# Patient Record
Sex: MALE | Race: WHITE | Employment: UNEMPLOYED | URBAN - METROPOLITAN AREA
[De-identification: names, ages, dates, MRNs, and addresses within clinical notes are randomized per-mention and may not be internally consistent; named-entity substitution may affect disease eponyms.]

---

## 2018-11-03 ENCOUNTER — HOSPITAL ENCOUNTER (EMERGENCY)
Age: 38
Discharge: HOME | End: 2018-11-03
Attending: EMERGENCY MEDICINE
Payer: COMMERCIAL

## 2018-11-03 ENCOUNTER — APPOINTMENT (OUTPATIENT)
Dept: CT IMAGING | Age: 38
End: 2018-11-03
Payer: COMMERCIAL

## 2018-11-03 VITALS
HEIGHT: 69 IN | RESPIRATION RATE: 10 BRPM | TEMPERATURE: 98.3 F | HEART RATE: 110 BPM | SYSTOLIC BLOOD PRESSURE: 131 MMHG | OXYGEN SATURATION: 95 % | DIASTOLIC BLOOD PRESSURE: 82 MMHG | WEIGHT: 170 LBS | BODY MASS INDEX: 25.18 KG/M2

## 2018-11-03 DIAGNOSIS — T50.901A ACCIDENTAL DRUG OVERDOSE, INITIAL ENCOUNTER: Primary | ICD-10-CM

## 2018-11-03 DIAGNOSIS — S01.81XA FACIAL LACERATION, INITIAL ENCOUNTER: ICD-10-CM

## 2018-11-03 LAB
ACETAMINOPHEN LEVEL: <5 MCG/ML (ref 10–30)
ALBUMIN SERPL-MCNC: 4.9 G/DL (ref 3.5–5.2)
ALP BLD-CCNC: 53 U/L (ref 40–129)
ALT SERPL-CCNC: 47 U/L (ref 0–40)
AMPHETAMINE SCREEN, URINE: NOT DETECTED
ANION GAP SERPL CALCULATED.3IONS-SCNC: 15 MMOL/L (ref 7–16)
AST SERPL-CCNC: 27 U/L (ref 0–39)
BACTERIA: ABNORMAL /HPF
BARBITURATE SCREEN URINE: NOT DETECTED
BENZODIAZEPINE SCREEN, URINE: NOT DETECTED
BILIRUB SERPL-MCNC: 0.4 MG/DL (ref 0–1.2)
BILIRUBIN URINE: NEGATIVE
BLOOD, URINE: ABNORMAL
BUN BLDV-MCNC: 21 MG/DL (ref 6–20)
CALCIUM SERPL-MCNC: 9.2 MG/DL (ref 8.6–10.2)
CANNABINOID SCREEN URINE: NOT DETECTED
CHLORIDE BLD-SCNC: 98 MMOL/L (ref 98–107)
CLARITY: CLEAR
CO2: 23 MMOL/L (ref 22–29)
COCAINE METABOLITE SCREEN URINE: POSITIVE
COLOR: YELLOW
CREAT SERPL-MCNC: 1.5 MG/DL (ref 0.7–1.2)
ETHANOL: <10 MG/DL (ref 0–0.08)
GFR AFRICAN AMERICAN: >60
GFR NON-AFRICAN AMERICAN: 52 ML/MIN/1.73
GLUCOSE BLD-MCNC: 240 MG/DL (ref 74–109)
GLUCOSE URINE: 500 MG/DL
HCT VFR BLD CALC: 44.1 % (ref 37–54)
HEMOGLOBIN: 15.5 G/DL (ref 12.5–16.5)
KETONES, URINE: NEGATIVE MG/DL
LEUKOCYTE ESTERASE, URINE: NEGATIVE
MCH RBC QN AUTO: 30.5 PG (ref 26–35)
MCHC RBC AUTO-ENTMCNC: 35.1 % (ref 32–34.5)
MCV RBC AUTO: 86.6 FL (ref 80–99.9)
METHADONE SCREEN, URINE: NOT DETECTED
NITRITE, URINE: NEGATIVE
OPIATE SCREEN URINE: NOT DETECTED
PDW BLD-RTO: 13 FL (ref 11.5–15)
PH UA: 5.5 (ref 5–9)
PHENCYCLIDINE SCREEN URINE: NOT DETECTED
PLATELET # BLD: 247 E9/L (ref 130–450)
PMV BLD AUTO: 9.1 FL (ref 7–12)
POTASSIUM SERPL-SCNC: 4.9 MMOL/L (ref 3.5–5)
PROPOXYPHENE SCREEN: NOT DETECTED
PROTEIN UA: 100 MG/DL
RBC # BLD: 5.09 E12/L (ref 3.8–5.8)
RBC UA: ABNORMAL /HPF (ref 0–2)
SALICYLATE, SERUM: <0.3 MG/DL (ref 0–30)
SODIUM BLD-SCNC: 136 MMOL/L (ref 132–146)
SPECIFIC GRAVITY UA: >=1.03 (ref 1–1.03)
TOTAL CK: 237 U/L (ref 20–200)
TOTAL PROTEIN: 7.8 G/DL (ref 6.4–8.3)
TRICYCLIC ANTIDEPRESSANTS SCREEN SERUM: NEGATIVE NG/ML
UROBILINOGEN, URINE: 0.2 E.U./DL
WBC # BLD: 11.5 E9/L (ref 4.5–11.5)
WBC UA: ABNORMAL /HPF (ref 0–5)

## 2018-11-03 PROCEDURE — 2500000003 HC RX 250 WO HCPCS

## 2018-11-03 PROCEDURE — 85027 COMPLETE CBC AUTOMATED: CPT

## 2018-11-03 PROCEDURE — 81001 URINALYSIS AUTO W/SCOPE: CPT

## 2018-11-03 PROCEDURE — 72125 CT NECK SPINE W/O DYE: CPT

## 2018-11-03 PROCEDURE — 36415 COLL VENOUS BLD VENIPUNCTURE: CPT

## 2018-11-03 PROCEDURE — 12013 RPR F/E/E/N/L/M 2.6-5.0 CM: CPT

## 2018-11-03 PROCEDURE — 2580000003 HC RX 258: Performed by: EMERGENCY MEDICINE

## 2018-11-03 PROCEDURE — 80053 COMPREHEN METABOLIC PANEL: CPT

## 2018-11-03 PROCEDURE — 90471 IMMUNIZATION ADMIN: CPT | Performed by: EMERGENCY MEDICINE

## 2018-11-03 PROCEDURE — 82550 ASSAY OF CK (CPK): CPT

## 2018-11-03 PROCEDURE — 6360000002 HC RX W HCPCS: Performed by: EMERGENCY MEDICINE

## 2018-11-03 PROCEDURE — 70486 CT MAXILLOFACIAL W/O DYE: CPT

## 2018-11-03 PROCEDURE — G0480 DRUG TEST DEF 1-7 CLASSES: HCPCS

## 2018-11-03 PROCEDURE — 99284 EMERGENCY DEPT VISIT MOD MDM: CPT

## 2018-11-03 PROCEDURE — 70450 CT HEAD/BRAIN W/O DYE: CPT

## 2018-11-03 PROCEDURE — 80307 DRUG TEST PRSMV CHEM ANLYZR: CPT

## 2018-11-03 PROCEDURE — 90715 TDAP VACCINE 7 YRS/> IM: CPT | Performed by: EMERGENCY MEDICINE

## 2018-11-03 RX ORDER — LIDOCAINE HYDROCHLORIDE AND EPINEPHRINE 10; 10 MG/ML; UG/ML
INJECTION, SOLUTION INFILTRATION; PERINEURAL
Status: COMPLETED
Start: 2018-11-03 | End: 2018-11-03

## 2018-11-03 RX ORDER — 0.9 % SODIUM CHLORIDE 0.9 %
1000 INTRAVENOUS SOLUTION INTRAVENOUS ONCE
Status: COMPLETED | OUTPATIENT
Start: 2018-11-03 | End: 2018-11-03

## 2018-11-03 RX ORDER — NALOXONE HYDROCHLORIDE 1 MG/ML
INJECTION INTRAMUSCULAR; INTRAVENOUS; SUBCUTANEOUS
Status: DISCONTINUED
Start: 2018-11-03 | End: 2018-11-03 | Stop reason: WASHOUT

## 2018-11-03 RX ADMIN — SODIUM CHLORIDE 1000 ML: 9 INJECTION, SOLUTION INTRAVENOUS at 04:43

## 2018-11-03 RX ADMIN — TETANUS TOXOID, REDUCED DIPHTHERIA TOXOID AND ACELLULAR PERTUSSIS VACCINE, ADSORBED 0.5 ML: 5; 2.5; 8; 8; 2.5 SUSPENSION INTRAMUSCULAR at 04:43

## 2018-11-03 RX ADMIN — LIDOCAINE HYDROCHLORIDE,EPINEPHRINE BITARTRATE: 10; .01 INJECTION, SOLUTION INFILTRATION; PERINEURAL at 07:26

## 2018-11-03 RX ADMIN — SODIUM CHLORIDE 1000 ML: 9 INJECTION, SOLUTION INTRAVENOUS at 07:32

## 2018-11-03 NOTE — ED NOTES
at bedside for laceration repair. Pt currently alert and answering questions. Respirations easy and unlabored. Cardiac monitoring continued. No signs of distress.   Will continue to monitor     Renetta Soliz RN  11/03/18 [id number]

## 2018-11-03 NOTE — ED PROVIDER NOTES
Negative Negative    Ketones, Urine Negative Negative mg/dL    Specific Gravity, UA >=1.030 1.005 - 1.030    Blood, Urine LARGE (A) Negative    pH, UA 5.5 5.0 - 9.0    Protein,  (A) Negative mg/dL    Urobilinogen, Urine 0.2 <2.0 E.U./dL    Nitrite, Urine Negative Negative    Leukocyte Esterase, Urine Negative Negative   Urine Drug Screen   Result Value Ref Range    Amphetamine Screen, Urine NOT DETECTED Negative <1000 ng/mL    Barbiturate Screen, Ur NOT DETECTED Negative < 200 ng/mL    Benzodiazepine Screen, Urine NOT DETECTED Negative < 200 ng/mL    Cannabinoid Scrn, Ur NOT DETECTED Negative < 50ng/mL    Cocaine Metabolite Screen, Urine POSITIVE (A) Negative < 300 ng/mL    Opiate Scrn, Ur NOT DETECTED Negative < 300ng/mL    PCP Screen, Urine NOT DETECTED Negative < 25 ng/mL    Methadone Screen, Urine NOT DETECTED Negative <300 ng/mL    Propoxyphene Scrn, Ur NOT DETECTED Negative <300 ng/mL   Serum Drug Screen   Result Value Ref Range    Ethanol Lvl <10 mg/dL    Acetaminophen Level <5.0 (L) 10.0 - 46.0 mcg/mL    Salicylate, Serum <4.5 0.0 - 30.0 mg/dL    TCA Scrn NEGATIVE Cutoff:300 ng/mL   Microscopic Urinalysis   Result Value Ref Range    WBC, UA 1-3 0 - 5 /HPF    RBC, UA 10-20 (A) 0 - 2 /HPF    Bacteria, UA FEW (A) /HPF       Radiology:  CT Cervical Spine WO Contrast   Final Result   No acute fracture. This report has been electronically signed by Melvi Sanches MD.      CT Facial Bones WO Contrast   Final Result   No acute facial bone fracture. This report has been electronically signed by Melvi Sanches MD.      CT Head WO Contrast   Final Result   No acute intracranial abnormality. This report has been electronically signed by Melvi Sanches MD.          ------------------------- NURSING NOTES AND VITALS REVIEWED ---------------------------  Date / Time Roomed:  11/3/2018  4:26 AM  ED Bed Assignment:  20/20    The nursing notes within the ED encounter and vital signs as below have been reviewed.

## 2018-11-03 NOTE — ED NOTES
PROCEDURE NOTE  11/3/18       Time: 0710    LACERATION REPAIR  Risks, benefits and alternatives (for applicable procedures below) described. Performed By: Bang Kaplan DO. Laceration #: 1. Location: forhead  Length: 3cm. The wound area was cleansed with povidone iodine, cleansend with shur-clens and draped in a sterile fashion. Local Anesthesia:  Lidocaine 1% with epinephrine. The wound was explored with the following results:  no foreign body or tendon injury seen. Debridement: None. Undermining: None. Wound Margins Revised: None. Flaps Aligned: yes. The wound was closed with 4-0 Ethilon using interrupted sutures. Dressing:  bacitracin was placed. Total number suture:  4. There were no additional lacerations requiring repair. A second laceration on lower forehead, 1.2 cm was closed with 4-0 Ethilon using 3 interrupted sutures. A third laceration, 1 cm on on left upper lip was sutured with 2 interrupted sutures, 4-0 Ethilon.             Bang Kaplan DO  Resident  11/03/18 [address] Caleb Gonzalez DO  Resident  11/03/18 [id number]

## 2018-11-09 LAB — COCAINE, CONFIRM, URINE: >1000 NG/ML

## 2020-08-14 ENCOUNTER — HOSPITAL ENCOUNTER (EMERGENCY)
Age: 40
Discharge: HOME | End: 2020-08-14

## 2020-08-14 ENCOUNTER — APPOINTMENT (OUTPATIENT)
Dept: CT IMAGING | Age: 40
End: 2020-08-14

## 2020-08-14 VITALS
DIASTOLIC BLOOD PRESSURE: 75 MMHG | WEIGHT: 195 LBS | TEMPERATURE: 99.1 F | SYSTOLIC BLOOD PRESSURE: 131 MMHG | HEIGHT: 68 IN | HEART RATE: 104 BPM | OXYGEN SATURATION: 97 % | RESPIRATION RATE: 18 BRPM | BODY MASS INDEX: 29.55 KG/M2

## 2020-08-14 LAB
ALBUMIN SERPL-MCNC: 3.8 G/DL (ref 3.5–5.2)
ALP BLD-CCNC: 116 U/L (ref 40–129)
ALT SERPL-CCNC: 85 U/L (ref 0–40)
ANION GAP SERPL CALCULATED.3IONS-SCNC: 9 MMOL/L (ref 7–16)
AST SERPL-CCNC: 167 U/L (ref 0–39)
BACTERIA: ABNORMAL /HPF
BASOPHILS ABSOLUTE: 0.04 E9/L (ref 0–0.2)
BASOPHILS RELATIVE PERCENT: 0.6 % (ref 0–2)
BILIRUB SERPL-MCNC: 1.3 MG/DL (ref 0–1.2)
BILIRUBIN URINE: ABNORMAL
BLOOD, URINE: NEGATIVE
BUN BLDV-MCNC: 7 MG/DL (ref 6–20)
CALCIUM SERPL-MCNC: 8.8 MG/DL (ref 8.6–10.2)
CHLORIDE BLD-SCNC: 105 MMOL/L (ref 98–107)
CLARITY: CLEAR
CO2: 23 MMOL/L (ref 22–29)
COLOR: ABNORMAL
CREAT SERPL-MCNC: 1.1 MG/DL (ref 0.7–1.2)
EOSINOPHILS ABSOLUTE: 0.17 E9/L (ref 0.05–0.5)
EOSINOPHILS RELATIVE PERCENT: 2.5 % (ref 0–6)
EPITHELIAL CELLS, UA: ABNORMAL /HPF
GFR AFRICAN AMERICAN: >60
GFR NON-AFRICAN AMERICAN: >60 ML/MIN/1.73
GLUCOSE BLD-MCNC: 119 MG/DL (ref 74–99)
GLUCOSE URINE: NEGATIVE MG/DL
HCT VFR BLD CALC: 44.3 % (ref 37–54)
HEMOGLOBIN: 15.2 G/DL (ref 12.5–16.5)
IMMATURE GRANULOCYTES #: 0.03 E9/L
IMMATURE GRANULOCYTES %: 0.4 % (ref 0–5)
KETONES, URINE: NEGATIVE MG/DL
LACTIC ACID: 2 MMOL/L (ref 0.5–2.2)
LEUKOCYTE ESTERASE, URINE: NEGATIVE
LYMPHOCYTES ABSOLUTE: 0.97 E9/L (ref 1.5–4)
LYMPHOCYTES RELATIVE PERCENT: 14.1 % (ref 20–42)
MCH RBC QN AUTO: 33 PG (ref 26–35)
MCHC RBC AUTO-ENTMCNC: 34.3 % (ref 32–34.5)
MCV RBC AUTO: 96.1 FL (ref 80–99.9)
MONOCYTES ABSOLUTE: 0.65 E9/L (ref 0.1–0.95)
MONOCYTES RELATIVE PERCENT: 9.4 % (ref 2–12)
MUCUS: PRESENT /LPF
NEUTROPHILS ABSOLUTE: 5.02 E9/L (ref 1.8–7.3)
NEUTROPHILS RELATIVE PERCENT: 73 % (ref 43–80)
NITRITE, URINE: NEGATIVE
PDW BLD-RTO: 13.8 FL (ref 11.5–15)
PH UA: 6.5 (ref 5–9)
PLATELET # BLD: 130 E9/L (ref 130–450)
PMV BLD AUTO: 9.3 FL (ref 7–12)
POTASSIUM SERPL-SCNC: 3.6 MMOL/L (ref 3.5–5)
PROTEIN UA: ABNORMAL MG/DL
RBC # BLD: 4.61 E12/L (ref 3.8–5.8)
RBC UA: ABNORMAL /HPF (ref 0–2)
SODIUM BLD-SCNC: 137 MMOL/L (ref 132–146)
SPECIFIC GRAVITY UA: 1.02 (ref 1–1.03)
T4 TOTAL: 9.3 MCG/DL (ref 4.5–11.7)
TOTAL PROTEIN: 7.6 G/DL (ref 6.4–8.3)
TSH SERPL DL<=0.05 MIU/L-ACNC: 2.9 UIU/ML (ref 0.27–4.2)
UROBILINOGEN, URINE: 1 E.U./DL
WBC # BLD: 6.9 E9/L (ref 4.5–11.5)
WBC UA: ABNORMAL /HPF (ref 0–5)

## 2020-08-14 PROCEDURE — 6360000004 HC RX CONTRAST MEDICATION: Performed by: RADIOLOGY

## 2020-08-14 PROCEDURE — 83605 ASSAY OF LACTIC ACID: CPT

## 2020-08-14 PROCEDURE — 85025 COMPLETE CBC W/AUTO DIFF WBC: CPT

## 2020-08-14 PROCEDURE — 84443 ASSAY THYROID STIM HORMONE: CPT

## 2020-08-14 PROCEDURE — 36415 COLL VENOUS BLD VENIPUNCTURE: CPT

## 2020-08-14 PROCEDURE — 74177 CT ABD & PELVIS W/CONTRAST: CPT

## 2020-08-14 PROCEDURE — 81001 URINALYSIS AUTO W/SCOPE: CPT

## 2020-08-14 PROCEDURE — 2580000003 HC RX 258: Performed by: PHYSICIAN ASSISTANT

## 2020-08-14 PROCEDURE — 80053 COMPREHEN METABOLIC PANEL: CPT

## 2020-08-14 PROCEDURE — 6370000000 HC RX 637 (ALT 250 FOR IP): Performed by: PHYSICIAN ASSISTANT

## 2020-08-14 PROCEDURE — 84436 ASSAY OF TOTAL THYROXINE: CPT

## 2020-08-14 PROCEDURE — 99285 EMERGENCY DEPT VISIT HI MDM: CPT

## 2020-08-14 RX ORDER — ACETAMINOPHEN 500 MG
1000 TABLET ORAL ONCE
Status: COMPLETED | OUTPATIENT
Start: 2020-08-14 | End: 2020-08-14

## 2020-08-14 RX ORDER — 0.9 % SODIUM CHLORIDE 0.9 %
1000 INTRAVENOUS SOLUTION INTRAVENOUS ONCE
Status: COMPLETED | OUTPATIENT
Start: 2020-08-14 | End: 2020-08-14

## 2020-08-14 RX ADMIN — IOPAMIDOL 110 ML: 755 INJECTION, SOLUTION INTRAVENOUS at 16:59

## 2020-08-14 RX ADMIN — SODIUM CHLORIDE 1000 ML: 9 INJECTION, SOLUTION INTRAVENOUS at 16:07

## 2020-08-14 RX ADMIN — ACETAMINOPHEN 1000 MG: 500 TABLET ORAL at 16:38

## 2020-08-14 ASSESSMENT — PAIN DESCRIPTION - LOCATION: LOCATION: ABDOMEN;BACK

## 2020-08-14 ASSESSMENT — PAIN SCALES - GENERAL
PAINLEVEL_OUTOF10: 3
PAINLEVEL_OUTOF10: 3

## 2020-08-14 NOTE — ED NOTES
Pt states he was diagnosed with heppatitis about 10 years ago, pt states he has diarrhea almost 10 times a day now for a year now. pt states soft greasy/ slimy stool foul smelling foalting stool. Pt states nausea sometimes more RLQ and LLQ. And sharp lower back pain. Pt states sometimes his armpits hurt. [place] he gained over the last year. Fatigue.       Milton Floyd RN  08/14/20 [id number]

## 2020-08-14 NOTE — ED PROVIDER NOTES
CT abdomen pelvis w con



INDICATION:  fever back pain.



TECHNIQUE:

All CT scans at this location are performed using the following dose modulation technique: Automated 
exposure control.



CONTRAST:  Isovue-300, 100 cc IV injection.



COMPARISON: None available.



CT ABDOMEN: Evaluation of the lung bases demonstrates mild tree-in-bud inflammation at the lower lobe
s.



The parenchymal organs are unremarkable in appearance. Negative for abdominal mass, fluid or inflamma
tion. The bowel is not dilated or thickened.



CT PELVIS: Negative for mass, fluid or inflammation. A small amount of free fluid is noted.



IMPRESSION:

1. Mild basilar pneumonia.

2. Small amount of pelvic free fluid.



Signer Name: Madi Dotson MD 

Signed: 12/24/2019 7:39 PM

 Workstation Name: [id number]                                                                                                                                    Department of Emergency Medicine   ED  Provider Note  Admit Date/RoomTime: 8/14/2020  3:39 PM  ED Room: 14/14        HPI:  8/14/20,   Time: 4:30 PM EDT         Tigre Thomas III is a 45 y.o. male presenting to the ED for diarrhea, lower abdominal pain, beginning 1 year ago but getting worse. The complaint has been persistent, moderate in severity, and worsened by nothing. The patient states that he has not felt well in at least a year. He is describing diarrhea that is greasy slimy and foul-smelling on a daily basis. The patient has lower abdominal pain as well as low back pain. He states that over the past year he has gained 30 pounds. He has a history of hepatitis C but never took the treatment for the his disease. He states that he is \"hot all the time\" but has not documented a fever. He denies any chills. The patient has been mildly nauseated but denies any vomiting. He is never had a colonoscopy. He admits that he has been putting off treatment. He gets all of his medical care at a local urgent care. The patient has otherwise generally been healthy. He has never had any abdominal surgery.         ROS:     Constitutional: Negative for fever and chills  HENT: Negative for ear pain, sore throat and sinus pressure  Eyes: Negative for pain, discharge and redness  Respiratory:  Negative for shortness of breath, cough and wheezing  Cardiovascular: Negative for CP, edema or palpitations  Gastrointestinal:  See HPI  Genitourinary: Negative for dysuria and frequency  Musculoskeletal: Negative for back pain and arthralgia  Skin: Negative for rash and wound  Neurological: Negative for weakness and headaches  Hematological: Negative for adenopathy    All other systems reviewed and are negative      -------------------------------- PAST HISTORY mg/dL    Total Protein 7.6 6.4 - 8.3 g/dL    Alb 3.8 3.5 - 5.2 g/dL    Total Bilirubin 1.3 (H) 0.0 - 1.2 mg/dL    Alkaline Phosphatase 116 40 - 129 U/L    ALT 85 (H) 0 - 40 U/L     (H) 0 - 39 U/L   Lactic Acid, Plasma   Result Value Ref Range    Lactic Acid 2.0 0.5 - 2.2 mmol/L   Urinalysis   Result Value Ref Range    Color, UA DARK YELLOW (A) Straw/Yellow    Clarity, UA Clear Clear    Glucose, Ur Negative Negative mg/dL    Bilirubin Urine MODERATE (A) Negative    Ketones, Urine Negative Negative mg/dL    Specific Gravity, UA 1.025 1.005 - 1.030    Blood, Urine Negative Negative    pH, UA 6.5 5.0 - 9.0    Protein, UA TRACE Negative mg/dL    Urobilinogen, Urine 1.0 <2.0 E.U./dL    Nitrite, Urine Negative Negative    Leukocyte Esterase, Urine Negative Negative   TSH without Reflex   Result Value Ref Range    TSH 2.900 0.270 - 4.200 uIU/mL   T4   Result Value Ref Range    T4, Total 9.3 4.5 - 11.7 mcg/dL   Microscopic Urinalysis   Result Value Ref Range    Mucus, UA Present (A) None Seen /LPF    WBC, UA 0-1 0 - 5 /HPF    RBC, UA NONE 0 - 2 /HPF    Epithelial Cells, UA NONE SEEN /HPF    Bacteria, UA RARE (A) None Seen /HPF       RADIOLOGY:  Interpreted by Radiologist.  CT ABDOMEN PELVIS W IV CONTRAST Additional Contrast? None   Final Result      1. Suspected hepatic steatosis. 2. Splenomegaly. 3. Nonspecific celiac adenopathy.                         ----------------- NURSING NOTES AND VITALS REVIEWED ---------------   The nursing notes within the ED encounter and vital signs as below have been reviewed.    BP (!) 142/82   Pulse 104   Temp 99.1 °F (37.3 °C)   Resp 18   Ht 5' 8\" (1.727 m)   Wt 195 lb (88.5 kg)   SpO2 97%   BMI 29.65 kg/m²   Oxygen Saturation Interpretation: Normal      --------------------------------PHYSICAL EXAM------------------------------------      Constitutional/General: Alert and oriented x3, well appearing, non toxic in NAD  Head: NC/AT  Eyes: PERRL, EOMI  Mouth: Oropharynx clear, handling secretions, no trismus  Neck: Supple, full ROM, no meningeal signs  Pulmonary: Lungs clear to auscultation bilaterally, no wheezes, rales, or rhonchi. Not in respiratory distress  Cardiovascular:  Regular rate and rhythm, no murmurs, gallops, or rubs. 2+ distal pulses  Abdomen: Soft, + BS. No distension. Mild diffuse lower abdominal pain. No palpable rigidity or rebound. .  No palpable rigidity, rebound or guarding  Extremities: Moves all extremities x 4. Warm and well perfused  Skin: warm and dry without rash  Neurologic: GCS 15,  Intact. No focal deficits  Psych: Normal Affect      ------------------------ ED COURSE/MEDICAL DECISION MAKING----------------------  Medications   0.9 % sodium chloride bolus (1,000 mLs Intravenous New Bag 8/14/20 1607)   acetaminophen (TYLENOL) tablet 1,000 mg (1,000 mg Oral Given 8/14/20 1638)   iopamidol (ISOVUE-370) 76 % injection 110 mL (110 mLs Intravenous Given 8/14/20 1659)         Medical Decision Making:    Patient's work-up was reviewed. He does have a mild bump in his LFTs but the rest of his labs look great and CT scan with IV contrast is negative for any acute changes. He does have some fatty changes within the liver and some nonspecific celiac adenopathy. The patient was advised that he is going to need to follow-up with GI both for the diarrhea that has had for a year and the untreated hepatitis C. He might try a gluten-free diet now until he gets that appointment but otherwise he can discuss other changes with the gastroenterologist.  Blood pressure here is borderline. Patient needs to find and establish with a PCP for regular routine care and preventative medicine. He is aware and agreeable to this plan       Counseling: The emergency provider has spoken with the  and discussed todays results, in addition to providing specific details for the plan of care and counseling regarding the diagnosis and prognosis.   Questions are answered at this time and they are agreeable with the plan.      ------------------------ IMPRESSION AND DISPOSITION -------------------------------    IMPRESSION  1. Lower abdominal pain    2.  Diarrhea, unspecified type        DISPOSITION  Disposition: Discharge to home  Patient condition is stable                   Kolby Mas PA-C  08/14/20 [id number]

## 2020-08-14 NOTE — ED NOTES
Received call from lab that wrong results were initially posted for pts UA. The patient's UA actually reads negative for red blood cells, epithelial cells, and rare bacteria. Lab was in the process of fixing the results in the computer to show the pt's accurate results.      Jim Drake RN  08/14/20 [id number]

## 2020-11-12 ENCOUNTER — APPOINTMENT (OUTPATIENT)
Dept: CT IMAGING | Age: 40
DRG: 241 | End: 2020-11-12
Payer: MEDICAID

## 2020-11-12 ENCOUNTER — HOSPITAL ENCOUNTER (INPATIENT)
Age: 40
LOS: 1 days | Discharge: HOME | DRG: 241 | End: 2020-11-14
Attending: EMERGENCY MEDICINE | Admitting: INTERNAL MEDICINE
Payer: MEDICAID

## 2020-11-12 LAB
ALBUMIN SERPL-MCNC: 3.6 G/DL (ref 3.5–5.2)
ALP BLD-CCNC: 111 U/L (ref 40–129)
ALT SERPL-CCNC: 41 U/L (ref 0–40)
ANION GAP SERPL CALCULATED.3IONS-SCNC: 7 MMOL/L (ref 7–16)
AST SERPL-CCNC: 86 U/L (ref 0–39)
BASOPHILS ABSOLUTE: 0.05 E9/L (ref 0–0.2)
BASOPHILS RELATIVE PERCENT: 0.5 % (ref 0–2)
BILIRUB SERPL-MCNC: 3 MG/DL (ref 0–1.2)
BUN BLDV-MCNC: 24 MG/DL (ref 6–20)
CALCIUM SERPL-MCNC: 9.1 MG/DL (ref 8.6–10.2)
CHLORIDE BLD-SCNC: 104 MMOL/L (ref 98–107)
CHP ED QC CHECK: YES
CO2: 25 MMOL/L (ref 22–29)
CREAT SERPL-MCNC: 1.1 MG/DL (ref 0.7–1.2)
EOSINOPHILS ABSOLUTE: 0.17 E9/L (ref 0.05–0.5)
EOSINOPHILS RELATIVE PERCENT: 1.6 % (ref 0–6)
GFR AFRICAN AMERICAN: >60
GFR NON-AFRICAN AMERICAN: >60 ML/MIN/1.73
GLUCOSE BLD-MCNC: 130 MG/DL (ref 74–99)
HCT VFR BLD CALC: 39.3 % (ref 37–54)
HEMOCCULT STL QL: POSITIVE
HEMOGLOBIN: 13.2 G/DL (ref 12.5–16.5)
IMMATURE GRANULOCYTES #: 0.04 E9/L
IMMATURE GRANULOCYTES %: 0.4 % (ref 0–5)
LACTIC ACID, SEPSIS: 1.8 MMOL/L (ref 0.5–1.9)
LIPASE: 48 U/L (ref 13–60)
LYMPHOCYTES ABSOLUTE: 1.34 E9/L (ref 1.5–4)
LYMPHOCYTES RELATIVE PERCENT: 12.9 % (ref 20–42)
MCH RBC QN AUTO: 32.3 PG (ref 26–35)
MCHC RBC AUTO-ENTMCNC: 33.6 % (ref 32–34.5)
MCV RBC AUTO: 96.1 FL (ref 80–99.9)
MONOCYTES ABSOLUTE: 0.79 E9/L (ref 0.1–0.95)
MONOCYTES RELATIVE PERCENT: 7.6 % (ref 2–12)
NEUTROPHILS ABSOLUTE: 7.99 E9/L (ref 1.8–7.3)
NEUTROPHILS RELATIVE PERCENT: 77 % (ref 43–80)
PDW BLD-RTO: 14.5 FL (ref 11.5–15)
PLATELET # BLD: 89 E9/L (ref 130–450)
PLATELET CONFIRMATION: NORMAL
PMV BLD AUTO: 10 FL (ref 7–12)
POTASSIUM SERPL-SCNC: 4.4 MMOL/L (ref 3.5–5)
RBC # BLD: 4.09 E12/L (ref 3.8–5.8)
SODIUM BLD-SCNC: 136 MMOL/L (ref 132–146)
TOTAL PROTEIN: 7.1 G/DL (ref 6.4–8.3)
WBC # BLD: 10.4 E9/L (ref 4.5–11.5)

## 2020-11-12 PROCEDURE — C9113 INJ PANTOPRAZOLE SODIUM, VIA: HCPCS | Performed by: NURSE PRACTITIONER

## 2020-11-12 PROCEDURE — 74177 CT ABD & PELVIS W/CONTRAST: CPT

## 2020-11-12 PROCEDURE — 83605 ASSAY OF LACTIC ACID: CPT

## 2020-11-12 PROCEDURE — 6370000000 HC RX 637 (ALT 250 FOR IP): Performed by: NURSE PRACTITIONER

## 2020-11-12 PROCEDURE — 96374 THER/PROPH/DIAG INJ IV PUSH: CPT

## 2020-11-12 PROCEDURE — 83690 ASSAY OF LIPASE: CPT

## 2020-11-12 PROCEDURE — 6360000004 HC RX CONTRAST MEDICATION: Performed by: RADIOLOGY

## 2020-11-12 PROCEDURE — 96361 HYDRATE IV INFUSION ADD-ON: CPT

## 2020-11-12 PROCEDURE — 96375 TX/PRO/DX INJ NEW DRUG ADDON: CPT

## 2020-11-12 PROCEDURE — 2580000003 HC RX 258: Performed by: NURSE PRACTITIONER

## 2020-11-12 PROCEDURE — 85025 COMPLETE CBC W/AUTO DIFF WBC: CPT

## 2020-11-12 PROCEDURE — 6360000002 HC RX W HCPCS: Performed by: NURSE PRACTITIONER

## 2020-11-12 PROCEDURE — 80053 COMPREHEN METABOLIC PANEL: CPT

## 2020-11-12 PROCEDURE — 99283 EMERGENCY DEPT VISIT LOW MDM: CPT

## 2020-11-12 RX ORDER — ONDANSETRON 4 MG/1
4 TABLET, ORALLY DISINTEGRATING ORAL ONCE
Status: COMPLETED | OUTPATIENT
Start: 2020-11-12 | End: 2020-11-12

## 2020-11-12 RX ORDER — 0.9 % SODIUM CHLORIDE 0.9 %
1000 INTRAVENOUS SOLUTION INTRAVENOUS ONCE
Status: COMPLETED | OUTPATIENT
Start: 2020-11-12 | End: 2020-11-12

## 2020-11-12 RX ORDER — MORPHINE SULFATE 2 MG/ML
2 INJECTION, SOLUTION INTRAMUSCULAR; INTRAVENOUS ONCE
Status: COMPLETED | OUTPATIENT
Start: 2020-11-12 | End: 2020-11-12

## 2020-11-12 RX ORDER — 0.9 % SODIUM CHLORIDE 0.9 %
1000 INTRAVENOUS SOLUTION INTRAVENOUS ONCE
Status: COMPLETED | OUTPATIENT
Start: 2020-11-12 | End: 2020-11-13

## 2020-11-12 RX ORDER — PANTOPRAZOLE SODIUM 40 MG/10ML
40 INJECTION, POWDER, LYOPHILIZED, FOR SOLUTION INTRAVENOUS ONCE
Status: COMPLETED | OUTPATIENT
Start: 2020-11-12 | End: 2020-11-12

## 2020-11-12 RX ORDER — ONDANSETRON 2 MG/ML
4 INJECTION INTRAMUSCULAR; INTRAVENOUS ONCE
Status: COMPLETED | OUTPATIENT
Start: 2020-11-12 | End: 2020-11-12

## 2020-11-12 RX ADMIN — ONDANSETRON 4 MG: 2 INJECTION INTRAMUSCULAR; INTRAVENOUS at 21:30

## 2020-11-12 RX ADMIN — SODIUM CHLORIDE 1000 ML: 9 INJECTION, SOLUTION INTRAVENOUS at 21:24

## 2020-11-12 RX ADMIN — ONDANSETRON 4 MG: 4 TABLET, ORALLY DISINTEGRATING ORAL at 17:56

## 2020-11-12 RX ADMIN — MORPHINE SULFATE 2 MG: 2 INJECTION, SOLUTION INTRAMUSCULAR; INTRAVENOUS at 21:30

## 2020-11-12 RX ADMIN — IOPAMIDOL 90 ML: 755 INJECTION, SOLUTION INTRAVENOUS at 22:19

## 2020-11-12 RX ADMIN — PANTOPRAZOLE SODIUM 40 MG: 40 INJECTION, POWDER, FOR SOLUTION INTRAVENOUS at 21:30

## 2020-11-12 ASSESSMENT — PAIN SCALES - GENERAL: PAINLEVEL_OUTOF10: 10

## 2020-11-12 NOTE — ED TRIAGE NOTES
FIRST PROVIDER CONTACT ASSESSMENT NOTE      Department of Emergency Medicine   ED  First Provider Note   11/12/20  5:32 PM EST    Chief Complaint: No chief complaint on file. History of Present Illness:    Mame Easton III is a 45 y.o. male who presents to the ED by private car for emesis  Focused Screening Exam:  Constitutional:  Alert, appears stated age and is in no distress.       *ALLERGIES*     Pcn [penicillins]     ED Triage Vitals [11/12/20 1717]   BP Temp Temp src Pulse Resp SpO2 Height Weight   -- 98.9 °F (37.2 °C) -- 136 20 95 % -- --        Initial Plan of Care:  Initiate Treatment-Testing, Proceed toTreatment Area When Bed Available for ED Attending/MLP to Continue Care    -----------------END OF FIRST PROVIDER CONTACT ASSESSMENT NOTE--------------  Electronically signed by CHRIS Duong CNP   DD: 11/12/20

## 2020-11-13 ENCOUNTER — ANESTHESIA EVENT (OUTPATIENT)
Dept: ENDOSCOPY | Age: 40
DRG: 241 | End: 2020-11-13
Payer: MEDICAID

## 2020-11-13 ENCOUNTER — ANESTHESIA (OUTPATIENT)
Dept: ENDOSCOPY | Age: 40
DRG: 241 | End: 2020-11-13
Payer: MEDICAID

## 2020-11-13 VITALS
RESPIRATION RATE: 19 BRPM | DIASTOLIC BLOOD PRESSURE: 38 MMHG | SYSTOLIC BLOOD PRESSURE: 78 MMHG | OXYGEN SATURATION: 87 %

## 2020-11-13 PROBLEM — K92.2 GI BLEED: Status: ACTIVE | Noted: 2020-11-13

## 2020-11-13 LAB
ACETAMINOPHEN LEVEL: <5 MCG/ML (ref 10–30)
ALBUMIN SERPL-MCNC: 3.4 G/DL (ref 3.5–5.2)
ALP BLD-CCNC: 95 U/L (ref 40–129)
ALT SERPL-CCNC: 37 U/L (ref 0–40)
AMMONIA: 74 UMOL/L (ref 16–60)
ANGLE (CLOT STRENGTH): 66.5 DEGREE (ref 59–74)
ANION GAP SERPL CALCULATED.3IONS-SCNC: 12 MMOL/L (ref 7–16)
AST SERPL-CCNC: 80 U/L (ref 0–39)
BASOPHILS ABSOLUTE: 0.07 E9/L (ref 0–0.2)
BASOPHILS RELATIVE PERCENT: 0.7 % (ref 0–2)
BILIRUB SERPL-MCNC: 2.8 MG/DL (ref 0–1.2)
BUN BLDV-MCNC: 24 MG/DL (ref 6–20)
CALCIUM SERPL-MCNC: 8.6 MG/DL (ref 8.6–10.2)
CHLORIDE BLD-SCNC: 105 MMOL/L (ref 98–107)
CO2: 21 MMOL/L (ref 22–29)
CREAT SERPL-MCNC: 1.2 MG/DL (ref 0.7–1.2)
EOSINOPHILS ABSOLUTE: 0.36 E9/L (ref 0.05–0.5)
EOSINOPHILS RELATIVE PERCENT: 3.5 % (ref 0–6)
EPL-TEG: 0.3 % (ref 0–15)
ETHANOL: <10 MG/DL (ref 0–0.08)
G-TEG: 5.5 K D/SC (ref 4.5–11)
GFR AFRICAN AMERICAN: >60
GFR NON-AFRICAN AMERICAN: >60 ML/MIN/1.73
GLUCOSE BLD-MCNC: 98 MG/DL (ref 74–99)
HCT VFR BLD CALC: 30.9 % (ref 37–54)
HCT VFR BLD CALC: 32.2 % (ref 37–54)
HCT VFR BLD CALC: 32.5 % (ref 37–54)
HCT VFR BLD CALC: 34.3 % (ref 37–54)
HEMOGLOBIN: 10.4 G/DL (ref 12.5–16.5)
HEMOGLOBIN: 10.7 G/DL (ref 12.5–16.5)
HEMOGLOBIN: 11 G/DL (ref 12.5–16.5)
HEMOGLOBIN: 11.7 G/DL (ref 12.5–16.5)
IMMATURE GRANULOCYTES #: 0.05 E9/L
IMMATURE GRANULOCYTES %: 0.5 % (ref 0–5)
INR BLD: 1.7
K (CLOTTING TIME): 1.8 MIN (ref 1–3)
LY30 (FIBRINOLYSIS): 0.3 % (ref 0–8)
LYMPHOCYTES ABSOLUTE: 2.02 E9/L (ref 1.5–4)
LYMPHOCYTES RELATIVE PERCENT: 19.8 % (ref 20–42)
MA (MAX AMPLITUDE): 52.4 MM (ref 50–70)
MCH RBC QN AUTO: 32.7 PG (ref 26–35)
MCHC RBC AUTO-ENTMCNC: 34.1 % (ref 32–34.5)
MCV RBC AUTO: 95.8 FL (ref 80–99.9)
MONOCYTES ABSOLUTE: 0.82 E9/L (ref 0.1–0.95)
MONOCYTES RELATIVE PERCENT: 8 % (ref 2–12)
NEUTROPHILS ABSOLUTE: 6.89 E9/L (ref 1.8–7.3)
NEUTROPHILS RELATIVE PERCENT: 67.5 % (ref 43–80)
PDW BLD-RTO: 14.8 FL (ref 11.5–15)
PLATELET # BLD: 86 E9/L (ref 130–450)
PLATELET CONFIRMATION: NORMAL
PMV BLD AUTO: 10.5 FL (ref 7–12)
POTASSIUM REFLEX MAGNESIUM: 3.8 MMOL/L (ref 3.5–5)
PROTHROMBIN TIME: 19.1 SEC (ref 9.3–12.4)
R (REACTION TIME): 4.9 MIN (ref 5–10)
RBC # BLD: 3.58 E12/L (ref 3.8–5.8)
SALICYLATE, SERUM: <0.3 MG/DL (ref 0–30)
SODIUM BLD-SCNC: 138 MMOL/L (ref 132–146)
TOTAL PROTEIN: 6.3 G/DL (ref 6.4–8.3)
TRICYCLIC ANTIDEPRESSANTS SCREEN SERUM: NEGATIVE NG/ML
WBC # BLD: 10.2 E9/L (ref 4.5–11.5)

## 2020-11-13 PROCEDURE — 6360000002 HC RX W HCPCS

## 2020-11-13 PROCEDURE — 80307 DRUG TEST PRSMV CHEM ANLYZR: CPT

## 2020-11-13 PROCEDURE — 7100000001 HC PACU RECOVERY - ADDTL 15 MIN: Performed by: SURGERY

## 2020-11-13 PROCEDURE — 3700000000 HC ANESTHESIA ATTENDED CARE: Performed by: SURGERY

## 2020-11-13 PROCEDURE — 0DB68ZX EXCISION OF STOMACH, VIA NATURAL OR ARTIFICIAL OPENING ENDOSCOPIC, DIAGNOSTIC: ICD-10-PCS | Performed by: SURGERY

## 2020-11-13 PROCEDURE — 88305 TISSUE EXAM BY PATHOLOGIST: CPT

## 2020-11-13 PROCEDURE — C9113 INJ PANTOPRAZOLE SODIUM, VIA: HCPCS | Performed by: INTERNAL MEDICINE

## 2020-11-13 PROCEDURE — 36415 COLL VENOUS BLD VENIPUNCTURE: CPT

## 2020-11-13 PROCEDURE — 2580000003 HC RX 258: Performed by: FAMILY MEDICINE

## 2020-11-13 PROCEDURE — 85576 BLOOD PLATELET AGGREGATION: CPT

## 2020-11-13 PROCEDURE — 85018 HEMOGLOBIN: CPT

## 2020-11-13 PROCEDURE — 2709999900 HC NON-CHARGEABLE SUPPLY: Performed by: SURGERY

## 2020-11-13 PROCEDURE — 85025 COMPLETE CBC W/AUTO DIFF WBC: CPT

## 2020-11-13 PROCEDURE — 6360000002 HC RX W HCPCS: Performed by: INTERNAL MEDICINE

## 2020-11-13 PROCEDURE — 2580000003 HC RX 258

## 2020-11-13 PROCEDURE — 85384 FIBRINOGEN ACTIVITY: CPT

## 2020-11-13 PROCEDURE — 2580000003 HC RX 258: Performed by: NURSE PRACTITIONER

## 2020-11-13 PROCEDURE — 6370000000 HC RX 637 (ALT 250 FOR IP): Performed by: STUDENT IN AN ORGANIZED HEALTH CARE EDUCATION/TRAINING PROGRAM

## 2020-11-13 PROCEDURE — 2060000000 HC ICU INTERMEDIATE R&B

## 2020-11-13 PROCEDURE — 3700000001 HC ADD 15 MINUTES (ANESTHESIA): Performed by: SURGERY

## 2020-11-13 PROCEDURE — 85347 COAGULATION TIME ACTIVATED: CPT

## 2020-11-13 PROCEDURE — G0480 DRUG TEST DEF 1-7 CLASSES: HCPCS

## 2020-11-13 PROCEDURE — 3609012400 HC EGD TRANSORAL BIOPSY SINGLE/MULTIPLE: Performed by: SURGERY

## 2020-11-13 PROCEDURE — 85014 HEMATOCRIT: CPT

## 2020-11-13 PROCEDURE — 2580000003 HC RX 258: Performed by: INTERNAL MEDICINE

## 2020-11-13 PROCEDURE — 6360000002 HC RX W HCPCS: Performed by: NURSE PRACTITIONER

## 2020-11-13 PROCEDURE — 85610 PROTHROMBIN TIME: CPT

## 2020-11-13 PROCEDURE — 99253 IP/OBS CNSLTJ NEW/EST LOW 45: CPT | Performed by: SURGERY

## 2020-11-13 PROCEDURE — 7100000000 HC PACU RECOVERY - FIRST 15 MIN: Performed by: SURGERY

## 2020-11-13 PROCEDURE — 82140 ASSAY OF AMMONIA: CPT

## 2020-11-13 PROCEDURE — 80053 COMPREHEN METABOLIC PANEL: CPT

## 2020-11-13 PROCEDURE — 88342 IMHCHEM/IMCYTCHM 1ST ANTB: CPT

## 2020-11-13 PROCEDURE — 43239 EGD BIOPSY SINGLE/MULTIPLE: CPT | Performed by: SURGERY

## 2020-11-13 RX ORDER — MORPHINE SULFATE 2 MG/ML
2 INJECTION, SOLUTION INTRAMUSCULAR; INTRAVENOUS EVERY 4 HOURS PRN
Status: DISCONTINUED | OUTPATIENT
Start: 2020-11-13 | End: 2020-11-14 | Stop reason: HOSPADM

## 2020-11-13 RX ORDER — MULTIVITAMIN WITH IRON
1 TABLET ORAL DAILY
Status: DISCONTINUED | OUTPATIENT
Start: 2020-11-14 | End: 2020-11-14 | Stop reason: HOSPADM

## 2020-11-13 RX ORDER — LORAZEPAM 2 MG/ML
1 INJECTION INTRAMUSCULAR
Status: DISCONTINUED | OUTPATIENT
Start: 2020-11-13 | End: 2020-11-14 | Stop reason: HOSPADM

## 2020-11-13 RX ORDER — LORAZEPAM 1 MG/1
1 TABLET ORAL
Status: DISCONTINUED | OUTPATIENT
Start: 2020-11-13 | End: 2020-11-14 | Stop reason: HOSPADM

## 2020-11-13 RX ORDER — SODIUM CHLORIDE 0.9 % (FLUSH) 0.9 %
10 SYRINGE (ML) INJECTION PRN
Status: DISCONTINUED | OUTPATIENT
Start: 2020-11-13 | End: 2020-11-14 | Stop reason: HOSPADM

## 2020-11-13 RX ORDER — FOLIC ACID 1 MG/1
1 TABLET ORAL DAILY
Status: DISCONTINUED | OUTPATIENT
Start: 2020-11-14 | End: 2020-11-14 | Stop reason: HOSPADM

## 2020-11-13 RX ORDER — LORAZEPAM 1 MG/1
2 TABLET ORAL
Status: DISCONTINUED | OUTPATIENT
Start: 2020-11-13 | End: 2020-11-14 | Stop reason: HOSPADM

## 2020-11-13 RX ORDER — THIAMINE MONONITRATE (VIT B1) 100 MG
100 TABLET ORAL DAILY
Status: DISCONTINUED | OUTPATIENT
Start: 2020-11-14 | End: 2020-11-14 | Stop reason: HOSPADM

## 2020-11-13 RX ORDER — LORAZEPAM 2 MG/ML
4 INJECTION INTRAMUSCULAR
Status: DISCONTINUED | OUTPATIENT
Start: 2020-11-13 | End: 2020-11-14 | Stop reason: HOSPADM

## 2020-11-13 RX ORDER — PROPOFOL 10 MG/ML
INJECTION, EMULSION INTRAVENOUS PRN
Status: DISCONTINUED | OUTPATIENT
Start: 2020-11-13 | End: 2020-11-13 | Stop reason: SDUPTHER

## 2020-11-13 RX ORDER — SODIUM CHLORIDE 0.9 % (FLUSH) 0.9 %
10 SYRINGE (ML) INJECTION EVERY 12 HOURS SCHEDULED
Status: DISCONTINUED | OUTPATIENT
Start: 2020-11-13 | End: 2020-11-14 | Stop reason: HOSPADM

## 2020-11-13 RX ORDER — SUCRALFATE 1 G/1
1 TABLET ORAL EVERY 6 HOURS SCHEDULED
Status: DISCONTINUED | OUTPATIENT
Start: 2020-11-13 | End: 2020-11-14 | Stop reason: HOSPADM

## 2020-11-13 RX ORDER — LORAZEPAM 1 MG/1
3 TABLET ORAL
Status: DISCONTINUED | OUTPATIENT
Start: 2020-11-13 | End: 2020-11-14 | Stop reason: HOSPADM

## 2020-11-13 RX ORDER — PANTOPRAZOLE SODIUM 40 MG/1
40 TABLET, DELAYED RELEASE ORAL
Status: DISCONTINUED | OUTPATIENT
Start: 2020-11-13 | End: 2020-11-13

## 2020-11-13 RX ORDER — PANTOPRAZOLE SODIUM 40 MG/10ML
40 INJECTION, POWDER, LYOPHILIZED, FOR SOLUTION INTRAVENOUS 2 TIMES DAILY
Status: DISCONTINUED | OUTPATIENT
Start: 2020-11-13 | End: 2020-11-14 | Stop reason: HOSPADM

## 2020-11-13 RX ORDER — LORAZEPAM 2 MG/ML
2 INJECTION INTRAMUSCULAR
Status: DISCONTINUED | OUTPATIENT
Start: 2020-11-13 | End: 2020-11-14 | Stop reason: HOSPADM

## 2020-11-13 RX ORDER — LORAZEPAM 2 MG/ML
3 INJECTION INTRAMUSCULAR
Status: DISCONTINUED | OUTPATIENT
Start: 2020-11-13 | End: 2020-11-14 | Stop reason: HOSPADM

## 2020-11-13 RX ORDER — SODIUM CHLORIDE 9 MG/ML
10 INJECTION INTRAVENOUS 2 TIMES DAILY
Status: DISCONTINUED | OUTPATIENT
Start: 2020-11-13 | End: 2020-11-14 | Stop reason: HOSPADM

## 2020-11-13 RX ORDER — SODIUM CHLORIDE 9 MG/ML
INJECTION, SOLUTION INTRAVENOUS CONTINUOUS
Status: DISCONTINUED | OUTPATIENT
Start: 2020-11-13 | End: 2020-11-13

## 2020-11-13 RX ORDER — SODIUM CHLORIDE 9 MG/ML
INJECTION, SOLUTION INTRAVENOUS CONTINUOUS PRN
Status: DISCONTINUED | OUTPATIENT
Start: 2020-11-13 | End: 2020-11-13 | Stop reason: SDUPTHER

## 2020-11-13 RX ORDER — LORAZEPAM 1 MG/1
4 TABLET ORAL
Status: DISCONTINUED | OUTPATIENT
Start: 2020-11-13 | End: 2020-11-14 | Stop reason: HOSPADM

## 2020-11-13 RX ADMIN — Medication 1 MG: at 05:19

## 2020-11-13 RX ADMIN — PANTOPRAZOLE SODIUM 40 MG: 40 TABLET, DELAYED RELEASE ORAL at 08:00

## 2020-11-13 RX ADMIN — MORPHINE SULFATE 2 MG: 2 INJECTION, SOLUTION INTRAMUSCULAR; INTRAVENOUS at 21:30

## 2020-11-13 RX ADMIN — PROPOFOL 50 MG: 10 INJECTION, EMULSION INTRAVENOUS at 13:36

## 2020-11-13 RX ADMIN — SODIUM CHLORIDE: 9 INJECTION, SOLUTION INTRAVENOUS at 05:30

## 2020-11-13 RX ADMIN — PROPOFOL 50 MG: 10 INJECTION, EMULSION INTRAVENOUS at 13:34

## 2020-11-13 RX ADMIN — HYDROMORPHONE HYDROCHLORIDE 1 MG: 1 INJECTION, SOLUTION INTRAMUSCULAR; INTRAVENOUS; SUBCUTANEOUS at 05:19

## 2020-11-13 RX ADMIN — MORPHINE SULFATE 2 MG: 2 INJECTION, SOLUTION INTRAMUSCULAR; INTRAVENOUS at 16:49

## 2020-11-13 RX ADMIN — SODIUM CHLORIDE, PRESERVATIVE FREE 10 ML: 5 INJECTION INTRAVENOUS at 20:18

## 2020-11-13 RX ADMIN — SUCRALFATE 1 G: 1 TABLET ORAL at 16:49

## 2020-11-13 RX ADMIN — SUCRALFATE 1 G: 1 TABLET ORAL at 08:00

## 2020-11-13 RX ADMIN — MORPHINE SULFATE 2 MG: 2 INJECTION, SOLUTION INTRAMUSCULAR; INTRAVENOUS at 11:37

## 2020-11-13 RX ADMIN — PROPOFOL 50 MG: 10 INJECTION, EMULSION INTRAVENOUS at 13:35

## 2020-11-13 RX ADMIN — SODIUM CHLORIDE: 9 INJECTION, SOLUTION INTRAVENOUS at 13:27

## 2020-11-13 RX ADMIN — HYDROMORPHONE HYDROCHLORIDE 0.5 MG: 1 INJECTION, SOLUTION INTRAMUSCULAR; INTRAVENOUS; SUBCUTANEOUS at 02:25

## 2020-11-13 RX ADMIN — HYDROMORPHONE HYDROCHLORIDE: 1 INJECTION, SOLUTION INTRAMUSCULAR; INTRAVENOUS; SUBCUTANEOUS at 00:02

## 2020-11-13 RX ADMIN — PROPOFOL 70 MG: 10 INJECTION, EMULSION INTRAVENOUS at 13:31

## 2020-11-13 RX ADMIN — Medication 10 ML: at 08:22

## 2020-11-13 RX ADMIN — SUCRALFATE 1 G: 1 TABLET ORAL at 20:13

## 2020-11-13 RX ADMIN — Medication 10 ML: at 20:20

## 2020-11-13 RX ADMIN — SODIUM CHLORIDE 1000 ML: 9 INJECTION, SOLUTION INTRAVENOUS at 00:07

## 2020-11-13 RX ADMIN — PANTOPRAZOLE SODIUM 40 MG: 40 INJECTION, POWDER, FOR SOLUTION INTRAVENOUS at 20:18

## 2020-11-13 ASSESSMENT — PAIN DESCRIPTION - DESCRIPTORS
DESCRIPTORS: ACHING;DISCOMFORT
DESCRIPTORS: ACHING;DISCOMFORT
DESCRIPTORS: ACHING

## 2020-11-13 ASSESSMENT — PAIN SCALES - GENERAL
PAINLEVEL_OUTOF10: 8
PAINLEVEL_OUTOF10: 0
PAINLEVEL_OUTOF10: 10
PAINLEVEL_OUTOF10: 0
PAINLEVEL_OUTOF10: 7
PAINLEVEL_OUTOF10: 8
PAINLEVEL_OUTOF10: 7
PAINLEVEL_OUTOF10: 0
PAINLEVEL_OUTOF10: 10
PAINLEVEL_OUTOF10: 0
PAINLEVEL_OUTOF10: 10
PAINLEVEL_OUTOF10: 3

## 2020-11-13 ASSESSMENT — PAIN DESCRIPTION - FREQUENCY
FREQUENCY: INTERMITTENT

## 2020-11-13 ASSESSMENT — PAIN DESCRIPTION - LOCATION
LOCATION: ABDOMEN
LOCATION: ABDOMEN
LOCATION: ABDOMEN;BACK;THROAT

## 2020-11-13 ASSESSMENT — PAIN DESCRIPTION - PROGRESSION
CLINICAL_PROGRESSION: GRADUALLY WORSENING
CLINICAL_PROGRESSION: GRADUALLY IMPROVING

## 2020-11-13 ASSESSMENT — PAIN DESCRIPTION - PAIN TYPE
TYPE: ACUTE PAIN

## 2020-11-13 ASSESSMENT — PAIN DESCRIPTION - ORIENTATION: ORIENTATION: LOWER;MID

## 2020-11-13 ASSESSMENT — PAIN - FUNCTIONAL ASSESSMENT: PAIN_FUNCTIONAL_ASSESSMENT: PREVENTS OR INTERFERES SOME ACTIVE ACTIVITIES AND ADLS

## 2020-11-13 NOTE — ANESTHESIA PRE PROCEDURE
Department of Anesthesiology  Preprocedure Note       Name:  Israel Blanc III   Age:  45 y.o.  :  1980                                          MRN:  [id number]         Date:  2020      Surgeon: [place] Avendano):  Xochilt Edmonds MD    Procedure: Procedure(s):  EGD DIAGNOSTIC ONLY    Medications prior to admission:   Prior to Admission medications    Not on File       Current medications:    Current Facility-Administered Medications   Medication Dose Route Frequency Provider Last Rate Last Dose    sodium chloride 0.9 % 7,291 mL with folic acid 1 mg, adult multi-vitamin with vitamin k 10 mL, thiamine 100 mg   Intravenous Once Cleveland Colin MD        sucralfate (CARAFATE) tablet 1 g  1 g Oral 4 times per day Marissa Bo DO   1 g at 20 0800    sodium chloride flush 0.9 % injection 10 mL  10 mL Intravenous 2 times per day Cleveland Colin MD   10 mL at 20 4472    sodium chloride flush 0.9 % injection 10 mL  10 mL Intravenous PRN Cleveland Colin MD       [facility] [START ON 2020] vitamin B-1 (THIAMINE) tablet 100 mg  100 mg Oral Daily Cleveland Colin MD       [facility] [START ON 2020] multivitamin 1 tablet  1 tablet Oral Daily Cleveland Colin MD        LORazepam (ATIVAN) tablet 1 mg  1 mg Oral Q1H PRN Cleveland Colin MD        Or    LORazepam (ATIVAN) injection 1 mg  1 mg Intravenous Q1H PRN Cleveland Colin MD        Or    LORazepam (ATIVAN) tablet 2 mg  2 mg Oral Q1H PRN Cleveland Colin MD        Or    LORazepam (ATIVAN) injection 2 mg  2 mg Intravenous Q1H PRN Cleveland Colin MD        Or    LORazepam (ATIVAN) tablet 3 mg  3 mg Oral Q1H PRN Cleveland Colin MD        Or    LORazepam (ATIVAN) injection 3 mg  3 mg Intravenous Q1H PRN Cleveland Colin MD        Or    LORazepam (ATIVAN) tablet 4 mg  4 mg Oral Q1H PRN Cleveland Colin MD        Or    LORazepam (ATIVAN) injection 4 mg  4 mg Intravenous Q1H PRN Cleveland Colin MD        [START ON 2020] folic acid (FOLVITE) tablet 1 mg  1 mg Oral Daily Abraham Kayser, MD        0.9 % sodium chloride infusion   Intravenous Continuous Abraham Kayser, MD   Stopped at 11/13/20 0910    pantoprazole (PROTONIX) injection 40 mg  40 mg Intravenous BID Xavier Bryant MD        And    sodium chloride (PF) 0.9 % injection 10 mL  10 mL Intravenous BID Xavier Bryant MD        morphine (PF) injection 2 mg  2 mg Intravenous Q4H PRN Xavier Bryant MD   2 mg at 11/13/20 1137     No current outpatient medications on file. Allergies: Allergies   Allergen Reactions    Pcn [Penicillins] Anaphylaxis       Problem List:    Patient Active Problem List   Diagnosis Code    Cellulitis L03.90    GI bleed K92.2       Past Medical History:  History reviewed. No pertinent past medical history. Past Surgical History:        Procedure Laterality Date    TONSILLECTOMY         Social History:    Social History     Tobacco Use    Smoking status: Former Smoker     Packs/day: 0.50    Smokeless tobacco: Never Used   Substance Use Topics    Alcohol use: Yes     Comment: occasional                                Counseling given: Not Answered      Vital Signs (Current):   Vitals:    11/13/20 0330 11/13/20 0500 11/13/20 0510 11/13/20 0901   BP: (!) 110/56 102/62 122/72 (!) 103/57   Pulse: 98 95 108 93   Resp: 13 13 18 12   Temp:       SpO2: 100% 98% 99% 99%   Weight:       Height:                                                  BP Readings from Last 3 Encounters:   11/13/20 (!) 103/57   08/14/20 131/75   11/03/18 131/82       NPO Status:                           2 days                                                      BMI:   Wt Readings from Last 3 Encounters:   11/12/20 195 lb (88.5 kg)   08/14/20 195 lb (88.5 kg)   11/03/18 170 lb (77.1 kg)     Body mass index is 29.65 kg/m².     CBC:   Lab Results   Component Value Date    WBC 10.2 11/13/2020    RBC 3.58 11/13/2020    HGB 10.7 11/13/2020    HCT 32.2 11/13/2020 MCV 95.8 11/13/2020    RDW 14.8 11/13/2020    PLT 86 11/13/2020       CMP:   Lab Results   Component Value Date     11/13/2020    K 3.8 11/13/2020     11/13/2020    CO2 21 11/13/2020    BUN 24 11/13/2020    CREATININE 1.2 11/13/2020    GFRAA >60 11/13/2020    LABGLOM >60 11/13/2020    GLUCOSE 98 11/13/2020    GLUCOSE 89 02/20/2012    PROT 6.3 11/13/2020    CALCIUM 8.6 11/13/2020    BILITOT 2.8 11/13/2020    ALKPHOS 95 11/13/2020    AST 80 11/13/2020    ALT 37 11/13/2020       POC Tests: No results for input(s): POCGLU, POCNA, POCK, POCCL, POCBUN, POCHEMO, POCHCT in the last 72 hours. Coags:   Lab Results   Component Value Date    PROTIME 19.1 11/13/2020    PROTIME 12.3 02/17/2012    INR 1.7 11/13/2020    APTT 27.2 02/17/2012       HCG (If Applicable): No results found for: PREGTESTUR, PREGSERUM, HCG, HCGQUANT     ABGs: No results found for: PHART, PO2ART, NIG7ZGX, BUH8HWM, BEART, G9KFSFDR     Type & Screen (If Applicable):  No results found for: LABABO, LABRH    Drug/Infectious Status (If Applicable):  No results found for: HIV, HEPCAB    COVID-19 Screening (If Applicable): No results found for: COVID19      Anesthesia Evaluation  Patient summary reviewed and Nursing notes reviewed no history of anesthetic complications:   Airway: Mallampati: II  TM distance: >3 FB   Neck ROM: full  Mouth opening: > = 3 FB Dental:          Pulmonary:Negative Pulmonary ROS breath sounds clear to auscultation                             Cardiovascular:Negative CV ROS  Exercise tolerance: good (>4 METS),           Rhythm: regular  Rate: normal                    Neuro/Psych:                ROS comment: Daily alcohol. GI/Hepatic/Renal:   (+) hepatitis: C,          ROS comment: Hematemesis Thursday. No recurrence. Complains of abdominal pain. Endo/Other: Negative Endo/Other ROS                    Abdominal:           Vascular: negative vascular ROS.                                        Anesthesia Plan      MAC ASA 3       Induction: intravenous. Anesthetic plan and risks discussed with patient. Plan discussed with CRNA. DOS STAFF ADDENDUM:    Patient seen and chart reviewed. Physical exam and history updated as indicated. NPO status confirmed. Anesthesia options and plan discussed including risks benefits with patient/legal guardian and family as available. Concerns and questions addressed. Consent verbalized to proceed.   Anesthesia plan, options and intraoperative/postoperative concerns discussed with care team.    Mario Tijerina MD  11/13/2020  1:17 PM          Mario Tijerina MD   11/13/2020

## 2020-11-13 NOTE — OP NOTE
[address] [place] [address]  UPPER ENDOSCOPY REPORT    DATE OF PROCEDURE: 11/13/20     SURGEON: Jose De Jesus Ruiz M.D.    ASSISTANT: none    PREOPERATIVE DIAGNOSIS: hematemesis, epigastric abdominal pain    POSTOPERATIVE DIAGNOSIS: severe gastritis, no peptic ulcer diease    OPERATION: Esophagogastroduodenoscopy with biopsies    ANESTHESIA: Local monitored anesthesia. (refer to Anesthesia record for details)    ESTIMATED BLOOD LOSS:  0 ml    COMPLICATIONS: None    SPECIMENS:  Was Obtained: antral biopsies    HISTORY: The patient is a 45y.o. year old male with history of above preop diagnosis. I recommended esophagogastroduodenoscopy with possible biopsy and I explained the risk, benefits, expected outcome, and alternatives to the procedure. Risks included but are not limited to bleeding, infection, respiratory distress, hypotension, and perforation of the esophagus, stomach, or duodenum. Patient understands and is in agreement. PROCEDURE: The patient was connected to the monitors and given supplemental oxygen by nasal cannula. The patient was sedated. The gastroscope was inserted orally and advanced under direct vision through the esophagus, through the stomach, through the pylorus, and into the descending duodenum. Findings:  Duodenum:     Descending: normal    Bulb: normal    Stomach:    Antrum: abnormal: severe diffuse gastritis and biopsies were taken    Body: abnormal: severe diffuse gastritis     Fundus: abnormal: severe diffuse gastritis     Esophagus: normal    Larynx: normal    The scope was removed and the patient tolerated the procedure well. IMPRESSION/PLAN:   1. Recommend alcohol cessation  2. Severe diffuse gastritis is causing the abdominal pain and is likely from heavy alcohol abuse  3. Await biopsies  4.  Continue PPI    Jose De Jesus Ruiz MD, FACS  11/13/2020  1:40 PM

## 2020-11-13 NOTE — H&P
Hospital Medicine History & Physical      PCP: No primary care provider on file. Date of Admission: 11/12/2020    Date of Service: Pt seen/examined on 11/12/2020 and is admitted to Inpatient with expected LOS greater than two midnights due to medical therapy. Chief Complaint:  had concerns including Hematemesis (vomiting blood with dark tarry stools). History Of Present Illness:    Mr. Darleen Kelly III, a 45y.o. year old male  who  has no past medical history on file. Patient refers that day for this admission early in the morning, probably around 2:00 in the morning she started having hematemesis. He described coffee-ground emesis. Patient also noticed that his stools were dark. Patient also reports that he regularly takes ibuprofen. Patient has history of alcohol abuse. Lately he has been having liquid, he described has been drinking some vodka. Patient refers history of hepatitis C and has not received treatment for this. Patient refers does not smoke. He refers no other symptoms. He refers no cough, no phlegm, no fever. Past Medical History:    History reviewed. No pertinent past medical history. Past Surgical History:        Procedure Laterality Date    TONSILLECTOMY         Medications Prior to Admission:      Prior to Admission medications    Not on File       Allergies:  Pcn [penicillins]    Social History:    TOBACCO:   reports that he has quit smoking. He smoked 0.50 packs per day. He has never used smokeless tobacco.  ETOH:   reports current alcohol use. Family History:    Reviewed in detail and negative for DM, CAD, Cancer, CVA. Positive as follows\"  History reviewed. No pertinent family history. REVIEW OF SYSTEMS:   Pertinent positives as noted in the HPI. All other systems reviewed and negative.     PHYSICAL EXAM:  /72   Pulse 108   Temp 98.9 °F (37.2 °C)   Resp 18   Ht 5' 8\" (1.727 m)   Wt 195 lb (88.5 kg)   SpO2 99%   BMI 29.65 kg/m² General appearance: No apparent distress, appears stated age and cooperative. HEENT: Normal cephalic, atraumatic without obvious deformity. Pupils equal, round, and reactive to light. Extra ocular muscles intact. Conjunctivae/corneas clear. Neck: Supple, with full range of motion. No jugular venous distention. Trachea midline. Respiratory:  Normal respiratory effort. Clear to auscultation, bilaterally without Rales/Wheezes/Rhonchi. Cardiovascular: S1/S2    Abdomen: Soft, non-tender, non-distended with normal bowel sounds. Musculoskeletal: No clubbing, cyanosis or edema bilaterally. Full range of motion without deformity. Brisk capillary refill. 2+ lower extremity pulses (dorsalis pedis). Skin: Normal skin color. No rashes or lesions. Neurologic:  Neurovascularly intact without any focal sensory/motor deficits. Cranial nerves: II-XII intact, grossly non-focal.  Psychiatric: Alert and oriented, thought content appropriate, normal insight    Reviewed EKG and CXR personally    CBC:   Recent Labs     11/12/20 1754 11/13/20  0557   WBC 10.4 10.2   RBC 4.09 3.58*   HGB 13.2 11.7*   HCT 39.3 34.3*   MCV 96.1 95.8   RDW 14.5 14.8   PLT 89* 86*     BMP:   Recent Labs     11/12/20 1754 11/13/20  0557    138   K 4.4 3.8    105   CO2 25 21*   BUN 24* 24*   CREATININE 1.1 1.2     LFT:  Recent Labs     11/12/20 1754 11/13/20  0557   PROT 7.1 6.3*   ALKPHOS 111 95   ALT 41* 37   AST 86* 80*   BILITOT 3.0* 2.8*   LIPASE 48  --      CE:  No results for input(s): CKTOTAL, TROPONINI in the last 72 hours. PT/INR:   Recent Labs     11/13/20  0557   INR 1.7     BNP: No results for input(s): BNP in the last 72 hours.   ESR: No results found for: SEDRATE  CRP: No results found for: CRP  D Dimer: No results found for: DDIMER  Folate and B12: No results found for: PVIRTGTU41, No results found for: FOLATE  Lactic Acid:   Lab Results   Component Value Date    LACTA 2.0 08/14/2020     Thyroid Studies:   Lab Results Component Value Date    TSH 2.900 08/14/2020    C0NZREX 9.3 08/14/2020       Oupatient labs:  Lab Results   Component Value Date    TSH 2.900 08/14/2020    INR 1.7 11/13/2020    LABA1C 4.9 02/18/2012       Urinalysis:    Lab Results   Component Value Date    NITRU Negative 08/14/2020    WBCUA 0-1 08/14/2020    BACTERIA RARE 08/14/2020    RBCUA NONE 08/14/2020    RBCUA 0-1 02/25/2013    BLOODU Negative 08/14/2020    SPECGRAV 1.025 08/14/2020    GLUCOSEU Negative 08/14/2020       Imaging:  Ct Abdomen Pelvis W Iv Contrast Additional Contrast? None    Result Date: 11/12/2020  EXAMINATION: CT OF THE ABDOMEN AND PELVIS WITH CONTRAST 11/12/2020 10:13 pm TECHNIQUE: CT of the abdomen and pelvis was performed with the administration of intravenous contrast. Multiplanar reformatted images are provided for review. Dose modulation, iterative reconstruction, and/or weight based adjustment of the mA/kV was utilized to reduce the radiation dose to as low as reasonably achievable. COMPARISON: CT abdomen and pelvis 08/14/2020. HISTORY: ORDERING SYSTEM PROVIDED HISTORY: abd pain , bleeding TECHNOLOGIST PROVIDED HISTORY: Reason for exam:->abd pain , bleeding Additional Contrast?->None What reading provider will be dictating this exam?->CRC FINDINGS: Lower Chest: Limited images through the lung bases are unremarkable. Organs: Diffuse patchy ill-defined low-density throughout the liver. Mildly nodular liver surface. The gallbladder is unremarkable. No biliary ductal dilatation. The pancreas and bilateral adrenal glands are unremarkable. Splenomegaly is noted. The bilateral kidneys and ureters are unremarkable. GI/Bowel: Normal appendix. The colon is unremarkable. No obstruction or definite wall thickening identified. Pelvis: The urinary bladder is normal in appearance. The prostate gland is unremarkable. Trace free fluid in the pelvis. No pelvic or inguinal lymphadenopathy.  Peritoneum/Retroperitoneum: The abdominal aorta is normal in appearance. No pathologically enlarged retroperitoneal or mesenteric lymph nodes. Trace ascites most pronounced in the right pericolic gutter. No pneumoperitoneum. Bones/Soft Tissues: No acute osseous or soft tissue abnormality. 1. Diffuse patchy ill-defined low-density throughout the liver with mildly nodular liver surface. This may represent pack steatosis although an infiltrative process is not excluded. Recommend further evaluation with liver protocol MRI. 2. Trace nonspecific ascites. 3. Splenomegaly. ASSESSMENT:    Gastrointestinal bleeding  Acute blood loss anemia  Chronic alcohol use  Chronic tobacco abuse  Thrombocytopenia  Splenomegaly  Ascites    PLAN:    [facility] protocol  Pantoprazole  Monitor electrolytes  Thiamine  Folic acid  Multivitamins   FOBT +  Endoscopy planned as per surgical team      Diet: Diet NPO Effective Now Exceptions are: Sips with Meds  Code Status: Prior    PT/OT Eval Status: [] Ordered [] Evaluation noted [x] Not applicable  DVT Prophylaxis: []Lovenox []Heparin [x]PCD [] [address] []Encouraged ambulation    Disposition: []Med/Surg [x] Intermediate [] ICU/CCU  Admit status: [] Observation [x] Inpatient     +++++++++++++++++++++++++++++++++++++++++++++++++  Aye Real MD, Hospitalist  +++++++++++++++++++++++++++++++++++++++++++++++++  NOTE: This report was transcribed using voice recognition software. Every effort was made to ensure accuracy; however, inadvertent computerized transcription errors may be present.

## 2020-11-13 NOTE — ED NOTES
Patient requesting pitcher of ice water and pain meds, instructed patient that he was npo except with meds and physician was contacted regarding pain meds. Patient irritated and states \"I know I have pain medicine ordered. \"  Patient told that nothing was ordered at this time, but repeated that a message was sent to physician regarding order.       Pradeep Coates RN  11/13/20 [id number]

## 2020-11-13 NOTE — CONSULTS
GENERAL SURGERY  CONSULT NOTE  11/13/2020    Physician Consulted: Dr. Chey Nielson  Reason for Consult: Hematemesis  Referring Physician: Dr. Kang Cameron is a 45 y.o. male who presents for evaluation of vomiting blood. States he awoke at 2 AM on the morning of 11/12 and vomited dark black blood. He is also endorsing having burgundy to black stools. He reports that he has chronic diarrhea for the last year. He also has a history of hepatitis C from IV drug abuse with diagnosed 5 to 8 years ago. He has not undergone treatment. The patient also endorses drinking a medium to large sized bottle of vodka every 3 days. He also uses ibuprofen and Tylenol frequently at high doses. The patient did have a recent colonoscopy with Dr. Sundeep Kennedy for chronic diarrhea with no abnormal findings. The patient does have some abdominal distention and bloating. He has some pain in his upper right quadrant. In the emergency department  Hgb was 13.2  WBC was 10.4  Platelets were 89  Bilirubin was 3.0  Alk phos was within normal limits  ALT is 41  AST is 86    CT of the abdomen did demonstrate some fatty liver, mild ascites, splenomegaly. No obvious esophageal varices appreciated. Past Surgical History:   Procedure Laterality Date    TONSILLECTOMY         Medications Prior to Admission:    Prior to Admission medications    Not on File       Allergies   Allergen Reactions    Pcn [Penicillins] Anaphylaxis       History reviewed. No pertinent family history.     Social History     Tobacco Use    Smoking status: Former Smoker     Packs/day: 0.50    Smokeless tobacco: Never Used   Substance Use Topics    Alcohol use: Yes     Comment: occasional    Drug use: Not on file     Comment: used drugs in past         Review of Systems   General ROS: positive for  - fatigue  negative for - chills or fever  Hematological and Lymphatic ROS: Not on blood thinning medication  Respiratory ROS: no cough, shortness of breath, or wheezing  Cardiovascular ROS: no chest pain or dyspnea on exertion  Gastrointestinal ROS: See HPI  Genito-Urinary ROS: no dysuria, trouble voiding, or hematuria  Musculoskeletal ROS: positive for -chronic back pain    PHYSICAL EXAM:    Vitals:    11/12/20 2328   BP: 129/74   Pulse: 105   Resp: 16   Temp:    SpO2: 97%       General Appearance:  awake, alert, oriented, in no acute distress  Skin:  Skin color, texture, turgor normal. No rashes or lesions. Head/face:  NCAT  Eyes:  No gross abnormalities. and Sclera nonicteric  Lungs:  Normal expansion. Clear to auscultation. No rales, rhonchi, or wheezing. Heart: Tachycardia, regular rhythm  Abdomen: Mild to moderate distention. Fluid wave appreciated. Tenderness to palpation in epigastrium and right upper quadrant. No guarding or rigidity  Extremities: Extremities warm to touch, pink, with no edema. Male Rectal: No external hemorrhoids appreciated. Anal tone within normal limits. Rectal vault with no masses appreciated. Stool on glove not appreciable. Finger smear FOBT positive      LABS:    CBC  Recent Labs     11/12/20  1754   WBC 10.4   HGB 13.2   HCT 39.3   PLT 89*     BMP  Recent Labs     11/12/20  1754      K 4.4      CO2 25   BUN 24*   CREATININE 1.1   CALCIUM 9.1     Liver Function  Recent Labs     11/12/20  1754   LIPASE 48   BILITOT 3.0*   AST 86*   ALT 41*   ALKPHOS 111   PROT 7.1   LABALBU 3.6     No results for input(s): LACTATE in the last 72 hours. No results for input(s): INR, PTT in the last 72 hours. Invalid input(s): PT    RADIOLOGY    Ct Abdomen Pelvis W Iv Contrast Additional Contrast? None    Result Date: 11/12/2020  EXAMINATION: CT OF THE ABDOMEN AND PELVIS WITH CONTRAST 11/12/2020 10:13 pm TECHNIQUE: CT of the abdomen and pelvis was performed with the administration of intravenous contrast. Multiplanar reformatted images are provided for review.  Dose modulation, iterative reconstruction, and/or weight based adjustment of the mA/kV was utilized to reduce the radiation dose to as low as reasonably achievable. COMPARISON: CT abdomen and pelvis 08/14/2020. HISTORY: ORDERING SYSTEM PROVIDED HISTORY: abd pain , bleeding TECHNOLOGIST PROVIDED HISTORY: Reason for exam:->abd pain , bleeding Additional Contrast?->None What reading provider will be dictating this exam?->CRC FINDINGS: Lower Chest: Limited images through the lung bases are unremarkable. Organs: Diffuse patchy ill-defined low-density throughout the liver. Mildly nodular liver surface. The gallbladder is unremarkable. No biliary ductal dilatation. The pancreas and bilateral adrenal glands are unremarkable. Splenomegaly is noted. The bilateral kidneys and ureters are unremarkable. GI/Bowel: Normal appendix. The colon is unremarkable. No obstruction or definite wall thickening identified. Pelvis: The urinary bladder is normal in appearance. The prostate gland is unremarkable. Trace free fluid in the pelvis. No pelvic or inguinal lymphadenopathy. Peritoneum/Retroperitoneum: The abdominal aorta is normal in appearance. No pathologically enlarged retroperitoneal or mesenteric lymph nodes. Trace ascites most pronounced in the right pericolic gutter. No pneumoperitoneum. Bones/Soft Tissues: No acute osseous or soft tissue abnormality. 1. Diffuse patchy ill-defined low-density throughout the liver with mildly nodular liver surface. This may represent pack steatosis although an infiltrative process is not excluded. Recommend further evaluation with liver protocol MRI. 2. Trace nonspecific ascites. 3. Splenomegaly.          ASSESSMENT:  45 y.o. male with upper GI bleed, gastric ulcer versus esophageal varices    PLAN:    -INR and TEG pending  -Serum drug screen  -EGD on 11/13  -Recommend patient on phenobarb, folate and thiamine for daily alcohol consumption  -Protonix BID 40 mg daily  -Carafate    Plan discussed with  Mei    Electronically signed by Jacky Zuniga DO on 11/13/20 at 2:50 AM EST

## 2020-11-13 NOTE — ED PROVIDER NOTES
ED physician  HPI:  11/12/20, Time: 8:40 PM EST Kelli Lundborg III is a 45 y.o. male presenting to the ED for dark burgundy blood noted in the stool as well as having emesis. Patient reports having diarrhea greater than a year. States that he did see gastroenterologist Dr. Samantha Walls and did have a colonoscopy in August 2020 which she reports was negative. Patient reports that over the last several days he just once again has not been feeling well and Thursday morning in the very early hours around 2 AM everything worsened and he had multiple episodes of black coffee-ground emesis. He also reports that he started once again having diarrhea which had black tarry appearance. Patient reports that he does at times take Motrin but not on a regular basis although he did report being a regular drinker but has not drank over the last 2 to 3 days. Patient states he normally drinks vodka. Patient did also report he did take 1 pink Pepto-Bismol tablet around 2:30 AM.  States that overall his appetite has been extremely poor. He was only able to eat small amount of crackers and ginger ale. Denies noticing fevers with this. Does report abdominal pain primarily right upper and right lower quadrant. He also reports some lower back pain. Patient reports just feeling weak and fatigued he also reports that his abdomen feels very distended to him. Review of Systems:   A complete review of systems was performed and pertinent positives and negatives are stated within HPI, all other systems reviewed and are negative.          --------------------------------------------- PAST HISTORY ---------------------------------------------  Past Medical History:  has no past medical history on file. Past Surgical History:  has a past surgical history that includes Tonsillectomy. Social History:  reports that he has quit smoking. He smoked 0.50 packs per day.  He has never used smokeless tobacco. He reports current alcohol use. Family History: family history is not on file. The patients home medications have been reviewed.     Allergies: Pcn [penicillins]    -------------------------------------------------- RESULTS -------------------------------------------------  All laboratory and radiology results have been personally reviewed by myself   LABS:  Results for orders placed or performed during the hospital encounter of 11/12/20   CBC auto differential   Result Value Ref Range    WBC 10.4 4.5 - 11.5 E9/L    RBC 4.09 3.80 - 5.80 E12/L    Hemoglobin 13.2 12.5 - 16.5 g/dL    Hematocrit 39.3 37.0 - 54.0 %    MCV 96.1 80.0 - 99.9 fL    MCH 32.3 26.0 - 35.0 pg    MCHC 33.6 32.0 - 34.5 %    RDW 14.5 11.5 - 15.0 fL    Platelets 89 (L) 284 - 450 E9/L    MPV 10.0 7.0 - 12.0 fL    Neutrophils % 77.0 43.0 - 80.0 %    Immature Granulocytes % 0.4 0.0 - 5.0 %    Lymphocytes % 12.9 (L) 20.0 - 42.0 %    Monocytes % 7.6 2.0 - 12.0 %    Eosinophils % 1.6 0.0 - 6.0 %    Basophils % 0.5 0.0 - 2.0 %    Neutrophils Absolute 7.99 (H) 1.80 - 7.30 E9/L    Immature Granulocytes # 0.04 E9/L    Lymphocytes Absolute 1.34 (L) 1.50 - 4.00 E9/L    Monocytes Absolute 0.79 0.10 - 0.95 E9/L    Eosinophils Absolute 0.17 0.05 - 0.50 E9/L    Basophils Absolute 0.05 0.00 - 0.20 E9/L   Comprehensive Metabolic Panel   Result Value Ref Range    Sodium 136 132 - 146 mmol/L    Potassium 4.4 3.5 - 5.0 mmol/L    Chloride 104 98 - 107 mmol/L    CO2 25 22 - 29 mmol/L    Anion Gap 7 7 - 16 mmol/L    Glucose 130 (H) 74 - 99 mg/dL    BUN 24 (H) 6 - 20 mg/dL    CREATININE 1.1 0.7 - 1.2 mg/dL    GFR Non-African American >60 >=60 mL/min/1.73    GFR African American >60     Calcium 9.1 8.6 - 10.2 mg/dL    Total Protein 7.1 6.4 - 8.3 g/dL    Alb 3.6 3.5 - 5.2 g/dL    Total Bilirubin 3.0 (H) 0.0 - 1.2 mg/dL    Alkaline Phosphatase 111 40 - 129 U/L    ALT 41 (H) 0 - 40 U/L    AST 86 (H) 0 - 39 U/L   Lactate, Sepsis   Result Value Ref Range    Lactic Acid, Sepsis 1.8 0.5 - 1.9 mmol/L   Lipase   Result Value Ref Range    Lipase 48 13 - 60 U/L   Platelet Confirmation   Result Value Ref Range    Platelet Confirmation CONFIRMED    POCT occult blood stool   Result Value Ref Range    OCCULT BLOOD FECAL positive     QC OK? yes        RADIOLOGY:  Interpreted by Radiologist.  CT ABDOMEN PELVIS W IV CONTRAST Additional Contrast? None   Final Result   1. Diffuse patchy ill-defined low-density throughout the liver with mildly   nodular liver surface. This may represent pack steatosis although an   infiltrative process is not excluded. Recommend further evaluation with   liver protocol MRI. 2. Trace nonspecific ascites. 3. Splenomegaly. ------------------------- NURSING NOTES AND VITALS REVIEWED ---------------------------   The nursing notes within the ED encounter and vital signs as below have been reviewed. /81   Pulse 124   Temp 98.9 °F (37.2 °C)   Resp 17   Ht 5' 8\" (1.727 m)   Wt 195 lb (88.5 kg)   SpO2 98%   BMI 29.65 kg/m²   Oxygen Saturation Interpretation: Normal      ---------------------------------------------------PHYSICAL EXAM--------------------------------------      Constitutional/General: Alert and oriented x3,   Head: Normocephalic and atraumatic  Eyes: PERRL, EOMI  Mouth: Oropharynx clear, handling secretions, no trismus  Neck: Supple, full ROM,   Pulmonary: Lungs clear to auscultation bilaterally, no wheezes, rales, or rhonchi. Not in respiratory distress  Cardiovascular:  Regular rate and rhythm, no murmurs, gallops, or rubs. 2+ distal pulses  Abdomen: Soft, tender, non distended, tenderness to right upper quadrant and right lower quadrant on exam.  Negative for CVA tenderness, Hemoccult exam completed and is positive. Extremities: Moves all extremities x 4.  Warm and well perfused  Skin: warm and dry without rash  Neurologic: GCS 15,  Psych: Normal Affect      ------------------------------ ED COURSE/MEDICAL DECISION MAKING----------------------  Medications   0.9 % sodium chloride bolus (1,000 mLs Intravenous New Bag 11/13/20 0007)   ondansetron (ZOFRAN-ODT) disintegrating tablet 4 mg (4 mg Oral Given 11/12/20 1756)   0.9 % sodium chloride bolus (0 mLs Intravenous Stopped 11/12/20 2317)   morphine (PF) injection 2 mg (2 mg Intravenous Given 11/12/20 2130)   ondansetron (ZOFRAN) injection 4 mg (4 mg Intravenous Given 11/12/20 2130)   pantoprazole (PROTONIX) injection 40 mg (40 mg Intravenous Given 11/12/20 2130)   iopamidol (ISOVUE-370) 76 % injection 90 mL (90 mLs Intravenous Given 11/12/20 2219)   HYDROmorphone (DILAUDID) injection 0.5 mg ( Intravenous Given 11/13/20 0002)         ED COURSE:       Medical Decision Making:    Plan will be to obtain labs will also obtain imaging and medicate for symptom relief plan will be for admission. Labs resulted CBC with white blood cell count of 10, platelet count is low at 89 but hemoglobin hematocrit stable at 13 and 39, chemistry panel with elevated BUN as well as elevated total bili as well as some slight elevation in his ALT and AST. Lactic acid level negative lipase negative, awaiting CT scan results. Patient was provided IV fluids, Zofran for further nausea vomiting as well as IV Protonix. Patient resting comfortably no further episodes of vomiting noted he does still continue to have diarrhea. He is Hemoccult positive. CT scan of the abdomen pelvis resulted showing diffuse patchy ill-defined low density throughout the liver with mildly nodular liver surface could represent a pack steatosis although infiltrative process is not excluded also does show some nonspecific ascites as well as splenomegaly and. Plan be for admission will consult general surgery.   Patient is tachycardic, denies chest pain denies shortness of breath, he was remedicated with IV pain meds as well as provide additional 1 L of normal saline, awaiting surgery but plan on admission due to GI bleed    Counseling: The emergency provider has spoken with the patient and discussed todays results, in addition to providing specific details for the plan of care and counseling regarding the diagnosis and prognosis. Questions are answered at this time and they are agreeable with the plan.      --------------------------------- IMPRESSION AND DISPOSITION ---------------------------------    IMPRESSION  1. Gastrointestinal hemorrhage, unspecified gastrointestinal hemorrhage type    2. Spleen enlargement        DISPOSITION  Disposition: Admit to telemetry  Patient condition is good      NOTE: This report was transcribed using voice recognition software.  Every effort was made to ensure accuracy; however, inadvertent computerized transcription errors may be present     Duanne Moritz, APRN - CNP  11/13/20 ANTELMO Ruby, CHRIS - CNP  11/13/20 0045

## 2020-11-13 NOTE — ED NOTES
Bed: 21  Expected date:   Expected time:   Means of arrival:   Comments:  [address] [place] [address]  RN  11/12/20 8414

## 2020-11-14 VITALS
HEART RATE: 75 BPM | DIASTOLIC BLOOD PRESSURE: 69 MMHG | TEMPERATURE: 98.3 F | HEIGHT: 68 IN | BODY MASS INDEX: 29.55 KG/M2 | WEIGHT: 195 LBS | RESPIRATION RATE: 17 BRPM | OXYGEN SATURATION: 95 % | SYSTOLIC BLOOD PRESSURE: 120 MMHG

## 2020-11-14 LAB
HCT VFR BLD CALC: 28.4 % (ref 37–54)
HEMOGLOBIN: 9.7 G/DL (ref 12.5–16.5)

## 2020-11-14 PROCEDURE — 6360000002 HC RX W HCPCS: Performed by: FAMILY MEDICINE

## 2020-11-14 PROCEDURE — 6370000000 HC RX 637 (ALT 250 FOR IP): Performed by: FAMILY MEDICINE

## 2020-11-14 PROCEDURE — G0008 ADMIN INFLUENZA VIRUS VAC: HCPCS | Performed by: FAMILY MEDICINE

## 2020-11-14 PROCEDURE — 90686 IIV4 VACC NO PRSV 0.5 ML IM: CPT | Performed by: FAMILY MEDICINE

## 2020-11-14 PROCEDURE — C9113 INJ PANTOPRAZOLE SODIUM, VIA: HCPCS | Performed by: INTERNAL MEDICINE

## 2020-11-14 PROCEDURE — 6360000002 HC RX W HCPCS: Performed by: INTERNAL MEDICINE

## 2020-11-14 PROCEDURE — 2580000003 HC RX 258: Performed by: FAMILY MEDICINE

## 2020-11-14 PROCEDURE — 6370000000 HC RX 637 (ALT 250 FOR IP): Performed by: STUDENT IN AN ORGANIZED HEALTH CARE EDUCATION/TRAINING PROGRAM

## 2020-11-14 PROCEDURE — 99231 SBSQ HOSP IP/OBS SF/LOW 25: CPT | Performed by: SURGERY

## 2020-11-14 PROCEDURE — 2580000003 HC RX 258: Performed by: INTERNAL MEDICINE

## 2020-11-14 RX ORDER — MULTIVITAMIN WITH IRON
1 TABLET ORAL DAILY
Qty: 30 TABLET | Refills: 0 | Status: SHIPPED | OUTPATIENT
Start: 2020-11-15 | End: 2021-01-06 | Stop reason: ALTCHOICE

## 2020-11-14 RX ORDER — PANTOPRAZOLE SODIUM 40 MG/1
40 TABLET, DELAYED RELEASE ORAL
Qty: 60 TABLET | Refills: 0 | Status: SHIPPED | OUTPATIENT
Start: 2020-11-14 | End: 2021-01-06 | Stop reason: ALTCHOICE

## 2020-11-14 RX ORDER — FOLIC ACID 1 MG/1
1 TABLET ORAL DAILY
Qty: 30 TABLET | Refills: 0 | Status: SHIPPED | OUTPATIENT
Start: 2020-11-14 | End: 2021-01-06 | Stop reason: ALTCHOICE

## 2020-11-14 RX ORDER — LANOLIN ALCOHOL/MO/W.PET/CERES
100 CREAM (GRAM) TOPICAL DAILY
Qty: 30 TABLET | Refills: 0 | Status: SHIPPED | OUTPATIENT
Start: 2020-11-15 | End: 2021-01-06 | Stop reason: ALTCHOICE

## 2020-11-14 RX ADMIN — FOLIC ACID 1 MG: 1 TABLET ORAL at 12:47

## 2020-11-14 RX ADMIN — Medication 1 TABLET: at 08:33

## 2020-11-14 RX ADMIN — Medication 10 ML: at 08:34

## 2020-11-14 RX ADMIN — SUCRALFATE 1 G: 1 TABLET ORAL at 05:58

## 2020-11-14 RX ADMIN — SODIUM CHLORIDE, PRESERVATIVE FREE 10 ML: 5 INJECTION INTRAVENOUS at 08:35

## 2020-11-14 RX ADMIN — MORPHINE SULFATE 2 MG: 2 INJECTION, SOLUTION INTRAMUSCULAR; INTRAVENOUS at 12:47

## 2020-11-14 RX ADMIN — MORPHINE SULFATE 2 MG: 2 INJECTION, SOLUTION INTRAMUSCULAR; INTRAVENOUS at 02:25

## 2020-11-14 RX ADMIN — Medication 100 MG: at 08:33

## 2020-11-14 RX ADMIN — SUCRALFATE 1 G: 1 TABLET ORAL at 12:47

## 2020-11-14 RX ADMIN — PANTOPRAZOLE SODIUM 40 MG: 40 INJECTION, POWDER, FOR SOLUTION INTRAVENOUS at 08:33

## 2020-11-14 RX ADMIN — SUCRALFATE 1 G: 1 TABLET ORAL at 00:13

## 2020-11-14 RX ADMIN — MORPHINE SULFATE 2 MG: 2 INJECTION, SOLUTION INTRAMUSCULAR; INTRAVENOUS at 06:45

## 2020-11-14 RX ADMIN — SODIUM CHLORIDE, PRESERVATIVE FREE 10 ML: 5 INJECTION INTRAVENOUS at 12:47

## 2020-11-14 RX ADMIN — INFLUENZA A VIRUS A/VICTORIA/2454/2019 IVR-207 (H1N1) ANTIGEN (PROPIOLACTONE INACTIVATED), INFLUENZA A VIRUS A/HONG KONG/2671/2019 IVR-208 (H3N2) ANTIGEN (PROPIOLACTONE INACTIVATED), INFLUENZA B VIRUS B/VICTORIA/705/2018 BVR-11 ANTIGEN (PROPIOLACTONE INACTIVATED), INFLUENZA B VIRUS B/PHUKET/3073/2013 BVR-1B ANTIGEN (PROPIOLACTONE INACTIVATED) 0.5 ML: 15; 15; 15; 15 INJECTION, SUSPENSION INTRAMUSCULAR at 15:08

## 2020-11-14 ASSESSMENT — PAIN DESCRIPTION - PROGRESSION: CLINICAL_PROGRESSION: GRADUALLY IMPROVING

## 2020-11-14 ASSESSMENT — PAIN DESCRIPTION - LOCATION: LOCATION: ABDOMEN

## 2020-11-14 ASSESSMENT — PAIN SCALES - GENERAL
PAINLEVEL_OUTOF10: 6
PAINLEVEL_OUTOF10: 8
PAINLEVEL_OUTOF10: 4
PAINLEVEL_OUTOF10: 7

## 2020-11-14 NOTE — DISCHARGE SUMMARY
Hospital Medicine Discharge Summary    Patient ID: Freda Mar III   Patient's PCP: No primary care provider on file. Admit Date: 11/12/2020   Discharge Date:      Admitting Physician: Brenda See MD  Discharge Physician: Dominic Shabazz MD     Discharge Diagnoses:    Gastrointestinal bleeding  Severe gastritis  Acute blood loss anemia  Chronic alcohol use  Chronic tobacco abuse  Thrombocytopenia  Splenomegaly  Ascites    Hospital course in brief (Please refer to daily progress notes for a comprehensive review of the hospitalization by requesting medical records)    45y.o. year old male  who  has no past medical history on file. Patient refers that day for this admission early in the morning,  around 2:00 in the morning she started having hematemesis. He described coffee-ground emesis. Patient also noticed that his stools were dark. Patient also reports that he regularly takes ibuprofen. Patient has history of alcohol abuse. Patient refers history of hepatitis C and has not received treatment for this. Patient had endoscopy by surgery on 11/13/2020. Findings: Severe gastritis biopsies were taken. Please see consultations for details. PHYSICAL EXAM:  BP (!) 120/53   Pulse 104   Temp 97.5 °F (36.4 °C) (Temporal)   Resp 15   Ht 5' 8\" (1.727 m)   Wt 195 lb (88.5 kg)   SpO2 96%   BMI 29.65 kg/m²     General appearance: No apparent distress appears stated age and cooperative. HEENT Normal cephalic, atraumatic without obvious deformity. Pupils equal, round, and reactive to light. Extra ocular muscles intact. Conjunctivae/corneas clear. Neck: Supple, No jugular venous distention/bruits. Trachea midline without thyromegaly or adenopathy with full range of motion. Lungs: Clear to auscultation, bilaterally without Rales/Wheezes/Rhonchi with good respiratory effort.   Heart: Regular rate and rhythm with Normal S1/S2 without murmurs, rubs or gallops, point of maximum impulse non-displaced  Abdomen: Soft, non-tender or non-distended without rigidity or guarding and positive bowel sounds all four quadrants. Extremities: No clubbing, cyanosis, or edema bilaterally. Skin: Skin color, texture, turgor normal.  No rashes or lesions. Neurologic: Alert and oriented X 3      Consults:   IP CONSULT TO GENERAL SURGERY  IP CONSULT TO INTERNAL MEDICINE  IP CONSULT TO SOCIAL WORK    Discharge Instructions / Follow up: Follow-up with surgery/GI follow-up PCP    Activity: activity as tolerated    Significant labs:    Labs: For convenience and continuity at follow-up the following most recent labs are provided:  CBC:   Recent Labs     11/12/20 1754 11/13/20  0557 11/13/20  1133 11/13/20  1725 11/13/20  1947   WBC 10.4 10.2  --   --   --    RBC 4.09 3.58*  --   --   --    HGB 13.2 11.7* 10.7* 11.0* 9.7*  10.4*   HCT 39.3 34.3* 32.2* 32.5* 28.4*  30.9*   MCV 96.1 95.8  --   --   --    RDW 14.5 14.8  --   --   --    PLT 89* 86*  --   --   --      BMP:   Recent Labs     11/12/20 1754 11/13/20  0557    138   K 4.4 3.8    105   CO2 25 21*   BUN 24* 24*   CREATININE 1.1 1.2     LFT:  Recent Labs     11/12/20 1754 11/13/20  0557   PROT 7.1 6.3*   ALKPHOS 111 95   ALT 41* 37   AST 86* 80*   BILITOT 3.0* 2.8*   LIPASE 48  --      PT/INR:   Recent Labs     11/13/20  0557   INR 1.7     BNP: No results for input(s): BNP in the last 72 hours.   Hgb A1C:   Lab Results   Component Value Date    LABA1C 4.9 02/18/2012     Folate and B12: No results found for: [place], No results found for: FOLATE  Thyroid Studies:   Lab Results   Component Value Date    TSH 2.900 08/14/2020    [id number] 9.3 08/14/2020       Urinalysis:    Lab Results   Component Value Date    NITRU Negative 08/14/2020    WBCUA 0-1 08/14/2020    BACTERIA RARE 08/14/2020    RBCUA NONE 08/14/2020    RBCUA 0-1 02/25/2013    BLOODU Negative 08/14/2020    SPECGRAV 1.025 08/14/2020    GLUCOSEU Negative 08/14/2020       Imaging:  Ct Abdomen Pelvis W Iv Contrast Additional Contrast? None    Result Date: 11/12/2020  EXAMINATION: CT OF THE ABDOMEN AND PELVIS WITH CONTRAST 11/12/2020 10:13 pm TECHNIQUE: CT of the abdomen and pelvis was performed with the administration of intravenous contrast. Multiplanar reformatted images are provided for review. Dose modulation, iterative reconstruction, and/or weight based adjustment of the mA/kV was utilized to reduce the radiation dose to as low as reasonably achievable. COMPARISON: CT abdomen and pelvis 08/14/2020. HISTORY: ORDERING SYSTEM PROVIDED HISTORY: abd pain , bleeding TECHNOLOGIST PROVIDED HISTORY: Reason for exam:->abd pain , bleeding Additional Contrast?->None What reading provider will be dictating this exam?->CRC FINDINGS: Lower Chest: Limited images through the lung bases are unremarkable. Organs: Diffuse patchy ill-defined low-density throughout the liver. Mildly nodular liver surface. The gallbladder is unremarkable. No biliary ductal dilatation. The pancreas and bilateral adrenal glands are unremarkable. Splenomegaly is noted. The bilateral kidneys and ureters are unremarkable. GI/Bowel: Normal appendix. The colon is unremarkable. No obstruction or definite wall thickening identified. Pelvis: The urinary bladder is normal in appearance. The prostate gland is unremarkable. Trace free fluid in the pelvis. No pelvic or inguinal lymphadenopathy. Peritoneum/Retroperitoneum: The abdominal aorta is normal in appearance. No pathologically enlarged retroperitoneal or mesenteric lymph nodes. Trace ascites most pronounced in the right pericolic gutter. No pneumoperitoneum. Bones/Soft Tissues: No acute osseous or soft tissue abnormality. 1. Diffuse patchy ill-defined low-density throughout the liver with mildly nodular liver surface. This may represent pack steatosis although an infiltrative process is not excluded. Recommend further evaluation with liver protocol MRI.  2. Trace nonspecific ascites. 3. Splenomegaly. Discharge Medications:      Medication List      START taking these medications    folic acid 1 MG tablet  Commonly known as:  FOLVITE  Take 1 tablet by mouth daily     multivitamin Tabs tablet  Take 1 tablet by mouth daily  Start taking on:  November 15, 2020     pantoprazole 40 MG tablet  Commonly known as:  PROTONIX  Take 1 tablet by mouth 2 times daily (before meals)     thiamine 100 MG tablet  Take 1 tablet by mouth daily  Start taking on:  November 15, 2020           Where to Get Your Medications      These medications were sent to [facility], [address] [place] [facility], [address] [place] [address] Road    Phone:  [phone]   · folic acid 1 MG tablet  · multivitamin Tabs tablet  · pantoprazole 40 MG tablet  · thiamine 100 MG tablet           Condition at discharge: Stable     Disposition:  Home    Thank you for the opportunity to be involved in this patient's care. If you have any questions or concerns please feel free to contact me. Time Spent on discharge is more than 35 minutes in the examination, evaluation, counseling and review of medications and discharge plan.    +++++++++++++++++++++++++++++++++++++++++++++++++  Aaron Vega MD, Hospitalist  +++++++++++++++++++++++++++++++++++++++++++++++++  NOTE: This report was transcribed using voice recognition software. Every effort was made to ensure accuracy; however, inadvertent computerized transcription errors may be present.

## 2020-11-14 NOTE — PROGRESS NOTES
Reviewed discharged paper with patient.  patient stated understanding and would follow the instructions

## 2020-11-14 NOTE — PROGRESS NOTES
[facility]  ATTENDING PHYSICIAN PROGRESS NOTE     I have examined the patient and  reviewed the record. I have reviewed all relevant labs and imaging data. The following summarizes my clinical findings and independent assessment. CC: Epigastric abdominal pain    S. I performed an EGD on patient yesterday. His hemoglobin is stable. He is tolerating a peptic ulcer diet    O.  Vitals:    11/13/20 2300   BP: (!) 109/52   Pulse: 100   Resp: 18   Temp: 97.4 °F (36.3 °C)   SpO2: 95%     PHYSICAL EXAM   PSYCH: mood and affect normal, alert and oriented x 3  CONSTITUTIONAL: No apparent distress, comfortable  EYES: Sclera white, pupils equal round and reactive to light  ENMT:  Hearing normal, trachea midline, ears externally intact  RESP: Breath sounds were clear and equal with no rales, wheezes, or rhonchi. Respiratory effort was normal with no retractions or use of accessory muscles. CV: Heart sounds were normal with a regular rate and rhythm. No pedal edema  GI/ Abdomen: The abdomen was soft and non distended. There wasmild epigastric tenderness, guarding, rebound, or rigidity. There was no                     masses, hepatosplenomegaly, or hernias. ASSESSMENT:  Active Problems:    GI bleed  Resolved Problems:    * No resolved hospital problems. *       PLAN:  Gastric pain/hematemesis--underwent EGD yesterday. I spoke to the patient about his findings. He had severe inflammation diffusely of his stomach. I recommend that he continue taking a PPI. I also said that he needs to quit drinking. The alcohol is irritating and causing inflammation of the stomach. Continue general diet    Okay for discharge home from general surgery standpoint. We will sign off       Veronica Hannon MD, [facility]  11/14/2020  8:09 AM    NOTE: This report was transcribed using voice recognition software.  Every effort was made to ensure accuracy; however, inadvertent computerized transcription errors may be present.

## 2020-11-14 NOTE — ANESTHESIA POSTPROCEDURE EVALUATION
Department of Anesthesiology  Postprocedure Note    Patient: Gustavo Chowdhury III  MRN: [id number]  YOB: 1980  Date of evaluation: 11/13/2020  Time:  8:50 PM     Procedure Summary     Date:  11/13/20 Room / Location:  [address] [place] [address] [place] [facility]    Anesthesia Start:  3227 Anesthesia Stop:  4196    Procedure:  EGD BIOPSY (N/A ) Diagnosis:  (Hemoptysis)    Surgeon:  Vedia Ormond, MD Responsible Provider:  Geovanny Barraza MD    Anesthesia Type:  MAC ASA Status:  3          Anesthesia Type: MAC    Edmundo Phase I: Edmundo Score: 10    Edmundo Phase II:      Last vitals: Reviewed and per EMR flowsheets.        Anesthesia Post Evaluation    Patient location during evaluation: bedside  Patient participation: complete - patient participated  Level of consciousness: awake and alert  Airway patency: patent  Nausea & Vomiting: no nausea and no vomiting  Complications: no  Cardiovascular status: blood pressure returned to baseline and hemodynamically stable  Respiratory status: acceptable and spontaneous ventilation  Hydration status: euvolemic

## 2021-01-05 PROCEDURE — 96375 TX/PRO/DX INJ NEW DRUG ADDON: CPT

## 2021-01-05 PROCEDURE — 96374 THER/PROPH/DIAG INJ IV PUSH: CPT

## 2021-01-05 PROCEDURE — 99284 EMERGENCY DEPT VISIT MOD MDM: CPT

## 2021-01-06 ENCOUNTER — ANESTHESIA EVENT (OUTPATIENT)
Dept: ENDOSCOPY | Age: 41
DRG: 280 | End: 2021-01-06
Payer: COMMERCIAL

## 2021-01-06 ENCOUNTER — APPOINTMENT (OUTPATIENT)
Dept: ULTRASOUND IMAGING | Age: 41
DRG: 280 | End: 2021-01-06
Payer: COMMERCIAL

## 2021-01-06 ENCOUNTER — APPOINTMENT (OUTPATIENT)
Dept: NUCLEAR MEDICINE | Age: 41
DRG: 280 | End: 2021-01-06
Payer: COMMERCIAL

## 2021-01-06 ENCOUNTER — APPOINTMENT (OUTPATIENT)
Dept: CT IMAGING | Age: 41
DRG: 280 | End: 2021-01-06
Payer: COMMERCIAL

## 2021-01-06 ENCOUNTER — ANESTHESIA (OUTPATIENT)
Dept: ENDOSCOPY | Age: 41
DRG: 280 | End: 2021-01-06
Payer: COMMERCIAL

## 2021-01-06 ENCOUNTER — HOSPITAL ENCOUNTER (INPATIENT)
Age: 41
LOS: 3 days | Discharge: HOME | DRG: 280 | End: 2021-01-11
Attending: EMERGENCY MEDICINE | Admitting: INTERNAL MEDICINE
Payer: COMMERCIAL

## 2021-01-06 VITALS — SYSTOLIC BLOOD PRESSURE: 89 MMHG | OXYGEN SATURATION: 99 % | DIASTOLIC BLOOD PRESSURE: 59 MMHG

## 2021-01-06 DIAGNOSIS — R10.84 GENERALIZED ABDOMINAL PAIN: Primary | ICD-10-CM

## 2021-01-06 PROBLEM — R10.9 ABDOMINAL PAIN: Status: ACTIVE | Noted: 2021-01-06

## 2021-01-06 LAB
ABO/RH: NORMAL
ABO/RH: NORMAL
ACETAMINOPHEN LEVEL: <5 MCG/ML (ref 10–30)
ALBUMIN SERPL-MCNC: 2.8 G/DL (ref 3.5–5.2)
ALBUMIN SERPL-MCNC: 3.5 G/DL (ref 3.5–5.2)
ALP BLD-CCNC: 185 U/L (ref 40–129)
ALP BLD-CCNC: 188 U/L (ref 40–129)
ALT SERPL-CCNC: 34 U/L (ref 0–40)
ALT SERPL-CCNC: 41 U/L (ref 0–40)
ANION GAP SERPL CALCULATED.3IONS-SCNC: 11 MMOL/L (ref 7–16)
ANION GAP SERPL CALCULATED.3IONS-SCNC: 8 MMOL/L (ref 7–16)
ANION GAP SERPL CALCULATED.3IONS-SCNC: 9 MMOL/L (ref 7–16)
ANTIBODY SCREEN: NORMAL
APTT: 38 SEC (ref 24.5–35.1)
AST SERPL-CCNC: 122 U/L (ref 0–39)
AST SERPL-CCNC: 145 U/L (ref 0–39)
BASOPHILS ABSOLUTE: 0.06 E9/L (ref 0–0.2)
BASOPHILS ABSOLUTE: 0.08 E9/L (ref 0–0.2)
BASOPHILS RELATIVE PERCENT: 0.6 % (ref 0–2)
BASOPHILS RELATIVE PERCENT: 0.7 % (ref 0–2)
BILIRUB SERPL-MCNC: 2.6 MG/DL (ref 0–1.2)
BILIRUB SERPL-MCNC: 2.8 MG/DL (ref 0–1.2)
BILIRUBIN DIRECT: 1.4 MG/DL (ref 0–0.3)
BILIRUBIN DIRECT: 1.5 MG/DL (ref 0–0.3)
BILIRUBIN, INDIRECT: 1.2 MG/DL (ref 0–1)
BILIRUBIN, INDIRECT: 1.3 MG/DL (ref 0–1)
BUN BLDV-MCNC: 12 MG/DL (ref 6–20)
BUN BLDV-MCNC: 17 MG/DL (ref 6–20)
BUN BLDV-MCNC: 31 MG/DL (ref 6–20)
CALCIUM SERPL-MCNC: 7.9 MG/DL (ref 8.6–10.2)
CALCIUM SERPL-MCNC: 8.1 MG/DL (ref 8.6–10.2)
CALCIUM SERPL-MCNC: 8.8 MG/DL (ref 8.6–10.2)
CHLORIDE BLD-SCNC: 105 MMOL/L (ref 98–107)
CHLORIDE BLD-SCNC: 106 MMOL/L (ref 98–107)
CHLORIDE BLD-SCNC: 107 MMOL/L (ref 98–107)
CO2: 22 MMOL/L (ref 22–29)
CO2: 22 MMOL/L (ref 22–29)
CO2: 23 MMOL/L (ref 22–29)
CREAT SERPL-MCNC: 1.1 MG/DL (ref 0.7–1.2)
CREAT SERPL-MCNC: 1.3 MG/DL (ref 0.7–1.2)
CREAT SERPL-MCNC: 1.8 MG/DL (ref 0.7–1.2)
EKG ATRIAL RATE: 108 BPM
EKG P AXIS: 60 DEGREES
EKG P-R INTERVAL: 134 MS
EKG Q-T INTERVAL: 336 MS
EKG QRS DURATION: 68 MS
EKG QTC CALCULATION (BAZETT): 450 MS
EKG R AXIS: 56 DEGREES
EKG T AXIS: 53 DEGREES
EKG VENTRICULAR RATE: 108 BPM
EOSINOPHILS ABSOLUTE: 0.06 E9/L (ref 0.05–0.5)
EOSINOPHILS ABSOLUTE: 0.12 E9/L (ref 0.05–0.5)
EOSINOPHILS RELATIVE PERCENT: 0.5 % (ref 0–6)
EOSINOPHILS RELATIVE PERCENT: 1.3 % (ref 0–6)
FERRITIN: 68 NG/ML
GFR AFRICAN AMERICAN: 51
GFR AFRICAN AMERICAN: >60
GFR AFRICAN AMERICAN: >60
GFR NON-AFRICAN AMERICAN: 42 ML/MIN/1.73
GFR NON-AFRICAN AMERICAN: >60 ML/MIN/1.73
GFR NON-AFRICAN AMERICAN: >60 ML/MIN/1.73
GLUCOSE BLD-MCNC: 142 MG/DL (ref 74–99)
GLUCOSE BLD-MCNC: 157 MG/DL (ref 74–99)
GLUCOSE BLD-MCNC: 159 MG/DL (ref 74–99)
HCT VFR BLD CALC: 19.5 % (ref 37–54)
HCT VFR BLD CALC: 24.6 % (ref 37–54)
HCT VFR BLD CALC: 26.6 % (ref 37–54)
HCT VFR BLD CALC: 31.5 % (ref 37–54)
HEMOGLOBIN: 10 G/DL (ref 12.5–16.5)
HEMOGLOBIN: 6.1 G/DL (ref 12.5–16.5)
HEMOGLOBIN: 8.1 G/DL (ref 12.5–16.5)
HEMOGLOBIN: 8.5 G/DL (ref 12.5–16.5)
IMMATURE GRANULOCYTES #: 0.04 E9/L
IMMATURE GRANULOCYTES #: 0.05 E9/L
IMMATURE GRANULOCYTES %: 0.4 % (ref 0–5)
IMMATURE GRANULOCYTES %: 0.4 % (ref 0–5)
INR BLD: 2
IRON SATURATION: 26 % (ref 20–55)
IRON: 67 MCG/DL (ref 59–158)
LACTIC ACID: 1.9 MMOL/L (ref 0.5–2.2)
LACTIC ACID: 2.8 MMOL/L (ref 0.5–2.2)
LIPASE: 102 U/L (ref 13–60)
LIPASE: 77 U/L (ref 13–60)
LYMPHOCYTES ABSOLUTE: 1.14 E9/L (ref 1.5–4)
LYMPHOCYTES ABSOLUTE: 1.27 E9/L (ref 1.5–4)
LYMPHOCYTES RELATIVE PERCENT: 10.3 % (ref 20–42)
LYMPHOCYTES RELATIVE PERCENT: 12.1 % (ref 20–42)
MCH RBC QN AUTO: 28.7 PG (ref 26–35)
MCH RBC QN AUTO: 28.8 PG (ref 26–35)
MCHC RBC AUTO-ENTMCNC: 31.7 % (ref 32–34.5)
MCHC RBC AUTO-ENTMCNC: 32 % (ref 32–34.5)
MCV RBC AUTO: 90.2 FL (ref 80–99.9)
MCV RBC AUTO: 90.5 FL (ref 80–99.9)
MONOCYTES ABSOLUTE: 0.88 E9/L (ref 0.1–0.95)
MONOCYTES ABSOLUTE: 1.04 E9/L (ref 0.1–0.95)
MONOCYTES RELATIVE PERCENT: 8.5 % (ref 2–12)
MONOCYTES RELATIVE PERCENT: 9.4 % (ref 2–12)
NEUTROPHILS ABSOLUTE: 7.16 E9/L (ref 1.8–7.3)
NEUTROPHILS ABSOLUTE: 9.8 E9/L (ref 1.8–7.3)
NEUTROPHILS RELATIVE PERCENT: 76.2 % (ref 43–80)
NEUTROPHILS RELATIVE PERCENT: 79.6 % (ref 43–80)
PDW BLD-RTO: 16.3 FL (ref 11.5–15)
PDW BLD-RTO: 16.3 FL (ref 11.5–15)
PLATELET # BLD: 164 E9/L (ref 130–450)
PLATELET # BLD: 176 E9/L (ref 130–450)
PMV BLD AUTO: 10.8 FL (ref 7–12)
PMV BLD AUTO: 10.9 FL (ref 7–12)
POTASSIUM REFLEX MAGNESIUM: 4.2 MMOL/L (ref 3.5–5)
POTASSIUM REFLEX MAGNESIUM: 5.1 MMOL/L (ref 3.5–5)
POTASSIUM SERPL-SCNC: 6.3 MMOL/L (ref 3.5–5)
PROTHROMBIN TIME: 22.6 SEC (ref 9.3–12.4)
RBC # BLD: 2.95 E12/L (ref 3.8–5.8)
RBC # BLD: 3.48 E12/L (ref 3.8–5.8)
SODIUM BLD-SCNC: 137 MMOL/L (ref 132–146)
SODIUM BLD-SCNC: 138 MMOL/L (ref 132–146)
SODIUM BLD-SCNC: 138 MMOL/L (ref 132–146)
TOTAL IRON BINDING CAPACITY: 261 MCG/DL (ref 250–450)
TOTAL PROTEIN: 6.7 G/DL (ref 6.4–8.3)
TOTAL PROTEIN: 7.7 G/DL (ref 6.4–8.3)
TROPONIN: <0.01 NG/ML (ref 0–0.03)
WBC # BLD: 12.3 E9/L (ref 4.5–11.5)
WBC # BLD: 9.4 E9/L (ref 4.5–11.5)

## 2021-01-06 PROCEDURE — 2580000003 HC RX 258: Performed by: STUDENT IN AN ORGANIZED HEALTH CARE EDUCATION/TRAINING PROGRAM

## 2021-01-06 PROCEDURE — 82105 ALPHA-FETOPROTEIN SERUM: CPT

## 2021-01-06 PROCEDURE — 82390 ASSAY OF CERULOPLASMIN: CPT

## 2021-01-06 PROCEDURE — G0378 HOSPITAL OBSERVATION PER HR: HCPCS

## 2021-01-06 PROCEDURE — 76705 ECHO EXAM OF ABDOMEN: CPT

## 2021-01-06 PROCEDURE — 6360000002 HC RX W HCPCS: Performed by: HOSPITALIST

## 2021-01-06 PROCEDURE — 2580000003 HC RX 258: Performed by: NURSE ANESTHETIST, CERTIFIED REGISTERED

## 2021-01-06 PROCEDURE — A9537 TC99M MEBROFENIN: HCPCS | Performed by: RADIOLOGY

## 2021-01-06 PROCEDURE — 6370000000 HC RX 637 (ALT 250 FOR IP): Performed by: SURGERY

## 2021-01-06 PROCEDURE — 82103 ALPHA-1-ANTITRYPSIN TOTAL: CPT

## 2021-01-06 PROCEDURE — 83540 ASSAY OF IRON: CPT

## 2021-01-06 PROCEDURE — 86038 ANTINUCLEAR ANTIBODIES: CPT

## 2021-01-06 PROCEDURE — 80048 BASIC METABOLIC PNL TOTAL CA: CPT

## 2021-01-06 PROCEDURE — 80307 DRUG TEST PRSMV CHEM ANLYZR: CPT

## 2021-01-06 PROCEDURE — 93010 ELECTROCARDIOGRAM REPORT: CPT | Performed by: INTERNAL MEDICINE

## 2021-01-06 PROCEDURE — 6360000002 HC RX W HCPCS: Performed by: SURGERY

## 2021-01-06 PROCEDURE — 3609012300 HC EGD BAND LIGATION ESOPHGEAL/GASTRIC VARICES: Performed by: INTERNAL MEDICINE

## 2021-01-06 PROCEDURE — 3700000001 HC ADD 15 MINUTES (ANESTHESIA): Performed by: INTERNAL MEDICINE

## 2021-01-06 PROCEDURE — 83516 IMMUNOASSAY NONANTIBODY: CPT

## 2021-01-06 PROCEDURE — 80074 ACUTE HEPATITIS PANEL: CPT

## 2021-01-06 PROCEDURE — 78226 HEPATOBILIARY SYSTEM IMAGING: CPT

## 2021-01-06 PROCEDURE — P9059 PLASMA, FRZ BETWEEN 8-24HOUR: HCPCS

## 2021-01-06 PROCEDURE — 6360000004 HC RX CONTRAST MEDICATION: Performed by: RADIOLOGY

## 2021-01-06 PROCEDURE — 93005 ELECTROCARDIOGRAM TRACING: CPT | Performed by: STUDENT IN AN ORGANIZED HEALTH CARE EDUCATION/TRAINING PROGRAM

## 2021-01-06 PROCEDURE — 06L38CZ OCCLUSION OF ESOPHAGEAL VEIN WITH EXTRALUMINAL DEVICE, VIA NATURAL OR ARTIFICIAL OPENING ENDOSCOPIC: ICD-10-PCS | Performed by: INTERNAL MEDICINE

## 2021-01-06 PROCEDURE — 96374 THER/PROPH/DIAG INJ IV PUSH: CPT

## 2021-01-06 PROCEDURE — 2720000010 HC SURG SUPPLY STERILE: Performed by: INTERNAL MEDICINE

## 2021-01-06 PROCEDURE — 6360000002 HC RX W HCPCS: Performed by: NURSE ANESTHETIST, CERTIFIED REGISTERED

## 2021-01-06 PROCEDURE — 7100000011 HC PHASE II RECOVERY - ADDTL 15 MIN: Performed by: INTERNAL MEDICINE

## 2021-01-06 PROCEDURE — 85014 HEMATOCRIT: CPT

## 2021-01-06 PROCEDURE — 6360000002 HC RX W HCPCS: Performed by: STUDENT IN AN ORGANIZED HEALTH CARE EDUCATION/TRAINING PROGRAM

## 2021-01-06 PROCEDURE — 2580000003 HC RX 258: Performed by: SURGERY

## 2021-01-06 PROCEDURE — 74177 CT ABD & PELVIS W/CONTRAST: CPT

## 2021-01-06 PROCEDURE — 7100000010 HC PHASE II RECOVERY - FIRST 15 MIN: Performed by: INTERNAL MEDICINE

## 2021-01-06 PROCEDURE — 2709999900 HC NON-CHARGEABLE SUPPLY: Performed by: INTERNAL MEDICINE

## 2021-01-06 PROCEDURE — 6360000002 HC RX W HCPCS

## 2021-01-06 PROCEDURE — 80076 HEPATIC FUNCTION PANEL: CPT

## 2021-01-06 PROCEDURE — 99219 PR INITIAL OBSERVATION CARE/DAY 50 MINUTES: CPT | Performed by: SURGERY

## 2021-01-06 PROCEDURE — 96365 THER/PROPH/DIAG IV INF INIT: CPT

## 2021-01-06 PROCEDURE — 2580000003 HC RX 258: Performed by: HOSPITALIST

## 2021-01-06 PROCEDURE — 99291 CRITICAL CARE FIRST HOUR: CPT | Performed by: INTERNAL MEDICINE

## 2021-01-06 PROCEDURE — 86256 FLUORESCENT ANTIBODY TITER: CPT

## 2021-01-06 PROCEDURE — 96376 TX/PRO/DX INJ SAME DRUG ADON: CPT

## 2021-01-06 PROCEDURE — 6360000002 HC RX W HCPCS: Performed by: EMERGENCY MEDICINE

## 2021-01-06 PROCEDURE — 82728 ASSAY OF FERRITIN: CPT

## 2021-01-06 PROCEDURE — 83550 IRON BINDING TEST: CPT

## 2021-01-06 PROCEDURE — C9113 INJ PANTOPRAZOLE SODIUM, VIA: HCPCS | Performed by: SURGERY

## 2021-01-06 PROCEDURE — 85025 COMPLETE CBC W/AUTO DIFF WBC: CPT

## 2021-01-06 PROCEDURE — 6370000000 HC RX 637 (ALT 250 FOR IP): Performed by: STUDENT IN AN ORGANIZED HEALTH CARE EDUCATION/TRAINING PROGRAM

## 2021-01-06 PROCEDURE — 36415 COLL VENOUS BLD VENIPUNCTURE: CPT

## 2021-01-06 PROCEDURE — 96366 THER/PROPH/DIAG IV INF ADDON: CPT

## 2021-01-06 PROCEDURE — 83605 ASSAY OF LACTIC ACID: CPT

## 2021-01-06 PROCEDURE — 96375 TX/PRO/DX INJ NEW DRUG ADDON: CPT

## 2021-01-06 PROCEDURE — 85018 HEMOGLOBIN: CPT

## 2021-01-06 PROCEDURE — 2580000003 HC RX 258

## 2021-01-06 PROCEDURE — 3430000000 HC RX DIAGNOSTIC RADIOPHARMACEUTICAL: Performed by: RADIOLOGY

## 2021-01-06 PROCEDURE — 3700000000 HC ANESTHESIA ATTENDED CARE: Performed by: INTERNAL MEDICINE

## 2021-01-06 PROCEDURE — 36430 TRANSFUSION BLD/BLD COMPNT: CPT

## 2021-01-06 RX ORDER — SODIUM CHLORIDE 0.9 % (FLUSH) 0.9 %
10 SYRINGE (ML) INJECTION PRN
Status: DISCONTINUED | OUTPATIENT
Start: 2021-01-06 | End: 2021-01-07 | Stop reason: SDUPTHER

## 2021-01-06 RX ORDER — CIPROFLOXACIN 2 MG/ML
400 INJECTION, SOLUTION INTRAVENOUS EVERY 12 HOURS
Status: DISCONTINUED | OUTPATIENT
Start: 2021-01-06 | End: 2021-01-11 | Stop reason: HOSPADM

## 2021-01-06 RX ORDER — MORPHINE SULFATE 4 MG/ML
4 INJECTION, SOLUTION INTRAMUSCULAR; INTRAVENOUS ONCE
Status: COMPLETED | OUTPATIENT
Start: 2021-01-06 | End: 2021-01-06

## 2021-01-06 RX ORDER — ONDANSETRON 2 MG/ML
4 INJECTION INTRAMUSCULAR; INTRAVENOUS ONCE
Status: COMPLETED | OUTPATIENT
Start: 2021-01-06 | End: 2021-01-06

## 2021-01-06 RX ORDER — SODIUM CHLORIDE 0.9 % (FLUSH) 0.9 %
10 SYRINGE (ML) INJECTION EVERY 12 HOURS SCHEDULED
Status: DISCONTINUED | OUTPATIENT
Start: 2021-01-06 | End: 2021-01-07 | Stop reason: SDUPTHER

## 2021-01-06 RX ORDER — FENTANYL CITRATE 50 UG/ML
50 INJECTION, SOLUTION INTRAMUSCULAR; INTRAVENOUS ONCE
Status: COMPLETED | OUTPATIENT
Start: 2021-01-06 | End: 2021-01-06

## 2021-01-06 RX ORDER — SODIUM CHLORIDE 0.9 % (FLUSH) 0.9 %
10 SYRINGE (ML) INJECTION PRN
Status: CANCELLED | OUTPATIENT
Start: 2021-01-06

## 2021-01-06 RX ORDER — SODIUM CHLORIDE, SODIUM LACTATE, POTASSIUM CHLORIDE, CALCIUM CHLORIDE 600; 310; 30; 20 MG/100ML; MG/100ML; MG/100ML; MG/100ML
INJECTION, SOLUTION INTRAVENOUS CONTINUOUS
Status: DISCONTINUED | OUTPATIENT
Start: 2021-01-06 | End: 2021-01-11

## 2021-01-06 RX ORDER — SODIUM CHLORIDE 9 MG/ML
INJECTION, SOLUTION INTRAVENOUS PRN
Status: DISCONTINUED | OUTPATIENT
Start: 2021-01-06 | End: 2021-01-11 | Stop reason: HOSPADM

## 2021-01-06 RX ORDER — SODIUM CHLORIDE 9 MG/ML
10 INJECTION INTRAVENOUS DAILY
Status: DISCONTINUED | OUTPATIENT
Start: 2021-01-06 | End: 2021-01-11 | Stop reason: HOSPADM

## 2021-01-06 RX ORDER — PANTOPRAZOLE SODIUM 40 MG/10ML
40 INJECTION, POWDER, LYOPHILIZED, FOR SOLUTION INTRAVENOUS 2 TIMES DAILY
Status: DISCONTINUED | OUTPATIENT
Start: 2021-01-06 | End: 2021-01-07

## 2021-01-06 RX ORDER — METOCLOPRAMIDE HYDROCHLORIDE 5 MG/ML
10 INJECTION INTRAMUSCULAR; INTRAVENOUS ONCE
Status: COMPLETED | OUTPATIENT
Start: 2021-01-06 | End: 2021-01-06

## 2021-01-06 RX ORDER — MORPHINE SULFATE 2 MG/ML
2 INJECTION, SOLUTION INTRAMUSCULAR; INTRAVENOUS EVERY 4 HOURS PRN
Status: DISCONTINUED | OUTPATIENT
Start: 2021-01-06 | End: 2021-01-11

## 2021-01-06 RX ORDER — MORPHINE SULFATE 10 MG/ML
8 INJECTION, SOLUTION INTRAMUSCULAR; INTRAVENOUS ONCE
Status: COMPLETED | OUTPATIENT
Start: 2021-01-06 | End: 2021-01-06

## 2021-01-06 RX ORDER — PROPOFOL 10 MG/ML
INJECTION, EMULSION INTRAVENOUS PRN
Status: DISCONTINUED | OUTPATIENT
Start: 2021-01-06 | End: 2021-01-06 | Stop reason: SDUPTHER

## 2021-01-06 RX ORDER — SODIUM CHLORIDE 0.9 % (FLUSH) 0.9 %
10 SYRINGE (ML) INJECTION EVERY 12 HOURS SCHEDULED
Status: CANCELLED | OUTPATIENT
Start: 2021-01-06

## 2021-01-06 RX ORDER — SODIUM CHLORIDE 9 MG/ML
INJECTION, SOLUTION INTRAVENOUS CONTINUOUS PRN
Status: DISCONTINUED | OUTPATIENT
Start: 2021-01-06 | End: 2021-01-06 | Stop reason: SDUPTHER

## 2021-01-06 RX ORDER — 0.9 % SODIUM CHLORIDE 0.9 %
1000 INTRAVENOUS SOLUTION INTRAVENOUS ONCE
Status: COMPLETED | OUTPATIENT
Start: 2021-01-06 | End: 2021-01-06

## 2021-01-06 RX ORDER — SUCRALFATE 1 G/1
1 TABLET ORAL EVERY 6 HOURS SCHEDULED
Status: DISCONTINUED | OUTPATIENT
Start: 2021-01-06 | End: 2021-01-11 | Stop reason: HOSPADM

## 2021-01-06 RX ORDER — ONDANSETRON 4 MG/1
4 TABLET, ORALLY DISINTEGRATING ORAL EVERY 8 HOURS PRN
Status: DISCONTINUED | OUTPATIENT
Start: 2021-01-06 | End: 2021-01-11 | Stop reason: HOSPADM

## 2021-01-06 RX ORDER — 0.9 % SODIUM CHLORIDE 0.9 %
500 INTRAVENOUS SOLUTION INTRAVENOUS ONCE
Status: DISCONTINUED | OUTPATIENT
Start: 2021-01-06 | End: 2021-01-11 | Stop reason: HOSPADM

## 2021-01-06 RX ORDER — MORPHINE SULFATE 4 MG/ML
INJECTION, SOLUTION INTRAMUSCULAR; INTRAVENOUS
Status: COMPLETED
Start: 2021-01-06 | End: 2021-01-06

## 2021-01-06 RX ORDER — SODIUM CHLORIDE, SODIUM LACTATE, POTASSIUM CHLORIDE, CALCIUM CHLORIDE 600; 310; 30; 20 MG/100ML; MG/100ML; MG/100ML; MG/100ML
INJECTION, SOLUTION INTRAVENOUS
Status: COMPLETED
Start: 2021-01-06 | End: 2021-01-06

## 2021-01-06 RX ORDER — MORPHINE SULFATE 4 MG/ML
4 INJECTION, SOLUTION INTRAMUSCULAR; INTRAVENOUS EVERY 4 HOURS PRN
Status: DISCONTINUED | OUTPATIENT
Start: 2021-01-06 | End: 2021-01-11

## 2021-01-06 RX ORDER — ONDANSETRON 2 MG/ML
4 INJECTION INTRAMUSCULAR; INTRAVENOUS EVERY 6 HOURS PRN
Status: DISCONTINUED | OUTPATIENT
Start: 2021-01-06 | End: 2021-01-11 | Stop reason: HOSPADM

## 2021-01-06 RX ORDER — OCTREOTIDE ACETATE 50 UG/ML
50 INJECTION, SOLUTION INTRAVENOUS; SUBCUTANEOUS ONCE
Status: COMPLETED | OUTPATIENT
Start: 2021-01-06 | End: 2021-01-06

## 2021-01-06 RX ORDER — 0.9 % SODIUM CHLORIDE 0.9 %
1000 INTRAVENOUS SOLUTION INTRAVENOUS ONCE
Status: DISCONTINUED | OUTPATIENT
Start: 2021-01-06 | End: 2021-01-06

## 2021-01-06 RX ADMIN — METOCLOPRAMIDE 10 MG: 5 INJECTION, SOLUTION INTRAMUSCULAR; INTRAVENOUS at 02:13

## 2021-01-06 RX ADMIN — PROPOFOL 100 MG: 10 INJECTION, EMULSION INTRAVENOUS at 17:06

## 2021-01-06 RX ADMIN — SUCRALFATE 1 G: 1 TABLET ORAL at 06:20

## 2021-01-06 RX ADMIN — SODIUM CHLORIDE 1000 ML: 9 INJECTION, SOLUTION INTRAVENOUS at 00:36

## 2021-01-06 RX ADMIN — LIDOCAINE HYDROCHLORIDE: 20 SOLUTION ORAL; TOPICAL at 07:35

## 2021-01-06 RX ADMIN — ONDANSETRON HYDROCHLORIDE 4 MG: 2 SOLUTION INTRAMUSCULAR; INTRAVENOUS at 13:23

## 2021-01-06 RX ADMIN — Medication 10 ML: at 13:31

## 2021-01-06 RX ADMIN — MORPHINE SULFATE 4 MG: 4 INJECTION, SOLUTION INTRAMUSCULAR; INTRAVENOUS at 05:07

## 2021-01-06 RX ADMIN — SODIUM CHLORIDE, POTASSIUM CHLORIDE, SODIUM LACTATE AND CALCIUM CHLORIDE: 600; 310; 30; 20 INJECTION, SOLUTION INTRAVENOUS at 06:20

## 2021-01-06 RX ADMIN — MORPHINE SULFATE 4 MG: 4 INJECTION, SOLUTION INTRAMUSCULAR; INTRAVENOUS at 23:49

## 2021-01-06 RX ADMIN — ONDANSETRON HYDROCHLORIDE 4 MG: 2 SOLUTION INTRAMUSCULAR; INTRAVENOUS at 19:02

## 2021-01-06 RX ADMIN — FENTANYL CITRATE 50 MCG: 50 INJECTION, SOLUTION INTRAMUSCULAR; INTRAVENOUS at 00:35

## 2021-01-06 RX ADMIN — ONDANSETRON HYDROCHLORIDE 4 MG: 2 SOLUTION INTRAMUSCULAR; INTRAVENOUS at 00:35

## 2021-01-06 RX ADMIN — PANTOPRAZOLE SODIUM 40 MG: 40 INJECTION, POWDER, FOR SOLUTION INTRAVENOUS at 13:23

## 2021-01-06 RX ADMIN — Medication 10 ML: at 23:50

## 2021-01-06 RX ADMIN — MORPHINE SULFATE 8 MG: 10 INJECTION INTRAVENOUS at 02:13

## 2021-01-06 RX ADMIN — PROPOFOL 10 MG: 10 INJECTION, EMULSION INTRAVENOUS at 17:07

## 2021-01-06 RX ADMIN — IOPAMIDOL 80 ML: 755 INJECTION, SOLUTION INTRAVENOUS at 01:33

## 2021-01-06 RX ADMIN — PHYTONADIONE 10 MG: 10 INJECTION, EMULSION INTRAMUSCULAR; INTRAVENOUS; SUBCUTANEOUS at 05:11

## 2021-01-06 RX ADMIN — SODIUM CHLORIDE, PRESERVATIVE FREE 10 ML: 5 INJECTION INTRAVENOUS at 13:23

## 2021-01-06 RX ADMIN — Medication 6 MILLICURIE: at 10:37

## 2021-01-06 RX ADMIN — OCTREOTIDE ACETATE 50 MCG/HR: 500 INJECTION, SOLUTION INTRAVENOUS; SUBCUTANEOUS at 16:09

## 2021-01-06 RX ADMIN — MORPHINE SULFATE 4 MG: 4 INJECTION, SOLUTION INTRAMUSCULAR; INTRAVENOUS at 19:02

## 2021-01-06 RX ADMIN — PROPOFOL 50 MG: 10 INJECTION, EMULSION INTRAVENOUS at 17:12

## 2021-01-06 RX ADMIN — PANTOPRAZOLE SODIUM 40 MG: 40 INJECTION, POWDER, FOR SOLUTION INTRAVENOUS at 21:02

## 2021-01-06 RX ADMIN — SODIUM CHLORIDE: 9 INJECTION, SOLUTION INTRAVENOUS at 17:01

## 2021-01-06 RX ADMIN — PROPOFOL 100 MG: 10 INJECTION, EMULSION INTRAVENOUS at 17:09

## 2021-01-06 RX ADMIN — ONDANSETRON HYDROCHLORIDE 4 MG: 2 SOLUTION INTRAMUSCULAR; INTRAVENOUS at 23:50

## 2021-01-06 RX ADMIN — PROPOFOL 50 MG: 10 INJECTION, EMULSION INTRAVENOUS at 17:13

## 2021-01-06 RX ADMIN — OCTREOTIDE ACETATE 50 MCG: 50 INJECTION, SOLUTION INTRAVENOUS; SUBCUTANEOUS at 16:08

## 2021-01-06 RX ADMIN — CIPROFLOXACIN 400 MG: 2 INJECTION, SOLUTION INTRAVENOUS at 21:02

## 2021-01-06 RX ADMIN — CIPROFLOXACIN 400 MG: 2 INJECTION, SOLUTION INTRAVENOUS at 13:24

## 2021-01-06 ASSESSMENT — PAIN DESCRIPTION - DESCRIPTORS
DESCRIPTORS: ACHING;CONSTANT
DESCRIPTORS: ACHING;CONSTANT;DISCOMFORT
DESCRIPTORS: ACHING
DESCRIPTORS: SHARP

## 2021-01-06 ASSESSMENT — ENCOUNTER SYMPTOMS
BLOOD IN STOOL: 1
COUGH: 0
PHOTOPHOBIA: 0
DIARRHEA: 0
BACK PAIN: 0
VOMITING: 1
SHORTNESS OF BREATH: 0
VOICE CHANGE: 0
ABDOMINAL PAIN: 1
NAUSEA: 1

## 2021-01-06 ASSESSMENT — PAIN DESCRIPTION - FREQUENCY
FREQUENCY: CONTINUOUS
FREQUENCY: CONTINUOUS

## 2021-01-06 ASSESSMENT — PAIN DESCRIPTION - ONSET: ONSET: ON-GOING

## 2021-01-06 ASSESSMENT — PAIN SCALES - GENERAL
PAINLEVEL_OUTOF10: 6
PAINLEVEL_OUTOF10: 7
PAINLEVEL_OUTOF10: 6
PAINLEVEL_OUTOF10: 7
PAINLEVEL_OUTOF10: 8
PAINLEVEL_OUTOF10: 9

## 2021-01-06 ASSESSMENT — PAIN DESCRIPTION - PAIN TYPE
TYPE: ACUTE PAIN

## 2021-01-06 ASSESSMENT — PAIN DESCRIPTION - ORIENTATION: ORIENTATION: LOWER;LEFT;RIGHT

## 2021-01-06 ASSESSMENT — PAIN DESCRIPTION - LOCATION
LOCATION: ABDOMEN

## 2021-01-06 ASSESSMENT — PAIN DESCRIPTION - PROGRESSION: CLINICAL_PROGRESSION: NOT CHANGED

## 2021-01-06 NOTE — CONSULTS
Department of Internal Medicine        CHIEF COMPLAINT: Generalized abdominal pain, hematemesis    Reason for Admission: Acute GI bleed with normocytic anemia    HISTORY OF PRESENT ILLNESS:      The patient is a 45 y.o. male who presents with acute onset of generalized abdominal pain. Patient also had some hematemesis. Patient is generalized abdominal pain and distention started acutely yesterday a.m. Patient states he had a similar episode in November and was admitted to [facility] and had EGD performed. Patient since discharge has had occasional alcohol and Tylenol use. Patient has prior history of drug abuse and was diagnosed with hepatitis C about 9 years ago. Patient has had some dark bowel movements over the past week and felt lightheaded yesterday morning. Hemoglobin is 8.5 on admission with a WBC of 12.3. Total bili 2.6 with direct bilirubin 1.4. AST is 122 and ALT of 34. BUN/creatinine 17/1.3. Gallbladder ultrasound showed cirrhosis and suggestion of portal hypertension with some gallbladder wall thickening. CT of the abdomen has fatty infiltration with hepatomegaly with mild ascites. There was some para cholecystic fluid noted. Past Medical History:    History reviewed. No pertinent past medical history. Past Surgical History:    Past Surgical History:   Procedure Laterality Date    TONSILLECTOMY      UPPER GASTROINTESTINAL ENDOSCOPY N/A 11/13/2020    EGD BIOPSY performed by Peyton Moore MD at [address] [place] [address]       Medications Prior to Admission:    @  Prior to Admission medications    Medication Sig Start Date End Date Taking?  Authorizing Provider   influenza virus trivalent vaccine (FLUZONE) injection Inject 0.5 mLs into the muscle once Given 11/2020   Yes Historical Provider, MD       Allergies:  Pcn [penicillins]    Social History:   Social History     Socioeconomic History    Marital status: Single     Spouse name: Not on file    Number of children: Not on file  Years of education: Not on file    Highest education level: Not on file   Occupational History    Not on file   Social Needs    Financial resource strain: Not on file    Food insecurity     Worry: Not on file     Inability: Not on file    Transportation needs     Medical: Not on file     Non-medical: Not on file   Tobacco Use    Smoking status: Former Smoker     Packs/day: 0.50    Smokeless tobacco: Never Used   Substance and Sexual Activity    Alcohol use: Yes     Comment: occasional    Drug use: Not on file     Comment: used drugs in past    Sexual activity: Not on file   Lifestyle    Physical activity     Days per week: Not on file     Minutes per session: Not on file    Stress: Not on file   Relationships    Social connections     Talks on phone: Not on file     Gets together: Not on file     Attends Adventism service: Not on file     Active member of club or organization: Not on file     Attends meetings of clubs or organizations: Not on file     Relationship status: Not on file    Intimate partner violence     Fear of current or ex partner: Not on file     Emotionally abused: Not on file     Physically abused: Not on file     Forced sexual activity: Not on file   Other Topics Concern    Not on file   Social History Narrative    Not on file       Family History:   No family history on file. REVIEW OF SYSTEMS:    Gen: + lightheadedness. No LOC or syncope. No fevers or chills. HEENT: No earache, sore throat or nasal congestion. Resp: Denies cough, hemoptysis or sputum production. Cardiac: Denies chest pain, SOB, diaphoresis or palpitations. GI: + nausea, vomiting, melena, hematochezia. : No urinary complaints, dysuria, hematuria or frequency. MSK: No extremity weakness, paralysis or paresthesias.      PHYSICAL EXAM:    Vitals:  BP (!) 117/52   Pulse 108   Temp 99.8 °F (37.7 °C)   Resp 16   Ht 5' 8\" (1.727 m)   Wt 196 lb (88.9 kg)   SpO2 94%   BMI 29.80 kg/m² General:  This is a 45 y.o. yo male who is alert and oriented in NAD  HEENT:  Head is normocephalic and atraumatic, PERRLA, EOMI, mucus membranes moist with no pharyngeal erythema or exudate. Neck:  Supple with no carotid bruits, JVD or thyromegaly.   No cervical adenopathy  CV:  Regular rate and rhythm, no murmurs  Lungs:  Clear to auscultation bilaterally with no wheezes, rales or rhonchi  Abdomen:  +tender, mildly distended, bowel sounds present  Extremities:  No edema, peripheral pulses intact bilaterally  Neuro:  Cranial nerves II-XII grossly intact; motor and sensory function intact with no focal deficits  Skin:  No rashes, lesions or wounds      DATA:  CBC with Differential:    Lab Results   Component Value Date    WBC 12.3 01/06/2021    RBC 2.95 01/06/2021    HGB 8.5 01/06/2021    HCT 26.6 01/06/2021     01/06/2021    MCV 90.2 01/06/2021    MCH 28.8 01/06/2021    MCHC 32.0 01/06/2021    RDW 16.3 01/06/2021    SEGSPCT 61 02/20/2012    LYMPHOPCT 10.3 01/06/2021    MONOPCT 8.5 01/06/2021    BASOPCT 0.7 01/06/2021    MONOSABS 1.04 01/06/2021    LYMPHSABS 1.27 01/06/2021    EOSABS 0.06 01/06/2021    BASOSABS 0.08 01/06/2021     CMP:    Lab Results   Component Value Date     01/06/2021    K 5.1 01/06/2021     01/06/2021    CO2 22 01/06/2021    BUN 17 01/06/2021    CREATININE 1.3 01/06/2021    GFRAA >60 01/06/2021    LABGLOM >60 01/06/2021    GLUCOSE 157 01/06/2021    GLUCOSE 89 02/20/2012    PROT 6.7 01/06/2021    LABALBU 2.8 01/06/2021    LABALBU 3.4 02/18/2012    CALCIUM 8.1 01/06/2021    BILITOT 2.6 01/06/2021    ALKPHOS 185 01/06/2021     01/06/2021    ALT 34 01/06/2021     Magnesium:    Lab Results   Component Value Date    MG 2.0 02/22/2015     Phosphorus:  No results found for: PHOS  PT/INR:    Lab Results   Component Value Date    PROTIME 22.6 01/06/2021    PROTIME 12.3 02/17/2012    INR 2.0 01/06/2021     Troponin:    Lab Results   Component Value Date TROPONINI <0.01 01/06/2021     U/A:    Lab Results   Component Value Date    COLORU DARK YELLOW 08/14/2020    PROTEINU TRACE 08/14/2020    PHUR 6.5 08/14/2020    WBCUA 0-1 08/14/2020    RBCUA NONE 08/14/2020    RBCUA 0-1 02/25/2013    MUCUS Present 08/14/2020    BACTERIA RARE 08/14/2020    CLARITYU Clear 08/14/2020    SPECGRAV 1.025 08/14/2020    LEUKOCYTESUR Negative 08/14/2020    UROBILINOGEN 1.0 08/14/2020    BILIRUBINUR MODERATE 08/14/2020    BLOODU Negative 08/14/2020    GLUCOSEU Negative 08/14/2020     ABG:  No results found for: PH, PCO2, PO2, HCO3, BE, THGB, TCO2, O2SAT  HgBA1c:    Lab Results   Component Value Date    LABA1C 4.9 02/18/2012     FLP:  No results found for: TRIG, HDL, LDLCALC, LDLDIRECT, LABVLDL  TSH:    Lab Results   Component Value Date    TSH 2.900 08/14/2020     IRON:  No results found for: IRON  LIPASE:    Lab Results   Component Value Date    LIPASE 102 01/06/2021       ASSESSMENT AND PLAN:      Patient Active Problem List    Diagnosis Date Noted    Abdominal pain 01/06/2021    GI bleed 11/13/2020    Cellulitis 02/17/2012     Impression:  1. Acute upper GI bleed with melena and hematochezia-history of severe gastritis from EGD on 11/13/2020 but ? 2. History hepatitis Cuntreated with underlying cirrhosis  3.   Normocytic anemia-hemoglobin 9.7 back November 13 and currently 8.5    Plan:  Patient admitted to general surgical floor by general surgery  Monitor heart rate, blood pressure, O2 saturation  H&H every 6 hours  Consult with GI for follow-up for hepatitis C and upper GI bleed  Protonix 40 mg IV push twice daily      Shirin Jackson D.O.  1/6/2021  10:12 AM

## 2021-01-06 NOTE — CONSULTS
Medication Sig Start Date End Date Taking?  Authorizing Provider   influenza virus trivalent vaccine (FLUZONE) injection Inject 0.5 mLs into the muscle once Given 11/2020   Yes Historical Provider, MD       ALLERGIES:  Pcn [penicillins]    SOCIAL Hx:  Social History     Socioeconomic History    Marital status: Single     Spouse name: Not on file    Number of children: Not on file    Years of education: Not on file    Highest education level: Not on file   Occupational History    Not on file   Social Needs    Financial resource strain: Not on file    Food insecurity     Worry: Not on file     Inability: Not on file    Transportation needs     Medical: Not on file     Non-medical: Not on file   Tobacco Use    Smoking status: Former Smoker     Packs/day: 0.50    Smokeless tobacco: Never Used   Substance and Sexual Activity    Alcohol use: Yes     Comment: occasional    Drug use: Not on file     Comment: used drugs in past    Sexual activity: Not on file   Lifestyle    Physical activity     Days per week: Not on file     Minutes per session: Not on file    Stress: Not on file   Relationships    Social connections     Talks on phone: Not on file     Gets together: Not on file     Attends Quaker service: Not on file     Active member of club or organization: Not on file     Attends meetings of clubs or organizations: Not on file     Relationship status: Not on file    Intimate partner violence     Fear of current or ex partner: Not on file     Emotionally abused: Not on file     Physically abused: Not on file     Forced sexual activity: Not on file   Other Topics Concern    Not on file   Social History Narrative    Not on file       PE:  BP (!) 117/52   Pulse 108   Temp 99.8 °F (37.7 °C)   Resp 16   Ht 5' 8\" (1.727 m)   Wt 196 lb (88.9 kg)   SpO2 94%   BMI 29.80 kg/m²     General: A&Ox 3, friendly, NAD HEENT: Atraumatic, symmetric, no anterior/posterior lymphadenopathy, moist mucous membranes, PERRL, EOM intact, fair dentition. Pulm: CTAB, Neg w/r/r, normal chest expansion, no crackles noted  Cardio: RRR, neg m/r/g, nl S1 and S2, no extra heart sounds  Abd.: soft, NT, ND, BS+, no G/R, no HSM  Ext: +2/4 pulse Dp and radial b/l, LE and UE ROM intact,   Skin: No lesions, excoriations, petechiae, or ecchymoses noted    Neuro: normal sensation throughout, DTRs patellar, tricept, and bicept 2/4 b/l and equal, normal muscle strength throughout. DATA:     Lab Results   Component Value Date    WBC 12.3 01/06/2021    RBC 2.95 01/06/2021    HGB 8.5 01/06/2021    HCT 26.6 01/06/2021    MCV 90.2 01/06/2021    MCH 28.8 01/06/2021    MCHC 32.0 01/06/2021    RDW 16.3 01/06/2021     01/06/2021    MPV 10.8 01/06/2021     Lab Results   Component Value Date     01/06/2021    K 5.1 01/06/2021     01/06/2021    CO2 22 01/06/2021    BUN 17 01/06/2021    CREATININE 1.3 01/06/2021    CALCIUM 8.1 01/06/2021    PROT 6.7 01/06/2021    LABALBU 2.8 01/06/2021    LABALBU 3.4 02/18/2012    BILITOT 2.6 01/06/2021    ALKPHOS 185 01/06/2021     01/06/2021    ALT 34 01/06/2021     Lab Results   Component Value Date    LIPASE 102 01/06/2021     Lab Results   Component Value Date    AMYLASE 49 02/22/2015         ASSESSMENT/PLAN:  1. Gastrointestinal hemorrhage secondary to hematemesis  Likely secondary gastritis versus esophagitis versus variceal bleed  Hemodynamically stable without any clinical evidence of an overt or brisk GI bleed  Elevate head of bed  PPI drip  Octreotide drip  Cipro IV BID  NPO with plans for EGD this afternoon      2. Elevated LFTs  Cholestatic  Likely secondary to alcohol  RUQ US revealed a cirrhotic liver and findings suggestive of portal hypertension  Hepatic panel      3.   Alcoholic cirrhosis with ascites  Likely secondary to untreated chronic hepatitis C and alcohol abuse -MELD-NA: 20  MDF: 49.5 we will consider prednisone once infectious etiology have been excluded, pending hospital course  No clinical signs of encephalopathy, asterixis, and sclera nonicteric  AFP  Right upper quadrant ultrasound unremarkable for any masses  CT revealed mild ascites that is not appear amenable to a diagnostic paracentesis, will discuss with radiology  Discussed importance of hepatitis C treatment, patient states he has an appointment with another GI physician that PCP referred him to and he plans to follow-up to discuss treatment  Reiterated the importance of complete alcohol cessation and continued abstinence, patient voiced understanding      We will discuss with attending    Ernestine Carl DO  1/6/2021  10:27 AM    Pt seen and independently examined. Pertinent notes and lab work reviewed. D/w Dr. Terrell Ceron. Agree with physical exam and A&P. Discussed with patient- all questions answered - agreeable with the plan as delineated. Thank you for the opportunity to see this patient in consultation.     José Luis Lara MD  1/6/2021  12:30 PM

## 2021-01-06 NOTE — ANESTHESIA PRE PROCEDURE
Department of Anesthesiology  Preprocedure Note       Name:  Love Aguayo III   Age:  45 y.o.  :  1980                                          MRN:  [id number]         Date:  2021      Surgeon: Justin Oconnell):  Catracho Gonzalez MD    Procedure: Procedure(s):  EGD BAND LIGATION    Medications prior to admission:   Prior to Admission medications    Medication Sig Start Date End Date Taking?  Authorizing Provider   influenza virus trivalent vaccine (FLUZONE) injection Inject 0.5 mLs into the muscle once Given 2020   Yes Historical Provider, MD       Current medications:    Current Facility-Administered Medications   Medication Dose Route Frequency Provider Last Rate Last Admin    lactated ringers infusion   Intravenous Continuous Temitope Galan  mL/hr at 21 0743 Rate Verify at 21 0743    sodium chloride flush 0.9 % injection 10 mL  10 mL Intravenous 2 times per day Temitope Galan MD   10 mL at 21 1331    sodium chloride flush 0.9 % injection 10 mL  10 mL Intravenous PRN Temitope Galan MD        morphine (PF) injection 2 mg  2 mg Intravenous Q4H PRN Temitope Galan MD        Or    morphine injection 4 mg  4 mg Intravenous Q4H PRN Temtiope Galan MD        pantoprazole (PROTONIX) injection 40 mg  40 mg Intravenous BID Temitope Galan MD   40 mg at 21 1323    And    sodium chloride (PF) 0.9 % injection 10 mL  10 mL Intravenous Daily Temitope Galan MD   10 mL at 21 1323    ondansetron (ZOFRAN-ODT) disintegrating tablet 4 mg  4 mg Oral Q8H PRN Temitope Galan MD        Or    [facility]ron [facility] injection 4 mg  4 mg Intravenous Q6H PRN Temitope Galan MD   4 mg at 21 1323    sucralfate (CARAFATE) tablet 1 g  1 g Oral 4 times per day Temitope Galan MD   1 g at 21 0620    0.9 % sodium chloride infusion   Intravenous PRN Candice Mcclelland MD        octreotide (SANDOSTATIN) 500 mcg in sodium chloride 0.9 % 100 mL infusion 50 mcg/hr Intravenous Continuous Cuca Mcdowell MD 10 mL/hr at 01/06/21 1609 50 mcg/hr at 01/06/21 1609    ciprofloxacin (CIPRO) IVPB 400 mg  400 mg Intravenous Q12H Farnaz Romano DO   Stopped at 01/06/21 1424    0.9 % sodium chloride bolus  500 mL Intravenous Once Osiris Stewart MD   Stopped at 01/06/21 1345       Allergies: Allergies   Allergen Reactions    Pcn [Penicillins] Anaphylaxis       Problem List:    Patient Active Problem List   Diagnosis Code    Cellulitis L03.90    GI bleed K92.2    Abdominal pain R10.9       Past Medical History:  History reviewed. No pertinent past medical history. Past Surgical History:        Procedure Laterality Date    TONSILLECTOMY      UPPER GASTROINTESTINAL ENDOSCOPY N/A 11/13/2020    EGD BIOPSY performed by Marty Ch MD at [address] History:    Social History     Tobacco Use    Smoking status: Former Smoker     Packs/day: 0.50    Smokeless tobacco: Never Used   Substance Use Topics    Alcohol use: Yes     Comment: occasional                                Counseling given: Not Answered      Vital Signs (Current):   Vitals:    01/06/21 1752 01/06/21 1753 01/06/21 1756 01/06/21 1806   BP:   (!) 111/53 (!) 105/53   Pulse: 108 108 110 107   Resp: 19 18 16 20   Temp:   98.6 °F (37 °C)    TempSrc:   Infrared    SpO2: 97% 97% 96% 97%   Weight:       Height:                                                  BP Readings from Last 3 Encounters:   01/06/21 (!) 105/53   01/06/21 (!) 89/59   11/14/20 120/69       NPO Status:                                                                                 BMI:   Wt Readings from Last 3 Encounters:   01/06/21 196 lb (88.9 kg)   11/12/20 195 lb (88.5 kg)   08/14/20 195 lb (88.5 kg)     Body mass index is 29.8 kg/m².     CBC:   Lab Results   Component Value Date    WBC 12.3 01/06/2021    RBC 2.95 01/06/2021    HGB 8.1 01/06/2021    HCT 24.6 01/06/2021    MCV 90.2 01/06/2021    RDW 16.3 01/06/2021     01/06/2021       CMP:   Lab Results   Component Value Date     01/06/2021    K 5.1 01/06/2021     01/06/2021    CO2 22 01/06/2021    BUN 17 01/06/2021    CREATININE 1.3 01/06/2021    GFRAA >60 01/06/2021    LABGLOM >60 01/06/2021    GLUCOSE 157 01/06/2021    GLUCOSE 89 02/20/2012    PROT 6.7 01/06/2021    CALCIUM 8.1 01/06/2021    BILITOT 2.6 01/06/2021    ALKPHOS 185 01/06/2021     01/06/2021    ALT 34 01/06/2021       POC Tests: No results for input(s): POCGLU, POCNA, POCK, POCCL, POCBUN, POCHEMO, POCHCT in the last 72 hours. Coags:   Lab Results   Component Value Date    PROTIME 22.6 01/06/2021    PROTIME 12.3 02/17/2012    INR 2.0 01/06/2021    APTT 38.0 01/06/2021       HCG (If Applicable): No results found for: PREGTESTUR, PREGSERUM, HCG, HCGQUANT     ABGs: No results found for: PHART, PO2ART, KWR7LHX, ZXE6RSA, BEART, Q1HZGUGW     Type & Screen (If Applicable):  No results found for: LABABO, LABRH    Drug/Infectious Status (If Applicable):  No results found for: HIV, HEPCAB    COVID-19 Screening (If Applicable): No results found for: COVID19      Anesthesia Evaluation  Patient summary reviewed  Airway: Mallampati: II  TM distance: >3 FB   Neck ROM: full  Mouth opening: > = 3 FB Dental: normal exam         Pulmonary:Negative Pulmonary ROS                              Cardiovascular:Negative CV ROS            Rhythm: regular  Rate: normal                    Neuro/Psych:   Negative Neuro/Psych ROS              GI/Hepatic/Renal: Neg GI/Hepatic/Renal ROS            Endo/Other: Negative Endo/Other ROS                    Abdominal:       Abdomen: soft. Vascular:                                        Anesthesia Plan      MAC     ASA 2     (HGB 8.1)  Induction: intravenous. Anesthetic plan and risks discussed with patient. Plan discussed with CRNA.                   Jose F Peace MD   1/6/2021

## 2021-01-06 NOTE — PROGRESS NOTES
Called for rapid response because:  Staff the patient returned to the floor after HIDA scan he had a bright red blood emesis. He continued to have some discomfort and nausea. His vitals were checked and is looked stable with systolic at around 916. I gave a fluid bolus of 1 liter  Refused a.m. labs as well as consults from GI and surgery. I updated GI who plans to scope him earlier. His octreotide & PPI drip will be starting shortly.   I updated GI and Dr. Kirill Petit

## 2021-01-06 NOTE — ED PROVIDER NOTES
The patient is a 78-year-old male with a history of hepatitis C who presents to the emergency department complaining of abdominal pain and hematemesis. Patient symptoms are sudden onset, constant, and severe. He describes an aching sensation throughout his entire abdomen with increased distention since earlier today. He states that about 2 hours ago he developed 1 episode of hematemesis. He states that it was bright red blood and about a quarter of a gallon in quantity. The patient states that it woke him up from sleep. The patient states that he had similar episode in November, and he was admitted at [facility] where he had an EGD and colonoscopy performed by Dr. Kath Patino. He states that he is to follow-up with a new GI doctor next month. The patient states that they felt this occurred due to his increased Tylenol intake and alcohol use. The patient states that he used to drink about 4 days a week, but he cut back and now only drinks for special occasions. However he did drink for [place] and New Year's. The patient also has a history of drug use in the past.  He states that he was diagnosed with hepatitis C about 8 to 10 years ago. The patient states that he also has had dark black stools over the past week. He states that he did feel a little lightheaded earlier when he had the hematemesis. He denies any fever, chills, chest pain, shortness of breath, dysuria, hematuria, syncope, blood thinner use, or other acute symptoms or concerns. The history is provided by the patient. Review of Systems   Constitutional: Negative for chills, fatigue and fever. HENT: Negative for congestion and voice change. Eyes: Negative for photophobia and visual disturbance. Respiratory: Negative for cough and shortness of breath. Cardiovascular: Negative for chest pain, palpitations and leg swelling. Gastrointestinal: Positive for abdominal pain, blood in stool, nausea and vomiting. Negative for diarrhea. Genitourinary: Negative for dysuria, flank pain and frequency. Musculoskeletal: Negative for back pain and neck pain. Skin: Negative for rash and wound. Neurological: Negative for dizziness, syncope, weakness, light-headedness and headaches. All other systems reviewed and are negative. Physical Exam  Vitals signs and nursing note reviewed. Constitutional:       General: He is not in acute distress. Appearance: He is well-developed. He is not ill-appearing, toxic-appearing or diaphoretic. HENT:      Head: Normocephalic and atraumatic. Nose: Nose normal.      Mouth/Throat:      Lips: Pink. No lesions. Mouth: Mucous membranes are moist.   Eyes:      General: Lids are normal.   Neck:      Musculoskeletal: Normal range of motion and neck supple. Cardiovascular:      Rate and Rhythm: Normal rate and regular rhythm. Heart sounds: Normal heart sounds. No murmur. Pulmonary:      Effort: Pulmonary effort is normal. No respiratory distress. Breath sounds: Normal breath sounds. No wheezing or rales. Abdominal:      General: Bowel sounds are normal. There is distension. Palpations: Abdomen is soft. Tenderness: There is generalized abdominal tenderness (moderate diffuse tenderness to palpation. No rigidity, no rebound tenderness, no guarding. ). There is no guarding or rebound. Hernia: A hernia is present. Hernia is present in the umbilical area. Skin:     General: Skin is warm and dry. Neurological:      Mental Status: He is alert and oriented to person, place, and time. Cranial Nerves: No cranial nerve deficit.       Coordination: Coordination normal.          Procedures     MDM  Number of Diagnoses or Management Options  Generalized abdominal pain LABS:  Results for orders placed or performed during the hospital encounter of 01/06/21   CBC auto differential   Result Value Ref Range    WBC 9.4 4.5 - 11.5 E9/L    RBC 3.48 (L) 3.80 - 5.80 E12/L    Hemoglobin 10.0 (L) 12.5 - 16.5 g/dL    Hematocrit 31.5 (L) 37.0 - 54.0 %    MCV 90.5 80.0 - 99.9 fL    MCH 28.7 26.0 - 35.0 pg    MCHC 31.7 (L) 32.0 - 34.5 %    RDW 16.3 (H) 11.5 - 15.0 fL    Platelets 154 130 - 597 E9/L    MPV 10.9 7.0 - 12.0 fL    Neutrophils % 76.2 43.0 - 80.0 %    Immature Granulocytes % 0.4 0.0 - 5.0 %    Lymphocytes % 12.1 (L) 20.0 - 42.0 %    Monocytes % 9.4 2.0 - 12.0 %    Eosinophils % 1.3 0.0 - 6.0 %    Basophils % 0.6 0.0 - 2.0 %    Neutrophils Absolute 7.16 1.80 - 7.30 E9/L    Immature Granulocytes # 0.04 E9/L    Lymphocytes Absolute 1.14 (L) 1.50 - 4.00 E9/L    Monocytes Absolute 0.88 0.10 - 0.95 E9/L    Eosinophils Absolute 0.12 0.05 - 0.50 E9/L    Basophils Absolute 0.06 0.00 - 0.20 J3/J   Basic Metabolic Panel w/ Reflex to MG   Result Value Ref Range    Sodium 138 132 - 146 mmol/L    Potassium reflex Magnesium 4.2 3.5 - 5.0 mmol/L    Chloride 105 98 - 107 mmol/L    CO2 22 22 - 29 mmol/L    Anion Gap 11 7 - 16 mmol/L    Glucose 159 (H) 74 - 99 mg/dL    BUN 12 6 - 20 mg/dL    CREATININE 1.1 0.7 - 1.2 mg/dL    GFR Non-African American >60 >=60 mL/min/1.73    GFR African American >60     Calcium 8.8 8.6 - 10.2 mg/dL   Hepatic function panel   Result Value Ref Range    Total Protein 7.7 6.4 - 8.3 g/dL    Alb 3.5 3.5 - 5.2 g/dL    Alkaline Phosphatase 188 (H) 40 - 129 U/L    ALT 41 (H) 0 - 40 U/L     (H) 0 - 39 U/L    Total Bilirubin 2.8 (H) 0.0 - 1.2 mg/dL    Bilirubin, Direct 1.5 (H) 0.0 - 0.3 mg/dL    Bilirubin, Indirect 1.3 (H) 0.0 - 1.0 mg/dL   Troponin   Result Value Ref Range    Troponin <0.01 0.00 - 0.03 ng/mL   Protime-INR   Result Value Ref Range    Protime 22.6 (H) 9.3 - 12.4 sec    INR 2.0    APTT   Result Value Ref Range    aPTT 38.0 (H) 24.5 - 35.1 sec Lactic Acid, Plasma   Result Value Ref Range    Lactic Acid 2.8 (H) 0.5 - 2.2 mmol/L   Lipase   Result Value Ref Range    Lipase 102 (H) 13 - 60 U/L   EKG 12 Lead   Result Value Ref Range    Ventricular Rate 108 BPM    Atrial Rate 108 BPM    P-R Interval 134 ms    QRS Duration 68 ms    Q-T Interval 336 ms    QTc Calculation (Bazett) 450 ms    P Axis 60 degrees    R Axis 56 degrees    T Axis 53 degrees   TYPE AND SCREEN   Result Value Ref Range    ABO/Rh CANCELED     Antibody Screen NEG    ABORH TUBE   Result Value Ref Range    ABO/Rh A POS, saline replacement [facility]        RADIOLOGY:  CT ABDOMEN PELVIS W IV CONTRAST Additional Contrast? None   Final Result   1. Unchanged hepatomegaly and fatty infiltration of the liver. Unchanged   splenomegaly. 2. New mild ascites. 3. Pericholecystic fluid may relate to ascites although gallbladder   inflammation not excluded. Correlate clinically. 4. There is stranding and fluid in the retroperitoneum, right more than left. This is most concentrated around and adjacent to the distal duodenum. There   is also some blurring of the planes around the pancreatic head. Findings   could reflect acute pancreatitis and/or duodenitis.          ------------------------- NURSING NOTES AND VITALS REVIEWED ---------------------------  Date / Time Roomed:  1/6/2021 12:00 AM  ED Bed Assignment:  02/02    The nursing notes within the ED encounter and vital signs as below have been reviewed.      Patient Vitals for the past 24 hrs:   BP Temp Temp src Pulse Resp SpO2 Height Weight   01/06/21 0214 (!) 114/56   103 16 97 %     01/06/21 0003 109/63 97.9 °F (36.6 °C) Infrared 109 19 97 %  200 lb (90.7 kg)   01/05/21 2351  97.9 °F (36.6 °C) Infrared    5' 8\" (1.727 m) 200 lb (90.7 kg)       Oxygen Saturation Interpretation: Normal    ------------------------------------------ PROGRESS NOTES ------------------------------------------  Re-evaluation(s): Patients symptoms are improving  Repeat physical examination is improved    Counseling:  I have spoken with the patient and discussed todays results, in addition to providing specific details for the plan of care and counseling regarding the diagnosis and prognosis. Their questions are answered at this time and they are agreeable with the plan of admission.    --------------------------------- ADDITIONAL PROVIDER NOTES ---------------------------------  Consultations:  Time: 0300. Spoke with Dr. Maria Antonia Ko. Discussed case. They will admit the patient. This patient's ED course included: a personal history and physicial examination, re-evaluation prior to disposition, multiple bedside re-evaluations and IV medications    This patient has remained hemodynamically stable during their ED course. Diagnosis:  1. Generalized abdominal pain        Disposition:  Patient's disposition: Admit to med/surg floor  Patient's condition is stable. Gee Kumar DO  01/06/21 0308    ATTENDING PROVIDER ATTESTATION:     Brissa Hankins III presented to the emergency department for evaluation of Abdominal Pain (abdominal pain, dark red emesis x1, diarrhea today)   and was initially evaluated by the Medical Resident. See Original ED Note for H&P and ED course above. I have reviewed and discussed the case, including pertinent history (medical, surgical, family and social) and exam findings with the Medical Resident assigned to Brissa Hankins III. I have personally performed and/or participated in the history, exam, medical decision making, and procedures and agree with all pertinent clinical information. I, Dr. Leif Srivastava, am the primary provider of record       I have reviewed my findings and recommendations with the assigned Medical Resident, Melinda Martínez and members of family present at the time of disposition. My findings/plan: The encounter diagnosis was Generalized abdominal pain. New Prescriptions    No medications on file     Markus Lamas, DO Markus Lamas,   01/06/21 0309       Yamil Smith,   Resident  01/06/21 6022

## 2021-01-06 NOTE — H&P
Immediately prior to the procedure the patient's History and Physical was reviewed- there are no changes with the current vitals. Blood pressure (!) 100/53, pulse 117, temperature 98.5 °F (36.9 °C), temperature source Oral, resp. rate 16, height 5' 8\" (1.727 m), weight 196 lb (88.9 kg), SpO2 95 %.

## 2021-01-06 NOTE — ED NOTES
Pt requesting pain medication. Dr. Manuel Zhu paged for further orders.       Abdiel Cox RN  01/06/21 4021

## 2021-01-06 NOTE — H&P
General Surgery History and Physical      Patient's Name/Date of Birth: Maria T Martin III / 1980    Date: January 6, 2021     PCP: Armida Taylor MD     Chief Complaint: hematemesis    HPI:   Maria T Martin III is a 45 y.o. male who presents for evaluation of hematemesis. Timing is 1 day, and his pain has radiation from epigastrium to entire abdomen, alleviated by nothing and started 2 months ago and severity is 8/10. He states he had one previous episode of hematemesis in the past, denies any change in his abdominal pain, and states he started drinking alcohol and ran out of his protonix over the weekend. He has not started treatment for his hepatitis, and has an appt with GI in February. He has had no weight loss over the last few months and has been avoiding spicy foods. His last Colonoscopy was 8/2020, Dr. Chance Silva, and normal  His last EGD was 11/2020, Dr. Carmelina Mata, and notable for severe H pylori negative gastritis    Patient Active Problem List   Diagnosis    Cellulitis    GI bleed    Abdominal pain       No past medical history on file. Past Surgical History:   Procedure Laterality Date    TONSILLECTOMY      UPPER GASTROINTESTINAL ENDOSCOPY N/A 11/13/2020    EGD BIOPSY performed by Alexander Vernon MD at [facility]       Allergies   Allergen Reactions    Pcn [Penicillins] Anaphylaxis       The patient has a family history that is negative for severe cardiovascular or respiratory issues, negative for reaction to anesthesia. Time spent reviewing past medical, surgical, social and family history, vitals, nursing assessment and images. No changes from above documented history.     Social History     Socioeconomic History    Marital status: Single     Spouse name: Not on file    Number of children: Not on file    Years of education: Not on file    Highest education level: Not on file   Occupational History    Not on file   Social Needs  Financial resource strain: Not on file   Toby-Tamra insecurity     Worry: Not on file     Inability: Not on file   [employer] needs     Medical: Not on file     Non-medical: Not on file   Tobacco Use    Smoking status: Former Smoker     Packs/day: 0.50    Smokeless tobacco: Never Used   Substance and Sexual Activity    Alcohol use: Yes     Comment: occasional    Drug use: Not on file     Comment: used drugs in past    Sexual activity: Not on file   Lifestyle    Physical activity     Days per week: Not on file     Minutes per session: Not on file    Stress: Not on file   Relationships    Social connections     Talks on phone: Not on file     Gets together: Not on file     Attends Hindu service: Not on file     Active member of club or organization: Not on file     Attends meetings of clubs or organizations: Not on file     Relationship status: Not on file    Intimate partner violence     Fear of current or ex partner: Not on file     Emotionally abused: Not on file     Physically abused: Not on file     Forced sexual activity: Not on file   Other Topics Concern    Not on file   Social History Narrative    Not on file       I have reviewed relevant labs from this admission and interpretation is included in my assessment and plan    Review of Systems    A complete 10 system review was performed and are otherwise negative unless mentioned in the above HPI. Specific negatives are listed below but may not include all those reviewed.     General ROS: negative obtundation, AMS  ENT ROS: negative rhinorrhea, epistaxis  Allergy and Immunology ROS: negative itchy/watery eyes or nasal congestion  Hematological and Lymphatic ROS: negative spontaneous bleeding or bruising  Endocrine ROS: negative  lethargy, mood swings, palpitations or polydipsia/polyuria  Respiratory ROS: negative sputum changes, stridor, tachypnea or wheezing  Cardiovascular ROS: negative for - loss of consciousness, murmur or orthopnea Gastrointestinal ROS: + hematemesis and abdominal pain  Genito-Urinary ROS: negative for -  genital discharge or hematuria  Musculoskeletal ROS: negative for - focal weakness, gangrene  Psych/Neuro ROS: negative for - visual or auditory hallucinations, suicidal ideation    Physical exam:   /67   Pulse 109   Temp 97.9 °F (36.6 °C) (Infrared)   Resp 16   Ht 5' 8\" (1.727 m)   Wt 200 lb (90.7 kg)   SpO2 95%   BMI 30.41 kg/m²   General appearance:  NAD, appears stated age  Head: NCAT, PERRLA, EOMI, red conjunctiva  Neck: supple, no masses, trachea midline  Lungs: Equal chest rise bilateral, no retractions, no wheezing  Heart: Reg rate  Abdomen: soft, tender worst at epigastrium with voluntary guarding with generalized mild tenderness, moderately distended  Skin; warm and dry, no cyanosis  Gu: no cva tenderness  Extremities: atraumatic, no focal motor deficits, no open wounds  Psych: No tremor, visual hallucinations      Radiology: I reviewed relevant abdominal imaging from this admission and that available in the EMR including CT abd/pel from 1/6/2021.  My assessment is duodenitis, pericholecystic fluid, early cirrhotic changes of liver    Assessment:  Jess Parisi III is a 45 y.o. male with likely recurrent gastritis in the setting of alcoholic and hepatitis C induced cirrhosis  Patient Active Problem List   Diagnosis    Cellulitis    GI bleed    Abdominal pain         Plan:  -EGD hopefully today per GI  -HIDA to assess GB function  -GB US: cirrhosis w portal HTN, no stones/pericholecystic fluid  -PPI/Carafate  -GI cocktail  -NPO until post procedure  -pain control  -vit K for coagulopathy, MELD 19, Child class B        Jesusita Crawford MD  6:13 AM  1/6/2021     General Surgery Consult Note  Chuy Chen MD, MS    Patient's Name/Date of Birth: Jess Parisi III / 1980    Date: January 6, 2021     Surgeon: Luba Hughes MD    Requesting Physician: Chief Complaint: abd pain, hematemesis    Patient Active Problem List   Diagnosis    Cellulitis    GI bleed    Abdominal pain       Subjective: As above. I saw and examined the patient and discussed the above HPI with the resident and agree with documented positive and negative findings. Objective:  BP (!) 117/52   Pulse 108   Temp 99.8 °F (37.7 °C)   Resp 16   Ht 5' 8\" (1.727 m)   Wt 196 lb (88.9 kg)   SpO2 94%   BMI 29.80 kg/m²   Labs:  Reviewed by me and relevant abnormalities noted       A complete 10 system review was performed and are otherwise negative unless mentioned in the above HPI. Specific negatives are listed below but may not include all those reviewed.     General ROS: negative obtundation, AMS  ENT ROS: negative rhinorrhea, epistaxis  Allergy and Immunology ROS: negative itchy/watery eyes or nasal congestion  Hematological and Lymphatic ROS: negative spontaneous bleeding or bruising  Endocrine ROS: negative  lethargy, mood swings, palpitations or polydipsia/polyuria  Respiratory ROS: negative sputum changes, stridor, tachypnea or wheezing  Cardiovascular ROS: negative for - loss of consciousness, murmur or orthopnea  Gastrointestinal ROS: negative for - hematochezia or hematemesis  Genito-Urinary ROS: negative for -  genital discharge or hematuria  Musculoskeletal ROS: negative for - focal weakness, gangrene  Psych/Neuro ROS: negative for - visual or auditory hallucinations, suicidal ideation    Physical exam:   BP (!) 117/52   Pulse 108   Temp 99.8 °F (37.7 °C)   Resp 16   Ht 5' 8\" (1.727 m)   Wt 196 lb (88.9 kg)   SpO2 94%   BMI 29.80 kg/m²   General appearance:  NAD, appears stated age  Head: NCAT, PERRLA, EOMI, red conjunctiva scleral icterus  Neck: supple, no masses, trachea midline  Lungs: Equal chest rise bilateral, no retractions, no wheezing  Heart: Reg rate  Abdomen: soft, mildly distended tender epigastrium positive fluid wave consistent with ascites and cirrhosis Skin; warm and dry, no cyanosis  Gu: no cva tenderness  Extremities: atraumatic, no focal motor deficits, no open wounds  Psych: No tremor, visual hallucinations        Radiology: I reviewed relevant abdominal imaging from this admission and that available in the EMR including CT abd/pel from admission. My assessment is cirrhosis with ascites mild gallbladder wall thickening pericholecystic fluid likely secondary to ascites    Assessment/Plan:  Mila Ramos III is a 45 y.o. male right upper quadrant epigastric pain history of peptic ulcer disease, cirrhosis meld 19, hep C and alcohol abuse, GI bleed  Patient Active Problem List   Diagnosis    Cellulitis    GI bleed    Abdominal pain       Admit  IV fluids  On admission his white blood cell count was normal so I did not suspect he had acute cholecystitis but given his now elevated white blood cell count and dropping hemoglobin I have higher concern for GI bleed, will start antibiotics as empiric therapy  Will obtain right upper quadrant ultrasound to rule out cholecystitis and cholelithiasis  Will obtain HIDA scan to rule out cystic duct obstruction and evaluate for hepatic function  GI consult secondary to hep C infection and cirrhosis  Would appreciate GI performing upper endoscopy to evaluate for source of GI bleed  Monitor hemoglobin  Transfuse as necessary  Alcohol cessation counseling undertaken  PPI and Carafate  I have personally participated in a face-to-face history and physical exam on the date of service. Reviewed chart, vitals, labs and radiologic studies. I also participated in medical decision making with the resident/NP on the date of service and I agree with all of the pertinent clinical information, assessment and treatment plan. I have reviewed and edited the note above based on my findings during my history, exam, and decision making.        Physician Signature: Electronically signed by Dr. Manuel Zhu  [phone] (p)  1/6/2021  1:02 PM

## 2021-01-06 NOTE — PLAN OF CARE
Problem: Pain:  Goal: Pain level will decrease  Outcome: Met This Shift     Problem: Pain:  Goal: Control of acute pain  Outcome: Met This Shift     Problem: Pain:  Goal: Control of chronic pain  Outcome: Met This Shift

## 2021-01-06 NOTE — ED NOTES
Dr. Karmen Potter returned call and gave verbal orders for 4 mg of morphine. Dr. Karmen Potter will put in any further orders.       Belkis Mobley RN  01/06/21 9715

## 2021-01-06 NOTE — PROCEDURES
Procedure:  Esophagogastroduodenoscopy with Banding of esophageal varices    Indication:  Hematemesis; portal hypertension    Sedation  MAC    Endoscope was advanced easily through mouth to second portion of duodenum      Oropharynx views are limited but grossly normal.    Esophagus:   3 columns grade 4 varices with GEJ at 40 cm from incisors. 6 bands placed with collapse of varices. Small bleeding observed      Stomach:   Antrum is normal    Gastric body large amount of blood, unable to clear entire body. Retroflexed views show normal fundus and cardia; no gastric varices. Duodenum: Bulb is normal.    Second portion of duodenum is normal.    EBL: 2 cc    IMPRESSION AND PLAN:     1.  3 columns grade 4 varices with GEJ at 40 cm from incisors. 6 bands placed with collapse of varices. 2.  Gastric body large amount of blood, unable to clear entire body  3.  no gastric varices    Rec; Continue IV Octreotide and Abx. Repeat EGD in 2 weeks     Follow up as outpatient in office, call [phone] to schedule for appointment.       Neyda Foss MD  1/6/2021  5:21 PM

## 2021-01-07 LAB
ALBUMIN SERPL-MCNC: 2.9 G/DL (ref 3.5–5.2)
ALP BLD-CCNC: 147 U/L (ref 40–129)
ALT SERPL-CCNC: 41 U/L (ref 0–40)
ANION GAP SERPL CALCULATED.3IONS-SCNC: 9 MMOL/L (ref 7–16)
ANISOCYTOSIS: ABNORMAL
ANTI-NUCLEAR ANTIBODY (ANA): NEGATIVE
AST SERPL-CCNC: 127 U/L (ref 0–39)
BASOPHILS ABSOLUTE: 0.05 E9/L (ref 0–0.2)
BASOPHILS RELATIVE PERCENT: 0.4 % (ref 0–2)
BILIRUB SERPL-MCNC: 3 MG/DL (ref 0–1.2)
BILIRUBIN DIRECT: 1.7 MG/DL (ref 0–0.3)
BILIRUBIN, INDIRECT: 1.3 MG/DL (ref 0–1)
BUN BLDV-MCNC: 36 MG/DL (ref 6–20)
CALCIUM SERPL-MCNC: 8.1 MG/DL (ref 8.6–10.2)
CERULOPLASMIN: 23 MG/DL (ref 15–30)
CHLORIDE BLD-SCNC: 107 MMOL/L (ref 98–107)
CO2: 23 MMOL/L (ref 22–29)
CREAT SERPL-MCNC: 1.8 MG/DL (ref 0.7–1.2)
EOSINOPHILS ABSOLUTE: 0.15 E9/L (ref 0.05–0.5)
EOSINOPHILS RELATIVE PERCENT: 1.1 % (ref 0–6)
GFR AFRICAN AMERICAN: 51
GFR NON-AFRICAN AMERICAN: 42 ML/MIN/1.73
GLUCOSE BLD-MCNC: 145 MG/DL (ref 74–99)
HAV IGM SER IA-ACNC: ABNORMAL
HCT VFR BLD CALC: 19.3 % (ref 37–54)
HCT VFR BLD CALC: 19.9 % (ref 37–54)
HEMOGLOBIN: 5.7 G/DL (ref 12.5–16.5)
HEMOGLOBIN: 6.7 G/DL (ref 12.5–16.5)
HEPATITIS B CORE IGM ANTIBODY: ABNORMAL
HEPATITIS B SURFACE ANTIGEN INTERPRETATION: ABNORMAL
HEPATITIS C ANTIBODY INTERPRETATION: REACTIVE
HYPOCHROMIA: ABNORMAL
IMMATURE GRANULOCYTES #: 0.08 E9/L
IMMATURE GRANULOCYTES %: 0.6 % (ref 0–5)
INR BLD: 1.8
INR BLD: 2
LYMPHOCYTES ABSOLUTE: 2.13 E9/L (ref 1.5–4)
LYMPHOCYTES RELATIVE PERCENT: 15.3 % (ref 20–42)
MCH RBC QN AUTO: 28.2 PG (ref 26–35)
MCHC RBC AUTO-ENTMCNC: 28.6 % (ref 32–34.5)
MCV RBC AUTO: 98.5 FL (ref 80–99.9)
MONOCYTES ABSOLUTE: 1.49 E9/L (ref 0.1–0.95)
MONOCYTES RELATIVE PERCENT: 10.7 % (ref 2–12)
NEUTROPHILS ABSOLUTE: 10.04 E9/L (ref 1.8–7.3)
NEUTROPHILS RELATIVE PERCENT: 71.9 % (ref 43–80)
OVALOCYTES: ABNORMAL
PDW BLD-RTO: 22.7 FL (ref 11.5–15)
PLATELET # BLD: 186 E9/L (ref 130–450)
PMV BLD AUTO: 12.3 FL (ref 7–12)
POIKILOCYTES: ABNORMAL
POLYCHROMASIA: ABNORMAL
POTASSIUM REFLEX MAGNESIUM: 4.8 MMOL/L (ref 3.5–5)
PROTHROMBIN TIME: 20.1 SEC (ref 9.3–12.4)
PROTHROMBIN TIME: 22.6 SEC (ref 9.3–12.4)
RBC # BLD: 2.02 E12/L (ref 3.8–5.8)
REASON FOR REJECTION: NORMAL
REJECTED TEST: NORMAL
SODIUM BLD-SCNC: 139 MMOL/L (ref 132–146)
TOTAL PROTEIN: 5.9 G/DL (ref 6.4–8.3)
WBC # BLD: 13.9 E9/L (ref 4.5–11.5)

## 2021-01-07 PROCEDURE — 6360000002 HC RX W HCPCS: Performed by: SURGERY

## 2021-01-07 PROCEDURE — 2580000003 HC RX 258: Performed by: INTERNAL MEDICINE

## 2021-01-07 PROCEDURE — 96376 TX/PRO/DX INJ SAME DRUG ADON: CPT

## 2021-01-07 PROCEDURE — 99225 PR SBSQ OBSERVATION CARE/DAY 25 MINUTES: CPT | Performed by: SURGERY

## 2021-01-07 PROCEDURE — 96366 THER/PROPH/DIAG IV INF ADDON: CPT

## 2021-01-07 PROCEDURE — 2580000003 HC RX 258: Performed by: SURGERY

## 2021-01-07 PROCEDURE — 36415 COLL VENOUS BLD VENIPUNCTURE: CPT

## 2021-01-07 PROCEDURE — G0378 HOSPITAL OBSERVATION PER HR: HCPCS

## 2021-01-07 PROCEDURE — P9016 RBC LEUKOCYTES REDUCED: HCPCS

## 2021-01-07 PROCEDURE — P9059 PLASMA, FRZ BETWEEN 8-24HOUR: HCPCS

## 2021-01-07 PROCEDURE — 36430 TRANSFUSION BLD/BLD COMPNT: CPT

## 2021-01-07 PROCEDURE — 85014 HEMATOCRIT: CPT

## 2021-01-07 PROCEDURE — C9113 INJ PANTOPRAZOLE SODIUM, VIA: HCPCS | Performed by: SURGERY

## 2021-01-07 PROCEDURE — 85025 COMPLETE CBC W/AUTO DIFF WBC: CPT

## 2021-01-07 PROCEDURE — 85610 PROTHROMBIN TIME: CPT

## 2021-01-07 PROCEDURE — 2580000003 HC RX 258: Performed by: STUDENT IN AN ORGANIZED HEALTH CARE EDUCATION/TRAINING PROGRAM

## 2021-01-07 PROCEDURE — 6360000002 HC RX W HCPCS: Performed by: HOSPITALIST

## 2021-01-07 PROCEDURE — 6370000000 HC RX 637 (ALT 250 FOR IP): Performed by: SURGERY

## 2021-01-07 PROCEDURE — 85018 HEMOGLOBIN: CPT

## 2021-01-07 PROCEDURE — 80076 HEPATIC FUNCTION PANEL: CPT

## 2021-01-07 PROCEDURE — 80048 BASIC METABOLIC PNL TOTAL CA: CPT

## 2021-01-07 PROCEDURE — 2580000003 HC RX 258: Performed by: HOSPITALIST

## 2021-01-07 RX ORDER — LACTULOSE 10 G/15ML
20 SOLUTION ORAL 2 TIMES DAILY
Status: DISCONTINUED | OUTPATIENT
Start: 2021-01-07 | End: 2021-01-11 | Stop reason: HOSPADM

## 2021-01-07 RX ORDER — SODIUM CHLORIDE 0.9 % (FLUSH) 0.9 %
10 SYRINGE (ML) INJECTION PRN
Status: DISCONTINUED | OUTPATIENT
Start: 2021-01-07 | End: 2021-01-11 | Stop reason: HOSPADM

## 2021-01-07 RX ORDER — SODIUM CHLORIDE 9 MG/ML
INJECTION, SOLUTION INTRAVENOUS PRN
Status: DISCONTINUED | OUTPATIENT
Start: 2021-01-07 | End: 2021-01-11 | Stop reason: HOSPADM

## 2021-01-07 RX ORDER — SODIUM CHLORIDE 9 MG/ML
INJECTION, SOLUTION INTRAVENOUS PRN
Status: COMPLETED | OUTPATIENT
Start: 2021-01-07 | End: 2021-01-07

## 2021-01-07 RX ORDER — SODIUM CHLORIDE 0.9 % (FLUSH) 0.9 %
10 SYRINGE (ML) INJECTION EVERY 12 HOURS
Status: DISCONTINUED | OUTPATIENT
Start: 2021-01-07 | End: 2021-01-11 | Stop reason: HOSPADM

## 2021-01-07 RX ADMIN — ONDANSETRON HYDROCHLORIDE 4 MG: 2 SOLUTION INTRAMUSCULAR; INTRAVENOUS at 05:53

## 2021-01-07 RX ADMIN — SODIUM CHLORIDE, PRESERVATIVE FREE 10 ML: 5 INJECTION INTRAVENOUS at 08:36

## 2021-01-07 RX ADMIN — LACTULOSE 20 G: 20 SOLUTION ORAL at 21:26

## 2021-01-07 RX ADMIN — MORPHINE SULFATE 4 MG: 4 INJECTION, SOLUTION INTRAMUSCULAR; INTRAVENOUS at 17:19

## 2021-01-07 RX ADMIN — MORPHINE SULFATE 4 MG: 4 INJECTION, SOLUTION INTRAMUSCULAR; INTRAVENOUS at 21:26

## 2021-01-07 RX ADMIN — CIPROFLOXACIN 400 MG: 2 INJECTION, SOLUTION INTRAVENOUS at 08:44

## 2021-01-07 RX ADMIN — SODIUM CHLORIDE, POTASSIUM CHLORIDE, SODIUM LACTATE AND CALCIUM CHLORIDE: 600; 310; 30; 20 INJECTION, SOLUTION INTRAVENOUS at 17:19

## 2021-01-07 RX ADMIN — SODIUM CHLORIDE, POTASSIUM CHLORIDE, SODIUM LACTATE AND CALCIUM CHLORIDE: 600; 310; 30; 20 INJECTION, SOLUTION INTRAVENOUS at 08:44

## 2021-01-07 RX ADMIN — OCTREOTIDE ACETATE 50 MCG/HR: 500 INJECTION, SOLUTION INTRAVENOUS; SUBCUTANEOUS at 13:22

## 2021-01-07 RX ADMIN — CIPROFLOXACIN 400 MG: 2 INJECTION, SOLUTION INTRAVENOUS at 21:26

## 2021-01-07 RX ADMIN — SODIUM CHLORIDE: 9 INJECTION, SOLUTION INTRAVENOUS at 15:34

## 2021-01-07 RX ADMIN — MORPHINE SULFATE 4 MG: 4 INJECTION, SOLUTION INTRAMUSCULAR; INTRAVENOUS at 08:44

## 2021-01-07 RX ADMIN — MORPHINE SULFATE 4 MG: 4 INJECTION, SOLUTION INTRAMUSCULAR; INTRAVENOUS at 04:09

## 2021-01-07 RX ADMIN — PANTOPRAZOLE SODIUM 40 MG: 40 INJECTION, POWDER, FOR SOLUTION INTRAVENOUS at 08:35

## 2021-01-07 RX ADMIN — Medication 10 ML: at 21:34

## 2021-01-07 RX ADMIN — LACTULOSE 20 G: 20 SOLUTION ORAL at 15:33

## 2021-01-07 RX ADMIN — SUCRALFATE 1 G: 1 TABLET ORAL at 15:33

## 2021-01-07 RX ADMIN — MORPHINE SULFATE 4 MG: 4 INJECTION, SOLUTION INTRAMUSCULAR; INTRAVENOUS at 12:45

## 2021-01-07 RX ADMIN — Medication 10 ML: at 22:30

## 2021-01-07 ASSESSMENT — PAIN SCALES - GENERAL
PAINLEVEL_OUTOF10: 8
PAINLEVEL_OUTOF10: 8
PAINLEVEL_OUTOF10: 7
PAINLEVEL_OUTOF10: 0
PAINLEVEL_OUTOF10: 8

## 2021-01-07 ASSESSMENT — PAIN DESCRIPTION - ORIENTATION: ORIENTATION: RIGHT;LEFT;MID

## 2021-01-07 NOTE — PROGRESS NOTES
PROGRESS NOTE    By Alannah Schaeffer D.O., GI Fellow    [facility]  Dr. April Franklin M.D., Dr. Onesimo Mitchell M.D., Dr. Grace Parry., Dr. Humaira Yancey M.D., Dr. Ricky Ray III  45 y.o.  male    SUBJECTIVE:    No acute events overnight. Patient tolerating ice chips and requesting advancement of diet to clears. Discussed findings on EGD, morning labs, plans for transfusion and drips for several days. Had a long discussion about the importance of complete alcohol cessation and continued abstinence.   Patient voiced understanding    OBJECTIVE:    /60   Pulse 104   Temp 98.4 °F (36.9 °C) (Oral)   Resp 18   Ht 5' 8\" (1.727 m)   Wt 196 lb (88.9 kg)   SpO2 92%   BMI 29.80 kg/m²   GEN: A&Ox3, friendly, NAD  HEENT:PEERL, no icterus  Heart: RRR  Lungs: CTAB  Abd.: soft, NT, ND, BS +, no G/R, no HSM  Extr.: no C/C/E, no brusing         Lab Results   Component Value Date    WBC 13.9 01/07/2021    WBC 12.3 01/06/2021    WBC 9.4 01/06/2021    HGB 5.7 01/07/2021    HGB 6.1 01/06/2021    HGB 8.1 01/06/2021    HCT 19.9 01/07/2021    MCV 98.5 01/07/2021    RDW 22.7 01/07/2021     01/07/2021     01/06/2021     01/06/2021     Lab Results   Component Value Date     01/07/2021    K 4.8 01/07/2021     01/07/2021    CO2 23 01/07/2021    BUN 36 01/07/2021    CREATININE 1.8 01/07/2021    CALCIUM 8.1 01/07/2021    PROT 5.9 01/07/2021    LABALBU 2.9 01/07/2021    LABALBU 3.4 02/18/2012    BILITOT 3.0 01/07/2021    BILITOT 2.6 01/06/2021    BILITOT 2.8 01/06/2021    ALKPHOS 147 01/07/2021    ALKPHOS 185 01/06/2021    ALKPHOS 188 01/06/2021     01/07/2021     01/06/2021     01/06/2021    ALT 41 01/07/2021    ALT 34 01/06/2021    ALT 41 01/06/2021     Lab Results   Component Value Date    LIPASE 77 01/06/2021    LIPASE 102 01/06/2021    LIPASE 48 11/12/2020     Lab Results   Component Value Date    AMYLASE 49 02/22/2015 ASSESSMENT/PLAN:  1. Gastrointestinal hemorrhage secondary to hematemesis  S/P EGD on 1/6/2021 revealing 3 columns of large varices at the GE junction, with 6 bands placed and good collapse  Elevate head of bed  PPI drip x72 hours  Octreotide drip x3 to 5 days  Cipro IV BID x7 days  Patient will require EGD in 2 weeks  Okay for clear liquids and to advance diet to low-sodium if tolerating         2. Elevated LFTs  Cholestatic  Likely secondary to alcohol  RUQ US revealed a cirrhotic liver and findings suggestive of portal hypertension. No obstructive process noted  Hepatic panel  Daily CMP and INR        3. Alcoholic cirrhosis with ascites  Likely secondary to untreated chronic hepatitis C and alcohol abuse  -MELD-NA: 20  MDF: 49.5 we will consider prednisone once infectious etiology have been excluded, pending hospital course  No clinical signs of encephalopathy, asterixis, and sclera nonicteric  AFP ordered and pending  Right upper quadrant ultrasound unremarkable for any masses  CT revealed mild ascites that is not appear amenable to a diagnostic paracentesis  Discussed importance of hepatitis C treatment, patient states he has an appointment with another GI physician that PCP referred him to and he plans to follow-up to discuss treatment  Reiterated the importance of complete alcohol cessation and continued abstinence, patient voiced understanding        We will discuss with attending  Marti Galvin DO  1/7/2021  11:11 AM    Pt seen and independently examined. Pertinent notes and lab work reviewed. D/w Dr. Isa Abrams with physical exam and A&P. Discussed with patient - all questions answered -emphasized importance of abstinence from alcohol and follow-up for treatment of hepatitis C -  agreeable with the plan as delineated.     Yaa Venegas MD  1/7/2021  3:11 PM

## 2021-01-07 NOTE — ANESTHESIA POSTPROCEDURE EVALUATION
Department of Anesthesiology  Postprocedure Note    Patient: Jose Hawthorne III  MRN: [id number]  YOB: 1980  Date of evaluation: 1/7/2021  Time:  9:53 AM     Procedure Summary     Date: 01/06/21 Room / Location: [address] [place] [address] 01 / [address]    Anesthesia Start: 1701 Anesthesia Stop: 1726    Procedure: EGD BAND LIGATION (N/A ) Diagnosis: (HEMATEMESIS)    Surgeons: Tequila Bonilla MD Responsible Provider: Catia Suero MD    Anesthesia Type: MAC ASA Status: 2          Anesthesia Type: No value filed. Edmundo Phase I:      Edmundo Phase II: Edmundo Score: 10    Last vitals: Reviewed and per EMR flowsheets.        Anesthesia Post Evaluation    Patient location during evaluation: PACU  Patient participation: complete - patient participated  Level of consciousness: awake  Pain score: 2  Airway patency: patent  Nausea & Vomiting: no nausea  Complications: no  Cardiovascular status: blood pressure returned to baseline  Respiratory status: acceptable  Hydration status: euvolemic

## 2021-01-07 NOTE — PROGRESS NOTES
GENERAL SURGERY  DAILY PROGRESS NOTE  1/7/2021    Subjective:  1 loose bloody BM, 1 emesis yesterday, continues to complain of epigastric pain, ongoing nausea, no further vomiting    Objective:  BP (!) 115/56   Pulse 107   Temp 98.9 °F (37.2 °C) (Oral)   Resp 19   Ht 5' 8\" (1.727 m)   Wt 196 lb (88.9 kg)   SpO2 94%   BMI 29.80 kg/m²     GENERAL:  Laying in bed, no apparent distress, pale  LUNGS:  No increased work of breathing, no cyanosis  CARDIOVASCULAR:  Extremities warm and well perfused  ABDOMEN:  Soft, moderate epigastric tenderness, moderately distended, no RUQ tenderness  SKIN: Warm and dry    Assessment/Plan:  45 y.o. male with hematemesis secondary to bleeding varices    -will needing ongoing resuscitation with blood products-1 U pRBC ordered, 2 prepared  -vit K and FFP thus far ineffective at reversing INR of 2, will need more FFP  -continue care per GI  -appreciate IM assistance  -management grossly per consultants    Electronically signed by Kay Devi MD on 1/7/2021 at 7:50 AM     General Surgery Progress Note  Pastora Reaves MD, MS    Patient's Name/Date of Birth: Chuckie Layton III / 1980    Date: January 7, 2021     Surgeon: Mady Quijano MD    Chief Complaint: abd pain    Patient Active Problem List   Diagnosis    Cellulitis    GI bleed    Abdominal pain       Subjective: bloody BM overnight, hgb down, continues to complain of epigastric pain and nausea    Objective:  BP (!) 112/54   Pulse 108   Temp 99.3 °F (37.4 °C) (Oral)   Resp 18   Ht 5' 8\" (1.727 m)   Wt 196 lb (88.9 kg)   SpO2 95%   BMI 29.80 kg/m²   Labs:  Recent Labs     01/06/21  0026 01/06/21  0623 01/06/21  1342 01/06/21 2058 01/07/21  0415   WBC 9.4 12.3*  --   --  13.9*   HGB 10.0* 8.5* 8.1* 6.1* 5.7*   HCT 31.5* 26.6* 24.6* 19.5* 19.9*     Lab Results   Component Value Date    CREATININE 1.8 (H) 01/07/2021    BUN 36 (H) 01/07/2021     01/07/2021    K 4.8 01/07/2021  01/07/2021    CO2 23 01/07/2021     Recent Labs     01/06/21  0026 01/06/21  1342   LIPASE 102* 77*         General appearance:  NAD  Head: NCAT, PERRLA, EOMI, scleral icterus  Neck: supple, no masses  Lungs: Equal chest rise bilateral  Heart: tachy  Abdomen: soft, distended, tender epigastric  Skin; no lesions  Gu: no cva tenderness  Extremities: atraumatic      Assessment/Plan:  Love Aguayo III is a 45 y.o. male with cirrhosis secondary to Etoh, no acute GB issues.  Esophageal varices and UGI bleed    Not a surgical candidate  transfuse  Appreciate GI input and management  PPI and marah gtt  Diet per GI, clears until hgb stable  Correct coagulopathy    Physician Signature: Electronically signed by Dr. Kacey Win  [phone] (p)  1/7/2021  3:56 PM

## 2021-01-07 NOTE — PROGRESS NOTES
Department of Internal Medicine        CHIEF COMPLAINT: Generalized abdominal pain, hematemesis    Reason for Admission: Acute GI bleed with normocytic anemia    HISTORY OF PRESENT ILLNESS:      The patient is a 45 y.o. male who presents with acute onset of generalized abdominal pain. Patient also had some hematemesis. Patient is generalized abdominal pain and distention started acutely yesterday a.m. Patient states he had a similar episode in November and was admitted to [facility] and had EGD performed. Patient since discharge has had occasional alcohol and Tylenol use. Patient has prior history of drug abuse and was diagnosed with hepatitis C about 9 years ago. Patient has had some dark bowel movements over the past week and felt lightheaded yesterday morning. Hemoglobin is 8.5 on admission with a WBC of 12.3. Total bili 2.6 with direct bilirubin 1.4. AST is 122 and ALT of 34. BUN/creatinine 17/1.3. Gallbladder ultrasound showed cirrhosis and suggestion of portal hypertension with some gallbladder wall thickening. CT of the abdomen has fatty infiltration with hepatomegaly with mild ascites. There was some para cholecystic fluid noted. 1/7/2021  Patient seen examined on general medical floor. Patient has some abdominal discomfort with bloating. Patient feels very weak. EGD done yesterday afternoon showed 3 rows of grade 4 varices with 6 bands placed. BUN/creatinine were 36/1.8 with total bili of 3.0 today compared to 2. With WBC still 13.9. Hemoglobin is down to 5.7 with INR 2.0 still. .  General surgery note reviewed with patient having 2 units fresh frozen plasma ordered along with 1 unit of packed RBC. Patient has mild tachycardia still probably from the anemia with a temperature 98.4. Blood pressure 114/53 with O2 sat 95%. Past Medical History:    History reviewed. No pertinent past medical history.   Past Surgical History:    Past Surgical History: Procedure Laterality Date    TONSILLECTOMY      UPPER GASTROINTESTINAL ENDOSCOPY N/A 11/13/2020    EGD BIOPSY performed by Melvin Bacon MD at [address] [facility] N/A 1/6/2021    EGD BAND LIGATION performed by Anjum Swann MD at [place] [address]       Medications Prior to Admission:    @  Prior to Admission medications    Medication Sig Start Date End Date Taking?  Authorizing Provider   influenza virus trivalent vaccine (FLUZONE) injection Inject 0.5 mLs into the muscle once Given 11/2020   Yes Historical Provider, MD       Allergies:  Pcn [penicillins]    Social History:   Social History     Socioeconomic History    Marital status: Single     Spouse name: Not on file    Number of children: Not on file    Years of education: Not on file    Highest education level: Not on file   Occupational History    Not on file   Social Needs    Financial resource strain: Not on file    Food insecurity     Worry: Not on file     Inability: Not on file   [employer] needs     Medical: Not on file     Non-medical: Not on file   Tobacco Use    Smoking status: Former Smoker     Packs/day: 0.50    Smokeless tobacco: Never Used   Substance and Sexual Activity    Alcohol use: Yes     Comment: occasional    Drug use: Not on file     Comment: used drugs in past    Sexual activity: Not on file   Lifestyle    Physical activity     Days per week: Not on file     Minutes per session: Not on file    Stress: Not on file   Relationships    Social connections     Talks on phone: Not on file     Gets together: Not on file     Attends [employer] service: Not on file     Active member of club or organization: Not on file     Attends meetings of clubs or organizations: Not on file     Relationship status: Not on file    Intimate partner violence     Fear of current or ex partner: Not on file     Emotionally abused: Not on file     Physically abused: Not on file Forced sexual activity: Not on file   Other Topics Concern    Not on file   Social History Narrative    Not on file       Family History:   No family history on file. REVIEW OF SYSTEMS:    Gen: + lightheadedness. No LOC or syncope. No fevers or chills. HEENT: No earache, sore throat or nasal congestion. Resp: Denies cough, hemoptysis or sputum production. Cardiac: Denies chest pain, SOB, diaphoresis or palpitations. GI: + nausea, vomiting, melena, hematochezia. : No urinary complaints, dysuria, hematuria or frequency. MSK: No extremity weakness, paralysis or paresthesias. PHYSICAL EXAM:    Vitals:  BP (!) 114/53   Pulse 106   Temp 98.4 °F (36.9 °C) (Oral)   Resp 18   Ht 5' 8\" (1.727 m)   Wt 196 lb (88.9 kg)   SpO2 95%   BMI 29.80 kg/m²     General:  This is a 45 y.o. yo male who is alert and oriented in NAD  HEENT:  Head is normocephalic and atraumatic, PERRLA, EOMI, mucus membranes moist with no pharyngeal erythema or exudate. Neck:  Supple with no carotid bruits, JVD or thyromegaly.   No cervical adenopathy  CV:  Regular rate and rhythm, no murmurs  Lungs:  Clear to auscultation bilaterally with no wheezes, rales or rhonchi  Abdomen:  +tender, mildly distended, bowel sounds present  Extremities:  No edema, peripheral pulses intact bilaterally  Neuro:  Cranial nerves II-XII grossly intact; motor and sensory function intact with no focal deficits  Skin:  No rashes, lesions or wounds      DATA:  CBC with Differential:    Lab Results   Component Value Date    WBC 13.9 01/07/2021    RBC 2.02 01/07/2021    HGB 5.7 01/07/2021    HCT 19.9 01/07/2021     01/07/2021    MCV 98.5 01/07/2021    MCH 28.2 01/07/2021    MCHC 28.6 01/07/2021    RDW 22.7 01/07/2021    SEGSPCT 61 02/20/2012    LYMPHOPCT 15.3 01/07/2021    MONOPCT 10.7 01/07/2021    BASOPCT 0.4 01/07/2021    MONOSABS 1.49 01/07/2021    LYMPHSABS 2.13 01/07/2021    EOSABS 0.15 01/07/2021    BASOSABS 0.05 01/07/2021 CMP:    Lab Results   Component Value Date     01/07/2021    K 4.8 01/07/2021     01/07/2021    CO2 23 01/07/2021    BUN 36 01/07/2021    CREATININE 1.8 01/07/2021    GFRAA 51 01/07/2021    LABGLOM 42 01/07/2021    GLUCOSE 145 01/07/2021    GLUCOSE 89 02/20/2012    PROT 5.9 01/07/2021    LABALBU 2.9 01/07/2021    LABALBU 3.4 02/18/2012    CALCIUM 8.1 01/07/2021    BILITOT 3.0 01/07/2021    ALKPHOS 147 01/07/2021     01/07/2021    ALT 41 01/07/2021     Magnesium:    Lab Results   Component Value Date    MG 2.0 02/22/2015     Phosphorus:  No results found for: PHOS  PT/INR:    Lab Results   Component Value Date    PROTIME 22.6 01/07/2021    PROTIME 12.3 02/17/2012    INR 2.0 01/07/2021     Troponin:    Lab Results   Component Value Date    TROPONINI <0.01 01/06/2021     U/A:    Lab Results   Component Value Date    COLORU DARK YELLOW 08/14/2020    PROTEINU TRACE 08/14/2020    PHUR 6.5 08/14/2020    WBCUA 0-1 08/14/2020    RBCUA NONE 08/14/2020    RBCUA 0-1 02/25/2013    MUCUS Present 08/14/2020    BACTERIA RARE 08/14/2020    CLARITYU Clear 08/14/2020    SPECGRAV 1.025 08/14/2020    LEUKOCYTESUR Negative 08/14/2020    UROBILINOGEN 1.0 08/14/2020    BILIRUBINUR MODERATE 08/14/2020    BLOODU Negative 08/14/2020    GLUCOSEU Negative 08/14/2020     ABG:  No results found for: PH, PCO2, PO2, HCO3, BE, THGB, TCO2, O2SAT  HgBA1c:    Lab Results   Component Value Date    LABA1C 4.9 02/18/2012     FLP:  No results found for: TRIG, HDL, LDLCALC, LDLDIRECT, LABVLDL  TSH:    Lab Results   Component Value Date    TSH 2.900 08/14/2020     IRON:    Lab Results   Component Value Date    IRON 67 01/06/2021     LIPASE:    Lab Results   Component Value Date    LIPASE 77 01/06/2021       ASSESSMENT AND PLAN:      Patient Active Problem List    Diagnosis Date Noted    Abdominal pain 01/06/2021    GI bleed 11/13/2020    Cellulitis 02/17/2012     Impression:  1.   Acute upper GI bleed from esophageal varices 2.  History hepatitis Cuntreated with underlying cirrhosis  3. Severe normocytic anemia-secondary to acute GI blood loss  4. Acute kidney injury secondary to acute hypotension secondary to hypovolemia from GI bleed  5.   Coagulopathy secondary to underlying cirrhosis    Plan:  Patient admitted to general surgical floor by general surgery  Monitor heart rate, blood pressure, O2 saturation  Consult with GI for follow-up for hepatitis C and upper GI bleed  Protonix 40 mg IV push twice daily    Lactated Ringer's 125 cc an hour per general surgery  Give 2 units of packed RBC this a.m.  2 units fresh frozen plasma per general surgery  Accurate I's and O's  Repeat BMP with hemoglobin recheck    Jasper Jernigan D.O.  1/7/2021  9:27 AM

## 2021-01-07 NOTE — CARE COORDINATION
CM note: attempted to meet with patient however patient was unavailable. Do not anticipate any discharge needs. Pt is post variceal banding, on IV protonix drip for 72 hours. Pt getting transfusion today. PCP is Dr. Madelaine Kahn and prefers [facility] [address] on [facility]. Independent with ADLs.

## 2021-01-08 LAB
AFP-TUMOR MARKER: 2 NG/ML (ref 0–9)
ALBUMIN SERPL-MCNC: 2.8 G/DL (ref 3.5–5.2)
ALP BLD-CCNC: 135 U/L (ref 40–129)
ALPHA-1 ANTITRYPSIN: 182 MG/DL (ref 90–200)
ALT SERPL-CCNC: 47 U/L (ref 0–40)
ANION GAP SERPL CALCULATED.3IONS-SCNC: 6 MMOL/L (ref 7–16)
ANISOCYTOSIS: ABNORMAL
AST SERPL-CCNC: 176 U/L (ref 0–39)
BASOPHILS ABSOLUTE: 0 E9/L (ref 0–0.2)
BASOPHILS RELATIVE PERCENT: 0.5 % (ref 0–2)
BILIRUB SERPL-MCNC: 4.1 MG/DL (ref 0–1.2)
BILIRUBIN DIRECT: 2.2 MG/DL (ref 0–0.3)
BILIRUBIN, INDIRECT: 1.9 MG/DL (ref 0–1)
BUN BLDV-MCNC: 25 MG/DL (ref 6–20)
CALCIUM SERPL-MCNC: 7.8 MG/DL (ref 8.6–10.2)
CHLORIDE BLD-SCNC: 106 MMOL/L (ref 98–107)
CO2: 25 MMOL/L (ref 22–29)
CREAT SERPL-MCNC: 1.3 MG/DL (ref 0.7–1.2)
EOSINOPHILS ABSOLUTE: 0.26 E9/L (ref 0.05–0.5)
EOSINOPHILS RELATIVE PERCENT: 4.3 % (ref 0–6)
GFR AFRICAN AMERICAN: >60
GFR NON-AFRICAN AMERICAN: >60 ML/MIN/1.73
GLUCOSE BLD-MCNC: 144 MG/DL (ref 74–99)
HCT VFR BLD CALC: 20.8 % (ref 37–54)
HCT VFR BLD CALC: 22.3 % (ref 37–54)
HCT VFR BLD CALC: 22.3 % (ref 37–54)
HEMOGLOBIN: 7.1 G/DL (ref 12.5–16.5)
HEMOGLOBIN: 7.6 G/DL (ref 12.5–16.5)
HEMOGLOBIN: 7.7 G/DL (ref 12.5–16.5)
HYPOCHROMIA: ABNORMAL
INR BLD: 1.8
LYMPHOCYTES ABSOLUTE: 0.79 E9/L (ref 1.5–4)
LYMPHOCYTES RELATIVE PERCENT: 13 % (ref 20–42)
MCH RBC QN AUTO: 29.6 PG (ref 26–35)
MCHC RBC AUTO-ENTMCNC: 34.1 % (ref 32–34.5)
MCV RBC AUTO: 86.7 FL (ref 80–99.9)
MONOCYTES ABSOLUTE: 0.49 E9/L (ref 0.1–0.95)
MONOCYTES RELATIVE PERCENT: 7.8 % (ref 2–12)
NEUTROPHILS ABSOLUTE: 4.58 E9/L (ref 1.8–7.3)
NEUTROPHILS RELATIVE PERCENT: 74.8 % (ref 43–80)
OVALOCYTES: ABNORMAL
PDW BLD-RTO: 16.8 FL (ref 11.5–15)
PLATELET # BLD: 82 E9/L (ref 130–450)
PLATELET CONFIRMATION: NORMAL
PMV BLD AUTO: 11 FL (ref 7–12)
POIKILOCYTES: ABNORMAL
POLYCHROMASIA: ABNORMAL
POTASSIUM REFLEX MAGNESIUM: 3.9 MMOL/L (ref 3.5–5)
PROTHROMBIN TIME: 20.8 SEC (ref 9.3–12.4)
RBC # BLD: 2.4 E12/L (ref 3.8–5.8)
SODIUM BLD-SCNC: 137 MMOL/L (ref 132–146)
TARGET CELLS: ABNORMAL
TOTAL PROTEIN: 5.7 G/DL (ref 6.4–8.3)
WBC # BLD: 6.1 E9/L (ref 4.5–11.5)

## 2021-01-08 PROCEDURE — 99224 PR SBSQ OBSERVATION CARE/DAY 15 MINUTES: CPT | Performed by: SURGERY

## 2021-01-08 PROCEDURE — C9113 INJ PANTOPRAZOLE SODIUM, VIA: HCPCS | Performed by: SURGERY

## 2021-01-08 PROCEDURE — 85025 COMPLETE CBC W/AUTO DIFF WBC: CPT

## 2021-01-08 PROCEDURE — 80048 BASIC METABOLIC PNL TOTAL CA: CPT

## 2021-01-08 PROCEDURE — 6360000002 HC RX W HCPCS: Performed by: HOSPITALIST

## 2021-01-08 PROCEDURE — 6370000000 HC RX 637 (ALT 250 FOR IP): Performed by: SURGERY

## 2021-01-08 PROCEDURE — 6360000002 HC RX W HCPCS: Performed by: SURGERY

## 2021-01-08 PROCEDURE — 2580000003 HC RX 258: Performed by: HOSPITALIST

## 2021-01-08 PROCEDURE — 2580000003 HC RX 258: Performed by: SURGERY

## 2021-01-08 PROCEDURE — 85018 HEMOGLOBIN: CPT

## 2021-01-08 PROCEDURE — 85610 PROTHROMBIN TIME: CPT

## 2021-01-08 PROCEDURE — 2580000003 HC RX 258: Performed by: INTERNAL MEDICINE

## 2021-01-08 PROCEDURE — 80076 HEPATIC FUNCTION PANEL: CPT

## 2021-01-08 PROCEDURE — 1200000000 HC SEMI PRIVATE

## 2021-01-08 PROCEDURE — 36415 COLL VENOUS BLD VENIPUNCTURE: CPT

## 2021-01-08 PROCEDURE — 85014 HEMATOCRIT: CPT

## 2021-01-08 RX ADMIN — SODIUM CHLORIDE 8 MG/HR: 9 INJECTION, SOLUTION INTRAVENOUS at 11:35

## 2021-01-08 RX ADMIN — CIPROFLOXACIN 400 MG: 2 INJECTION, SOLUTION INTRAVENOUS at 09:30

## 2021-01-08 RX ADMIN — MORPHINE SULFATE 4 MG: 4 INJECTION, SOLUTION INTRAMUSCULAR; INTRAVENOUS at 05:32

## 2021-01-08 RX ADMIN — SUCRALFATE 1 G: 1 TABLET ORAL at 16:24

## 2021-01-08 RX ADMIN — SODIUM CHLORIDE, POTASSIUM CHLORIDE, SODIUM LACTATE AND CALCIUM CHLORIDE: 600; 310; 30; 20 INJECTION, SOLUTION INTRAVENOUS at 09:30

## 2021-01-08 RX ADMIN — ONDANSETRON HYDROCHLORIDE 4 MG: 2 SOLUTION INTRAMUSCULAR; INTRAVENOUS at 19:36

## 2021-01-08 RX ADMIN — OCTREOTIDE ACETATE 50 MCG/HR: 500 INJECTION, SOLUTION INTRAVENOUS; SUBCUTANEOUS at 01:16

## 2021-01-08 RX ADMIN — MORPHINE SULFATE 4 MG: 4 INJECTION, SOLUTION INTRAMUSCULAR; INTRAVENOUS at 14:51

## 2021-01-08 RX ADMIN — SUCRALFATE 1 G: 1 TABLET ORAL at 11:35

## 2021-01-08 RX ADMIN — SODIUM CHLORIDE, POTASSIUM CHLORIDE, SODIUM LACTATE AND CALCIUM CHLORIDE: 600; 310; 30; 20 INJECTION, SOLUTION INTRAVENOUS at 22:57

## 2021-01-08 RX ADMIN — SUCRALFATE 1 G: 1 TABLET ORAL at 23:38

## 2021-01-08 RX ADMIN — OCTREOTIDE ACETATE 50 MCG/HR: 500 INJECTION, SOLUTION INTRAVENOUS; SUBCUTANEOUS at 22:22

## 2021-01-08 RX ADMIN — MORPHINE SULFATE 4 MG: 4 INJECTION, SOLUTION INTRAMUSCULAR; INTRAVENOUS at 01:26

## 2021-01-08 RX ADMIN — SODIUM CHLORIDE 8 MG/HR: 9 INJECTION, SOLUTION INTRAVENOUS at 01:14

## 2021-01-08 RX ADMIN — MORPHINE SULFATE 4 MG: 4 INJECTION, SOLUTION INTRAMUSCULAR; INTRAVENOUS at 10:44

## 2021-01-08 RX ADMIN — OCTREOTIDE ACETATE 50 MCG/HR: 500 INJECTION, SOLUTION INTRAVENOUS; SUBCUTANEOUS at 13:18

## 2021-01-08 RX ADMIN — LACTULOSE 20 G: 20 SOLUTION ORAL at 09:29

## 2021-01-08 RX ADMIN — SODIUM CHLORIDE, POTASSIUM CHLORIDE, SODIUM LACTATE AND CALCIUM CHLORIDE: 600; 310; 30; 20 INJECTION, SOLUTION INTRAVENOUS at 01:18

## 2021-01-08 RX ADMIN — OCTREOTIDE ACETATE 50 MCG/HR: 500 INJECTION, SOLUTION INTRAVENOUS; SUBCUTANEOUS at 14:51

## 2021-01-08 RX ADMIN — CIPROFLOXACIN 400 MG: 2 INJECTION, SOLUTION INTRAVENOUS at 22:07

## 2021-01-08 RX ADMIN — SUCRALFATE 1 G: 1 TABLET ORAL at 05:48

## 2021-01-08 RX ADMIN — MORPHINE SULFATE 4 MG: 4 INJECTION, SOLUTION INTRAMUSCULAR; INTRAVENOUS at 23:38

## 2021-01-08 RX ADMIN — MORPHINE SULFATE 4 MG: 4 INJECTION, SOLUTION INTRAMUSCULAR; INTRAVENOUS at 19:36

## 2021-01-08 ASSESSMENT — PAIN SCALES - GENERAL
PAINLEVEL_OUTOF10: 8
PAINLEVEL_OUTOF10: 8
PAINLEVEL_OUTOF10: 7
PAINLEVEL_OUTOF10: 8
PAINLEVEL_OUTOF10: 4

## 2021-01-08 NOTE — PROGRESS NOTES
GENERAL SURGERY  DAILY PROGRESS NOTE  1/8/2021    Subjective:  Diarrhea improving, now less melenic, no nausea or vomiting, tolerating clears, pain improved    Objective:  BP (!) 101/57   Pulse 96   Temp 98.3 °F (36.8 °C) (Oral)   Resp 16   Ht 5' 8\" (1.727 m)   Wt 196 lb (88.9 kg)   SpO2 93%   BMI 29.80 kg/m²     GENERAL:  Laying in bed, no apparent distress, pale  LUNGS:  No increased work of breathing, no cyanosis  CARDIOVASCULAR:  Extremities warm and well perfused  ABDOMEN:  Soft, mild epigastric tenderness, mildly distended, no RUQ tenderness  SKIN: Warm and dry    Assessment/Plan:  45 y.o. male with hematemesis secondary to bleeding varices    -transfuse per IM  -diet advancement per GI, ok once Hgb stable  -appreciate IM assistance  -management grossly per consultants  -no surgical interventions planned    Electronically signed by Kaila Way MD on 1/8/2021 at 9:22 AM     General Surgery Progress Note  Ildefonso Dai MD, MS    Patient's Name/Date of Birth: Mila Ramos III / 1980    Date: January 8, 2021     Surgeon: Manuel Zhu MD    Chief Complaint: Abdominal pain    Patient Active Problem List   Diagnosis    Cellulitis    GI bleed    Abdominal pain       Subjective: No more bright red bowel movements, seems to be resolving melena tolerating clears, less pain    Objective:  BP (!) 101/57   Pulse 96   Temp 98.3 °F (36.8 °C) (Oral)   Resp 16   Ht 5' 8\" (1.727 m)   Wt 196 lb (88.9 kg)   SpO2 93%   BMI 29.80 kg/m²   Labs:  Recent Labs     01/06/21  0623 01/06/21  0623 01/07/21  0415 01/07/21  1936 01/08/21  0123 01/08/21  0331   WBC 12.3*  --  13.9*  --   --  6.1   HGB 8.5*   < > 5.7* 6.7* 7.7* 7.1*   HCT 26.6*   < > 19.9* 19.3* 22.3* 20.8*    < > = values in this interval not displayed.      Lab Results   Component Value Date    CREATININE 1.3 (H) 01/08/2021    BUN 25 (H) 01/08/2021     01/08/2021    K 3.9 01/08/2021     01/08/2021 CO2 25 01/08/2021     Recent Labs     01/06/21  0026 01/06/21  1342   LIPASE 102* 77*         General appearance:  NAD  Head: NCAT, PERRLA, EOMI, red conjunctiva  Neck: supple, no masses  Lungs: equal chest rise bilateral  Heart: Reg rate  Abdomen: soft, nondistended, minimal tender epigastric but much improved  Skin; no lesions  Gu: no cva tenderness  Extremities: extremities normal, atraumatic, no cyanosis or edema      Assessment/Plan:  Love Aguayo III is a 45 y.o. male alcohol induced cirrhosis, upper GI bleed secondary to bleeding varices, normal HIDA and gallbladder    Transfusion for medicine  Drip per GI  No surgery plan  Discharge when hemoglobin stable and okay with others    Physician Signature: Electronically signed by Dr. Kacey Win  [phone] (p)  1/8/2021  2:17 PM

## 2021-01-08 NOTE — PROGRESS NOTES
Patient seen and examined on general medical floor. Patient is feeling better again today. Patient still a bit weak and mildly lightheaded when he gets up and moves around. He has some lower chest burning. BUN/creatinine is 25/1.3 and improved from 36/1.8 yesterday a.m. Fasting blood sugar 144. Total bili slightly increased again today to 4.1 with elevation of transaminases still present. WBC is improved to 6.1 with hemoglobin currently 7.1. Patient did receive another unit of packed RBC last night. INR is 1.8 with patient's hepatitis C antibody rechecked was reactive. Temperature is 98.3 with heart rate in 96. Blood pressure currently 101/57. O2 sats 93% on room air. Past Medical History:    History reviewed. No pertinent past medical history. Past Surgical History:    Past Surgical History:   Procedure Laterality Date    TONSILLECTOMY      UPPER GASTROINTESTINAL ENDOSCOPY N/A 11/13/2020    EGD BIOPSY performed by Clary Drummond MD at [address] [place] [address],Third Floor N/A 1/6/2021    EGD BAND LIGATION performed by Catracho Gonzalez MD at [place] [address]       Medications Prior to Admission:    @  Prior to Admission medications    Medication Sig Start Date End Date Taking?  Authorizing Provider   influenza virus trivalent vaccine (FLUZONE) injection Inject 0.5 mLs into the muscle once Given 11/2020   Yes Historical Provider, MD       Allergies:  Pcn [penicillins]    Social History:   Social History     Socioeconomic History    Marital status: Single     Spouse name: Not on file    Number of children: Not on file    Years of education: Not on file    Highest education level: Not on file   Occupational History    Not on file   Social Needs    Financial resource strain: Not on file    Food insecurity     Worry: Not on file     Inability: Not on file    Transportation needs     Medical: Not on file     Non-medical: Not on file   Tobacco Use    Smoking status: Former Smoker Lungs:  Clear to auscultation bilaterally with no wheezes, rales or rhonchi  Abdomen:  +tender, mildly distended, bowel sounds present  Extremities:  No edema, peripheral pulses intact bilaterally  Neuro:  Cranial nerves II-XII grossly intact; motor and sensory function intact with no focal deficits  Skin:  No rashes, lesions or wounds      DATA:  CBC with Differential:    Lab Results   Component Value Date    WBC 6.1 01/08/2021    RBC 2.40 01/08/2021    HGB 7.1 01/08/2021    HCT 20.8 01/08/2021    PLT 82 01/08/2021    MCV 86.7 01/08/2021    MCH 29.6 01/08/2021    MCHC 34.1 01/08/2021    RDW 16.8 01/08/2021    SEGSPCT 61 02/20/2012    LYMPHOPCT 13.0 01/08/2021    MONOPCT 7.8 01/08/2021    BASOPCT 0.5 01/08/2021    MONOSABS 0.49 01/08/2021    LYMPHSABS 0.79 01/08/2021    EOSABS 0.26 01/08/2021    BASOSABS 0.00 01/08/2021     CMP:    Lab Results   Component Value Date     01/08/2021    K 3.9 01/08/2021     01/08/2021    CO2 25 01/08/2021    BUN 25 01/08/2021    CREATININE 1.3 01/08/2021    GFRAA >60 01/08/2021    LABGLOM >60 01/08/2021    GLUCOSE 144 01/08/2021    GLUCOSE 89 02/20/2012    PROT 5.7 01/08/2021    LABALBU 2.8 01/08/2021    LABALBU 3.4 02/18/2012    CALCIUM 7.8 01/08/2021    BILITOT 4.1 01/08/2021    ALKPHOS 135 01/08/2021     01/08/2021    ALT 47 01/08/2021     Magnesium:    Lab Results   Component Value Date    MG 2.0 02/22/2015     Phosphorus:  No results found for: PHOS  PT/INR:    Lab Results   Component Value Date    PROTIME 20.8 01/08/2021    PROTIME 12.3 02/17/2012    INR 1.8 01/08/2021     Troponin:    Lab Results   Component Value Date    TROPONINI <0.01 01/06/2021     U/A:    Lab Results   Component Value Date    COLORU DARK YELLOW 08/14/2020    PROTEINU TRACE 08/14/2020    PHUR 6.5 08/14/2020    WBCUA 0-1 08/14/2020    RBCUA NONE 08/14/2020    RBCUA 0-1 02/25/2013    MUCUS Present 08/14/2020    BACTERIA RARE 08/14/2020    CLARITYU Clear 08/14/2020 SPECGRAV 1.025 08/14/2020    LEUKOCYTESUR Negative 08/14/2020    UROBILINOGEN 1.0 08/14/2020    BILIRUBINUR MODERATE 08/14/2020    BLOODU Negative 08/14/2020    GLUCOSEU Negative 08/14/2020     ABG:  No results found for: PH, PCO2, PO2, HCO3, BE, THGB, TCO2, O2SAT  HgBA1c:    Lab Results   Component Value Date    LABA1C 4.9 02/18/2012     FLP:  No results found for: TRIG, HDL, LDLCALC, LDLDIRECT, LABVLDL  TSH:    Lab Results   Component Value Date    TSH 2.900 08/14/2020     IRON:    Lab Results   Component Value Date    IRON 67 01/06/2021     LIPASE:    Lab Results   Component Value Date    LIPASE 77 01/06/2021       ASSESSMENT AND PLAN:      Patient Active Problem List    Diagnosis Date Noted    Abdominal pain 01/06/2021    GI bleed 11/13/2020    Cellulitis 02/17/2012     Impression:  1. Acute upper GI bleed from esophageal varices  2. History hepatitis Cuntreated with underlying cirrhosis  3. Severe normocytic anemia-secondary to acute GI blood loss  4. Acute kidney injury secondary to acute hypotension secondary to hypovolemia from GI bleed-improved  5. Coagulopathy secondary to underlying cirrhosis    Plan:  Patient admitted to general surgical floor by general surgery  Monitor heart rate, blood pressure, O2 saturation  Consult with GI for follow-up for hepatitis C and upper GI bleed  Protonix 40 mg IV push twice daily    Decrease Lactated Ringer's 75 cc an hour   Given 2 units of packed RBC 1/7 AM and 1 unit of packed RBC 1/7 PM  2 units fresh frozen plasma per general surgery  Accurate I's and O's  H&H 2 PM today  CMP in a.m.   Hemoglobin A1c    Shirin Jackson D.O.  1/8/2021  10:23 AM

## 2021-01-08 NOTE — PROGRESS NOTES
PROGRESS NOTE    By Marti Galvin D.O., GI Fellow    [facility] Chong Garcia M.D., Dr. Faye Stoddard M.D., Dr. Eugenio Zavala., Dr. Freda Mukherjee M.D., Dr. Lillie Wu III  45 y.o.  male    SUBJECTIVE:    No acute events overnight. Patient tolerated clears and is requesting advancement of diet. States he feels better than when he came in. Discussed recent labs and continued plan of care. Patient voiced understanding    OBJECTIVE:    BP (!) 101/57   Pulse 96   Temp 98.3 °F (36.8 °C) (Oral)   Resp 16   Ht 5' 8\" (1.727 m)   Wt 196 lb (88.9 kg)   SpO2 93%   BMI 29.80 kg/m²   GEN: A&Ox3, friendly, NAD  HEENT:PEERL, no icterus  Heart: RRR  Lungs: CTAB  Abd.: soft, NT, ND, BS +, no G/R, no HSM  Extr.: no C/C/E, no brusing         Lab Results   Component Value Date    WBC 6.1 01/08/2021    WBC 13.9 01/07/2021    WBC 12.3 01/06/2021    HGB 7.1 01/08/2021    HGB 7.7 01/08/2021    HGB 6.7 01/07/2021    HCT 20.8 01/08/2021    MCV 86.7 01/08/2021    RDW 16.8 01/08/2021    PLT 82 01/08/2021     01/07/2021     01/06/2021     Lab Results   Component Value Date     01/08/2021    K 3.9 01/08/2021     01/08/2021    CO2 25 01/08/2021    BUN 25 01/08/2021    CREATININE 1.3 01/08/2021    CALCIUM 7.8 01/08/2021    PROT 5.7 01/08/2021    LABALBU 2.8 01/08/2021    LABALBU 3.4 02/18/2012    BILITOT 4.1 01/08/2021    BILITOT 3.0 01/07/2021    BILITOT 2.6 01/06/2021    ALKPHOS 135 01/08/2021    ALKPHOS 147 01/07/2021    ALKPHOS 185 01/06/2021     01/08/2021     01/07/2021     01/06/2021    ALT 47 01/08/2021    ALT 41 01/07/2021    ALT 34 01/06/2021     Lab Results   Component Value Date    LIPASE 77 01/06/2021    LIPASE 102 01/06/2021    LIPASE 48 11/12/2020     Lab Results   Component Value Date    AMYLASE 49 02/22/2015         ASSESSMENT/PLAN:  1.   Gastrointestinal hemorrhage secondary to hematemesis S/P EGD on 1/6/2021 revealing 3 columns of large varices at the GE junction, with 6 bands placed and good collapse  Elevate head of bed  PPI drip x72 hours  Octreotide drip x3 to 5 days  Cipro IV BID x7 days  Patient will require EGD in 2 weeks  Okay for low-sodium diet        2. Elevated LFTs  Cholestatic  Likely secondary to alcohol  RUQ US revealed a cirrhotic liver and findings suggestive of portal hypertension. No obstructive process noted  Hepatic panel  Daily CMP and INR        3. Alcoholic cirrhosis with ascites  Likely secondary to untreated chronic hepatitis C and alcohol abuse  -MELD-NA: 20  MDF: 49.5 we will consider prednisone once infectious etiology have been excluded, pending hospital course  No clinical signs of encephalopathy, asterixis, and sclera nonicteric  AFP ordered and pending  Right upper quadrant ultrasound unremarkable for any masses  CT revealed mild ascites that is not appear amenable to a diagnostic paracentesis  Discussed importance of hepatitis C treatment, patient states he has an appointment with another GI physician that PCP referred him to and he plans to follow-up to discuss treatment  Reiterated the importance of complete alcohol cessation and continued abstinence, patient voiced understanding        We will discuss with attending    Janna Juan DO  1/8/2021  10:35 AM    Pt seen and independently examined. Pertinent notes and lab work reviewed. D/w Dr. Emerita Kwon with physical exam and A&P. Discussed with patient - all questions answered - agreeable with the plan as delineated.     Erik Fontenot MD  1/8/2021  12:34 PM

## 2021-01-09 LAB
ALBUMIN SERPL-MCNC: 2.9 G/DL (ref 3.5–5.2)
ALP BLD-CCNC: 132 U/L (ref 40–129)
ALT SERPL-CCNC: 59 U/L (ref 0–40)
ANION GAP SERPL CALCULATED.3IONS-SCNC: 7 MMOL/L (ref 7–16)
ANISOCYTOSIS: ABNORMAL
AST SERPL-CCNC: 218 U/L (ref 0–39)
BASOPHILIC STIPPLING: ABNORMAL
BASOPHILS ABSOLUTE: 0 E9/L (ref 0–0.2)
BASOPHILS RELATIVE PERCENT: 0.9 % (ref 0–2)
BILIRUB SERPL-MCNC: 3.8 MG/DL (ref 0–1.2)
BUN BLDV-MCNC: 14 MG/DL (ref 6–20)
BURR CELLS: ABNORMAL
CALCIUM SERPL-MCNC: 7.8 MG/DL (ref 8.6–10.2)
CHLORIDE BLD-SCNC: 103 MMOL/L (ref 98–107)
CO2: 23 MMOL/L (ref 22–29)
CREAT SERPL-MCNC: 1.3 MG/DL (ref 0.7–1.2)
EOSINOPHILS ABSOLUTE: 0.05 E9/L (ref 0.05–0.5)
EOSINOPHILS RELATIVE PERCENT: 0.9 % (ref 0–6)
GFR AFRICAN AMERICAN: >60
GFR NON-AFRICAN AMERICAN: >60 ML/MIN/1.73
GLUCOSE BLD-MCNC: 107 MG/DL (ref 74–99)
HBA1C MFR BLD: 4.9 % (ref 4–5.6)
HCT VFR BLD CALC: 23 % (ref 37–54)
HEMOGLOBIN: 7.5 G/DL (ref 12.5–16.5)
HYPOCHROMIA: ABNORMAL
INR BLD: 2
LYMPHOCYTES ABSOLUTE: 0.61 E9/L (ref 1.5–4)
LYMPHOCYTES RELATIVE PERCENT: 11.3 % (ref 20–42)
MCH RBC QN AUTO: 29.2 PG (ref 26–35)
MCHC RBC AUTO-ENTMCNC: 32.6 % (ref 32–34.5)
MCV RBC AUTO: 89.5 FL (ref 80–99.9)
MONOCYTES ABSOLUTE: 0.22 E9/L (ref 0.1–0.95)
MONOCYTES RELATIVE PERCENT: 3.5 % (ref 2–12)
NEUTROPHILS ABSOLUTE: 4.62 E9/L (ref 1.8–7.3)
NEUTROPHILS RELATIVE PERCENT: 84.3 % (ref 43–80)
OVALOCYTES: ABNORMAL
PDW BLD-RTO: 16.9 FL (ref 11.5–15)
PLATELET # BLD: 79 E9/L (ref 130–450)
PLATELET CONFIRMATION: NORMAL
PMV BLD AUTO: 11.1 FL (ref 7–12)
POIKILOCYTES: ABNORMAL
POLYCHROMASIA: ABNORMAL
POTASSIUM SERPL-SCNC: 3.9 MMOL/L (ref 3.5–5)
PROTHROMBIN TIME: 22.8 SEC (ref 9.3–12.4)
RBC # BLD: 2.57 E12/L (ref 3.8–5.8)
SODIUM BLD-SCNC: 133 MMOL/L (ref 132–146)
TEAR DROP CELLS: ABNORMAL
TOTAL PROTEIN: 5.9 G/DL (ref 6.4–8.3)
WBC # BLD: 5.5 E9/L (ref 4.5–11.5)

## 2021-01-09 PROCEDURE — 2580000003 HC RX 258: Performed by: HOSPITALIST

## 2021-01-09 PROCEDURE — 85025 COMPLETE CBC W/AUTO DIFF WBC: CPT

## 2021-01-09 PROCEDURE — 6360000002 HC RX W HCPCS: Performed by: HOSPITALIST

## 2021-01-09 PROCEDURE — 36415 COLL VENOUS BLD VENIPUNCTURE: CPT

## 2021-01-09 PROCEDURE — 80053 COMPREHEN METABOLIC PANEL: CPT

## 2021-01-09 PROCEDURE — 99232 SBSQ HOSP IP/OBS MODERATE 35: CPT | Performed by: SURGERY

## 2021-01-09 PROCEDURE — 6360000002 HC RX W HCPCS: Performed by: SURGERY

## 2021-01-09 PROCEDURE — 2580000003 HC RX 258: Performed by: INTERNAL MEDICINE

## 2021-01-09 PROCEDURE — C9113 INJ PANTOPRAZOLE SODIUM, VIA: HCPCS | Performed by: SURGERY

## 2021-01-09 PROCEDURE — 6370000000 HC RX 637 (ALT 250 FOR IP): Performed by: SURGERY

## 2021-01-09 PROCEDURE — 83036 HEMOGLOBIN GLYCOSYLATED A1C: CPT

## 2021-01-09 PROCEDURE — 85610 PROTHROMBIN TIME: CPT

## 2021-01-09 PROCEDURE — 2580000003 HC RX 258: Performed by: SURGERY

## 2021-01-09 PROCEDURE — 1200000000 HC SEMI PRIVATE

## 2021-01-09 RX ADMIN — CIPROFLOXACIN 400 MG: 2 INJECTION, SOLUTION INTRAVENOUS at 20:26

## 2021-01-09 RX ADMIN — MORPHINE SULFATE 4 MG: 4 INJECTION, SOLUTION INTRAMUSCULAR; INTRAVENOUS at 21:38

## 2021-01-09 RX ADMIN — SODIUM CHLORIDE 8 MG/HR: 9 INJECTION, SOLUTION INTRAVENOUS at 08:54

## 2021-01-09 RX ADMIN — LACTULOSE 20 G: 20 SOLUTION ORAL at 12:45

## 2021-01-09 RX ADMIN — MORPHINE SULFATE 4 MG: 4 INJECTION, SOLUTION INTRAMUSCULAR; INTRAVENOUS at 04:16

## 2021-01-09 RX ADMIN — MORPHINE SULFATE 4 MG: 4 INJECTION, SOLUTION INTRAMUSCULAR; INTRAVENOUS at 17:13

## 2021-01-09 RX ADMIN — MORPHINE SULFATE 4 MG: 4 INJECTION, SOLUTION INTRAMUSCULAR; INTRAVENOUS at 08:51

## 2021-01-09 RX ADMIN — SODIUM CHLORIDE 8 MG/HR: 9 INJECTION, SOLUTION INTRAVENOUS at 19:15

## 2021-01-09 RX ADMIN — ONDANSETRON HYDROCHLORIDE 4 MG: 2 SOLUTION INTRAMUSCULAR; INTRAVENOUS at 12:51

## 2021-01-09 RX ADMIN — OCTREOTIDE ACETATE 50 MCG/HR: 500 INJECTION, SOLUTION INTRAVENOUS; SUBCUTANEOUS at 20:26

## 2021-01-09 RX ADMIN — MORPHINE SULFATE 4 MG: 4 INJECTION, SOLUTION INTRAMUSCULAR; INTRAVENOUS at 13:00

## 2021-01-09 RX ADMIN — SUCRALFATE 1 G: 1 TABLET ORAL at 17:21

## 2021-01-09 RX ADMIN — SUCRALFATE 1 G: 1 TABLET ORAL at 06:00

## 2021-01-09 RX ADMIN — SODIUM CHLORIDE, POTASSIUM CHLORIDE, SODIUM LACTATE AND CALCIUM CHLORIDE: 600; 310; 30; 20 INJECTION, SOLUTION INTRAVENOUS at 14:22

## 2021-01-09 RX ADMIN — OCTREOTIDE ACETATE 50 MCG/HR: 500 INJECTION, SOLUTION INTRAVENOUS; SUBCUTANEOUS at 10:17

## 2021-01-09 RX ADMIN — CIPROFLOXACIN 400 MG: 2 INJECTION, SOLUTION INTRAVENOUS at 09:14

## 2021-01-09 RX ADMIN — LACTULOSE 20 G: 20 SOLUTION ORAL at 20:27

## 2021-01-09 ASSESSMENT — PAIN DESCRIPTION - LOCATION: LOCATION: ABDOMEN

## 2021-01-09 ASSESSMENT — PAIN DESCRIPTION - PROGRESSION: CLINICAL_PROGRESSION: GRADUALLY WORSENING

## 2021-01-09 ASSESSMENT — PAIN DESCRIPTION - DESCRIPTORS: DESCRIPTORS: ACHING;DISCOMFORT;DULL

## 2021-01-09 ASSESSMENT — PAIN SCALES - GENERAL
PAINLEVEL_OUTOF10: 2
PAINLEVEL_OUTOF10: 1

## 2021-01-09 ASSESSMENT — PAIN DESCRIPTION - FREQUENCY: FREQUENCY: CONTINUOUS

## 2021-01-09 ASSESSMENT — PAIN DESCRIPTION - ORIENTATION: ORIENTATION: RIGHT;LEFT;MID

## 2021-01-09 NOTE — PROGRESS NOTES
PROGRESS NOTE    By Ingris Spicer D.O., GI Fellow    [facility] Jena Francisco M.D., Dr. Teddy Schaumann, M.D., Dr. Kamille Lagunas., Dr. Sarah Julien M.D., Dr. Danette Houston III  45 y.o.  male    SUBJECTIVE:    No acute events overnight. Patient tolerated low-sodium diet. Discussed recent labs and continued plan of care. Patient voiced understanding    OBJECTIVE:    /65   Pulse 90   Temp 98.1 °F (36.7 °C) (Oral)   Resp 18   Ht 5' 8\" (1.727 m)   Wt 196 lb (88.9 kg)   SpO2 96%   BMI 29.80 kg/m²   GEN: A&Ox3, friendly, NAD  HEENT:PEERL, no icterus  Heart: RRR  Lungs: CTAB  Abd.: soft, NT, ND, BS +, no G/R, no HSM  Extr.: no C/C/E, no brusing      Stool (measured) : 0 mL  Lab Results   Component Value Date    WBC 5.5 01/09/2021    WBC 6.1 01/08/2021    WBC 13.9 01/07/2021    HGB 7.5 01/09/2021    HGB 7.6 01/08/2021    HGB 7.1 01/08/2021    HCT 23.0 01/09/2021    MCV 89.5 01/09/2021    RDW 16.9 01/09/2021    PLT 79 01/09/2021    PLT 82 01/08/2021     01/07/2021     Lab Results   Component Value Date     01/09/2021    K 3.9 01/09/2021    K 3.9 01/08/2021     01/09/2021    CO2 23 01/09/2021    BUN 14 01/09/2021    CREATININE 1.3 01/09/2021    CALCIUM 7.8 01/09/2021    PROT 5.9 01/09/2021    LABALBU 2.9 01/09/2021    LABALBU 3.4 02/18/2012    BILITOT 3.8 01/09/2021    BILITOT 4.1 01/08/2021    BILITOT 3.0 01/07/2021    ALKPHOS 132 01/09/2021    ALKPHOS 135 01/08/2021    ALKPHOS 147 01/07/2021     01/09/2021     01/08/2021     01/07/2021    ALT 59 01/09/2021    ALT 47 01/08/2021    ALT 41 01/07/2021     Lab Results   Component Value Date    LIPASE 77 01/06/2021    LIPASE 102 01/06/2021    LIPASE 48 11/12/2020     Lab Results   Component Value Date    AMYLASE 49 02/22/2015         ASSESSMENT/PLAN:  1.   Gastrointestinal hemorrhage secondary to hematemesis S/P EGD on 1/6/2021 revealing 3 columns of large varices at the GE junction, with 6 bands placed and good collapse  Elevate head of bed  Serial hemoglobin stable  PPI drip x72 hours, end tomorrow  Octreotide drip x3 to 5 days, end date tomorrow   Cipro IV BID x7 days  Patient will require EGD in 2 weeks  Okay for low-sodium diet        2. Elevated LFTs  Cholestatic  Likely secondary to alcohol  RUQ US revealed a cirrhotic liver and findings suggestive of portal hypertension. No obstructive process noted  Hepatic panel  Daily CMP and INR        3. Alcoholic cirrhosis with ascites  Likely secondary to untreated chronic hepatitis C and alcohol abuse  -MELD-NA: 20  No clinical signs of encephalopathy, asterixis, and sclera nonicteric  AFP: 2  Right upper quadrant ultrasound unremarkable for any masses  CT revealed mild ascites that is not appear amenable to a diagnostic paracentesis  Discussed importance of hepatitis C treatment, patient states he has an appointment with another GI physician that PCP referred him to and he plans to follow-up to discuss treatment  Reiterated the importance of complete alcohol cessation and continued abstinence, patient voiced understanding        We will discuss with attending    Ernestine Carl DO  1/9/2021  11:45 AM      Pt seen and independently examined. Pertinent notes and lab work reviewed. D/w Dr. Phelps Adjutant with physical exam and A&P. Discussed with patient - all questions answered - agreeable with the plan as delineated.     José Luis Lara MD  1/9/2021  11:51 AM

## 2021-01-09 NOTE — PROGRESS NOTES
Surgery Progress Note            Chief complaint:   Patient Active Problem List   Diagnosis    Cellulitis    GI bleed    Abdominal pain       S: no acute changes    O:   Vitals:    01/09/21 0545   BP: 107/65   Pulse: 90   Resp: 18   Temp: 98.1 °F (36.7 °C)   SpO2: 96%       Intake/Output Summary (Last 24 hours) at 1/9/2021 1041  Last data filed at 1/9/2021 0800  Gross per 24 hour   Intake 800 ml   Output 375 ml   Net 425 ml           Labs:  Recent Labs     01/07/21  0415 01/07/21  0415 01/08/21  0331 01/08/21  1425 01/09/21  0407   WBC 13.9*  --  6.1  --  5.5   HGB 5.7*   < > 7.1* 7.6* 7.5*   HCT 19.9*   < > 20.8* 22.3* 23.0*    < > = values in this interval not displayed.      Lab Results   Component Value Date    CREATININE 1.3 (H) 01/09/2021    BUN 14 01/09/2021     01/09/2021    K 3.9 01/09/2021     01/09/2021    CO2 23 01/09/2021     Recent Labs     01/06/21  1342   LIPASE 77*       Physical exam:   /65   Pulse 90   Temp 98.1 °F (36.7 °C) (Oral)   Resp 18   Ht 5' 8\" (1.727 m)   Wt 196 lb (88.9 kg)   SpO2 96%   BMI 29.80 kg/m²   General appearance: NAD  Head: NCAT  Neck: supple, no masses  Lungs: equal chest rise bilateral  Heart: S1S2 present  Abdomen: soft, nontender, nondistended  Skin; no lesions  Gu: no cva tenderness  Extremities: extremities normal, atraumatic, no cyanosis or edema    A:  hematemesis    P: ok for d/c when ok with others, no changes from surgery    Roman Ivey MD  1/9/2021

## 2021-01-09 NOTE — PROGRESS NOTES
Department of Internal Medicine        CHIEF COMPLAINT: Generalized abdominal pain, hematemesis    Reason for Admission: Acute GI bleed with normocytic anemia    HISTORY OF PRESENT ILLNESS:      The patient is a 45 y.o. male who presents with acute onset of generalized abdominal pain. Patient also had some hematemesis. Patient is generalized abdominal pain and distention started acutely yesterday a.m. Patient states he had a similar episode in November and was admitted to [facility] and had EGD performed. Patient since discharge has had occasional alcohol and Tylenol use. Patient has prior history of drug abuse and was diagnosed with hepatitis C about 9 years ago. Patient has had some dark bowel movements over the past week and felt lightheaded yesterday morning. Hemoglobin is 8.5 on admission with a WBC of 12.3. Total bili 2.6 with direct bilirubin 1.4. AST is 122 and ALT of 34. BUN/creatinine 17/1.3. Gallbladder ultrasound showed cirrhosis and suggestion of portal hypertension with some gallbladder wall thickening. CT of the abdomen has fatty infiltration with hepatomegaly with mild ascites. There was some para cholecystic fluid noted. 1/7/2021  Patient seen examined on general medical floor. Patient has some abdominal discomfort with bloating. Patient feels very weak. EGD done yesterday afternoon showed 3 rows of grade 4 varices with 6 bands placed. BUN/creatinine were 36/1.8 with total bili of 3.0 today compared to 2. With WBC still 13.9. Hemoglobin is down to 5.7 with INR 2.0 still. .  General surgery note reviewed with patient having 2 units fresh frozen plasma ordered along with 1 unit of packed RBC. Patient has mild tachycardia still probably from the anemia with a temperature 98.4. Blood pressure 114/53 with O2 sat 95%.     1/8/2021  Marital status: Single     Spouse name: Not on file    Number of children: Not on file    Years of education: Not on file    Highest education level: Not on file   Occupational History    Not on file   Social Needs    Financial resource strain: Not on file    Food insecurity     Worry: Not on file     Inability: Not on file    Transportation needs     Medical: Not on file     Non-medical: Not on file   Tobacco Use    Smoking status: Former Smoker     Packs/day: 0.50    Smokeless tobacco: Never Used   Substance and Sexual Activity    Alcohol use: Yes     Comment: occasional    Drug use: Not on file     Comment: used drugs in past    Sexual activity: Not on file   Lifestyle    Physical activity     Days per week: Not on file     Minutes per session: Not on file    Stress: Not on file   Relationships    Social connections     Talks on phone: Not on file     Gets together: Not on file     Attends Latter day service: Not on file     Active member of club or organization: Not on file     Attends meetings of clubs or organizations: Not on file     Relationship status: Not on file    Intimate partner violence     Fear of current or ex partner: Not on file     Emotionally abused: Not on file     Physically abused: Not on file     Forced sexual activity: Not on file   Other Topics Concern    Not on file   Social History Narrative    Not on file       Family History:   No family history on file. REVIEW OF SYSTEMS:    Gen: + lightheadedness. No LOC or syncope. No fevers or chills. HEENT: No earache, sore throat or nasal congestion. Resp: Denies cough, hemoptysis or sputum production. Cardiac: Denies chest pain, SOB, diaphoresis or palpitations. GI: + nausea, vomiting, melena, hematochezia. : No urinary complaints, dysuria, hematuria or frequency. MSK: No extremity weakness, paralysis or paresthesias.      PHYSICAL EXAM:    Vitals: /65   Pulse 90   Temp 98.1 °F (36.7 °C) (Oral)   Resp 18   Ht 5' 8\" (1.727 m)   Wt 196 lb (88.9 kg)   SpO2 96%   BMI 29.80 kg/m²     General:  This is a 45 y.o. yo male who is alert and oriented in NAD  HEENT:  Head is normocephalic and atraumatic, PERRLA, EOMI, mucus membranes moist with no pharyngeal erythema or exudate. Neck:  Supple with no carotid bruits, JVD or thyromegaly.   No cervical adenopathy  CV:  Regular rate and rhythm, no murmurs  Lungs:  Clear to auscultation bilaterally with no wheezes, rales or rhonchi  Abdomen:  +tender, mildly distended, bowel sounds present  Extremities:  No edema, peripheral pulses intact bilaterally  Neuro:  Cranial nerves II-XII grossly intact; motor and sensory function intact with no focal deficits  Skin:  No rashes, lesions or wounds      DATA:  CBC with Differential:    Lab Results   Component Value Date    WBC 5.5 01/09/2021    RBC 2.57 01/09/2021    HGB 7.5 01/09/2021    HCT 23.0 01/09/2021    PLT 79 01/09/2021    MCV 89.5 01/09/2021    MCH 29.2 01/09/2021    MCHC 32.6 01/09/2021    RDW 16.9 01/09/2021    SEGSPCT 61 02/20/2012    LYMPHOPCT 11.3 01/09/2021    MONOPCT 3.5 01/09/2021    BASOPCT 0.9 01/09/2021    MONOSABS 0.22 01/09/2021    LYMPHSABS 0.61 01/09/2021    EOSABS 0.05 01/09/2021    BASOSABS 0.00 01/09/2021     CMP:    Lab Results   Component Value Date     01/09/2021    K 3.9 01/09/2021    K 3.9 01/08/2021     01/09/2021    CO2 23 01/09/2021    BUN 14 01/09/2021    CREATININE 1.3 01/09/2021    GFRAA >60 01/09/2021    LABGLOM >60 01/09/2021    GLUCOSE 107 01/09/2021    GLUCOSE 89 02/20/2012    PROT 5.9 01/09/2021    LABALBU 2.9 01/09/2021    LABALBU 3.4 02/18/2012    CALCIUM 7.8 01/09/2021    BILITOT 3.8 01/09/2021    ALKPHOS 132 01/09/2021     01/09/2021    ALT 59 01/09/2021     Magnesium:    Lab Results   Component Value Date    MG 2.0 02/22/2015     Phosphorus:  No results found for: PHOS  PT/INR:    Lab Results Component Value Date    PROTIME 22.8 01/09/2021    PROTIME 12.3 02/17/2012    INR 2.0 01/09/2021     Troponin:    Lab Results   Component Value Date    TROPONINI <0.01 01/06/2021     U/A:    Lab Results   Component Value Date    COLORU DARK YELLOW 08/14/2020    PROTEINU TRACE 08/14/2020    PHUR 6.5 08/14/2020    WBCUA 0-1 08/14/2020    RBCUA NONE 08/14/2020    RBCUA 0-1 02/25/2013    MUCUS Present 08/14/2020    BACTERIA RARE 08/14/2020    CLARITYU Clear 08/14/2020    SPECGRAV 1.025 08/14/2020    LEUKOCYTESUR Negative 08/14/2020    UROBILINOGEN 1.0 08/14/2020    BILIRUBINUR MODERATE 08/14/2020    BLOODU Negative 08/14/2020    GLUCOSEU Negative 08/14/2020     ABG:  No results found for: PH, PCO2, PO2, HCO3, BE, THGB, TCO2, O2SAT  HgBA1c:    Lab Results   Component Value Date    LABA1C 4.9 02/18/2012     FLP:  No results found for: TRIG, HDL, LDLCALC, LDLDIRECT, LABVLDL  TSH:    Lab Results   Component Value Date    TSH 2.900 08/14/2020     IRON:    Lab Results   Component Value Date    IRON 67 01/06/2021     LIPASE:    Lab Results   Component Value Date    LIPASE 77 01/06/2021       ASSESSMENT AND PLAN:      Patient Active Problem List    Diagnosis Date Noted    Abdominal pain 01/06/2021    GI bleed 11/13/2020    Cellulitis 02/17/2012     Impression:  1. Acute upper GI bleed from esophageal varices  2. History hepatitis Cuntreated with underlying cirrhosis  3. Severe normocytic anemia-secondary to acute GI blood loss  4. Acute kidney injury secondary to acute hypotension secondary to hypovolemia from GI bleed-improved  5.   Coagulopathy secondary to underlying cirrhosis    Plan:  Patient admitted to general surgical floor by general surgery  Monitor heart rate, blood pressure, O2 saturation  Consult with GI for follow-up for hepatitis C and upper GI bleed  Protonix 40 mg IV push twice daily    Decrease Lactated Ringer's 75 cc an hour   Given 2 units of packed RBC 1/7 AM and 1 unit of packed RBC 1/7 PM 2 units fresh frozen plasma per general surgery  Accurate I's and O's    CBC and CMP in a.m.   Stop IV fluids if okay with GI/general surgery    Russel Rocha D.O.  1/9/2021  11:22 AM

## 2021-01-10 ENCOUNTER — APPOINTMENT (OUTPATIENT)
Dept: ULTRASOUND IMAGING | Age: 41
DRG: 280 | End: 2021-01-10
Payer: COMMERCIAL

## 2021-01-10 LAB
ALBUMIN SERPL-MCNC: 2.9 G/DL (ref 3.5–5.2)
ALP BLD-CCNC: 123 U/L (ref 40–129)
ALT SERPL-CCNC: 53 U/L (ref 0–40)
ANION GAP SERPL CALCULATED.3IONS-SCNC: 8 MMOL/L (ref 7–16)
ANISOCYTOSIS: ABNORMAL
AST SERPL-CCNC: 181 U/L (ref 0–39)
BASOPHILS ABSOLUTE: 0 E9/L (ref 0–0.2)
BASOPHILS RELATIVE PERCENT: 0.6 % (ref 0–2)
BILIRUB SERPL-MCNC: 3.1 MG/DL (ref 0–1.2)
BLOOD BANK DISPENSE STATUS: NORMAL
BLOOD BANK PRODUCT CODE: NORMAL
BPU ID: NORMAL
BUN BLDV-MCNC: 10 MG/DL (ref 6–20)
CALCIUM SERPL-MCNC: 7.8 MG/DL (ref 8.6–10.2)
CHLORIDE BLD-SCNC: 102 MMOL/L (ref 98–107)
CO2: 23 MMOL/L (ref 22–29)
CREAT SERPL-MCNC: 1.2 MG/DL (ref 0.7–1.2)
DESCRIPTION BLOOD BANK: NORMAL
EOSINOPHILS ABSOLUTE: 0.23 E9/L (ref 0.05–0.5)
EOSINOPHILS RELATIVE PERCENT: 4.4 % (ref 0–6)
GFR AFRICAN AMERICAN: >60
GFR NON-AFRICAN AMERICAN: >60 ML/MIN/1.73
GLUCOSE BLD-MCNC: 115 MG/DL (ref 74–99)
HCT VFR BLD CALC: 22.8 % (ref 37–54)
HEMOGLOBIN: 7.5 G/DL (ref 12.5–16.5)
HYPOCHROMIA: ABNORMAL
INR BLD: 2
LYMPHOCYTES ABSOLUTE: 0.68 E9/L (ref 1.5–4)
LYMPHOCYTES RELATIVE PERCENT: 13.2 % (ref 20–42)
MCH RBC QN AUTO: 29.5 PG (ref 26–35)
MCHC RBC AUTO-ENTMCNC: 32.9 % (ref 32–34.5)
MCV RBC AUTO: 89.8 FL (ref 80–99.9)
MONOCYTES ABSOLUTE: 0.31 E9/L (ref 0.1–0.95)
MONOCYTES RELATIVE PERCENT: 6.1 % (ref 2–12)
NEUTROPHILS ABSOLUTE: 3.95 E9/L (ref 1.8–7.3)
NEUTROPHILS RELATIVE PERCENT: 76.3 % (ref 43–80)
OVALOCYTES: ABNORMAL
PDW BLD-RTO: 16.8 FL (ref 11.5–15)
PLATELET # BLD: 72 E9/L (ref 130–450)
PLATELET CONFIRMATION: NORMAL
PMV BLD AUTO: 11.3 FL (ref 7–12)
POIKILOCYTES: ABNORMAL
POLYCHROMASIA: ABNORMAL
POTASSIUM SERPL-SCNC: 4.1 MMOL/L (ref 3.5–5)
PROTHROMBIN TIME: 22.8 SEC (ref 9.3–12.4)
RBC # BLD: 2.54 E12/L (ref 3.8–5.8)
SODIUM BLD-SCNC: 133 MMOL/L (ref 132–146)
TEAR DROP CELLS: ABNORMAL
TOTAL PROTEIN: 6.1 G/DL (ref 6.4–8.3)
WBC # BLD: 5.2 E9/L (ref 4.5–11.5)

## 2021-01-10 PROCEDURE — 1200000000 HC SEMI PRIVATE

## 2021-01-10 PROCEDURE — 85610 PROTHROMBIN TIME: CPT

## 2021-01-10 PROCEDURE — 6370000000 HC RX 637 (ALT 250 FOR IP): Performed by: SURGERY

## 2021-01-10 PROCEDURE — 2580000003 HC RX 258: Performed by: SURGERY

## 2021-01-10 PROCEDURE — 6360000002 HC RX W HCPCS: Performed by: HOSPITALIST

## 2021-01-10 PROCEDURE — 6360000002 HC RX W HCPCS: Performed by: SURGERY

## 2021-01-10 PROCEDURE — 85025 COMPLETE CBC W/AUTO DIFF WBC: CPT

## 2021-01-10 PROCEDURE — 80053 COMPREHEN METABOLIC PANEL: CPT

## 2021-01-10 PROCEDURE — 2580000003 HC RX 258: Performed by: HOSPITALIST

## 2021-01-10 PROCEDURE — C9113 INJ PANTOPRAZOLE SODIUM, VIA: HCPCS | Performed by: HOSPITALIST

## 2021-01-10 PROCEDURE — 36415 COLL VENOUS BLD VENIPUNCTURE: CPT

## 2021-01-10 PROCEDURE — 76705 ECHO EXAM OF ABDOMEN: CPT

## 2021-01-10 RX ADMIN — SUCRALFATE 1 G: 1 TABLET ORAL at 06:39

## 2021-01-10 RX ADMIN — MORPHINE SULFATE 4 MG: 4 INJECTION, SOLUTION INTRAMUSCULAR; INTRAVENOUS at 01:50

## 2021-01-10 RX ADMIN — SODIUM CHLORIDE 8 MG/HR: 9 INJECTION, SOLUTION INTRAVENOUS at 11:00

## 2021-01-10 RX ADMIN — CIPROFLOXACIN 400 MG: 2 INJECTION, SOLUTION INTRAVENOUS at 09:55

## 2021-01-10 RX ADMIN — MORPHINE SULFATE 4 MG: 4 INJECTION, SOLUTION INTRAMUSCULAR; INTRAVENOUS at 15:25

## 2021-01-10 RX ADMIN — SUCRALFATE 1 G: 1 TABLET ORAL at 17:38

## 2021-01-10 RX ADMIN — MORPHINE SULFATE 4 MG: 4 INJECTION, SOLUTION INTRAMUSCULAR; INTRAVENOUS at 06:42

## 2021-01-10 RX ADMIN — LACTULOSE 20 G: 20 SOLUTION ORAL at 10:00

## 2021-01-10 RX ADMIN — CIPROFLOXACIN 400 MG: 2 INJECTION, SOLUTION INTRAVENOUS at 21:15

## 2021-01-10 RX ADMIN — LACTULOSE 20 G: 20 SOLUTION ORAL at 21:15

## 2021-01-10 RX ADMIN — MORPHINE SULFATE 4 MG: 4 INJECTION, SOLUTION INTRAMUSCULAR; INTRAVENOUS at 21:15

## 2021-01-10 RX ADMIN — SODIUM CHLORIDE, PRESERVATIVE FREE 10 ML: 5 INJECTION INTRAVENOUS at 10:01

## 2021-01-10 RX ADMIN — SUCRALFATE 1 G: 1 TABLET ORAL at 01:50

## 2021-01-10 RX ADMIN — MORPHINE SULFATE 4 MG: 4 INJECTION, SOLUTION INTRAMUSCULAR; INTRAVENOUS at 11:01

## 2021-01-10 ASSESSMENT — PAIN SCALES - GENERAL
PAINLEVEL_OUTOF10: 2
PAINLEVEL_OUTOF10: 1
PAINLEVEL_OUTOF10: 7
PAINLEVEL_OUTOF10: 8
PAINLEVEL_OUTOF10: 8

## 2021-01-10 ASSESSMENT — PAIN - FUNCTIONAL ASSESSMENT: PAIN_FUNCTIONAL_ASSESSMENT: PREVENTS OR INTERFERES SOME ACTIVE ACTIVITIES AND ADLS

## 2021-01-10 ASSESSMENT — PAIN DESCRIPTION - PROGRESSION: CLINICAL_PROGRESSION: GRADUALLY WORSENING

## 2021-01-10 ASSESSMENT — PAIN DESCRIPTION - ONSET: ONSET: PROGRESSIVE

## 2021-01-10 ASSESSMENT — PAIN DESCRIPTION - LOCATION: LOCATION: ABDOMEN

## 2021-01-10 NOTE — PROGRESS NOTES
PROGRESS NOTE  By Karen Wallace M.D. [facility]  Dr. Mariposa Hernandez M.D.,  Dr. Luciana Martinez M.D.,   Dr. Danielle Son D.O.,  Dr. Juan Strong M.D.,  Dr. Kailey Hernandez D.O.,  Nurialori Nessa, Luis M Benson III  45 y.o.  male    SUBJECTIVE:    Denies abdominal pain but complains of some increased abdominal distention as well as scrotal edema. OBJECTIVE:    BP (!) 134/90   Pulse 88   Temp 97.9 °F (36.6 °C) (Oral)   Resp 18   Ht 5' 8\" (1.727 m)   Wt 196 lb (88.9 kg)   SpO2 97%   BMI 29.80 kg/m²     General: NAD/ male. HEENT: Anicteric sclera/moist oral mucosa  Neck: Supple with trachea midline  Chest: Symmetrical excursion/now with respirations  Abd.: Soft and moderately distended. Nontender.   BS +/4  Extr.:  BLE without significant pitting edema  Skin: Warm and dry/anicteric      DATA:  Stool (measured) : 0 mL  Lab Results   Component Value Date    WBC 5.2 01/10/2021    RBC 2.54 01/10/2021    HGB 7.5 01/10/2021    HCT 22.8 01/10/2021    MCV 89.8 01/10/2021    MCH 29.5 01/10/2021    MCHC 32.9 01/10/2021    RDW 16.8 01/10/2021    PLT 72 01/10/2021    MPV 11.3 01/10/2021     Lab Results   Component Value Date     01/10/2021    K 4.1 01/10/2021    K 3.9 01/08/2021     01/10/2021    CO2 23 01/10/2021    BUN 10 01/10/2021    CREATININE 1.2 01/10/2021    CALCIUM 7.8 01/10/2021    PROT 6.1 01/10/2021    LABALBU 2.9 01/10/2021    LABALBU 3.4 02/18/2012    BILITOT 3.1 01/10/2021    ALKPHOS 123 01/10/2021     01/10/2021    ALT 53 01/10/2021     Lab Results   Component Value Date    LIPASE 77 01/06/2021     Lab Results   Component Value Date    AMYLASE 49 02/22/2015         ASSESSMENT/PLAN:  1.  Gastrointestinal hemorrhage secondary to hematemesis  S/P EGD on 1/6/2021 revealing 3 columns of large varices at the GE junction, with 6 bands placed and good collapse  Elevate head of bed  Serial hemoglobin stable  PPI drip x72 hours, end tomorrow Octreotide drip x3 to 5 days -wean off and DC later today or tomorrow see orders  Cipro IV BID x7 days -consider discharging on antibiotics if 7 days not completed  Patient will require EGD in 2 weeks -follow with our office versus with Dr. Winfield Mohs low-sodium diet        2.  Elevated LFTs  Cholestatic  Likely secondary to alcohol  RUQ US revealed a cirrhotic liver and findings suggestive of portal hypertension. No obstructive process noted  Hepatic panel  Daily CMP and INR        3.  Alcoholic cirrhosis with ascites  Likely secondary to untreated chronic hepatitis C and alcohol abuse  -MELD-NA: 20  No clinical signs of encephalopathy, asterixis, and sclera nonicteric  AFP: 2  Right upper quadrant ultrasound unremarkable for any masses  CT revealed mild ascites that is not appear amenable to a diagnostic paracentesis  Discussed importance of hepatitis C treatment, patient states he has an appointment with another GI physician (Dr. Nazia Lanier) that PCP referred him to and he plans to follow-up to discuss treatment  Reiterated the importance of complete alcohol cessation and continued abstinence, patient voiced understanding       Ava Peter MD  1/10/2021  10:20 AM    NOTE:  This report was transcribed using voice recognition software. Every effort was made to ensure accuracy; however, inadvertent computerized transcription errors may be present.

## 2021-01-10 NOTE — PROGRESS NOTES
Patient seen and examined on general medical floor. Patient is feeling better again today. Patient still a bit weak and mildly lightheaded when he gets up and moves around. He has some lower chest burning. BUN/creatinine is 25/1.3 and improved from 36/1.8 yesterday a.m. Fasting blood sugar 144. Total bili slightly increased again today to 4.1 with elevation of transaminases still present. WBC is improved to 6.1 with hemoglobin currently 7.1. Patient did receive another unit of packed RBC last night. INR is 1.8 with patient's hepatitis C antibody rechecked was reactive. Temperature is 98.3 with heart rate in 96. Blood pressure currently 101/57. O2 sats 93% on room air. 1/9/2021  Patient seen examined on general medical floor. Patient is feeling better. Patient still fairly weak with ambulation. Patient denies any chest pain dizziness or unusual shortness of breath. BUN/creatinine is 14/1.3. Total bili was down to 3.8. Hemoglobin 7.5 and platelet count is still low at 79. INR is 2.0. Temperature 98.1 with heart rate in 90. Blood pressure 107/65. O2 sats 96% on room air. Patient with multiple formed bowel movements. 1/10/2021  Patient seen and examined on general medical floor. Patient feels a bit better today and states his bowel movements have slowed down but are still loose. Patient denies any chest pain. Patient denies any unusual shortness of breath. BUN/creatinine was 10/1.2. Total bili 3.1. Hemoglobin 7.5 with WBC 5.2 and platelet count of 72 today. INR still 2.0. Temperature 97.9 with heart rate 88. Blood pressure currently 134/90. O2 sats 97% on room air. Past Medical History:    History reviewed. No pertinent past medical history.   Past Surgical History:    Past Surgical History:   Procedure Laterality Date    TONSILLECTOMY      UPPER GASTROINTESTINAL ENDOSCOPY N/A 11/13/2020    EGD BIOPSY performed by Erika Valadez MD at [address] [place] [address] Gen: + lightheadedness. No LOC or syncope. No fevers or chills. HEENT: No earache, sore throat or nasal congestion. Resp: Denies cough, hemoptysis or sputum production. Cardiac: Denies chest pain, SOB, diaphoresis or palpitations. GI: + nausea, vomiting, melena, hematochezia. : No urinary complaints, dysuria, hematuria or frequency. MSK: No extremity weakness, paralysis or paresthesias. PHYSICAL EXAM:    Vitals:  BP (!) 134/90   Pulse 88   Temp 97.9 °F (36.6 °C) (Oral)   Resp 18   Ht 5' 8\" (1.727 m)   Wt 196 lb (88.9 kg)   SpO2 97%   BMI 29.80 kg/m²     General:  This is a 45 y.o. yo male who is alert and oriented in NAD  HEENT:  Head is normocephalic and atraumatic, PERRLA, EOMI, mucus membranes moist with no pharyngeal erythema or exudate. Neck:  Supple with no carotid bruits, JVD or thyromegaly.   No cervical adenopathy  CV:  Regular rate and rhythm, no murmurs  Lungs:  Clear to auscultation bilaterally with no wheezes, rales or rhonchi  Abdomen:  +tender, mildly distended, bowel sounds present  Extremities:  No edema, peripheral pulses intact bilaterally  Neuro:  Cranial nerves II-XII grossly intact; motor and sensory function intact with no focal deficits  Skin:  No rashes, lesions or wounds      DATA:  CBC with Differential:    Lab Results   Component Value Date    WBC 5.2 01/10/2021    RBC 2.54 01/10/2021    HGB 7.5 01/10/2021    HCT 22.8 01/10/2021    PLT 72 01/10/2021    MCV 89.8 01/10/2021    MCH 29.5 01/10/2021    MCHC 32.9 01/10/2021    RDW 16.8 01/10/2021    SEGSPCT 61 02/20/2012    LYMPHOPCT 13.2 01/10/2021    MONOPCT 6.1 01/10/2021    BASOPCT 0.6 01/10/2021    MONOSABS 0.31 01/10/2021    LYMPHSABS 0.68 01/10/2021    EOSABS 0.23 01/10/2021    BASOSABS 0.00 01/10/2021     CMP:    Lab Results   Component Value Date     01/10/2021    K 4.1 01/10/2021    K 3.9 01/08/2021     01/10/2021    CO2 23 01/10/2021    BUN 10 01/10/2021    CREATININE 1.2 01/10/2021 GFRAA >60 01/10/2021    LABGLOM >60 01/10/2021    GLUCOSE 115 01/10/2021    GLUCOSE 89 02/20/2012    PROT 6.1 01/10/2021    LABALBU 2.9 01/10/2021    LABALBU 3.4 02/18/2012    CALCIUM 7.8 01/10/2021    BILITOT 3.1 01/10/2021    ALKPHOS 123 01/10/2021     01/10/2021    ALT 53 01/10/2021     Magnesium:    Lab Results   Component Value Date    MG 2.0 02/22/2015     Phosphorus:  No results found for: PHOS  PT/INR:    Lab Results   Component Value Date    PROTIME 22.8 01/10/2021    PROTIME 12.3 02/17/2012    INR 2.0 01/10/2021     Troponin:    Lab Results   Component Value Date    TROPONINI <0.01 01/06/2021     U/A:    Lab Results   Component Value Date    COLORU DARK YELLOW 08/14/2020    PROTEINU TRACE 08/14/2020    PHUR 6.5 08/14/2020    WBCUA 0-1 08/14/2020    RBCUA NONE 08/14/2020    RBCUA 0-1 02/25/2013    MUCUS Present 08/14/2020    BACTERIA RARE 08/14/2020    CLARITYU Clear 08/14/2020    SPECGRAV 1.025 08/14/2020    LEUKOCYTESUR Negative 08/14/2020    UROBILINOGEN 1.0 08/14/2020    BILIRUBINUR MODERATE 08/14/2020    BLOODU Negative 08/14/2020    GLUCOSEU Negative 08/14/2020     ABG:  No results found for: PH, PCO2, PO2, HCO3, BE, THGB, TCO2, O2SAT  HgBA1c:    Lab Results   Component Value Date    LABA1C 4.9 01/09/2021     FLP:  No results found for: TRIG, HDL, LDLCALC, LDLDIRECT, LABVLDL  TSH:    Lab Results   Component Value Date    TSH 2.900 08/14/2020     IRON:    Lab Results   Component Value Date    IRON 67 01/06/2021     LIPASE:    Lab Results   Component Value Date    LIPASE 77 01/06/2021       ASSESSMENT AND PLAN:      Patient Active Problem List    Diagnosis Date Noted    Abdominal pain 01/06/2021    GI bleed 11/13/2020    Cellulitis 02/17/2012     Impression:  1. Acute upper GI bleed from esophageal varices  2. History hepatitis Cuntreated with underlying cirrhosis  3.   Severe normocytic anemia-secondary to acute GI blood loss 4.  Acute kidney injury secondary to acute hypotension secondary to hypovolemia from GI bleed-improved  5. Coagulopathy secondary to underlying cirrhosis  6. Thrombocytopenia    Plan:  Patient admitted to general surgical floor by general surgery  Monitor heart rate, blood pressure, O2 saturation  Consult with GI for follow-up for hepatitis C and upper GI bleed  Protonix 40 mg IV push twice daily    Decrease Lactated Ringer's 75 cc an hour   Given 2 units of packed RBC 1/7 AM and 1 unit of packed RBC 1/7 PM  2 units fresh frozen plasma per general surgery  Accurate I's and O's    CBC and CMP in a.m.   Stop IV fluids if okay with GI/general surgery    Ashlie Olvera D.O.  1/10/2021  10:16 AM

## 2021-01-11 VITALS
DIASTOLIC BLOOD PRESSURE: 51 MMHG | SYSTOLIC BLOOD PRESSURE: 95 MMHG | RESPIRATION RATE: 16 BRPM | WEIGHT: 196 LBS | TEMPERATURE: 98.2 F | BODY MASS INDEX: 29.7 KG/M2 | HEIGHT: 68 IN | OXYGEN SATURATION: 98 % | HEART RATE: 88 BPM

## 2021-01-11 LAB
ANISOCYTOSIS: ABNORMAL
BASOPHILS ABSOLUTE: 0.09 E9/L (ref 0–0.2)
BASOPHILS RELATIVE PERCENT: 1.7 % (ref 0–2)
EOSINOPHILS ABSOLUTE: 0.05 E9/L (ref 0.05–0.5)
EOSINOPHILS RELATIVE PERCENT: 0.9 % (ref 0–6)
HCT VFR BLD CALC: 23.2 % (ref 37–54)
HEMOGLOBIN: 7.6 G/DL (ref 12.5–16.5)
INR BLD: 2
LYMPHOCYTES ABSOLUTE: 0.64 E9/L (ref 1.5–4)
LYMPHOCYTES RELATIVE PERCENT: 12.1 % (ref 20–42)
MCH RBC QN AUTO: 30 PG (ref 26–35)
MCHC RBC AUTO-ENTMCNC: 32.8 % (ref 32–34.5)
MCV RBC AUTO: 91.7 FL (ref 80–99.9)
MONOCYTES ABSOLUTE: 0.58 E9/L (ref 0.1–0.95)
MONOCYTES RELATIVE PERCENT: 11.2 % (ref 2–12)
MYELOCYTE PERCENT: 1.7 % (ref 0–0)
NEUTROPHILS ABSOLUTE: 3.92 E9/L (ref 1.8–7.3)
NEUTROPHILS RELATIVE PERCENT: 72.4 % (ref 43–80)
OVALOCYTES: ABNORMAL
PDW BLD-RTO: 17.3 FL (ref 11.5–15)
PLATELET # BLD: 75 E9/L (ref 130–450)
PLATELET CONFIRMATION: NORMAL
PMV BLD AUTO: 11.7 FL (ref 7–12)
POIKILOCYTES: ABNORMAL
POLYCHROMASIA: ABNORMAL
PROTHROMBIN TIME: 23.1 SEC (ref 9.3–12.4)
RBC # BLD: 2.53 E12/L (ref 3.8–5.8)
WBC # BLD: 5.3 E9/L (ref 4.5–11.5)

## 2021-01-11 PROCEDURE — 6370000000 HC RX 637 (ALT 250 FOR IP): Performed by: SURGERY

## 2021-01-11 PROCEDURE — 99238 HOSP IP/OBS DSCHRG MGMT 30/<: CPT | Performed by: SURGERY

## 2021-01-11 PROCEDURE — 85025 COMPLETE CBC W/AUTO DIFF WBC: CPT

## 2021-01-11 PROCEDURE — 6370000000 HC RX 637 (ALT 250 FOR IP): Performed by: STUDENT IN AN ORGANIZED HEALTH CARE EDUCATION/TRAINING PROGRAM

## 2021-01-11 PROCEDURE — 2580000003 HC RX 258: Performed by: HOSPITALIST

## 2021-01-11 PROCEDURE — 6360000002 HC RX W HCPCS: Performed by: HOSPITALIST

## 2021-01-11 PROCEDURE — 6360000002 HC RX W HCPCS: Performed by: SURGERY

## 2021-01-11 PROCEDURE — 2580000003 HC RX 258: Performed by: INTERNAL MEDICINE

## 2021-01-11 PROCEDURE — 36415 COLL VENOUS BLD VENIPUNCTURE: CPT

## 2021-01-11 PROCEDURE — 85610 PROTHROMBIN TIME: CPT

## 2021-01-11 RX ORDER — CIPROFLOXACIN 500 MG/1
500 TABLET, FILM COATED ORAL 2 TIMES DAILY
Qty: 2 TABLET | Refills: 0 | Status: SHIPPED | OUTPATIENT
Start: 2021-01-11 | End: 2021-01-11 | Stop reason: SDUPTHER

## 2021-01-11 RX ORDER — FUROSEMIDE 10 MG/ML
40 INJECTION INTRAMUSCULAR; INTRAVENOUS ONCE
Status: COMPLETED | OUTPATIENT
Start: 2021-01-11 | End: 2021-01-11

## 2021-01-11 RX ORDER — OXYCODONE HYDROCHLORIDE 5 MG/1
5 TABLET ORAL EVERY 4 HOURS PRN
Status: DISCONTINUED | OUTPATIENT
Start: 2021-01-11 | End: 2021-01-11 | Stop reason: HOSPADM

## 2021-01-11 RX ORDER — PANTOPRAZOLE SODIUM 40 MG/1
40 TABLET, DELAYED RELEASE ORAL
Status: DISCONTINUED | OUTPATIENT
Start: 2021-01-11 | End: 2021-01-11 | Stop reason: HOSPADM

## 2021-01-11 RX ORDER — SPIRONOLACTONE 25 MG/1
100 TABLET ORAL DAILY
Status: DISCONTINUED | OUTPATIENT
Start: 2021-01-11 | End: 2021-01-11 | Stop reason: HOSPADM

## 2021-01-11 RX ORDER — OXYCODONE HYDROCHLORIDE 5 MG/1
10 TABLET ORAL EVERY 4 HOURS PRN
Status: DISCONTINUED | OUTPATIENT
Start: 2021-01-11 | End: 2021-01-11 | Stop reason: HOSPADM

## 2021-01-11 RX ORDER — CIPROFLOXACIN 500 MG/1
500 TABLET, FILM COATED ORAL 2 TIMES DAILY
Qty: 2 TABLET | Refills: 0 | Status: SHIPPED | OUTPATIENT
Start: 2021-01-11 | End: 2021-01-13

## 2021-01-11 RX ADMIN — CIPROFLOXACIN 400 MG: 2 INJECTION, SOLUTION INTRAVENOUS at 09:27

## 2021-01-11 RX ADMIN — LACTULOSE 20 G: 20 SOLUTION ORAL at 09:27

## 2021-01-11 RX ADMIN — Medication 10 ML: at 09:27

## 2021-01-11 RX ADMIN — SUCRALFATE 1 G: 1 TABLET ORAL at 05:52

## 2021-01-11 RX ADMIN — SPIRONOLACTONE 100 MG: 25 TABLET ORAL at 12:48

## 2021-01-11 RX ADMIN — SUCRALFATE 1 G: 1 TABLET ORAL at 12:48

## 2021-01-11 RX ADMIN — OXYCODONE 10 MG: 5 TABLET ORAL at 09:27

## 2021-01-11 RX ADMIN — OCTREOTIDE ACETATE 25 MCG/HR: 500 INJECTION, SOLUTION INTRAVENOUS; SUBCUTANEOUS at 09:32

## 2021-01-11 RX ADMIN — FUROSEMIDE 40 MG: 10 INJECTION, SOLUTION INTRAMUSCULAR; INTRAVENOUS at 12:48

## 2021-01-11 RX ADMIN — MORPHINE SULFATE 4 MG: 4 INJECTION, SOLUTION INTRAMUSCULAR; INTRAVENOUS at 05:52

## 2021-01-11 RX ADMIN — SUCRALFATE 1 G: 1 TABLET ORAL at 00:04

## 2021-01-11 RX ADMIN — OXYCODONE 10 MG: 5 TABLET ORAL at 14:08

## 2021-01-11 RX ADMIN — MORPHINE SULFATE 4 MG: 4 INJECTION, SOLUTION INTRAMUSCULAR; INTRAVENOUS at 01:47

## 2021-01-11 ASSESSMENT — PAIN SCALES - GENERAL
PAINLEVEL_OUTOF10: 8
PAINLEVEL_OUTOF10: 7
PAINLEVEL_OUTOF10: 7
PAINLEVEL_OUTOF10: 8

## 2021-01-11 ASSESSMENT — PAIN DESCRIPTION - DESCRIPTORS: DESCRIPTORS: ACHING;TENDER;SHOOTING

## 2021-01-11 ASSESSMENT — PAIN DESCRIPTION - LOCATION: LOCATION: ABDOMEN
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
## 2021-01-11 NOTE — DISCHARGE SUMMARY
Physician Discharge Summary     Patient ID:  Brissa Hankins III  [id number]  89 y.o.  1980    Admit date: 1/6/2021    Discharge date and time: 1/11/2021    Admitting Physician: Toy Nieves MD     Admission Diagnoses:   Patient Active Problem List   Diagnosis    Cellulitis    GI bleed    Abdominal pain       Discharge Diagnoses:   Patient Active Problem List   Diagnosis    Cellulitis    GI bleed    Abdominal pain       Admission Condition: good    Discharged Condition: good    Indication for Admission: Upper GI bleed epigastric pain    Hospital Course: Brissa Hankins III is a 45 y.o. male admitted with nausea vomiting epigastric abdominal pain. Found to have acute on chronic blood loss anemia. Patient is known history of alcoholism with cirrhosis secondary to above with coagulopathy of an INR 2.0. He was admitted and underwent upper endoscopy with GI that showed esophageal varices which were banded. He was followed by GI during his hospital stay and received several units of blood transfusion. Hemoglobin stabilized and his GI bleed resolved. He was treated with antacid therapy and Sandostatin during his hospital stay. Work-up of his gallbladder revealed no evidence of acute obstruction or cholecystitis on HIDA scan or ultrasound. He did well diet was advanced, they were ambulating independently and pain was controlled. They were discharged with appropriate medication, instructions and follow up.      Consults: Gastroenterology and hospitalist    Treatments: IV hydration, antibiotics, blood transfusion, plasma transfusion, upper endoscopy with esophageal variceal banding    In process/preliminary results:  Outstanding Order Results     Date and Time Order Name Status Description    1/6/2021 0923 Miscellaneous Sendout 1 Preliminary           Patient Instructions:   Current Discharge Medication List      START taking these medications    Details ciprofloxacin (CIPRO) 500 MG tablet Take 1 tablet by mouth 2 times daily for 2 days  Qty: 2 tablet, Refills: 0         STOP taking these medications       influenza virus trivalent vaccine (FLUZONE) injection Comments:   Reason for Stopping:               Discharge Exam:  General appearance: AAOx3, NAD  Head: NCAT, PERRLA, EOMI, red conjunctiva  Neck: supple, no masses  Lungs: Equal chest rise bilateral  Heart: Reg rate  Abdomen: soft, nondistended, nontender  Extremities: extremities normal, atraumatic, no cyanosis or edema    Disposition: home    Patient Instructions:    Activity: activity as tolerated  Diet: regular diet  Wound Care: none needed    Follow-up with GI and primary care    Signed:  Danny John  1/11/2021  1:36 PM

## 2021-01-11 NOTE — PROGRESS NOTES
PROGRESS NOTE    By Andreina Henson D.O., GI Fellow    [facility] Arell[facility] M.D., Dr. Elidia Spence M.D., Dr. Maryan Szymanski., Dr. Danielle Mak M.D., Dr. Britta Garrido III  45 y.o.  male    SUBJECTIVE:    No acute events overnight. Patient tolerating diet. Patient very appreciative of care. Discussed recent labs, recommendations for follow-up in 2 weeks for repeat EGD and to discuss hepatitis C treatment, as well as completing antibiotics course and twice daily PPI.   Patient voiced understanding    OBJECTIVE:    BP (!) 95/51   Pulse 82   Temp 98.2 °F (36.8 °C) (Oral)   Resp 16   Ht 5' 8\" (1.727 m)   Wt 196 lb (88.9 kg)   SpO2 96%   BMI 29.80 kg/m²   GEN: A&Ox3, friendly, NAD  HEENT:PEERL, no icterus  Heart: RRR  Lungs: CTAB  Abd.: soft, NT, ND, BS +, no G/R, no HSM  Extr.: no C/C/E, no brusing      Stool (measured) : 0 mL  Lab Results   Component Value Date    WBC 5.3 01/11/2021    WBC 5.2 01/10/2021    WBC 5.5 01/09/2021    HGB 7.6 01/11/2021    HGB 7.5 01/10/2021    HGB 7.5 01/09/2021    HCT 23.2 01/11/2021    MCV 91.7 01/11/2021    RDW 17.3 01/11/2021    PLT 75 01/11/2021    PLT 72 01/10/2021    PLT 79 01/09/2021     Lab Results   Component Value Date     01/10/2021    K 4.1 01/10/2021    K 3.9 01/08/2021     01/10/2021    CO2 23 01/10/2021    BUN 10 01/10/2021    CREATININE 1.2 01/10/2021    CALCIUM 7.8 01/10/2021    PROT 6.1 01/10/2021    LABALBU 2.9 01/10/2021    LABALBU 3.4 02/18/2012    BILITOT 3.1 01/10/2021    BILITOT 3.8 01/09/2021    BILITOT 4.1 01/08/2021    ALKPHOS 123 01/10/2021    ALKPHOS 132 01/09/2021    ALKPHOS 135 01/08/2021     01/10/2021     01/09/2021     01/08/2021    ALT 53 01/10/2021    ALT 59 01/09/2021    ALT 47 01/08/2021     Lab Results   Component Value Date    LIPASE 77 01/06/2021    LIPASE 102 01/06/2021    LIPASE 48 11/12/2020     Lab Results   Component Value Date AMYLASE 49 02/22/2015         ASSESSMENT/PLAN:  1.  Gastrointestinal hemorrhage secondary to hematemesis  S/P EGD on 1/6/2021 revealing 3 columns of large varices at the GE junction, with 6 bands placed and good collapse  Elevate head of bed  Serial hemoglobin stable  PPI BID  Octreotide drip x3 to 5 days -wean off and DC later today or tomorrow see orders  Cipro IV BID x7 days. Will need Cipro for 2 additional days if patient is being discharged today  Patient will require EGD in 2 weeks -follow with our office versus with Dr. Annmarie Connelly low-sodium diet  Okay to DC home from GI POV        2.  Elevated LFTs  Cholestatic  Likely secondary to alcohol  RUQ US revealed a cirrhotic liver and findings suggestive of portal hypertension.  No obstructive process noted  Hepatic panel  Daily CMP and INR        3.  Alcoholic cirrhosis with ascites  Likely secondary to untreated chronic hepatitis C and alcohol abuse  -MELD-NA: 20  No clinical signs of encephalopathy, asterixis, and sclera nonicteric  AFP: 2  Right upper quadrant ultrasound unremarkable for any masses  CT revealed mild ascites that is not appear amenable to a diagnostic paracentesis  Discussed importance of hepatitis C treatment, patient states he has an appointment with another GI physician (Dr. Chiara Seth) that PCP referred him to and he plans to follow-up to discuss treatment  Reiterated the importance of complete alcohol cessation and continued abstinence, patient voiced understanding      4. Treatment naïve chronic hepatitis C  We will follow-up as an outpatient to initiate treatment      We will discuss with attending     Hetal Hurt DO  1/11/2021  11:40 AM    Pt seen and independently examined. Pertinent notes and lab work reviewed. Ultrasound of the abdomen reviewed/report read  -no significant ascites. D/w Dr. Felder Brine with physical exam and A&P. Okay to be discharged from GI POV. Follow-up in the office in 2 to 3 weeks unless patient decides to follow with Dr. Donna Jo as previously scheduled. Patient to call office for appointment and with questions/concerns at [phone]. Discussed with patient - all questions answered - agreeable with the plan as delineated. Thank you for the opportunity to participate in the care of Mr. Ihsan Ballard.     Ruthie Oconnor MD  1/11/2021  12:04 PM

## 2021-01-11 NOTE — PLAN OF CARE
Problem: Pain:  Goal: Pain level will decrease  Description: Pain level will decrease  Outcome: Met This Shift     Problem: Pain:  Goal: Control of acute pain  Description: Control of acute pain  Outcome: Met This Shift     Problem: Falls - Risk of:  Goal: Will remain free from falls  Description: Will remain free from falls  Outcome: Met This Shift     Problem: Falls - Risk of:  Goal: Will remain free from falls  Description: Will remain free from falls  Outcome: Met This Shift

## 2021-01-11 NOTE — PROGRESS NOTES
GENERAL SURGERY  DAILY PROGRESS NOTE  1/11/2021    Subjective:  Pt having normal BMs x3, pain improving, tolerating diet, ambulating, feels swollen and bloated  Objective:  BP (!) 95/51   Pulse 82   Temp 98.2 °F (36.8 °C) (Oral)   Resp 16   Ht 5' 8\" (1.727 m)   Wt 196 lb (88.9 kg)   SpO2 96%   BMI 29.80 kg/m²     GENERAL:  Laying in bed, no apparent distress, + anasarca  LUNGS:  No increased work of breathing, no cyanosis  CARDIOVASCULAR:  Extremities warm and well perfused  ABDOMEN:  Soft, mild epigastric tenderness, moderately distended, no RUQ tenderness, non tympanic  SKIN: Warm and dry    Assessment/Plan:  45 y.o. male with hematemesis secondary to bleeding varices    -transfuse per IM  -ok to continue diet  -appreciate IM assistance  -management grossly per consultants  -no surgical interventions planned  -IVF stopped, per GI octreotide and ppi gtts to stop today  -will need repeat endoscopy with GI  -discharge when cleared by consultants    Electronically signed by Abran Ward MD on 1/11/2021 at 6:48 AM

## 2021-01-12 LAB
Lab: NORMAL
REPORT: NORMAL
THIS TEST SENT TO: NORMAL

## 2021-02-17 NOTE — ADT AUTH CERT
Abdominal Pain, Undiagnosed - Care Day 5 (1/9/2021) by Hal Fowler RN       Review Status Review Entered   Completed 2/17/2021 16:51      Criteria Review      Care Day: 5 Care Date: 1/9/2021 Level of Care: Inpatient Floor    Guideline Day 2    Clinical Status    (X) * Hemodynamic stability    2/17/2021 4:51 PM EST by Odalys Victor      HR 90 /58    (X) * Pain absent or managed    2/17/2021 4:51 PM EST by Odalys Victor      morphine 4mg IV PRN x 5    ( ) * Etiology or finding requiring inpatient treatment absent    ( ) * Liquid or advanced diet tolerated    ( ) * Discharge plans and education understood    Activity    (X) * Ambulatory or acceptable for next level of care [D]    Routes    ( ) * Oral hydration, medications, and diet    2/17/2021 4:51 PM EST by Odalys Victor      cipro 400mg IV Q12hrs, sandostating drip IV @ 25mcg/hr, LR IV @50ml/hr, protonix drip IV @ 8mg/hr, zofran 4mg IV PRN x1    * Milestone   Additional Notes   01/09/21    VS T 98.4 P 90 R 18 /58 spo2 95% RA      Meds: cipro 400mg IV Q12hrs, lactulose 20g PO twice daily, sandostatin drip IV @25mcg/hr, carafate 1g PO four times daily, LR IV @50ml/hr, protonix drip IV @ 8mg/hr, morphine 4mg IV PRN x 5, zofran 4mg IV PRN x1,          IM NOTE   Impression:   1.  Acute upper GI bleed from esophageal varices   2.  History hepatitis C-untreated with underlying cirrhosis   3.  Severe normocytic anemia-secondary to acute GI blood loss   4.  Acute kidney injury secondary to acute hypotension secondary to hypovolemia from GI bleed-improved   5.  Coagulopathy secondary to underlying cirrhosis       Plan:   Patient admitted to general surgical floor by general surgery   Monitor heart rate, blood pressure, O2 saturation   Consult with GI for follow-up for hepatitis C and upper GI bleed   Protonix 40 mg IV push twice daily       Decrease Lactated Ringer's 75 cc an hour Given 2 units of packed RBC 1/7 AM and 1 unit of packed RBC 1/7 PM   2 units fresh frozen plasma per general surgery   Accurate I's and O's       CBC and CMP in a.m.    Stop IV fluids if okay with GI/general surgery         GI NOTE   ASSESSMENT/PLAN:   1.  Gastrointestinal hemorrhage secondary to hematemesis   -S/P EGD on 1/6/2021 revealing 3 columns of large varices at the GE junction, with 6 bands placed and good collapse   -Elevate head of bed   -Serial hemoglobin stable   -PPI drip x72 hours, end tomorrow   -Octreotide drip x3 to 5 days, end date tomorrow    -Cipro IV BID x7 days   -Patient will require EGD in 2 weeks   -Okay for low-sodium diet           2.  Elevated LFTs   -Cholestatic   -Likely secondary to alcohol   -RUQ US revealed a cirrhotic liver and findings suggestive of portal hypertension.  No obstructive process noted   -Hepatic panel   -Daily CMP and INR           3.  Alcoholic cirrhosis with ascites   -Likely secondary to untreated chronic hepatitis C and alcohol abuse   -MELD-NA: 20   -No clinical signs of encephalopathy, asterixis, and sclera nonicteric   -AFP: 2   -Right upper quadrant ultrasound unremarkable for any masses   -CT revealed mild ascites that is not appear amenable to a diagnostic paracentesis   -Discussed importance of hepatitis C treatment, patient states he has an appointment with another GI physician that PCP referred him to and he plans to follow-up to discuss treatment   -Reiterated the importance of complete alcohol cessation and continued abstinence, patient voiced understanding              Abdominal Pain, Undiagnosed - Care Day 4 (1/8/2021) by Dominguez Tanner RN       Review Status Review Entered   Completed 2/17/2021 16:49      Criteria Review      Care Day: 4 Care Date: 1/8/2021 Level of Care: Inpatient Floor    Guideline Day 2    Clinical Status    (X) * Hemodynamic stability    2/17/2021 4:49 PM EST by Gilbert Oconnor      HR 96 BP 99/55 ( ) * Pain absent or managed    2/17/2021 4:49 PM EST by Jahaira Steinberg      morphine 4mg IV PRN x 6    ( ) * Etiology or finding requiring inpatient treatment absent    ( ) * Liquid or advanced diet tolerated    ( ) * Discharge plans and education understood    Activity    (X) * Ambulatory or acceptable for next level of care [D]    Routes    ( ) * Oral hydration, medications, and diet    2/17/2021 4:49 PM EST by Jahaira Steinberg      cipro 400mg IV Q12hrs, sandostatin drip IV @ 25mcg/hr, LR IV @50ml/hr, protonix drip IV @ 8mg/hr    * Milestone   Additional Notes   01/08/21 upgrade to inpatient   Vs T 99 P 96 R 18 BP 99/55 spo2 93% RA      Meds: cipro 400mg IV Q12hrs, lactulose 20g PO twice daily, sandostatin drip IV @25mcg/hr, carafate 1gm PO four times daily, LR IV @50ml/hr, protonix drip IV @ 8mg/hr, morphine 4mg IV PRN x 6, zofran 4mg IV PRN x1         general surgery   Assessment/Plan:   45 y.o. male with hematemesis secondary to bleeding varices       -transfuse per IM   -diet advancement per GI, ok once Hgb stable   -appreciate IM assistance   -management grossly per consultants   -no surgical interventions planned         IM NOTE       Impression:   1.  Acute upper GI bleed from esophageal varices   2.  History hepatitis C-untreated with underlying cirrhosis   3.  Severe normocytic anemia-secondary to acute GI blood loss   4.  Acute kidney injury secondary to acute hypotension secondary to hypovolemia from GI bleed-improved   5.  Coagulopathy secondary to underlying cirrhosis       Plan:   Patient admitted to general surgical floor by general surgery   Monitor heart rate, blood pressure, O2 saturation   Consult with GI for follow-up for hepatitis C and upper GI bleed   Protonix 40 mg IV push twice daily       Decrease Lactated Ringer's 75 cc an hour    Given 2 units of packed RBC 1/7 AM and 1 unit of packed RBC 1/7 PM   2 units fresh frozen plasma per general surgery   Accurate I's and O's H&H 2 PM today   CMP in a.m.    Hemoglobin A1c         GI NOTE           ASSESSMENT/PLAN:   1.  Gastrointestinal hemorrhage secondary to hematemesis   -S/P EGD on 1/6/2021 revealing 3 columns of large varices at the GE junction, with 6 bands placed and good collapse   -Elevate head of bed   -PPI drip x72 hours   -Octreotide drip x3 to 5 days   -Cipro IV BID x7 days   -Patient will require EGD in 2 weeks   -Okay for low-sodium diet           2.  Elevated LFTs   -Cholestatic   -Likely secondary to alcohol   -RUQ US revealed a cirrhotic liver and findings suggestive of portal hypertension.  No obstructive process noted   -Hepatic panel   -Daily CMP and INR           3.  Alcoholic cirrhosis with ascites   -Likely secondary to untreated chronic hepatitis C and alcohol abuse   -MELD-NA: 20   -MDF: 49.5 we will consider prednisone once infectious etiology have been excluded, pending hospital course   -No clinical signs of encephalopathy, asterixis, and sclera nonicteric   -AFP ordered and pending   -Right upper quadrant ultrasound unremarkable for any masses   -CT revealed mild ascites that is not appear amenable to a diagnostic paracentesis   -Discussed importance of hepatitis C treatment, patient states he has an appointment with another GI physician that PCP referred him to and he plans to follow-up to discuss treatment   -Reiterated the importance of complete alcohol cessation and continued abstinence, patient voiced understanding

## 2021-05-10 ENCOUNTER — ANESTHESIA (OUTPATIENT)
Dept: OPERATING ROOM | Age: 41
DRG: 280 | End: 2021-05-10
Payer: COMMERCIAL

## 2021-05-10 ENCOUNTER — ANESTHESIA EVENT (OUTPATIENT)
Dept: OPERATING ROOM | Age: 41
DRG: 280 | End: 2021-05-10
Payer: COMMERCIAL

## 2021-05-10 ENCOUNTER — HOSPITAL ENCOUNTER (INPATIENT)
Age: 41
LOS: 3 days | Discharge: HOME | DRG: 280 | End: 2021-05-13
Attending: EMERGENCY MEDICINE | Admitting: INTERNAL MEDICINE
Payer: COMMERCIAL

## 2021-05-10 ENCOUNTER — APPOINTMENT (OUTPATIENT)
Dept: GENERAL RADIOLOGY | Age: 41
DRG: 280 | End: 2021-05-10
Payer: COMMERCIAL

## 2021-05-10 VITALS
OXYGEN SATURATION: 97 % | RESPIRATION RATE: 3 BRPM | DIASTOLIC BLOOD PRESSURE: 54 MMHG | SYSTOLIC BLOOD PRESSURE: 113 MMHG | TEMPERATURE: 97.2 F

## 2021-05-10 DIAGNOSIS — K92.0 HEMATEMESIS WITH NAUSEA: Primary | ICD-10-CM

## 2021-05-10 DIAGNOSIS — D64.9 ANEMIA, UNSPECIFIED TYPE: ICD-10-CM

## 2021-05-10 DIAGNOSIS — R10.13 EPIGASTRIC PAIN: ICD-10-CM

## 2021-05-10 PROBLEM — K92.2 GIB (GASTROINTESTINAL BLEEDING): Status: ACTIVE | Noted: 2021-05-10

## 2021-05-10 LAB
ABO/RH: NORMAL
ALBUMIN SERPL-MCNC: 3.3 G/DL (ref 3.5–5.2)
ALP BLD-CCNC: 95 U/L (ref 40–129)
ALT SERPL-CCNC: 37 U/L (ref 0–40)
ANION GAP SERPL CALCULATED.3IONS-SCNC: 9 MMOL/L (ref 7–16)
ANTIBODY SCREEN: NORMAL
APTT: 35.8 SEC (ref 24.5–35.1)
AST SERPL-CCNC: 65 U/L (ref 0–39)
BASOPHILS ABSOLUTE: 0.04 E9/L (ref 0–0.2)
BASOPHILS RELATIVE PERCENT: 0.4 % (ref 0–2)
BILIRUB SERPL-MCNC: 1.7 MG/DL (ref 0–1.2)
BLOOD BANK DISPENSE STATUS: NORMAL
BLOOD BANK PRODUCT CODE: NORMAL
BPU ID: NORMAL
BUN BLDV-MCNC: 24 MG/DL (ref 6–20)
CALCIUM SERPL-MCNC: 8.7 MG/DL (ref 8.6–10.2)
CHLORIDE BLD-SCNC: 104 MMOL/L (ref 98–107)
CO2: 20 MMOL/L (ref 22–29)
CREAT SERPL-MCNC: 1.2 MG/DL (ref 0.7–1.2)
DESCRIPTION BLOOD BANK: NORMAL
EOSINOPHILS ABSOLUTE: 0.23 E9/L (ref 0.05–0.5)
EOSINOPHILS RELATIVE PERCENT: 2.1 % (ref 0–6)
GFR AFRICAN AMERICAN: >60
GFR NON-AFRICAN AMERICAN: >60 ML/MIN/1.73
GLUCOSE BLD-MCNC: 125 MG/DL (ref 74–99)
HCT VFR BLD CALC: 22.4 % (ref 37–54)
HEMOGLOBIN: 6.8 G/DL (ref 12.5–16.5)
IMMATURE GRANULOCYTES #: 0.08 E9/L
IMMATURE GRANULOCYTES %: 0.7 % (ref 0–5)
INR BLD: 2
LIPASE: 43 U/L (ref 13–60)
LYMPHOCYTES ABSOLUTE: 1.12 E9/L (ref 1.5–4)
LYMPHOCYTES RELATIVE PERCENT: 10.1 % (ref 20–42)
MCH RBC QN AUTO: 23.8 PG (ref 26–35)
MCHC RBC AUTO-ENTMCNC: 30.4 % (ref 32–34.5)
MCV RBC AUTO: 78.3 FL (ref 80–99.9)
MONOCYTES ABSOLUTE: 0.79 E9/L (ref 0.1–0.95)
MONOCYTES RELATIVE PERCENT: 7.1 % (ref 2–12)
NEUTROPHILS ABSOLUTE: 8.83 E9/L (ref 1.8–7.3)
NEUTROPHILS RELATIVE PERCENT: 79.6 % (ref 43–80)
PDW BLD-RTO: 18.7 FL (ref 11.5–15)
PLATELET # BLD: 144 E9/L (ref 130–450)
PMV BLD AUTO: 10 FL (ref 7–12)
POTASSIUM REFLEX MAGNESIUM: 4.5 MMOL/L (ref 3.5–5)
PROTHROMBIN TIME: 23.4 SEC (ref 9.3–12.4)
RBC # BLD: 2.86 E12/L (ref 3.8–5.8)
SODIUM BLD-SCNC: 133 MMOL/L (ref 132–146)
TOTAL PROTEIN: 6.6 G/DL (ref 6.4–8.3)
WBC # BLD: 11.1 E9/L (ref 4.5–11.5)

## 2021-05-10 PROCEDURE — 86850 RBC ANTIBODY SCREEN: CPT

## 2021-05-10 PROCEDURE — 3600007503: Performed by: INTERNAL MEDICINE

## 2021-05-10 PROCEDURE — 96375 TX/PRO/DX INJ NEW DRUG ADDON: CPT

## 2021-05-10 PROCEDURE — 6360000002 HC RX W HCPCS: Performed by: NURSE ANESTHETIST, CERTIFIED REGISTERED

## 2021-05-10 PROCEDURE — 99284 EMERGENCY DEPT VISIT MOD MDM: CPT

## 2021-05-10 PROCEDURE — 2500000003 HC RX 250 WO HCPCS: Performed by: NURSE ANESTHETIST, CERTIFIED REGISTERED

## 2021-05-10 PROCEDURE — 6360000002 HC RX W HCPCS

## 2021-05-10 PROCEDURE — 80053 COMPREHEN METABOLIC PANEL: CPT

## 2021-05-10 PROCEDURE — 96374 THER/PROPH/DIAG INJ IV PUSH: CPT

## 2021-05-10 PROCEDURE — 7100000001 HC PACU RECOVERY - ADDTL 15 MIN: Performed by: INTERNAL MEDICINE

## 2021-05-10 PROCEDURE — 71045 X-RAY EXAM CHEST 1 VIEW: CPT

## 2021-05-10 PROCEDURE — 2709999900 HC NON-CHARGEABLE SUPPLY: Performed by: INTERNAL MEDICINE

## 2021-05-10 PROCEDURE — 36430 TRANSFUSION BLD/BLD COMPNT: CPT

## 2021-05-10 PROCEDURE — 06L38CZ OCCLUSION OF ESOPHAGEAL VEIN WITH EXTRALUMINAL DEVICE, VIA NATURAL OR ARTIFICIAL OPENING ENDOSCOPIC: ICD-10-PCS | Performed by: INTERNAL MEDICINE

## 2021-05-10 PROCEDURE — 2580000003 HC RX 258: Performed by: INTERNAL MEDICINE

## 2021-05-10 PROCEDURE — P9059 PLASMA, FRZ BETWEEN 8-24HOUR: HCPCS

## 2021-05-10 PROCEDURE — 85025 COMPLETE CBC W/AUTO DIFF WBC: CPT

## 2021-05-10 PROCEDURE — 1200000000 HC SEMI PRIVATE

## 2021-05-10 PROCEDURE — 3700000000 HC ANESTHESIA ATTENDED CARE: Performed by: INTERNAL MEDICINE

## 2021-05-10 PROCEDURE — 86900 BLOOD TYPING SEROLOGIC ABO: CPT

## 2021-05-10 PROCEDURE — P9016 RBC LEUKOCYTES REDUCED: HCPCS

## 2021-05-10 PROCEDURE — 2580000003 HC RX 258: Performed by: STUDENT IN AN ORGANIZED HEALTH CARE EDUCATION/TRAINING PROGRAM

## 2021-05-10 PROCEDURE — 2580000003 HC RX 258: Performed by: NURSE ANESTHETIST, CERTIFIED REGISTERED

## 2021-05-10 PROCEDURE — 83690 ASSAY OF LIPASE: CPT

## 2021-05-10 PROCEDURE — 85610 PROTHROMBIN TIME: CPT

## 2021-05-10 PROCEDURE — 7100000000 HC PACU RECOVERY - FIRST 15 MIN: Performed by: INTERNAL MEDICINE

## 2021-05-10 PROCEDURE — 6360000002 HC RX W HCPCS: Performed by: STUDENT IN AN ORGANIZED HEALTH CARE EDUCATION/TRAINING PROGRAM

## 2021-05-10 PROCEDURE — 3600007513: Performed by: INTERNAL MEDICINE

## 2021-05-10 PROCEDURE — 6360000002 HC RX W HCPCS: Performed by: INTERNAL MEDICINE

## 2021-05-10 PROCEDURE — 3700000001 HC ADD 15 MINUTES (ANESTHESIA): Performed by: INTERNAL MEDICINE

## 2021-05-10 PROCEDURE — 86923 COMPATIBILITY TEST ELECTRIC: CPT

## 2021-05-10 PROCEDURE — C9113 INJ PANTOPRAZOLE SODIUM, VIA: HCPCS | Performed by: STUDENT IN AN ORGANIZED HEALTH CARE EDUCATION/TRAINING PROGRAM

## 2021-05-10 PROCEDURE — 85730 THROMBOPLASTIN TIME PARTIAL: CPT

## 2021-05-10 PROCEDURE — 86901 BLOOD TYPING SEROLOGIC RH(D): CPT

## 2021-05-10 PROCEDURE — C9113 INJ PANTOPRAZOLE SODIUM, VIA: HCPCS | Performed by: INTERNAL MEDICINE

## 2021-05-10 RX ORDER — SODIUM CHLORIDE 9 MG/ML
INJECTION, SOLUTION INTRAVENOUS PRN
Status: DISCONTINUED | OUTPATIENT
Start: 2021-05-10 | End: 2021-05-13 | Stop reason: HOSPADM

## 2021-05-10 RX ORDER — DEXAMETHASONE SODIUM PHOSPHATE 10 MG/ML
INJECTION, SOLUTION INTRAMUSCULAR; INTRAVENOUS PRN
Status: DISCONTINUED | OUTPATIENT
Start: 2021-05-10 | End: 2021-05-10 | Stop reason: SDUPTHER

## 2021-05-10 RX ORDER — MEPERIDINE HYDROCHLORIDE 25 MG/ML
INJECTION INTRAMUSCULAR; INTRAVENOUS; SUBCUTANEOUS
Status: COMPLETED
Start: 2021-05-10 | End: 2021-05-10

## 2021-05-10 RX ORDER — LIDOCAINE HYDROCHLORIDE 20 MG/ML
INJECTION, SOLUTION INTRAVENOUS PRN
Status: DISCONTINUED | OUTPATIENT
Start: 2021-05-10 | End: 2021-05-10 | Stop reason: SDUPTHER

## 2021-05-10 RX ORDER — SODIUM CHLORIDE 9 MG/ML
INJECTION, SOLUTION INTRAVENOUS CONTINUOUS PRN
Status: DISCONTINUED | OUTPATIENT
Start: 2021-05-10 | End: 2021-05-10 | Stop reason: SDUPTHER

## 2021-05-10 RX ORDER — SUCCINYLCHOLINE/SOD CL,ISO/PF 200MG/10ML
SYRINGE (ML) INTRAVENOUS PRN
Status: DISCONTINUED | OUTPATIENT
Start: 2021-05-10 | End: 2021-05-10 | Stop reason: SDUPTHER

## 2021-05-10 RX ORDER — MIDAZOLAM HYDROCHLORIDE 1 MG/ML
INJECTION INTRAMUSCULAR; INTRAVENOUS PRN
Status: DISCONTINUED | OUTPATIENT
Start: 2021-05-10 | End: 2021-05-10 | Stop reason: SDUPTHER

## 2021-05-10 RX ORDER — SODIUM CHLORIDE, SODIUM LACTATE, POTASSIUM CHLORIDE, CALCIUM CHLORIDE 600; 310; 30; 20 MG/100ML; MG/100ML; MG/100ML; MG/100ML
INJECTION, SOLUTION INTRAVENOUS CONTINUOUS
Status: DISCONTINUED | OUTPATIENT
Start: 2021-05-10 | End: 2021-05-13

## 2021-05-10 RX ORDER — 0.9 % SODIUM CHLORIDE 0.9 %
1000 INTRAVENOUS SOLUTION INTRAVENOUS ONCE
Status: COMPLETED | OUTPATIENT
Start: 2021-05-10 | End: 2021-05-10

## 2021-05-10 RX ORDER — PROPOFOL 10 MG/ML
INJECTION, EMULSION INTRAVENOUS PRN
Status: DISCONTINUED | OUTPATIENT
Start: 2021-05-10 | End: 2021-05-10 | Stop reason: SDUPTHER

## 2021-05-10 RX ORDER — PANTOPRAZOLE SODIUM 40 MG/10ML
40 INJECTION, POWDER, LYOPHILIZED, FOR SOLUTION INTRAVENOUS DAILY
Status: DISCONTINUED | OUTPATIENT
Start: 2021-05-10 | End: 2021-05-13

## 2021-05-10 RX ORDER — OCTREOTIDE ACETATE 50 UG/ML
50 INJECTION, SOLUTION INTRAVENOUS; SUBCUTANEOUS ONCE
Status: COMPLETED | OUTPATIENT
Start: 2021-05-10 | End: 2021-05-10

## 2021-05-10 RX ORDER — FENTANYL CITRATE 50 UG/ML
75 INJECTION, SOLUTION INTRAMUSCULAR; INTRAVENOUS ONCE
Status: COMPLETED | OUTPATIENT
Start: 2021-05-10 | End: 2021-05-10

## 2021-05-10 RX ORDER — CIPROFLOXACIN 2 MG/ML
400 INJECTION, SOLUTION INTRAVENOUS ONCE
Status: COMPLETED | OUTPATIENT
Start: 2021-05-10 | End: 2021-05-10

## 2021-05-10 RX ORDER — MEPERIDINE HYDROCHLORIDE 25 MG/ML
25 INJECTION INTRAMUSCULAR; INTRAVENOUS; SUBCUTANEOUS EVERY 5 MIN PRN
Status: DISCONTINUED | OUTPATIENT
Start: 2021-05-10 | End: 2021-05-11

## 2021-05-10 RX ORDER — ONDANSETRON 2 MG/ML
4 INJECTION INTRAMUSCULAR; INTRAVENOUS EVERY 6 HOURS PRN
Status: DISCONTINUED | OUTPATIENT
Start: 2021-05-10 | End: 2021-05-13 | Stop reason: HOSPADM

## 2021-05-10 RX ORDER — ONDANSETRON 2 MG/ML
8 INJECTION INTRAMUSCULAR; INTRAVENOUS ONCE
Status: COMPLETED | OUTPATIENT
Start: 2021-05-10 | End: 2021-05-10

## 2021-05-10 RX ORDER — FENTANYL CITRATE 50 UG/ML
INJECTION, SOLUTION INTRAMUSCULAR; INTRAVENOUS PRN
Status: DISCONTINUED | OUTPATIENT
Start: 2021-05-10 | End: 2021-05-10 | Stop reason: SDUPTHER

## 2021-05-10 RX ORDER — CIPROFLOXACIN 2 MG/ML
INJECTION, SOLUTION INTRAVENOUS
Status: COMPLETED
Start: 2021-05-10 | End: 2021-05-10

## 2021-05-10 RX ORDER — ONDANSETRON 2 MG/ML
INJECTION INTRAMUSCULAR; INTRAVENOUS PRN
Status: DISCONTINUED | OUTPATIENT
Start: 2021-05-10 | End: 2021-05-10 | Stop reason: SDUPTHER

## 2021-05-10 RX ADMIN — DEXAMETHASONE SODIUM PHOSPHATE 10 MG: 10 INJECTION, SOLUTION INTRAMUSCULAR; INTRAVENOUS at 18:10

## 2021-05-10 RX ADMIN — MEPERIDINE HYDROCHLORIDE 25 MG: 25 INJECTION INTRAMUSCULAR; INTRAVENOUS; SUBCUTANEOUS at 19:25

## 2021-05-10 RX ADMIN — MIDAZOLAM 2 MG: 1 INJECTION INTRAMUSCULAR; INTRAVENOUS at 17:55

## 2021-05-10 RX ADMIN — PANTOPRAZOLE SODIUM 40 MG: 40 INJECTION, POWDER, FOR SOLUTION INTRAVENOUS at 21:38

## 2021-05-10 RX ADMIN — PANTOPRAZOLE SODIUM 80 MG: 40 INJECTION, POWDER, FOR SOLUTION INTRAVENOUS at 16:37

## 2021-05-10 RX ADMIN — SODIUM CHLORIDE, POTASSIUM CHLORIDE, SODIUM LACTATE AND CALCIUM CHLORIDE: 600; 310; 30; 20 INJECTION, SOLUTION INTRAVENOUS at 20:26

## 2021-05-10 RX ADMIN — MEPERIDINE HYDROCHLORIDE 25 MG: 25 INJECTION INTRAMUSCULAR; INTRAVENOUS; SUBCUTANEOUS at 18:37

## 2021-05-10 RX ADMIN — SODIUM CHLORIDE: 9 INJECTION, SOLUTION INTRAVENOUS at 17:55

## 2021-05-10 RX ADMIN — OCTREOTIDE ACETATE 50 MCG/HR: 500 INJECTION, SOLUTION INTRAVENOUS; SUBCUTANEOUS at 16:37

## 2021-05-10 RX ADMIN — PROPOFOL 150 MG: 10 INJECTION, EMULSION INTRAVENOUS at 18:00

## 2021-05-10 RX ADMIN — PROPOFOL 50 MG: 10 INJECTION, EMULSION INTRAVENOUS at 18:11

## 2021-05-10 RX ADMIN — PHENYLEPHRINE HYDROCHLORIDE 200 MCG: 10 INJECTION INTRAVENOUS at 18:11

## 2021-05-10 RX ADMIN — LIDOCAINE HYDROCHLORIDE 100 MG: 20 INJECTION, SOLUTION INTRAVENOUS at 18:00

## 2021-05-10 RX ADMIN — CIPROFLOXACIN 400 MG: 2 INJECTION, SOLUTION INTRAVENOUS at 18:46

## 2021-05-10 RX ADMIN — OCTREOTIDE ACETATE 50 MCG: 50 INJECTION, SOLUTION INTRAVENOUS; SUBCUTANEOUS at 16:37

## 2021-05-10 RX ADMIN — FENTANYL CITRATE 75 MCG: 50 INJECTION, SOLUTION INTRAMUSCULAR; INTRAVENOUS at 16:35

## 2021-05-10 RX ADMIN — FENTANYL CITRATE 100 MCG: 50 INJECTION, SOLUTION INTRAMUSCULAR; INTRAVENOUS at 18:07

## 2021-05-10 RX ADMIN — ONDANSETRON 8 MG: 2 INJECTION INTRAMUSCULAR; INTRAVENOUS at 16:55

## 2021-05-10 RX ADMIN — SODIUM CHLORIDE 1000 ML: 9 INJECTION, SOLUTION INTRAVENOUS at 16:22

## 2021-05-10 RX ADMIN — Medication 160 MG: at 18:00

## 2021-05-10 RX ADMIN — ONDANSETRON 4 MG: 2 INJECTION INTRAMUSCULAR; INTRAVENOUS at 18:10

## 2021-05-10 ASSESSMENT — ENCOUNTER SYMPTOMS
VOMITING: 1
SHORTNESS OF BREATH: 0
WHEEZING: 0
EYE DISCHARGE: 0
ABDOMINAL PAIN: 0
EYE PAIN: 0
EYE REDNESS: 0
SINUS PRESSURE: 0
DIARRHEA: 0
BACK PAIN: 0
SORE THROAT: 0
BLOOD IN STOOL: 1
NAUSEA: 0
COUGH: 0

## 2021-05-10 ASSESSMENT — PULMONARY FUNCTION TESTS
PIF_VALUE: 19
PIF_VALUE: 0
PIF_VALUE: 2
PIF_VALUE: 15
PIF_VALUE: 16
PIF_VALUE: 16
PIF_VALUE: 7
PIF_VALUE: 22
PIF_VALUE: 11
PIF_VALUE: 2
PIF_VALUE: 2
PIF_VALUE: 1
PIF_VALUE: 3
PIF_VALUE: 18

## 2021-05-10 ASSESSMENT — PAIN SCALES - GENERAL
PAINLEVEL_OUTOF10: 8
PAINLEVEL_OUTOF10: 8

## 2021-05-10 ASSESSMENT — PAIN DESCRIPTION - PAIN TYPE: TYPE: ACUTE PAIN

## 2021-05-10 ASSESSMENT — PAIN DESCRIPTION - PROGRESSION: CLINICAL_PROGRESSION: GRADUALLY WORSENING

## 2021-05-10 NOTE — ED PROVIDER NOTES
HPI     Patient is a 37 y.o. Othel Avers a past medical history of hepatitis C, esophageal varices, abdominal pain who presents with a chief complaint of hematemesis  This has been occurring since last night. Patient states that it gets better with nothing. Patient states that it gets worse with nothing. Patient states that it is severe in severity. Patient states that last night he was having some emesis and diarrhea but denies any blood. Patient states that this morning he started having episodes of hematemesis as well as dark tarry stool. Patient states that he started on multiple times. Patient has had esophageal banding multiple times in the past.  Last banding was in February. Patient states that he feels significantly fatigued and feels like one of the bands has ruptured. Patient denies use of any blood thinners. Patient denies any fevers, chills, chest pain, shortness of breath, changes in urinary habits. Review of Systems   Constitutional: Negative for chills and fever. HENT: Negative for ear pain, sinus pressure and sore throat. Eyes: Negative for pain, discharge and redness. Respiratory: Negative for cough, shortness of breath and wheezing. Cardiovascular: Negative for chest pain. Gastrointestinal: Positive for blood in stool and vomiting. Negative for abdominal pain, diarrhea and nausea. Genitourinary: Negative for dysuria, enuresis and frequency. Musculoskeletal: Negative for arthralgias and back pain. Skin: Negative for rash and wound. Neurological: Negative for weakness, light-headedness and headaches. Hematological: Negative for adenopathy. Psychiatric/Behavioral: The patient is not nervous/anxious and is not hyperactive. All other systems reviewed and are negative. Physical Exam  Vitals signs and nursing note reviewed. Constitutional:       General: He is not in acute distress. Appearance: Normal appearance. He is well-developed.  He is ill-appearing. HENT:      Head: Normocephalic and atraumatic. Nose: Nose normal. No congestion or rhinorrhea. Mouth/Throat:      Pharynx: No oropharyngeal exudate or posterior oropharyngeal erythema. Eyes:      Conjunctiva/sclera: Conjunctivae normal.   Neck:      Musculoskeletal: Normal range of motion and neck supple. Cardiovascular:      Rate and Rhythm: Normal rate and regular rhythm. Heart sounds: Normal heart sounds. No murmur. Pulmonary:      Effort: Pulmonary effort is normal. No respiratory distress. Breath sounds: Normal breath sounds. No wheezing or rales. Abdominal:      General: Bowel sounds are normal.      Palpations: Abdomen is soft. Tenderness: There is no abdominal tenderness. There is no guarding or rebound. Comments: Mild epigastric pain to palpation. Musculoskeletal:         General: No tenderness or deformity. Skin:     General: Skin is warm and dry. Neurological:      Mental Status: He is alert and oriented to person, place, and time. Cranial Nerves: No cranial nerve deficit. Coordination: Coordination normal.          Procedures     [facility]     ED Course as of May 11 0048   Mon May 10, 2021   1653 Actively vomiting this time. Patient will be given Zofran. [JM]   3230 Discussed case with gastroenterology who was made aware of patient. [JM]   0247 Patient reevaluated and stated that he still feels sick to his stomach. Patient was informed that gastroenterology was informed of his situation. [JM]      ED Course User Index  [JM] Tessy Abdi MD      Patient is a 37 y.o. male presenting with hematemesis. Patient has known esophageal varices secondary to hepatitis C. Patient states that he has not drank alcohol since January. Patient notes that starting yesterday he had multiple episodes of nausea and vomiting. Patient also is endorsing some dark tarry stool.   Patient states that this is what feels like when he needs to have patients home medications have been reviewed.     Allergies: Pcn [penicillins]    -------------------------------------------------- RESULTS -------------------------------------------------    LABS:  Results for orders placed or performed during the hospital encounter of 05/10/21   CBC Auto Differential   Result Value Ref Range    WBC 11.1 4.5 - 11.5 E9/L    RBC 2.86 (L) 3.80 - 5.80 E12/L    Hemoglobin 6.8 (LL) 12.5 - 16.5 g/dL    Hematocrit 22.4 (L) 37.0 - 54.0 %    MCV 78.3 (L) 80.0 - 99.9 fL    MCH 23.8 (L) 26.0 - 35.0 pg    MCHC 30.4 (L) 32.0 - 34.5 %    RDW 18.7 (H) 11.5 - 15.0 fL    Platelets 282 174 - 750 E9/L    MPV 10.0 7.0 - 12.0 fL    Neutrophils % 79.6 43.0 - 80.0 %    Immature Granulocytes % 0.7 0.0 - 5.0 %    Lymphocytes % 10.1 (L) 20.0 - 42.0 %    Monocytes % 7.1 2.0 - 12.0 %    Eosinophils % 2.1 0.0 - 6.0 %    Basophils % 0.4 0.0 - 2.0 %    Neutrophils Absolute 8.83 (H) 1.80 - 7.30 E9/L    Immature Granulocytes # 0.08 E9/L    Lymphocytes Absolute 1.12 (L) 1.50 - 4.00 E9/L    Monocytes Absolute 0.79 0.10 - 0.95 E9/L    Eosinophils Absolute 0.23 0.05 - 0.50 E9/L    Basophils Absolute 0.04 0.00 - 0.20 E9/L   Comprehensive Metabolic Panel w/ Reflex to MG   Result Value Ref Range    Sodium 133 132 - 146 mmol/L    Potassium reflex Magnesium 4.5 3.5 - 5.0 mmol/L    Chloride 104 98 - 107 mmol/L    CO2 20 (L) 22 - 29 mmol/L    Anion Gap 9 7 - 16 mmol/L    Glucose 125 (H) 74 - 99 mg/dL    BUN 24 (H) 6 - 20 mg/dL    CREATININE 1.2 0.7 - 1.2 mg/dL    GFR Non-African American >60 >=60 mL/min/1.73    GFR African American >60     Calcium 8.7 8.6 - 10.2 mg/dL    Total Protein 6.6 6.4 - 8.3 g/dL    Albumin 3.3 (L) 3.5 - 5.2 g/dL    Total Bilirubin 1.7 (H) 0.0 - 1.2 mg/dL    Alkaline Phosphatase 95 40 - 129 U/L    ALT 37 0 - 40 U/L    AST 65 (H) 0 - 39 U/L   Lipase   Result Value Ref Range    Lipase 43 13 - 60 U/L   Protime-INR   Result Value Ref Range    Protime 23.4 (H) 9.3 - 12.4 sec    INR 2.0    APTT   Result Value Ref Range    aPTT 35.8 (H) 24.5 - 35.1 sec   TYPE AND SCREEN   Result Value Ref Range    ABO/Rh A POS     Antibody Screen NEG    PREPARE RBC (CROSSMATCH), 2 Units   Result Value Ref Range    Product Code Blood Bank [id number]     Description Blood Bank Red Blood Cells, Leuko-reduced     Unit Number [id number]     Dispense Status Blood Bank transfused     Product Code Blood Bank [id number]     Description Blood Bank Red Blood Cells, Leuko-reduced     Unit Number [id number]     Dispense Status Blood Bank transfused    PREPARE FRESH FROZEN PLASMA, 1 Units   Result Value Ref Range    Product Code Blood Bank [id number]     Description Blood Bank Plasma, 5 Day, Thawed     Unit Number [id number]     Dispense Status Blood Bank transfused        RADIOLOGY:  XR CHEST PORTABLE   Final Result   Nonacute chest.      There is no free air under the diaphragm               ------------------------- NURSING NOTES AND VITALS REVIEWED ---------------------------  Date / Time Roomed:  5/10/2021  3:53 PM  ED Bed Assignment:  [id number]/0315-01    The nursing notes within the ED encounter and vital signs as below have been reviewed.      Patient Vitals for the past 24 hrs:   BP Temp Temp src Pulse Resp SpO2 Height Weight   05/11/21 0021 123/69 98 °F (36.7 °C) Oral 99 16 98 % -- --   05/10/21 2359 117/69 98 °F (36.7 °C) Oral 88 17 99 % -- --   05/10/21 2346 111/79 98.3 °F (36.8 °C) Oral 93 16 99 % -- --   05/10/21 2157 (!) 117/56 98.7 °F (37.1 °C) Oral 98 16 96 % -- --   05/10/21 2134 121/66 98.1 °F (36.7 °C) Oral 100 18 98 % -- --   05/10/21 2023 (!) 140/63 97.9 °F (36.6 °C) Oral 98 16 98 % -- --   05/10/21 2005 -- -- -- 100 12 96 % -- --   05/10/21 2001 126/84 -- -- 99 14 97 % -- --   05/10/21 1951 120/84 -- -- 99 14 97 % -- --   05/10/21 1941 128/82 -- -- 105 17 96 % -- --   05/10/21 1939 -- -- -- 101 16 97 % -- --   05/10/21 1931 129/84 -- -- 101 15 98 % -- --   05/10/21 1930 129/84 98.2 °F (36.8 °C) Infrared 103 15 98 % -- -- 05/10/21 1921 125/83 -- -- 99 17 99 % -- --   05/10/21 1911 122/73 -- -- 101 13 98 % -- --   05/10/21 1901 128/79 -- -- 103 15 97 % -- --   05/10/21 1851 128/78 98.3 °F (36.8 °C) Infrared 103 18 100 % -- --   05/10/21 1841 139/76 -- -- 106 16 100 % -- --   05/10/21 1839 -- -- -- 106 19 100 % -- --   05/10/21 1831 130/67 -- -- 106 19 100 % -- --   05/10/21 1823 121/62 98.1 °F (36.7 °C) Infrared 109 20 100 % -- --   05/10/21 1741 (!) 108/48 98.3 °F (36.8 °C) -- 101 16 -- -- --   05/10/21 1710 (!) 97/50 -- -- 101 18 100 % -- --   05/10/21 1658 (!) 97/58 -- -- 106 18 100 % -- --   05/10/21 1551 (!) 95/53 -- -- 105 18 100 % 5' 9\" (1.753 m) 185 lb (83.9 kg)   05/10/21 1550 -- 98.2 °F (36.8 °C) -- -- -- -- -- --       Oxygen Saturation Interpretation: Normal    ------------------------------------------ PROGRESS NOTES ------------------------------------------  Re-evaluation(s):  Time: see ed course  Patients symptoms are improving  Repeat physical examination is improved    Counseling:  I have spoken with the patient and discussed todays results, in addition to providing specific details for the plan of care and counseling regarding the diagnosis and prognosis. Their questions are answered at this time and they are agreeable with the plan of admission.    --------------------------------- ADDITIONAL PROVIDER NOTES ---------------------------------  Consultations:  Spoke with Dr. Tatiana Zuñiga. Discussed case. They will admit the patient. This patient's ED course included: a personal history and physicial examination, re-evaluation prior to disposition, multiple bedside re-evaluations, IV medications, cardiac monitoring and continuous pulse oximetry    This patient has remained hemodynamically stable during their ED course. Diagnosis:  1. Hematemesis with nausea    2. Epigastric pain    3. Anemia, unspecified type        Disposition:  Patient's disposition: Admit to telemetry  Patient's condition is stable. Diana Almazan MD  Resident  05/11/21 1970

## 2021-05-10 NOTE — H&P
Immediately prior to the procedure the patient's History and Physical was reviewed- there are no changes with the current vitals. Blood pressure (!) 108/48, pulse 101, temperature 98.3 °F (36.8 °C), resp. rate 16, height 5' 9\" (1.753 m), weight 185 lb (83.9 kg), SpO2 100 %.

## 2021-05-10 NOTE — ANESTHESIA PRE PROCEDURE
Department of Anesthesiology  Preprocedure Note       Name:  Trey Chang III   Age:  37 y.o.  :  1980                                          MRN:  [id number]         Date:  5/10/2021      Surgeon: Luis Madrid):  Derek Melissa MD    Procedure: Procedure(s):  EGD ESOPHAGOGASTRODUODENOSCOPY    Medications prior to admission:   Prior to Admission medications    Not on File       Current medications:    Current Facility-Administered Medications   Medication Dose Route Frequency Provider Last Rate Last Admin    octreotide (SANDOSTATIN) 500 mcg in sodium chloride 0.9 % 100 mL infusion  50 mcg/hr Intravenous Continuous Gustavo Wood MD 10 mL/hr at 05/10/21 1637 50 mcg/hr at 05/10/21 1637    0.9 % sodium chloride infusion   Intravenous PRN Gustavo Wood MD   Stopped at 05/10/21 1711     No current outpatient medications on file. Allergies:     Allergies   Allergen Reactions    Pcn [Penicillins] Anaphylaxis       Problem List:    Patient Active Problem List   Diagnosis Code    Cellulitis L03.90    UGI bleed K92.2    Abdominal pain R10.9    Secondary esophageal varices with bleeding (HCC) I85.11    GIB (gastrointestinal bleeding) K92.2       Past Medical History:        Diagnosis Date    Esophageal varices ([address]        Past Surgical History:        Procedure Laterality Date    TONSILLECTOMY      UPPER GASTROINTESTINAL ENDOSCOPY N/A 2020    EGD BIOPSY performed by Charlene Nicole MD at [address] [place] [address] N/A 2021    EGD BAND LIGATION performed by Derek Melissa MD at [facility] History:    Social History     Tobacco Use    Smoking status: Former Smoker     Packs/day: 0.50    Smokeless tobacco: Never Used   Substance Use Topics    Alcohol use: Yes     Comment: occasional                                Counseling given: Not Answered      Vital Signs (Current):   Vitals:    05/10/21 1550 05/10/21 1551 05/10/21 1658 05/10/21 1710 BP:  (!) 95/53 (!) 97/58 (!) 97/50   Pulse:  105 106 101   Resp:  18 18 18   Temp: 98.2 °F (36.8 °C)      SpO2:  100% 100% 100%   Weight:  185 lb (83.9 kg)     Height:  5' 9\" (1.753 m)                                                BP Readings from Last 3 Encounters:   05/10/21 (!) 97/50   01/11/21 (!) 95/51   01/06/21 (!) 89/59       NPO Status:                                                                                 BMI:   Wt Readings from Last 3 Encounters:   05/10/21 185 lb (83.9 kg)   01/06/21 196 lb (88.9 kg)   11/12/20 195 lb (88.5 kg)     Body mass index is 27.32 kg/m². CBC:   Lab Results   Component Value Date    WBC 11.1 05/10/2021    RBC 2.86 05/10/2021    HGB 6.8 05/10/2021    HCT 22.4 05/10/2021    MCV 78.3 05/10/2021    RDW 18.7 05/10/2021     05/10/2021       CMP:   Lab Results   Component Value Date     05/10/2021    K 4.5 05/10/2021     05/10/2021    CO2 20 05/10/2021    BUN 24 05/10/2021    CREATININE 1.2 05/10/2021    GFRAA >60 05/10/2021    LABGLOM >60 05/10/2021    GLUCOSE 125 05/10/2021    GLUCOSE 89 02/20/2012    PROT 6.6 05/10/2021    CALCIUM 8.7 05/10/2021    BILITOT 1.7 05/10/2021    ALKPHOS 95 05/10/2021    AST 65 05/10/2021    ALT 37 05/10/2021       POC Tests: No results for input(s): POCGLU, POCNA, POCK, POCCL, POCBUN, POCHEMO, POCHCT in the last 72 hours.     Coags:   Lab Results   Component Value Date    PROTIME 23.4 05/10/2021    PROTIME 12.3 02/17/2012    INR 2.0 05/10/2021    APTT 35.8 05/10/2021       HCG (If Applicable): No results found for: PREGTESTUR, PREGSERUM, HCG, HCGQUANT     ABGs: No results found for: PHART, PO2ART, FNR7ZGL, ODK4NLW, BEART, H7TIAGDK     Type & Screen (If Applicable):  No results found for: LABABO, LABRH    Drug/Infectious Status (If Applicable):  No results found for: HIV, HEPCAB    COVID-19 Screening (If Applicable): No results found for: COVID19        Anesthesia Evaluation  Patient summary reviewed  Airway: Mallampati: III  TM distance: <3 FB   Neck ROM: limited  Mouth opening: < 3 FB Dental:          Pulmonary:Negative Pulmonary ROS breath sounds clear to auscultation                            ROS comment: Former Smoker. Cardiovascular:Negative CV ROS            Rhythm: regular  Rate: normal                    Neuro/Psych:   Negative Neuro/Psych ROS              GI/Hepatic/Renal:   (+) hepatitis: C, liver disease (Cirrhosis.):,          ROS comment: Possible ETOH abuse. .   Endo/Other: Negative Endo/Other ROS                    Abdominal:           Vascular: negative vascular ROS. Anesthesia Plan      general     ASA 3 - emergent       Induction: rapid sequence and intravenous. Anesthetic plan and risks discussed with patient. Plan discussed with CRNA.     Attending anesthesiologist reviewed and agrees with Cristine Arana MD   5/10/2021

## 2021-05-10 NOTE — CONSULTS
MEDICATIONS:  Prior to Admission medications    Not on File         ALLERGIES:  Allergies   Allergen Reactions    Pcn [Penicillins] Anaphylaxis          SOCIAL Hx:  Social History     Tobacco History     Smoking Status  Former Smoker Smoking Frequency  0.5 packs/day    Smokeless Tobacco Use  Never Used          Alcohol History     Alcohol Use Status  Yes Comment  occasional          Drug Use     Drug Use Status  Not Asked Comment  used drugs in past          Sexual Activity     Sexually Active  Not Asked                 PE:  BP (!) 140/77   Pulse 50   Temp 98.6 °F (37 °C) (Oral)   Resp 14   Ht 5' 2\" (1.575 m)   Wt 95 lb (43.1 kg)   SpO2 95%   BMI 17.38 kg/m²      General: A&Ox 3, friendly, NAD  HEENT: Atraumatic, symmetric, no anterior/posterior lymphadenopathy, moist mucous membranes, PERRL, EOM intact, fair dentition. Pulm: CTAB, Neg w/r/r, normal chest expansion, no crackles noted  Cardio: RRR, neg m/r/g, nl S1 and S2, no extra heart sounds  Abd.: soft, NT, ND, BS+, no G/R, no HSM  Ext: +2/4 pulse Dp and radial b/l, LE and UE ROM intact,   Skin: No lesions, excoriations, petechiae, or ecchymoses noted    Neuro: normal sensation throughout, DTRs patellar, tricept, and bicept 2/4 b/l and equal, normal muscle strength throughout. DATA:  Lab Results   Component Value Date/Time    INR 2.0 05/10/2021 04:10 PM               ASSESSMENT/PLAN:  1. Variceal bleed  Plan for emergent endoscopy in the OR. Continue octreotide and volume support  Further recommendations to follow    2. Hepatitis C  We will review office chart for hepatitis C status regarding treatment    3.   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  Imaging studies to be completed as well      Z Melecio Lung  6:29 PM  [unfilled]

## 2021-05-10 NOTE — PROCEDURES
Procedure:  Esophagogastroduodenoscopy with banding of esophageal varices    Indication: Active hematemesis, known esophageal varices    Sedation  General anesthesia, intubated in OR    Endoscope was advanced easily through mouth to second portion of duodenum      Oropharynx views are limited but grossly normal.    Esophagus:   Mucosa is normal.  3 columns of esophageal varices. 1 grade 3 varix with a red rodolfo marking. Two  grade 2 varix also seen. Varices between 40 and 35 cm from the incisors. A total of 6 bands were placed with collapse of all varices. Bleeding stopped. Stomach:   Antrum is normal    Gastric body is normal.    Retroflexed views shows no gastric varices    Duodenum: Bulb is normal.    Second portion of duodenum is normal.    EBL: Between 1 to 2 cc    IMPRESSION AND PLAN:     1. Bleeding esophageal varices; a single grade 3 varix with stigmata recent bleeding and two grade 2 varices with no stigmata recent bleeding. 6 bands placed with cessation of bleeding. 2.  No gastric varices  3. Normal bulb and proximal small bowel    We will continue octreotide drip. Volume support. Monitor coags. Will give FFP.  N.p.o. for now. Repeat endoscopy 2 weeks as an outpatient. Dose of antibiotics if not given in ER      Follow up as outpatient in office, call [phone] to schedule for appointment.       JOIE Lombardi MD  5/10/2021  6:22 PM

## 2021-05-10 NOTE — H&P
Department of Internal Medicine        CHIEF COMPLAINT: Hematemesis    Reason for Admission: Upper GI bleed    HISTORY OF PRESENT ILLNESS:      The patient is a 37 y.o. male who presents with acute onset of nausea vomiting with hematemesis starting this morning. Patient then had some dark tarry stools. Patient stated he ate some guacamole last night that his wife made and he usually does not eat that currently patient complaining of mid upper abdominal pain most likely related to the frequent emesis. Patient feels extremely fatigued at this time. Hemoglobin 6.8 on admission. Temperature is 90.3 with patient heart rate of 100, O2 saturation 100% on room air with blood pressure 110/70. Patient will be admitted to the hospital reconsult GI with the patient being discharged back in January 11, 2021 for similar problems with patient having bleeding esophageal varices with banding performed.     Past Medical History:    Past Medical History:   Diagnosis Date    Esophageal varices ([facility] [address]      Past Surgical History:    Past Surgical History:   Procedure Laterality Date    TONSILLECTOMY      UPPER GASTROINTESTINAL ENDOSCOPY N/A 11/13/2020    EGD BIOPSY performed by Main Yates MD at [facility] N/A 1/6/2021    EGD BAND LIGATION performed by Crow Choe MD at [facility] 23       Medications Prior to Admission:    @  Prior to Admission medications    Not on File       Allergies:  Pcn [penicillins]    Social History:   Social History     Socioeconomic History    Marital status: Single     Spouse name: Not on file    Number of children: Not on file    Years of education: Not on file    Highest education level: Not on file   Occupational History    Not on file   Social Needs    Financial resource strain: Not on file    Food insecurity     Worry: Not on file     Inability: Not on file    Transportation needs     Medical: Not on file     Non-medical: Not on file Tobacco Use    Smoking status: Former Smoker     Packs/day: 0.50    Smokeless tobacco: Never Used   Substance and Sexual Activity    Alcohol use: Yes     Comment: occasional    Drug use: Not on file     Comment: used drugs in past    Sexual activity: Not on file   Lifestyle    Physical activity     Days per week: Not on file     Minutes per session: Not on file    Stress: Not on file   Relationships    Social connections     Talks on phone: Not on file     Gets together: Not on file     Attends Jain service: Not on file     Active member of club or organization: Not on file     Attends meetings of clubs or organizations: Not on file     Relationship status: Not on file    Intimate partner violence     Fear of current or ex partner: Not on file     Emotionally abused: Not on file     Physically abused: Not on file     Forced sexual activity: Not on file   Other Topics Concern    Not on file   Social History Narrative    Not on file       Family History:   History reviewed. No pertinent family history. REVIEW OF SYSTEMS:    Gen: Patient denies any lightheadedness or dizziness. No LOC or syncope. No fevers or chills. HEENT: No earache, sore throat or nasal congestion. Resp: Denies cough, hemoptysis or sputum production. Cardiac: Denies chest pain, SOB, diaphoresis or palpitations. GI: + nausea, vomiting, upper abdominal pain. No diarrhea or constipation.  + Hematemesis, melena. No hematochezia. : No urinary complaints, dysuria, hematuria or frequency. MSK: No extremity weakness, paralysis or paresthesias. PHYSICAL EXAM:    Vitals:  BP (!) 108/48   Pulse 101   Temp 98.3 °F (36.8 °C)   Resp 16   Ht 5' 9\" (1.753 m)   Wt 185 lb (83.9 kg)   SpO2 100%   BMI 27.32 kg/m²     General:  This is a 37 y.o. yo male who is alert and oriented in NAD  HEENT:  Head is normocephalic and atraumatic, PERRLA, EOMI, mucus membranes moist with no pharyngeal erythema or exudate.   Neck: Supple with no carotid bruits, JVD or thyromegaly.   No cervical adenopathy  CV:  Regular rate and rhythm, no murmurs  Lungs:  Clear to auscultation bilaterally with no wheezes, rales or rhonchi  Abdomen:  + Mid upper abdominal tenderness, nondistended, bowel sounds present  Extremities:  No edema, peripheral pulses intact bilaterally  Neuro:  Cranial nerves II-XII grossly intact; motor and sensory function intact with no focal deficits  Skin:  No rashes, lesions or wounds    DATA:  CBC with Differential:    Lab Results   Component Value Date    WBC 11.1 05/10/2021    RBC 2.86 05/10/2021    HGB 6.8 05/10/2021    HCT 22.4 05/10/2021     05/10/2021    MCV 78.3 05/10/2021    MCH 23.8 05/10/2021    MCHC 30.4 05/10/2021    RDW 18.7 05/10/2021    SEGSPCT 61 02/20/2012    LYMPHOPCT 10.1 05/10/2021    MONOPCT 7.1 05/10/2021    MYELOPCT 1.7 01/11/2021    BASOPCT 0.4 05/10/2021    MONOSABS 0.79 05/10/2021    LYMPHSABS 1.12 05/10/2021    EOSABS 0.23 05/10/2021    BASOSABS 0.04 05/10/2021     CMP:    Lab Results   Component Value Date     05/10/2021    K 4.5 05/10/2021     05/10/2021    CO2 20 05/10/2021    BUN 24 05/10/2021    CREATININE 1.2 05/10/2021    GFRAA >60 05/10/2021    LABGLOM >60 05/10/2021    GLUCOSE 125 05/10/2021    GLUCOSE 89 02/20/2012    PROT 6.6 05/10/2021    LABALBU 3.3 05/10/2021    LABALBU 3.4 02/18/2012    CALCIUM 8.7 05/10/2021    BILITOT 1.7 05/10/2021    ALKPHOS 95 05/10/2021    AST 65 05/10/2021    ALT 37 05/10/2021     Magnesium:    Lab Results   Component Value Date    MG 2.0 02/22/2015     Phosphorus:  No results found for: PHOS  PT/INR:    Lab Results   Component Value Date    PROTIME 23.4 05/10/2021    PROTIME 12.3 02/17/2012    INR 2.0 05/10/2021     Troponin:    Lab Results   Component Value Date    TROPONINI <0.01 01/06/2021     U/A:    Lab Results   Component Value Date    COLORU DARK YELLOW 08/14/2020    PROTEINU TRACE 08/14/2020    PHUR 6.5 08/14/2020    WBCUA 0-1 08/14/2020    RBCUA NONE 08/14/2020    RBCUA 0-1 02/25/2013    MUCUS Present 08/14/2020    BACTERIA RARE 08/14/2020    CLARITYU Clear 08/14/2020    SPECGRAV 1.025 08/14/2020    LEUKOCYTESUR Negative 08/14/2020    UROBILINOGEN 1.0 08/14/2020    BILIRUBINUR MODERATE 08/14/2020    BLOODU Negative 08/14/2020    GLUCOSEU Negative 08/14/2020     ABG:  No results found for: PH, PCO2, PO2, HCO3, BE, THGB, TCO2, O2SAT  HgBA1c:    Lab Results   Component Value Date    LABA1C 4.9 01/09/2021     FLP:  No results found for: TRIG, HDL, LDLCALC, LDLDIRECT, LABVLDL  TSH:    Lab Results   Component Value Date    TSH 2.900 08/14/2020     IRON:    Lab Results   Component Value Date    IRON 67 01/06/2021     LIPASE:    Lab Results   Component Value Date    LIPASE 43 05/10/2021       ASSESSMENT AND PLAN:      Patient Active Problem List    Diagnosis Date Noted    GIB (gastrointestinal bleeding) 05/10/2021    Secondary esophageal varices with bleeding ([facility] [address]     Abdominal pain 01/06/2021    UGI bleed 11/13/2020    Cellulitis 02/17/2012     Impression:  1. Acute upper GI bleed-most likely secondary to esophageal varices  2. History hepatitis C-history untreated with underlying cirrhosis  3. Severe microcytic anemia secondary #1  4. Coagulopathy secondary to underlying cirrhosis    Plan:  Admit to general medical floor with remote telemetry  Home meds reviewed  Monitor heart rate, blood pressure, O2 saturation  Patient receiving 2 units packed RBCs in the ED  Consult GI  Lactated Ringer 65 cc an hour  Protonix drip  Octreotide drip 10 cc an hour  Routine H&H following transfusion and as needed  Clear liquid diet  CMP, CBC in a.m. SCDs lower extremity  Activity up ad roney.   Zofran 4 mg IV push every 6 hours as needed    Jacky Knox D.O.  5/10/2021  5:56 PM

## 2021-05-11 LAB
ALBUMIN SERPL-MCNC: 3.1 G/DL (ref 3.5–5.2)
ALP BLD-CCNC: 79 U/L (ref 40–129)
ALT SERPL-CCNC: 33 U/L (ref 0–40)
ANION GAP SERPL CALCULATED.3IONS-SCNC: 10 MMOL/L (ref 7–16)
AST SERPL-CCNC: 53 U/L (ref 0–39)
BASOPHILS ABSOLUTE: 0 E9/L (ref 0–0.2)
BASOPHILS RELATIVE PERCENT: 0 % (ref 0–2)
BILIRUB SERPL-MCNC: 2 MG/DL (ref 0–1.2)
BLOOD BANK DISPENSE STATUS: NORMAL
BLOOD BANK PRODUCT CODE: NORMAL
BPU ID: NORMAL
BUN BLDV-MCNC: 19 MG/DL (ref 6–20)
CALCIUM SERPL-MCNC: 8.1 MG/DL (ref 8.6–10.2)
CHLORIDE BLD-SCNC: 105 MMOL/L (ref 98–107)
CO2: 18 MMOL/L (ref 22–29)
CREAT SERPL-MCNC: 1.1 MG/DL (ref 0.7–1.2)
DESCRIPTION BLOOD BANK: NORMAL
EOSINOPHILS ABSOLUTE: 0 E9/L (ref 0.05–0.5)
EOSINOPHILS RELATIVE PERCENT: 0 % (ref 0–6)
GFR AFRICAN AMERICAN: >60
GFR NON-AFRICAN AMERICAN: >60 ML/MIN/1.73
GLUCOSE BLD-MCNC: 203 MG/DL (ref 74–99)
HCT VFR BLD CALC: 21.9 % (ref 37–54)
HCT VFR BLD CALC: 22.5 % (ref 37–54)
HCT VFR BLD CALC: 23.4 % (ref 37–54)
HEMOGLOBIN: 6.9 G/DL (ref 12.5–16.5)
HEMOGLOBIN: 7.1 G/DL (ref 12.5–16.5)
HEMOGLOBIN: 7.3 G/DL (ref 12.5–16.5)
HYPOCHROMIA: ABNORMAL
INR BLD: 1.9
INR BLD: 1.9
LYMPHOCYTES ABSOLUTE: 0.35 E9/L (ref 1.5–4)
LYMPHOCYTES RELATIVE PERCENT: 5 % (ref 20–42)
MAGNESIUM: 1.8 MG/DL (ref 1.6–2.6)
MCH RBC QN AUTO: 26.3 PG (ref 26–35)
MCHC RBC AUTO-ENTMCNC: 31.6 % (ref 32–34.5)
MCV RBC AUTO: 83.3 FL (ref 80–99.9)
MONOCYTES ABSOLUTE: 0.07 E9/L (ref 0.1–0.95)
MONOCYTES RELATIVE PERCENT: 1 % (ref 2–12)
NEUTROPHILS ABSOLUTE: 6.49 E9/L (ref 1.8–7.3)
NEUTROPHILS RELATIVE PERCENT: 94 % (ref 43–80)
PDW BLD-RTO: 18.6 FL (ref 11.5–15)
PHOSPHORUS: 1.9 MG/DL (ref 2.5–4.5)
PLATELET # BLD: 73 E9/L (ref 130–450)
PLATELET CONFIRMATION: NORMAL
PMV BLD AUTO: 10.6 FL (ref 7–12)
POLYCHROMASIA: ABNORMAL
POTASSIUM SERPL-SCNC: 4.5 MMOL/L (ref 3.5–5)
PROTHROMBIN TIME: 22 SEC (ref 9.3–12.4)
PROTHROMBIN TIME: 22.4 SEC (ref 9.3–12.4)
RBC # BLD: 2.7 E12/L (ref 3.8–5.8)
SODIUM BLD-SCNC: 133 MMOL/L (ref 132–146)
TOTAL PROTEIN: 6 G/DL (ref 6.4–8.3)
WBC # BLD: 6.9 E9/L (ref 4.5–11.5)

## 2021-05-11 PROCEDURE — 83735 ASSAY OF MAGNESIUM: CPT

## 2021-05-11 PROCEDURE — 85025 COMPLETE CBC W/AUTO DIFF WBC: CPT

## 2021-05-11 PROCEDURE — 6360000002 HC RX W HCPCS: Performed by: STUDENT IN AN ORGANIZED HEALTH CARE EDUCATION/TRAINING PROGRAM

## 2021-05-11 PROCEDURE — 36415 COLL VENOUS BLD VENIPUNCTURE: CPT

## 2021-05-11 PROCEDURE — 84100 ASSAY OF PHOSPHORUS: CPT

## 2021-05-11 PROCEDURE — 6370000000 HC RX 637 (ALT 250 FOR IP): Performed by: HOSPITALIST

## 2021-05-11 PROCEDURE — 80053 COMPREHEN METABOLIC PANEL: CPT

## 2021-05-11 PROCEDURE — P9016 RBC LEUKOCYTES REDUCED: HCPCS

## 2021-05-11 PROCEDURE — 6370000000 HC RX 637 (ALT 250 FOR IP): Performed by: INTERNAL MEDICINE

## 2021-05-11 PROCEDURE — 85018 HEMOGLOBIN: CPT

## 2021-05-11 PROCEDURE — 1200000000 HC SEMI PRIVATE

## 2021-05-11 PROCEDURE — 6360000002 HC RX W HCPCS: Performed by: INTERNAL MEDICINE

## 2021-05-11 PROCEDURE — C9113 INJ PANTOPRAZOLE SODIUM, VIA: HCPCS | Performed by: INTERNAL MEDICINE

## 2021-05-11 PROCEDURE — 85014 HEMATOCRIT: CPT

## 2021-05-11 PROCEDURE — 85610 PROTHROMBIN TIME: CPT

## 2021-05-11 PROCEDURE — 36430 TRANSFUSION BLD/BLD COMPNT: CPT

## 2021-05-11 PROCEDURE — 2580000003 HC RX 258: Performed by: STUDENT IN AN ORGANIZED HEALTH CARE EDUCATION/TRAINING PROGRAM

## 2021-05-11 RX ORDER — SODIUM CHLORIDE 9 MG/ML
INJECTION, SOLUTION INTRAVENOUS PRN
Status: DISCONTINUED | OUTPATIENT
Start: 2021-05-11 | End: 2021-05-13 | Stop reason: HOSPADM

## 2021-05-11 RX ORDER — CIPROFLOXACIN 500 MG/1
500 TABLET, FILM COATED ORAL EVERY 12 HOURS SCHEDULED
Status: DISCONTINUED | OUTPATIENT
Start: 2021-05-11 | End: 2021-05-13 | Stop reason: HOSPADM

## 2021-05-11 RX ORDER — TRAMADOL HYDROCHLORIDE 50 MG/1
50 TABLET ORAL 2 TIMES DAILY PRN
Status: DISCONTINUED | OUTPATIENT
Start: 2021-05-11 | End: 2021-05-13 | Stop reason: HOSPADM

## 2021-05-11 RX ADMIN — TRAMADOL HYDROCHLORIDE 50 MG: 50 TABLET ORAL at 10:21

## 2021-05-11 RX ADMIN — CIPROFLOXACIN 500 MG: 500 TABLET, FILM COATED ORAL at 20:22

## 2021-05-11 RX ADMIN — OCTREOTIDE ACETATE 50 MCG/HR: 500 INJECTION, SOLUTION INTRAVENOUS; SUBCUTANEOUS at 03:20

## 2021-05-11 RX ADMIN — PANTOPRAZOLE SODIUM 40 MG: 40 INJECTION, POWDER, FOR SOLUTION INTRAVENOUS at 11:06

## 2021-05-11 RX ADMIN — OCTREOTIDE ACETATE 50 MCG/HR: 500 INJECTION, SOLUTION INTRAVENOUS; SUBCUTANEOUS at 13:52

## 2021-05-11 RX ADMIN — CIPROFLOXACIN 500 MG: 500 TABLET, FILM COATED ORAL at 12:29

## 2021-05-11 RX ADMIN — TRAMADOL HYDROCHLORIDE 50 MG: 50 TABLET ORAL at 22:26

## 2021-05-11 ASSESSMENT — PAIN DESCRIPTION - PAIN TYPE
TYPE: ACUTE PAIN
TYPE: ACUTE PAIN

## 2021-05-11 ASSESSMENT — PAIN SCALES - GENERAL
PAINLEVEL_OUTOF10: 2
PAINLEVEL_OUTOF10: 0
PAINLEVEL_OUTOF10: 7
PAINLEVEL_OUTOF10: 7

## 2021-05-11 ASSESSMENT — PAIN DESCRIPTION - FREQUENCY: FREQUENCY: INTERMITTENT

## 2021-05-11 ASSESSMENT — PAIN DESCRIPTION - LOCATION
LOCATION: ABDOMEN
LOCATION: ABDOMEN

## 2021-05-11 ASSESSMENT — PAIN DESCRIPTION - PROGRESSION: CLINICAL_PROGRESSION: GRADUALLY IMPROVING

## 2021-05-11 ASSESSMENT — PAIN DESCRIPTION - DESCRIPTORS: DESCRIPTORS: BURNING

## 2021-05-11 ASSESSMENT — PAIN DESCRIPTION - ONSET: ONSET: ON-GOING

## 2021-05-11 NOTE — PLAN OF CARE
Problem: Discharge Planning:  Goal: Discharged to appropriate level of care  Description: Discharged to appropriate level of care  Outcome: Ongoing     Problem:  Bowel Function - Altered:  Goal: Bowel elimination is within specified parameters  Description: Bowel elimination is within specified parameters  Outcome: Ongoing     Problem: Fluid Volume - Imbalance:  Goal: Absence of imbalanced fluid volume signs and symptoms  Description: Absence of imbalanced fluid volume signs and symptoms  Outcome: Met This Shift  Goal: Will show no signs and symptoms of excessive bleeding  Description: Will show no signs and symptoms of excessive bleeding  Outcome: Met This Shift     Problem: Nausea/Vomiting:  Goal: Absence of nausea/vomiting  Description: Absence of nausea/vomiting  Outcome: Met This Shift  Goal: Able to drink  Description: Able to drink  Outcome: Met This Shift  Goal: Able to eat  Description: Able to eat  Outcome: Ongoing  Goal: Ability to achieve adequate nutritional intake will improve  Description: Ability to achieve adequate nutritional intake will improve  Outcome: Ongoing

## 2021-05-11 NOTE — PROGRESS NOTES
PROGRESS NOTE  By Jose Betts M.D. [facility]  Dr. Esther Lopez M.D.,  Dr. Duran Delatorre M.D.,   Dr. Annmarie Meeks D.O.,  Dr. Nichelle Melendez M.D.,  Dr. Tia Roy D.O.,  Cintia French III  37 y.o.  male    SUBJECTIVE:  Minimal abdominal pain. No nausea nor vomiting. OBJECTIVE:    BP (!) 97/56   Pulse 92   Temp 97.1 °F (36.2 °C) (Oral)   Resp 16   Ht 5' 9\" (1.753 m)   Wt 185 lb (83.9 kg)   SpO2 98%   BMI 27.32 kg/m²     General: NAD/ male. AAO x3  HEENT: Anicteric sclera/moist oral mucosa  Neck: Supple/trachea midline  Chest: Symmetric excursion/nonlabored respirations  Cor: Regular  Abd.: Soft/nondistended  Extr.:  No peripheral edema  Skin: Warm and dry/anicteric      DATA:    Stool (measured) : 0 mL  Lab Results   Component Value Date    WBC 6.9 05/11/2021    RBC 2.70 05/11/2021    HGB 7.1 05/11/2021    HCT 22.5 05/11/2021    MCV 83.3 05/11/2021    MCH 26.3 05/11/2021    MCHC 31.6 05/11/2021    RDW 18.6 05/11/2021    PLT 73 05/11/2021    MPV 10.6 05/11/2021     Lab Results   Component Value Date     05/11/2021    K 4.5 05/11/2021    K 4.5 05/10/2021     05/11/2021    CO2 18 05/11/2021    BUN 19 05/11/2021    CREATININE 1.1 05/11/2021    CALCIUM 8.1 05/11/2021    PROT 6.0 05/11/2021    LABALBU 3.1 05/11/2021    LABALBU 3.4 02/18/2012    BILITOT 2.0 05/11/2021    ALKPHOS 79 05/11/2021    AST 53 05/11/2021    ALT 33 05/11/2021     Lab Results   Component Value Date    LIPASE 43 05/10/2021     Lab Results   Component Value Date    AMYLASE 49 02/22/2015         ASSESSMENT/PLAN:  1.  GI bleed/anemia  -EGD with banding yesterday  -Please refer to pertinent procedure note  -Continue octreotide/PPI/antibiotics  -Advance diet    2. Hepatitis C  -Follow-up in the office for treatment    3.   Cirrhosis  -Alcohol/hepatitis C  -See orders    Jose Betts MD  5/11/2021  10:43 AM    NOTE:  This report was transcribed using voice recognition software. Every effort was made to ensure accuracy; however, inadvertent computerized transcription errors may be present.

## 2021-05-11 NOTE — PROGRESS NOTES
Department of Internal Medicine        CHIEF COMPLAINT: Hematemesis    Reason for Admission: Upper GI bleed    HISTORY OF PRESENT ILLNESS:      The patient is a 37 y.o. male who presents with acute onset of nausea vomiting with hematemesis starting this morning. Patient then had some dark tarry stools. Patient stated he ate some guacamole last night that his wife made and he usually does not eat that currently patient complaining of mid upper abdominal pain most likely related to the frequent emesis. Patient feels extremely fatigued at this time. Hemoglobin 6.8 on admission. Temperature is 90.3 with patient heart rate of 100, O2 saturation 100% on room air with blood pressure 110/70. Patient will be admitted to the hospital reconsult GI with the patient being discharged back in January 11, 2021 for similar problems with patient having bleeding esophageal varices with banding performed. 5/11/2021  Patient seen and examined on general medical floor. Patient still complaining of some generalized abdominal pain probably related to the multiple emesis yesterday. Patient denies any chest pain. Patient denies unusual shortness of breath. Patient denies any emesis today. BUN/creatinine 19/1.1 today with total bili 2.0 mild elevation AST 53. Hemoglobin 7.1 after 2 units packed RBC patient's platelet count is 73. WBC is normal 6.9 and a INR 1.9. Temperature 90.8 with heart rate of 92. Blood pressure 115/64 with O2 sat 96% on room air at rest.  Urinary output is good.     Past Medical History:    Past Medical History:   Diagnosis Date    Esophageal varices ([address]      Past Surgical History:    Past Surgical History:   Procedure Laterality Date    TONSILLECTOMY      UPPER GASTROINTESTINAL ENDOSCOPY N/A 11/13/2020    EGD BIOPSY performed by Mike Gomez MD at [address] N/A 1/6/2021    EGD BAND LIGATION performed by Fred Dumont MD at [address] GASTROINTESTINAL ENDOSCOPY N/A 5/10/2021    EGD BAND LIGATION performed by Mercy Pat MD at [address]       Medications Prior to Admission:    @  Prior to Admission medications    Not on File       Allergies:  Pcn [penicillins]    Social History:   Social History     Socioeconomic History    Marital status: Single     Spouse name: Not on file    Number of children: Not on file    Years of education: Not on file    Highest education level: Not on file   Occupational History    Not on file   Social Needs    Financial resource strain: Not on file    Food insecurity     Worry: Not on file     Inability: Not on file    Transportation needs     Medical: Not on file     Non-medical: Not on file   Tobacco Use    Smoking status: Former Smoker     Packs/day: 0.50    Smokeless tobacco: Never Used   Substance and Sexual Activity    Alcohol use: Yes     Comment: occasional    Drug use: Not on file     Comment: used drugs in past    Sexual activity: Not on file   Lifestyle    Physical activity     Days per week: Not on file     Minutes per session: Not on file    Stress: Not on file   Relationships    Social connections     Talks on phone: Not on file     Gets together: Not on file     Attends Pentecostal service: Not on file     Active member of club or organization: Not on file     Attends meetings of clubs or organizations: Not on file     Relationship status: Not on file    Intimate partner violence     Fear of current or ex partner: Not on file     Emotionally abused: Not on file     Physically abused: Not on file     Forced sexual activity: Not on file   Other Topics Concern    Not on file   Social History Narrative    Not on file       Family History:   History reviewed. No pertinent family history. REVIEW OF SYSTEMS:    Gen: Patient denies any lightheadedness or dizziness. No LOC or syncope. No fevers or chills. HEENT: No earache, sore throat or nasal congestion.     Resp: Denies cough, hemoptysis or sputum production. Cardiac: Denies chest pain, SOB, diaphoresis or palpitations. GI: + nausea, vomiting, upper abdominal pain. No diarrhea or constipation.  + Hematemesis, melena. No hematochezia. : No urinary complaints, dysuria, hematuria or frequency. MSK: No extremity weakness, paralysis or paresthesias. PHYSICAL EXAM:    Vitals:  /64   Pulse 92   Temp 98.8 °F (37.1 °C) (Oral)   Resp 14   Ht 5' 9\" (1.753 m)   Wt 185 lb (83.9 kg)   SpO2 96%   BMI 27.32 kg/m²     General:  This is a 37 y.o. yo male who is alert and oriented in NAD  HEENT:  Head is normocephalic and atraumatic, PERRLA, EOMI, mucus membranes moist with no pharyngeal erythema or exudate. Neck:  Supple with no carotid bruits, JVD or thyromegaly.   No cervical adenopathy  CV:  Regular rate and rhythm, no murmurs  Lungs:  Clear to auscultation bilaterally with no wheezes, rales or rhonchi  Abdomen:  + Mid upper abdominal tenderness, nondistended, bowel sounds present  Extremities:  No edema, peripheral pulses intact bilaterally  Neuro:  Cranial nerves II-XII grossly intact; motor and sensory function intact with no focal deficits  Skin:  No rashes, lesions or wounds    DATA:  CBC with Differential:    Lab Results   Component Value Date    WBC 6.9 05/11/2021    RBC 2.70 05/11/2021    HGB 7.1 05/11/2021    HCT 22.5 05/11/2021    PLT 73 05/11/2021    MCV 83.3 05/11/2021    MCH 26.3 05/11/2021    MCHC 31.6 05/11/2021    RDW 18.6 05/11/2021    SEGSPCT 61 02/20/2012    LYMPHOPCT 5.0 05/11/2021    MONOPCT 1.0 05/11/2021    MYELOPCT 1.7 01/11/2021    BASOPCT 0.0 05/11/2021    MONOSABS 0.07 05/11/2021    LYMPHSABS 0.35 05/11/2021    EOSABS 0.00 05/11/2021    BASOSABS 0.00 05/11/2021     CMP:    Lab Results   Component Value Date     05/11/2021    K 4.5 05/11/2021    K 4.5 05/10/2021     05/11/2021    CO2 18 05/11/2021    BUN 19 05/11/2021    CREATININE 1.1 05/11/2021    GFRAA >60 05/11/2021    LABGLOM >60 05/11/2021    GLUCOSE 203 05/11/2021    GLUCOSE 89 02/20/2012    PROT 6.0 05/11/2021    LABALBU 3.1 05/11/2021    LABALBU 3.4 02/18/2012    CALCIUM 8.1 05/11/2021    BILITOT 2.0 05/11/2021    ALKPHOS 79 05/11/2021    AST 53 05/11/2021    ALT 33 05/11/2021     Magnesium:    Lab Results   Component Value Date    MG 1.8 05/11/2021     Phosphorus:    Lab Results   Component Value Date    PHOS 1.9 05/11/2021     PT/INR:    Lab Results   Component Value Date    PROTIME 22.4 05/11/2021    PROTIME 12.3 02/17/2012    INR 1.9 05/11/2021     Troponin:    Lab Results   Component Value Date    TROPONINI <0.01 01/06/2021     U/A:    Lab Results   Component Value Date    COLORU DARK YELLOW 08/14/2020    PROTEINU TRACE 08/14/2020    PHUR 6.5 08/14/2020    WBCUA 0-1 08/14/2020    RBCUA NONE 08/14/2020    RBCUA 0-1 02/25/2013    MUCUS Present 08/14/2020    BACTERIA RARE 08/14/2020    CLARITYU Clear 08/14/2020    SPECGRAV 1.025 08/14/2020    LEUKOCYTESUR Negative 08/14/2020    UROBILINOGEN 1.0 08/14/2020    BILIRUBINUR MODERATE 08/14/2020    BLOODU Negative 08/14/2020    GLUCOSEU Negative 08/14/2020     ABG:  No results found for: PH, PCO2, PO2, HCO3, BE, THGB, TCO2, O2SAT  HgBA1c:    Lab Results   Component Value Date    LABA1C 4.9 01/09/2021     FLP:  No results found for: TRIG, HDL, LDLCALC, LDLDIRECT, LABVLDL  TSH:    Lab Results   Component Value Date    TSH 2.900 08/14/2020     IRON:    Lab Results   Component Value Date    IRON 67 01/06/2021     LIPASE:    Lab Results   Component Value Date    LIPASE 43 05/10/2021       ASSESSMENT AND PLAN:      Patient Active Problem List    Diagnosis Date Noted    GIB (gastrointestinal bleeding) 05/10/2021    Secondary esophageal varices with bleeding ([address]     Abdominal pain 01/06/2021    UGI bleed 11/13/2020    Cellulitis 02/17/2012     Impression:  1. Acute upper GI bleed-most likely secondary to esophageal varices  2.   History hepatitis C-history untreated with underlying cirrhosis  3. Severe microcytic anemia secondary #1  4. Coagulopathy secondary to underlying cirrhosis    Plan:  Admit to general medical floor with remote telemetry  Home meds reviewed  Monitor heart rate, blood pressure, O2 saturation  Patient receiving 2 units packed RBCs in the ED  Consult GI  Lactated Ringer 65 cc an hour  Protonix drip  Octreotide drip 10 cc an hour  Routine H&H following transfusion and as needed  Clear liquid diet  SCDs lower extremity  Activity up ad roney. Zofran 4 mg IV push every 6 hours as needed    Ultram 50 mg twice daily as needed  H&H every 8 hours  CMP, CBC in a.m.     Con Walker D.O.  5/11/2021  9:36 AM

## 2021-05-11 NOTE — CARE COORDINATION
5/11/2021 1033 CM note: No covid test. Met with patient for transition of care needs. Pt on IV sandostatin gtt. Pt is independent with ADLS and resides with his girlfriend. PCP is Dr Vivian Brown and he uses [facility] Walk-in Appointment Scheduler. No hx DME/HHC. Plan is for patient to return home upon discharge, his girlfriend will provide ride. No needs identified.  Molina HARTMAN

## 2021-05-12 LAB
ALBUMIN SERPL-MCNC: 3 G/DL (ref 3.5–5.2)
ALP BLD-CCNC: 75 U/L (ref 40–129)
ALT SERPL-CCNC: 36 U/L (ref 0–40)
ANION GAP SERPL CALCULATED.3IONS-SCNC: 8 MMOL/L (ref 7–16)
AST SERPL-CCNC: 63 U/L (ref 0–39)
BASOPHILS ABSOLUTE: 0 E9/L (ref 0–0.2)
BASOPHILS RELATIVE PERCENT: 0.1 % (ref 0–2)
BILIRUB SERPL-MCNC: 1.9 MG/DL (ref 0–1.2)
BUN BLDV-MCNC: 16 MG/DL (ref 6–20)
CALCIUM SERPL-MCNC: 8 MG/DL (ref 8.6–10.2)
CHLORIDE BLD-SCNC: 104 MMOL/L (ref 98–107)
CO2: 22 MMOL/L (ref 22–29)
CREAT SERPL-MCNC: 1.1 MG/DL (ref 0.7–1.2)
EOSINOPHILS ABSOLUTE: 0 E9/L (ref 0.05–0.5)
EOSINOPHILS RELATIVE PERCENT: 0.1 % (ref 0–6)
GFR AFRICAN AMERICAN: >60
GFR NON-AFRICAN AMERICAN: >60 ML/MIN/1.73
GLUCOSE BLD-MCNC: 133 MG/DL (ref 74–99)
HCT VFR BLD CALC: 23.3 % (ref 37–54)
HCT VFR BLD CALC: 24.1 % (ref 37–54)
HCT VFR BLD CALC: 24.8 % (ref 37–54)
HEMOGLOBIN: 7.7 G/DL (ref 12.5–16.5)
HEMOGLOBIN: 7.9 G/DL (ref 12.5–16.5)
HEMOGLOBIN: 7.9 G/DL (ref 12.5–16.5)
HYPOCHROMIA: ABNORMAL
INR BLD: 2
LYMPHOCYTES ABSOLUTE: 0.45 E9/L (ref 1.5–4)
LYMPHOCYTES RELATIVE PERCENT: 5.2 % (ref 20–42)
MCH RBC QN AUTO: 26.9 PG (ref 26–35)
MCHC RBC AUTO-ENTMCNC: 33 % (ref 32–34.5)
MCV RBC AUTO: 81.5 FL (ref 80–99.9)
MONOCYTES ABSOLUTE: 0.27 E9/L (ref 0.1–0.95)
MONOCYTES RELATIVE PERCENT: 2.6 % (ref 2–12)
NEUTROPHILS ABSOLUTE: 8.19 E9/L (ref 1.8–7.3)
NEUTROPHILS RELATIVE PERCENT: 92.2 % (ref 43–80)
OVALOCYTES: ABNORMAL
PDW BLD-RTO: 18.5 FL (ref 11.5–15)
PLATELET # BLD: 73 E9/L (ref 130–450)
PLATELET CONFIRMATION: NORMAL
PMV BLD AUTO: 10.1 FL (ref 7–12)
POIKILOCYTES: ABNORMAL
POLYCHROMASIA: ABNORMAL
POTASSIUM SERPL-SCNC: 4.2 MMOL/L (ref 3.5–5)
PROTHROMBIN TIME: 24 SEC (ref 9.3–12.4)
RBC # BLD: 2.86 E12/L (ref 3.8–5.8)
SODIUM BLD-SCNC: 134 MMOL/L (ref 132–146)
TOTAL PROTEIN: 5.5 G/DL (ref 6.4–8.3)
WBC # BLD: 8.9 E9/L (ref 4.5–11.5)

## 2021-05-12 PROCEDURE — 6370000000 HC RX 637 (ALT 250 FOR IP): Performed by: INTERNAL MEDICINE

## 2021-05-12 PROCEDURE — 1200000000 HC SEMI PRIVATE

## 2021-05-12 PROCEDURE — 85014 HEMATOCRIT: CPT

## 2021-05-12 PROCEDURE — 36415 COLL VENOUS BLD VENIPUNCTURE: CPT

## 2021-05-12 PROCEDURE — C9113 INJ PANTOPRAZOLE SODIUM, VIA: HCPCS | Performed by: INTERNAL MEDICINE

## 2021-05-12 PROCEDURE — 2580000003 HC RX 258: Performed by: STUDENT IN AN ORGANIZED HEALTH CARE EDUCATION/TRAINING PROGRAM

## 2021-05-12 PROCEDURE — 85018 HEMOGLOBIN: CPT

## 2021-05-12 PROCEDURE — 85025 COMPLETE CBC W/AUTO DIFF WBC: CPT

## 2021-05-12 PROCEDURE — 6370000000 HC RX 637 (ALT 250 FOR IP): Performed by: HOSPITALIST

## 2021-05-12 PROCEDURE — 85610 PROTHROMBIN TIME: CPT

## 2021-05-12 PROCEDURE — 6360000002 HC RX W HCPCS: Performed by: STUDENT IN AN ORGANIZED HEALTH CARE EDUCATION/TRAINING PROGRAM

## 2021-05-12 PROCEDURE — 80053 COMPREHEN METABOLIC PANEL: CPT

## 2021-05-12 PROCEDURE — 6360000002 HC RX W HCPCS: Performed by: INTERNAL MEDICINE

## 2021-05-12 PROCEDURE — 2580000003 HC RX 258: Performed by: INTERNAL MEDICINE

## 2021-05-12 RX ADMIN — CIPROFLOXACIN 500 MG: 500 TABLET, FILM COATED ORAL at 19:36

## 2021-05-12 RX ADMIN — TRAMADOL HYDROCHLORIDE 50 MG: 50 TABLET ORAL at 11:20

## 2021-05-12 RX ADMIN — OCTREOTIDE ACETATE 50 MCG/HR: 500 INJECTION, SOLUTION INTRAVENOUS; SUBCUTANEOUS at 12:04

## 2021-05-12 RX ADMIN — TRAMADOL HYDROCHLORIDE 50 MG: 50 TABLET ORAL at 23:48

## 2021-05-12 RX ADMIN — OCTREOTIDE ACETATE 50 MCG/HR: 500 INJECTION, SOLUTION INTRAVENOUS; SUBCUTANEOUS at 00:16

## 2021-05-12 RX ADMIN — CIPROFLOXACIN 500 MG: 500 TABLET, FILM COATED ORAL at 09:24

## 2021-05-12 RX ADMIN — SODIUM CHLORIDE, POTASSIUM CHLORIDE, SODIUM LACTATE AND CALCIUM CHLORIDE: 600; 310; 30; 20 INJECTION, SOLUTION INTRAVENOUS at 12:04

## 2021-05-12 RX ADMIN — PANTOPRAZOLE SODIUM 40 MG: 40 INJECTION, POWDER, FOR SOLUTION INTRAVENOUS at 09:23

## 2021-05-12 ASSESSMENT — PAIN SCALES - GENERAL: PAINLEVEL_OUTOF10: 5

## 2021-05-12 NOTE — CARE COORDINATION
5/12/2021 1010 CM note: No covid test. Pt on IV sandostatin gtt. Pt is independent with ADLS and resides with his girlfriend. Plan is for patient to return home upon discharge, his girlfriend will provide ride.  Molina HARTMAN

## 2021-05-12 NOTE — ANESTHESIA POSTPROCEDURE EVALUATION
Department of Anesthesiology  Postprocedure Note    Patient: Naomy Fragoso III  MRN: [id number]  YOB: 1980  Date of evaluation: 5/12/2021  Time:  6:37 AM     Procedure Summary     Date: 05/10/21 Room / Location: [address] [place] [address] CTR    Anesthesia Start: 2032 Anesthesia Stop: 1824    Procedure: EGD BAND LIGATION (N/A ) Diagnosis: (hematemesis)    Surgeons: Norma Lan MD Responsible Provider: Raquel Cai MD    Anesthesia Type: general ASA Status: 3 - Emergent          Anesthesia Type: general    Edmundo Phase I: Edmundo Score: 9    Edmundo Phase II:      Last vitals: Reviewed and per EMR flowsheets.        Anesthesia Post Evaluation    Patient location during evaluation: PACU  Patient participation: complete - patient participated  Level of consciousness: awake  Pain score: 0  Airway patency: patent  Nausea & Vomiting: no nausea  Complications: no  Cardiovascular status: blood pressure returned to baseline  Respiratory status: acceptable  Hydration status: euvolemic

## 2021-05-12 NOTE — PROGRESS NOTES
PROGRESS NOTE  By Sherine Stanley M.D. [facility]  Dr. Jaleel Almonte M.D.,  Dr. Clarita Goodell, M.D.,   Dr. Verónica Garcia D.O.,  Dr. Brent Carvajal M.D.,  Dr. Shoshana Jean D.O.,  Cristiana Salgadon III  37 y.o.  male    SUBJECTIVE:  Denies abdominal pain. Denies nausea vomiting    OBJECTIVE:    BP (!) 108/53   Pulse 74   Temp 97.6 °F (36.4 °C)   Resp 19   Ht 5' 9\" (1.753 m)   Wt 185 lb (83.9 kg)   SpO2 99%   BMI 27.32 kg/m²     General: NAD/ male  HEENT: Anicteric sclera/moist oral mucosa  Neck: Supple/trachea midline  Chest: CTA B   Cor: Regular  Abd.: Soft and nondistended  Extr.:  No peripheral edema  Skin: Warm and dry      DATA:    Stool (measured) : 0 mL  Lab Results   Component Value Date    WBC 8.9 05/12/2021    RBC 2.86 05/12/2021    HGB 7.7 05/12/2021    HCT 23.3 05/12/2021    MCV 81.5 05/12/2021    MCH 26.9 05/12/2021    MCHC 33.0 05/12/2021    RDW 18.5 05/12/2021    PLT 73 05/12/2021    MPV 10.1 05/12/2021     Lab Results   Component Value Date     05/12/2021    K 4.2 05/12/2021    K 4.5 05/10/2021     05/12/2021    CO2 22 05/12/2021    BUN 16 05/12/2021    CREATININE 1.1 05/12/2021    CALCIUM 8.0 05/12/2021    PROT 5.5 05/12/2021    LABALBU 3.0 05/12/2021    LABALBU 3.4 02/18/2012    BILITOT 1.9 05/12/2021    ALKPHOS 75 05/12/2021    AST 63 05/12/2021    ALT 36 05/12/2021     Lab Results   Component Value Date    LIPASE 43 05/10/2021     Lab Results   Component Value Date    AMYLASE 49 02/22/2015         ASSESSMENT/PLAN:    1.  GI bleed/anemia  -EGD with banding yesterday  -Please refer to pertinent procedure note  -Continue octreotide/PPI/antibiotics  -Advance diet     2. Hepatitis C  -Follow-up in the office for treatment     3. Cirrhosis  -Alcohol/hepatitis C    -See orders  Sherine Stanley MD  5/12/2021  12:50 PM    NOTE:  This report was transcribed using voice recognition software.   Every effort was made to ensure accuracy; however, inadvertent computerized transcription errors may be present.

## 2021-05-12 NOTE — PROGRESS NOTES
Department of Internal Medicine        CHIEF COMPLAINT: Hematemesis    Reason for Admission: Upper GI bleed    HISTORY OF PRESENT ILLNESS:      The patient is a 37 y.o. male who presents with acute onset of nausea vomiting with hematemesis starting this morning. Patient then had some dark tarry stools. Patient stated he ate some guacamole last night that his wife made and he usually does not eat that currently patient complaining of mid upper abdominal pain most likely related to the frequent emesis. Patient feels extremely fatigued at this time. Hemoglobin 6.8 on admission. Temperature is 90.3 with patient heart rate of 100, O2 saturation 100% on room air with blood pressure 110/70. Patient will be admitted to the hospital reconsult GI with the patient being discharged back in January 11, 2021 for similar problems with patient having bleeding esophageal varices with banding performed. 5/11/2021  Patient seen and examined on general medical floor. Patient still complaining of some generalized abdominal pain probably related to the multiple emesis yesterday. Patient denies any chest pain. Patient denies unusual shortness of breath. Patient denies any emesis today. BUN/creatinine 19/1.1 today with total bili 2.0 mild elevation AST 53. Hemoglobin 7.1 after 2 units packed RBC patient's platelet count is 73. WBC is normal 6.9 and a INR 1.9. Temperature 90.8 with heart rate of 92. Blood pressure 115/64 with O2 sat 96% on room air at rest.  Urinary output is good. 5/12/2021  Patient seen examined on general medical floor. Patient still has some lower chest discomfort and epigastric pain but moderately improved from yesterday. Patient still had 1 bowel movement with some dark blood yesterday afternoon/evening. BUN/creatinine 16/1.1. Total bili is 1.9 with mild elevation AST. Serum albumin 3.0. Hemoglobin is 7.7 with patient receiving 1 unit of packed RBC yesterday afternoon.   Platelet count 77 and INR 2.0. Temperature 97.6 heart rate 74. Blood pressure currently 108/53 with O2 sat 97% room air at rest.  Patient was put on low fiber diet yesterday afternoon. Appetite is fair.     Past Medical History:    Past Medical History:   Diagnosis Date    Esophageal varices (HCC)      Past Surgical History:    Past Surgical History:   Procedure Laterality Date    TONSILLECTOMY      UPPER GASTROINTESTINAL ENDOSCOPY N/A 11/13/2020    [facility]SY [facility] Andrew Madrid [facility] [address] [facility] N/A 1/6/2021    EGD BAND LIGATION performed by Nusrat Freed MD at [address] N/A 5/10/2021    EGD BAND LIGATION performed by Nusrat Freed MD at [id number]       Medications Prior to Admission:    @  Prior to Admission medications    Not on File       Allergies:  Pcn [penicillins]    Social History:   Social History     Socioeconomic History    Marital status: Single     Spouse name: Not on file    Number of children: Not on file    Years of education: Not on file    Highest education level: Not on file   Occupational History    Not on file   Social Needs    Financial resource strain: Not on file    Food insecurity     Worry: Not on file     Inability: Not on file    Transportation needs     Medical: Not on file     Non-medical: Not on file   Tobacco Use    Smoking status: Former Smoker     Packs/day: 0.50    Smokeless tobacco: Never Used   Substance and Sexual Activity    Alcohol use: Yes     Comment: occasional    Drug use: Not on file     Comment: used drugs in past    Sexual activity: Not on file   Lifestyle    Physical activity     Days per week: Not on file     Minutes per session: Not on file    Stress: Not on file   Relationships    Social connections     Talks on phone: Not on file     Gets together: Not on file     Attends Muslim service: Not on file     Active member of club or organization: Not on file     Attends 05/12/2021    HCT 23.3 05/12/2021    PLT 73 05/12/2021    MCV 81.5 05/12/2021    MCH 26.9 05/12/2021    MCHC 33.0 05/12/2021    RDW 18.5 05/12/2021    SEGSPCT 61 02/20/2012    LYMPHOPCT 5.2 05/12/2021    MONOPCT 2.6 05/12/2021    MYELOPCT 1.7 01/11/2021    BASOPCT 0.1 05/12/2021    MONOSABS 0.27 05/12/2021    LYMPHSABS 0.45 05/12/2021    EOSABS 0.00 05/12/2021    BASOSABS 0.00 05/12/2021     CMP:    Lab Results   Component Value Date     05/12/2021    K 4.2 05/12/2021    K 4.5 05/10/2021     05/12/2021    CO2 22 05/12/2021    BUN 16 05/12/2021    CREATININE 1.1 05/12/2021    GFRAA >60 05/12/2021    LABGLOM >60 05/12/2021    GLUCOSE 133 05/12/2021    GLUCOSE 89 02/20/2012    PROT 5.5 05/12/2021    LABALBU 3.0 05/12/2021    LABALBU 3.4 02/18/2012    CALCIUM 8.0 05/12/2021    BILITOT 1.9 05/12/2021    ALKPHOS 75 05/12/2021    AST 63 05/12/2021    ALT 36 05/12/2021     Magnesium:    Lab Results   Component Value Date    MG 1.8 05/11/2021     Phosphorus:    Lab Results   Component Value Date    PHOS 1.9 05/11/2021     PT/INR:    Lab Results   Component Value Date    PROTIME 24.0 05/12/2021    PROTIME 12.3 02/17/2012    INR 2.0 05/12/2021     Troponin:    Lab Results   Component Value Date    TROPONINI <0.01 01/06/2021     U/A:    Lab Results   Component Value Date    COLORU DARK YELLOW 08/14/2020    PROTEINU TRACE 08/14/2020    PHUR 6.5 08/14/2020    WBCUA 0-1 08/14/2020    RBCUA NONE 08/14/2020    RBCUA 0-1 02/25/2013    MUCUS Present 08/14/2020    BACTERIA RARE 08/14/2020    CLARITYU Clear 08/14/2020    SPECGRAV 1.025 08/14/2020    LEUKOCYTESUR Negative 08/14/2020    UROBILINOGEN 1.0 08/14/2020    BILIRUBINUR MODERATE 08/14/2020    BLOODU Negative 08/14/2020    GLUCOSEU Negative 08/14/2020     ABG:  No results found for: PH, PCO2, PO2, HCO3, BE, THGB, TCO2, O2SAT  HgBA1c:    Lab Results   Component Value Date    LABA1C 4.9 01/09/2021     FLP:  No results found for: TRIG, HDL, LDLCALC, LDLDIRECT, LABVLDL  TSH:    Lab Results   Component Value Date    TSH 2.900 08/14/2020     IRON:    Lab Results   Component Value Date    IRON 67 01/06/2021     LIPASE:    Lab Results   Component Value Date    LIPASE 43 05/10/2021       ASSESSMENT AND PLAN:      Patient Active Problem List    Diagnosis Date Noted    GIB (gastrointestinal bleeding) 05/10/2021    Secondary esophageal varices with bleeding ([address]     Abdominal pain 01/06/2021    UGI bleed 11/13/2020    Cellulitis 02/17/2012     Impression:  1. Acute upper GI bleed-most likely secondary to esophageal varices  2. History hepatitis C-history untreated with underlying cirrhosis  3. Severe microcytic anemia secondary #1  4. Coagulopathy secondary to underlying cirrhosis    Plan:  Admit to general medical floor with remote telemetry  Home meds reviewed  Monitor heart rate, blood pressure, O2 saturation  Patient receiving 2 units packed RBCs in the ED  Consult GI  Lactated Ringer 65 cc an hour  Protonix drip  Octreotide drip 10 cc an hour  Routine H&H following transfusion and as needed  Clear liquid diet  SCDs lower extremity  Activity up ad roney. Zofran 4 mg IV push every 6 hours as needed    Ultram 50 mg twice daily as needed  H&H every 8 hours  Patient received 1 unit of typed and crossed RBC 5/11  Patient still on octreotide drip.       Jade Orr D.O.  5/12/2021  9:06 AM

## 2021-05-13 VITALS
RESPIRATION RATE: 18 BRPM | HEIGHT: 69 IN | SYSTOLIC BLOOD PRESSURE: 110 MMHG | BODY MASS INDEX: 27.4 KG/M2 | OXYGEN SATURATION: 98 % | DIASTOLIC BLOOD PRESSURE: 69 MMHG | HEART RATE: 64 BPM | WEIGHT: 185 LBS | TEMPERATURE: 97.9 F

## 2021-05-13 LAB
HCT VFR BLD CALC: 23.3 % (ref 37–54)
HCT VFR BLD CALC: 24.7 % (ref 37–54)
HEMOGLOBIN: 7.6 G/DL (ref 12.5–16.5)
HEMOGLOBIN: 7.7 G/DL (ref 12.5–16.5)
INR BLD: 2
PROTHROMBIN TIME: 24.1 SEC (ref 9.3–12.4)

## 2021-05-13 PROCEDURE — 85610 PROTHROMBIN TIME: CPT

## 2021-05-13 PROCEDURE — 85018 HEMOGLOBIN: CPT

## 2021-05-13 PROCEDURE — 6360000002 HC RX W HCPCS: Performed by: INTERNAL MEDICINE

## 2021-05-13 PROCEDURE — 6360000002 HC RX W HCPCS: Performed by: STUDENT IN AN ORGANIZED HEALTH CARE EDUCATION/TRAINING PROGRAM

## 2021-05-13 PROCEDURE — 6370000000 HC RX 637 (ALT 250 FOR IP): Performed by: HOSPITALIST

## 2021-05-13 PROCEDURE — 6370000000 HC RX 637 (ALT 250 FOR IP): Performed by: INTERNAL MEDICINE

## 2021-05-13 PROCEDURE — 2580000003 HC RX 258: Performed by: STUDENT IN AN ORGANIZED HEALTH CARE EDUCATION/TRAINING PROGRAM

## 2021-05-13 PROCEDURE — 2580000003 HC RX 258: Performed by: INTERNAL MEDICINE

## 2021-05-13 PROCEDURE — C9113 INJ PANTOPRAZOLE SODIUM, VIA: HCPCS | Performed by: INTERNAL MEDICINE

## 2021-05-13 PROCEDURE — 85014 HEMATOCRIT: CPT

## 2021-05-13 PROCEDURE — 36415 COLL VENOUS BLD VENIPUNCTURE: CPT

## 2021-05-13 RX ORDER — CIPROFLOXACIN 500 MG/1
500 TABLET, FILM COATED ORAL EVERY 12 HOURS SCHEDULED
Qty: 10 TABLET | Refills: 0 | Status: SHIPPED | OUTPATIENT
Start: 2021-05-13 | End: 2021-05-18

## 2021-05-13 RX ORDER — PANTOPRAZOLE SODIUM 40 MG/1
40 TABLET, DELAYED RELEASE ORAL
Status: DISCONTINUED | OUTPATIENT
Start: 2021-05-14 | End: 2021-05-13 | Stop reason: HOSPADM

## 2021-05-13 RX ORDER — PANTOPRAZOLE SODIUM 40 MG/1
40 TABLET, DELAYED RELEASE ORAL
Qty: 30 TABLET | Refills: 3 | Status: SHIPPED | OUTPATIENT
Start: 2021-05-14 | End: 2021-05-21

## 2021-05-13 RX ADMIN — TRAMADOL HYDROCHLORIDE 50 MG: 50 TABLET ORAL at 11:38

## 2021-05-13 RX ADMIN — PANTOPRAZOLE SODIUM 40 MG: 40 INJECTION, POWDER, FOR SOLUTION INTRAVENOUS at 07:57

## 2021-05-13 RX ADMIN — CIPROFLOXACIN 500 MG: 500 TABLET, FILM COATED ORAL at 07:57

## 2021-05-13 RX ADMIN — SODIUM CHLORIDE, POTASSIUM CHLORIDE, SODIUM LACTATE AND CALCIUM CHLORIDE: 600; 310; 30; 20 INJECTION, SOLUTION INTRAVENOUS at 05:11

## 2021-05-13 RX ADMIN — OCTREOTIDE ACETATE 50 MCG/HR: 500 INJECTION, SOLUTION INTRAVENOUS; SUBCUTANEOUS at 05:11

## 2021-05-13 ASSESSMENT — PAIN DESCRIPTION - PAIN TYPE: TYPE: ACUTE PAIN

## 2021-05-13 ASSESSMENT — PAIN DESCRIPTION - DESCRIPTORS: DESCRIPTORS: SORE

## 2021-05-13 ASSESSMENT — PAIN DESCRIPTION - LOCATION: LOCATION: ABDOMEN

## 2021-05-13 NOTE — DISCHARGE SUMMARY
Department of Internal Medicine        CHIEF COMPLAINT: Hematemesis    Reason for Admission: Upper GI bleed    HISTORY OF PRESENT ILLNESS:      The patient is a [address] y.[address] male who presents with acute onset of nausea vomiting with hematemesis starting this morning. Patient then had some dark tarry stools. Patient stated he ate some guacamole last night that his wife made and he usually does not eat that currently patient complaining of mid upper abdominal pain most likely related to the frequent emesis. Patient feels extremely fatigued at this time. Hemoglobin 6.8 on admission. Temperature is 90.3 with patient heart rate of 100, O2 saturation 100% on room air with blood pressure 110/70. Patient will be admitted to the hospital reconsult GI with the patient being discharged back in January 11, 2021 for similar problems with patient having bleeding esophageal varices with banding performed. 5/11/2021  Patient seen and examined on general medical floor. Patient still complaining of some generalized abdominal pain probably related to the multiple emesis yesterday. Patient denies any chest pain. Patient denies unusual shortness of breath. Patient denies any emesis today. BUN/creatinine 19/1.1 today with total bili 2.0 mild elevation AST 53. Hemoglobin 7.1 after 2 units packed RBC patient's platelet count is 73. WBC is normal 6.9 and a INR 1.9. Temperature 90.8 with heart rate of 92. Blood pressure 115/64 with O2 sat 96% on room air at rest.  Urinary output is good. 5/12/2021  Patient seen examined on general medical floor. Patient still has some lower chest discomfort and epigastric pain but moderately improved from yesterday. Patient still had 1 bowel movement with some dark blood yesterday afternoon/evening. BUN/creatinine 16/1.1. Total bili is 1.9 with mild elevation AST. Serum albumin 3.0. Hemoglobin is 7.7 with patient receiving 1 unit of packed RBC yesterday afternoon.   Platelet count 77 and INR 2.0. Temperature 97.6 heart rate 74. Blood pressure currently 108/53 with O2 sat 97% room air at rest.  Patient was put on low fiber diet yesterday afternoon. Appetite is fair. 5/13/2021  Patient seen examined on general medical floor. Patient states he feels very good today. Patient denies any chest or abdominal pain. Patient's heartburn discomfort has been markedly improved. Patient denies any dizziness with activity. Hemoglobin 7.7 today. Temperature 97.9 with heart rate of 64. Blood pressure 110/69. O2 sat 96% on room air at rest.  Patient can be discharged home when okay with GI    Patient stable for discharge    Past Medical History:    Past Medical History:   Diagnosis Date    Esophageal varices ([facility] [address]      Past Surgical History:    Past Surgical History:   Procedure Laterality Date    TONSILLECTOMY      UPPER GASTROINTESTINAL ENDOSCOPY N/A 11/13/2020    EGD BIOPSY performed by Main Yates MD at [address] N/A 1/6/2021    EGD BAND LIGATION performed by Crow Choe MD at [address] [facility] N/A 5/10/2021    EGD BAND LIGATION performed by Crow Choe MD at [address]       Medications Prior to Admission:    @  Prior to Admission medications    Medication Sig Start Date End Date Taking?  Authorizing Provider   ciprofloxacin (CIPRO) 500 MG tablet Take 1 tablet by mouth every 12 hours for 5 days 5/13/21 5/18/21 Yes Tracy Llanes MD   pantoprazole (PROTONIX) 40 MG tablet Take 1 tablet by mouth every morning (before breakfast) 5/14/21  Yes Tracy Llanes MD       Allergies:  Pcn [penicillins]    Social History:   Social History     Socioeconomic History    Marital status: Single     Spouse name: Not on file    Number of children: Not on file    Years of education: Not on file    Highest education level: Not on file   Occupational History    Not on file   Social Needs    Financial resource strain: Not on file    Food insecurity     Worry: Not on file     Inability: Not on file    Transportation needs     Medical: Not on file     Non-medical: Not on file   Tobacco Use    Smoking status: Former Smoker     Packs/day: 0.50    Smokeless tobacco: Never Used   Substance and Sexual Activity    Alcohol use: Yes     Comment: occasional    Drug use: Not on file     Comment: used drugs in past    Sexual activity: Not on file   Lifestyle    Physical activity     Days per week: Not on file     Minutes per session: Not on file    Stress: Not on file   Relationships    Social connections     Talks on phone: Not on file     Gets together: Not on file     Attends Quaker service: Not on file     Active member of club or organization: Not on file     Attends meetings of clubs or organizations: Not on file     Relationship status: Not on file    Intimate partner violence     Fear of current or ex partner: Not on file     Emotionally abused: Not on file     Physically abused: Not on file     Forced sexual activity: Not on file   Other Topics Concern    Not on file   Social History Narrative    Not on file       Family History:   History reviewed. No pertinent family history. REVIEW OF SYSTEMS:    Gen: Patient denies any lightheadedness or dizziness. No LOC or syncope. No fevers or chills. HEENT: No earache, sore throat or nasal congestion. Resp: Denies cough, hemoptysis or sputum production. Cardiac: Denies chest pain, SOB, diaphoresis or palpitations. GI: + nausea, vomiting, upper abdominal pain. No diarrhea or constipation.  + Hematemesis, melena. No hematochezia. : No urinary complaints, dysuria, hematuria or frequency. MSK: No extremity weakness, paralysis or paresthesias.      PHYSICAL EXAM:    Vitals:  /69   Pulse 64   Temp 97.9 °F (36.6 °C) (Oral)   Resp 18   Ht 5' 9\" (1.753 m)   Wt 185 lb (83.9 kg)   SpO2 98%   BMI 27.32 kg/m²     General:  This is a 37 y.o. yo male who is alert and oriented in NAD  HEENT:  Head is normocephalic and atraumatic, PERRLA, EOMI, mucus membranes moist with no pharyngeal erythema or exudate. Neck:  Supple with no carotid bruits, JVD or thyromegaly.   No cervical adenopathy  CV:  Regular rate and rhythm, no murmurs  Lungs:  Clear to auscultation bilaterally with no wheezes, rales or rhonchi  Abdomen:  + Mid upper abdominal tenderness, nondistended, bowel sounds present  Extremities:  No edema, peripheral pulses intact bilaterally  Neuro:  Cranial nerves II-XII grossly intact; motor and sensory function intact with no focal deficits  Skin:  No rashes, lesions or wounds    DATA:  CBC with Differential:    Lab Results   Component Value Date    WBC 8.9 05/12/2021    RBC 2.86 05/12/2021    HGB 7.7 05/13/2021    HCT 24.7 05/13/2021    PLT 73 05/12/2021    MCV 81.5 05/12/2021    MCH 26.9 05/12/2021    MCHC 33.0 05/12/2021    RDW 18.5 05/12/2021    SEGSPCT 61 02/20/2012    LYMPHOPCT 5.2 05/12/2021    MONOPCT 2.6 05/12/2021    MYELOPCT 1.7 01/11/2021    BASOPCT 0.1 05/12/2021    MONOSABS 0.27 05/12/2021    LYMPHSABS 0.45 05/12/2021    EOSABS 0.00 05/12/2021    BASOSABS 0.00 05/12/2021     CMP:    Lab Results   Component Value Date     05/12/2021    K 4.2 05/12/2021    K 4.5 05/10/2021     05/12/2021    CO2 22 05/12/2021    BUN 16 05/12/2021    CREATININE 1.1 05/12/2021    GFRAA >60 05/12/2021    LABGLOM >60 05/12/2021    GLUCOSE 133 05/12/2021    GLUCOSE 89 02/20/2012    PROT 5.5 05/12/2021    LABALBU 3.0 05/12/2021    LABALBU 3.4 02/18/2012    CALCIUM 8.0 05/12/2021    BILITOT 1.9 05/12/2021    ALKPHOS 75 05/12/2021    AST 63 05/12/2021    ALT 36 05/12/2021     Magnesium:    Lab Results   Component Value Date    MG 1.8 05/11/2021     Phosphorus:    Lab Results   Component Value Date    PHOS 1.9 05/11/2021     PT/INR:    Lab Results   Component Value Date    PROTIME 24.1 05/13/2021    PROTIME 12.3 02/17/2012    INR 2.0 05/13/2021     Troponin: Lab Results   Component Value Date    TROPONINI <0.01 01/06/2021     U/A:    Lab Results   Component Value Date    COLORU DARK YELLOW 08/14/2020    PROTEINU TRACE 08/14/2020    PHUR 6.5 08/14/2020    WBCUA 0-1 08/14/2020    RBCUA NONE 08/14/2020    RBCUA 0-1 02/25/2013    MUCUS Present 08/14/2020    BACTERIA RARE 08/14/2020    CLARITYU Clear 08/14/2020    SPECGRAV 1.025 08/14/2020    LEUKOCYTESUR Negative 08/14/2020    UROBILINOGEN 1.0 08/14/2020    BILIRUBINUR MODERATE 08/14/2020    BLOODU Negative 08/14/2020    GLUCOSEU Negative 08/14/2020     ABG:  No results found for: PH, PCO2, PO2, HCO3, BE, THGB, TCO2, O2SAT  HgBA1c:    Lab Results   Component Value Date    LABA1C 4.9 01/09/2021     FLP:  No results found for: TRIG, HDL, LDLCALC, LDLDIRECT, LABVLDL  TSH:    Lab Results   Component Value Date    TSH 2.900 08/14/2020     IRON:    Lab Results   Component Value Date    IRON 67 01/06/2021     LIPASE:    Lab Results   Component Value Date    LIPASE 43 05/10/2021       ASSESSMENT AND PLAN:      Patient Active Problem List    Diagnosis Date Noted    GIB (gastrointestinal bleeding) 05/10/2021    Secondary esophageal varices with bleeding ([facility] [address]     Abdominal pain 01/06/2021    UGI bleed 11/13/2020    Cellulitis 02/17/2012     Impression:  1. Acute upper GI bleed-most likely secondary to esophageal varices  2. History hepatitis C-history untreated with underlying cirrhosis  3. Severe microcytic anemia secondary #1  4.   Coagulopathy secondary to underlying cirrhosis    Plan:  Discharge home today  Prescription for Cipro 500 mg twice daily for 5 days  Prescription for Protonix 40 mg p.o. daily    Patient follow-up with GI as directed regarding his cirrhosis and bleeding    Patient follow-up primary care physician in 1 week or as directed    Stacia Turpin D.O.  5/13/2021  2:53 PM

## 2021-05-13 NOTE — PROGRESS NOTES
CLINICAL PHARMACY NOTE: MEDS TO [address] [place] [address] Drive Select Patient?: No  Total # of Prescriptions Filled: 2   The following medications were delivered to the patient:  · Pantoprazole sodium 40 mg  · Ciprofloxacin 500 mg  Total # of Interventions Completed: 3  Time Spent (min): 15    Additional Documentation:

## 2021-05-13 NOTE — PROGRESS NOTES
Department of Internal Medicine        CHIEF COMPLAINT: Hematemesis    Reason for Admission: Upper GI bleed    HISTORY OF PRESENT ILLNESS:      The patient is a 37 y.o. male who presents with acute onset of nausea vomiting with hematemesis starting this morning. Patient then had some dark tarry stools. Patient stated he ate some guacamole last night that his wife made and he usually does not eat that currently patient complaining of mid upper abdominal pain most likely related to the frequent emesis. Patient feels extremely fatigued at this time. Hemoglobin 6.8 on admission. Temperature is 90.3 with patient heart rate of 100, O2 saturation 100% on room air with blood pressure 110/70. Patient will be admitted to the hospital reconsult GI with the patient being discharged back in January 11, 2021 for similar problems with patient having bleeding esophageal varices with banding performed. 5/11/2021  Patient seen and examined on general medical floor. Patient still complaining of some generalized abdominal pain probably related to the multiple emesis yesterday. Patient denies any chest pain. Patient denies unusual shortness of breath. Patient denies any emesis today. BUN/creatinine 19/1.1 today with total bili 2.0 mild elevation AST 53. Hemoglobin 7.1 after 2 units packed RBC patient's platelet count is 73. WBC is normal 6.9 and a INR 1.9. Temperature 90.8 with heart rate of 92. Blood pressure 115/64 with O2 sat 96% on room air at rest.  Urinary output is good. 5/12/2021  Patient seen examined on general medical floor. Patient still has some lower chest discomfort and epigastric pain but moderately improved from yesterday. Patient still had 1 bowel movement with some dark blood yesterday afternoon/evening. BUN/creatinine 16/1.1. Total bili is 1.9 with mild elevation AST. Serum albumin 3.0. Hemoglobin is 7.7 with patient receiving 1 unit of packed RBC yesterday afternoon.   Platelet count 77 and INR 2.0. Temperature 97.6 heart rate 74. Blood pressure currently 108/53 with O2 sat 97% room air at rest.  Patient was put on low fiber diet yesterday afternoon. Appetite is fair. 5/13/2021  Patient seen examined on general medical floor. Patient states he feels very good today. Patient denies any chest or abdominal pain. Patient's heartburn discomfort has been markedly improved. Patient denies any dizziness with activity. Hemoglobin 7.7 today. Temperature 97.9 with heart rate of 64. Blood pressure 110/69.   O2 sat 96% on room air at rest.  Patient can be discharged home when okay with GI    Past Medical History:    Past Medical History:   Diagnosis Date    Esophageal varices ([facility] [address]      Past Surgical History:    Past Surgical History:   Procedure Laterality Date    TONSILLECTOMY      UPPER GASTROINTESTINAL ENDOSCOPY N/A 11/13/2020    EGD BIOPSY performed by Main Yates MD at [address] [place] [address],Third Floor N/A 1/6/2021    EGD BAND LIGATION performed by Crow Choe MD at [address] [place] [address],[facility] N/A 5/10/2021    EGD BAND LIGATION performed by Crow Choe MD at [address]       Medications Prior to Admission:    @  Prior to Admission medications    Not on File       Allergies:  Pcn [penicillins]    Social History:   Social History     Socioeconomic History    Marital status: Single     Spouse name: Not on file    Number of children: Not on file    Years of education: Not on file    Highest education level: Not on file   Occupational History    Not on file   Social Needs    Financial resource strain: Not on file    Food insecurity     Worry: Not on file     Inability: Not on file    Transportation needs     Medical: Not on file     Non-medical: Not on file   Tobacco Use    Smoking status: Former Smoker     Packs/day: 0.50    Smokeless tobacco: Never Used   Substance and Sexual Activity    Alcohol use: Yes     Comment: occasional    Drug use: Not on file     Comment: used drugs in past    Sexual activity: Not on file   Lifestyle    Physical activity     Days per week: Not on file     Minutes per session: Not on file    Stress: Not on file   Relationships    Social connections     Talks on phone: Not on file     Gets together: Not on file     Attends Taoism service: Not on file     Active member of club or organization: Not on file     Attends meetings of clubs or organizations: Not on file     Relationship status: Not on file    Intimate partner violence     Fear of current or ex partner: Not on file     Emotionally abused: Not on file     Physically abused: Not on file     Forced sexual activity: Not on file   Other Topics Concern    Not on file   Social History Narrative    Not on file       Family History:   History reviewed. No pertinent family history. REVIEW OF SYSTEMS:    Gen: Patient denies any lightheadedness or dizziness. No LOC or syncope. No fevers or chills. HEENT: No earache, sore throat or nasal congestion. Resp: Denies cough, hemoptysis or sputum production. Cardiac: Denies chest pain, SOB, diaphoresis or palpitations. GI: + nausea, vomiting, upper abdominal pain. No diarrhea or constipation.  + Hematemesis, melena. No hematochezia. : No urinary complaints, dysuria, hematuria or frequency. MSK: No extremity weakness, paralysis or paresthesias. PHYSICAL EXAM:    Vitals:  /69   Pulse 64   Temp 97.9 °F (36.6 °C) (Oral)   Resp 18   Ht 5' 9\" (1.753 m)   Wt 185 lb (83.9 kg)   SpO2 96%   BMI 27.32 kg/m²     General:  This is a 37 y.o. yo male who is alert and oriented in NAD  HEENT:  Head is normocephalic and atraumatic, PERRLA, EOMI, mucus membranes moist with no pharyngeal erythema or exudate. Neck:  Supple with no carotid bruits, JVD or thyromegaly.   No cervical adenopathy  CV:  Regular rate and rhythm, no murmurs  Lungs:  Clear to auscultation bilaterally with no wheezes, rales or rhonchi  Abdomen:  + Mid upper abdominal tenderness, nondistended, bowel sounds present  Extremities:  No edema, peripheral pulses intact bilaterally  Neuro:  Cranial nerves II-XII grossly intact; motor and sensory function intact with no focal deficits  Skin:  No rashes, lesions or wounds    DATA:  CBC with Differential:    Lab Results   Component Value Date    WBC 8.9 05/12/2021    RBC 2.86 05/12/2021    HGB 7.7 05/13/2021    HCT 24.7 05/13/2021    PLT 73 05/12/2021    MCV 81.5 05/12/2021    MCH 26.9 05/12/2021    MCHC 33.0 05/12/2021    RDW 18.5 05/12/2021    SEGSPCT 61 02/20/2012    LYMPHOPCT 5.2 05/12/2021    MONOPCT 2.6 05/12/2021    MYELOPCT 1.7 01/11/2021    BASOPCT 0.1 05/12/2021    MONOSABS 0.27 05/12/2021    LYMPHSABS 0.45 05/12/2021    EOSABS 0.00 05/12/2021    BASOSABS 0.00 05/12/2021     CMP:    Lab Results   Component Value Date     05/12/2021    K 4.2 05/12/2021    K 4.5 05/10/2021     05/12/2021    CO2 22 05/12/2021    BUN 16 05/12/2021    CREATININE 1.1 05/12/2021    GFRAA >60 05/12/2021    LABGLOM >60 05/12/2021    GLUCOSE 133 05/12/2021    GLUCOSE 89 02/20/2012    PROT 5.5 05/12/2021    LABALBU 3.0 05/12/2021    LABALBU 3.4 02/18/2012    CALCIUM 8.0 05/12/2021    BILITOT 1.9 05/12/2021    ALKPHOS 75 05/12/2021    AST 63 05/12/2021    ALT 36 05/12/2021     Magnesium:    Lab Results   Component Value Date    MG 1.8 05/11/2021     Phosphorus:    Lab Results   Component Value Date    PHOS 1.9 05/11/2021     PT/INR:    Lab Results   Component Value Date    PROTIME 24.1 05/13/2021    PROTIME 12.3 02/17/2012    INR 2.0 05/13/2021     Troponin:    Lab Results   Component Value Date    TROPONINI <0.01 01/06/2021     U/A:    Lab Results   Component Value Date    COLORU DARK YELLOW 08/14/2020    PROTEINU TRACE 08/14/2020    PHUR 6.5 08/14/2020    WBCUA 0-1 08/14/2020    RBCUA NONE 08/14/2020    RBCUA 0-1 02/25/2013    MUCUS Present 08/14/2020    BACTERIA RARE 08/14/2020    CLARITYU Clear 08/14/2020    SPECGRAV 1.025 08/14/2020    LEUKOCYTESUR Negative 08/14/2020    UROBILINOGEN 1.0 08/14/2020    BILIRUBINUR MODERATE 08/14/2020    BLOODU Negative 08/14/2020    GLUCOSEU Negative 08/14/2020     ABG:  No results found for: PH, PCO2, PO2, HCO3, BE, THGB, TCO2, O2SAT  HgBA1c:    Lab Results   Component Value Date    LABA1C 4.9 01/09/2021     FLP:  No results found for: TRIG, HDL, LDLCALC, LDLDIRECT, LABVLDL  TSH:    Lab Results   Component Value Date    TSH 2.900 08/14/2020     IRON:    Lab Results   Component Value Date    IRON 67 01/06/2021     LIPASE:    Lab Results   Component Value Date    LIPASE 43 05/10/2021       ASSESSMENT AND PLAN:      Patient Active Problem List    Diagnosis Date Noted    GIB (gastrointestinal bleeding) 05/10/2021    Secondary esophageal varices with bleeding ([address]     Abdominal pain 01/06/2021    UGI bleed 11/13/2020    Cellulitis 02/17/2012     Impression:  1. Acute upper GI bleed-most likely secondary to esophageal varices  2. History hepatitis C-history untreated with underlying cirrhosis  3. Severe microcytic anemia secondary #1  4. Coagulopathy secondary to underlying cirrhosis    Plan:  Admit to general medical floor with remote telemetry  Home meds reviewed  Monitor heart rate, blood pressure, O2 saturation  Patient receiving 2 units packed RBCs in the ED  Consult GI  Octreotide drip 10 cc an hour  Routine H&H following transfusion and as needed  Clear liquid diet  SCDs lower extremity  Activity up ad roney. Zofran 4 mg IV push every 6 hours as needed    Ultram 50 mg twice daily as needed  H&H every 8 hours  Patient received 1 unit of typed and crossed RBC 5/11  Patient still on octreotide drip.     Discharge home when okay with GI  Stop IV fluids  GI to write for any new meds on discharge      Alcon Abbott D.O.  5/13/2021  10:01 AM

## 2021-05-21 ENCOUNTER — HOSPITAL ENCOUNTER (EMERGENCY)
Age: 41
Discharge: HOME | End: 2021-05-21
Attending: EMERGENCY MEDICINE
Payer: COMMERCIAL

## 2021-05-21 VITALS
WEIGHT: 180 LBS | DIASTOLIC BLOOD PRESSURE: 72 MMHG | TEMPERATURE: 97.8 F | SYSTOLIC BLOOD PRESSURE: 153 MMHG | OXYGEN SATURATION: 100 % | HEART RATE: 102 BPM | RESPIRATION RATE: 20 BRPM | BODY MASS INDEX: 26.66 KG/M2 | HEIGHT: 69 IN

## 2021-05-21 DIAGNOSIS — T50.901A ACCIDENTAL DRUG OVERDOSE, INITIAL ENCOUNTER: Primary | ICD-10-CM

## 2021-05-21 LAB
CHP ED QC CHECK: YES
GLUCOSE BLD-MCNC: 111 MG/DL
METER GLUCOSE: 111 MG/DL (ref 74–99)

## 2021-05-21 PROCEDURE — 99283 EMERGENCY DEPT VISIT LOW MDM: CPT

## 2021-05-21 PROCEDURE — 82962 GLUCOSE BLOOD TEST: CPT

## 2021-05-21 PROCEDURE — 6370000000 HC RX 637 (ALT 250 FOR IP): Performed by: EMERGENCY MEDICINE

## 2021-05-21 RX ORDER — ONDANSETRON 4 MG/1
4 TABLET, ORALLY DISINTEGRATING ORAL ONCE
Status: COMPLETED | OUTPATIENT
Start: 2021-05-21 | End: 2021-05-21

## 2021-05-21 RX ADMIN — ONDANSETRON 4 MG: 4 TABLET, ORALLY DISINTEGRATING ORAL at 15:14

## 2021-05-21 NOTE — ED PROVIDER NOTES
HPI:  5/21/21,   Time: 2:50 PM EDT           March Bobby MENA is a 37 y.o. male presenting to the ED for Drug Overdose, beginning several minutes ago. The complaint has been persistent, moderate in severity, and worsened by nothing. Patient was found in his car unresponsive received a 1 mg of Narcan intranasally. Patient thought he was going fentanyl with he was actually doing heroin reports a history of drug abuse in the past.  Also reported history of esophageal varices from chronic alcohol use. Is been vomiting gastric contents for EMS. He has abdominal pain is epigastric. He has no chest pain or shortness of breath. ROS:   Pertinent positives and negatives are stated within HPI, all other systems reviewed and are negative.  --------------------------------------------- PAST HISTORY ---------------------------------------------  Past Medical History:  has a past medical history of Esophageal varices ([facility] [address] Past Surgical History:  has a past surgical history that includes Upper gastrointestinal endoscopy (N/A, 11/13/2020); Tonsillectomy; Upper gastrointestinal endoscopy (N/A, 1/6/2021); and Upper gastrointestinal endoscopy (N/A, 5/10/2021). Social History:  reports that he has quit smoking. He smoked 0.50 packs per day. He has never used smokeless tobacco. He reports current alcohol use. Family History: family history is not on file. The patients home medications have been reviewed. Allergies: Pcn [penicillins]    -------------------------------------------------- RESULTS -------------------------------------------------  All laboratory and radiology results have been personally reviewed by myself   LABS:  Results for orders placed or performed during the hospital encounter of 05/21/21   POCT Glucose   Result Value Ref Range    Glucose 111 mg/dL    QC OK?  yes    POCT Glucose   Result Value Ref Range    Meter Glucose 111 (H) 74 - 99 mg/dL       RADIOLOGY:  Interpreted by Radiologist.  No orders to display       ------------------------- NURSING NOTES AND VITALS REVIEWED ---------------------------   The nursing notes within the ED encounter and vital signs as below have been reviewed. BP (!) 153/72   Pulse 102   Temp 97.8 °F (36.6 °C) (Infrared)   Resp 20   Ht 5' 9\" (1.753 m)   Wt 180 lb (81.6 kg)   SpO2 100%   BMI 26.58 kg/m²   Oxygen Saturation Interpretation: Normal      ---------------------------------------------------PHYSICAL EXAM--------------------------------------      Constitutional/General: Alert and oriented x3, mild distress  Head: NC/AT  Eyes: PERRL, EOMI  Mouth: Oropharynx clear, handling secretions, no trismus  Neck: Supple, full ROM, no meningeal signs  Pulmonary: Lungs clear to auscultation bilaterally, no wheezes, rales, or rhonchi. Not in respiratory distress  Cardiovascular:  Regular rate and rhythm, no murmurs, gallops, or rubs. 2+ distal pulses  Abdomen: Soft, non tender, non distended,   Extremities: Moves all extremities x 4. Warm and well perfused  Skin: warm, without rash, multiple tattoos. Skin avoids moist  Neurologic: GCS 15, no focal deficits  Psych: Normal Affect      ------------------------------ ED COURSE/MEDICAL DECISION MAKING----------------------  Medications   ondansetron (ZOFRAN-ODT) disintegrating tablet 4 mg (4 mg Oral Given 5/21/21 2704)         Medical Decision Making:    Was admitted to the ED for observation patient eloped before disposition. He was given Zofran ODT for his nausea. Counseling: The emergency provider has spoken with the patient and discussed todays results, in addition to providing specific details for the plan of care and counseling regarding the diagnosis and prognosis. Questions are answered at this time and they are agreeable with the plan.      --------------------------------- IMPRESSION AND DISPOSITION ---------------------------------    IMPRESSION  1.  Accidental drug overdose, initial encounter        DISPOSITION  Disposition: Other Disposition: Eloped  Patient condition is stable                Leo Campos DO  05/21/21 [id number]

## 2021-07-29 ENCOUNTER — INITIAL CONSULT (OUTPATIENT)
Dept: SURGERY | Age: 41
End: 2021-07-29
Payer: COMMERCIAL

## 2021-07-29 VITALS
OXYGEN SATURATION: 98 % | HEIGHT: 69 IN | DIASTOLIC BLOOD PRESSURE: 69 MMHG | WEIGHT: 170 LBS | BODY MASS INDEX: 25.18 KG/M2 | HEART RATE: 78 BPM | SYSTOLIC BLOOD PRESSURE: 106 MMHG | TEMPERATURE: 96.6 F | RESPIRATION RATE: 18 BRPM

## 2021-07-29 DIAGNOSIS — K42.9 UMBILICAL HERNIA WITHOUT OBSTRUCTION AND WITHOUT GANGRENE: Primary | ICD-10-CM

## 2021-07-29 PROCEDURE — 99214 OFFICE O/P EST MOD 30 MIN: CPT | Performed by: SURGERY

## 2021-07-29 PROCEDURE — G8419 CALC BMI OUT NRM PARAM NOF/U: HCPCS | Performed by: SURGERY

## 2021-07-29 PROCEDURE — 1036F TOBACCO NON-USER: CPT | Performed by: SURGERY

## 2021-07-29 PROCEDURE — G8427 DOCREV CUR MEDS BY ELIG CLIN: HCPCS | Performed by: SURGERY

## 2021-07-29 RX ORDER — FUROSEMIDE 40 MG/1
40 TABLET ORAL 2 TIMES DAILY
COMMUNITY
End: 2022-02-04

## 2021-07-29 RX ORDER — SPIRONOLACTONE 100 MG/1
100 TABLET, FILM COATED ORAL DAILY
COMMUNITY

## 2021-07-29 NOTE — PROGRESS NOTES
General Surgery History and Physical    Patient's Name/Date of Birth: Angeles Engle III / 1980    Date: July 29, 2021     Surgeon: Angelica Zhou M.D.    PCP: Raul Lewis MD     Chief Complaint: umbilical hernia and liver disease    HPI:   Angeles Engle III is a 37 y.o. male who presents for evaluation of umbilical hernia that is enlarging and becoming more painful, tolerating a diet and moving bowels. Past Medical History:   Diagnosis Date    Esophageal varices ([address]        Past Surgical History:   Procedure Laterality Date    TONSILLECTOMY      UPPER GASTROINTESTINAL ENDOSCOPY N/A 11/13/2020    EGD BIOPSY performed by Ryan Farrar MD at [address] 1/6/2021    EGD BAND LIGATION performed by Diego Hoffman MD at [address] 5/10/2021    EGD BAND LIGATION performed by Diego Hoffman MD at [address]       Current Outpatient Medications   Medication Sig Dispense Refill    spironolactone (ALDACTONE) 100 MG tablet Take 100 mg by mouth daily      furosemide (LASIX) 40 MG tablet Take 40 mg by mouth 2 times daily       No current facility-administered medications for this visit. Allergies   Allergen Reactions    Pcn [Penicillins] Anaphylaxis       The patient has a family history that is negative for severe cardiovascular or respiratory issues, negative for reaction to anesthesia.     Social History     Socioeconomic History    Marital status: Single     Spouse name: Not on file    Number of children: Not on file    Years of education: Not on file    Highest education level: Not on file   Occupational History    Not on file   Tobacco Use    Smoking status: Former Smoker     Packs/day: 0.50    Smokeless tobacco: Never Used   Vaping Use    Vaping Use: Never used   Substance and Sexual Activity    Alcohol use: Yes     Comment: occasional    Drug use: Not on file     Comment: used drugs in past    Sexual activity: Not on file   Other Topics Concern    Not on file   Social History Narrative    Not on file     Social Determinants of Health     Financial Resource Strain:     Difficulty of Paying Living Expenses:    Food Insecurity:     Worried About Running Out of Food in the Last Year:     [address] in the Last Year:    Transportation Needs:     Lack of Transportation (Medical):      Lack of Transportation (Non-Medical):    Physical Activity:     Days of Exercise per Week:     Minutes of Exercise per Session:    Stress:     Feeling of Stress :    Social Connections:     Frequency of Communication with Friends and Family:     Frequency of Social Gatherings with Friends and Family:     Attends Mormonism Services:     Active Member of Clubs or Organizations:     Attends Club or Organization Meetings:     Marital Status:    Intimate Partner Violence:     Fear of Current or Ex-Partner:     Emotionally Abused:     Physically Abused:     Sexually Abused:            Review of Systems  Review of Systems -  General ROS: negative for - chills, fatigue or malaise  ENT ROS: negative for - hearing change, nasal congestion or nasal discharge  Allergy and Immunology ROS: negative for - hives, itchy/watery eyes or nasal congestion  Hematological and Lymphatic ROS: negative for - blood clots, blood transfusions, bruising or fatigue  Endocrine ROS: negative for - malaise/lethargy, mood swings, palpitations or polydipsia/polyuria  Breast ROS: negative for - new or changing breast lumps or nipple changes  Respiratory ROS: negative for - sputum changes, stridor, tachypnea or wheezing  Cardiovascular ROS: negative for - irregular heartbeat, loss of consciousness, murmur or orthopnea  Gastrointestinal ROS: negative for - constipation, diarrhea, gas/bloating, heartburn or hematemesis  Genito-Urinary ROS: negative for -  genital discharge, genital ulcers or hematuria  Musculoskeletal ROS: negative for - gait disturbance, muscle pain or muscular weakness    Physical exam:   /69 (Site: Right Upper Arm, Position: Sitting, Cuff Size: Medium Adult)   Pulse 78   Temp 96.6 °F (35.9 °C) (Temporal)   Resp 18   Ht 5' 9\" (1.753 m)   Wt 170 lb (77.1 kg)   SpO2 98%   BMI 25.10 kg/m²   General appearance:  NAD  Pyscho/social: negative for tremors and hallucinations  Head: NCAT, PERRLA, EOMI, red conjunctiva  Neck: supple, no masses  Lungs: CTAB, equal chest rise bilateral  Heart: Reg rate  Abdomen: soft, minimally tender around reducible umbilical hernia 2cm in size, nondistended, minimal fluid wave  Skin; no lesions  Gu: no cva tenderness  Extremities: extremities normal, atraumatic, no cyanosis or edema    Assessment:  37 y.o. male with umbilical hernia and liver disease without signs of large ascites    Plan: To OR for repair  Discussed the risk, benefits and alternatives of surgery including wound infections, bleeding, scar  and the risks of  anesthetic including MI, CVA, sudden death or reactions to anesthetic medications. The patient understands the risks and alternatives. All questions were answered to the patient's satisfaction and they freely signed the consent.     Chris Edmondson MD  9:03 AM  7/29/2021

## 2021-08-02 ENCOUNTER — TELEPHONE (OUTPATIENT)
Dept: SURGERY | Age: 41
End: 2021-08-02

## 2021-08-02 NOTE — TELEPHONE ENCOUNTER
Patient provided with surgery instructions during office visit. Patient scheduled for follow up visit with Dr. Roberto Moreau on 09/23/2021. Surgery scheduling form faxed and confirmation received.     Electronically signed by Tamela Turpin MA on 8/2/2021 at 8:58 AM

## 2021-08-02 NOTE — TELEPHONE ENCOUNTER
Prior Authorization Form:      DEMOGRAPHICS:                     Patient Name:  Edgard Ingram III  Patient :  1980            Insurance:  Payor: Willy Best / Plan: Husam Hernandez / Product Type: *No Product type* /   Insurance ID Number:    Payor/Plan Subscr  Sex Relation Sub.  Ins. ID Effective Group Num   1. Keke Rose* 1980 Male Self [id number]                                    [id number]         DIAGNOSIS & PROCEDURE:                       Procedure/Operation: lap poss open umbilical hernia repair w/ mesh           CPT Code: 66362    Diagnosis:  Umbilical hernia    KCG37 Code: K42.9    [facility]  [facility]    Surgeon:  Haleigh Polk INFORMATION:                          Date: 09/10/2021    Time:               Anesthesia:                                                         Status:  Outpatient        Special Comments:           Electronically signed by Estefany Bryant MA on 2021 at 8:58 AM

## 2021-08-04 ENCOUNTER — PREP FOR PROCEDURE (OUTPATIENT)
Dept: SURGERY | Age: 41
End: 2021-08-04

## 2021-08-04 RX ORDER — SODIUM CHLORIDE, SODIUM LACTATE, POTASSIUM CHLORIDE, CALCIUM CHLORIDE 600; 310; 30; 20 MG/100ML; MG/100ML; MG/100ML; MG/100ML
INJECTION, SOLUTION INTRAVENOUS CONTINUOUS
Status: CANCELLED | OUTPATIENT
Start: 2021-08-04

## 2021-08-04 RX ORDER — SODIUM CHLORIDE 0.9 % (FLUSH) 0.9 %
5-40 SYRINGE (ML) INJECTION PRN
Status: CANCELLED | OUTPATIENT
Start: 2021-08-04

## 2021-08-04 RX ORDER — SODIUM CHLORIDE 0.9 % (FLUSH) 0.9 %
5-40 SYRINGE (ML) INJECTION EVERY 12 HOURS SCHEDULED
Status: CANCELLED | OUTPATIENT
Start: 2021-08-04

## 2021-08-04 RX ORDER — SODIUM CHLORIDE 9 MG/ML
25 INJECTION, SOLUTION INTRAVENOUS PRN
Status: CANCELLED | OUTPATIENT
Start: 2021-08-04

## 2021-09-09 ENCOUNTER — TELEPHONE (OUTPATIENT)
Dept: SURGERY | Age: 41
End: 2021-09-09

## 2021-09-09 NOTE — TELEPHONE ENCOUNTER
Patient phoned the office to cancel his surgery due to transportation. Patient would like to have surgery 9.15.2021 with Dr. Beatrice Earl. Phone call placed to surgery scheduling to move patients surgery. Chart forwarded to Dr. Beatrice Earl to inform him of date change.   Electronically signed by Maureen Dickson on 9/9/21 at 2:54 PM EDT

## 2021-09-09 NOTE — TELEPHONE ENCOUNTER
Patient called to cancel surgery for tomorrow due to transportation. He wants to reschedule for sometime next week. I did call surgery scheduling and cancel as well as his follow up appointment. Jacque More will call his to reschedule.

## 2021-09-13 ENCOUNTER — PREP FOR PROCEDURE (OUTPATIENT)
Dept: SURGERY | Age: 41
End: 2021-09-13

## 2021-09-13 RX ORDER — SODIUM CHLORIDE 9 MG/ML
25 INJECTION, SOLUTION INTRAVENOUS PRN
Status: CANCELLED | OUTPATIENT
Start: 2021-09-13

## 2021-09-13 RX ORDER — SODIUM CHLORIDE, SODIUM LACTATE, POTASSIUM CHLORIDE, CALCIUM CHLORIDE 600; 310; 30; 20 MG/100ML; MG/100ML; MG/100ML; MG/100ML
INJECTION, SOLUTION INTRAVENOUS CONTINUOUS
Status: CANCELLED | OUTPATIENT
Start: 2021-09-13

## 2021-09-13 RX ORDER — SODIUM CHLORIDE 0.9 % (FLUSH) 0.9 %
5-40 SYRINGE (ML) INJECTION EVERY 12 HOURS SCHEDULED
Status: CANCELLED | OUTPATIENT
Start: 2021-09-13

## 2021-09-13 RX ORDER — SODIUM CHLORIDE 0.9 % (FLUSH) 0.9 %
5-40 SYRINGE (ML) INJECTION PRN
Status: CANCELLED | OUTPATIENT
Start: 2021-09-13

## 2021-09-14 NOTE — PROGRESS NOTES
[facility]                                                                                                                    PRE OP INSTRUCTIONS FOR  George Vasquez III        Date: 9/14/2021    Date of surgery: 9/15/21   Arrival Time: Hospital will call you between 5pm and 7pm with your final arrival time for surgery    1. Do not eat or drink anything after midnight prior to surgery. This includes no water, chewing gum, mints or ice chips. 2. Take the following medications with a small sip of water on the morning of Surgery: none     3. Diabetics may take evening dose of insulin but none after midnight. If you feel symptomatic or low blood sugar morning of surgery drink 1-2 ounces of apple juice only. 4. Aspirin, Ibuprofen, Advil, Naproxen, Vitamin E and other Anti-inflammatory products should be stopped  before surgery  as directed by your physician. Take Tylenol only unless instructed otherwise by your surgeon. 5. Check with your Doctor regarding stopping Plavix, Coumadin, Lovenox, Eliquis, Effient, or other blood thinners. 6. Do not smoke,use illicit drugs and do not drink any alcoholic beverages 24 hours prior to surgery. 7. You may brush your teeth the morning of surgery. DO NOT SWALLOW WATER    8. You MUST make arrangements for a responsible adult to take you home after your surgery. You will not be allowed to leave alone or drive yourself home. It is strongly suggested someone stay with you the first 24 hrs. Your surgery will be cancelled if you do not have a ride home. 9. PEDIATRIC PATIENTS ONLY:  A parent/legal guardian must accompany a child scheduled for surgery and plan to stay at the hospital until the child is discharged. Please do not bring other children with you.     10. Please wear simple, loose fitting clothing to the hospital.  Deysi Yun not bring valuables (money, credit cards, checkbooks, etc.) Do not wear any makeup (including no eye makeup) or nail polish on your fingers or toes. 11. DO NOT wear any jewelry or piercings on day of surgery. All body piercing jewelry must be removed. 12. Shower the night before surgery with __x_Antibacterial soap /GILMAR WIPES________    13. TOTAL JOINT REPLACEMENT/HYSTERECTOMY PATIENTS ONLY---Remember to bring Blood Bank bracelet to the hospital on the day of surgery. 14. If you have a Living Will and Durable Power of  for Healthcare, please bring in a copy. 15. If appropriate bring crutches, inspirex, WALKER, CANE etc... 12. Notify your Surgeon if you develop any illness between now and surgery time, cough, cold, fever, sore throat, nausea, vomiting, etc.  Please notify your surgeon if you experience dizziness, shortness of breath or blurred vision between now & the time of your surgery. 17. If you have ___dentures, they will be removed before going to the OR; we will provide you a container. If you wear ___contact lenses or ___glasses, they will be removed; please bring a case for them. 18. To provide excellent care visitors will be limited to 1 in the room at any given time. 19. Please bring picture ID and insurance card. 20. Sleep apnea patients need to bring CPAP AND SETTINGS to hospital on day of surgery. 21. During flu season no children under the age of 15 are permitted in the hospital for the safety of all patients. 22. Other please check in at the information desk. Please wear a mask. Please call AMBULATORY CARE if you have any further questions.    [address] [facility]     [address]

## 2021-09-15 ENCOUNTER — ANESTHESIA EVENT (OUTPATIENT)
Dept: OPERATING ROOM | Age: 41
End: 2021-09-15
Payer: COMMERCIAL

## 2021-09-15 ENCOUNTER — ANESTHESIA (OUTPATIENT)
Dept: OPERATING ROOM | Age: 41
End: 2021-09-15
Payer: COMMERCIAL

## 2021-09-15 ENCOUNTER — HOSPITAL ENCOUNTER (OUTPATIENT)
Age: 41
Setting detail: OUTPATIENT SURGERY
Discharge: HOME | End: 2021-09-15
Attending: SURGERY | Admitting: SURGERY
Payer: COMMERCIAL

## 2021-09-15 VITALS
SYSTOLIC BLOOD PRESSURE: 120 MMHG | RESPIRATION RATE: 18 BRPM | HEIGHT: 69 IN | WEIGHT: 170 LBS | BODY MASS INDEX: 25.18 KG/M2 | DIASTOLIC BLOOD PRESSURE: 82 MMHG | OXYGEN SATURATION: 94 % | HEART RATE: 82 BPM | TEMPERATURE: 97.2 F

## 2021-09-15 VITALS
RESPIRATION RATE: 6 BRPM | SYSTOLIC BLOOD PRESSURE: 106 MMHG | DIASTOLIC BLOOD PRESSURE: 56 MMHG | OXYGEN SATURATION: 100 % | TEMPERATURE: 98.6 F

## 2021-09-15 DIAGNOSIS — G89.18 POST-OP PAIN: Primary | ICD-10-CM

## 2021-09-15 LAB
ANION GAP SERPL CALCULATED.3IONS-SCNC: 6 MMOL/L (ref 7–16)
BUN BLDV-MCNC: 11 MG/DL (ref 6–20)
CALCIUM SERPL-MCNC: 8.3 MG/DL (ref 8.6–10.2)
CHLORIDE BLD-SCNC: 108 MMOL/L (ref 98–107)
CO2: 24 MMOL/L (ref 22–29)
CREAT SERPL-MCNC: 1.1 MG/DL (ref 0.7–1.2)
GFR AFRICAN AMERICAN: >60
GFR NON-AFRICAN AMERICAN: >60 ML/MIN/1.73
GLUCOSE BLD-MCNC: 94 MG/DL (ref 74–99)
POTASSIUM SERPL-SCNC: 3.6 MMOL/L (ref 3.5–5)
SODIUM BLD-SCNC: 138 MMOL/L (ref 132–146)

## 2021-09-15 PROCEDURE — 36415 COLL VENOUS BLD VENIPUNCTURE: CPT

## 2021-09-15 PROCEDURE — 3700000001 HC ADD 15 MINUTES (ANESTHESIA): Performed by: SURGERY

## 2021-09-15 PROCEDURE — 6360000002 HC RX W HCPCS: Performed by: ANESTHESIOLOGY

## 2021-09-15 PROCEDURE — 2500000003 HC RX 250 WO HCPCS: Performed by: SURGERY

## 2021-09-15 PROCEDURE — 49082 ABD PARACENTESIS: CPT | Performed by: SURGERY

## 2021-09-15 PROCEDURE — 2709999900 HC NON-CHARGEABLE SUPPLY: Performed by: SURGERY

## 2021-09-15 PROCEDURE — 2500000003 HC RX 250 WO HCPCS: Performed by: NURSE ANESTHETIST, CERTIFIED REGISTERED

## 2021-09-15 PROCEDURE — 49587 REPAIR UMBILICAL HERN,5+Y/O,STRANG: CPT | Performed by: SURGERY

## 2021-09-15 PROCEDURE — 6360000002 HC RX W HCPCS: Performed by: NURSE ANESTHETIST, CERTIFIED REGISTERED

## 2021-09-15 PROCEDURE — 3700000000 HC ANESTHESIA ATTENDED CARE: Performed by: SURGERY

## 2021-09-15 PROCEDURE — 99024 POSTOP FOLLOW-UP VISIT: CPT | Performed by: SURGERY

## 2021-09-15 PROCEDURE — 3600000004 HC SURGERY LEVEL 4 BASE: Performed by: SURGERY

## 2021-09-15 PROCEDURE — 2580000003 HC RX 258: Performed by: NURSE ANESTHETIST, CERTIFIED REGISTERED

## 2021-09-15 PROCEDURE — 3600000014 HC SURGERY LEVEL 4 ADDTL 15MIN: Performed by: SURGERY

## 2021-09-15 PROCEDURE — 7100000001 HC PACU RECOVERY - ADDTL 15 MIN: Performed by: SURGERY

## 2021-09-15 PROCEDURE — 6360000002 HC RX W HCPCS

## 2021-09-15 PROCEDURE — 7100000000 HC PACU RECOVERY - FIRST 15 MIN: Performed by: SURGERY

## 2021-09-15 PROCEDURE — 7100000010 HC PHASE II RECOVERY - FIRST 15 MIN: Performed by: SURGERY

## 2021-09-15 PROCEDURE — C1781 MESH (IMPLANTABLE): HCPCS | Performed by: SURGERY

## 2021-09-15 PROCEDURE — 7100000011 HC PHASE II RECOVERY - ADDTL 15 MIN: Performed by: SURGERY

## 2021-09-15 PROCEDURE — 80048 BASIC METABOLIC PNL TOTAL CA: CPT

## 2021-09-15 DEVICE — MESH HERN M DIA6.4CM VENTRAL POLYPR EPTFE CIR SELF EXP PTCH: Type: IMPLANTABLE DEVICE | Status: FUNCTIONAL

## 2021-09-15 RX ORDER — SODIUM CHLORIDE, SODIUM LACTATE, POTASSIUM CHLORIDE, CALCIUM CHLORIDE 600; 310; 30; 20 MG/100ML; MG/100ML; MG/100ML; MG/100ML
INJECTION, SOLUTION INTRAVENOUS CONTINUOUS PRN
Status: DISCONTINUED | OUTPATIENT
Start: 2021-09-15 | End: 2021-09-15 | Stop reason: SDUPTHER

## 2021-09-15 RX ORDER — CLINDAMYCIN PHOSPHATE 600 MG/50ML
600 INJECTION INTRAVENOUS ONCE
Status: CANCELLED | OUTPATIENT
Start: 2021-09-15

## 2021-09-15 RX ORDER — HYDRALAZINE HYDROCHLORIDE 20 MG/ML
5 INJECTION INTRAMUSCULAR; INTRAVENOUS EVERY 10 MIN PRN
Status: DISCONTINUED | OUTPATIENT
Start: 2021-09-15 | End: 2021-09-15 | Stop reason: HOSPADM

## 2021-09-15 RX ORDER — MEPERIDINE HYDROCHLORIDE 25 MG/ML
12.5 INJECTION INTRAMUSCULAR; INTRAVENOUS; SUBCUTANEOUS EVERY 5 MIN PRN
Status: DISCONTINUED | OUTPATIENT
Start: 2021-09-15 | End: 2021-09-15 | Stop reason: HOSPADM

## 2021-09-15 RX ORDER — PROPOFOL 10 MG/ML
INJECTION, EMULSION INTRAVENOUS PRN
Status: DISCONTINUED | OUTPATIENT
Start: 2021-09-15 | End: 2021-09-15 | Stop reason: SDUPTHER

## 2021-09-15 RX ORDER — LABETALOL HYDROCHLORIDE 5 MG/ML
5 INJECTION, SOLUTION INTRAVENOUS EVERY 10 MIN PRN
Status: DISCONTINUED | OUTPATIENT
Start: 2021-09-15 | End: 2021-09-15 | Stop reason: HOSPADM

## 2021-09-15 RX ORDER — SODIUM CHLORIDE 0.9 % (FLUSH) 0.9 %
5-40 SYRINGE (ML) INJECTION PRN
Status: DISCONTINUED | OUTPATIENT
Start: 2021-09-15 | End: 2021-09-15 | Stop reason: HOSPADM

## 2021-09-15 RX ORDER — CLINDAMYCIN PHOSPHATE 600 MG/50ML
INJECTION INTRAVENOUS
Status: COMPLETED
Start: 2021-09-15 | End: 2021-09-15

## 2021-09-15 RX ORDER — SODIUM CHLORIDE 9 MG/ML
25 INJECTION, SOLUTION INTRAVENOUS PRN
Status: DISCONTINUED | OUTPATIENT
Start: 2021-09-15 | End: 2021-09-15 | Stop reason: HOSPADM

## 2021-09-15 RX ORDER — CLINDAMYCIN PHOSPHATE 600 MG/50ML
INJECTION INTRAVENOUS PRN
Status: DISCONTINUED | OUTPATIENT
Start: 2021-09-15 | End: 2021-09-15 | Stop reason: SDUPTHER

## 2021-09-15 RX ORDER — LIDOCAINE HYDROCHLORIDE 20 MG/ML
INJECTION, SOLUTION INTRAVENOUS PRN
Status: DISCONTINUED | OUTPATIENT
Start: 2021-09-15 | End: 2021-09-15 | Stop reason: SDUPTHER

## 2021-09-15 RX ORDER — PROMETHAZINE HYDROCHLORIDE 25 MG/ML
6.25 INJECTION, SOLUTION INTRAMUSCULAR; INTRAVENOUS
Status: DISCONTINUED | OUTPATIENT
Start: 2021-09-15 | End: 2021-09-15 | Stop reason: HOSPADM

## 2021-09-15 RX ORDER — MIDAZOLAM HYDROCHLORIDE 1 MG/ML
INJECTION INTRAMUSCULAR; INTRAVENOUS PRN
Status: DISCONTINUED | OUTPATIENT
Start: 2021-09-15 | End: 2021-09-15 | Stop reason: SDUPTHER

## 2021-09-15 RX ORDER — HYDROCODONE BITARTRATE AND ACETAMINOPHEN 5; 325 MG/1; MG/1
1 TABLET ORAL PRN
Status: DISCONTINUED | OUTPATIENT
Start: 2021-09-15 | End: 2021-09-15 | Stop reason: HOSPADM

## 2021-09-15 RX ORDER — HYDROCODONE BITARTRATE AND ACETAMINOPHEN 5; 325 MG/1; MG/1
2 TABLET ORAL PRN
Status: DISCONTINUED | OUTPATIENT
Start: 2021-09-15 | End: 2021-09-15 | Stop reason: HOSPADM

## 2021-09-15 RX ORDER — FENTANYL CITRATE 50 UG/ML
INJECTION, SOLUTION INTRAMUSCULAR; INTRAVENOUS PRN
Status: DISCONTINUED | OUTPATIENT
Start: 2021-09-15 | End: 2021-09-15 | Stop reason: SDUPTHER

## 2021-09-15 RX ORDER — KETOROLAC TROMETHAMINE 30 MG/ML
30 INJECTION, SOLUTION INTRAMUSCULAR; INTRAVENOUS ONCE
Status: COMPLETED | OUTPATIENT
Start: 2021-09-15 | End: 2021-09-15

## 2021-09-15 RX ORDER — SODIUM CHLORIDE 0.9 % (FLUSH) 0.9 %
5-40 SYRINGE (ML) INJECTION EVERY 12 HOURS SCHEDULED
Status: DISCONTINUED | OUTPATIENT
Start: 2021-09-15 | End: 2021-09-15 | Stop reason: HOSPADM

## 2021-09-15 RX ORDER — OXYCODONE HYDROCHLORIDE 5 MG/1
5 TABLET ORAL EVERY 6 HOURS PRN
Qty: 12 TABLET | Refills: 0 | Status: SHIPPED | OUTPATIENT
Start: 2021-09-15 | End: 2021-09-18

## 2021-09-15 RX ORDER — SODIUM CHLORIDE, SODIUM LACTATE, POTASSIUM CHLORIDE, CALCIUM CHLORIDE 600; 310; 30; 20 MG/100ML; MG/100ML; MG/100ML; MG/100ML
INJECTION, SOLUTION INTRAVENOUS CONTINUOUS
Status: DISCONTINUED | OUTPATIENT
Start: 2021-09-15 | End: 2021-09-15 | Stop reason: HOSPADM

## 2021-09-15 RX ORDER — MORPHINE SULFATE 2 MG/ML
2 INJECTION, SOLUTION INTRAMUSCULAR; INTRAVENOUS EVERY 5 MIN PRN
Status: DISCONTINUED | OUTPATIENT
Start: 2021-09-15 | End: 2021-09-15 | Stop reason: HOSPADM

## 2021-09-15 RX ORDER — ONDANSETRON 2 MG/ML
INJECTION INTRAMUSCULAR; INTRAVENOUS PRN
Status: DISCONTINUED | OUTPATIENT
Start: 2021-09-15 | End: 2021-09-15 | Stop reason: SDUPTHER

## 2021-09-15 RX ORDER — KETOROLAC TROMETHAMINE 30 MG/ML
INJECTION, SOLUTION INTRAMUSCULAR; INTRAVENOUS
Status: COMPLETED
Start: 2021-09-15 | End: 2021-09-15

## 2021-09-15 RX ORDER — DEXAMETHASONE SODIUM PHOSPHATE 10 MG/ML
INJECTION INTRAMUSCULAR; INTRAVENOUS PRN
Status: DISCONTINUED | OUTPATIENT
Start: 2021-09-15 | End: 2021-09-15 | Stop reason: SDUPTHER

## 2021-09-15 RX ORDER — ROCURONIUM BROMIDE 10 MG/ML
INJECTION, SOLUTION INTRAVENOUS PRN
Status: DISCONTINUED | OUTPATIENT
Start: 2021-09-15 | End: 2021-09-15 | Stop reason: SDUPTHER

## 2021-09-15 RX ORDER — BUPIVACAINE HYDROCHLORIDE AND EPINEPHRINE 2.5; 5 MG/ML; UG/ML
INJECTION, SOLUTION EPIDURAL; INFILTRATION; INTRACAUDAL; PERINEURAL PRN
Status: DISCONTINUED | OUTPATIENT
Start: 2021-09-15 | End: 2021-09-15 | Stop reason: ALTCHOICE

## 2021-09-15 RX ORDER — FENTANYL CITRATE 50 UG/ML
25 INJECTION, SOLUTION INTRAMUSCULAR; INTRAVENOUS EVERY 5 MIN PRN
Status: DISCONTINUED | OUTPATIENT
Start: 2021-09-15 | End: 2021-09-15 | Stop reason: HOSPADM

## 2021-09-15 RX ADMIN — KETOROLAC TROMETHAMINE 30 MG: 30 INJECTION, SOLUTION INTRAMUSCULAR; INTRAVENOUS at 09:06

## 2021-09-15 RX ADMIN — HYDROMORPHONE HYDROCHLORIDE 0.5 MG: 1 INJECTION, SOLUTION INTRAMUSCULAR; INTRAVENOUS; SUBCUTANEOUS at 08:49

## 2021-09-15 RX ADMIN — FENTANYL CITRATE 100 MCG: 50 INJECTION, SOLUTION INTRAMUSCULAR; INTRAVENOUS at 08:30

## 2021-09-15 RX ADMIN — PROPOFOL 200 MG: 10 INJECTION, EMULSION INTRAVENOUS at 08:06

## 2021-09-15 RX ADMIN — FENTANYL CITRATE 50 MCG: 50 INJECTION, SOLUTION INTRAMUSCULAR; INTRAVENOUS at 08:41

## 2021-09-15 RX ADMIN — DEXAMETHASONE SODIUM PHOSPHATE 10 MG: 10 INJECTION INTRAMUSCULAR; INTRAVENOUS at 08:12

## 2021-09-15 RX ADMIN — MIDAZOLAM 2 MG: 1 INJECTION INTRAMUSCULAR; INTRAVENOUS at 08:01

## 2021-09-15 RX ADMIN — CLINDAMYCIN PHOSPHATE 600 MG: 600 INJECTION, SOLUTION INTRAVENOUS at 08:01

## 2021-09-15 RX ADMIN — HYDROMORPHONE HYDROCHLORIDE 0.5 MG: 1 INJECTION, SOLUTION INTRAMUSCULAR; INTRAVENOUS; SUBCUTANEOUS at 09:10

## 2021-09-15 RX ADMIN — HYDROMORPHONE HYDROCHLORIDE 0.5 MG: 1 INJECTION, SOLUTION INTRAMUSCULAR; INTRAVENOUS; SUBCUTANEOUS at 08:59

## 2021-09-15 RX ADMIN — ROCURONIUM BROMIDE 30 MG: 10 SOLUTION INTRAVENOUS at 08:06

## 2021-09-15 RX ADMIN — LIDOCAINE HYDROCHLORIDE 60 MG: 20 INJECTION, SOLUTION INTRAVENOUS at 08:06

## 2021-09-15 RX ADMIN — ONDANSETRON 4 MG: 2 INJECTION INTRAMUSCULAR; INTRAVENOUS at 08:12

## 2021-09-15 RX ADMIN — SODIUM CHLORIDE, POTASSIUM CHLORIDE, SODIUM LACTATE AND CALCIUM CHLORIDE: 600; 310; 30; 20 INJECTION, SOLUTION INTRAVENOUS at 08:01

## 2021-09-15 RX ADMIN — FENTANYL CITRATE 100 MCG: 50 INJECTION, SOLUTION INTRAMUSCULAR; INTRAVENOUS at 08:06

## 2021-09-15 RX ADMIN — HYDROMORPHONE HYDROCHLORIDE 0.5 MG: 1 INJECTION, SOLUTION INTRAMUSCULAR; INTRAVENOUS; SUBCUTANEOUS at 09:25

## 2021-09-15 RX ADMIN — SUGAMMADEX 300 MG: 100 INJECTION, SOLUTION INTRAVENOUS at 08:32

## 2021-09-15 ASSESSMENT — PULMONARY FUNCTION TESTS
PIF_VALUE: 2
PIF_VALUE: 16
PIF_VALUE: 23
PIF_VALUE: 2
PIF_VALUE: 16
PIF_VALUE: 18
PIF_VALUE: 0
PIF_VALUE: 28
PIF_VALUE: 16
PIF_VALUE: 0
PIF_VALUE: 17
PIF_VALUE: 1
PIF_VALUE: 17
PIF_VALUE: 33
PIF_VALUE: 17
PIF_VALUE: 0
PIF_VALUE: 16
PIF_VALUE: 23
PIF_VALUE: 17
PIF_VALUE: 16
PIF_VALUE: 3
PIF_VALUE: 19
PIF_VALUE: 17
PIF_VALUE: 16
PIF_VALUE: 23
PIF_VALUE: 17
PIF_VALUE: 9
PIF_VALUE: 17
PIF_VALUE: 16

## 2021-09-15 ASSESSMENT — PAIN DESCRIPTION - ORIENTATION
ORIENTATION: MID

## 2021-09-15 ASSESSMENT — PAIN DESCRIPTION - DESCRIPTORS
DESCRIPTORS: SHARP;STABBING
DESCRIPTORS: PATIENT UNABLE TO DESCRIBE
DESCRIPTORS: CONSTANT;SHARP
DESCRIPTORS: DISCOMFORT
DESCRIPTORS: ACHING;DISCOMFORT
DESCRIPTORS: ACHING;DISCOMFORT;SHARP
DESCRIPTORS: SHARP;SHOOTING;STABBING

## 2021-09-15 ASSESSMENT — PAIN DESCRIPTION - PAIN TYPE
TYPE: ACUTE PAIN;SURGICAL PAIN

## 2021-09-15 ASSESSMENT — PAIN DESCRIPTION - FREQUENCY
FREQUENCY: CONTINUOUS

## 2021-09-15 ASSESSMENT — PAIN DESCRIPTION - LOCATION
LOCATION: ABDOMEN

## 2021-09-15 ASSESSMENT — PAIN - FUNCTIONAL ASSESSMENT
PAIN_FUNCTIONAL_ASSESSMENT: PREVENTS OR INTERFERES SOME ACTIVE ACTIVITIES AND ADLS
PAIN_FUNCTIONAL_ASSESSMENT: PREVENTS OR INTERFERES WITH MANY ACTIVE NOT PASSIVE ACTIVITIES
PAIN_FUNCTIONAL_ASSESSMENT: 0-10

## 2021-09-15 ASSESSMENT — PAIN DESCRIPTION - PROGRESSION
CLINICAL_PROGRESSION: GRADUALLY IMPROVING
CLINICAL_PROGRESSION: GRADUALLY IMPROVING
CLINICAL_PROGRESSION: NOT CHANGED
CLINICAL_PROGRESSION: RAPIDLY WORSENING
CLINICAL_PROGRESSION: NOT CHANGED

## 2021-09-15 ASSESSMENT — PAIN SCALES - GENERAL
PAINLEVEL_OUTOF10: 8
PAINLEVEL_OUTOF10: 8
PAINLEVEL_OUTOF10: 7
PAINLEVEL_OUTOF10: 8
PAINLEVEL_OUTOF10: 4
PAINLEVEL_OUTOF10: 7

## 2021-09-15 ASSESSMENT — LIFESTYLE VARIABLES: SMOKING_STATUS: 0

## 2021-09-15 NOTE — H&P
General Surgery History and Physical     Patient's Name/Date of Birth: Mackenzie Kirby III / 1980     Date: ,9/15/2021       Surgeon: Sury Hogue M.D.     PCP: Karma Philip MD     Chief Complaint: umbilical hernia and liver disease     HPI:   Mackenzie Kirby III is a 37 y.o. male who presents for evaluation of umbilical hernia that is enlarging and becoming more painful, tolerating a diet and moving bowels.        Past Medical History        Past Medical History:   Diagnosis Date    Esophageal varices ([address]              Past Surgical History   Past Surgical History:   Procedure Laterality Date    TONSILLECTOMY        UPPER GASTROINTESTINAL ENDOSCOPY N/A 11/13/2020     EGD BIOPSY performed by Saumya Barbour MD at [address] [place] [address] N/A 1/6/2021     EGD BAND LIGATION performed by Juan Cardoza MD at [address] [place] [address] N/A 5/10/2021     EGD BAND LIGATION performed by Juan Cardoza MD at [place] [address]     Current Facility-Administered Medications          Current Outpatient Medications   Medication Sig Dispense Refill    spironolactone (ALDACTONE) 100 MG tablet Take 100 mg by mouth daily        furosemide (LASIX) 40 MG tablet Take 40 mg by mouth 2 times daily          No current facility-administered medications for this visit.                 Allergies   Allergen Reactions    Pcn [Penicillins] Anaphylaxis         The patient has a family history that is negative for severe cardiovascular or respiratory issues, negative for reaction to anesthesia.     Social History               Socioeconomic History    Marital status: Single       Spouse name: Not on file    Number of children: Not on file    Years of education: Not on file    Highest education level: Not on file   Occupational History    Not on file   Tobacco Use    Smoking status: Former Smoker       Packs/day: 0.50    Smokeless tobacco: Never Used   Vaping Use    Vaping Use: Never used   Substance and Sexual Activity    Alcohol use: Yes       Comment: occasional    Drug use: Not on file       Comment: used drugs in past    Sexual activity: Not on file   Other Topics Concern    Not on file   Social History Narrative    Not on file      Social Determinants of Health          Financial Resource Strain:     Difficulty of Paying Living Expenses:    Food Insecurity:     Worried About Running Out of Food in the Last Year:     [address] in the Last Year:    Transportation Needs:     Lack of Transportation (Medical):      Lack of Transportation (Non-Medical):    Physical Activity:     Days of Exercise per Week:     Minutes of Exercise per Session:    Stress:     Feeling of Stress :    Social Connections:     Frequency of Communication with Friends and Family:     Frequency of Social Gatherings with Friends and Family:     Attends Muslim Services:     Active Member of Clubs or Organizations:     Attends Club or Organization Meetings:     Marital Status:    Intimate Partner Violence:     Fear of Current or Ex-Partner:     Emotionally Abused:     Physically Abused:     Sexually Abused:                   Review of Systems  Review of Systems -  General ROS: negative for - chills, fatigue or malaise  ENT ROS: negative for - hearing change, nasal congestion or nasal discharge  Allergy and Immunology ROS: negative for - hives, itchy/watery eyes or nasal congestion  Hematological and Lymphatic ROS: negative for - blood clots, blood transfusions, bruising or fatigue  Endocrine ROS: negative for - malaise/lethargy, mood swings, palpitations or polydipsia/polyuria  Breast ROS: negative for - new or changing breast lumps or nipple changes  Respiratory ROS: negative for - sputum changes, stridor, tachypnea or wheezing  Cardiovascular ROS: negative for - irregular heartbeat, loss of consciousness, murmur or orthopnea  Gastrointestinal ROS: negative for - constipation, diarrhea, gas/bloating, heartburn or hematemesis  Genito-Urinary ROS: negative for -  genital discharge, genital ulcers or hematuria  Musculoskeletal ROS: negative for - gait disturbance, muscle pain or muscular weakness     Physical exam:   BP (!) 115/57   Pulse 79   Temp 98.7 °F (37.1 °C) (Infrared)   Resp 16   Ht 5' 9\" (1.753 m)   Wt 170 lb (77.1 kg)   SpO2 97%   BMI 25.10 kg/m²      General appearance:  NAD  Pyscho/social: negative for tremors and hallucinations  Head: NCAT, PERRLA, EOMI, red conjunctiva  Neck: supple, no masses  Lungs: CTAB, equal chest rise bilateral  Heart: Reg rate  Abdomen: soft, minimally tender around reducible umbilical hernia 2cm in size, nondistended, minimal fluid wave  Skin; no lesions  Gu: no cva tenderness  Extremities: extremities normal, atraumatic, no cyanosis or edema     Assessment:  37 y.o. male with umbilical hernia and liver disease without signs of large ascites     Plan: To OR for repair  Discussed the risk, benefits and alternatives of surgery including wound infections, bleeding, scar  and the risks of  anesthetic including MI, CVA, sudden death or reactions to anesthetic medications. The patient understands the risks and alternatives.  All questions were answered to the patient's satisfaction and they freely signed the consent.  Sundeep Romero MD

## 2021-09-15 NOTE — OP NOTE
DATE OF PROCEDURE: 9/15/2021    SURGEON: Diana Perez M.D.   ASSISTANT: emerald    PREOPERATIVE DIAGNOSIS: Umbilical hernia. ascites    POSTOPERATIVE DIAGNOSIS: incarcerated Umbilical hernia. ascites    OPERATION: incarcerated Umbilical herniorrhaphy with Mesh. paracentesis    ANESTHESIA: General endotracheal.     ESTIMATED BLOOD LOSS: minimal    COMPLICATIONS: None. SPECIMEN: none    BRIEF HISTORY: Raphael Echavarria III is a 37 y.o.  male who was found to have a large umbilical hernia when seen in the office. He complained of pain and swelling. I discussed \"umbilical herniorrhaphy, possible mesh placement\" with him, including the procedure, benefits, risks and alternatives and he agreed. PROCEDURE IN DETAIL: The patient was taken to the operative suite and was placed on the table in the supine position. General endotracheal anesthesia was administered. The abdomen was prepped with Betadine and draped in a sterile fashion. A large bore needle was introduced in and approximately 500ml of ascitic fluid was suctioned out. A curvilinear incision was made around the top side of the umbilicus and was carried down through the dermis, the subcutaneous tissue, to the incarcerated hernia sac using the electrocautery. Along the way, any bleeding points were cauterized. The umbilical skin was dissected free from the surface of the hernia sac taking care not to injure the skin in any way. The hernia sac was then dissected free from the surrounding subcutaneous tissue all the way down to the fascial defect which was found to measure approximately 2 cm in diameter. Because of the size of the defect and the fact that it was not easily closable without tension, it was decided that it would require a mesh closure. The hernia sac was opened and the incarcerated hernia contents present were reduced back into the abdominal cavity. The excess hernia sac was excised.   The preperitoneal space was opened circumferentially for approximately 2-3 cm in all directions using a combination of sharp dissection with electrocautery and blunt finger dissection. Once this was completed, a ventralex patch(6 by 6 cm) was chosen as the prosthesis of choice. The wound was copiously irrigated . Ирина Brown After cleaning the anterior surface of the fascia of its fatty attachments circumferentially, interrupted 0-vicryl U-stitches were placed to attach the mesh to the fascia as an underlay and was pulled upward to avoid any kinks or folds in the mesh so that it lay flat below the fascia. All of the sutures were tied and cut at the knots. The wound was copiously irrigated and suctioned dry. Any bleeding points were cauterized. The umbilical skin was then tacked to the fascia to create the normal contour of the umbilicus using a 3-0 Vicryl figure-of-8 suture. The deep dermal layer was closed with 3-0 vicryl inverted interrupted sutures and the skin was closed with a 4-0 Vicryl subcuticular stitch. Sterile dressing was applied. The patient tolerated the procedure well. Sponge, needle and instrument counts were correct. The patient was extubated and sent to the post anesthesia care unit in stable condition.      Chris Edmondson MD  8:22 AM  9/15/2021

## 2021-09-15 NOTE — ANESTHESIA PRE PROCEDURE
Department of Anesthesiology  Preprocedure Note       Name:  Angelica Davis III   Age:  37 y.o.  :  1980                                          MRN:  [id number]         Date:  9/15/2021      Surgeon: Kim Crespo):  Jovany Saunders MD    Procedure: Procedure(s):  LAPAROSCOPIC POSSIBLE OPEN UMBILICAL HERNIA REPAIR WITH [facility] AT [facility] ([facility])    Medications prior to admission:   Prior to Admission medications    Medication Sig Start Date End Date Taking? Authorizing Provider   NONFORMULARY Medical marijuana edibles or vapes    Historical Provider, MD   spironolactone (ALDACTONE) 100 MG tablet Take 100 mg by mouth daily    Historical Provider, MD   furosemide (LASIX) 40 MG tablet Take 40 mg by mouth 2 times daily    Historical Provider, MD       Current medications:    No current facility-administered medications for this visit. No current outpatient medications on file. Facility-Administered Medications Ordered in Other Visits   Medication Dose Route Frequency Provider Last Rate Last Admin    0.9 % sodium chloride infusion  25 mL IntraVENous PRN Jovany Saunders MD        lactated ringers infusion   IntraVENous Continuous Jovany Saunders MD        sodium chloride flush 0.9 % injection 5-40 mL  5-40 mL IntraVENous 2 times per day Jovany Saunders MD        sodium chloride flush 0.9 % injection 5-40 mL  5-40 mL IntraVENous PRN Jovany Saunders MD           Allergies:     Allergies   Allergen Reactions    Pcn [Penicillins] Anaphylaxis       Problem List:    Patient Active Problem List   Diagnosis Code    Cellulitis L03.90    UGI bleed K92.2    Abdominal pain R10.9    Secondary esophageal varices with bleeding (HCC) I85.11    GIB (gastrointestinal bleeding) K92.2       Past Medical History:        Diagnosis Date    Esophageal varices ([address]        Past Surgical History:        Procedure Laterality Date    ENDOSCOPY, COLON, DIAGNOSTIC      TONSILLECTOMY      UPPER GASTROINTESTINAL ENDOSCOPY N/A 2020 EGD BIOPSY performed by Lorenzo Harmon MD at [address] N/A 1/6/2021    EGD BAND LIGATION performed by Korina Maki MD at [address] N/A 5/10/2021    EGD BAND LIGATION performed by Korina Maki MD at [address] History:    Social History     Tobacco Use    Smoking status: Former Smoker     Packs/day: 0.50    Smokeless tobacco: Never Used   Substance Use Topics    Alcohol use: Yes     Comment: occasional-denies                                Counseling given: Not Answered      Vital Signs (Current): There were no vitals filed for this visit. BP Readings from Last 3 Encounters:   09/15/21 (!) 115/57   07/29/21 106/69   05/21/21 (!) 153/72       NPO Status:                                                                                 BMI:   Wt Readings from Last 3 Encounters:   09/15/21 170 lb (77.1 kg)   07/29/21 170 lb (77.1 kg)   05/21/21 180 lb (81.6 kg)     There is no height or weight on file to calculate BMI.    CBC:   Lab Results   Component Value Date    WBC 8.9 05/12/2021    RBC 2.86 05/12/2021    HGB 7.7 05/13/2021    HCT 24.7 05/13/2021    MCV 81.5 05/12/2021    RDW 18.5 05/12/2021    PLT 73 05/12/2021       CMP:   Lab Results   Component Value Date     09/15/2021    K 3.6 09/15/2021    K 4.5 05/10/2021     09/15/2021    CO2 24 09/15/2021    BUN 11 09/15/2021    CREATININE 1.1 09/15/2021    GFRAA >60 09/15/2021    LABGLOM >60 09/15/2021    GLUCOSE 94 09/15/2021    GLUCOSE 89 02/20/2012    PROT 5.5 05/12/2021    CALCIUM 8.3 09/15/2021    BILITOT 1.9 05/12/2021    ALKPHOS 75 05/12/2021    AST 63 05/12/2021    ALT 36 05/12/2021       POC Tests: No results for input(s): POCGLU, POCNA, POCK, POCCL, POCBUN, POCHEMO, POCHCT in the last 72 hours.     Coags:   Lab Results   Component Value Date    PROTIME 24.1 05/13/2021    PROTIME 12.3 02/17/2012    INR 2.0 05/13/2021    APTT 35.8 05/10/2021       HCG (If Applicable): No results found for: PREGTESTUR, PREGSERUM, HCG, HCGQUANT     ABGs: No results found for: PHART, PO2ART, DXL9HDA, LRY7DSO, BEART, R5TOFJXX     Type & Screen (If Applicable):  No results found for: LABABO, LABRH    Drug/Infectious Status (If Applicable):  No results found for: HIV, HEPCAB    COVID-19 Screening (If Applicable): No results found for: COVID19      Anesthesia Evaluation  Patient summary reviewed  Airway: Mallampati: II  TM distance: >3 FB   Neck ROM: full  Mouth opening: > = 3 FB Dental: normal exam         Pulmonary:Negative Pulmonary ROS       (-) not a current smoker                           Cardiovascular:Negative CV ROS            Rhythm: regular  Rate: normal                    Neuro/Psych:   Negative Neuro/Psych ROS              GI/Hepatic/Renal: Neg GI/Hepatic/Renal ROS           ROS comment: Hx. GI Bleed  Hx. Esophageal varices . Endo/Other: Negative Endo/Other ROS       (-) no electrolyte abnormalities               Abdominal:         (-) obese       Vascular: Other Findings:               Anesthesia Plan      general     ASA 2       Induction: intravenous. Anesthetic plan and risks discussed with patient. Plan discussed with CRNA. DOS STAFF ADDENDUM:    Pt seen and examined, chart reviewed (including anesthesia, drug and allergy history). Anesthetic plan, risks, benefits, alternatives, and personnel involved discussed with patient. Patient verbalized an understanding and agrees to proceed. Plan discussed with care team members and agreed upon.     Dafne Cabral MD  Staff Anesthesiologist  7:36 AM      Dafne Cabral MD   9/15/2021

## 2021-09-23 ENCOUNTER — OFFICE VISIT (OUTPATIENT)
Dept: SURGERY | Age: 41
End: 2021-09-23

## 2021-09-23 VITALS
TEMPERATURE: 97.9 F | HEART RATE: 77 BPM | HEIGHT: 69 IN | WEIGHT: 170 LBS | DIASTOLIC BLOOD PRESSURE: 79 MMHG | BODY MASS INDEX: 25.18 KG/M2 | SYSTOLIC BLOOD PRESSURE: 124 MMHG

## 2021-09-23 DIAGNOSIS — K42.9 UMBILICAL HERNIA WITHOUT OBSTRUCTION AND WITHOUT GANGRENE: Primary | ICD-10-CM

## 2021-09-23 PROCEDURE — 99024 POSTOP FOLLOW-UP VISIT: CPT | Performed by: SURGERY

## 2021-09-23 NOTE — PROGRESS NOTES
Surgery Progress Note            Chief complaint:   Patient Active Problem List   Diagnosis    Cellulitis    UGI bleed    Abdominal pain    Secondary esophageal varices with bleeding (HCC)    GIB (gastrointestinal bleeding)    Umbilical hernia without obstruction and without gangrene       S: doing well    O:   Vitals:    09/23/21 1011   BP: 124/79   Pulse: 77   Temp: 97.9 °F (36.6 °C)     No intake or output data in the 24 hours ending 09/23/21 1017        Labs:  No results for input(s): WBC, HGB, HCT in the last 72 hours. Invalid input(s): PLR  Lab Results   Component Value Date    CREATININE 1.1 09/15/2021    BUN 11 09/15/2021     09/15/2021    K 3.6 09/15/2021     (H) 09/15/2021    CO2 24 09/15/2021     No results for input(s): LIPASE, AMYLASE in the last 72 hours. Physical exam:   /79   Pulse 77   Temp 97.9 °F (36.6 °C) (Temporal)   Ht 5' 9\" (1.753 m)   Wt 170 lb (77.1 kg)   BMI 25.10 kg/m²   General appearance: NAD  Head: NCAT  Neck: supple, no masses  Lungs: equal chest rise bilateral  Heart: S1S2 present  Abdomen: soft, nontender, nondistended  Skin; no new lesions, incisions clean and intact  Gu: no cva tenderness  Extremities: extremities normal, atraumatic, no cyanosis or edema    A:  Post op umbilical hernia repair with mesh    P: follow up as needed.      Analilia Stephenson MD, MD  9/23/2021

## 2022-02-02 ENCOUNTER — TELEPHONE (OUTPATIENT)
Dept: SURGERY | Age: 42
End: 2022-02-02

## 2022-02-02 ENCOUNTER — INITIAL CONSULT (OUTPATIENT)
Dept: SURGERY | Age: 42
End: 2022-02-02
Payer: COMMERCIAL

## 2022-02-02 VITALS
DIASTOLIC BLOOD PRESSURE: 67 MMHG | WEIGHT: 160 LBS | SYSTOLIC BLOOD PRESSURE: 106 MMHG | TEMPERATURE: 98.2 F | HEART RATE: 73 BPM | HEIGHT: 69 IN | BODY MASS INDEX: 23.7 KG/M2

## 2022-02-02 DIAGNOSIS — K40.90 LEFT INGUINAL HERNIA: Primary | ICD-10-CM

## 2022-02-02 PROCEDURE — 1036F TOBACCO NON-USER: CPT | Performed by: SURGERY

## 2022-02-02 PROCEDURE — G8427 DOCREV CUR MEDS BY ELIG CLIN: HCPCS | Performed by: SURGERY

## 2022-02-02 PROCEDURE — G8484 FLU IMMUNIZE NO ADMIN: HCPCS | Performed by: SURGERY

## 2022-02-02 PROCEDURE — G8420 CALC BMI NORM PARAMETERS: HCPCS | Performed by: SURGERY

## 2022-02-02 PROCEDURE — 99214 OFFICE O/P EST MOD 30 MIN: CPT | Performed by: SURGERY

## 2022-02-02 RX ORDER — SODIUM CHLORIDE 9 MG/ML
INJECTION, SOLUTION INTRAVENOUS CONTINUOUS
Status: CANCELLED | OUTPATIENT
Start: 2022-02-02

## 2022-02-02 RX ORDER — SODIUM CHLORIDE 0.9 % (FLUSH) 0.9 %
10 SYRINGE (ML) INJECTION EVERY 12 HOURS SCHEDULED
Status: CANCELLED | OUTPATIENT
Start: 2022-02-02

## 2022-02-02 RX ORDER — SODIUM CHLORIDE 0.9 % (FLUSH) 0.9 %
10 SYRINGE (ML) INJECTION PRN
Status: CANCELLED | OUTPATIENT
Start: 2022-02-02

## 2022-02-02 RX ORDER — SODIUM CHLORIDE 9 MG/ML
25 INJECTION, SOLUTION INTRAVENOUS PRN
Status: CANCELLED | OUTPATIENT
Start: 2022-02-02

## 2022-02-02 RX ORDER — HYDROCODONE BITARTRATE AND ACETAMINOPHEN 5; 325 MG/1; MG/1
1 TABLET ORAL EVERY 4 HOURS PRN
Qty: 18 TABLET | Refills: 0 | Status: SHIPPED | OUTPATIENT
Start: 2022-02-02 | End: 2022-02-05

## 2022-02-02 NOTE — PROGRESS NOTES
General Surgery History and Physical  [facility]    Patient's Name/Date of Birth: Kalina Badillo III / 1980    Date: February 2, 2022     Surgeon: Brandon Gasca M.D.    PCP: Yo Austin MD     Chief Complaint: left Inguinal bulge    HPI:   Kalina Badillo III is a 40 y.o. male who presents for evaluation of left inguinal hernia. Timing is constant, radiation to left groin, alleviated by none and started two weeks ago and severity is 10/10. States pain has been worsening, no symptoms of bowel obstruction, nausea, vomiting, fever, chills, abdominal pain. States it protrudes with activity, it is reducible. Patient Active Problem List   Diagnosis    Cellulitis    UGI bleed    Abdominal pain    Secondary esophageal varices with bleeding (HCC)    GIB (gastrointestinal bleeding)    Umbilical hernia without obstruction and without gangrene       Past Medical History:   Diagnosis Date    Esophageal varices ([address]        Past Surgical History:   Procedure Laterality Date    ENDOSCOPY, COLON, DIAGNOSTIC      TONSILLECTOMY      UMBILICAL HERNIA REPAIR N/A 9/15/2021    LAPAROSCOPIC POSSIBLE OPEN UMBILICAL HERNIA REPAIR WITH [facility] performed by Issac Parker MD at [address] [place] [address] 11/13/2020    EGD BIOPSY performed by Sandrine Castellon MD at [address] [facility] [facility] N/A 1/6/2021    EGD BAND LIGATION performed by Nadeem Sylvester MD at [address] [facility] N/A 5/10/2021    EGD BAND LIGATION performed by Nadeem Sylvester MD at [address] [facility]   Allergen Reactions    Pcn [Penicillins] Anaphylaxis       The patient has a family history that is negative for severe cardiovascular or respiratory issues, negative for reaction to anesthesia. Time spent reviewing past medical, surgical, social and family history, vitals, nursing assessment and images.  No changes from above documented history. Social History     Socioeconomic History    Marital status: Single     Spouse name: Not on file    Number of children: Not on file    Years of education: Not on file    Highest education level: Not on file   Occupational History    Not on file   Tobacco Use    Smoking status: Former Smoker     Packs/day: 0.50    Smokeless tobacco: Never Used   Vaping Use    Vaping Use: Never used   Substance and Sexual Activity    Alcohol use: Yes     Comment: occasional-denies    Drug use: Not on file     Comment: used drugs in past, medical marijuana- ate today 9/15/21    Sexual activity: Not on file   Other Topics Concern    Not on file   Social History Narrative    Not on file     Social Determinants of Health     Financial Resource Strain:     Difficulty of Paying Living Expenses: Not on file   Food Insecurity:     Worried About [address] in the Last Year: Not on file    Luciana of Food in the Last Year: Not on file   Transportation Needs:     Lack of Transportation (Medical): Not on file    Lack of Transportation (Non-Medical):  Not on file   Physical Activity:     Days of Exercise per Week: Not on file    Minutes of Exercise per Session: Not on file   Stress:     Feeling of Stress : Not on file   Social Connections:     Frequency of Communication with Friends and Family: Not on file    Frequency of Social Gatherings with Friends and Family: Not on file    Attends Quaker Services: Not on file    Active Member of Clubs or Organizations: Not on file    Attends Club or Organization Meetings: Not on file    Marital Status: Not on file   Intimate Partner Violence:     Fear of Current or Ex-Partner: Not on file    Emotionally Abused: Not on file    Physically Abused: Not on file    Sexually Abused: Not on file   Housing Stability:     Unable to Pay for Housing in the Last Year: Not on file    Number of [place] in the Last Year: Not on file    Unstable Housing in the Last Year: Not on file         A complete 10 system review was performed and are otherwise negative unless mentioned in the above HPI. Specific negatives are listed below but may not include all those reviewed.     General ROS: negative obtundation, AMS  ENT ROS: negative rhinorrhea, epistaxis  Allergy and Immunology ROS: negative itchy/watery eyes or nasal congestion  Hematological and Lymphatic ROS: negative spontaneous bleeding or bruising  Endocrine ROS: negative  lethargy, mood swings, palpitations or polydipsia/polyuria  Respiratory ROS: negative sputum changes, stridor, tachypnea or wheezing  Cardiovascular ROS: negative for - loss of consciousness, murmur or orthopnea  Gastrointestinal ROS: negative for - hematochezia or hematemesis  Genito-Urinary ROS: negative for -  genital discharge or hematuria  Musculoskeletal ROS: negative for - focal weakness, gangrene  Psych/Neuro ROS: negative for - visual or auditory hallucinations, suicidal ideation    Physical exam:   /67 (Site: Left Upper Arm, Position: Sitting, Cuff Size: Medium Adult)   Pulse 73   Temp 98.2 °F (36.8 °C)   Ht 5' 9\" (1.753 m)   Wt 160 lb (72.6 kg)   BMI 23.63 kg/m²   General appearance:  NAD, appears stated age  Head: NCAT, PERRLA, EOMI, red conjunctiva  Neck: supple, no masses, trachea midline  Lungs: Equal chest rise bilateral, no retractions, no wheezing  Heart: Reg rate  Abdomen: soft, nontender, umbilical incision well healed  Skin; warm and dry, no cyanosis  Gu: no cva tenderness, reducible left inguinal hernia  Extremities: atraumatic, no focal motor deficits, no open wounds  Psych: No tremor, visual hallucinations      Radiology: I reviewed relevant abdominal imaging from this admission and that available in the EMR       Assessment:  Clary Marinelli III is a 40 y.o. male with left inguinal hernia, nonrecurrent  Patient Active Problem List   Diagnosis    Cellulitis    UGI bleed    Abdominal pain    Secondary esophageal varices with bleeding (HCC)    GIB (gastrointestinal bleeding)    Umbilical hernia without obstruction and without gangrene         Plan:  OR for laparoscopic left inguinal hernia repair with mesh possible bilateral  Discussed the risk, benefits and alternatives of surgery including wound infections, bleeding, scar, recurrent hernia formation, mesh infection and migration, chronic groin pain, nerve injury, testicle injury, repeat procedures and the risks of general anesthetic including MI, CVA, sudden death or reactions to anesthetic medications. The patient understands the risks and alternatives and the possibility of converting to an open procedure. All questions were answered to the patient's satisfaction and they freely signed the consent.            Napoleon French MD  10:54 AM  2/2/2022

## 2022-02-02 NOTE — TELEPHONE ENCOUNTER
Per Dr. Sanjuanita Pope, patient is scheduled for Laparosopic left inguinal hernia repair with mesh, possible open, possible bilateral at [address] [place] [address] on 22. Surgery scheduling form faxed with confirmation, surgeon's calendar updated. Dr. Sanjuanita Pope to enter orders. Follow up appointment scheduled. Patient has received COVID 19 vaccine. Electronically signed by Alessandra Esposito RN on 2022 at 11:05 AM    Prior Authorization Form:      DEMOGRAPHICS:                     Patient Name:  Bryan Souza III  Patient :  1980            Insurance:  Payor: Tom Marie / Plan: [employer] / Product Type: *No Product type* /   Insurance ID Number:    Payor/Plan Subscr  Sex Relation Sub.  Ins. ID Effective Group Num   1. Mary Parks* 1980 Male Self [id number] [address] [facility] [address]         DIAGNOSIS & PROCEDURE:                       Procedure/Operation: Laparosopic left inguinal hernia repair with mesh, possible open, possible bilateral           CPT Code: 90822    Diagnosis:  Left inguinal hernia     ICD10 Code: K40.90    Location:  [address] [place] [address]    Surgeon:  Jose Cai INFORMATION:                          Date: 22    Time: TBD              Anesthesia:  General                                                       Status:  Outpatient        Special Comments:         Electronically signed by Alessandra Esposito RN on 2022 at 11:05 AM

## 2022-02-04 ENCOUNTER — PREP FOR PROCEDURE (OUTPATIENT)
Dept: SURGERY | Age: 42
End: 2022-02-04

## 2022-02-04 NOTE — PROGRESS NOTES
[address] [facility]                                                                                                                    PRE OP INSTRUCTIONS FOR  Kalina Badillo III        Date: 2/4/2022    Date of surgery: 2/7/22   Arrival Time: Hospital will call you between 5pm and 7pm with your final arrival time for surgery    1. Do not eat or drink anything after midnight prior to surgery. This includes no water, chewing gum, mints or ice chips. 2. Take the following medications with a small sip of water on the morning of Surgery:     3. Diabetics may take evening dose of insulin but none after midnight. If you feel symptomatic or low blood sugar morning of surgery drink 1-2 ounces of apple juice only. 4. Aspirin, Ibuprofen, Advil, Naproxen, Vitamin E and other Anti-inflammatory products should be stopped  before surgery  as directed by your physician. Take Tylenol only unless instructed otherwise by your surgeon. 5. Check with your Doctor regarding stopping Plavix, Coumadin, Lovenox, Eliquis, Effient, or other blood thinners. 6. Do not smoke,use illicit drugs and do not drink any alcoholic beverages 24 hours prior to surgery. 7. You may brush your teeth the morning of surgery. DO NOT SWALLOW WATER    8. You MUST make arrangements for a responsible adult to take you home after your surgery. You will not be allowed to leave alone or drive yourself home. It is strongly suggested someone stay with you the first 24 hrs. Your surgery will be cancelled if you do not have a ride home. 9. PEDIATRIC PATIENTS ONLY:  A parent/legal guardian must accompany a child scheduled for surgery and plan to stay at the hospital until the child is discharged. Please do not bring other children with you.     10. Please wear simple, loose fitting clothing to the hospital.  Joshua Perea not bring valuables (money, credit cards, checkbooks, etc.) Do not wear any makeup (including no eye makeup) or nail polish on your fingers or toes. 11. DO NOT wear any jewelry or piercings on day of surgery. All body piercing jewelry must be removed. 12. Shower the night before surgery with _x__Antibacterial soap /GILMAR WIPES________    13. TOTAL JOINT REPLACEMENT/HYSTERECTOMY PATIENTS ONLY---Remember to bring Blood Bank bracelet to the hospital on the day of surgery. 14. If you have a Living Will and Durable Power of  for Healthcare, please bring in a copy. 15. If appropriate bring crutches, inspirex, WALKER, CANE etc... 12. Notify your Surgeon if you develop any illness between now and surgery time, cough, cold, fever, sore throat, nausea, vomiting, etc.  Please notify your surgeon if you experience dizziness, shortness of breath or blurred vision between now & the time of your surgery. 17. If you have _x__dentures, they will be removed before going to the OR; we will provide you a container. If you wear ___contact lenses or _x__glasses, they will be removed; please bring a case for them. 18. To provide excellent care visitors will be limited to 1 in the room at any given time. 19. Please bring picture ID and insurance card. 20. Sleep apnea patients need to bring CPAP SETTINGS to hospital on day of surgery. 21. During flu season no children under the age of 15 are permitted in the hospital for the safety of all patients. 22. Other                 Please call AMBULATORY CARE if you have any further questions.    [address]     [address]

## 2022-02-06 ENCOUNTER — ANESTHESIA EVENT (OUTPATIENT)
Dept: OPERATING ROOM | Age: 42
End: 2022-02-06
Payer: COMMERCIAL

## 2022-02-06 ASSESSMENT — LIFESTYLE VARIABLES: SMOKING_STATUS: 0

## 2022-02-06 NOTE — ANESTHESIA PRE PROCEDURE
Department of Anesthesiology  Preprocedure Note       Name:  Yulissa Solis III   Age:  40 y.o.  :  1980                                          MRN:  [id number]         Date:  2022      Surgeon: Kristen Sesay):  Kelsey Cohen MD    Procedure: Procedure(s):  LAPAROSCOPIC LEFT INGUINAL HERNIA REPAIR WITH MESH POSSIBLE OPEN POSSIBLE BILATERAL    Medications prior to admission:   Prior to Admission medications    Medication Sig Start Date End Date Taking? Authorizing Provider   NONFORMULARY daily as needed Medical marijuana edibles or vapes     Historical Provider, MD   spironolactone (ALDACTONE) 100 MG tablet Take 100 mg by mouth daily    Historical Provider, MD       Current medications:    Current Outpatient Medications   Medication Sig Dispense Refill    NONFORMULARY daily as needed Medical marijuana edibles or vapes       spironolactone (ALDACTONE) 100 MG tablet Take 100 mg by mouth daily       No current facility-administered medications for this visit. Allergies:     Allergies   Allergen Reactions    Pcn [Penicillins] Anaphylaxis       Problem List:    Patient Active Problem List   Diagnosis Code    Cellulitis L03.90    UGI bleed K92.2    Abdominal pain R10.9    Secondary esophageal varices with bleeding (HCC) I85.11    GIB (gastrointestinal bleeding) O77.6    Umbilical hernia without obstruction and without gangrene K42.9       Past Medical History:        Diagnosis Date    Esophageal varices ([address]        Past Surgical History:        Procedure Laterality Date    ENDOSCOPY, COLON, DIAGNOSTIC      HERNIA REPAIR      TONSILLECTOMY      UMBILICAL HERNIA REPAIR N/A 9/15/2021    LAPAROSCOPIC POSSIBLE OPEN UMBILICAL HERNIA REPAIR WITH  [facility] performed by Kelsey Cohen MD at [place] [address] N/A 2020    EGD BIOPSY performed by Cheri Laureano MD at [address] [place] [address] N/A 2021    EGD BAND LIGATION performed by JOIE Tico Elise MD at [facility] N/A 5/10/2021    EGD BAND LIGATION performed by Abena Orellana MD at [address] [facility] [address] History:    Social History     Tobacco Use    Smoking status: Former Smoker     Packs/day: 0.50    Smokeless tobacco: Never Used   Substance Use Topics    Alcohol use: Not Currently     Comment: occasional-denies                                Counseling given: Not Answered      Vital Signs (Current): There were no vitals filed for this visit. BP Readings from Last 3 Encounters:   02/02/22 106/67   09/23/21 124/79   09/15/21 120/82       NPO Status:                                                                                 BMI:   Wt Readings from Last 3 Encounters:   02/02/22 160 lb (72.6 kg)   09/23/21 170 lb (77.1 kg)   09/15/21 170 lb (77.1 kg)     There is no height or weight on file to calculate BMI.    CBC:   Lab Results   Component Value Date    WBC 8.9 05/12/2021    RBC 2.86 05/12/2021    HGB 7.7 05/13/2021    HCT 24.7 05/13/2021    MCV 81.5 05/12/2021    RDW 18.5 05/12/2021    PLT 73 05/12/2021       CMP:   Lab Results   Component Value Date     09/15/2021    K 3.6 09/15/2021    K 4.5 05/10/2021     09/15/2021    CO2 24 09/15/2021    BUN 11 09/15/2021    CREATININE 1.1 09/15/2021    GFRAA >60 09/15/2021    LABGLOM >60 09/15/2021    GLUCOSE 94 09/15/2021    GLUCOSE 89 02/20/2012    PROT 5.5 05/12/2021    CALCIUM 8.3 09/15/2021    BILITOT 1.9 05/12/2021    ALKPHOS 75 05/12/2021    AST 63 05/12/2021    ALT 36 05/12/2021       POC Tests: No results for input(s): POCGLU, POCNA, POCK, POCCL, POCBUN, POCHEMO, POCHCT in the last 72 hours.     Coags:   Lab Results   Component Value Date    PROTIME 24.1 05/13/2021    PROTIME 12.3 02/17/2012    INR 2.0 05/13/2021    APTT 35.8 05/10/2021       HCG (If Applicable): No results found for: PREGTESTUR, PREGSERUM, HCG, HCGQUANT     ABGs: No results found for: PHART, PO2ART, XJU6JSG, ABW2RBK, BEART, D8RZHDEL     Type & Screen (If Applicable):  No results found for: LABABO, LABRH    Drug/Infectious Status (If Applicable):  No results found for: HIV, HEPCAB    COVID-19 Screening (If Applicable): No results found for: COVID19      Anesthesia Evaluation  Patient summary reviewed no history of anesthetic complications:   Airway: Mallampati: I  TM distance: >3 FB   Neck ROM: full  Mouth opening: > = 3 FB Dental:    (+) partials      Pulmonary: breath sounds clear to auscultation      (-) not a current smoker                          ROS comment: Former smoker 0.5 - 1.0 PPD x 15 years quit 10 years ago  +marijuana use   Cardiovascular:Negative CV ROS            Rhythm: regular  Rate: normal                    Neuro/Psych:   (+) psychiatric history ( H/O IV drug abuse with Heroin, quit 5 years ago ):            GI/Hepatic/Renal:   (+) liver disease ( H/O Hepatitis C treated several months ago.):,          ROS comment: Hx. GI Bleed  Hx. Esophageal varices . Endo/Other: Negative Endo/Other ROS       (-) no electrolyte abnormalities               Abdominal:         (-) obese       Vascular: Other Findings:           CHRIS Ingram - CRNA      Anesthesia Plan      general     ASA 3       Induction: intravenous. MIPS: Postoperative opioids intended and Prophylactic antiemetics administered. Anesthetic plan and risks discussed with patient. Plan discussed with CRNA.             Michael Wright MD   2-7-22

## 2022-02-07 ENCOUNTER — HOSPITAL ENCOUNTER (OUTPATIENT)
Age: 42
Setting detail: OUTPATIENT SURGERY
Discharge: HOME | End: 2022-02-07
Attending: SURGERY | Admitting: SURGERY
Payer: COMMERCIAL

## 2022-02-07 ENCOUNTER — ANESTHESIA (OUTPATIENT)
Dept: OPERATING ROOM | Age: 42
End: 2022-02-07
Payer: COMMERCIAL

## 2022-02-07 VITALS
RESPIRATION RATE: 9 BRPM | SYSTOLIC BLOOD PRESSURE: 127 MMHG | DIASTOLIC BLOOD PRESSURE: 73 MMHG | OXYGEN SATURATION: 100 %

## 2022-02-07 VITALS
WEIGHT: 164 LBS | OXYGEN SATURATION: 96 % | SYSTOLIC BLOOD PRESSURE: 128 MMHG | RESPIRATION RATE: 18 BRPM | HEART RATE: 99 BPM | TEMPERATURE: 98 F | BODY MASS INDEX: 24.86 KG/M2 | DIASTOLIC BLOOD PRESSURE: 80 MMHG | HEIGHT: 68 IN

## 2022-02-07 DIAGNOSIS — K42.9 UMBILICAL HERNIA WITHOUT OBSTRUCTION AND WITHOUT GANGRENE: Primary | ICD-10-CM

## 2022-02-07 PROCEDURE — 7100000010 HC PHASE II RECOVERY - FIRST 15 MIN: Performed by: SURGERY

## 2022-02-07 PROCEDURE — 6360000002 HC RX W HCPCS

## 2022-02-07 PROCEDURE — 3600000014 HC SURGERY LEVEL 4 ADDTL 15MIN: Performed by: SURGERY

## 2022-02-07 PROCEDURE — 7100000011 HC PHASE II RECOVERY - ADDTL 15 MIN: Performed by: SURGERY

## 2022-02-07 PROCEDURE — 2720000010 HC SURG SUPPLY STERILE: Performed by: SURGERY

## 2022-02-07 PROCEDURE — C1781 MESH (IMPLANTABLE): HCPCS | Performed by: SURGERY

## 2022-02-07 PROCEDURE — 2500000003 HC RX 250 WO HCPCS: Performed by: NURSE ANESTHETIST, CERTIFIED REGISTERED

## 2022-02-07 PROCEDURE — 49650 LAP ING HERNIA REPAIR INIT: CPT | Performed by: SURGERY

## 2022-02-07 PROCEDURE — 6360000002 HC RX W HCPCS: Performed by: NURSE ANESTHETIST, CERTIFIED REGISTERED

## 2022-02-07 PROCEDURE — 2500000003 HC RX 250 WO HCPCS: Performed by: SURGERY

## 2022-02-07 PROCEDURE — 49565 PR REPAIR RECURR INCIS HERNIA,REDUC: CPT | Performed by: SURGERY

## 2022-02-07 PROCEDURE — 3700000000 HC ANESTHESIA ATTENDED CARE: Performed by: SURGERY

## 2022-02-07 PROCEDURE — 7100000001 HC PACU RECOVERY - ADDTL 15 MIN: Performed by: SURGERY

## 2022-02-07 PROCEDURE — 3700000001 HC ADD 15 MINUTES (ANESTHESIA): Performed by: SURGERY

## 2022-02-07 PROCEDURE — 99024 POSTOP FOLLOW-UP VISIT: CPT | Performed by: SURGERY

## 2022-02-07 PROCEDURE — 49568 PR IMPLANT MESH HERNIA REPAIR/DEBRIDEMENT CLOSURE: CPT | Performed by: SURGERY

## 2022-02-07 PROCEDURE — 6360000002 HC RX W HCPCS: Performed by: ANESTHESIOLOGY

## 2022-02-07 PROCEDURE — 2709999900 HC NON-CHARGEABLE SUPPLY: Performed by: SURGERY

## 2022-02-07 PROCEDURE — 7100000000 HC PACU RECOVERY - FIRST 15 MIN: Performed by: SURGERY

## 2022-02-07 PROCEDURE — 2580000003 HC RX 258: Performed by: NURSE ANESTHETIST, CERTIFIED REGISTERED

## 2022-02-07 PROCEDURE — 3600000004 HC SURGERY LEVEL 4 BASE: Performed by: SURGERY

## 2022-02-07 DEVICE — 3DMAX LIGHT MESH, 12.2 CM X 17.0 CM (4.8" X 6.7"), LEFT, EXTRA LARGE
Type: IMPLANTABLE DEVICE | Status: FUNCTIONAL
Brand: 3DMAX

## 2022-02-07 RX ORDER — LIDOCAINE HYDROCHLORIDE 20 MG/ML
INJECTION, SOLUTION INTRAVENOUS PRN
Status: DISCONTINUED | OUTPATIENT
Start: 2022-02-07 | End: 2022-02-07 | Stop reason: SDUPTHER

## 2022-02-07 RX ORDER — ROCURONIUM BROMIDE 10 MG/ML
INJECTION, SOLUTION INTRAVENOUS PRN
Status: DISCONTINUED | OUTPATIENT
Start: 2022-02-07 | End: 2022-02-07 | Stop reason: SDUPTHER

## 2022-02-07 RX ORDER — SODIUM CHLORIDE 9 MG/ML
INJECTION, SOLUTION INTRAVENOUS CONTINUOUS PRN
Status: DISCONTINUED | OUTPATIENT
Start: 2022-02-07 | End: 2022-02-07 | Stop reason: SDUPTHER

## 2022-02-07 RX ORDER — MIDAZOLAM HYDROCHLORIDE 1 MG/ML
INJECTION INTRAMUSCULAR; INTRAVENOUS PRN
Status: DISCONTINUED | OUTPATIENT
Start: 2022-02-07 | End: 2022-02-07 | Stop reason: SDUPTHER

## 2022-02-07 RX ORDER — GLYCOPYRROLATE 1 MG/5 ML
SYRINGE (ML) INTRAVENOUS PRN
Status: DISCONTINUED | OUTPATIENT
Start: 2022-02-07 | End: 2022-02-07 | Stop reason: SDUPTHER

## 2022-02-07 RX ORDER — DIPHENHYDRAMINE HYDROCHLORIDE 50 MG/ML
12.5 INJECTION INTRAMUSCULAR; INTRAVENOUS
Status: DISCONTINUED | OUTPATIENT
Start: 2022-02-07 | End: 2022-02-07 | Stop reason: HOSPADM

## 2022-02-07 RX ORDER — HYDRALAZINE HYDROCHLORIDE 20 MG/ML
5 INJECTION INTRAMUSCULAR; INTRAVENOUS EVERY 10 MIN PRN
Status: DISCONTINUED | OUTPATIENT
Start: 2022-02-07 | End: 2022-02-07 | Stop reason: HOSPADM

## 2022-02-07 RX ORDER — FENTANYL CITRATE 50 UG/ML
INJECTION, SOLUTION INTRAMUSCULAR; INTRAVENOUS PRN
Status: DISCONTINUED | OUTPATIENT
Start: 2022-02-07 | End: 2022-02-07 | Stop reason: SDUPTHER

## 2022-02-07 RX ORDER — SODIUM CHLORIDE 0.9 % (FLUSH) 0.9 %
10 SYRINGE (ML) INJECTION EVERY 12 HOURS SCHEDULED
Status: DISCONTINUED | OUTPATIENT
Start: 2022-02-07 | End: 2022-02-07 | Stop reason: HOSPADM

## 2022-02-07 RX ORDER — CLINDAMYCIN PHOSPHATE 600 MG/50ML
600 INJECTION INTRAVENOUS EVERY 8 HOURS
Status: DISCONTINUED | OUTPATIENT
Start: 2022-02-07 | End: 2022-02-07 | Stop reason: HOSPADM

## 2022-02-07 RX ORDER — SODIUM CHLORIDE 9 MG/ML
25 INJECTION, SOLUTION INTRAVENOUS PRN
Status: DISCONTINUED | OUTPATIENT
Start: 2022-02-07 | End: 2022-02-07 | Stop reason: HOSPADM

## 2022-02-07 RX ORDER — BUPIVACAINE HYDROCHLORIDE AND EPINEPHRINE 2.5; 5 MG/ML; UG/ML
INJECTION, SOLUTION EPIDURAL; INFILTRATION; INTRACAUDAL; PERINEURAL PRN
Status: DISCONTINUED | OUTPATIENT
Start: 2022-02-07 | End: 2022-02-07 | Stop reason: ALTCHOICE

## 2022-02-07 RX ORDER — LABETALOL HYDROCHLORIDE 5 MG/ML
5 INJECTION, SOLUTION INTRAVENOUS EVERY 10 MIN PRN
Status: DISCONTINUED | OUTPATIENT
Start: 2022-02-07 | End: 2022-02-07 | Stop reason: HOSPADM

## 2022-02-07 RX ORDER — SODIUM CHLORIDE 9 MG/ML
INJECTION, SOLUTION INTRAVENOUS CONTINUOUS
Status: DISCONTINUED | OUTPATIENT
Start: 2022-02-07 | End: 2022-02-07 | Stop reason: HOSPADM

## 2022-02-07 RX ORDER — HYDROCODONE BITARTRATE AND ACETAMINOPHEN 5; 325 MG/1; MG/1
2 TABLET ORAL PRN
Status: DISCONTINUED | OUTPATIENT
Start: 2022-02-07 | End: 2022-02-07 | Stop reason: HOSPADM

## 2022-02-07 RX ORDER — NEOSTIGMINE METHYLSULFATE 1 MG/ML
INJECTION, SOLUTION INTRAVENOUS PRN
Status: DISCONTINUED | OUTPATIENT
Start: 2022-02-07 | End: 2022-02-07 | Stop reason: SDUPTHER

## 2022-02-07 RX ORDER — CEFAZOLIN SODIUM 2 G/50ML
2000 SOLUTION INTRAVENOUS
Status: DISCONTINUED | OUTPATIENT
Start: 2022-02-07 | End: 2022-02-07

## 2022-02-07 RX ORDER — MEPERIDINE HYDROCHLORIDE 25 MG/ML
12.5 INJECTION INTRAMUSCULAR; INTRAVENOUS; SUBCUTANEOUS EVERY 5 MIN PRN
Status: DISCONTINUED | OUTPATIENT
Start: 2022-02-07 | End: 2022-02-07 | Stop reason: HOSPADM

## 2022-02-07 RX ORDER — ONDANSETRON 2 MG/ML
INJECTION INTRAMUSCULAR; INTRAVENOUS PRN
Status: DISCONTINUED | OUTPATIENT
Start: 2022-02-07 | End: 2022-02-07 | Stop reason: SDUPTHER

## 2022-02-07 RX ORDER — OXYCODONE HYDROCHLORIDE 5 MG/1
5 TABLET ORAL EVERY 6 HOURS PRN
Qty: 20 TABLET | Refills: 0 | Status: SHIPPED | OUTPATIENT
Start: 2022-02-07 | End: 2022-02-12

## 2022-02-07 RX ORDER — SODIUM CHLORIDE 0.9 % (FLUSH) 0.9 %
10 SYRINGE (ML) INJECTION PRN
Status: DISCONTINUED | OUTPATIENT
Start: 2022-02-07 | End: 2022-02-07 | Stop reason: HOSPADM

## 2022-02-07 RX ORDER — DEXAMETHASONE SODIUM PHOSPHATE 10 MG/ML
INJECTION, SOLUTION INTRAMUSCULAR; INTRAVENOUS PRN
Status: DISCONTINUED | OUTPATIENT
Start: 2022-02-07 | End: 2022-02-07 | Stop reason: SDUPTHER

## 2022-02-07 RX ORDER — HYDROCODONE BITARTRATE AND ACETAMINOPHEN 5; 325 MG/1; MG/1
1 TABLET ORAL PRN
Status: DISCONTINUED | OUTPATIENT
Start: 2022-02-07 | End: 2022-02-07 | Stop reason: HOSPADM

## 2022-02-07 RX ORDER — PROPOFOL 10 MG/ML
INJECTION, EMULSION INTRAVENOUS PRN
Status: DISCONTINUED | OUTPATIENT
Start: 2022-02-07 | End: 2022-02-07 | Stop reason: SDUPTHER

## 2022-02-07 RX ORDER — PROCHLORPERAZINE EDISYLATE 5 MG/ML
5 INJECTION INTRAMUSCULAR; INTRAVENOUS
Status: DISCONTINUED | OUTPATIENT
Start: 2022-02-07 | End: 2022-02-07 | Stop reason: HOSPADM

## 2022-02-07 RX ORDER — PROMETHAZINE HYDROCHLORIDE 25 MG/ML
6.25 INJECTION, SOLUTION INTRAMUSCULAR; INTRAVENOUS
Status: DISCONTINUED | OUTPATIENT
Start: 2022-02-07 | End: 2022-02-07 | Stop reason: HOSPADM

## 2022-02-07 RX ADMIN — HYDROMORPHONE HYDROCHLORIDE 0.5 MG: 1 INJECTION, SOLUTION INTRAMUSCULAR; INTRAVENOUS; SUBCUTANEOUS at 12:11

## 2022-02-07 RX ADMIN — DEXAMETHASONE SODIUM PHOSPHATE 10 MG: 10 INJECTION, SOLUTION INTRAMUSCULAR; INTRAVENOUS at 10:09

## 2022-02-07 RX ADMIN — FENTANYL CITRATE 50 MCG: 50 INJECTION, SOLUTION INTRAMUSCULAR; INTRAVENOUS at 10:30

## 2022-02-07 RX ADMIN — HYDROMORPHONE HYDROCHLORIDE 0.5 MG: 1 INJECTION, SOLUTION INTRAMUSCULAR; INTRAVENOUS; SUBCUTANEOUS at 11:57

## 2022-02-07 RX ADMIN — HYDROMORPHONE HYDROCHLORIDE 0.5 MG: 1 INJECTION, SOLUTION INTRAMUSCULAR; INTRAVENOUS; SUBCUTANEOUS at 12:25

## 2022-02-07 RX ADMIN — PROPOFOL 200 MG: 10 INJECTION, EMULSION INTRAVENOUS at 10:01

## 2022-02-07 RX ADMIN — LIDOCAINE HYDROCHLORIDE 60 MG: 20 INJECTION, SOLUTION INTRAVENOUS at 10:01

## 2022-02-07 RX ADMIN — SODIUM CHLORIDE: 9 INJECTION, SOLUTION INTRAVENOUS at 09:55

## 2022-02-07 RX ADMIN — FENTANYL CITRATE 100 MCG: 50 INJECTION, SOLUTION INTRAMUSCULAR; INTRAVENOUS at 10:26

## 2022-02-07 RX ADMIN — FENTANYL CITRATE 50 MCG: 50 INJECTION, SOLUTION INTRAMUSCULAR; INTRAVENOUS at 10:41

## 2022-02-07 RX ADMIN — CLINDAMYCIN PHOSPHATE 600 MG: 600 INJECTION, SOLUTION INTRAVENOUS at 09:56

## 2022-02-07 RX ADMIN — HYDROMORPHONE HYDROCHLORIDE 0.5 MG: 1 INJECTION, SOLUTION INTRAMUSCULAR; INTRAVENOUS; SUBCUTANEOUS at 11:47

## 2022-02-07 RX ADMIN — FENTANYL CITRATE 100 MCG: 50 INJECTION, SOLUTION INTRAMUSCULAR; INTRAVENOUS at 10:01

## 2022-02-07 RX ADMIN — ROCURONIUM BROMIDE 10 MG: 10 SOLUTION INTRAVENOUS at 10:40

## 2022-02-07 RX ADMIN — ROCURONIUM BROMIDE 30 MG: 10 SOLUTION INTRAVENOUS at 10:02

## 2022-02-07 RX ADMIN — ONDANSETRON 4 MG: 2 INJECTION INTRAMUSCULAR; INTRAVENOUS at 10:09

## 2022-02-07 RX ADMIN — FENTANYL CITRATE 100 MCG: 50 INJECTION, SOLUTION INTRAMUSCULAR; INTRAVENOUS at 10:59

## 2022-02-07 RX ADMIN — PHENYLEPHRINE HYDROCHLORIDE 100 MCG: 10 INJECTION INTRAVENOUS at 10:07

## 2022-02-07 RX ADMIN — MIDAZOLAM 2 MG: 1 INJECTION INTRAMUSCULAR; INTRAVENOUS at 09:55

## 2022-02-07 RX ADMIN — FENTANYL CITRATE 100 MCG: 50 INJECTION, SOLUTION INTRAMUSCULAR; INTRAVENOUS at 11:05

## 2022-02-07 RX ADMIN — Medication 0.6 MG: at 10:57

## 2022-02-07 RX ADMIN — Medication 3 MG: at 10:57

## 2022-02-07 RX ADMIN — SODIUM CHLORIDE: 9 INJECTION, SOLUTION INTRAVENOUS at 11:01

## 2022-02-07 ASSESSMENT — PAIN DESCRIPTION - FREQUENCY
FREQUENCY: CONTINUOUS
FREQUENCY: INTERMITTENT

## 2022-02-07 ASSESSMENT — PULMONARY FUNCTION TESTS
PIF_VALUE: 21
PIF_VALUE: 25
PIF_VALUE: 23
PIF_VALUE: 25
PIF_VALUE: 13
PIF_VALUE: 20
PIF_VALUE: 0
PIF_VALUE: 21
PIF_VALUE: 27
PIF_VALUE: 21
PIF_VALUE: 14
PIF_VALUE: 0
PIF_VALUE: 25
PIF_VALUE: 4
PIF_VALUE: 24
PIF_VALUE: 20
PIF_VALUE: 22
PIF_VALUE: 25
PIF_VALUE: 26
PIF_VALUE: 24
PIF_VALUE: 26
PIF_VALUE: 10
PIF_VALUE: 25
PIF_VALUE: 3
PIF_VALUE: 1
PIF_VALUE: 25
PIF_VALUE: 27
PIF_VALUE: 22
PIF_VALUE: 13
PIF_VALUE: 22
PIF_VALUE: 19
PIF_VALUE: 27
PIF_VALUE: 11
PIF_VALUE: 13
PIF_VALUE: 13
PIF_VALUE: 27
PIF_VALUE: 11
PIF_VALUE: 13
PIF_VALUE: 19
PIF_VALUE: 14
PIF_VALUE: 14
PIF_VALUE: 26
PIF_VALUE: 11
PIF_VALUE: 27
PIF_VALUE: 2
PIF_VALUE: 20
PIF_VALUE: 27
PIF_VALUE: 20
PIF_VALUE: 5
PIF_VALUE: 21
PIF_VALUE: 25
PIF_VALUE: 24
PIF_VALUE: 27
PIF_VALUE: 20
PIF_VALUE: 2
PIF_VALUE: 21
PIF_VALUE: 25
PIF_VALUE: 27
PIF_VALUE: 24
PIF_VALUE: 23
PIF_VALUE: 25
PIF_VALUE: 3
PIF_VALUE: 25
PIF_VALUE: 25
PIF_VALUE: 4
PIF_VALUE: 9
PIF_VALUE: 26
PIF_VALUE: 1

## 2022-02-07 ASSESSMENT — PAIN DESCRIPTION - PAIN TYPE
TYPE: ACUTE PAIN
TYPE: SURGICAL PAIN

## 2022-02-07 ASSESSMENT — PAIN SCALES - GENERAL
PAINLEVEL_OUTOF10: 6
PAINLEVEL_OUTOF10: 6
PAINLEVEL_OUTOF10: 7
PAINLEVEL_OUTOF10: 7
PAINLEVEL_OUTOF10: 6
PAINLEVEL_OUTOF10: 4
PAINLEVEL_OUTOF10: 5
PAINLEVEL_OUTOF10: 6

## 2022-02-07 ASSESSMENT — PAIN DESCRIPTION - PROGRESSION
CLINICAL_PROGRESSION: GRADUALLY IMPROVING
CLINICAL_PROGRESSION: NOT CHANGED
CLINICAL_PROGRESSION: GRADUALLY IMPROVING
CLINICAL_PROGRESSION: GRADUALLY WORSENING

## 2022-02-07 ASSESSMENT — PAIN DESCRIPTION - ORIENTATION
ORIENTATION: LEFT

## 2022-02-07 ASSESSMENT — PAIN DESCRIPTION - DESCRIPTORS
DESCRIPTORS: SHARP
DESCRIPTORS: SHARP
DESCRIPTORS: CRAMPING
DESCRIPTORS: SHARP
DESCRIPTORS: SHARP

## 2022-02-07 ASSESSMENT — PAIN DESCRIPTION - LOCATION
LOCATION: ABDOMEN
LOCATION: GROIN
LOCATION: ABDOMEN

## 2022-02-07 ASSESSMENT — PAIN - FUNCTIONAL ASSESSMENT
PAIN_FUNCTIONAL_ASSESSMENT: ACTIVITIES ARE NOT PREVENTED

## 2022-02-07 ASSESSMENT — PAIN DESCRIPTION - ONSET
ONSET: ON-GOING

## 2022-02-07 NOTE — OP NOTE
DATE OF PROCEDURE:  2/7/2022    SURGEON: Eamon Case M.D.     ASSISTANT: jamila.     PREOPERATIVE DIAGNOSES: left inguinal hernia, possible bilateral, recurrent incisional hernia    POSTOPERATIVE DIAGNOSIS: same      OPERATION:   1.) incisional hernia repair with mesh  2.)Laparoscopic transabdominal left inguinal hernia repair with Mesh      ANESTHESIA: General.     ESTIMATED BLOOD LOSS: Minimal.     COMPLICATIONS: None. FLUIDS: Crystalloid. SPECIMEN: None. DISPOSITION: Discharged home. INDICATION FOR SURGERY: This is a 67-year-old male   who presented with left inguinal and umbilical swelling and complaints of pain consistent with a hernias that was verified with physical exam. We explained the risks and benefits,   potential outcomes and alternatives of treatment of the aforementioned   treatment, including risk of opening surgical site, infection, mesh infection   and needing removal, conversion to open procedure and he agreed to proceed understanding those risks and potential outcomes. PROCEDURE: The patient was brought into the operating suite, had bilateral   PCDs placed, was on preoperative antibiotics, was placed under general   anesthesia, and was then prepped and draped in normal sterile condition. The supraumbilical incision was made with scalpel and carried down to hernia sac with cautery. The defect was cleared and the ventralex 6x6 mesh was attached with vicryl suture, a 5mm trocar was then inserted below this. CO2   was used to insufflate the abdomen to a pressure of 15 mmHg. A   laparoscope was introduced through this trocar and under direct visualization   with the laparoscope, 2 additional 5-mm trocars were placed, 1 on the right   abdomen 4 fingerbreadths lateral into the same line as the umbilicus, and   then a similar mirror image on the left side. Once all 3 ports were in   placed, we explored the abdomen. There was an obvious left indirect   inguinal hernia.     We began our dissection by taking the scissors and making a peritoneal   incision more proximal on the abdominal wall from the hernia site. Once we   were able to get into the preperitoneal space, we bluntly dissected out the   preperitoneal space down to the hernia sac. The hernia sac was then bluntly   dissected free from the spermatic cord and its contents. A cord lipoma was dissected free and excised. We were able to   visualize the vas deferens and pay close attention to not injure this as well   as the vasculature of the cord contents. Once we were able to reduce the   hernia and free the peritoneum from the spermatic cord, and fully reduce the   hernia, we brought in a Bard 3DMax Light mesh, introduced it through one of   the trocars and then placed it into the left inguinal area. Once this was   done, it was tacked to the pubic tubercle medially and a few other   stabilizing tacks were placed throughout the mesh. These were paid close   attention to not place any so-called \"triangle of doom\" or \"triangle of pain\". Once this was completed, the peritoneum was then tacked upon itself to close   over the mesh to assure no bowel would come in contact with the mesh. Once this was completed and we were content with the placement of the mesh,   the pneumoperitoneum was evacuated. The trocars were removed. At that   point, the skin was approximated in all trocar sites with 4-0 Monocryl   sutures in a subcuticular fashion. The patient was woken up in stable   condition and taken to PACU for postoperative care.       Radha Bray MD  10:57 AM  2/7/2022

## 2022-02-07 NOTE — H&P
General Surgery History and Physical    Patient's Name/Date of Birth: Brooke Coronado III / 1980    Date: February 7, 2022     Surgeon: Farida Ballard M.D.    PCP: Concepción Hilliard MD     Chief Complaint: left  inguinal hernia    HPI:   Brooke Coronado III is a 40 y.o. male who presents for evaluation of left right inguinal hernia this is reducible but has become enlarging and causing more pain. Is tolerating a diet and moving bowels.        Past Medical History:   Diagnosis Date    Esophageal varices ([address]        Past Surgical History:   Procedure Laterality Date    ENDOSCOPY, COLON, DIAGNOSTIC      HERNIA REPAIR      TONSILLECTOMY      UMBILICAL HERNIA REPAIR N/A 9/15/2021    LAPAROSCOPIC POSSIBLE OPEN UMBILICAL HERNIA REPAIR WITH 2020 [facility] performed by Stevie Tejada MD at [address] [place] [address] 11/13/2020    EGD BIOPSY performed by Pedro Luis Nunes MD at [address] [place] [address] [facility] N/A 1/6/2021    EGD BAND LIGATION performed by Kwan Banda MD at [address] [place] [address] [facility] [facility] N/A 5/10/2021    EGD BAND LIGATION performed by Kwan Banda MD at [address] [place] [address] [address]       Current Facility-Administered Medications   Medication Dose Route Frequency Provider Last Rate Last Admin    0.9 % sodium chloride infusion   IntraVENous Continuous Carisa Shin MD        0.9 % sodium chloride infusion  25 mL IntraVENous PRN Carisa Shin MD        ceFAZolin (ANCEF) 2000 mg in dextrose 3 % 50 mL IVPB (duplex)  2,000 mg IntraVENous On Call to Karen Guzman MD        sodium chloride flush 0.9 % injection 10 mL  10 mL IntraVENous 2 times per day Carisa Shin MD        sodium chloride flush 0.9 % injection 10 mL  10 mL IntraVENous PRN Carisa Shin MD           Allergies   Allergen Reactions    Pcn [Penicillins] Anaphylaxis       The patient has a family history that is negative for severe cardiovascular or respiratory issues, negative for reaction to anesthesia. Social History     Socioeconomic History    Marital status: Single     Spouse name: Not on file    Number of children: Not on file    Years of education: Not on file    Highest education level: Not on file   Occupational History    Not on file   Tobacco Use    Smoking status: Former Smoker     Packs/day: 0.50    Smokeless tobacco: Never Used   Vaping Use    Vaping Use: Never used   Substance and Sexual Activity    Alcohol use: Not Currently     Comment: occasional-denies    Drug use: Not on file     Comment: last used medical marijuana 2/2    Sexual activity: Not on file   Other Topics Concern    Not on file   Social History Narrative    Not on file     Social Determinants of Health     Financial Resource Strain:     Difficulty of Paying Living Expenses: Not on file   Food Insecurity:     Worried About [address] in the Last Year: Not on file    [address] [employer] in the Last Year: Not on file   Transportation Needs:     Lack of Transportation (Medical): Not on file    Lack of Transportation (Non-Medical):  Not on file   Physical Activity:     Days of Exercise per Week: Not on file    Minutes of Exercise per Session: Not on file   Stress:     Feeling of Stress : Not on file   Social Connections:     Frequency of Communication with Friends and Family: Not on file    Frequency of Social Gatherings with Friends and Family: Not on file    Attends Holiness Services: Not on file    Active Member of Clubs or Organizations: Not on file    Attends Club or Organization Meetings: Not on file    Marital Status: Not on file   Intimate Partner Violence:     Fear of Current or Ex-Partner: Not on file    Emotionally Abused: Not on file    Physically Abused: Not on file    Sexually Abused: Not on file   Housing Stability:     Unable to Pay for Housing in the Last Year: Not on file    Number of Places Lived in the Last Year: Not on file    Unstable Housing in the Last Year: Not on file           Review of Systems  Review of Systems -  General ROS: negative for - chills, fatigue or malaise  ENT ROS: negative for - hearing change, nasal congestion or nasal discharge  Allergy and Immunology ROS: negative for - hives, itchy/watery eyes or nasal congestion  Hematological and Lymphatic ROS: negative for - blood clots, blood transfusions, bruising or fatigue  Endocrine ROS: negative for - malaise/lethargy, mood swings, palpitations or polydipsia/polyuria  Breast ROS: negative for - new or changing breast lumps or nipple changes  Respiratory ROS: negative for - sputum changes, stridor, tachypnea or wheezing  Cardiovascular ROS: negative for - irregular heartbeat, loss of consciousness, murmur or orthopnea  Gastrointestinal ROS: negative for - constipation, diarrhea, gas/bloating, heartburn or hematemesis  Genito-Urinary ROS: negative for -  genital discharge, genital ulcers or hematuria  Musculoskeletal ROS: negative for - gait disturbance, muscle pain or muscular weakness    Physical exam:   BP (!) 112/53   Pulse 88   Temp 98.9 °F (37.2 °C) (Infrared)   Resp 18   Ht 5' 8\" (1.727 m)   Wt 164 lb (74.4 kg)   SpO2 98%   BMI 24.94 kg/m²   General appearance:  NAD  Pyscho/social: negative for tremors and hallucinations  Head: NCAT, PERRLA, EOMI, red conjunctiva  Neck: supple, no masses  Lungs: CTAB, equal chest rise bilateral  Heart: Reg rate  Abdomen: soft, minimally tender, nondistended, left reducible hernia   Skin; no lesions  Gu: no cva tenderness  Extremities: extremities normal, atraumatic, no cyanosis or edema    Assessment:  40 y.o. male with left inguinal hernia     Plan: To OR   Discussed the risk, benefits and alternatives of surgery including wound infections, bleeding, scar and hernia formation and the risks of general anesthetic including MI, CVA, sudden death or reactions to anesthetic medications.  The patient understands the risks and alternatives and the possibility of converting to an open procedure. All questions were answered to the patient's satisfaction and they freely signed the consent.         Serena Snell MD  9:09 AM  2/7/2022

## 2022-02-07 NOTE — PROGRESS NOTES
Resident at the bedside assessing the patients abdomen. He states the swelling is normal and to keep ice on it.

## 2022-02-07 NOTE — ANESTHESIA POSTPROCEDURE EVALUATION
Department of Anesthesiology  Postprocedure Note    Patient: Kalie Lara III  MRN: [id number]  YOB: 1980  Date of evaluation: 2/7/2022  Time:  11:53 AM     Procedure Summary     Date: 02/07/22 Room / Location: [address] [place] [address] 03 [address] [facility]    Anesthesia Start: 1251 Anesthesia Stop: 0549    Procedure: [address] WITH MESH POSSIBLE OPEN POSSIBLE BILATERAL (Left ) Diagnosis: (LEFT INGUINAL HERNIA)    Surgeons: Ramona Issa MD Responsible Provider: Farida Lara MD    Anesthesia Type: general ASA Status: 3          Anesthesia Type: general    Edmundo Phase I: Edmundo Score: 9    Edmundo Phase II:      Last vitals: Reviewed and per EMR flowsheets.        Anesthesia Post Evaluation    Patient location during evaluation: PACU  Patient participation: complete - patient participated  Level of consciousness: awake  Pain score: 3  Airway patency: patent  Nausea & Vomiting: no nausea and no vomiting  Complications: no  Cardiovascular status: blood pressure returned to baseline and hemodynamically stable  Respiratory status: acceptable and room air  Hydration status: euvolemic  Multimodal analgesia pain management approach

## 2022-02-11 ENCOUNTER — APPOINTMENT (OUTPATIENT)
Dept: ULTRASOUND IMAGING | Age: 42
End: 2022-02-11
Payer: COMMERCIAL

## 2022-02-11 ENCOUNTER — HOSPITAL ENCOUNTER (EMERGENCY)
Age: 42
Discharge: HOME | End: 2022-02-12
Attending: EMERGENCY MEDICINE
Payer: COMMERCIAL

## 2022-02-11 ENCOUNTER — TELEPHONE (OUTPATIENT)
Dept: SURGERY | Age: 42
End: 2022-02-11

## 2022-02-11 DIAGNOSIS — G89.18 POST-OP PAIN: ICD-10-CM

## 2022-02-11 DIAGNOSIS — N50.89 SCROTAL SWELLING: Primary | ICD-10-CM

## 2022-02-11 LAB — LACTIC ACID: 1.4 MMOL/L (ref 0.5–2.2)

## 2022-02-11 PROCEDURE — 6360000002 HC RX W HCPCS: Performed by: STUDENT IN AN ORGANIZED HEALTH CARE EDUCATION/TRAINING PROGRAM

## 2022-02-11 PROCEDURE — 99284 EMERGENCY DEPT VISIT MOD MDM: CPT

## 2022-02-11 PROCEDURE — 83735 ASSAY OF MAGNESIUM: CPT

## 2022-02-11 PROCEDURE — 86140 C-REACTIVE PROTEIN: CPT

## 2022-02-11 PROCEDURE — 83605 ASSAY OF LACTIC ACID: CPT

## 2022-02-11 PROCEDURE — 85025 COMPLETE CBC W/AUTO DIFF WBC: CPT

## 2022-02-11 PROCEDURE — 85610 PROTHROMBIN TIME: CPT

## 2022-02-11 PROCEDURE — 76870 US EXAM SCROTUM: CPT

## 2022-02-11 PROCEDURE — 93975 VASCULAR STUDY: CPT

## 2022-02-11 PROCEDURE — 80076 HEPATIC FUNCTION PANEL: CPT

## 2022-02-11 PROCEDURE — 85651 RBC SED RATE NONAUTOMATED: CPT

## 2022-02-11 PROCEDURE — 96374 THER/PROPH/DIAG INJ IV PUSH: CPT

## 2022-02-11 PROCEDURE — 84484 ASSAY OF TROPONIN QUANT: CPT

## 2022-02-11 PROCEDURE — 85730 THROMBOPLASTIN TIME PARTIAL: CPT

## 2022-02-11 PROCEDURE — 80048 BASIC METABOLIC PNL TOTAL CA: CPT

## 2022-02-11 PROCEDURE — 36415 COLL VENOUS BLD VENIPUNCTURE: CPT

## 2022-02-11 PROCEDURE — 83690 ASSAY OF LIPASE: CPT

## 2022-02-11 RX ORDER — FENTANYL CITRATE 0.05 MG/ML
100 INJECTION, SOLUTION INTRAMUSCULAR; INTRAVENOUS ONCE
Status: COMPLETED | OUTPATIENT
Start: 2022-02-11 | End: 2022-02-11

## 2022-02-11 RX ADMIN — FENTANYL CITRATE 100 MCG: 50 INJECTION INTRAMUSCULAR; INTRAVENOUS at 23:30

## 2022-02-11 ASSESSMENT — PAIN SCALES - GENERAL: PAINLEVEL_OUTOF10: 10

## 2022-02-11 NOTE — TELEPHONE ENCOUNTER
Patient phoned the office regarding swelling following his surgery. Patient encouraged to elevate his hips and apply ice to the area to aid in reducing the swelling. Patient verbalized that his incisions are clean and dry and that he is urinating normally. Patient requesting refill of pain medication. Patient encouraged to use Tylenol and Ibuprofen for pain once his prescription for pain medication has ended. Patient verbalized understanding.   Electronically signed by Anuel Cabrera on 2/11/22 at 9:54 AM EST

## 2022-02-12 ENCOUNTER — APPOINTMENT (OUTPATIENT)
Dept: CT IMAGING | Age: 42
End: 2022-02-12
Payer: COMMERCIAL

## 2022-02-12 VITALS
HEART RATE: 85 BPM | WEIGHT: 165 LBS | BODY MASS INDEX: 25.09 KG/M2 | SYSTOLIC BLOOD PRESSURE: 123 MMHG | DIASTOLIC BLOOD PRESSURE: 57 MMHG | RESPIRATION RATE: 16 BRPM | OXYGEN SATURATION: 98 % | TEMPERATURE: 98.8 F

## 2022-02-12 LAB
ALBUMIN SERPL-MCNC: 3.1 G/DL (ref 3.5–5.2)
ALP BLD-CCNC: 108 U/L (ref 40–129)
ALT SERPL-CCNC: 54 U/L (ref 0–40)
ANION GAP SERPL CALCULATED.3IONS-SCNC: 7 MMOL/L (ref 7–16)
ANISOCYTOSIS: ABNORMAL
APTT: 34.5 SEC (ref 24.5–35.1)
AST SERPL-CCNC: 73 U/L (ref 0–39)
BASOPHILS ABSOLUTE: 0 E9/L (ref 0–0.2)
BASOPHILS RELATIVE PERCENT: 0.2 % (ref 0–2)
BILIRUB SERPL-MCNC: 2 MG/DL (ref 0–1.2)
BILIRUBIN DIRECT: 0.7 MG/DL (ref 0–0.3)
BILIRUBIN URINE: NEGATIVE
BILIRUBIN, INDIRECT: 1.3 MG/DL (ref 0–1)
BLOOD, URINE: NEGATIVE
BUN BLDV-MCNC: 14 MG/DL (ref 6–20)
BURR CELLS: ABNORMAL
C-REACTIVE PROTEIN: 0.3 MG/DL (ref 0–0.4)
CALCIUM SERPL-MCNC: 8.5 MG/DL (ref 8.6–10.2)
CHLORIDE BLD-SCNC: 98 MMOL/L (ref 98–107)
CLARITY: CLEAR
CO2: 28 MMOL/L (ref 22–29)
COLOR: YELLOW
CREAT SERPL-MCNC: 1.1 MG/DL (ref 0.7–1.2)
EOSINOPHILS ABSOLUTE: 0.38 E9/L (ref 0.05–0.5)
EOSINOPHILS RELATIVE PERCENT: 9.1 % (ref 0–6)
GFR AFRICAN AMERICAN: >60
GFR NON-AFRICAN AMERICAN: >60 ML/MIN/1.73
GLUCOSE BLD-MCNC: 105 MG/DL (ref 74–99)
GLUCOSE URINE: NEGATIVE MG/DL
HCT VFR BLD CALC: 29 % (ref 37–54)
HEMOGLOBIN: 10 G/DL (ref 12.5–16.5)
HYPOCHROMIA: ABNORMAL
INR BLD: 1.8
KETONES, URINE: NEGATIVE MG/DL
LEUKOCYTE ESTERASE, URINE: NEGATIVE
LIPASE: 91 U/L (ref 13–60)
LYMPHOCYTES ABSOLUTE: 0.46 E9/L (ref 1.5–4)
LYMPHOCYTES RELATIVE PERCENT: 10.9 % (ref 20–42)
MAGNESIUM: 1.6 MG/DL (ref 1.6–2.6)
MCH RBC QN AUTO: 28.5 PG (ref 26–35)
MCHC RBC AUTO-ENTMCNC: 34.5 % (ref 32–34.5)
MCV RBC AUTO: 82.6 FL (ref 80–99.9)
MONOCYTES ABSOLUTE: 0.25 E9/L (ref 0.1–0.95)
MONOCYTES RELATIVE PERCENT: 6.4 % (ref 2–12)
NEUTROPHILS ABSOLUTE: 3.11 E9/L (ref 1.8–7.3)
NEUTROPHILS RELATIVE PERCENT: 73.6 % (ref 43–80)
NITRITE, URINE: NEGATIVE
OVALOCYTES: ABNORMAL
PDW BLD-RTO: 15.7 FL (ref 11.5–15)
PH UA: 6 (ref 5–9)
PLATELET # BLD: 49 E9/L (ref 130–450)
PLATELET CONFIRMATION: NORMAL
PMV BLD AUTO: 10.1 FL (ref 7–12)
POIKILOCYTES: ABNORMAL
POLYCHROMASIA: ABNORMAL
POTASSIUM REFLEX MAGNESIUM: 3.1 MMOL/L (ref 3.5–5)
PROTEIN UA: NEGATIVE MG/DL
PROTHROMBIN TIME: 21 SEC (ref 9.3–12.4)
RBC # BLD: 3.51 E12/L (ref 3.8–5.8)
SEDIMENTATION RATE, ERYTHROCYTE: 9 MM/HR (ref 0–15)
SODIUM BLD-SCNC: 133 MMOL/L (ref 132–146)
SPECIFIC GRAVITY UA: 1.01 (ref 1–1.03)
TARGET CELLS: ABNORMAL
TEAR DROP CELLS: ABNORMAL
TOTAL PROTEIN: 6.1 G/DL (ref 6.4–8.3)
TROPONIN, HIGH SENSITIVITY: <6 NG/L (ref 0–11)
UROBILINOGEN, URINE: 1 E.U./DL
WBC # BLD: 4.2 E9/L (ref 4.5–11.5)

## 2022-02-12 PROCEDURE — 81003 URINALYSIS AUTO W/O SCOPE: CPT

## 2022-02-12 PROCEDURE — 87088 URINE BACTERIA CULTURE: CPT

## 2022-02-12 PROCEDURE — 6370000000 HC RX 637 (ALT 250 FOR IP): Performed by: STUDENT IN AN ORGANIZED HEALTH CARE EDUCATION/TRAINING PROGRAM

## 2022-02-12 PROCEDURE — 6360000004 HC RX CONTRAST MEDICATION: Performed by: RADIOLOGY

## 2022-02-12 PROCEDURE — 74177 CT ABD & PELVIS W/CONTRAST: CPT

## 2022-02-12 RX ORDER — HYDROCODONE BITARTRATE AND ACETAMINOPHEN 5; 325 MG/1; MG/1
1 TABLET ORAL ONCE
Status: COMPLETED | OUTPATIENT
Start: 2022-02-12 | End: 2022-02-12

## 2022-02-12 RX ORDER — HYDROCODONE BITARTRATE AND ACETAMINOPHEN 5; 325 MG/1; MG/1
1 TABLET ORAL EVERY 6 HOURS PRN
Qty: 12 TABLET | Refills: 0 | Status: SHIPPED | OUTPATIENT
Start: 2022-02-12 | End: 2022-02-15 | Stop reason: SDUPTHER

## 2022-02-12 RX ORDER — POTASSIUM CHLORIDE 20 MEQ/1
40 TABLET, EXTENDED RELEASE ORAL ONCE
Status: COMPLETED | OUTPATIENT
Start: 2022-02-12 | End: 2022-02-12

## 2022-02-12 RX ADMIN — IOPAMIDOL 75 ML: 755 INJECTION, SOLUTION INTRAVENOUS at 00:42

## 2022-02-12 RX ADMIN — HYDROCODONE BITARTRATE AND ACETAMINOPHEN 1 TABLET: 5; 325 TABLET ORAL at 01:56

## 2022-02-12 RX ADMIN — POTASSIUM CHLORIDE 40 MEQ: 1500 TABLET, EXTENDED RELEASE ORAL at 00:46

## 2022-02-12 ASSESSMENT — ENCOUNTER SYMPTOMS
VOMITING: 0
DIARRHEA: 0
SHORTNESS OF BREATH: 0
SORE THROAT: 0
CONSTIPATION: 0
ABDOMINAL PAIN: 1
EYE PAIN: 0
CHEST TIGHTNESS: 0
SINUS PAIN: 0
NAUSEA: 0
COLOR CHANGE: 1
SINUS PRESSURE: 0
COUGH: 0
BACK PAIN: 0
EYE REDNESS: 0

## 2022-02-12 ASSESSMENT — PAIN SCALES - GENERAL
PAINLEVEL_OUTOF10: 7
PAINLEVEL_OUTOF10: 10

## 2022-02-12 ASSESSMENT — PAIN DESCRIPTION - LOCATION: LOCATION: SCROTUM

## 2022-02-12 ASSESSMENT — PAIN DESCRIPTION - PAIN TYPE: TYPE: ACUTE PAIN

## 2022-02-12 NOTE — ED PROVIDER NOTES
[address] [facility]  Department of Emergency Medicine     Written by: Willette Gowers, DO  Patient Name: Daniel Joseph III  Attending Provider: Olga Coleman DO  Admit Date: 2022  9:31 PM  MRN: [id number]                   : 1980        Chief Complaint   Patient presents with    Post-op Problem      inguinal hernia repair, testicles edema/purple, left hip/groin pain    - Chief complaint    Mr. Kings Martinez is a 41 yo male who presents to the ED due to testicular swelling and pain following inguinal hernia repair. Patient had a left inguinal hernia repair completed 4 days prior to arrival.  He notes that after surgery his scrotum has become increasingly more swollen and painful. He notes that he has called Dr. Shayla Cortes office to follow-up which they responded was a normal postoperative course for most patients. He is presenting due to unbearable scrotal pain at this time. He notes his pain is aggravated with any movements or palpation of the scrotum and relieved by nothing. States his scrotum is now larger since then and is a dark blue/purple color. He also notes that he has pain into the left groin and hip region. Also endorses left lower abdominal pain. Symptoms have been progressively worsening since onset. States he has not had any problems urinating or having bowel movements. Denies any recent fevers, chills, nausea, vomiting, chest pain, shortness of breath, bowel or urinary changes, headaches or vision changes. Review of Systems   Constitutional: Negative for chills, fatigue and fever. HENT: Negative for congestion, sinus pressure, sinus pain and sore throat. Eyes: Negative for pain, redness and visual disturbance. Respiratory: Negative for cough, chest tightness and shortness of breath. Cardiovascular: Negative for chest pain, palpitations and leg swelling. Gastrointestinal: Positive for abdominal pain.  Negative for constipation, diarrhea, nausea and vomiting. Genitourinary: Positive for scrotal swelling and testicular pain. Negative for decreased urine volume, difficulty urinating, dysuria, flank pain, frequency, hematuria, penile discharge, penile pain and urgency. Musculoskeletal: Negative for arthralgias, back pain, gait problem, joint swelling and myalgias. Skin: Positive for color change (Purple scrotum). Negative for rash. Neurological: Negative for dizziness, tremors, syncope, weakness, light-headedness, numbness and headaches. Physical Exam  Exam conducted with a chaperone present. Constitutional:       General: He is in acute distress. Appearance: Normal appearance. He is normal weight. He is not ill-appearing. HENT:      Head: Normocephalic and atraumatic. Right Ear: External ear normal.      Left Ear: External ear normal.      Mouth/Throat:      Mouth: Mucous membranes are moist.      Pharynx: Oropharynx is clear. Eyes:      Extraocular Movements: Extraocular movements intact. Conjunctiva/sclera: Conjunctivae normal.   Cardiovascular:      Rate and Rhythm: Normal rate and regular rhythm. Pulses: Normal pulses. Heart sounds: Normal heart sounds. Pulmonary:      Effort: Pulmonary effort is normal. No respiratory distress. Breath sounds: Normal breath sounds. No wheezing. Abdominal:      General: Abdomen is flat. Bowel sounds are normal. There is no distension. Palpations: Abdomen is soft. Tenderness: There is abdominal tenderness (LLQ and suprapubic). There is no right CVA tenderness, left CVA tenderness, guarding or rebound. Genitourinary:     Penis: Swelling (Edematous and purple as well) present. Testes:         Right: Tenderness and swelling present. Left: Tenderness and swelling present. Musculoskeletal:         General: Swelling (Left upper thigh) present. No tenderness, deformity or signs of injury. Normal range of motion.       Cervical back: Normal range of motion and neck supple. No rigidity or tenderness. Skin:     General: Skin is warm and dry. Findings: Bruising (Scrotum purple) present. Neurological:      General: No focal deficit present. Mental Status: He is alert and oriented to person, place, and time. Mental status is at baseline. Sensory: No sensory deficit. Motor: No weakness. Psychiatric:         Mood and Affect: Mood normal.         Behavior: Behavior normal.          Procedures       MDM  Number of Diagnoses or Management Options  Post-op pain  Scrotal swelling  Diagnosis management comments: This is a 41 yo male who presents to the ED due to testicular swelling and pain following inguinal hernia repair. Patient was initially given 100 mcg of fentanyl for his pain. CBC revealed leukopenia with white blood cell count of 4.2 and mild anemia with a hemoglobin of 10.0 which appears to be within his normal limits and thrombocytopenia with a platelet count of 49 which is in line with his cirrhosis history. BMP revealed hypokalemia with potassium 3.1 he was given 40 mEq of potassium orally. Hepatic function panel revealed decreased albumin and mildly elevated transaminases with an increased total bili of 2.0. Lipase is mildly elevated at 91. Troponin was less than 6. Coags were markedly normal.  ESR was 9. Lactic acid was normal.  Urinalysis was benign. Ultrasound duplex of the scrotum revealed no evidence of testicular torsion with bilateral hydroceles and marked scrotal edema. CT abdomen pelvis revealed a few small foci of air identified anterior to the scrotal sac along with right inferior ventral abdominal wall likely due to recent inguinal hernia repair. He also had nodular hepatic contour consistent with cirrhosis and small volume ascites as well as thickening of the ascending colon. He will be given 1 dose of Norco in the department prior to discharge.   Touch base with his surgeon, Dr. John Mckeon, who stated swelling like this can occur post inguinal hernia repair and if his labs and imaging look good he can follow-up as an outpatient if his symptoms persist.  He has been told to follow-up with his primary care provider and general surgeon if his symptoms persist.  Patient will be discharged home at this time with a short course of Norco to help manage his pain. He has been told return the emergency department if symptoms return, worsen or change in any time. --------------------------------------------- PAST HISTORY ---------------------------------------------  Past Medical History:  has a past medical [facility] Past Surgical History:  has a past surgical history that includes Upper gastrointestinal endoscopy (N/A, 11/13/2020); Tonsillectomy; Upper gastrointestinal endoscopy (N/A, 1/6/2021); Upper gastrointestinal endoscopy (N/A, 5/10/2021); Endoscopy, colon, diagnostic; Umbilical hernia repair (N/A, 9/15/2021); hernia repair; and hernia repair (Left, 2/7/2022). Social History:  reports that he has quit smoking. He smoked 0.50 packs per day. He has never used smokeless tobacco. He reports previous alcohol use. Family History: family history is not on file. The patients home medications have been reviewed.     Allergies: Pcn [penicillins]    -------------------------------------------------- RESULTS -------------------------------------------------  Labs:  Results for orders placed or performed during the hospital encounter of 02/11/22   CBC Auto Differential   Result Value Ref Range    WBC 4.2 (L) 4.5 - 11.5 E9/L    RBC 3.51 (L) 3.80 - 5.80 E12/L    Hemoglobin 10.0 (L) 12.5 - 16.5 g/dL    Hematocrit 29.0 (L) 37.0 - 54.0 %    MCV 82.6 80.0 - 99.9 fL    MCH 28.5 26.0 - 35.0 pg    MCHC 34.5 32.0 - 34.5 %    RDW 15.7 (H) 11.5 - 15.0 fL    Platelets 49 (L) 974 - 450 E9/L    MPV 10.1 7.0 - 12.0 fL   Basic Metabolic Panel w/ Reflex to MG   Result Value Ref Range    Sodium 133 132 - 146 mmol/L    Potassium reflex Magnesium 3.1 (L) 3.5 - 5.0 mmol/L    Chloride 98 98 - 107 mmol/L    CO2 28 22 - 29 mmol/L    Anion Gap 7 7 - 16 mmol/L    Glucose 105 (H) 74 - 99 mg/dL    BUN 14 6 - 20 mg/dL    CREATININE 1.1 0.7 - 1.2 mg/dL    GFR Non-African American >60 >=60 mL/min/1.73    GFR African American >60     Calcium 8.5 (L) 8.6 - 10.2 mg/dL   Hepatic Function Panel   Result Value Ref Range    Total Protein 6.1 (L) 6.4 - 8.3 g/dL    Albumin 3.1 (L) 3.5 - 5.2 g/dL    Alkaline Phosphatase 108 40 - 129 U/L    ALT 54 (H) 0 - 40 U/L    AST 73 (H) 0 - 39 U/L    Total Bilirubin 2.0 (H) 0.0 - 1.2 mg/dL    Bilirubin, Direct 0.7 (H) 0.0 - 0.3 mg/dL    Bilirubin, Indirect 1.3 (H) 0.0 - 1.0 mg/dL   Lipase   Result Value Ref Range    Lipase 91 (H) 13 - 60 U/L   Troponin   Result Value Ref Range    Troponin, High Sensitivity <6 0 - 11 ng/L   Urinalysis, reflex to microscopic   Result Value Ref Range    Color, UA Yellow Straw/Yellow    Clarity, UA Clear Clear    Glucose, Ur Negative Negative mg/dL    Bilirubin Urine Negative Negative    Ketones, Urine Negative Negative mg/dL    Specific Gravity, UA 1.010 1.005 - 1.030    Blood, Urine Negative Negative    pH, UA 6.0 5.0 - 9.0    Protein, UA Negative Negative mg/dL    Urobilinogen, Urine 1.0 <2.0 E.U./dL    Nitrite, Urine Negative Negative    Leukocyte Esterase, Urine Negative Negative   Lactic Acid, Plasma   Result Value Ref Range    Lactic Acid 1.4 0.5 - 2.2 mmol/L   APTT   Result Value Ref Range    aPTT 34.5 24.5 - 35.1 sec   Protime-INR   Result Value Ref Range    Protime 21.0 (H) 9.3 - 12.4 sec    INR 1.8    Sedimentation Rate   Result Value Ref Range    Sed Rate 9 0 - 15 mm/Hr   Magnesium   Result Value Ref Range    Magnesium 1.6 1.6 - 2.6 mg/dL       Radiology:  CT ABDOMEN PELVIS W IV CONTRAST Additional Contrast? None   Final Result   A few small foci of air identified anterior to the scrotal sac and along the   right inferior ventral abdominal wall.   Findings not definitive for   necrotizing fasciitis and most likely related to recent left-sided hernia   surgery 2 days prior. Nodular hepatic contour consistent with cirrhotic morphology. Splenomegaly. Small volume ascites. Recanalization of the umbilical vein. Thickening of the ascending colon. Correlate with colitis clinically. Findings discussed on 02/12/2022 with Dr. Charo Sarkar at 12:46 a.m. Mart[facility]nez LESTER DUP ABD PEL RETRO SCROT COMPLETE   Final Result   No evidence of testicular torsion. Bilateral hydroceles. Marked scrotal edema. US SCROTUM AND TESTICLES   Final Result   No evidence of testicular torsion. Bilateral hydroceles. Marked scrotal edema. ------------------------- NURSING NOTES AND VITALS REVIEWED ---------------------------  Date / Time Roomed:  2/11/2022  9:31 PM  ED Bed Assignment:  14/14    The nursing notes within the ED encounter and vital signs as below have been reviewed. BP (!) 123/57   Pulse 85   Temp 98.8 °F (37.1 °C) (Oral)   Resp 16   Wt 165 lb (74.8 kg)   SpO2 98%   BMI 25.09 kg/m²   Oxygen Saturation Interpretation: Normal      ------------------------------------------ PROGRESS NOTES ------------------------------------------  2:05 AM EST  I have spoken with the patient and discussed todays results, in addition to providing specific details for the plan of care and counseling regarding the diagnosis and prognosis. Their questions are answered at this time and they are agreeable with the plan. I discussed at length with them reasons for immediate return here for re evaluation. They will followup with their primary care physician and general surgeon by calling their office tomorrow. --------------------------------- ADDITIONAL PROVIDER NOTES ---------------------------------  At this time the patient is without objective evidence of an acute process requiring hospitalization or inpatient management.   They have remained hemodynamically stable throughout their entire ED visit and are stable for discharge with outpatient follow-up. The plan has been discussed in detail and they are aware of the specific conditions for emergent return, as well as the importance of follow-up. New Prescriptions    HYDROCODONE-ACETAMINOPHEN (NORCO) 5-325 MG PER TABLET    Take 1 tablet by mouth every 6 hours as needed for Pain for up to 3 days. Intended supply: 3 days. Take lowest dose possible to manage pain       Diagnosis:  1. Scrotal swelling    2. Post-op pain        Disposition:  Patient's disposition: Discharge to home  Patient's condition is stable. Patient was seen and evaluated by myself and my attending Angel Young DO. Assessment and Plan discussed with attending provider, please see attestation for final plan of care.      DO Yan Ochoa DO  Resident  02/12/22 [id number]

## 2022-02-14 LAB — URINE CULTURE, ROUTINE: NORMAL

## 2022-02-15 DIAGNOSIS — N50.89 SCROTAL SWELLING: ICD-10-CM

## 2022-02-15 RX ORDER — HYDROCODONE BITARTRATE AND ACETAMINOPHEN 5; 325 MG/1; MG/1
1 TABLET ORAL EVERY 6 HOURS PRN
Qty: 12 TABLET | Refills: 0 | Status: SHIPPED | OUTPATIENT
Start: 2022-02-15 | End: 2022-02-18

## 2022-02-15 NOTE — PROGRESS NOTES
Called for more pain meds. Follow up tomorrow. Last narcotic script given. Hes had 40 tabs in 15 days.      Rashawn Berg MD, MS  Minimally Invasive and Bariatric Surgery  [phone] (p)  2/15/2022  5:59 PM

## 2022-02-16 ENCOUNTER — OFFICE VISIT (OUTPATIENT)
Dept: SURGERY | Age: 42
End: 2022-02-16

## 2022-02-16 VITALS
BODY MASS INDEX: 25.01 KG/M2 | TEMPERATURE: 98.2 F | DIASTOLIC BLOOD PRESSURE: 73 MMHG | SYSTOLIC BLOOD PRESSURE: 134 MMHG | WEIGHT: 165 LBS | HEART RATE: 106 BPM | HEIGHT: 68 IN

## 2022-02-16 DIAGNOSIS — K40.90 LEFT INGUINAL HERNIA: ICD-10-CM

## 2022-02-16 DIAGNOSIS — N50.89 SCROTAL SWELLING: Primary | ICD-10-CM

## 2022-02-16 PROCEDURE — 99024 POSTOP FOLLOW-UP VISIT: CPT | Performed by: SURGERY

## 2022-02-16 RX ORDER — FUROSEMIDE 40 MG/1
TABLET ORAL
Status: ON HOLD | COMMUNITY
Start: 2022-02-05 | End: 2022-07-01 | Stop reason: HOSPADM

## 2022-02-16 RX ORDER — NADOLOL 40 MG/1
TABLET ORAL
Status: ON HOLD | COMMUNITY
Start: 2021-12-02 | End: 2022-02-24 | Stop reason: HOSPADM

## 2022-02-16 RX ORDER — IBUPROFEN 800 MG/1
800 TABLET ORAL EVERY 6 HOURS PRN
Qty: 120 TABLET | Refills: 0 | Status: ON HOLD
Start: 2022-02-16 | End: 2022-07-01 | Stop reason: HOSPADM

## 2022-02-16 NOTE — PROGRESS NOTES
General Surgery Office Note  Isamar Zamora MD, MS    Patient's Name/Date of Birth: Usha Hutchinson III / 1980    Date: February 16, 2022     Chief compaint: Postop visit from laparoscopic left inguinal hernia repair with mesh    Surgeon: Duran Watters MD    Patient Active Problem List   Diagnosis    Cellulitis    UGI bleed    Abdominal pain    Secondary esophageal varices with bleeding ([address]    GIB (gastrointestinal bleeding)    Umbilical hernia without obstruction and without gangrene    Left inguinal hernia       Subjective: Patient returns complaining of scrotal swelling and pain. His pain is not significantly changed from what it was preoperatively when I saw him. His CT scan that he had done in the emergency department for acute postop pain show significant hematoma scrotum which today appears significantly improved. He denies any trouble urinating or defecating. He is moving his bowels and he has no abdominal pain. He complains of some itching at his incision sites which appear to have a mild cutaneous dermatitis reaction to the glue which was peeled off our visit. I reviewed his CT scan with him there is no evidence of recurrence. I also discussed with him this is appropriate postop pain considering when his pain level was prepped prior to surgery. I discussed with him that he may not see significant improvement for 6 to 8 weeks given his likely chronic nerve pain that was present prior to his inguinal hernia. I offered injection of local anesthetic and steroid today to try and improve his acute pain. He is received 3 separate narcotic prescriptions over the last 2 weeks from 3 different providers. I advised him I will refill this 1 more time which I did last night after a telephone call from him after hours.   He has not been taking any ibuprofen to supplement the narcotics    Objective:  /73   Pulse 106   Temp 98.2 °F (36.8 °C) (Temporal)   Ht 5' 8\" (1.727 m)   Wt 165 lb (74.8 kg)   BMI 25.09 kg/m²   Labs:  No results for input(s): WBC, HGB, HCT in the last 72 hours. Invalid input(s): PLR  Lab Results   Component Value Date    CREATININE 1.1 02/11/2022    BUN 14 02/11/2022     02/11/2022    K 3.1 (L) 02/11/2022    CL 98 02/11/2022    CO2 28 02/11/2022     No results for input(s): LIPASE, AMYLASE in the last 72 hours. Review of Systems -  General ROS: negative for - chills, fatigue or malaise  ENT ROS: negative for - hearing change, nasal congestion or nasal discharge  Allergy and Immunology ROS: negative for - hives, itchy/watery eyes or nasal congestion  Hematological and Lymphatic ROS: negative for - blood clots, blood transfusions, bruising or fatigue  Endocrine ROS: negative for - malaise/lethargy, mood swings, palpitations or polydipsia/polyuria  Respiratory ROS: negative for - sputum changes, stridor, tachypnea or wheezing  Cardiovascular ROS: negative for - irregular heartbeat, loss of consciousness, murmur or orthopnea  Gastrointestinal ROS: negative for - diarrhea, or hematemesis  Genito-Urinary ROS: negative for -  genital discharge, genital ulcers or hematuria  Musculoskeletal ROS: negative for - gait disturbance, muscle pain or muscular weakness  Psych/Neuro ROS: negative for - visual or auditory hallucinations, suicidal ideation    General appearance: AA, NAD  HEENT: NCAT, PERRLA, EOMI  Lungs: Clear, equal rise bilateral  Heart: Reg  Abdomen: soft, nondistended, nontender, incisions well-healed with some mild dermatitis around consistent with reaction of the glue. The glue was removed there is no evidence of cellulitis or infection  Ext: Atraumatic, no swelling  : No hernia recurrence    CT scan postop reviewed significant scrotal hematoma and edema    Assessment/Plan:  Kalie Lara III is a 40 y.o. male 2 weeks s/p laparoscopic left inguinal hernia repair with mesh and umbilical hernia repair by Dr. Kalyani Means.   Postop pain without evidence of recurrence    Procedure Note  Left Abdominal wall steroid injection       Indication: Chronic left groin pain    Procedure: The patient was positioned appropriately and the skin over the incision site was prepped with alcohol. Local anesthesia was 5cc of 0.25% marcaine and 5cc of 50mg kenelog were drawn in a 10cc syringe. Injection was performed at 3 sites with 3cc per site. The area was cleaned and bandages applied to areas of any injection. The patient tolerated the procedure well.     Complications: None    He had significant improvement in his pain after completion of the injections  Ambulated out of the office upright with what appear to be significant improvement  Continue ibuprofen for 6 weeks every 6hr  Topical hydrocortisone to incision sites  Follow-up with Dr. Vincenzo Calderon as planned        Physician Signature: Electronically signed by Dr. Carmina Whelan  [phone] (p)  2/16/2022  9:14 AM

## 2022-02-17 NOTE — PROGRESS NOTES
Kenalog/Lidocaine injection given during today's visit, see Dr. Eric Mcguire note.   KenalogClementina Dural: [id number]  S/N: [id number]  Lot: [id number]  Exp: MAR 2023    Lidocaine 1%:  NDC: 18527-306-58  LOT: [id number]   EXP: 6/23  Electronically signed by Matthew German RN on 2/17/2022 at 7:45 AM

## 2022-02-20 ENCOUNTER — HOSPITAL ENCOUNTER (INPATIENT)
Age: 42
LOS: 4 days | Discharge: HOME | DRG: 280 | End: 2022-02-24
Attending: STUDENT IN AN ORGANIZED HEALTH CARE EDUCATION/TRAINING PROGRAM | Admitting: SURGERY
Payer: COMMERCIAL

## 2022-02-20 ENCOUNTER — APPOINTMENT (OUTPATIENT)
Dept: CT IMAGING | Age: 42
DRG: 280 | End: 2022-02-20
Payer: COMMERCIAL

## 2022-02-20 DIAGNOSIS — R18.8 OTHER ASCITES: ICD-10-CM

## 2022-02-20 DIAGNOSIS — E87.6 HYPOKALEMIA: ICD-10-CM

## 2022-02-20 DIAGNOSIS — G89.18 POST-OP PAIN: Primary | ICD-10-CM

## 2022-02-20 DIAGNOSIS — N50.89 SCROTAL EDEMA: ICD-10-CM

## 2022-02-20 LAB
ALBUMIN SERPL-MCNC: 3.4 G/DL (ref 3.5–5.2)
ALP BLD-CCNC: 100 U/L (ref 40–129)
ALT SERPL-CCNC: 39 U/L (ref 0–40)
AMMONIA: 15 UMOL/L (ref 16–60)
ANION GAP SERPL CALCULATED.3IONS-SCNC: 10 MMOL/L (ref 7–16)
APTT: 30.4 SEC (ref 24.5–35.1)
AST SERPL-CCNC: 49 U/L (ref 0–39)
BACTERIA: NORMAL /HPF
BASOPHILS ABSOLUTE: 0.02 E9/L (ref 0–0.2)
BASOPHILS RELATIVE PERCENT: 0.2 % (ref 0–2)
BILIRUB SERPL-MCNC: 1.4 MG/DL (ref 0–1.2)
BILIRUBIN DIRECT: 0.6 MG/DL (ref 0–0.3)
BILIRUBIN URINE: NEGATIVE
BILIRUBIN, INDIRECT: 0.8 MG/DL (ref 0–1)
BLOOD, URINE: NEGATIVE
BUN BLDV-MCNC: 29 MG/DL (ref 6–20)
CALCIUM SERPL-MCNC: 8.4 MG/DL (ref 8.6–10.2)
CHLORIDE BLD-SCNC: 102 MMOL/L (ref 98–107)
CLARITY: CLEAR
CO2: 23 MMOL/L (ref 22–29)
COLOR: YELLOW
CREAT SERPL-MCNC: 1.4 MG/DL (ref 0.7–1.2)
EOSINOPHILS ABSOLUTE: 0.12 E9/L (ref 0.05–0.5)
EOSINOPHILS RELATIVE PERCENT: 1.2 % (ref 0–6)
GFR AFRICAN AMERICAN: >60
GFR NON-AFRICAN AMERICAN: 56 ML/MIN/1.73
GLUCOSE BLD-MCNC: 139 MG/DL (ref 74–99)
GLUCOSE URINE: NEGATIVE MG/DL
HCT VFR BLD CALC: 27.1 % (ref 37–54)
HEMOGLOBIN: 8.8 G/DL (ref 12.5–16.5)
IMMATURE GRANULOCYTES #: 0.12 E9/L
IMMATURE GRANULOCYTES %: 1.2 % (ref 0–5)
INR BLD: 1.5
KETONES, URINE: NEGATIVE MG/DL
LACTIC ACID: 1.9 MMOL/L (ref 0.5–2.2)
LEUKOCYTE ESTERASE, URINE: NEGATIVE
LIPASE: 70 U/L (ref 13–60)
LYMPHOCYTES ABSOLUTE: 0.95 E9/L (ref 1.5–4)
LYMPHOCYTES RELATIVE PERCENT: 9.5 % (ref 20–42)
MAGNESIUM: 2.1 MG/DL (ref 1.6–2.6)
MCH RBC QN AUTO: 28 PG (ref 26–35)
MCHC RBC AUTO-ENTMCNC: 32.5 % (ref 32–34.5)
MCV RBC AUTO: 86.3 FL (ref 80–99.9)
MONOCYTES ABSOLUTE: 0.92 E9/L (ref 0.1–0.95)
MONOCYTES RELATIVE PERCENT: 9.2 % (ref 2–12)
NEUTROPHILS ABSOLUTE: 7.87 E9/L (ref 1.8–7.3)
NEUTROPHILS RELATIVE PERCENT: 78.7 % (ref 43–80)
NITRITE, URINE: NEGATIVE
PDW BLD-RTO: 16.6 FL (ref 11.5–15)
PH UA: 6 (ref 5–9)
PLATELET # BLD: 160 E9/L (ref 130–450)
PMV BLD AUTO: 9 FL (ref 7–12)
POTASSIUM REFLEX MAGNESIUM: 2.9 MMOL/L (ref 3.5–5)
PRO-BNP: 41 PG/ML (ref 0–125)
PROTEIN UA: NEGATIVE MG/DL
PROTHROMBIN TIME: 17.6 SEC (ref 9.3–12.4)
RBC # BLD: 3.14 E12/L (ref 3.8–5.8)
RBC UA: NORMAL /HPF (ref 0–2)
SODIUM BLD-SCNC: 135 MMOL/L (ref 132–146)
SPECIFIC GRAVITY UA: 1.02 (ref 1–1.03)
TOTAL PROTEIN: 6.8 G/DL (ref 6.4–8.3)
UROBILINOGEN, URINE: 0.2 E.U./DL
WBC # BLD: 10 E9/L (ref 4.5–11.5)
WBC UA: NORMAL /HPF (ref 0–5)

## 2022-02-20 PROCEDURE — 74177 CT ABD & PELVIS W/CONTRAST: CPT

## 2022-02-20 PROCEDURE — 96374 THER/PROPH/DIAG INJ IV PUSH: CPT

## 2022-02-20 PROCEDURE — 1200000000 HC SEMI PRIVATE

## 2022-02-20 PROCEDURE — 83605 ASSAY OF LACTIC ACID: CPT

## 2022-02-20 PROCEDURE — 81001 URINALYSIS AUTO W/SCOPE: CPT

## 2022-02-20 PROCEDURE — 99285 EMERGENCY DEPT VISIT HI MDM: CPT

## 2022-02-20 PROCEDURE — 6360000002 HC RX W HCPCS: Performed by: STUDENT IN AN ORGANIZED HEALTH CARE EDUCATION/TRAINING PROGRAM

## 2022-02-20 PROCEDURE — 80076 HEPATIC FUNCTION PANEL: CPT

## 2022-02-20 PROCEDURE — 82140 ASSAY OF AMMONIA: CPT

## 2022-02-20 PROCEDURE — 83880 ASSAY OF NATRIURETIC PEPTIDE: CPT

## 2022-02-20 PROCEDURE — 6360000004 HC RX CONTRAST MEDICATION: Performed by: RADIOLOGY

## 2022-02-20 PROCEDURE — 6360000002 HC RX W HCPCS

## 2022-02-20 PROCEDURE — 2580000003 HC RX 258

## 2022-02-20 PROCEDURE — 87088 URINE BACTERIA CULTURE: CPT

## 2022-02-20 PROCEDURE — 85025 COMPLETE CBC W/AUTO DIFF WBC: CPT

## 2022-02-20 PROCEDURE — 83735 ASSAY OF MAGNESIUM: CPT

## 2022-02-20 PROCEDURE — 80048 BASIC METABOLIC PNL TOTAL CA: CPT

## 2022-02-20 PROCEDURE — 83690 ASSAY OF LIPASE: CPT

## 2022-02-20 PROCEDURE — 6370000000 HC RX 637 (ALT 250 FOR IP): Performed by: STUDENT IN AN ORGANIZED HEALTH CARE EDUCATION/TRAINING PROGRAM

## 2022-02-20 PROCEDURE — 85730 THROMBOPLASTIN TIME PARTIAL: CPT

## 2022-02-20 PROCEDURE — 85610 PROTHROMBIN TIME: CPT

## 2022-02-20 RX ORDER — POTASSIUM CHLORIDE 7.45 MG/ML
10 INJECTION INTRAVENOUS ONCE
Status: COMPLETED | OUTPATIENT
Start: 2022-02-20 | End: 2022-02-20

## 2022-02-20 RX ORDER — MORPHINE SULFATE 2 MG/ML
2 INJECTION, SOLUTION INTRAMUSCULAR; INTRAVENOUS
Status: DISCONTINUED | OUTPATIENT
Start: 2022-02-20 | End: 2022-02-24

## 2022-02-20 RX ORDER — MORPHINE SULFATE 4 MG/ML
4 INJECTION, SOLUTION INTRAMUSCULAR; INTRAVENOUS
Status: DISCONTINUED | OUTPATIENT
Start: 2022-02-20 | End: 2022-02-23

## 2022-02-20 RX ORDER — 0.9 % SODIUM CHLORIDE 0.9 %
500 INTRAVENOUS SOLUTION INTRAVENOUS ONCE
Status: DISCONTINUED | OUTPATIENT
Start: 2022-02-20 | End: 2022-02-24 | Stop reason: HOSPADM

## 2022-02-20 RX ORDER — SODIUM CHLORIDE 9 MG/ML
INJECTION, SOLUTION INTRAVENOUS
Status: COMPLETED
Start: 2022-02-20 | End: 2022-02-20

## 2022-02-20 RX ORDER — POTASSIUM CHLORIDE 20 MEQ/1
40 TABLET, EXTENDED RELEASE ORAL ONCE
Status: COMPLETED | OUTPATIENT
Start: 2022-02-20 | End: 2022-02-20

## 2022-02-20 RX ORDER — FENTANYL CITRATE 0.05 MG/ML
100 INJECTION, SOLUTION INTRAMUSCULAR; INTRAVENOUS ONCE
Status: COMPLETED | OUTPATIENT
Start: 2022-02-20 | End: 2022-02-20

## 2022-02-20 RX ORDER — MORPHINE SULFATE 4 MG/ML
INJECTION, SOLUTION INTRAMUSCULAR; INTRAVENOUS
Status: COMPLETED
Start: 2022-02-20 | End: 2022-02-20

## 2022-02-20 RX ADMIN — FENTANYL CITRATE 100 MCG: 50 INJECTION INTRAMUSCULAR; INTRAVENOUS at 19:56

## 2022-02-20 RX ADMIN — MORPHINE SULFATE 4 MG: 4 INJECTION, SOLUTION INTRAMUSCULAR; INTRAVENOUS at 23:58

## 2022-02-20 RX ADMIN — SODIUM CHLORIDE 500 ML: 9 INJECTION, SOLUTION INTRAVENOUS at 22:11

## 2022-02-20 RX ADMIN — POTASSIUM CHLORIDE 10 MEQ: 7.46 INJECTION, SOLUTION INTRAVENOUS at 22:04

## 2022-02-20 RX ADMIN — POTASSIUM CHLORIDE 40 MEQ: 1500 TABLET, EXTENDED RELEASE ORAL at 22:03

## 2022-02-20 RX ADMIN — IOPAMIDOL 75 ML: 755 INJECTION, SOLUTION INTRAVENOUS at 21:32

## 2022-02-20 ASSESSMENT — ENCOUNTER SYMPTOMS
DIARRHEA: 0
EYE REDNESS: 0
ABDOMINAL PAIN: 1
EYE PAIN: 0
CONSTIPATION: 0
BACK PAIN: 0
COUGH: 0
VOMITING: 0
NAUSEA: 0
SORE THROAT: 0
CHEST TIGHTNESS: 0
ABDOMINAL DISTENTION: 1
SINUS PAIN: 0
SHORTNESS OF BREATH: 0
SINUS PRESSURE: 0

## 2022-02-20 ASSESSMENT — PAIN SCALES - GENERAL
PAINLEVEL_OUTOF10: 10

## 2022-02-20 ASSESSMENT — PAIN - FUNCTIONAL ASSESSMENT: PAIN_FUNCTIONAL_ASSESSMENT: 0-10

## 2022-02-21 ENCOUNTER — APPOINTMENT (OUTPATIENT)
Dept: INTERVENTIONAL RADIOLOGY/VASCULAR | Age: 42
DRG: 280 | End: 2022-02-21
Payer: COMMERCIAL

## 2022-02-21 LAB
ALBUMIN FLUID: 0.2 G/DL
ALBUMIN SERPL-MCNC: 3 G/DL (ref 3.5–5.2)
ALP BLD-CCNC: 93 U/L (ref 40–129)
ALT SERPL-CCNC: 33 U/L (ref 0–40)
AMYLASE FLUID: 50 U/L
ANION GAP SERPL CALCULATED.3IONS-SCNC: 11 MMOL/L (ref 7–16)
APPEARANCE FLUID: NORMAL
AST SERPL-CCNC: 41 U/L (ref 0–39)
BILIRUB SERPL-MCNC: 1.7 MG/DL (ref 0–1.2)
BUN BLDV-MCNC: 27 MG/DL (ref 6–20)
CALCIUM SERPL-MCNC: 8.1 MG/DL (ref 8.6–10.2)
CELL COUNT FLUID TYPE: NORMAL
CHLORIDE BLD-SCNC: 106 MMOL/L (ref 98–107)
CHOLESTEROL, TOTAL: 115 MG/DL (ref 0–199)
CO2: 21 MMOL/L (ref 22–29)
COLOR FLUID: NORMAL
CREAT SERPL-MCNC: 1.2 MG/DL (ref 0.7–1.2)
FLUID TYPE: NORMAL
FOLATE: 7.3 NG/ML (ref 4.8–24.2)
GFR AFRICAN AMERICAN: >60
GFR NON-AFRICAN AMERICAN: >60 ML/MIN/1.73
GLUCOSE BLD-MCNC: 110 MG/DL (ref 74–99)
HCT VFR BLD CALC: 24 % (ref 37–54)
HDLC SERPL-MCNC: 32 MG/DL
HEMOGLOBIN: 7.9 G/DL (ref 12.5–16.5)
IRON SATURATION: 11 % (ref 20–55)
IRON: 34 MCG/DL (ref 59–158)
LACTATE DEHYDROGENASE: 225 U/L (ref 135–225)
LD, FLUID: 22 U/L
LDL CHOLESTEROL CALCULATED: 72 MG/DL (ref 0–99)
MONOCYTE, FLUID: 79 %
NEUTROPHIL, FLUID: 21 %
NUCLEATED CELLS FLUID: 71 /UL
POTASSIUM SERPL-SCNC: 3.8 MMOL/L (ref 3.5–5)
PROTEIN FLUID: 0.3 G/DL
RBC FLUID: <2000 /UL
SODIUM BLD-SCNC: 138 MMOL/L (ref 132–146)
TOTAL IRON BINDING CAPACITY: 315 MCG/DL (ref 250–450)
TOTAL PROTEIN: 6.1 G/DL (ref 6.4–8.3)
TRIGL SERPL-MCNC: 56 MG/DL (ref 0–149)
TSH SERPL DL<=0.05 MIU/L-ACNC: 5.11 UIU/ML (ref 0.27–4.2)
VITAMIN B-12: 958 PG/ML (ref 211–946)
VLDLC SERPL CALC-MCNC: 11 MG/DL

## 2022-02-21 PROCEDURE — C1729 CATH, DRAINAGE: HCPCS

## 2022-02-21 PROCEDURE — 6360000002 HC RX W HCPCS: Performed by: INTERNAL MEDICINE

## 2022-02-21 PROCEDURE — 80061 LIPID PANEL: CPT

## 2022-02-21 PROCEDURE — 83550 IRON BINDING TEST: CPT

## 2022-02-21 PROCEDURE — 6360000002 HC RX W HCPCS: Performed by: SURGERY

## 2022-02-21 PROCEDURE — 2580000003 HC RX 258: Performed by: SURGERY

## 2022-02-21 PROCEDURE — 82607 VITAMIN B-12: CPT

## 2022-02-21 PROCEDURE — 1200000000 HC SEMI PRIVATE

## 2022-02-21 PROCEDURE — 82042 OTHER SOURCE ALBUMIN QUAN EA: CPT

## 2022-02-21 PROCEDURE — 49083 ABD PARACENTESIS W/IMAGING: CPT

## 2022-02-21 PROCEDURE — 83615 LACTATE (LD) (LDH) ENZYME: CPT

## 2022-02-21 PROCEDURE — 36415 COLL VENOUS BLD VENIPUNCTURE: CPT

## 2022-02-21 PROCEDURE — 89051 BODY FLUID CELL COUNT: CPT

## 2022-02-21 PROCEDURE — 85018 HEMOGLOBIN: CPT

## 2022-02-21 PROCEDURE — 99222 1ST HOSP IP/OBS MODERATE 55: CPT | Performed by: SURGERY

## 2022-02-21 PROCEDURE — 0W9G3ZZ DRAINAGE OF PERITONEAL CAVITY, PERCUTANEOUS APPROACH: ICD-10-PCS | Performed by: INTERNAL MEDICINE

## 2022-02-21 PROCEDURE — 80053 COMPREHEN METABOLIC PANEL: CPT

## 2022-02-21 PROCEDURE — 82746 ASSAY OF FOLIC ACID SERUM: CPT

## 2022-02-21 PROCEDURE — 83540 ASSAY OF IRON: CPT

## 2022-02-21 PROCEDURE — 88112 CYTOPATH CELL ENHANCE TECH: CPT

## 2022-02-21 PROCEDURE — 6370000000 HC RX 637 (ALT 250 FOR IP): Performed by: INTERNAL MEDICINE

## 2022-02-21 PROCEDURE — 85014 HEMATOCRIT: CPT

## 2022-02-21 PROCEDURE — 6370000000 HC RX 637 (ALT 250 FOR IP): Performed by: SURGERY

## 2022-02-21 PROCEDURE — 84157 ASSAY OF PROTEIN OTHER: CPT

## 2022-02-21 PROCEDURE — 88305 TISSUE EXAM BY PATHOLOGIST: CPT

## 2022-02-21 PROCEDURE — 82150 ASSAY OF AMYLASE: CPT

## 2022-02-21 PROCEDURE — 84443 ASSAY THYROID STIM HORMONE: CPT

## 2022-02-21 RX ORDER — FUROSEMIDE 40 MG/1
40 TABLET ORAL DAILY
Status: DISCONTINUED | OUTPATIENT
Start: 2022-02-21 | End: 2022-02-22

## 2022-02-21 RX ORDER — SODIUM CHLORIDE 0.9 % (FLUSH) 0.9 %
5-40 SYRINGE (ML) INJECTION PRN
Status: DISCONTINUED | OUTPATIENT
Start: 2022-02-21 | End: 2022-02-24 | Stop reason: HOSPADM

## 2022-02-21 RX ORDER — ONDANSETRON 2 MG/ML
4 INJECTION INTRAMUSCULAR; INTRAVENOUS EVERY 6 HOURS PRN
Status: DISCONTINUED | OUTPATIENT
Start: 2022-02-21 | End: 2022-02-24 | Stop reason: HOSPADM

## 2022-02-21 RX ORDER — ONDANSETRON 4 MG/1
4 TABLET, ORALLY DISINTEGRATING ORAL EVERY 8 HOURS PRN
Status: DISCONTINUED | OUTPATIENT
Start: 2022-02-21 | End: 2022-02-24 | Stop reason: HOSPADM

## 2022-02-21 RX ORDER — CIPROFLOXACIN 500 MG/1
500 TABLET, FILM COATED ORAL
Status: DISCONTINUED | OUTPATIENT
Start: 2022-02-21 | End: 2022-02-24 | Stop reason: HOSPADM

## 2022-02-21 RX ORDER — SODIUM CHLORIDE AND POTASSIUM CHLORIDE .9; .15 G/100ML; G/100ML
SOLUTION INTRAVENOUS CONTINUOUS
Status: DISCONTINUED | OUTPATIENT
Start: 2022-02-21 | End: 2022-02-22

## 2022-02-21 RX ORDER — OXYCODONE HYDROCHLORIDE 5 MG/1
10 TABLET ORAL EVERY 4 HOURS PRN
Status: DISCONTINUED | OUTPATIENT
Start: 2022-02-21 | End: 2022-02-24

## 2022-02-21 RX ORDER — OXYCODONE HYDROCHLORIDE 5 MG/1
5 TABLET ORAL EVERY 4 HOURS PRN
Status: DISCONTINUED | OUTPATIENT
Start: 2022-02-21 | End: 2022-02-24 | Stop reason: HOSPADM

## 2022-02-21 RX ORDER — SODIUM CHLORIDE 9 MG/ML
25 INJECTION, SOLUTION INTRAVENOUS PRN
Status: DISCONTINUED | OUTPATIENT
Start: 2022-02-21 | End: 2022-02-24 | Stop reason: HOSPADM

## 2022-02-21 RX ORDER — SODIUM CHLORIDE 9 MG/ML
INJECTION, SOLUTION INTRAVENOUS CONTINUOUS
Status: DISCONTINUED | OUTPATIENT
Start: 2022-02-21 | End: 2022-02-21

## 2022-02-21 RX ORDER — NADOLOL 20 MG/1
40 TABLET ORAL DAILY
Status: DISCONTINUED | OUTPATIENT
Start: 2022-02-21 | End: 2022-02-24 | Stop reason: HOSPADM

## 2022-02-21 RX ORDER — SPIRONOLACTONE 25 MG/1
100 TABLET ORAL DAILY
Status: DISCONTINUED | OUTPATIENT
Start: 2022-02-21 | End: 2022-02-24 | Stop reason: HOSPADM

## 2022-02-21 RX ORDER — SODIUM CHLORIDE 0.9 % (FLUSH) 0.9 %
5-40 SYRINGE (ML) INJECTION EVERY 12 HOURS SCHEDULED
Status: DISCONTINUED | OUTPATIENT
Start: 2022-02-21 | End: 2022-02-24 | Stop reason: HOSPADM

## 2022-02-21 RX ADMIN — MORPHINE SULFATE 2 MG: 2 INJECTION, SOLUTION INTRAMUSCULAR; INTRAVENOUS at 02:00

## 2022-02-21 RX ADMIN — MORPHINE SULFATE 4 MG: 4 INJECTION, SOLUTION INTRAMUSCULAR; INTRAVENOUS at 04:00

## 2022-02-21 RX ADMIN — NADOLOL 40 MG: 20 TABLET ORAL at 08:40

## 2022-02-21 RX ADMIN — FUROSEMIDE 40 MG: 40 TABLET ORAL at 08:40

## 2022-02-21 RX ADMIN — OXYCODONE 10 MG: 5 TABLET ORAL at 12:25

## 2022-02-21 RX ADMIN — SODIUM CHLORIDE: 9 INJECTION, SOLUTION INTRAVENOUS at 00:57

## 2022-02-21 RX ADMIN — MORPHINE SULFATE 4 MG: 4 INJECTION, SOLUTION INTRAMUSCULAR; INTRAVENOUS at 08:40

## 2022-02-21 RX ADMIN — CIPROFLOXACIN HYDROCHLORIDE 500 MG: 500 TABLET, FILM COATED ORAL at 20:08

## 2022-02-21 RX ADMIN — MORPHINE SULFATE 4 MG: 4 INJECTION, SOLUTION INTRAMUSCULAR; INTRAVENOUS at 06:13

## 2022-02-21 RX ADMIN — MORPHINE SULFATE 4 MG: 4 INJECTION, SOLUTION INTRAMUSCULAR; INTRAVENOUS at 15:59

## 2022-02-21 RX ADMIN — OXYCODONE 10 MG: 5 TABLET ORAL at 20:08

## 2022-02-21 RX ADMIN — SPIRONOLACTONE 100 MG: 25 TABLET ORAL at 08:40

## 2022-02-21 RX ADMIN — SODIUM CHLORIDE AND POTASSIUM CHLORIDE: 9; 1.49 INJECTION, SOLUTION INTRAVENOUS at 10:00

## 2022-02-21 RX ADMIN — ONDANSETRON 4 MG: 2 INJECTION INTRAMUSCULAR; INTRAVENOUS at 08:40

## 2022-02-21 ASSESSMENT — PAIN DESCRIPTION - PROGRESSION
CLINICAL_PROGRESSION: NOT CHANGED
CLINICAL_PROGRESSION: NOT CHANGED

## 2022-02-21 ASSESSMENT — PAIN SCALES - GENERAL
PAINLEVEL_OUTOF10: 9
PAINLEVEL_OUTOF10: 10
PAINLEVEL_OUTOF10: 5
PAINLEVEL_OUTOF10: 8
PAINLEVEL_OUTOF10: 9
PAINLEVEL_OUTOF10: 6
PAINLEVEL_OUTOF10: 10
PAINLEVEL_OUTOF10: 2
PAINLEVEL_OUTOF10: 9
PAINLEVEL_OUTOF10: 7
PAINLEVEL_OUTOF10: 10
PAINLEVEL_OUTOF10: 3
PAINLEVEL_OUTOF10: 10
PAINLEVEL_OUTOF10: 2

## 2022-02-21 ASSESSMENT — PAIN - FUNCTIONAL ASSESSMENT
PAIN_FUNCTIONAL_ASSESSMENT: ACTIVITIES ARE NOT PREVENTED
PAIN_FUNCTIONAL_ASSESSMENT: ACTIVITIES ARE NOT PREVENTED

## 2022-02-21 ASSESSMENT — PAIN DESCRIPTION - FREQUENCY
FREQUENCY: CONTINUOUS
FREQUENCY: INTERMITTENT

## 2022-02-21 ASSESSMENT — PAIN DESCRIPTION - LOCATION: LOCATION: SCROTUM

## 2022-02-21 ASSESSMENT — PAIN DESCRIPTION - ONSET
ONSET: GRADUAL
ONSET: GRADUAL

## 2022-02-21 ASSESSMENT — PAIN DESCRIPTION - DESCRIPTORS
DESCRIPTORS: CRAMPING
DESCRIPTORS: ACHING;CRUSHING;DISCOMFORT

## 2022-02-21 ASSESSMENT — PAIN DESCRIPTION - PAIN TYPE
TYPE: ACUTE PAIN
TYPE: ACUTE PAIN

## 2022-02-21 ASSESSMENT — PAIN DESCRIPTION - ORIENTATION: ORIENTATION: LEFT

## 2022-02-21 NOTE — CONSULTS
[facility]  Dr. Chris David M.D., Dr. Lolis Walton M.D., KAI FordeO., Dr. Gina Duff M.D., Dr. Bharti Cash D.O. Patient Name: Meryle Hashimoto III  MRN: [id number]  : 1980 (40 y.o. male)  Allergies: is allergic to pcn [penicillins]. Date of Service: 2022       Reason for Consultation:  Ascites    HISTORY OF PRESENT ILLNESS:      The patient is a 40 y.o. male who presents with abdominal pain and swelling, and scrotal pain and swelling. He recently had inguinal and umbilical hernia repair, and has had issues with pain and swelling since. He has known cirrhosis secondary to alcohol use disorder and hepatitis C. He reports that he did have paracentesis once in the past, and did have similar discomfort then. He reports that he is generally complaint with his diuretics. He reports subjective fever. Denies nausea, vomiting, diarrhea, constipation, pain or burning with urination, hematochezia, melena, hematemesis.     REVIEW OF SYSTEMS:   Review of Systems   12 point ROS obtained and negative except as in HPI    Past Medical History:  Past Medical History:   Diagnosis Date    Esophageal varices ([address]        Past Surgical History:  Past Surgical History:   Procedure Laterality Date    ENDOSCOPY, COLON, DIAGNOSTIC      HERNIA REPAIR      HERNIA REPAIR Left 2022    LAPAROSCOPIC LEFT INGUINAL HERNIA REPAIR WITH MESH POSSIBLE OPEN POSSIBLE BILATERAL performed by Jatinder Ogden MD at [facility] N/A 9/15/2021    LAPAROSCOPIC POSSIBLE OPEN UMBILICAL HERNIA REPAIR WITH  [facility] performed by Jatinder Ogden MD at [facility] 2020    EGD BIOPSY performed by Heath Walden MD at [address] [facility] N/A 2021    EGD BAND LIGATION performed by Simi Mcdermott MD at [facility] [address] [facility] 5/10/2021    EGD BAND LIGATION performed by Karo Padilla MD at [address] Medications:  Prior to Admission medications    Medication Sig Start Date End Date Taking? Authorizing Provider   nadolol (CORGARD) 40 MG tablet TAKE ONE TABLET BY MOUTH EVERY DAY AT BEDTIME  Patient not taking: Reported on 2/16/2022 12/2/21   Historical Provider, MD   furosemide (LASIX) 40 MG tablet TAKE ONE TABLET BY MOUTH EVERY DAY 2/5/22   Historical Provider, MD   ibuprofen (ADVIL;MOTRIN) 800 MG tablet Take 1 tablet by mouth every 6 hours as needed for Pain 2/16/22   Lizz Benavides MD   NONFORMULARY daily as needed Medical marijuana edibles or vapes     Historical Provider, MD   spironolactone (ALDACTONE) 100 MG tablet Take 100 mg by mouth daily    Historical Provider, MD       Allergies: Pcn [penicillins]    Social History:  Social History     Socioeconomic History    Marital status: Single     Spouse name: Not on file    Number of children: Not on file    Years of education: Not on file    Highest education level: Not on file   Occupational History    Not on file   Tobacco Use    Smoking status: Former Smoker     Packs/day: 0.50    Smokeless tobacco: Never Used   Vaping Use    Vaping Use: Never used   Substance and Sexual Activity    Alcohol use: Not Currently     Comment: occasional-denies    Drug use: Not on file     Comment: last used medical marijuana 2/2    Sexual activity: Not on file   Other Topics Concern    Not on file   Social History Narrative    Not on file     Social Determinants of Health     Financial Resource Strain:     Difficulty of Paying Living Expenses: Not on file   Food Insecurity:     Worried About [address] in the Last Year: Not on file    [address] [facility] [address] in the Last Year: Not on file   Transportation Needs:     Lack of Transportation (Medical): Not on file    Lack of Transportation (Non-Medical):  Not on file   Physical Activity:     Days of Exercise per Week: Not on file    Minutes of Exercise per Session: Not on file   Stress:     Feeling of Stress : Not on file   Social Connections:     Frequency of Communication with Friends and Family: Not on file    Frequency of Social Gatherings with Friends and Family: Not on file    Attends Orthodoxy Services: Not on file    Active Member of Clubs or Organizations: Not on file    Attends Club or Organization Meetings: Not on file    Marital Status: Not on file   Intimate Partner Violence:     Fear of Current or Ex-Partner: Not on file    Emotionally Abused: Not on file    Physically Abused: Not on file    Sexually Abused: Not on file   Housing Stability:     Unable to Pay for Housing in the Last Year: Not on file    Number of [place] in the Last Year: Not on file    Unstable Housing in the Last Year: Not on file       Family History:  History reviewed. No pertinent family history. PHYSICAL EXAM:  Vital Signs: /74   Pulse 90   Temp 98.8 °F (37.1 °C)   Resp 18   SpO2 99%   GENERAL APPEARANCE:  awake, alert, oriented, cooperative, in mild distress related to abdominal/scrotal discomfort. EYES:  Lids and lashes normal, sclera clear  HENT:  Normocephalic, without obvious abnormality  LUNGS:  Clear to auscultation bilaterally. No increased work of breathing, good air exchange. CARDIOVASCULAR: Regular rate and rhythm  ABDOMEN:  Soft, distended, tender, no rigidity, some voluntary gaurding, no rebound. +fluid wave. Massive scrotal edema. +caput madusae  MUSCULOSKELETAL:  There is no redness, warmth, or swelling of the joints. EXTREMITIES: No edema  NEUROLOGIC:  Awake, alert, oriented to name, place and time. SKIN: Normal skin color, no rashes  PSYCH: Affect, behavior and insight are all within normal limits.       DATA:  Results for orders placed or performed during the hospital encounter of 02/20/22   CBC with Auto Differential   Result Value Ref Range    WBC 10.0 4.5 - 11.5 E9/L    RBC 3.14 (L) 3.80 - 5.80 E12/L    Hemoglobin 8.8 (L) 12.5 - 16.5 g/dL    Hematocrit 27.1 (L) 37.0 - 54.0 %    MCV 86.3 80.0 - 99.9 fL    MCH 28.0 26.0 - 35.0 pg    MCHC 32.5 32.0 - 34.5 %    RDW 16.6 (H) 11.5 - 15.0 fL    Platelets 528 263 - 284 E9/L    MPV 9.0 7.0 - 12.0 fL    Neutrophils % 78.7 43.0 - 80.0 %    Immature Granulocytes % 1.2 0.0 - 5.0 %    Lymphocytes % 9.5 (L) 20.0 - 42.0 %    Monocytes % 9.2 2.0 - 12.0 %    Eosinophils % 1.2 0.0 - 6.0 %    Basophils % 0.2 0.0 - 2.0 %    Neutrophils Absolute 7.87 (H) 1.80 - 7.30 E9/L    Immature Granulocytes # 0.12 E9/L    Lymphocytes Absolute 0.95 (L) 1.50 - 4.00 E9/L    Monocytes Absolute 0.92 0.10 - 0.95 E9/L    Eosinophils Absolute 0.12 0.05 - 0.50 E9/L    Basophils Absolute 0.02 0.00 - 0.20 N2/Z   Basic Metabolic Panel w/ Reflex to MG   Result Value Ref Range    Sodium 135 132 - 146 mmol/L    Potassium reflex Magnesium 2.9 (L) 3.5 - 5.0 mmol/L    Chloride 102 98 - 107 mmol/L    CO2 23 22 - 29 mmol/L    Anion Gap 10 7 - 16 mmol/L    Glucose 139 (H) 74 - 99 mg/dL    BUN 29 (H) 6 - 20 mg/dL    CREATININE 1.4 (H) 0.7 - 1.2 mg/dL    GFR Non-African American 56 >=60 mL/min/1.73    GFR African American >60     Calcium 8.4 (L) 8.6 - 10.2 mg/dL   Hepatic Function Panel   Result Value Ref Range    Total Protein 6.8 6.4 - 8.3 g/dL    Albumin 3.4 (L) 3.5 - 5.2 g/dL    Alkaline Phosphatase 100 40 - 129 U/L    ALT 39 0 - 40 U/L    AST 49 (H) 0 - 39 U/L    Total Bilirubin 1.4 (H) 0.0 - 1.2 mg/dL    Bilirubin, Direct 0.6 (H) 0.0 - 0.3 mg/dL    Bilirubin, Indirect 0.8 0.0 - 1.0 mg/dL   Lipase   Result Value Ref Range    Lipase 70 (H) 13 - 60 U/L   Lactic Acid   Result Value Ref Range    Lactic Acid 1.9 0.5 - 2.2 mmol/L   Urinalysis with Microscopic   Result Value Ref Range    Color, UA Yellow Straw/Yellow    Clarity, UA Clear Clear    Glucose, Ur Negative Negative mg/dL    Bilirubin Urine Negative Negative    Ketones, Urine Negative Negative mg/dL    Specific Gravity, UA 1.020 1.005 - 1.030    Blood, Urine Negative Negative pH, UA 6.0 5.0 - 9.0    Protein, UA Negative Negative mg/dL    Urobilinogen, Urine 0.2 <2.0 E.U./dL    Nitrite, Urine Negative Negative    Leukocyte Esterase, Urine Negative Negative    WBC, UA NONE 0 - 5 /HPF    RBC, UA NONE 0 - 2 /HPF    Bacteria, UA NONE SEEN None Seen /HPF   Brain Natriuretic Peptide   Result Value Ref Range    Pro-BNP 41 0 - 125 pg/mL   Magnesium   Result Value Ref Range    Magnesium 2.1 1.6 - 2.6 mg/dL   APTT   Result Value Ref Range    aPTT 30.4 24.5 - 35.1 sec   Protime-INR   Result Value Ref Range    Protime 17.6 (H) 9.3 - 12.4 sec    INR 1.5    Ammonia   Result Value Ref Range    Ammonia 15.0 (L) 16.0 - 60.0 umol/L   Hemoglobin and Hematocrit   Result Value Ref Range    Hemoglobin 7.9 (L) 12.5 - 16.5 g/dL    Hematocrit 24.0 (L) 37.0 - 54.0 %   Comprehensive Metabolic Panel   Result Value Ref Range    Sodium 138 132 - 146 mmol/L    Potassium 3.8 3.5 - 5.0 mmol/L    Chloride 106 98 - 107 mmol/L    CO2 21 (L) 22 - 29 mmol/L    Anion Gap 11 7 - 16 mmol/L    Glucose 110 (H) 74 - 99 mg/dL    BUN 27 (H) 6 - 20 mg/dL    CREATININE 1.2 0.7 - 1.2 mg/dL    GFR Non-African American >60 >=60 mL/min/1.73    GFR African American >60     Calcium 8.1 (L) 8.6 - 10.2 mg/dL    Total Protein 6.1 (L) 6.4 - 8.3 g/dL    Albumin 3.0 (L) 3.5 - 5.2 g/dL    Total Bilirubin 1.7 (H) 0.0 - 1.2 mg/dL    Alkaline Phosphatase 93 40 - 129 U/L    ALT 33 0 - 40 U/L    AST 41 (H) 0 - 39 U/L   TSH   Result Value Ref Range    TSH 5.110 (H) 0.270 - 4.200 uIU/mL   Lipid Panel   Result Value Ref Range    Cholesterol, Total 115 0 - 199 mg/dL    Triglycerides 56 0 - 149 mg/dL    HDL 32 >40 mg/dL    LDL Calculated 72 0 - 99 mg/dL    VLDL Cholesterol Calculated 11 mg/dL   Iron and TIBC   Result Value Ref Range    Iron 34 (L) 59 - 158 mcg/dL    TIBC 315 250 - 450 mcg/dL    Iron Saturation 11 (L) 20 - 55 %   Vitamin B12 & Folate   Result Value Ref Range    Vitamin B-12 958 (H) 211 - 946 pg/mL    Folate 7.3 4.8 - 24.2 ng/mL   Lactate Dehydrogenase   Result Value Ref Range     135 - 225 U/L         IMAGING:  US SCROTUM AND TESTICLES    Result Date: 2/11/2022  EXAMINATION: ULTRASOUND OF THE SCROTUM/TESTICLES WITH COLOR DOPPLER FLOW EVALUATION; DOPPLER EVALUATION OF THE PELVIS 2/11/2022 TECHNIQUE: Duplex ultrasound using B-mode/gray scaled imaging, Doppler spectral analysis and color flow Doppler was obtained of the testicles.; Duplex ultrasound using B-mode/gray scaled imaging and Doppler spectral analysis and color flow was obtained of the pelvis. COMPARISON: None. HISTORY: ORDERING SYSTEM PROVIDED HISTORY: Pain TECHNOLOGIST PROVIDED HISTORY: Reason for exam:->Pain What reading provider will be dictating this exam?->CRC; ORDERING SYSTEM PROVIDED HISTORY: testicular pain, r/o torsion TECHNOLOGIST PROVIDED HISTORY: Reason for exam:->testicular pain, r/o torsion What reading provider will be dictating this exam?->CRC FINDINGS: Measurements: Right Testicle: 4.1 x 2.6 x 2.6 Left Testicle: 4.1 x 2.6 x 2.5 Right: Grey Scale: The right testicle demonstrates normal homogeneous echotexture without focal lesion. No evidence of testicular microlithiasis. Doppler Evaluation: There is normal arterial and venous Doppler flow within the testicle. Scrotal Sac: Moderate hydrocele. Marked scrotal edema. Epididymis: No acute abnormality. Left: Grey Scale: The left testicle demonstrates normal homogeneous echotexture without focal lesion. No evidence of testicular microlithiasis. Doppler Evaluation: There is normal arterial and venous Doppler flow within the testicle. Scrotal Sac: Moderate hydrocele. Marked scrotal edema. Epididymis: No acute abnormality. No evidence of testicular torsion. Bilateral hydroceles. Marked scrotal edema.      CT ABDOMEN PELVIS W IV CONTRAST Additional Contrast? None    Result Date: 2/20/2022  EXAMINATION: CT OF THE ABDOMEN AND PELVIS WITH CONTRAST 2/20/2022 9:30 pm TECHNIQUE: CT of the abdomen and pelvis was performed with the administration of intravenous contrast. Multiplanar reformatted images are provided for review. Dose modulation, iterative reconstruction, and/or weight based adjustment of the mA/kV was utilized to reduce the radiation dose to as low as reasonably achievable. COMPARISON: 02/12/2022 HISTORY: ORDERING SYSTEM PROVIDED HISTORY: abdominal distension and scrotal edema - run longer to include whole scrotum if possible TECHNOLOGIST PROVIDED HISTORY: Additional Contrast?->None Reason for exam:->abdominal distension and scrotal edema - run longer to include whole scrotum if possible Decision Support Exception - unselect if not a suspected or confirmed emergency medical condition->Emergency Medical Condition (MA) FINDINGS: Nodular contour of the liver consistent with cirrhosis. The liver is slightly heterogeneous but without definitive evidence of localized mass. There is recanalization of the umbilical vein. Moderate splenomegaly similar to previous. Pancreas is grossly unremarkable. No adrenal mass. No hydronephrosis. Large amount of ascites, significantly increased from previous. Assessment of bowel is limited without oral contrast.  No bowel obstruction. The appendix is not well seen. Partial visualization of probable normal appendix. Moderate and relatively diffuse thickening of small and large bowel. No abscess. No identified free air. Numerous varicosities are present. Urinary bladder is largely decompressed. Prostate is normal in size. Moderate fluid distention of a left inguinal hernia. Large bilateral hydroceles with diffuse bilateral scrotal edema. There is no soft tissue gas identified in the scrotum. Mild soft tissue edema in the visualized lower extremities. Mild anasarca. Scattered areas of scarring and/or atelectasis in the lung bases. Findings are consistent with cirrhosis and portal venous hypertension. Large volume ascites significantly increased from previous.  Fluid distention of left inguinal hernia with prominent bilateral hydroceles and diffuse scrotal edema. Thickening of small and large bowel relatively diffusely may be reactive to the ascites. Other bowel related infectious or inflammatory process not entirely excluded. Correlate with clinical presentation. RECOMMENDATIONS: Unavailable     CT ABDOMEN PELVIS W IV CONTRAST Additional Contrast? None    Result Date: 2/12/2022  EXAMINATION: CT OF THE ABDOMEN AND PELVIS WITH CONTRAST 2/12/2022 12:26 am TECHNIQUE: CT of the abdomen and pelvis was performed with the administration of intravenous contrast. Multiplanar reformatted images are provided for review. Dose modulation, iterative reconstruction, and/or weight based adjustment of the mA/kV was utilized to reduce the radiation dose to as low as reasonably achievable. COMPARISON: None. HISTORY: ORDERING SYSTEM PROVIDED HISTORY: testicular pain - include through scrotum TECHNOLOGIST PROVIDED HISTORY: Reason for exam:->testicular pain - include through scrotum Additional Contrast?->None Decision Support Exception - unselect if not a suspected or confirmed emergency medical condition->Emergency Medical Condition (MA) FINDINGS: Lower Chest: Visualized lungs, heart and pericardium are normal. Organs: The adrenal glands, kidneys, pancreas are unremarkable. Splenomegaly at 17.5 cm. Mild gallbladder wall thickening. Recanalization of the umbilical vein. Nodular hepatic contour. GI/Bowel: Diffuse colonic fecal retention. Thickening of the ascending colon. Normal small bowel. Pelvis: Normal urinary bladder. Peritoneum/Retroperitoneum: Small volume ascites. Bones/Soft Tissues: A few small foci of air anterior to the scrotal sac and right inferior ventral abdominal wall. .     A few small foci of air identified anterior to the scrotal sac and along the right inferior ventral abdominal wall.   Findings not definitive for necrotizing fasciitis and most likely related to recent left-sided hernia surgery 2 days prior. Nodular hepatic contour consistent with cirrhotic morphology. Splenomegaly. Small volume ascites. Recanalization of the umbilical vein. Thickening of the ascending colon. Correlate with colitis clinically. Findings discussed on 02/12/2022 with Dr. Kateryna Braga at 12:46 a.m.. IR US GUIDED PARACENTESIS    Result Date: 2/21/2022  PROCEDURE: PARACENTESIS WITHOUT IMAGE GUIDANCE US ABDOMEN LIMITED 2/21/2022 HISTORY: ORDERING SYSTEM PROVIDED HISTORY: paracentesis TECHNOLOGIST PROVIDED HISTORY: Reason for exam:->paracentesis TECHNIQUE: Informed consent was obtained after a detailed explanation of the procedure including risks, benefits, and alternatives. Universal protocol was followed. A limited ultrasound of the abdomen was performed. The right abdomen was prepped and draped in sterile fashion and local anesthesia was achieved with lidocaine. A 5 Portuguese needle sheath was advanced into ascites under direct ultrasound guidance and paracentesis was performed. The patient tolerated the procedure well. FINDINGS: Limited ultrasound of the abdomen demonstrates ascites. A total of 2710 cc of clear ascitic fluid was removed. Status post paracentesis. US DUP ABD PEL RETRO SCROT COMPLETE    Result Date: 2/11/2022  EXAMINATION: ULTRASOUND OF THE SCROTUM/TESTICLES WITH COLOR DOPPLER FLOW EVALUATION; DOPPLER EVALUATION OF THE PELVIS 2/11/2022 TECHNIQUE: Duplex ultrasound using B-mode/gray scaled imaging, Doppler spectral analysis and color flow Doppler was obtained of the testicles.; Duplex ultrasound using B-mode/gray scaled imaging and Doppler spectral analysis and color flow was obtained of the pelvis. COMPARISON: None.  HISTORY: ORDERING SYSTEM PROVIDED HISTORY: Pain TECHNOLOGIST PROVIDED HISTORY: Reason for exam:->Pain What reading provider will be dictating this exam?->CRC; ORDERING SYSTEM PROVIDED HISTORY: testicular pain, r/o torsion TECHNOLOGIST PROVIDED HISTORY: Reason for exam:->testicular pain, r/o torsion What reading provider will be dictating this exam?->CRC FINDINGS: Measurements: Right Testicle: 4.1 x 2.6 x 2.6 Left Testicle: 4.1 x 2.6 x 2.5 Right: Grey Scale: The right testicle demonstrates normal homogeneous echotexture without focal lesion. No evidence of testicular microlithiasis. Doppler Evaluation: There is normal arterial and venous Doppler flow within the testicle. Scrotal Sac: Moderate hydrocele. Marked scrotal edema. Epididymis: No acute abnormality. Left: Grey Scale: The left testicle demonstrates normal homogeneous echotexture without focal lesion. No evidence of testicular microlithiasis. Doppler Evaluation: There is normal arterial and venous Doppler flow within the testicle. Scrotal Sac: Moderate hydrocele. Marked scrotal edema. Epididymis: No acute abnormality. No evidence of testicular torsion. Bilateral hydroceles. Marked scrotal edema. ASSESSMENT and PLAN:    # Ascites  # Abdominal pain  # Scrotal edema  - Abdominal and scrotal discomfort most likely related to volume overloaded state and ascites  - Patient has apparently been compliant with diuretics; lasix 40mg qd, spironolactone 100mg qd  - May be related to fluid and corticosteroid administration before/during recent surgery  - Plan for paracentesis with IR today with fluid studies to rule out SBP  - Continue diuretics as tolerated    # Cirrhosis secondary to alcohol use and hepatitis C  - Reportedly completed hepatitis C treatment, but did not follow up for SVR12  - MELD-Na 16  - No hepatic encephalopathy  - No evidence of bleeding  - Did have recent EGD with banding of esophageal varices    Please see orders for further plan of care.   Will discuss above with Dr. Bita Martinez DO  GI fellow  2/21/2022 at 3:24 PM

## 2022-02-21 NOTE — ED PROVIDER NOTES
[address] [facility]  Department of Emergency Medicine     Written by: Louie Khan DO  Patient Name: Kalie Lara III  Attending Provider: Gonzalo Bauman MD  Admit Date: 2022  7:37 PM  MRN: [id number]                   : 1980        Chief Complaint   Patient presents with    Post-op Problem     hernia repair , seen here on  with scrotal swelling, abd bloating now and had left femoral pain from an injection,     - Chief complaint    Mr. Tobi Vo is a 39 yo male who presents to the ED due to a post-op problem. Patient had a left inguinal hernia repair completed on . He was seen in the emergency department 4 days after surgery due to pain from marked scrotal edema and ecchymosis. Patient presents today after he had been seen by Dr. Sabrina Orellana 4 days ago and received a left abdominal wall injection for local pain relief. Patient reports some relief in his symptoms for a few days but his pain has since returned. When comparing to his last visit in the emergency department scrotal edema is much improved the patient now has abdominal distention and tenderness. He no longer has any ecchymosis surrounding the scrotum but there is still significant edema. Patient is able to urinate without difficulty and has not had difficulty with bowel movements. He notes associated pain in the right inguinal canal as well now. Says he has had difficulty ambulating ever since surgery. Denies any fevers, chills, nausea, vomiting, chest pain, shortness of breath, bowel or urinary changes, headaches or vision changes. Review of Systems   Constitutional: Negative for chills, fatigue and fever. HENT: Negative for congestion, sinus pressure, sinus pain, sneezing and sore throat. Eyes: Negative for pain, redness and visual disturbance. Respiratory: Negative for cough, chest tightness and shortness of breath.     Cardiovascular: Negative for chest pain, palpitations and leg swelling. Gastrointestinal: Positive for abdominal distention and abdominal pain. Negative for constipation, diarrhea, nausea and vomiting. Genitourinary: Positive for scrotal swelling and testicular pain. Negative for dysuria, flank pain, frequency, hematuria and urgency. Musculoskeletal: Negative for arthralgias, back pain, gait problem, joint swelling and myalgias. Skin: Negative for rash. Neurological: Negative for dizziness, tremors, seizures, syncope, weakness, light-headedness, numbness and headaches. Physical Exam  Constitutional:       General: He is not in acute distress. Appearance: Normal appearance. He is normal weight. HENT:      Head: Normocephalic and atraumatic. Right Ear: External ear normal.      Left Ear: External ear normal.      Mouth/Throat:      Mouth: Mucous membranes are moist.      Pharynx: Oropharynx is clear. Eyes:      Extraocular Movements: Extraocular movements intact. Conjunctiva/sclera: Conjunctivae normal.   Cardiovascular:      Rate and Rhythm: Normal rate and regular rhythm. Pulses: Normal pulses. Heart sounds: Normal heart sounds. Pulmonary:      Effort: Pulmonary effort is normal. No respiratory distress. Breath sounds: Normal breath sounds. No wheezing. Abdominal:      General: Abdomen is flat. Bowel sounds are normal. There is distension. Palpations: Abdomen is soft. There is no mass. Tenderness: There is abdominal tenderness (Generalized). There is no right CVA tenderness, left CVA tenderness, guarding or rebound. Hernia: No hernia is present. Genitourinary:     Testes:         Right: Tenderness and swelling present. Left: Tenderness and swelling present. Comments: Marked scrotal edema  Musculoskeletal:         General: No swelling, tenderness, deformity or signs of injury. Normal range of motion. Cervical back: Normal range of motion and neck supple. No rigidity or tenderness. Skin:     General: Skin is warm and dry. Neurological:      General: No focal deficit present. Mental Status: He is alert and oriented to person, place, and time. Mental status is at baseline. Sensory: No sensory deficit. Motor: No weakness. Psychiatric:         Mood and Affect: Mood normal.         Behavior: Behavior normal.          Procedures       MDM  Number of Diagnoses or Management Options  Hypokalemia  Other ascites  Post-op pain  Scrotal edema  Diagnosis management comments: This is a 39 yo male who presents to the ED due to a post-op problem. CBC revealed mild anemia with a hemoglobin of 8.8 with other values in line with prior studies. BMP revealed hypokalemia with a potassium of 2.9 and IV and oral potassium were ordered for repletion. Patient also had a mild CANDY with creatinine of 1.4 will be given 500 mL of normal saline with his potassium. Hepatic function panel revealed values in line with prior studies and mildly elevated AST was 49 and increased bilirubin of 1.4. Lipase mildly elevated 70. Lactate, coags, ammonia and BNP were all normal.  Patient urinalysis was negative for any urinary tract infection or blood. CT abdomen pelvis revealed findings consistent with cirrhosis and portal venous hypertension with large volume ascites significantly increased from prior study. He also had fluid distention of the left inguinal hernia with prominent bilateral hydroceles and diffuse scrotal edema with thickening of the large and small bowel relatively diffusely likely reactive due to the ascites.   Spoke with Dr. Rupali Ty who stated he will be admitting the patient for likely paracentesis tomorrow to relieve some of the pressure and edema and the patient scrotum which is the main cause of his pain.              --------------------------------------------- PAST HISTORY ---------------------------------------------  Past Medical History:  has a [facility] Past Surgical History:  has a past surgical history that includes Upper gastrointestinal endoscopy (N/A, 11/13/2020); Tonsillectomy; Upper gastrointestinal endoscopy (N/A, 1/6/2021); Upper gastrointestinal endoscopy (N/A, 5/10/2021); Endoscopy, colon, diagnostic; Umbilical hernia repair (N/A, 9/15/2021); hernia repair; and hernia repair (Left, 2/7/2022). Social History:  reports that he has quit smoking. He smoked 0.50 packs per day. He has never used smokeless tobacco. He reports previous alcohol use. Family History: family history is not on file. The patients home medications have been reviewed.     Allergies: Pcn [penicillins]    -------------------------------------------------- RESULTS -------------------------------------------------    LABS:  Results for orders placed or performed during the hospital encounter of 02/20/22   CBC with Auto Differential   Result Value Ref Range    WBC 10.0 4.5 - 11.5 E9/L    RBC 3.14 (L) 3.80 - 5.80 E12/L    Hemoglobin 8.8 (L) 12.5 - 16.5 g/dL    Hematocrit 27.1 (L) 37.0 - 54.0 %    MCV 86.3 80.0 - 99.9 fL    MCH 28.0 26.0 - 35.0 pg    MCHC 32.5 32.0 - 34.5 %    RDW 16.6 (H) 11.5 - 15.0 fL    Platelets 529 887 - 069 E9/L    MPV 9.0 7.0 - 12.0 fL    Neutrophils % 78.7 43.0 - 80.0 %    Immature Granulocytes % 1.2 0.0 - 5.0 %    Lymphocytes % 9.5 (L) 20.0 - 42.0 %    Monocytes % 9.2 2.0 - 12.0 %    Eosinophils % 1.2 0.0 - 6.0 %    Basophils % 0.2 0.0 - 2.0 %    Neutrophils Absolute 7.87 (H) 1.80 - 7.30 E9/L    Immature Granulocytes # 0.12 E9/L    Lymphocytes Absolute 0.95 (L) 1.50 - 4.00 E9/L    Monocytes Absolute 0.92 0.10 - 0.95 E9/L    Eosinophils Absolute 0.12 0.05 - 0.50 E9/L    Basophils Absolute 0.02 0.00 - 0.20 Y6/P   Basic Metabolic Panel w/ Reflex to MG   Result Value Ref Range    Sodium 135 132 - 146 mmol/L    Potassium reflex Magnesium 2.9 (L) 3.5 - 5.0 mmol/L    Chloride 102 98 - 107 mmol/L    CO2 23 22 - 29 mmol/L    Anion Gap 10 7 - 16 mmol/L    Glucose 139 (H) 74 - 99 mg/dL    BUN 29 (H) 6 - 20 mg/dL    CREATININE 1.4 (H) 0.7 - 1.2 mg/dL    GFR Non-African American 56 >=60 mL/min/1.73    GFR African American >60     Calcium 8.4 (L) 8.6 - 10.2 mg/dL   Hepatic Function Panel   Result Value Ref Range    Total Protein 6.8 6.4 - 8.3 g/dL    Albumin 3.4 (L) 3.5 - 5.2 g/dL    Alkaline Phosphatase 100 40 - 129 U/L    ALT 39 0 - 40 U/L    AST 49 (H) 0 - 39 U/L    Total Bilirubin 1.4 (H) 0.0 - 1.2 mg/dL    Bilirubin, Direct 0.6 (H) 0.0 - 0.3 mg/dL    Bilirubin, Indirect 0.8 0.0 - 1.0 mg/dL   Lipase   Result Value Ref Range    Lipase 70 (H) 13 - 60 U/L   Lactic Acid   Result Value Ref Range    Lactic Acid 1.9 0.5 - 2.2 mmol/L   Urinalysis with Microscopic   Result Value Ref Range    Color, UA Yellow Straw/Yellow    Clarity, UA Clear Clear    Glucose, Ur Negative Negative mg/dL    Bilirubin Urine Negative Negative    Ketones, Urine Negative Negative mg/dL    Specific Gravity, UA 1.020 1.005 - 1.030    Blood, Urine Negative Negative    pH, UA 6.0 5.0 - 9.0    Protein, UA Negative Negative mg/dL    Urobilinogen, Urine 0.2 <2.0 E.U./dL    Nitrite, Urine Negative Negative    Leukocyte Esterase, Urine Negative Negative    WBC, UA NONE 0 - 5 /HPF    RBC, UA NONE 0 - 2 /HPF    Bacteria, UA NONE SEEN None Seen /HPF   Brain Natriuretic Peptide   Result Value Ref Range    Pro-BNP 41 0 - 125 pg/mL   Magnesium   Result Value Ref Range    Magnesium 2.1 1.6 - 2.6 mg/dL   APTT   Result Value Ref Range    aPTT 30.4 24.5 - 35.1 sec   Protime-INR   Result Value Ref Range    Protime 17.6 (H) 9.3 - 12.4 sec    INR 1.5    Ammonia   Result Value Ref Range    Ammonia 15.0 (L) 16.0 - 60.0 umol/L       RADIOLOGY:  CT ABDOMEN PELVIS W IV CONTRAST Additional Contrast? None   Final Result   Findings are consistent with cirrhosis and portal venous hypertension. Large volume ascites significantly increased from previous.       Fluid distention of left inguinal hernia with prominent bilateral hydroceles   and diffuse scrotal edema. Thickening of small and large bowel relatively diffusely may be reactive to   the ascites. Other bowel related infectious or inflammatory process not   entirely excluded. Correlate with clinical presentation. RECOMMENDATIONS:   Unavailable             ------------------------- NURSING NOTES AND VITALS REVIEWED ---------------------------  Date / Time Roomed:  2/20/2022  7:37 PM  ED Bed Assignment:  09/09    The nursing notes within the ED encounter and vital signs as below have been reviewed. Patient Vitals for the past 24 hrs:   BP Temp Temp src Pulse Resp SpO2   02/20/22 2209 132/79 -- -- 84 22 100 %   02/20/22 1933 131/67 -- -- -- 24 --   02/20/22 1928 -- 98.9 °F (37.2 °C) Temporal 89 -- 99 %       Oxygen Saturation Interpretation: Normal    ------------------------------------------ PROGRESS NOTES ------------------------------------------  Re-evaluation(s):  Time: 2230  Patients symptoms show no change  Repeat physical examination is not changed    Counseling:  I have spoken with the patient and discussed todays results, in addition to providing specific details for the plan of care and counseling regarding the diagnosis and prognosis. Their questions are answered at this time and they are agreeable with the plan of admission.    --------------------------------- ADDITIONAL PROVIDER NOTES ---------------------------------  Consultations:  Time: 2245. Spoke with Dr. Kim Nguyen. Discussed case. They will admit the patient. This patient's ED course included: a personal history and physicial examination, re-evaluation prior to disposition, multiple bedside re-evaluations, IV medications, cardiac monitoring, continuous pulse oximetry and complex medical decision making and emergency management    This patient has remained hemodynamically stable during their ED course. Diagnosis:  1. Post-op pain    2. Other ascites    3. Hypokalemia    4.  Scrotal edema        Disposition:  Patient's disposition: Admit to med/surg floor  Patient's condition is stable. Patient was seen and evaluated by myself and my attending Carlos Chung MD. Assessment and Plan discussed with attending provider, please see attestation for final plan of care.      DO Epi Blancas DO  Resident  02/20/22 2543

## 2022-02-21 NOTE — PROGRESS NOTES
Patient here for ultrasound guided paracentesis. Instructions given and questions answered. Consent signed. Abdomen scanned and marked under the guidance of procedural Radiologist.     1451 start procedure /55 82 20 97% on room air    1505 end procedure /57 80 19 95% on room air     2710 cc drained of straw colored ascites fluid.       Dry sterile dressing of 2x2 foam tape to RLQ     Specimen sent to lab with orders from floor    Nurse to nurse called to third floor spoke with Shiva Mendenhall RN    Patient transported back to the floor

## 2022-02-21 NOTE — CONSULTS
Department of Internal Medicine        HISTORY OF PRESENT ILLNESS:      The patient is a 40 y.o. male who presents with abdominal pain. Patient has history having a left inguinal hernia repair completed 2/7 with patient having increasing pain since his surgery with scrotal edema and ecchymosis. Patient followed up with Dr. Venita Garcia in the office with patient having the left abdominal wall injected with local pain medicine which had some relief. The pain has come back a few days after that. Patient's scrotal edema with which was present before in the ED is improved with the patient now having more abdominal distention and tenderness. Patient also complains of difficulty ambulating since the surgery. He denies any fever/chills, nausea/vomiting, chest pain, unusual shortness of breath or changes in bowel or urinary habits. CT the abdomen showed cirrhosis with portal vein hypertension, large volume ascites which is increased from previous CT with fluid distention of left inguinal hernia with prominent bilateral hydroceles and scrotal edema. Patient states he has a history of cirrhosis secondary to prior history of IV drug use and hepatitis C. Patient does follow-up with GI. Patient is not sure he is ever had abdominal paracentesis done before. Temperature is 98.8 with a heart rate of 90 and blood pressure 130/74. O2 sat 98% room air at rest.  BUN/creatinine in the ED initially was 29/1.4 potassium 2.9 and this morning BUN/creatinine 27/1.2. Patient has mild elevation of AST at 41 with a total bili 1.7 this morning. Hemoglobin 8.8 on admission repeat 7.9 with a WBC of 10. INR is 1.5 on admission. Urinalysis unremarkable.     Past Medical History:    Past Medical History:   Diagnosis Date    Esophageal varices ([address]      Past Surgical History:    Past Surgical History:   Procedure Laterality Date    ENDOSCOPY, COLON, DIAGNOSTIC      HERNIA REPAIR      HERNIA REPAIR Left 2/7/2022    LAPAROSCOPIC LEFT INGUINAL HERNIA REPAIR WITH MESH POSSIBLE OPEN POSSIBLE BILATERAL performed by Serena Snell MD at [facility] N/A 9/15/2021    LAPAROSCOPIC POSSIBLE OPEN UMBILICAL HERNIA REPAIR WITH [address] performed by Serena Snell MD at [address] [place] [address] 11/13/2020    EGD BIOPSY performed by Kaylin Pollack MD at [facility] N/A 1/6/2021    EGD BAND LIGATION performed by Jerry Jasso MD at [facility] N/A 5/10/2021    EGD BAND LIGATION performed by Jerry Jasso MD at [address]       Medications Prior to Admission:    @  Prior to Admission medications    Medication Sig Start Date End Date Taking?  Authorizing Provider   nadolol (CORGARD) 40 MG tablet TAKE ONE TABLET BY MOUTH EVERY DAY AT BEDTIME  Patient not taking: Reported on 2/16/2022 12/2/21   Historical Provider, MD   furosemide (LASIX) 40 MG tablet TAKE ONE TABLET BY MOUTH EVERY DAY 2/5/22   Historical Provider, MD   ibuprofen (ADVIL;MOTRIN) 800 MG tablet Take 1 tablet by mouth every 6 hours as needed for Pain 2/16/22   Sandee Cordova MD   NONFORMULARY daily as needed Medical marijuana edibles or vapes     Historical Provider, MD   spironolactone (ALDACTONE) 100 MG tablet Take 100 mg by mouth daily    Historical Provider, MD       Allergies:  Pcn [penicillins]    Social History:   Social History     Socioeconomic History    Marital status: Single     Spouse name: Not on file    Number of children: Not on file    Years of education: Not on file    Highest education level: Not on file   Occupational History    Not on file   Tobacco Use    Smoking status: Former Smoker     Packs/day: 0.50    Smokeless tobacco: Never Used   Vaping Use    Vaping Use: Never used   Substance and Sexual Activity    Alcohol use: Not Currently     Comment: occasional-denies    Drug use: Not on file     Comment: last used medical MSK: No extremity weakness, paralysis or paresthesias. PHYSICAL EXAM:    Vitals:  /74   Pulse 90   Temp 98.8 °F (37.1 °C)   Resp 18   SpO2 99%     General:  This is a 40 y.o. yo male who is alert and oriented in moderate distress secondary to abdominal distention and groin discomfort. HEENT:  Head is normocephalic and atraumatic, PERRLA, EOMI, mucus membranes moist with no pharyngeal erythema or exudate. Neck:  Supple with no carotid bruits, JVD or thyromegaly.   No cervical adenopathy  CV:  Regular rate and rhythm, no murmurs  Lungs:  Clear to auscultation bilaterally with no wheezes, rales or rhonchi  Abdomen:  + Taunt abdominal distention with positive fluid wave, bowel sounds present  : Marked distended scrotum and very uncomfortable with mild ecchymosis around the left groin  Extremities:  + Bilateral  upper thigh edema, peripheral pulses intact bilaterally  Neuro:  Cranial nerves II-XII grossly intact; motor and sensory function intact with no focal deficits  Skin:  No rashes, lesions or wounds      DATA:  CBC with Differential:    Lab Results   Component Value Date    WBC 10.0 02/20/2022    RBC 3.14 02/20/2022    HGB 7.9 02/21/2022    HCT 24.0 02/21/2022     02/20/2022    MCV 86.3 02/20/2022    MCH 28.0 02/20/2022    MCHC 32.5 02/20/2022    RDW 16.6 02/20/2022    SEGSPCT 61 02/20/2012    LYMPHOPCT 9.5 02/20/2022    MONOPCT 9.2 02/20/2022    MYELOPCT 1.7 01/11/2021    BASOPCT 0.2 02/20/2022    MONOSABS 0.92 02/20/2022    LYMPHSABS 0.95 02/20/2022    EOSABS 0.12 02/20/2022    BASOSABS 0.02 02/20/2022     CMP:    Lab Results   Component Value Date     02/21/2022    K 3.8 02/21/2022    K 2.9 02/20/2022     02/21/2022    CO2 21 02/21/2022    BUN 27 02/21/2022    CREATININE 1.2 02/21/2022    GFRAA >60 02/21/2022    LABGLOM >60 02/21/2022    GLUCOSE 110 02/21/2022    GLUCOSE 89 02/20/2012    PROT 6.1 02/21/2022    LABALBU 3.0 02/21/2022    LABALBU 3.4 02/18/2012    CALCIUM 8.1 02/21/2022    BILITOT 1.7 02/21/2022    ALKPHOS 93 02/21/2022    AST 41 02/21/2022    ALT 33 02/21/2022     Magnesium:    Lab Results   Component Value Date    MG 2.1 02/20/2022     Phosphorus:    Lab Results   Component Value Date    PHOS 1.9 05/11/2021     PT/INR:    Lab Results   Component Value Date    PROTIME 17.6 02/20/2022    PROTIME 12.3 02/17/2012    INR 1.5 02/20/2022     Troponin:    Lab Results   Component Value Date    TROPONINI <0.01 01/06/2021     U/A:    Lab Results   Component Value Date    COLORU Yellow 02/20/2022    PROTEINU Negative 02/20/2022    PHUR 6.0 02/20/2022    WBCUA NONE 02/20/2022    RBCUA NONE 02/20/2022    RBCUA 0-1 02/25/2013    MUCUS Present 08/14/2020    BACTERIA NONE SEEN 02/20/2022    CLARITYU Clear 02/20/2022    SPECGRAV 1.020 02/20/2022    LEUKOCYTESUR Negative 02/20/2022    UROBILINOGEN 0.2 02/20/2022    BILIRUBINUR Negative 02/20/2022    BLOODU Negative 02/20/2022    GLUCOSEU Negative 02/20/2022     ABG:  No results found for: PH, PCO2, PO2, HCO3, BE, THGB, TCO2, O2SAT  HgBA1c:    Lab Results   Component Value Date    LABA1C 4.9 01/09/2021     FLP:    Lab Results   Component Value Date    TRIG 56 02/21/2022    HDL 32 02/21/2022    LDLCALC 72 02/21/2022    LABVLDL 11 02/21/2022     TSH:    Lab Results   Component Value Date    TSH 5.110 02/21/2022     IRON:    Lab Results   Component Value Date    IRON 67 01/06/2021     LIPASE:    Lab Results   Component Value Date    LIPASE 70 02/20/2022       ASSESSMENT AND PLAN:      Patient Active Problem List    Diagnosis Date Noted    Ascites-massive 02/20/2022    Left inguinal hernia repair 2/7/2022      Acute kidney injury      Cirrhosis-secondary to hepatitis C 05/10/2021    Secondary esophageal varices      Abdominal pain 01/06/2021     Bilateral severe hydrocele 11/13/2020     Normocytic anemia 02/17/2012     Plan:  Patient admitted to general surgical floor  Home medications reviewed  IV fluids normal saline 20 KCl at 60 cc an hour  Consult urology  Consult GI  IR consulted for abdominal paracentesis  Serum iron, S98 folic acid  Paracentesis panel ordered    CMP, CBC in a.m.       Quang Amaro DO, [email or web address]  2/21/2022  9:57 AM

## 2022-02-21 NOTE — H&P
GENERAL SURGERY  HISTORY AND PHYSICAL  2/21/2022    Chief Complaint   Patient presents with    Post-op Problem     hernia repair 2/7, seen here on 2/11 with scrotal swelling, abd bloating now and had left femoral pain from an injection,        HPI  Brooke Coronado III is a 40 y.o. male who presents for evaluation of abdominal pain. He had a recent incisional hernia and left inguinal hernia repair on 2/7. Since surgery he has been complaining of constant groin pain and swelling. He was given a local anesthetic block in the office on 2/16 for this pain which he stated did help him but now it has returned and worsened as it now includes his abdomen. His abdomen is tense and very tender to the touch. He has a history of cirrhosis with small volume ascites. Repeat CT scan revealed large volume ascites with hydrocele of his left side. He has required paracentesis once before but states his pain was never this bad. He denies any emesis. Has not passed much flatus. Past Medical History:   Diagnosis Date    Esophageal varices ([address]        Past Surgical History:   Procedure Laterality Date    ENDOSCOPY, COLON, DIAGNOSTIC      HERNIA REPAIR      HERNIA REPAIR Left 2/7/2022    LAPAROSCOPIC LEFT INGUINAL HERNIA REPAIR WITH MESH POSSIBLE OPEN POSSIBLE BILATERAL performed by Stevie Tejada MD at [facility] N/A 9/15/2021    LAPAROSCOPIC POSSIBLE OPEN UMBILICAL HERNIA REPAIR WITH [address] performed by Stevie Tjeada MD at [place] [address] 11/13/2020    EGD BIOPSY performed by Pedro Luis Nunes MD at [facility] N/A 1/6/2021    EGD BAND LIGATION performed by Kwan Banda MD at [facility] N/A 5/10/2021    EGD BAND LIGATION performed by Kwan Banda MD at [address]       Prior to Admission medications    Medication Sig Start Date End Date Taking?  Authorizing Provider   nadolol (CORGARD) 40 MG tablet TAKE ONE TABLET BY MOUTH EVERY DAY AT BEDTIME  Patient not taking: Reported on 2/16/2022 12/2/21   Historical Provider, MD   furosemide (LASIX) 40 MG tablet TAKE ONE TABLET BY MOUTH EVERY DAY 2/5/22   Historical Provider, MD   ibuprofen (ADVIL;MOTRIN) 800 MG tablet Take 1 tablet by mouth every 6 hours as needed for Pain 2/16/22   Carisa Sihn MD   NONFORMULARY daily as needed Medical marijuana edibles or vapes     Historical Provider, MD   spironolactone (ALDACTONE) 100 MG tablet Take 100 mg by mouth daily    Historical Provider, MD       Allergies   Allergen Reactions    Pcn [Penicillins] Anaphylaxis       History reviewed. No pertinent family history. Social History     Tobacco Use    Smoking status: Former Smoker     Packs/day: 0.50    Smokeless tobacco: Never Used   Vaping Use    Vaping Use: Never used   Substance Use Topics    Alcohol use: Not Currently     Comment: occasional-denies    Drug use: Not on file     Comment: last used medical marijuana 2/2         Review of Systems - History obtained from the patient  General ROS: negative  Psychological ROS: negative  Ophthalmic ROS: negative  ENT ROS: negative  Allergy and Immunology ROS: negative  Hematological and Lymphatic ROS: negative  Respiratory ROS: no cough, shortness of breath, or wheezing  Cardiovascular ROS: no chest pain or dyspnea on exertion  Gastrointestinal ROS: positive for - abdominal pain, appetite loss, gas/bloating and nausea/vomiting  Genito-Urinary ROS: no dysuria, trouble voiding, or hematuria  Musculoskeletal ROS: negative  Neurological ROS: no TIA or stroke symptoms  Dermatological ROS: negative      PHYSICAL EXAM:    Vitals:    02/21/22 0600   BP: 130/74   Pulse: 90   Resp: 18   Temp: 98.8 °F (37.1 °C)   SpO2: 99%       General Appearance:  awake, alert, oriented, uncomfortable   Skin:  Skin color, texture, turgor normal. No rashes or lesions.   Head/face:  NCAT  Eyes:  No gross abnormalities. Lungs:  Normal expansion. Clear to auscultation. Heart:  Heart regular rate and rhythm  Abdomen:  Tense, distended, diffusely tender   Extremities: Extremities warm to touch, pink, with no edema. Neurologic:  negative      LABS:  CBC  Recent Labs     02/20/22 1956   WBC 10.0   HGB 8.8*   HCT 27.1*        BMP  Recent Labs     02/20/22 1956      K 2.9*      CO2 23   BUN 29*   CREATININE 1.4*   CALCIUM 8.4*     Liver Function  Recent Labs     02/20/22 1956   LIPASE 70*   BILITOT 1.4*   BILIDIR 0.6*   AST 49*   ALT 39   ALKPHOS 100   PROT 6.8   LABALBU 3.4*     No results for input(s): LACTATE in the last 72 hours. Recent Labs     02/20/22 2159   INR 1.5       RADIOLOGY    CT ABDOMEN PELVIS W IV CONTRAST Additional Contrast? None    Result Date: 2/20/2022  EXAMINATION: CT OF THE ABDOMEN AND PELVIS WITH CONTRAST 2/20/2022 9:30 pm TECHNIQUE: CT of the abdomen and pelvis was performed with the administration of intravenous contrast. Multiplanar reformatted images are provided for review. Dose modulation, iterative reconstruction, and/or weight based adjustment of the mA/kV was utilized to reduce the radiation dose to as low as reasonably achievable. COMPARISON: 02/12/2022 HISTORY: ORDERING SYSTEM PROVIDED HISTORY: abdominal distension and scrotal edema - run longer to include whole scrotum if possible TECHNOLOGIST PROVIDED HISTORY: Additional Contrast?->None Reason for exam:->abdominal distension and scrotal edema - run longer to include whole scrotum if possible Decision Support Exception - unselect if not a suspected or confirmed emergency medical condition->Emergency Medical Condition (MA) FINDINGS: Nodular contour of the liver consistent with cirrhosis. The liver is slightly heterogeneous but without definitive evidence of localized mass. There is recanalization of the umbilical vein. Moderate splenomegaly similar to previous. Pancreas is grossly unremarkable.   No adrenal mass.  No hydronephrosis. Large amount of ascites, significantly increased from previous. Assessment of bowel is limited without oral contrast.  No bowel obstruction. The appendix is not well seen. Partial visualization of probable normal appendix. Moderate and relatively diffuse thickening of small and large bowel. No abscess. No identified free air. Numerous varicosities are present. Urinary bladder is largely decompressed. Prostate is normal in size. Moderate fluid distention of a left inguinal hernia. Large bilateral hydroceles with diffuse bilateral scrotal edema. There is no soft tissue gas identified in the scrotum. Mild soft tissue edema in the visualized lower extremities. Mild anasarca. Scattered areas of scarring and/or atelectasis in the lung bases. Findings are consistent with cirrhosis and portal venous hypertension. Large volume ascites significantly increased from previous. Fluid distention of left inguinal hernia with prominent bilateral hydroceles and diffuse scrotal edema. Thickening of small and large bowel relatively diffusely may be reactive to the ascites. Other bowel related infectious or inflammatory process not entirely excluded. Correlate with clinical presentation.  RECOMMENDATIONS: Unavailable         ASSESSMENT:  40 y.o. male with recent hernia repair, now with large volume ascites     PLAN:  - IR paracentesis today  - NPO  - analgesia, nausea control  - attempt ambulation after paracentesis    Discussed with Dr. Nic Hendricks    Electronically signed by Adrianna Hammans, MD on 2/21/22 at 7:24 AM [facility]    General Surgery Progress Note  [facility]    Patient's Name/Date of Birth: Ba Mayo III / 1980    Date: February 21, 2022     Surgeon: Nic Hendricks MD    Chief Complaint: abdominal pain    Patient Active Problem List   Diagnosis    Cellulitis    UGI bleed    Abdominal pain    Secondary esophageal varices with bleeding ([address]    GIB (gastrointestinal bleeding)    Umbilical hernia without obstruction and without gangrene    Left inguinal hernia    Ascites       Subjective: uncomfortable. C/o flank and pelvic pain    Objective:  /74   Pulse 90   Temp 98.8 °F (37.1 °C)   Resp 18   SpO2 99%   Labs:  Recent Labs     02/20/22 1956 02/21/22  0811   WBC 10.0  --    HGB 8.8* 7.9*   HCT 27.1* 24.0*     Lab Results   Component Value Date    CREATININE 1.4 (H) 02/20/2022    BUN 29 (H) 02/20/2022     02/20/2022    K 2.9 (L) 02/20/2022     02/20/2022    CO2 23 02/20/2022     Recent Labs     02/20/22 1956   LIPASE 70*     CBC with Differential:    Lab Results   Component Value Date    WBC 10.0 02/20/2022    RBC 3.14 02/20/2022    HGB 7.9 02/21/2022    HCT 24.0 02/21/2022     02/20/2022    MCV 86.3 02/20/2022    MCH 28.0 02/20/2022    MCHC 32.5 02/20/2022    RDW 16.6 02/20/2022    SEGSPCT 61 02/20/2012    LYMPHOPCT 9.5 02/20/2022    MONOPCT 9.2 02/20/2022    MYELOPCT 1.7 01/11/2021    BASOPCT 0.2 02/20/2022    MONOSABS 0.92 02/20/2022    LYMPHSABS 0.95 02/20/2022    EOSABS 0.12 02/20/2022    BASOSABS 0.02 02/20/2022     CMP:    Lab Results   Component Value Date     02/20/2022    K 2.9 02/20/2022     02/20/2022    CO2 23 02/20/2022    BUN 29 02/20/2022    CREATININE 1.4 02/20/2022    GFRAA >60 02/20/2022    LABGLOM 56 02/20/2022    GLUCOSE 139 02/20/2022    GLUCOSE 89 02/20/2012    PROT 6.8 02/20/2022    LABALBU 3.4 02/20/2022    LABALBU 3.4 02/18/2012    CALCIUM 8.4 02/20/2022    BILITOT 1.4 02/20/2022    ALKPHOS 100 02/20/2022    AST 49 02/20/2022    ALT 39 02/20/2022       General appearance:  NAD  Head: NCAT, PERRLA, EOMI, red conjunctiva  Neck: supple, no masses  Lungs: CTAB, equal chest rise bilateral  Heart: Reg rate  Abdomen: soft, distended with tense abdomen. No rebound or guarding. Skin; no lesions  Gu: no cva tenderness.  Groin ecchymosis improving, scrotal swelling  Extremities: extremities normal, atraumatic, no cyanosis or edema      Assessment/Plan:  Brooke Coronado III is a 40 y.o. male 2 weeks s/p Lap L inguinal hernia repair, incisional hernia repair.  Now with ascites, persistent scrotal swelling, h/o cirrhosis    Admit  Gentle hydration  IR consult for paracentesis  IM consult for medical management  Continue lasix/spirinolactone  Pain control    Marie Estevez MD  2/21/2022  9:15 AM

## 2022-02-21 NOTE — CONSULTS
spironolactone (ALDACTONE) 100 MG tablet, Take 100 mg by mouth daily    Allergies:    Pcn [penicillins]    Social History:    reports that he has quit smoking. He smoked 0.50 packs per day. He has never used smokeless tobacco. He reports previous alcohol use. Family History:   Non-contributory to this Urological problem  family history is not on file. Review of Systems:  Constitutional: No fever or chills   Respiratory: negative for cough and hemoptysis  Cardiovascular: negative for chest pain and dyspnea  Gastrointestinal: negative for abdominal pain, diarrhea, nausea and vomiting   Derm: negative for rash and skin lesion(s)  Neurological: negative for seizures and tremors  Musculoskeletal: Negative    Psychiatric: Negative   : As above in the HPI, otherwise negative  All other reviews are negative    Physical Exam:     Vitals:  /74   Pulse 90   Temp 98.8 °F (37.1 °C)   Resp 18   SpO2 99%     General:  Awake, alert, oriented X 3. No apparent distress. HEENT:  Normocephalic, atraumatic. Lungs:  Respirations symmetric and non-labored. Abdomen:  Tender, distended   Extremities:  No clubbing, cyanosis, or edema  Skin:  Warm and dry, no open lesions or rashes  Neuro:  There are no motor or sensory deficits in the 4 quadrant extremities   Rectal: deferred  Genitourinary:  Severe scrotal edema secondary to ascites, no evidence of abscess or Luis Daniel's    Labs:     Recent Labs     02/20/22 1956 02/21/22  0811   WBC 10.0  --    RBC 3.14*  --    HGB 8.8* 7.9*   HCT 27.1* 24.0*   MCV 86.3  --    MCH 28.0  --    MCHC 32.5  --    RDW 16.6*  --      --    MPV 9.0  --          Recent Labs     02/20/22 1956 02/21/22  0811   CREATININE 1.4* 1.2       No results found for: PSA    Imaging:   Narrative   EXAMINATION:   CT OF THE ABDOMEN AND PELVIS WITH CONTRAST 2/20/2022 9:30 pm       TECHNIQUE:   CT of the abdomen and pelvis was performed with the administration of   intravenous contrast. Multiplanar reformatted images are provided for review. Dose modulation, iterative reconstruction, and/or weight based adjustment of   the mA/kV was utilized to reduce the radiation dose to as low as reasonably   achievable.       COMPARISON:   02/12/2022       HISTORY:   ORDERING SYSTEM PROVIDED HISTORY: abdominal distension and scrotal edema -   run longer to include whole scrotum if possible   TECHNOLOGIST PROVIDED HISTORY:   Additional Contrast?->None   Reason for exam:->abdominal distension and scrotal edema - run longer to   include whole scrotum if possible   Decision Support Exception - unselect if not a suspected or confirmed   emergency medical condition->Emergency Medical Condition (MA)       FINDINGS:   Nodular contour of the liver consistent with cirrhosis.  The liver is   slightly heterogeneous but without definitive evidence of localized mass. There is recanalization of the umbilical vein.  Moderate splenomegaly similar   to previous.  Pancreas is grossly unremarkable.  No adrenal mass.  No   hydronephrosis.       Large amount of ascites, significantly increased from previous.  Assessment   of bowel is limited without oral contrast.  No bowel obstruction.  The   appendix is not well seen.  Partial visualization of probable normal   appendix.  Moderate and relatively diffuse thickening of small and large   bowel.  No abscess.  No identified free air.  Numerous varicosities are   present.       Urinary bladder is largely decompressed.  Prostate is normal in size.    Moderate fluid distention of a left inguinal hernia.  Large bilateral   hydroceles with diffuse bilateral scrotal edema.  There is no soft tissue gas   identified in the scrotum.  Mild soft tissue edema in the visualized lower   extremities.  Mild anasarca.       Scattered areas of scarring and/or atelectasis in the lung bases.           Impression   Findings are consistent with cirrhosis and portal venous hypertension.       Large volume ascites significantly increased from previous.       Fluid distention of left inguinal hernia with prominent bilateral hydroceles   and diffuse scrotal edema.       Thickening of small and large bowel relatively diffusely may be reactive to   the ascites.  Other bowel related infectious or inflammatory process not   entirely excluded.  Correlate with clinical presentation.       RECOMMENDATIONS:   Unavailable                       Assessment/plan:  Scrotal swelling secondary to ascites   Bilateral hydroceles   Hx of left inguinal hernia s/p repair on 2/7    Scrotal swelling 2/2 ascites   He is pending IR for paracentesis   Ice and elevate the scrotum   He is voiding comfortably and can be left without a mcclellan catheter at this time  No acute surgical intervention for scrotal swelling at this time  He was made aware this may take weeks to improve  Will follow         Electronically signed by CHRIS Mcrae CNP on 2/21/2022 at 11:22 AM   Agree with above assessment and plan

## 2022-02-21 NOTE — ED NOTES
Bed: 09  Expected date:   Expected time:   Means of arrival:   Comments:  niles Padilla RN  02/20/22 [id number]

## 2022-02-21 NOTE — ED NOTES
Nurse unable to take report at this time.  Informed that RN will return call     Gerri Alex RN  02/21/22 0004

## 2022-02-22 LAB
ALBUMIN SERPL-MCNC: 3 G/DL (ref 3.5–5.2)
ALP BLD-CCNC: 109 U/L (ref 40–129)
ALT SERPL-CCNC: 30 U/L (ref 0–40)
ANION GAP SERPL CALCULATED.3IONS-SCNC: 9 MMOL/L (ref 7–16)
AST SERPL-CCNC: 38 U/L (ref 0–39)
BASOPHILS ABSOLUTE: 0.03 E9/L (ref 0–0.2)
BASOPHILS RELATIVE PERCENT: 0.3 % (ref 0–2)
BILIRUB SERPL-MCNC: 1.5 MG/DL (ref 0–1.2)
BUN BLDV-MCNC: 23 MG/DL (ref 6–20)
CALCIUM SERPL-MCNC: 8.1 MG/DL (ref 8.6–10.2)
CHLORIDE BLD-SCNC: 101 MMOL/L (ref 98–107)
CO2: 21 MMOL/L (ref 22–29)
CREAT SERPL-MCNC: 1.2 MG/DL (ref 0.7–1.2)
EOSINOPHILS ABSOLUTE: 0.18 E9/L (ref 0.05–0.5)
EOSINOPHILS RELATIVE PERCENT: 1.5 % (ref 0–6)
GFR AFRICAN AMERICAN: >60
GFR NON-AFRICAN AMERICAN: >60 ML/MIN/1.73
GLUCOSE BLD-MCNC: 107 MG/DL (ref 74–99)
HCT VFR BLD CALC: 25.6 % (ref 37–54)
HEMOGLOBIN: 8.1 G/DL (ref 12.5–16.5)
IMMATURE GRANULOCYTES #: 0.18 E9/L
IMMATURE GRANULOCYTES %: 1.5 % (ref 0–5)
LYMPHOCYTES ABSOLUTE: 0.93 E9/L (ref 1.5–4)
LYMPHOCYTES RELATIVE PERCENT: 7.9 % (ref 20–42)
MCH RBC QN AUTO: 27.5 PG (ref 26–35)
MCHC RBC AUTO-ENTMCNC: 31.6 % (ref 32–34.5)
MCV RBC AUTO: 86.8 FL (ref 80–99.9)
MONOCYTES ABSOLUTE: 1.19 E9/L (ref 0.1–0.95)
MONOCYTES RELATIVE PERCENT: 10.2 % (ref 2–12)
NEUTROPHILS ABSOLUTE: 9.19 E9/L (ref 1.8–7.3)
NEUTROPHILS RELATIVE PERCENT: 78.6 % (ref 43–80)
PDW BLD-RTO: 16.3 FL (ref 11.5–15)
PLATELET # BLD: 140 E9/L (ref 130–450)
PMV BLD AUTO: 9.2 FL (ref 7–12)
POTASSIUM SERPL-SCNC: 4.2 MMOL/L (ref 3.5–5)
RBC # BLD: 2.95 E12/L (ref 3.8–5.8)
SODIUM BLD-SCNC: 131 MMOL/L (ref 132–146)
T4 FREE: 0.99 NG/DL (ref 0.93–1.7)
TOTAL PROTEIN: 6.2 G/DL (ref 6.4–8.3)
WBC # BLD: 11.7 E9/L (ref 4.5–11.5)

## 2022-02-22 PROCEDURE — 80053 COMPREHEN METABOLIC PANEL: CPT

## 2022-02-22 PROCEDURE — 6360000002 HC RX W HCPCS: Performed by: INTERNAL MEDICINE

## 2022-02-22 PROCEDURE — 6370000000 HC RX 637 (ALT 250 FOR IP): Performed by: SURGERY

## 2022-02-22 PROCEDURE — 1200000000 HC SEMI PRIVATE

## 2022-02-22 PROCEDURE — 36415 COLL VENOUS BLD VENIPUNCTURE: CPT

## 2022-02-22 PROCEDURE — 84439 ASSAY OF FREE THYROXINE: CPT

## 2022-02-22 PROCEDURE — 85025 COMPLETE CBC W/AUTO DIFF WBC: CPT

## 2022-02-22 PROCEDURE — 6370000000 HC RX 637 (ALT 250 FOR IP): Performed by: INTERNAL MEDICINE

## 2022-02-22 PROCEDURE — 51798 US URINE CAPACITY MEASURE: CPT

## 2022-02-22 PROCEDURE — 6360000002 HC RX W HCPCS: Performed by: SURGERY

## 2022-02-22 PROCEDURE — 2580000003 HC RX 258: Performed by: SURGERY

## 2022-02-22 RX ORDER — POLYETHYLENE GLYCOL 3350 17 G/17G
17 POWDER, FOR SOLUTION ORAL DAILY
Status: DISCONTINUED | OUTPATIENT
Start: 2022-02-22 | End: 2022-02-24 | Stop reason: HOSPADM

## 2022-02-22 RX ORDER — LACTULOSE 10 G/15ML
20 SOLUTION ORAL DAILY
Status: DISCONTINUED | OUTPATIENT
Start: 2022-02-22 | End: 2022-02-24 | Stop reason: HOSPADM

## 2022-02-22 RX ORDER — BISACODYL 10 MG
10 SUPPOSITORY, RECTAL RECTAL DAILY PRN
Status: DISCONTINUED | OUTPATIENT
Start: 2022-02-22 | End: 2022-02-24 | Stop reason: HOSPADM

## 2022-02-22 RX ORDER — FUROSEMIDE 10 MG/ML
40 INJECTION INTRAMUSCULAR; INTRAVENOUS 2 TIMES DAILY
Status: DISCONTINUED | OUTPATIENT
Start: 2022-02-22 | End: 2022-02-24

## 2022-02-22 RX ADMIN — POLYETHYLENE GLYCOL 3350 17 G: 17 POWDER, FOR SOLUTION ORAL at 08:56

## 2022-02-22 RX ADMIN — OXYCODONE 10 MG: 5 TABLET ORAL at 00:04

## 2022-02-22 RX ADMIN — MORPHINE SULFATE 4 MG: 4 INJECTION, SOLUTION INTRAMUSCULAR; INTRAVENOUS at 17:09

## 2022-02-22 RX ADMIN — CIPROFLOXACIN HYDROCHLORIDE 500 MG: 500 TABLET, FILM COATED ORAL at 08:27

## 2022-02-22 RX ADMIN — MORPHINE SULFATE 4 MG: 4 INJECTION, SOLUTION INTRAMUSCULAR; INTRAVENOUS at 20:42

## 2022-02-22 RX ADMIN — LACTULOSE 20 G: 20 SOLUTION ORAL at 08:56

## 2022-02-22 RX ADMIN — SPIRONOLACTONE 100 MG: 25 TABLET ORAL at 08:16

## 2022-02-22 RX ADMIN — ENOXAPARIN SODIUM 40 MG: 100 INJECTION SUBCUTANEOUS at 08:16

## 2022-02-22 RX ADMIN — SODIUM CHLORIDE, PRESERVATIVE FREE 10 ML: 5 INJECTION INTRAVENOUS at 20:36

## 2022-02-22 RX ADMIN — MORPHINE SULFATE 4 MG: 4 INJECTION, SOLUTION INTRAMUSCULAR; INTRAVENOUS at 08:16

## 2022-02-22 RX ADMIN — OXYCODONE 10 MG: 5 TABLET ORAL at 22:56

## 2022-02-22 RX ADMIN — NADOLOL 40 MG: 20 TABLET ORAL at 08:16

## 2022-02-22 RX ADMIN — MORPHINE SULFATE 4 MG: 4 INJECTION, SOLUTION INTRAMUSCULAR; INTRAVENOUS at 04:19

## 2022-02-22 RX ADMIN — FUROSEMIDE 40 MG: 40 TABLET ORAL at 08:16

## 2022-02-22 RX ADMIN — OXYCODONE 10 MG: 5 TABLET ORAL at 12:39

## 2022-02-22 RX ADMIN — BISACODYL 10 MG: 5 TABLET, COATED ORAL at 08:56

## 2022-02-22 RX ADMIN — FUROSEMIDE 40 MG: 10 INJECTION, SOLUTION INTRAMUSCULAR; INTRAVENOUS at 22:54

## 2022-02-22 ASSESSMENT — PAIN SCALES - GENERAL
PAINLEVEL_OUTOF10: 6
PAINLEVEL_OUTOF10: 8
PAINLEVEL_OUTOF10: 5
PAINLEVEL_OUTOF10: 8
PAINLEVEL_OUTOF10: 7

## 2022-02-22 ASSESSMENT — PAIN DESCRIPTION - PROGRESSION: CLINICAL_PROGRESSION: NOT CHANGED

## 2022-02-22 NOTE — PROGRESS NOTES
cc of clear fluid. Serum sodium 131 with BUN/creatinine of 23/1.2. Transaminases normal with total bili 1.5.  TSH is elevated at 5.1 with a free T4 is low normal at 0.99. WBC 11.7 with a hemoglobin 8.1. Serum iron 34. Paracentesis fluid had a LD H of only 22 and protein of only 0.3. Temperature was 90.3 with heart rate 82 blood pressure 118/64. O2 sat 99% room air at rest.  Urine output is adequate. General surgery, urology, GI notes reviewed. Past Medical History:    Past Medical History:   Diagnosis Date    Esophageal varices ([address]      Past Surgical History:    Past Surgical History:   Procedure Laterality Date    ENDOSCOPY, COLON, DIAGNOSTIC      HERNIA REPAIR      HERNIA REPAIR Left 2/7/2022    LAPAROSCOPIC LEFT INGUINAL HERNIA REPAIR WITH MESH POSSIBLE OPEN POSSIBLE BILATERAL performed by Serena Snell MD at [facility] N/A 9/15/2021    LAPAROSCOPIC POSSIBLE OPEN UMBILICAL HERNIA REPAIR WITH [facility] performed by Serena Snell MD at [place] [address] 11/13/2020    EGD BIOPSY performed by Kaylin Pollack MD at [address] [place] [address] N/A 1/6/2021    EGD BAND LIGATION performed by Jerry Jasso MD at [address] [place] [address] N/A 5/10/2021    EGD BAND LIGATION performed by Jerry Jasso MD at [address] [place], [place] [id number]        Medications Prior to Admission:    @  Prior to Admission medications    Medication Sig Start Date End Date Taking?  Authorizing Provider   nadolol (CORGARD) 40 MG tablet TAKE ONE TABLET BY MOUTH EVERY DAY AT BEDTIME  Patient not taking: Reported on 2/16/2022 12/2/21   Historical Provider, MD   furosemide (LASIX) 40 MG tablet TAKE ONE TABLET BY MOUTH EVERY DAY 2/5/22   Historical Provider, MD   ibuprofen (ADVIL;MOTRIN) 800 MG tablet Take 1 tablet by mouth every 6 hours as needed for Pain 2/16/22   Sandee Cordova MD   NONFORMULARY daily as needed Medical marijuana edibles or vapes     Historical Provider, MD   spironolactone (ALDACTONE) 100 MG tablet Take 100 mg by mouth daily    Historical Provider, MD       Allergies:  Pcn [penicillins]    Social History:   Social History     Socioeconomic History    Marital status: Single     Spouse name: Not on file    Number of children: Not on file    Years of education: Not on file    Highest education level: Not on file   Occupational History    Not on file   Tobacco Use    Smoking status: Former Smoker     Packs/day: 0.50    Smokeless tobacco: Never Used   Vaping Use    Vaping Use: Never used   Substance and Sexual Activity    Alcohol use: Not Currently     Comment: occasional-denies    Drug use: Not on file     Comment: last used medical marijuana 2/2    Sexual activity: Not on file   Other Topics Concern    Not on file   Social History Narrative    Not on file     Social Determinants of Health     Financial Resource Strain:     Difficulty of Paying Living Expenses: Not on file   Food Insecurity:     Worried About [address] [employer] in the Last Year: Not on file    [address] in the Last Year: Not on file   Transportation Needs:     Lack of Transportation (Medical): Not on file    Lack of Transportation (Non-Medical):  Not on file   Physical Activity:     Days of Exercise per Week: Not on file    Minutes of Exercise per Session: Not on file   Stress:     Feeling of Stress : Not on file   Social Connections:     Frequency of Communication with Friends and Family: Not on file    Frequency of Social Gatherings with Friends and Family: Not on file    Attends [employer] Services: Not on file    Active Member of Clubs or Organizations: Not on file    Attends Club or Organization Meetings: Not on file    Marital Status: Not on file   Intimate Partner Violence:     Fear of Current or Ex-Partner: Not on file    Emotionally Abused: Not on file    Physically Abused: Not on file    Sexually Abused: Not on file   Housing Stability:     Unable to Pay for Housing in the Last Year: Not on file    Number of Places Lived in the Last Year: Not on file    Unstable Housing in the Last Year: Not on file       Family History:   History reviewed. No pertinent family history. REVIEW OF SYSTEMS:    Gen: Patient denies any lightheadedness or dizziness. No LOC or syncope. No fevers or chills. HEENT: No earache, sore throat or nasal congestion. Resp: Denies cough, hemoptysis or sputum production. Cardiac: Denies chest pain, SOB, diaphoresis or palpitations. GI:+ Lower abdominal pain. No nausea, vomiting, diarrhea or constipation. No melena or hematochezia. : + Scrotal edema and ecchymosis. No dysuria, hematuria or frequency. MSK: No extremity weakness, paralysis or paresthesias. PHYSICAL EXAM:    Vitals:  /64   Pulse 82   Temp 98.3 °F (36.8 °C)   Resp 16   SpO2 99%     General:  This is a 40 y.o. yo male who is alert and oriented in moderate distress secondary to abdominal distention and groin discomfort. HEENT:  Head is normocephalic and atraumatic, PERRLA, EOMI, mucus membranes moist with no pharyngeal erythema or exudate. Neck:  Supple with no carotid bruits, JVD or thyromegaly.   No cervical adenopathy  CV:  Regular rate and rhythm, no murmurs  Lungs:  Clear to auscultation bilaterally with no wheezes, rales or rhonchi  Abdomen:  + abdominal distention with positive fluid wave, bowel sounds present  : Marked distended scrotum and very uncomfortable with mild ecchymosis around the left groin  Extremities:  + Bilateral  upper thigh edema, peripheral pulses intact bilaterally  Neuro:  Cranial nerves II-XII grossly intact; motor and sensory function intact with no focal deficits  Skin:  No rashes, lesions or wounds      DATA:  CBC with Differential:    Lab Results   Component Value Date    WBC 11.7 02/22/2022    RBC 2.95 02/22/2022    HGB 8.1 02/22/2022    HCT 25.6 02/22/2022  02/22/2022    MCV 86.8 02/22/2022    MCH 27.5 02/22/2022    MCHC 31.6 02/22/2022    RDW 16.3 02/22/2022    SEGSPCT 61 02/20/2012    LYMPHOPCT 7.9 02/22/2022    MONOPCT 10.2 02/22/2022    MYELOPCT 1.7 01/11/2021    BASOPCT 0.3 02/22/2022    MONOSABS 1.19 02/22/2022    LYMPHSABS 0.93 02/22/2022    EOSABS 0.18 02/22/2022    BASOSABS 0.03 02/22/2022     CMP:    Lab Results   Component Value Date     02/22/2022    K 4.2 02/22/2022    K 2.9 02/20/2022     02/22/2022    CO2 21 02/22/2022    BUN 23 02/22/2022    CREATININE 1.2 02/22/2022    GFRAA >60 02/22/2022    LABGLOM >60 02/22/2022    GLUCOSE 107 02/22/2022    GLUCOSE 89 02/20/2012    PROT 6.2 02/22/2022    LABALBU 3.0 02/22/2022    LABALBU 3.4 02/18/2012    CALCIUM 8.1 02/22/2022    BILITOT 1.5 02/22/2022    ALKPHOS 109 02/22/2022    AST 38 02/22/2022    ALT 30 02/22/2022     Magnesium:    Lab Results   Component Value Date    MG 2.1 02/20/2022     Phosphorus:    Lab Results   Component Value Date    PHOS 1.9 05/11/2021     PT/INR:    Lab Results   Component Value Date    PROTIME 17.6 02/20/2022    PROTIME 12.3 02/17/2012    INR 1.5 02/20/2022     Troponin:    Lab Results   Component Value Date    TROPONINI <0.01 01/06/2021     U/A:    Lab Results   Component Value Date    COLORU Yellow 02/20/2022    PROTEINU Negative 02/20/2022    PHUR 6.0 02/20/2022    WBCUA NONE 02/20/2022    RBCUA NONE 02/20/2022    RBCUA 0-1 02/25/2013    MUCUS Present 08/14/2020    BACTERIA NONE SEEN 02/20/2022    CLARITYU Clear 02/20/2022    SPECGRAV 1.020 02/20/2022    LEUKOCYTESUR Negative 02/20/2022    UROBILINOGEN 0.2 02/20/2022    BILIRUBINUR Negative 02/20/2022    BLOODU Negative 02/20/2022    GLUCOSEU Negative 02/20/2022     ABG:  No results found for: PH, PCO2, PO2, HCO3, BE, THGB, TCO2, O2SAT  HgBA1c:    Lab Results   Component Value Date    LABA1C 4.9 01/09/2021     FLP:    Lab Results   Component Value Date    TRIG 56 02/21/2022    HDL 32 02/21/2022    LDLCALC 72 02/21/2022    LABVLDL 11 02/21/2022     TSH:    Lab Results   Component Value Date    TSH 5.110 02/21/2022     IRON:    Lab Results   Component Value Date    IRON 34 02/21/2022     LIPASE:    Lab Results   Component Value Date    LIPASE 70 02/20/2022       ASSESSMENT AND PLAN:      Patient Active Problem List    Diagnosis Date Noted    Ascites-massive-abdominal paracentesis 2/21 for 2710 cc-transudate 02/20/2022    Left inguinal hernia repair 2/7/2022      Acute kidney injury      Cirrhosis-secondary to hepatitis C 05/10/2021    Secondary esophageal varices      Abdominal pain 01/06/2021     Bilateral severe hydrocele 11/13/2020     Normocytic anemia with iron with iron deficiency  Borderline mild hypothyroidism 02/17/2012     Plan:  Patient admitted to general surgical floor  Home medications reviewed  IV fluids normal saline 20 KCl at 60 cc an hour  Consult urology  Consult GI  IR consulted for abdominal paracentesis  Serum iron, S60 folic acid-normal  Paracentesis panel ordered  Serum iron-34  Stop IV fluids since patient is eating and drinking and still with significant ascites and scrotal edema. CMP, CBC in a.m.       Tony Briggs DO, D.O.  2/22/2022  10:16 AM

## 2022-02-22 NOTE — PROGRESS NOTES
PROGRESS NOTE  [facility]  Dr. Jovanni Del Cid MD, Dr. David Cardenas MD, Dr Sejal Ramos, Dr. Adrienne Smith MD, Dr. Rafat Rangel, DO      Manual Jyothi III  40 y.o.  male    SUBJECTIVE: [place]. Feeling slightly better today, still with significant abdominal discomfort and groin/scrotal discomfort. Has not had a bowel movement in 3 days.     OBJECTIVE:     /64   Pulse 82   Temp 98.3 °F (36.8 °C)   Resp 16   SpO2 99%     Gen: NAD, AAO x 3  HEENT:PEERL, no icterus  Heart: RRR, no M/R/G  Lungs: CTAB  Abd.: less distended today,   Extr.: no C/C/E, no bruising         Lab Results   Component Value Date    WBC 11.7 02/22/2022    WBC 10.0 02/20/2022    WBC 4.2 02/11/2022    HGB 8.1 02/22/2022    HGB 7.9 02/21/2022    HGB 8.8 02/20/2022    HCT 25.6 02/22/2022    MCV 86.8 02/22/2022    RDW 16.3 02/22/2022     02/22/2022     02/20/2022    PLT 49 02/11/2022     Lab Results   Component Value Date     02/22/2022    K 4.2 02/22/2022    K 2.9 02/20/2022     02/22/2022    CO2 21 02/22/2022    BUN 23 02/22/2022    CREATININE 1.2 02/22/2022    CALCIUM 8.1 02/22/2022    PROT 6.2 02/22/2022    LABALBU 3.0 02/22/2022    LABALBU 3.4 02/18/2012    BILITOT 1.5 02/22/2022    BILITOT 1.7 02/21/2022    BILITOT 1.4 02/20/2022    ALKPHOS 109 02/22/2022    ALKPHOS 93 02/21/2022    ALKPHOS 100 02/20/2022    AST 38 02/22/2022    AST 41 02/21/2022    AST 49 02/20/2022    ALT 30 02/22/2022    ALT 33 02/21/2022    ALT 39 02/20/2022     Lab Results   Component Value Date    LIPASE 70 02/20/2022    LIPASE 91 02/11/2022    LIPASE 43 05/10/2021     Lab Results   Component Value Date    AMYLASE 49 02/22/2015         ASSESSMENT/PLAN:  # Ascites  # Abdominal pain  # Scrotal edema  - Abdominal and scrotal discomfort most likely related to volume overloaded state and ascites  - Patient has apparently been compliant with diuretics; lasix 40mg qd, spironolactone 100mg qd  - May be related to fluid and corticosteroid administration before/during recent surgery  - Underwent paracentesis 2/21/2022 with 2.7L removed  - Fluid studies without SBP, but with very low total protein; start SBP prophylaxis Cipro 500mg QD  - Continue diuretics as tolerated; monitor strict I&O    # Constipation  - Abdominal pain may also be worsened by constipation  - Start bowel regimen     # 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 Catie Kumar DO  GI Fellow   2/22/2022  10:34 AM

## 2022-02-22 NOTE — PROGRESS NOTES
Comprehensive Nutrition Assessment    Type and Reason for Visit:  Initial,Positive Nutrition Screen    Nutrition Recommendations/Plan: Continue Current Diet and monitor    Nutrition Assessment:  Pt w/ ascites s/p paracentesis. Hx cirrhosis/hep C/ETOH abuse/s/p recent L inguinal hernia repair. Pt consuming % of meals, will continue to monitor. Nutrition Related Findings:  -I&Os, perineal/BLE +2/scrotal edema, abd distended/taut, BS hypoactive, constipation, Na 131, bili 1.5, 2000 ml fluid restriction per order      Wounds:  Surgical Incision (abdomen- 3 lap sites w/ glue)       Current Nutrition Therapies:    ADULT DIET; Regular; Low Sodium (2 gm); 2000 ml    Anthropometric Measures:  · Height: 5' 8\" (172.7 cm)  · Current Body Weight: 187 lb (84.8 kg) (2/22 bed)   · Usual Body Weight:  (no actual wt hx per EMR, noted 164-185#)     · Ideal Body Weight: 154 lbs; % Ideal Body Weight 121.4 %   · BMI: 28.4  · BMI Categories: Overweight (BMI 25.0-29. 9)       Nutrition Diagnosis:   No nutrition diagnosis at this time    Nutrition Interventions:   Nutrition Education/Counseling:  Education initiated (discussed appetite w/ pt)   Coordination of Nutrition Care:  Continue to monitor while inpatient    Goals:  Pt to consume >75% of meals       Nutrition Monitoring and Evaluation:   Food/Nutrient Intake Outcomes:  Food and Nutrient Intake  Physical Signs/Symptoms Outcomes:  Biochemical Data,Constipation,GI Status,Fluid Status or Edema,Nutrition Focused Physical Findings,Skin,Weight     Discharge Planning:     Too soon to determine     Electronically signed by Bev Escobedo RD, LD on 2/22/22 at 1:17 PM EST  Contact: [phone]

## 2022-02-22 NOTE — PLAN OF CARE
Problem: Pain:  Goal: Pain level will decrease  Description: Pain level will decrease  Outcome: Met This Shift  Goal: Control of acute pain  Description: Control of acute pain  Outcome: Met This Shift  Goal: Control of chronic pain  Description: Control of chronic pain  Outcome: Met This Shift     Problem:  Activity:  Goal: Ability to tolerate increased activity will improve  Description: Ability to tolerate increased activity will improve  Outcome: Met This Shift

## 2022-02-22 NOTE — PROGRESS NOTES
GENERAL SURGERY  DAILY PROGRESS NOTE  2/22/2022    Chief Complaint   Patient presents with    Post-op Problem     hernia repair 2/7, seen here on 2/11 with scrotal swelling, abd bloating now and had left femoral pain from an injection,        Subjective:  Feeling better this morning, still with scrotal swelling, likely ascitic fluid. Objective:  /64   Pulse 82   Temp 98.3 °F (36.8 °C)   Resp 16   SpO2 99%     GENERAL:  Laying in bed, awake, alert, cooperative, no apparent distress  HEAD: Normocephalic, atraumatic  EYES: No sclera icterus, pupils equal  LUNGS:  No increased work of breathing  CARDIOVASCULAR:  RR  ABDOMEN:  Soft, non-tender, non-distended  EXTREMITIES: No edema or swelling  SKIN: Warm and dry    Assessment/Plan:  40 y.o. male with recent left inguinal hernia repair, large volume ascites s/p paracentesis     - continue diuresis   - continue diet as tolerated  - GI, IM, urology recs appreciated     Electronically signed by Toshia Merino MD on 2/22/2022 at 7:06 AM    As above  Home when ok with all others.      Margarett Cheadle, MD

## 2022-02-22 NOTE — PROGRESS NOTES
[facility] PROGRESS NOTE                                                                       2/22/2022          SUBJECTIVE:    Pt had paracentesis yesterday  Scrotum unchanged  Voiding well    OBJECTIVE:    /64   Pulse 82   Temp 98.3 °F (36.8 °C)   Resp 16   SpO2 99%     PHYSICAL EXAMINATION:  Skin: dry, without rashes  Respirations: non-labored, intact  Abdomen: soft, non-tender, non-distendedscrotumand penis edematous no intra scrotaal problems noted      Lab Results   Component Value Date    WBC 11.7 (H) 02/22/2022    HGB 8.1 (L) 02/22/2022    HCT 25.6 (L) 02/22/2022    MCV 86.8 02/22/2022     02/22/2022       Lab Results   Component Value Date    CREATININE 1.2 02/22/2022       No results found for: PSA    REVIEW OF SYSTEMS:    CONSTITUTIONAL: negative  HEENT: negative  HEMATOLOGIC: negative  ENDOCRINE: negative  RESPIRATORY: negative  CV: negative  GI: negative  NEURO: negative  ORTHOPEDICS: negative  PSYCHIATRIC: negative  : as above    PAST FAMILY HISTORY:  History reviewed. No pertinent family history.   PAST SOCIAL HISTORY:    Social History     Socioeconomic History    Marital status: Single     Spouse name: None    Number of children: None    Years of education: None    Highest education level: None   Occupational History    None   Tobacco Use    Smoking status: Former Smoker     Packs/day: 0.50    Smokeless tobacco: Never Used   Vaping Use    Vaping Use: Never used   Substance and Sexual Activity    Alcohol use: Not Currently     Comment: occasional-denies    Drug use: None     Comment: last used medical marijuana 2/2    Sexual activity: None   Other Topics Concern    None   Social History Narrative    None     Social Determinants of Health     Financial Resource Strain:     Difficulty of Paying Living Expenses: Not on file   Food Insecurity:     Worried About Running Out of Food in the Last Year: Not on file    Ran Out of Food in the Last Year: Not on file   Transportation Needs:     Lack of Transportation (Medical): Not on file    Lack of Transportation (Non-Medical):  Not on file   Physical Activity:     Days of Exercise per Week: Not on file    Minutes of Exercise per Session: Not on file   Stress:     Feeling of Stress : Not on file   Social Connections:     Frequency of Communication with Friends and Family: Not on file    Frequency of Social Gatherings with Friends and Family: Not on file    Attends [employer] Services: Not on file    Active Member of Clubs or Organizations: Not on file    Attends Club or Organization Meetings: Not on file    Marital Status: Not on file   Intimate Partner Violence:     Fear of Current or Ex-Partner: Not on file    Emotionally Abused: Not on file    Physically Abused: Not on file    Sexually Abused: Not on file   Housing Stability:     Unable to Pay for Housing in the Last Year: Not on file    Number of [place] in the Last Year: Not on file    Unstable Housing in the Last Year: Not on file       Scheduled Meds:   furosemide  40 mg Oral Daily    spironolactone  100 mg Oral Daily    nadolol  40 mg Oral Daily    sodium chloride flush  5-40 mL IntraVENous 2 times per day    enoxaparin  40 mg SubCUTAneous Daily    ciprofloxacin  500 mg Oral Daily    sodium chloride  500 mL IntraVENous Once     Continuous Infusions:   sodium chloride      0.9% NaCl with KCl 20 mEq 75 mL/hr at 02/21/22 1000     PRN Meds:.sodium chloride flush, sodium chloride, ondansetron **OR** ondansetron, oxyCODONE **OR** oxyCODONE, morphine **OR** morphine    ASSESSMENT:    Patient Active Problem List   Diagnosis    Cellulitis    UGI bleed    Abdominal pain    Secondary esophageal varices with bleeding ([address]    GIB (gastrointestinal bleeding)    Umbilical hernia without obstruction and without gangrene    Left inguinal hernia

## 2022-02-23 LAB
ALBUMIN SERPL-MCNC: 2.9 G/DL (ref 3.5–5.2)
ALP BLD-CCNC: 99 U/L (ref 40–129)
ALT SERPL-CCNC: 29 U/L (ref 0–40)
ANION GAP SERPL CALCULATED.3IONS-SCNC: 10 MMOL/L (ref 7–16)
ANION GAP SERPL CALCULATED.3IONS-SCNC: 7 MMOL/L (ref 7–16)
AST SERPL-CCNC: 38 U/L (ref 0–39)
BILIRUB SERPL-MCNC: 1.3 MG/DL (ref 0–1.2)
BUN BLDV-MCNC: 16 MG/DL (ref 6–20)
BUN BLDV-MCNC: 17 MG/DL (ref 6–20)
CALCIUM SERPL-MCNC: 8.4 MG/DL (ref 8.6–10.2)
CALCIUM SERPL-MCNC: 8.7 MG/DL (ref 8.6–10.2)
CHLORIDE BLD-SCNC: 100 MMOL/L (ref 98–107)
CHLORIDE BLD-SCNC: 102 MMOL/L (ref 98–107)
CO2: 21 MMOL/L (ref 22–29)
CO2: 26 MMOL/L (ref 22–29)
CREAT SERPL-MCNC: 1.1 MG/DL (ref 0.7–1.2)
CREAT SERPL-MCNC: 1.1 MG/DL (ref 0.7–1.2)
GFR AFRICAN AMERICAN: >60
GFR AFRICAN AMERICAN: >60
GFR NON-AFRICAN AMERICAN: >60 ML/MIN/1.73
GFR NON-AFRICAN AMERICAN: >60 ML/MIN/1.73
GLUCOSE BLD-MCNC: 118 MG/DL (ref 74–99)
GLUCOSE BLD-MCNC: 130 MG/DL (ref 74–99)
HCT VFR BLD CALC: 25.2 % (ref 37–54)
HEMOGLOBIN: 8.3 G/DL (ref 12.5–16.5)
POTASSIUM SERPL-SCNC: 3.8 MMOL/L (ref 3.5–5)
POTASSIUM SERPL-SCNC: 4.2 MMOL/L (ref 3.5–5)
SODIUM BLD-SCNC: 131 MMOL/L (ref 132–146)
SODIUM BLD-SCNC: 135 MMOL/L (ref 132–146)
TOTAL PROTEIN: 6.2 G/DL (ref 6.4–8.3)
URINE CULTURE, ROUTINE: NORMAL

## 2022-02-23 PROCEDURE — 36415 COLL VENOUS BLD VENIPUNCTURE: CPT

## 2022-02-23 PROCEDURE — 80048 BASIC METABOLIC PNL TOTAL CA: CPT

## 2022-02-23 PROCEDURE — 6360000002 HC RX W HCPCS: Performed by: SURGERY

## 2022-02-23 PROCEDURE — 85014 HEMATOCRIT: CPT

## 2022-02-23 PROCEDURE — 6360000002 HC RX W HCPCS: Performed by: INTERNAL MEDICINE

## 2022-02-23 PROCEDURE — 6370000000 HC RX 637 (ALT 250 FOR IP): Performed by: INTERNAL MEDICINE

## 2022-02-23 PROCEDURE — 6370000000 HC RX 637 (ALT 250 FOR IP): Performed by: SURGERY

## 2022-02-23 PROCEDURE — 2580000003 HC RX 258: Performed by: SURGERY

## 2022-02-23 PROCEDURE — 85018 HEMOGLOBIN: CPT

## 2022-02-23 PROCEDURE — 80053 COMPREHEN METABOLIC PANEL: CPT

## 2022-02-23 PROCEDURE — 1200000000 HC SEMI PRIVATE

## 2022-02-23 RX ADMIN — LACTULOSE 20 G: 20 SOLUTION ORAL at 08:45

## 2022-02-23 RX ADMIN — CIPROFLOXACIN HYDROCHLORIDE 500 MG: 500 TABLET, FILM COATED ORAL at 08:45

## 2022-02-23 RX ADMIN — MORPHINE SULFATE 2 MG: 2 INJECTION, SOLUTION INTRAMUSCULAR; INTRAVENOUS at 05:32

## 2022-02-23 RX ADMIN — MORPHINE SULFATE 2 MG: 2 INJECTION, SOLUTION INTRAMUSCULAR; INTRAVENOUS at 12:42

## 2022-02-23 RX ADMIN — NADOLOL 40 MG: 20 TABLET ORAL at 08:45

## 2022-02-23 RX ADMIN — FUROSEMIDE 40 MG: 10 INJECTION, SOLUTION INTRAMUSCULAR; INTRAVENOUS at 17:46

## 2022-02-23 RX ADMIN — OXYCODONE 10 MG: 5 TABLET ORAL at 15:12

## 2022-02-23 RX ADMIN — MORPHINE SULFATE 2 MG: 2 INJECTION, SOLUTION INTRAMUSCULAR; INTRAVENOUS at 16:26

## 2022-02-23 RX ADMIN — POLYETHYLENE GLYCOL 3350 17 G: 17 POWDER, FOR SOLUTION ORAL at 08:45

## 2022-02-23 RX ADMIN — OXYCODONE 10 MG: 5 TABLET ORAL at 08:45

## 2022-02-23 RX ADMIN — ENOXAPARIN SODIUM 40 MG: 100 INJECTION SUBCUTANEOUS at 08:45

## 2022-02-23 RX ADMIN — SODIUM CHLORIDE, PRESERVATIVE FREE 10 ML: 5 INJECTION INTRAVENOUS at 20:18

## 2022-02-23 RX ADMIN — MORPHINE SULFATE 2 MG: 2 INJECTION, SOLUTION INTRAMUSCULAR; INTRAVENOUS at 10:11

## 2022-02-23 RX ADMIN — OXYCODONE 10 MG: 5 TABLET ORAL at 23:21

## 2022-02-23 RX ADMIN — SODIUM CHLORIDE, PRESERVATIVE FREE 10 ML: 5 INJECTION INTRAVENOUS at 08:46

## 2022-02-23 RX ADMIN — MORPHINE SULFATE 2 MG: 2 INJECTION, SOLUTION INTRAMUSCULAR; INTRAVENOUS at 20:18

## 2022-02-23 RX ADMIN — MORPHINE SULFATE 4 MG: 4 INJECTION, SOLUTION INTRAMUSCULAR; INTRAVENOUS at 01:09

## 2022-02-23 RX ADMIN — OXYCODONE 10 MG: 5 TABLET ORAL at 02:57

## 2022-02-23 RX ADMIN — SPIRONOLACTONE 100 MG: 25 TABLET ORAL at 08:45

## 2022-02-23 RX ADMIN — FUROSEMIDE 40 MG: 10 INJECTION, SOLUTION INTRAMUSCULAR; INTRAVENOUS at 08:45

## 2022-02-23 ASSESSMENT — PAIN SCALES - GENERAL
PAINLEVEL_OUTOF10: 8
PAINLEVEL_OUTOF10: 6
PAINLEVEL_OUTOF10: 8
PAINLEVEL_OUTOF10: 8
PAINLEVEL_OUTOF10: 6
PAINLEVEL_OUTOF10: 8
PAINLEVEL_OUTOF10: 8
PAINLEVEL_OUTOF10: 7
PAINLEVEL_OUTOF10: 5
PAINLEVEL_OUTOF10: 7

## 2022-02-23 ASSESSMENT — PAIN DESCRIPTION - PROGRESSION: CLINICAL_PROGRESSION: NOT CHANGED

## 2022-02-23 NOTE — PROGRESS NOTES
PROGRESS NOTE  [facility]  Dr. Gianna Parry MD, Dr. Yohan Chung MD, Dr Zackary Gold, Dr. Al Titus MD, Dr. Pieter Zuñiga, DO      Lali Serranor III  40 y.o.  male    SUBJECTIVE: Filomena Jerry. Started IV lasix yesterday, patient reports brisk response.     OBJECTIVE:     /63   Pulse 77   Temp 98.6 °F (37 °C) (Oral)   Resp 18   Ht 5' 8\" (1.727 m)   Wt 187 lb (84.8 kg)   SpO2 98%   BMI 28.43 kg/m²     Gen: NAD, AAO x 3  HEENT:PEERL, no icterus  Heart: RRR, no M/R/G  Lungs: CTAB  Abd.: less distended today,   Extr.: no C/C/E, no bruising         Lab Results   Component Value Date    WBC 11.7 02/22/2022    WBC 10.0 02/20/2022    WBC 4.2 02/11/2022    HGB 8.3 02/23/2022    HGB 8.1 02/22/2022    HGB 7.9 02/21/2022    HCT 25.2 02/23/2022    MCV 86.8 02/22/2022    RDW 16.3 02/22/2022     02/22/2022     02/20/2022    PLT 49 02/11/2022     Lab Results   Component Value Date     02/23/2022    K 4.2 02/23/2022    K 2.9 02/20/2022     02/23/2022    CO2 21 02/23/2022    BUN 17 02/23/2022    CREATININE 1.1 02/23/2022    CALCIUM 8.4 02/23/2022    PROT 6.2 02/23/2022    LABALBU 2.9 02/23/2022    LABALBU 3.4 02/18/2012    BILITOT 1.3 02/23/2022    BILITOT 1.5 02/22/2022    BILITOT 1.7 02/21/2022    ALKPHOS 99 02/23/2022    ALKPHOS 109 02/22/2022    ALKPHOS 93 02/21/2022    AST 38 02/23/2022    AST 38 02/22/2022    AST 41 02/21/2022    ALT 29 02/23/2022    ALT 30 02/22/2022    ALT 33 02/21/2022     Lab Results   Component Value Date    LIPASE 70 02/20/2022    LIPASE 91 02/11/2022    LIPASE 43 05/10/2021     Lab Results   Component Value Date    AMYLASE 49 02/22/2015         ASSESSMENT/PLAN:  # Ascites  # Abdominal pain  # Scrotal edema  - Abdominal and scrotal discomfort most likely related to volume overloaded state and ascites  - Patient has apparently been compliant with diuretics; lasix 40mg qd, spironolactone 100mg qd  - May be related to fluid and corticosteroid administration before/during recent surgery  - Underwent paracentesis 2/21/2022 with 2.7L removed  - Fluid studies without SBP, but with very low total protein; start SBP prophylaxis Cipro 500mg QD  - Continue diuretics as tolerated; monitor strict I&O  - Started lasix 40mg IV BID; continue for today  - Check BMP twice daily    # Constipation  - Abdominal pain may also be worsened by constipation  - Continue bowel regimen     # 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 Toro King DO  GI Fellow   2/23/2022  10:30 AM

## 2022-02-23 NOTE — CARE COORDINATION
CM note: IV diuresis continues, paracentesis done on 2/21. No discharge needs and patient will return home when medically stable.

## 2022-02-23 NOTE — PROGRESS NOTES
GENERAL SURGERY  DAILY PROGRESS NOTE  2/23/2022    Chief Complaint   Patient presents with    Post-op Problem     hernia repair 2/7, seen here on 2/11 with scrotal swelling, abd bloating now and had left femoral pain from an injection,        Subjective:  Feeling better this morning, swelling much better after dose of lasix yesterday. Tolerated diet. Objective:  /63   Pulse 77   Temp 98.6 °F (37 °C) (Oral)   Resp 18   Ht 5' 8\" (1.727 m)   Wt 187 lb (84.8 kg)   SpO2 98%   BMI 28.43 kg/m²     GENERAL:  Laying in bed, awake, alert, cooperative, no apparent distress  HEAD: Normocephalic, atraumatic  EYES: No sclera icterus, pupils equal  LUNGS:  No increased work of breathing  CARDIOVASCULAR:  RR  ABDOMEN:  Soft, non-tender, mildly distended  EXTREMITIES: No edema or swelling  SKIN: Warm and dry    Assessment/Plan:  40 y.o. male with recent left inguinal hernia repair, large volume ascites s/p paracentesis     - continue diuresis per medicine team  - continue diet as tolerated  - discharge planning     Electronically signed by Vaibhav Hodge MD on 2/23/2022 at 6:36 AM    As above. Ok to discharge when ok with all others.

## 2022-02-23 NOTE — PROGRESS NOTES
Department of Internal Medicine        HISTORY OF PRESENT ILLNESS:      The patient is a 40 y.o. male who presents with abdominal pain. Patient has history having a left inguinal hernia repair completed 2/7 with patient having increasing pain since his surgery with scrotal edema and ecchymosis. Patient followed up with Dr. Fernanda Craig in the office with patient having the left abdominal wall injected with local pain medicine which had some relief. The pain has come back a few days after that. Patient's scrotal edema with which was present before in the ED is improved with the patient now having more abdominal distention and tenderness. Patient also complains of difficulty ambulating since the surgery. He denies any fever/chills, nausea/vomiting, chest pain, unusual shortness of breath or changes in bowel or urinary habits. CT the abdomen showed cirrhosis with portal vein hypertension, large volume ascites which is increased from previous CT with fluid distention of left inguinal hernia with prominent bilateral hydroceles and scrotal edema. Patient states he has a history of cirrhosis secondary to prior history of IV drug use and hepatitis C. Patient does follow-up with GI. Patient is not sure he is ever had abdominal paracentesis done before. Temperature is 98.8 with a heart rate of 90 and blood pressure 130/74. O2 sat 98% room air at rest.  BUN/creatinine in the ED initially was 29/1.4 potassium 2.9 and this morning BUN/creatinine 27/1.2. Patient has mild elevation of AST at 41 with a total bili 1.7 this morning. Hemoglobin 8.8 on admission repeat 7.9 with a WBC of 10. INR is 1.5 on admission. Urinalysis unremarkable. 2/22/2022  Patient seen examined on medical surgical floor. Patient feels a little bit better today but still with some abdominal discomfort and scrotal discomfort. He denies any chest pain, nausea/vomiting or unusual shortness of breath.   Abdominal paracentesis yesterday removed 4409 cc of clear fluid. Serum sodium 131 with BUN/creatinine of 23/1.2. Transaminases normal with total bili 1.5.  TSH is elevated at 5.1 with a free T4 is low normal at 0.99. WBC 11.7 with a hemoglobin 8.1. Serum iron 34. Paracentesis fluid had a LD H of only 22 and protein of only 0.3. Temperature was 90.3 with heart rate 82 blood pressure 118/64. O2 sat 99% room air at rest.  Urine output is adequate. General surgery, urology, GI notes reviewed. 2/23/2022  Patient seen examined on medical surgical floor. Patient was still with groin discomfort which is improving along with abdominal discomfort which is moderately improved. Patient denies any chest pain or unusual shortness of breath. BUN/creatinine 17/1.1. Fasting blood sugar one hundred thirty. Hemoglobin is 8.3. Temperature is 98.6 with a heart rate of seventy-seven blood pressure 113/63. O2 sat 98% on room air at rest.  Urine output is very good.     Past Medical History:    Past Medical History:   Diagnosis Date    Esophageal varices ([address]      Past Surgical History:    Past Surgical History:   Procedure Laterality Date    ENDOSCOPY, COLON, DIAGNOSTIC      HERNIA REPAIR      HERNIA REPAIR Left 2/7/2022    LAPAROSCOPIC LEFT INGUINAL HERNIA REPAIR WITH MESH POSSIBLE OPEN POSSIBLE BILATERAL performed by Rufina Jennings MD at [facility] N/A 9/15/2021    LAPAROSCOPIC POSSIBLE OPEN UMBILICAL HERNIA REPAIR WITH [facility] performed by Rufina Jennings MD at [address] [place] [address] 11/13/2020    EGD BIOPSY performed by Kevin Quiros MD at [address] [place] [address] N/A 1/6/2021    EGD BAND LIGATION performed by Remi Mariee MD at [facility] N/A 5/10/2021    EGD BAND LIGATION performed by Remi Mariee MD at [address]       Medications Prior to Admission:    @  Prior to Admission medications    Medication Sig Start Date End Date Taking? Authorizing Provider   nadolol (CORGARD) 40 MG tablet TAKE ONE TABLET BY MOUTH EVERY DAY AT BEDTIME  Patient not taking: Reported on 2/16/2022 12/2/21   Historical Provider, MD   furosemide (LASIX) 40 MG tablet TAKE ONE TABLET BY MOUTH EVERY DAY 2/5/22   Historical Provider, MD   ibuprofen (ADVIL;MOTRIN) 800 MG tablet Take 1 tablet by mouth every 6 hours as needed for Pain 2/16/22   Felicita Cheung MD   NONFORMULARY daily as needed Medical marijuana edibles or vapes     Historical Provider, MD   spironolactone (ALDACTONE) 100 MG tablet Take 100 mg by mouth daily    Historical Provider, MD       Allergies:  Pcn [penicillins]    Social History:   Social History     Socioeconomic History    Marital status: Single     Spouse name: Not on file    Number of children: Not on file    Years of education: Not on file    Highest education level: Not on file   Occupational History    Not on file   Tobacco Use    Smoking status: Former Smoker     Packs/day: 0.50    Smokeless tobacco: Never Used   Vaping Use    Vaping Use: Never used   Substance and Sexual Activity    Alcohol use: Not Currently     Comment: occasional-denies    Drug use: Not on file     Comment: last used medical marijuana 2/2    Sexual activity: Not on file   Other Topics Concern    Not on file   Social History Narrative    Not on file     Social Determinants of Health     Financial Resource Strain:     Difficulty of Paying Living Expenses: Not on file   Food Insecurity:     Worried About [address] in the Last Year: Not on file    [address] in the Last Year: Not on file   Transportation Needs:     Lack of Transportation (Medical): Not on file    Lack of Transportation (Non-Medical):  Not on file   Physical Activity:     Days of Exercise per Week: Not on file    Minutes of Exercise per Session: Not on file   Stress:     Feeling of Stress : Not on file   Social Connections:     Frequency of Communication with Friends and Family: Not on file    Frequency of Social Gatherings with Friends and Family: Not on file    Attends Sikh Services: Not on file    Active Member of Clubs or Organizations: Not on file    Attends Club or Organization Meetings: Not on file    Marital Status: Not on file   Intimate Partner Violence:     Fear of Current or Ex-Partner: Not on file    Emotionally Abused: Not on file    Physically Abused: Not on file    Sexually Abused: Not on file   Housing Stability:     Unable to Pay for Housing in the Last Year: Not on file    Number of [place] in the Last Year: Not on file    Unstable Housing in the Last Year: Not on file       Family History:   History reviewed. No pertinent family history. REVIEW OF SYSTEMS:    Gen: Patient denies any lightheadedness or dizziness. No LOC or syncope. No fevers or chills. HEENT: No earache, sore throat or nasal congestion. Resp: Denies cough, hemoptysis or sputum production. Cardiac: Denies chest pain, SOB, diaphoresis or palpitations. GI:+ Lower abdominal pain. No nausea, vomiting, diarrhea or constipation. No melena or hematochezia. : + Scrotal edema and ecchymosis. No dysuria, hematuria or frequency. MSK: No extremity weakness, paralysis or paresthesias. PHYSICAL EXAM:    Vitals:  /63   Pulse 77   Temp 98.6 °F (37 °C) (Oral)   Resp 18   Ht 5' 8\" (1.727 m)   Wt 187 lb (84.8 kg)   SpO2 98%   BMI 28.43 kg/m²     General:  This is a 40 y.o. yo male who is alert and oriented in moderate distress secondary to abdominal distention and groin discomfort. HEENT:  Head is normocephalic and atraumatic, PERRLA, EOMI, mucus membranes moist with no pharyngeal erythema or exudate. Neck:  Supple with no carotid bruits, JVD or thyromegaly.   No cervical adenopathy  CV:  Regular rate and rhythm, no murmurs  Lungs:  Clear to auscultation bilaterally with no wheezes, rales or rhonchi  Abdomen:  + abdominal distention with positive fluid wave, bowel sounds present  : Marked distended scrotum and very uncomfortable with mild ecchymosis around the left groin  Extremities:  + Bilateral  upper thigh edema, peripheral pulses intact bilaterally  Neuro:  Cranial nerves II-XII grossly intact; motor and sensory function intact with no focal deficits  Skin:  No rashes, lesions or wounds      DATA:  CBC with Differential:    Lab Results   Component Value Date    WBC 11.7 02/22/2022    RBC 2.95 02/22/2022    HGB 8.3 02/23/2022    HCT 25.2 02/23/2022     02/22/2022    MCV 86.8 02/22/2022    MCH 27.5 02/22/2022    MCHC 31.6 02/22/2022    RDW 16.3 02/22/2022    SEGSPCT 61 02/20/2012    LYMPHOPCT 7.9 02/22/2022    MONOPCT 10.2 02/22/2022    MYELOPCT 1.7 01/11/2021    BASOPCT 0.3 02/22/2022    MONOSABS 1.19 02/22/2022    LYMPHSABS 0.93 02/22/2022    EOSABS 0.18 02/22/2022    BASOSABS 0.03 02/22/2022     CMP:    Lab Results   Component Value Date     02/23/2022    K 4.2 02/23/2022    K 2.9 02/20/2022     02/23/2022    CO2 21 02/23/2022    BUN 17 02/23/2022    CREATININE 1.1 02/23/2022    GFRAA >60 02/23/2022    LABGLOM >60 02/23/2022    GLUCOSE 130 02/23/2022    GLUCOSE 89 02/20/2012    PROT 6.2 02/23/2022    LABALBU 2.9 02/23/2022    LABALBU 3.4 02/18/2012    CALCIUM 8.4 02/23/2022    BILITOT 1.3 02/23/2022    ALKPHOS 99 02/23/2022    AST 38 02/23/2022    ALT 29 02/23/2022     Magnesium:    Lab Results   Component Value Date    MG 2.1 02/20/2022     Phosphorus:    Lab Results   Component Value Date    PHOS 1.9 05/11/2021     PT/INR:    Lab Results   Component Value Date    PROTIME 17.6 02/20/2022    PROTIME 12.3 02/17/2012    INR 1.5 02/20/2022     Troponin:    Lab Results   Component Value Date    TROPONINI <0.01 01/06/2021     U/A:    Lab Results   Component Value Date    COLORU Yellow 02/20/2022    PROTEINU Negative 02/20/2022    PHUR 6.0 02/20/2022    WBCUA NONE 02/20/2022    RBCUA NONE 02/20/2022    RBCUA 0-1 02/25/2013 MUCUS Present 08/14/2020    BACTERIA NONE SEEN 02/20/2022    CLARITYU Clear 02/20/2022    SPECGRAV 1.020 02/20/2022    LEUKOCYTESUR Negative 02/20/2022    UROBILINOGEN 0.2 02/20/2022    BILIRUBINUR Negative 02/20/2022    BLOODU Negative 02/20/2022    GLUCOSEU Negative 02/20/2022     ABG:  No results found for: PH, PCO2, PO2, HCO3, BE, THGB, TCO2, O2SAT  HgBA1c:    Lab Results   Component Value Date    LABA1C 4.9 01/09/2021     FLP:    Lab Results   Component Value Date    TRIG 56 02/21/2022    HDL 32 02/21/2022    LDLCALC 72 02/21/2022    LABVLDL 11 02/21/2022     TSH:    Lab Results   Component Value Date    TSH 5.110 02/21/2022     IRON:    Lab Results   Component Value Date    IRON 34 02/21/2022     LIPASE:    Lab Results   Component Value Date    LIPASE 70 02/20/2022       ASSESSMENT AND PLAN:      Patient Active Problem List    Diagnosis Date Noted    Ascites-massive-abdominal paracentesis 2/21 for 2710 cc-transudate 02/20/2022    Left inguinal hernia repair 2/7/2022      Acute kidney injury      Cirrhosis-secondary to hepatitis C 05/10/2021    Secondary esophageal varices      Abdominal pain 01/06/2021     Bilateral severe hydrocele 11/13/2020     Normocytic anemia with iron with iron deficiency  Borderline mild hypothyroidism 02/17/2012     Plan:  Patient admitted to general surgical floor  Home medications reviewed  IV fluids normal saline 20 KCl at 60 cc an hour  Consult urology  Consult GI  IR consulted for abdominal paracentesis  Serum iron, F14 folic acid-normal  Paracentesis panel ordered  Serum iron-34  Stop IV fluids since patient is eating and drinking and still with significant ascites and scrotal edema. Lasix 40 mg IV push twice daily    Discharge home when okay with general surgery/GI    CMP, CBC in a.m.       Tony Briggs DO, D.SELENE  2/23/2022  9:40 AM

## 2022-02-23 NOTE — PROGRESS NOTES
2/23/2022 8:40 AM  Service: Urology  Group: [facility] ([facility])    Kirti Nazario III  [id number]    Subjective:    Awake and alert  Feeling a bit better  Scrotal swelling slightly improving, still has left groin pain   Paracentesis was done 2 days ago   He is voiding comfortably     Review of Systems  Constitutional: No fever or chills   Respiratory: negative for cough and hemoptysis  Cardiovascular: negative for chest pain and dyspnea  Gastrointestinal: + abdominal soreness   : See above  Derm: negative for rash and skin lesion(s)  Neurological: negative for seizures and tremors  Musculoskeletal: Negative    Psychiatric: Negative   All other reviews are negative      Scheduled Meds:   polyethylene glycol  17 g Oral Daily    lactulose  20 g Oral Daily    furosemide  40 mg IntraVENous BID    spironolactone  100 mg Oral Daily    nadolol  40 mg Oral Daily    sodium chloride flush  5-40 mL IntraVENous 2 times per day    enoxaparin  40 mg SubCUTAneous Daily    ciprofloxacin  500 mg Oral Daily    sodium chloride  500 mL IntraVENous Once       Objective:  Vitals:    02/23/22 0526   BP: 113/63   Pulse: 77   Resp: 18   Temp: 98.6 °F (37 °C)   SpO2: 98%         Allergies: Pcn [penicillins]    General Appearance: alert and oriented to person, place and time and in no acute distress  Skin: no rash or erythema  Head: normocephalic and atraumatic  Pulmonary/Chest: normal air movement, no respiratory distress  Abdomen: soft, tender, incisions clean dry intact  Genitourinary: no mcclellan, moderate scrotal swelling secondary to ascites (left>right) without any evidence of abscess formation   Extremities: no cyanosis, clubbing or edema         Labs:     Recent Labs     02/23/22  0459   *   K 4.2      CO2 21*   BUN 17   CREATININE 1.1   GLUCOSE 130*   CALCIUM 8.4*       Lab Results   Component Value Date    HGB 8.3 02/23/2022    HCT 25.2 02/23/2022       No results found for: PSA      Assessment/Plan:  Scrotal swelling secondary to ascites   Bilateral hydroceles   Hx of left inguinal hernia s/p repair on 2/7    Cont the diuresis per primary   Cont the scrotal ice and elevation  Recommend scrotal support as well with compressive underwear or jock strap   No further  interventions are planned at this time  Please call with any further questions or concerns  Thank you for allowing us to participate in his care       CHRIS Chavarria - CNP   [facility]  [facility]rology    Agree with above  Agree with the plan and treatment    [facility], DO

## 2022-02-24 VITALS
OXYGEN SATURATION: 98 % | WEIGHT: 187 LBS | SYSTOLIC BLOOD PRESSURE: 115 MMHG | HEART RATE: 77 BPM | TEMPERATURE: 99 F | HEIGHT: 68 IN | DIASTOLIC BLOOD PRESSURE: 65 MMHG | BODY MASS INDEX: 28.34 KG/M2 | RESPIRATION RATE: 18 BRPM

## 2022-02-24 LAB
ALBUMIN SERPL-MCNC: 3.1 G/DL (ref 3.5–5.2)
ALP BLD-CCNC: 95 U/L (ref 40–129)
ALT SERPL-CCNC: 30 U/L (ref 0–40)
ANION GAP SERPL CALCULATED.3IONS-SCNC: 6 MMOL/L (ref 7–16)
AST SERPL-CCNC: 41 U/L (ref 0–39)
BASOPHILS ABSOLUTE: 0.01 E9/L (ref 0–0.2)
BASOPHILS RELATIVE PERCENT: 0.1 % (ref 0–2)
BILIRUB SERPL-MCNC: 1 MG/DL (ref 0–1.2)
BUN BLDV-MCNC: 16 MG/DL (ref 6–20)
CALCIUM SERPL-MCNC: 8.7 MG/DL (ref 8.6–10.2)
CHLORIDE BLD-SCNC: 100 MMOL/L (ref 98–107)
CO2: 26 MMOL/L (ref 22–29)
CREAT SERPL-MCNC: 1.1 MG/DL (ref 0.7–1.2)
EOSINOPHILS ABSOLUTE: 0.23 E9/L (ref 0.05–0.5)
EOSINOPHILS RELATIVE PERCENT: 2.8 % (ref 0–6)
GFR AFRICAN AMERICAN: >60
GFR NON-AFRICAN AMERICAN: >60 ML/MIN/1.73
GLUCOSE BLD-MCNC: 129 MG/DL (ref 74–99)
HCT VFR BLD CALC: 23.9 % (ref 37–54)
HEMOGLOBIN: 7.8 G/DL (ref 12.5–16.5)
IMMATURE GRANULOCYTES #: 0.12 E9/L
IMMATURE GRANULOCYTES %: 1.5 % (ref 0–5)
LYMPHOCYTES ABSOLUTE: 0.92 E9/L (ref 1.5–4)
LYMPHOCYTES RELATIVE PERCENT: 11.2 % (ref 20–42)
MCH RBC QN AUTO: 27.9 PG (ref 26–35)
MCHC RBC AUTO-ENTMCNC: 32.6 % (ref 32–34.5)
MCV RBC AUTO: 85.4 FL (ref 80–99.9)
MONOCYTES ABSOLUTE: 1.26 E9/L (ref 0.1–0.95)
MONOCYTES RELATIVE PERCENT: 15.4 % (ref 2–12)
NEUTROPHILS ABSOLUTE: 5.66 E9/L (ref 1.8–7.3)
NEUTROPHILS RELATIVE PERCENT: 69 % (ref 43–80)
PDW BLD-RTO: 16 FL (ref 11.5–15)
PLATELET # BLD: 118 E9/L (ref 130–450)
PMV BLD AUTO: 9 FL (ref 7–12)
POTASSIUM SERPL-SCNC: 4 MMOL/L (ref 3.5–5)
RBC # BLD: 2.8 E12/L (ref 3.8–5.8)
SODIUM BLD-SCNC: 132 MMOL/L (ref 132–146)
TOTAL PROTEIN: 6.3 G/DL (ref 6.4–8.3)
WBC # BLD: 8.2 E9/L (ref 4.5–11.5)

## 2022-02-24 PROCEDURE — 36415 COLL VENOUS BLD VENIPUNCTURE: CPT

## 2022-02-24 PROCEDURE — 6360000002 HC RX W HCPCS: Performed by: INTERNAL MEDICINE

## 2022-02-24 PROCEDURE — 85025 COMPLETE CBC W/AUTO DIFF WBC: CPT

## 2022-02-24 PROCEDURE — 80053 COMPREHEN METABOLIC PANEL: CPT

## 2022-02-24 PROCEDURE — 6370000000 HC RX 637 (ALT 250 FOR IP): Performed by: INTERNAL MEDICINE

## 2022-02-24 PROCEDURE — 87522 HEPATITIS C REVRS TRNSCRPJ: CPT

## 2022-02-24 PROCEDURE — 6360000002 HC RX W HCPCS: Performed by: SURGERY

## 2022-02-24 PROCEDURE — 6370000000 HC RX 637 (ALT 250 FOR IP): Performed by: SURGERY

## 2022-02-24 RX ORDER — FUROSEMIDE 40 MG/1
40 TABLET ORAL DAILY
Status: DISCONTINUED | OUTPATIENT
Start: 2022-02-25 | End: 2022-02-24 | Stop reason: HOSPADM

## 2022-02-24 RX ORDER — CIPROFLOXACIN 500 MG/1
500 TABLET, FILM COATED ORAL
Qty: 10 TABLET | Refills: 0 | Status: SHIPPED | OUTPATIENT
Start: 2022-02-25 | End: 2022-03-07

## 2022-02-24 RX ADMIN — LACTULOSE 20 G: 20 SOLUTION ORAL at 08:06

## 2022-02-24 RX ADMIN — MORPHINE SULFATE 2 MG: 2 INJECTION, SOLUTION INTRAMUSCULAR; INTRAVENOUS at 00:52

## 2022-02-24 RX ADMIN — ENOXAPARIN SODIUM 40 MG: 100 INJECTION SUBCUTANEOUS at 08:30

## 2022-02-24 RX ADMIN — SPIRONOLACTONE 100 MG: 25 TABLET ORAL at 08:05

## 2022-02-24 RX ADMIN — FUROSEMIDE 40 MG: 10 INJECTION, SOLUTION INTRAMUSCULAR; INTRAVENOUS at 08:07

## 2022-02-24 RX ADMIN — POLYETHYLENE GLYCOL 3350 17 G: 17 POWDER, FOR SOLUTION ORAL at 08:07

## 2022-02-24 RX ADMIN — OXYCODONE 5 MG: 5 TABLET ORAL at 08:06

## 2022-02-24 RX ADMIN — NADOLOL 40 MG: 20 TABLET ORAL at 08:06

## 2022-02-24 RX ADMIN — OXYCODONE 5 MG: 5 TABLET ORAL at 16:12

## 2022-02-24 RX ADMIN — MORPHINE SULFATE 2 MG: 2 INJECTION, SOLUTION INTRAMUSCULAR; INTRAVENOUS at 03:29

## 2022-02-24 RX ADMIN — CIPROFLOXACIN HYDROCHLORIDE 500 MG: 500 TABLET, FILM COATED ORAL at 08:30

## 2022-02-24 RX ADMIN — OXYCODONE 5 MG: 5 TABLET ORAL at 12:09

## 2022-02-24 ASSESSMENT — PAIN SCALES - GENERAL
PAINLEVEL_OUTOF10: 8
PAINLEVEL_OUTOF10: 8
PAINLEVEL_OUTOF10: 3
PAINLEVEL_OUTOF10: 7
PAINLEVEL_OUTOF10: 2
PAINLEVEL_OUTOF10: 5
PAINLEVEL_OUTOF10: 8
PAINLEVEL_OUTOF10: 8

## 2022-02-24 NOTE — PROGRESS NOTES
and corticosteroid administration before/during recent surgery  - Underwent paracentesis 2/21/2022 with 2.7L removed  - Fluid studies without SBP, but with very low total protein; continue SBP prophylaxis Cipro 500mg QD  - Discontinue IV lasix; resume home regimen  - Okay for discharge from GI POV    # Constipation  - Abdominal pain may also be worsened by constipation  - Continue bowel regimen     # Cirrhosis secondary to alcohol use and hepatitis C  - Reportedly completed hepatitis C treatment, but did not follow up for SVR12  - MELD-Na 16  - No hepatic encephalopathy  - No evidence of bleeding  - Did have recent EGD with banding of esophageal varices    OK to be d/c from GI POV. Follow in office in 1-2 weeks or as needed - pt to call for appointment - [phone].     Will discuss with Dr. Anastasia Iyer,   GI Fellow   2/24/2022  11:27 AM

## 2022-02-24 NOTE — PROGRESS NOTES
GENERAL SURGERY  DAILY PROGRESS NOTE  2/24/2022    Chief Complaint   Patient presents with    Post-op Problem     hernia repair 2/7, seen here on 2/11 with scrotal swelling, abd bloating now and had left femoral pain from an injection,        Subjective:  No issues overnight. No complaints.     Objective:  /65   Pulse 77   Temp 99 °F (37.2 °C) (Oral)   Resp 18   Ht 5' 8\" (1.727 m)   Wt 187 lb (84.8 kg)   SpO2 98%   BMI 28.43 kg/m²     GENERAL:  Laying in bed, awake, alert, cooperative, no apparent distress  HEAD: Normocephalic, atraumatic  EYES: No sclera icterus, pupils equal  LUNGS:  No increased work of breathing  CARDIOVASCULAR:  RR  ABDOMEN:  Soft, non-tender, mildly distended, scrotal edema improving  SKIN: Warm and dry    Assessment/Plan:  40 y.o. male with recent left inguinal hernia repair, large volume ascites s/p paracentesis     Decrease pain meds, stopped IV  Diet as tolerated  OK for discharge from surgery POV -- discharge when ok with others    [facility],   Surgery Resident PGY-4  2/24/2022  11:47 AM    As above  Discharge home

## 2022-02-24 NOTE — PROGRESS NOTES
Department of Internal Medicine        HISTORY OF PRESENT ILLNESS:      The patient is a 40 y.o. male who presents with abdominal pain. Patient has history having a left inguinal hernia repair completed 2/7 with patient having increasing pain since his surgery with scrotal edema and ecchymosis. Patient followed up with Dr. Duran Watters in the office with patient having the left abdominal wall injected with local pain medicine which had some relief. The pain has come back a few days after that. Patient's scrotal edema with which was present before in the ED is improved with the patient now having more abdominal distention and tenderness. Patient also complains of difficulty ambulating since the surgery. He denies any fever/chills, nausea/vomiting, chest pain, unusual shortness of breath or changes in bowel or urinary habits. CT the abdomen showed cirrhosis with portal vein hypertension, large volume ascites which is increased from previous CT with fluid distention of left inguinal hernia with prominent bilateral hydroceles and scrotal edema. Patient states he has a history of cirrhosis secondary to prior history of IV drug use and hepatitis C. Patient does follow-up with GI. Patient is not sure he is ever had abdominal paracentesis done before. Temperature is 98.8 with a heart rate of 90 and blood pressure 130/74. O2 sat 98% room air at rest.  BUN/creatinine in the ED initially was 29/1.4 potassium 2.9 and this morning BUN/creatinine 27/1.2. Patient has mild elevation of AST at 41 with a total bili 1.7 this morning. Hemoglobin 8.8 on admission repeat 7.9 with a WBC of 10. INR is 1.5 on admission. Urinalysis unremarkable. 2/22/2022  Patient seen examined on medical surgical floor. Patient feels a little bit better today but still with some abdominal discomfort and scrotal discomfort. He denies any chest pain, nausea/vomiting or unusual shortness of breath.   Abdominal paracentesis yesterday removed 9493 cc of clear fluid. Serum sodium 131 with BUN/creatinine of 23/1.2. Transaminases normal with total bili 1.5.  TSH is elevated at 5.1 with a free T4 is low normal at 0.99. WBC 11.7 with a hemoglobin 8.1. Serum iron 34. Paracentesis fluid had a LD H of only 22 and protein of only 0.3. Temperature was 90.3 with heart rate 82 blood pressure 118/64. O2 sat 99% room air at rest.  Urine output is adequate. General surgery, urology, GI notes reviewed. 2/23/2022  Patient seen examined on medical surgical floor. Patient was still with groin discomfort which is improving along with abdominal discomfort which is moderately improved. Patient denies any chest pain or unusual shortness of breath. BUN/creatinine 17/1.1. Fasting blood sugar one hundred thirty. Hemoglobin is 8.3. Temperature is 98.6 with a heart rate of seventy-seven blood pressure 113/63. O2 sat 98% on room air at rest.  Urine output is very good. 2/24/2022  Patient seen examined on medical surgical floor. Patient slowly feeling better. Patient states that his abdomen and scrotum is markedly improved with the weight testicle/scrotum about back to baseline in the left still markedly swollen. The edema in the upper thighs improved in his abdomen is moderately improved. He denies any chest pain, nausea/vomiting, unusual shortness of breath. BUN/creatinine 16/1.1 with essentially normal transaminase with albumin 3.1 total bili 1.0. WBC 8.2 with hemoglobin 7.8. Temperature 99.3 with heart rate of 77 blood pressure 115/65. O2 sat 90% room air at rest.  Urine output is very good ranging from 750-2600 cc a shift.      Past Medical History:    Past Medical History:   Diagnosis Date    Esophageal varices ([address]      Past Surgical History:    Past Surgical History:   Procedure Laterality Date    ENDOSCOPY, COLON, DIAGNOSTIC      HERNIA REPAIR      HERNIA REPAIR Left 2/7/2022    LAPAROSCOPIC LEFT INGUINAL HERNIA REPAIR WITH MESH POSSIBLE OPEN POSSIBLE BILATERAL performed by Lamont Lou MD at [facility] N/A 9/15/2021    LAPAROSCOPIC POSSIBLE OPEN UMBILICAL HERNIA REPAIR WITH [address] [facility] performed by Lamont Lou MD at [facility] 11/13/2020    EGD BIOPSY performed by Seema Conde MD at [address] [place] [address] N/A 1/6/2021    EGD BAND LIGATION performed by Randi Banegas MD at [address] [place] [address] N/A 5/10/2021    EGD BAND LIGATION performed by Randi Banegas MD at [address]       Medications Prior to Admission:    @  Prior to Admission medications    Medication Sig Start Date End Date Taking?  Authorizing Provider   nadolol (CORGARD) 40 MG tablet TAKE ONE TABLET BY MOUTH EVERY DAY AT BEDTIME  Patient not taking: Reported on 2/16/2022 12/2/21   Historical Provider, MD   furosemide (LASIX) 40 MG tablet TAKE ONE TABLET BY MOUTH EVERY DAY 2/5/22   Historical Provider, MD   ibuprofen (ADVIL;MOTRIN) 800 MG tablet Take 1 tablet by mouth every 6 hours as needed for Pain 2/16/22   Lebron Vázquez MD   NONFORMULARY daily as needed Medical marijuana edibles or vapes     Historical Provider, MD   spironolactone (ALDACTONE) 100 MG tablet Take 100 mg by mouth daily    Historical Provider, MD       Allergies:  Pcn [penicillins]    Social History:   Social History     Socioeconomic History    Marital status: Single     Spouse name: Not on file    Number of children: Not on file    Years of education: Not on file    Highest education level: Not on file   Occupational History    Not on file   Tobacco Use    Smoking status: Former Smoker     Packs/day: 0.50    Smokeless tobacco: Never Used   Vaping Use    Vaping Use: Never used   Substance and Sexual Activity    Alcohol use: Not Currently     Comment: occasional-denies    Drug use: Not on file     Comment: last used medical marijuana 2/2    Sexual activity: Not on file Other Topics Concern    Not on file   Social History Narrative    Not on file     Social Determinants of Health     Financial Resource Strain:     Difficulty of Paying Living Expenses: Not on file   Food Insecurity:     Worried About Running Out of Food in the Last Year: Not on file    Luciana of Food in the Last Year: Not on file   Transportation Needs:     Lack of Transportation (Medical): Not on file    Lack of Transportation (Non-Medical): Not on file   Physical Activity:     Days of Exercise per Week: Not on file    Minutes of Exercise per Session: Not on file   Stress:     Feeling of Stress : Not on file   Social Connections:     Frequency of Communication with Friends and Family: Not on file    Frequency of Social Gatherings with Friends and Family: Not on file    Attends Scientologist Services: Not on file    Active Member of [address] [place] [address] or Organizations: Not on file    Attends Club or Organization Meetings: Not on file    Marital Status: Not on file   Intimate Partner Violence:     Fear of Current or Ex-Partner: Not on file    Emotionally Abused: Not on file    Physically Abused: Not on file    Sexually Abused: Not on file   Housing Stability:     Unable to Pay for Housing in the Last Year: Not on file    Number of [place] in the Last Year: Not on file    Unstable Housing in the Last Year: Not on file       Family History:   History reviewed. No pertinent family history. REVIEW OF SYSTEMS:    Gen: Patient denies any lightheadedness or dizziness. No LOC or syncope. No fevers or chills. HEENT: No earache, sore throat or nasal congestion. Resp: Denies cough, hemoptysis or sputum production. Cardiac: Denies chest pain, SOB, diaphoresis or palpitations. GI:+ Lower abdominal pain. No nausea, vomiting, diarrhea or constipation. No melena or hematochezia. : + Scrotal edema and ecchymosis. No dysuria, hematuria or frequency.     MSK: No extremity weakness, paralysis or paresthesias. PHYSICAL EXAM:    Vitals:  /65   Pulse 77   Temp 99 °F (37.2 °C) (Oral)   Resp 18   Ht 5' 8\" (1.727 m)   Wt 187 lb (84.8 kg)   SpO2 98%   BMI 28.43 kg/m²     General:  This is a 40 y.o. yo male who is alert and oriented in moderate distress secondary to abdominal distention and groin discomfort. HEENT:  Head is normocephalic and atraumatic, PERRLA, EOMI, mucus membranes moist with no pharyngeal erythema or exudate. Neck:  Supple with no carotid bruits, JVD or thyromegaly.   No cervical adenopathy  CV:  Regular rate and rhythm, no murmurs  Lungs:  Clear to auscultation bilaterally with no wheezes, rales or rhonchi  Abdomen:  + Abdomen is flatter but still with positive fluid wave, bowel sounds present  : + distended scrotum with mild ecchymosis around the left groin  Extremities:  + Bilateral  upper thigh edema which has improved, peripheral pulses intact bilaterally  Neuro:  Cranial nerves II-XII grossly intact; motor and sensory function intact with no focal deficits  Skin:  No rashes, lesions or wounds      DATA:  CBC with Differential:    Lab Results   Component Value Date    WBC 8.2 02/24/2022    RBC 2.80 02/24/2022    HGB 7.8 02/24/2022    HCT 23.9 02/24/2022     02/24/2022    MCV 85.4 02/24/2022    MCH 27.9 02/24/2022    MCHC 32.6 02/24/2022    RDW 16.0 02/24/2022    SEGSPCT 61 02/20/2012    LYMPHOPCT 11.2 02/24/2022    MONOPCT 15.4 02/24/2022    MYELOPCT 1.7 01/11/2021    BASOPCT 0.1 02/24/2022    MONOSABS 1.26 02/24/2022    LYMPHSABS 0.92 02/24/2022    EOSABS 0.23 02/24/2022    BASOSABS 0.01 02/24/2022     CMP:    Lab Results   Component Value Date     02/24/2022    K 4.0 02/24/2022    K 2.9 02/20/2022     02/24/2022    CO2 26 02/24/2022    BUN 16 02/24/2022    CREATININE 1.1 02/24/2022    GFRAA >60 02/24/2022    LABGLOM >60 02/24/2022    GLUCOSE 129 02/24/2022    GLUCOSE 89 02/20/2012    PROT 6.3 02/24/2022    LABALBU 3.1 02/24/2022    LABALBU 3.4 02/18/2012    CALCIUM 8.7 02/24/2022    BILITOT 1.0 02/24/2022    ALKPHOS 95 02/24/2022    AST 41 02/24/2022    ALT 30 02/24/2022     Magnesium:    Lab Results   Component Value Date    MG 2.1 02/20/2022     Phosphorus:    Lab Results   Component Value Date    PHOS 1.9 05/11/2021     PT/INR:    Lab Results   Component Value Date    PROTIME 17.6 02/20/2022    PROTIME 12.3 02/17/2012    INR 1.5 02/20/2022     Troponin:    Lab Results   Component Value Date    TROPONINI <0.01 01/06/2021     U/A:    Lab Results   Component Value Date    COLORU Yellow 02/20/2022    PROTEINU Negative 02/20/2022    PHUR 6.0 02/20/2022    WBCUA NONE 02/20/2022    RBCUA NONE 02/20/2022    RBCUA 0-1 02/25/2013    MUCUS Present 08/14/2020    BACTERIA NONE SEEN 02/20/2022    CLARITYU Clear 02/20/2022    SPECGRAV 1.020 02/20/2022    LEUKOCYTESUR Negative 02/20/2022    UROBILINOGEN 0.2 02/20/2022    BILIRUBINUR Negative 02/20/2022    BLOODU Negative 02/20/2022    GLUCOSEU Negative 02/20/2022     ABG:  No results found for: PH, PCO2, PO2, HCO3, BE, THGB, TCO2, O2SAT  HgBA1c:    Lab Results   Component Value Date    LABA1C 4.9 01/09/2021     FLP:    Lab Results   Component Value Date    TRIG 56 02/21/2022    HDL 32 02/21/2022    LDLCALC 72 02/21/2022    LABVLDL 11 02/21/2022     TSH:    Lab Results   Component Value Date    TSH 5.110 02/21/2022     IRON:    Lab Results   Component Value Date    IRON 34 02/21/2022     LIPASE:    Lab Results   Component Value Date    LIPASE 70 02/20/2022       ASSESSMENT AND PLAN:      Patient Active Problem List    Diagnosis Date Noted    Ascites-massive-abdominal paracentesis 2/21 for 2710 cc-transudate 02/20/2022    Left inguinal hernia repair 2/7/2022      Acute kidney injury      Cirrhosis-secondary to hepatitis C 05/10/2021    Secondary esophageal varices      Abdominal pain 01/06/2021     Bilateral severe hydrocele 11/13/2020     Normocytic anemia with iron with iron deficiency  Borderline mild hypothyroidism 02/17/2012     Plan:  Patient admitted to general surgical floor  Home medications reviewed  IV fluids normal saline 20 KCl at 60 cc an hour  Consult urology  Consult GI  IR consulted for abdominal paracentesis  Serum iron, G27 folic acid-normal  Paracentesis panel ordered  Serum iron-34  Stop IV fluids since patient is eating and drinking and still with significant ascites and scrotal edema. Lasix 40 mg IV push twice daily    Discharge home when okay with general surgery/GI    CMP, H/H in a.m.       Eduarda Chen DO, D.OShelby  2/24/2022  9:15 AM

## 2022-03-01 LAB
HCV QNT BY NAAT IU/ML: NOT DETECTED IU/ML
HCV QNT BY NAAT LOG IU/ML: NOT DETECTED LOG IU/ML
INTERPRETATION: NOT DETECTED

## 2022-03-04 ENCOUNTER — OFFICE VISIT (OUTPATIENT)
Dept: SURGERY | Age: 42
End: 2022-03-04
Payer: COMMERCIAL

## 2022-03-04 VITALS
DIASTOLIC BLOOD PRESSURE: 55 MMHG | WEIGHT: 187 LBS | HEART RATE: 71 BPM | TEMPERATURE: 97.5 F | BODY MASS INDEX: 28.34 KG/M2 | HEIGHT: 68 IN | SYSTOLIC BLOOD PRESSURE: 87 MMHG

## 2022-03-04 DIAGNOSIS — N43.3 HYDROCELE, UNSPECIFIED HYDROCELE TYPE: Primary | ICD-10-CM

## 2022-03-04 PROCEDURE — 1036F TOBACCO NON-USER: CPT | Performed by: SURGERY

## 2022-03-04 PROCEDURE — 99213 OFFICE O/P EST LOW 20 MIN: CPT | Performed by: SURGERY

## 2022-03-04 PROCEDURE — 1111F DSCHRG MED/CURRENT MED MERGE: CPT | Performed by: SURGERY

## 2022-03-04 PROCEDURE — G8419 CALC BMI OUT NRM PARAM NOF/U: HCPCS | Performed by: SURGERY

## 2022-03-04 PROCEDURE — G8427 DOCREV CUR MEDS BY ELIG CLIN: HCPCS | Performed by: SURGERY

## 2022-03-04 PROCEDURE — G8484 FLU IMMUNIZE NO ADMIN: HCPCS | Performed by: SURGERY

## 2022-03-04 NOTE — PROGRESS NOTES
Chris Noguera III  3/4/2022      [place] Office Consult    CHIEF COMPLAINT:  Left hydrocele    HISTORY OF PRESENT ILLNESS:  Chris Noguera III is a 40 y.o.  male post incisional and left inguinal hernia repair on 2/7/22 by Dr. Abril Gauthier, developed left groin pain and was given a local anesthetic block in the office on 2/16/22 by Dr. Geri Ellsworth which helped for 24 hrs. He has a history of cirrhosis with paracentesis of ascites. CT scan revealed large volume ascites with hydrocele of his left side so another paracentesis was done. Now the ascites is better on lasix. The hydrocele is swollen but not as painful. Past Medical History: He has a past medical history of Esophageal varices ([address] Past Surgical History: He has a past surgical history that includes Upper gastrointestinal endoscopy (N/A, 11/13/2020); Tonsillectomy; Upper gastrointestinal endoscopy (N/A, 1/6/2021); Upper gastrointestinal endoscopy (N/A, 5/10/2021); Endoscopy, colon, diagnostic; Umbilical hernia repair (N/A, 9/15/2021); hernia repair; and hernia repair (Left, 2/7/2022). Home Medications  Prior to Visit Medications    Medication Sig Taking? Authorizing Provider   ciprofloxacin (CIPRO) 500 MG tablet Take 1 tablet by mouth every 24 hours for 10 days Yes Paco Lim DO   furosemide (LASIX) 40 MG tablet TAKE ONE TABLET BY MOUTH EVERY DAY Yes Historical Provider, MD   ibuprofen (ADVIL;MOTRIN) 800 MG tablet Take 1 tablet by mouth every 6 hours as needed for Pain Yes Robin Ruffin MD   NONFORMULARY daily as needed Medical marijuana edibles or vapes  Yes Historical Provider, MD   spironolactone (ALDACTONE) 100 MG tablet Take 100 mg by mouth daily Yes Historical Provider, MD       Allergies: Pcn [penicillins]   Social History:   TOBACCO:   reports that he has quit smoking. He smoked 0.50 packs per day.  He has never used smokeless tobacco.  All smokers should join the free smoking cessation program and stop smoking before having

## 2022-03-17 NOTE — DISCHARGE SUMMARY
Physician Discharge Summary     Patient ID:  Meseret Youssef III  [id number]  3 y.o.  1980    Admit date: 2/20/2022    Discharge date and time: 2/24/2022  5:52 PM     Admitting Physician: Ascencion Ravi MD     Admission Diagnoses: Hypokalemia [E87.6]  Post-op pain [G89.18]  Scrotal edema [N50.89]  Ascites [R18.8]  Other ascites [R18.8]    Discharge Diagnoses: Principal Problem:    Ascites  Resolved Problems:    * No resolved hospital problems. *      Admission Condition: stable    Discharged Condition: stable    Indication for Admission: post-op pain    Hospital Course/Procedures/Operation/treatments:   Presented for pain and swelling after repair of incisional hernia and left inguinal hernia on 2/7. Has hx of cirrhosis. CT showed large volume of ascites. Exam showed persistent scrotal swelling. Underwent paracentesis with interventional radiology. Medicine and GI services following and assisted with diuresis and ascites management. Ultimately pain was controlled and swelling decreased. He was discharged home 2/24. Consults:   IP CONSULT TO GENERAL SURGERY  IP CONSULT TO HOSPITALIST  IP CONSULT TO GI  IP CONSULT TO UROLOGY    Significant Diagnostic Studies:   CT ABDOMEN PELVIS W IV CONTRAST Additional Contrast? None    Result Date: 2/20/2022  EXAMINATION: CT OF THE ABDOMEN AND PELVIS WITH CONTRAST 2/20/2022 9:30 pm TECHNIQUE: CT of the abdomen and pelvis was performed with the administration of intravenous contrast. Multiplanar reformatted images are provided for review. Dose modulation, iterative reconstruction, and/or weight based adjustment of the mA/kV was utilized to reduce the radiation dose to as low as reasonably achievable.  COMPARISON: 02/12/2022 HISTORY: ORDERING SYSTEM PROVIDED HISTORY: abdominal distension and scrotal edema - run longer to include whole scrotum if possible TECHNOLOGIST PROVIDED HISTORY: Additional Contrast?->None Reason for exam:->abdominal distension and scrotal edema - run longer to include whole scrotum if possible Decision Support Exception - unselect if not a suspected or confirmed emergency medical condition->Emergency Medical Condition (MA) FINDINGS: Nodular contour of the liver consistent with cirrhosis. The liver is slightly heterogeneous but without definitive evidence of localized mass. There is recanalization of the umbilical vein. Moderate splenomegaly similar to previous. Pancreas is grossly unremarkable. No adrenal mass. No hydronephrosis. Large amount of ascites, significantly increased from previous. Assessment of bowel is limited without oral contrast.  No bowel obstruction. The appendix is not well seen. Partial visualization of probable normal appendix. Moderate and relatively diffuse thickening of small and large bowel. No abscess. No identified free air. Numerous varicosities are present. Urinary bladder is largely decompressed. Prostate is normal in size. Moderate fluid distention of a left inguinal hernia. Large bilateral hydroceles with diffuse bilateral scrotal edema. There is no soft tissue gas identified in the scrotum. Mild soft tissue edema in the visualized lower extremities. Mild anasarca. Scattered areas of scarring and/or atelectasis in the lung bases. Findings are consistent with cirrhosis and portal venous hypertension. Large volume ascites significantly increased from previous. Fluid distention of left inguinal hernia with prominent bilateral hydroceles and diffuse scrotal edema. Thickening of small and large bowel relatively diffusely may be reactive to the ascites. Other bowel related infectious or inflammatory process not entirely excluded. Correlate with clinical presentation.  RECOMMENDATIONS: Unavailable     IR US GUIDED PARACENTESIS    Result Date: 2/21/2022  PROCEDURE: PARACENTESIS WITHOUT IMAGE GUIDANCE US ABDOMEN LIMITED 2/21/2022 HISTORY: ORDERING SYSTEM PROVIDED HISTORY: paracentesis TECHNOLOGIST PROVIDED HISTORY: Reason for exam:->paracentesis TECHNIQUE: Informed consent was obtained after a detailed explanation of the procedure including risks, benefits, and alternatives. Universal protocol was followed. A limited ultrasound of the abdomen was performed. The right abdomen was prepped and draped in sterile fashion and local anesthesia was achieved with lidocaine. A 5 French needle sheath was advanced into ascites under direct ultrasound guidance and paracentesis was performed. The patient tolerated the procedure well. FINDINGS: Limited ultrasound of the abdomen demonstrates ascites. A total of 2710 cc of clear ascitic fluid was removed. Status post paracentesis. Discharge Exam:  GENERAL:  Laying in bed, awake, alert, cooperative, no apparent distress  HEAD: Normocephalic, atraumatic  EYES: No sclera icterus, pupils equal  LUNGS:  No increased work of breathing  CARDIOVASCULAR:  RR  ABDOMEN:  Soft, non-tender, mildly distended, scrotal edema improving  SKIN: Warm and dry    Disposition: Home    In process/preliminary results:  Outstanding Order Results     No orders found from 2022 to 2022.           Patient Instructions:   Discharge Medication List as of 2022  4:32 PM           Details   ciprofloxacin (CIPRO) 500 MG tablet Take 1 tablet by mouth every 24 hours for 10 days, Disp-10 tablet, R-0Normal              Details   furosemide (LASIX) 40 MG tablet TAKE ONE TABLET BY MOUTH EVERY DAYHistorical Med      ibuprofen (ADVIL;MOTRIN) 800 MG tablet Take 1 tablet by mouth every 6 hours as needed for Pain, Disp-120 tablet, R-0Normal      NONFORMULARY daily as needed Medical marijuana edibles or vapes Historical Med      spironolactone (ALDACTONE) 100 MG tablet Take 100 mg by mouth dailyHistorical Med           Your information:  Name: Brando Esablair III  : 1980    Your instructions:  Discharge Home  Elevate scrotum with rolled towel and apply ice bag to scrotum as tolerates for swelling  Call upon discharge to schedule a follow-up visit with Danielle Quesada/Buffy/Rob ([facility]) at [phone]    What to do after you leave the hospital:    Recommended diet: Low Sodium Diet  Low Sodium Diet (2,000 Milligram): Care Instructions  Overview  Limiting sodium can be an important part of managing some health problems. The most common source of sodium is salt. People get most of the salt in their diet from canned, prepared, and packaged foods. Fast food and restaurant meals also are very high in sodium. Your doctor will probably limit your sodium to less than 2,000 milligrams (mg) a day. This limit counts all the sodium in prepared and packaged foods and any salt you add to your food. Follow-up care is a key part of your treatment and safety. Be sure to make and go to all appointments, and call your doctor if you are having problems. It's also a good idea to know your test results and keep a list of the medicines you take. How can you care for yourself at home? Read food labels  · Read labels on cans and food packages. The labels tell you how much sodium is in each serving. Make sure that you look at the serving size. If you eat more than the serving size, you have eaten more sodium. · Food labels also tell you the Percent Daily Value for sodium. Choose products with low Percent Daily Values for sodium. · Be aware that sodium can come in forms other than salt, including monosodium glutamate (MSG), sodium citrate, and sodium bicarbonate (baking soda). MSG is often added to Asian food. When you eat out, you can sometimes ask for food without MSG or added salt. Buy low-sodium foods  · Buy foods that are labeled \"unsalted\" (no salt added), \"sodium-free\" (less than 5 mg of sodium per serving), or \"low-sodium\" (140 mg or less of sodium per serving). Foods labeled \"reduced-sodium\" and \"light sodium\" may still have too much sodium.  Be sure to read the label to see how much sodium you are getting. · Buy fresh vegetables, or frozen vegetables without added sauces. Buy low-sodium versions of canned vegetables, soups, and other canned goods. Prepare low-sodium meals  · Cut back on the amount of salt you use in cooking. This will help you adjust to the taste. Do not add salt after cooking. One teaspoon of salt has about 2,300 mg of sodium. · Take the salt shaker off the table. · Flavor your food with garlic, lemon juice, onion, vinegar, herbs, and spices. Do not use soy sauce, lite soy sauce, steak sauce, onion salt, garlic salt, celery salt, or ketchup on your food. · Use low-sodium salad dressings, sauces, and ketchup. Or make your own salad dressings and sauces without adding salt. · Use less salt (or none) when recipes call for it. You can often use half the salt a recipe calls for without losing flavor. Other foods such as rice, pasta, and grains do not need added salt. · Rinse canned vegetables, and cook them in fresh water. This removes some--but not all--of the salt. · Avoid water that is naturally high in sodium or that has been treated with water softeners, which add sodium. If you buy bottled water, read the label and choose a sodium-free brand. Avoid high-sodium foods  · Avoid eating:  ? Smoked, cured, salted, and canned meat, fish, and poultry. ? Ham, devlin, hot dogs, and luncheon meats. ? Regular, hard, and processed cheese and regular peanut butter. ? Crackers with salted tops, and other salted snack foods such as pretzels, chips, and salted popcorn. ? Frozen prepared meals, unless labeled low-sodium. ? Canned and dried soups, broths, and bouillon, unless labeled sodium-free or low-sodium. ? Canned vegetables, unless labeled sodium-free or low-sodium. ? [place] fries, pizza, tacos, and other fast foods. ? Pickles, olives, ketchup, and other condiments, especially soy sauce, unless labeled sodium-free or low-sodium. Where can you learn more?   Go to https://chpepiceweb.Meijob. org and sign in to your eMeter account. Enter M110 in the [facility] box to learn more about \"Low Sodium Diet (2,000 Milligram): Care Instructions. \"     If you do not have an account, please click on the \"Sign Up Now\" link. Current as of: September 8, 2021               Content Version: 13.1  © 2006-2021 [employer]. Care instructions adapted under license by [facility] ([facility]). If you have questions about a medical condition or this instruction, always ask your healthcare professional. [place] [address] any warranty or liability for your use of this information. Recommended activity: As Tolerates    The following personal items were collected during your admission and were returned to you:    Belongings  Dental Appliances: Lowers,Uppers  Vision - Corrective Lenses: None  Hearing Aid: None  Jewelry: None  Body Piercings Removed: N/A  Clothing: Shirt,Pants  Were All Patient Medications Collected?: Not Applicable  Home Medications: None  Other Valuables: Cell phone,Wallet,Other (Comment) (cell phone )  Valuables Given To: Patient  Patient approves for provider to speak to responsible person post operatively: Yes    Information obtained by:  By signing below, I understand that if any problems occur once I leave the hospital I am to contact PCP. I understand and acknowledge receipt of the instructions indicated above. Ascites: Care Instructions  Your Care Instructions  Ascites (say \"uh-SY-teez\") is a buildup of extra fluid in the belly. It can cause your belly to swell. It can also make it hard for you to breathe. Many diseases can cause ascites. But most people who get it have a liver problem. When the liver gets damaged, it can cause fluid to back up from the liver or from blood vessels. Then this fluid builds up in the belly. Your doctor may take a sample of the fluid in your belly with a thin needle.  The sample is then tested to help find out the cause of the ascites. Treatment may include medicine. It may also include changing the way you eat so you don't eat a lot of salt. If your ascites is very bad and hard to treat, your doctor may need to use a needle to take out the fluid. Follow-up care is a key part of your treatment and safety. Be sure to make and go to all appointments, and call your doctor if you are having problems. It's also a good idea to know your test results and keep a list of the medicines you take. How can you care for yourself at home? · Be safe with medicines. Take your medicines exactly as prescribed. Call your doctor if you have any problems with your medicine. You will get more details on the specific medicines your doctor prescribes. · Eat low-salt foods, and don't add salt to your food. If you eat a lot of salt, it's harder to get rid of the extra fluid. Salt is in many prepared foods. These include devlin, canned foods, snack foods, sauces, and soups. Look for products that are low-sodium or have reduced salt. · Do not drink any alcohol until you are all better. Alcohol will damage the liver more. If your liver disease is caused by drinking alcohol, do not drink alcohol at all. Tell your doctor if you need help to quit. Counseling, support groups, and sometimes medicines can help you stay sober. · Talk to your doctor before you take any other medicines. These include over-the-counter medicines, vitamins, and herbal products. · Make sure your doctor knows all the medicines you take. Some medicines, such as acetaminophen (Tylenol), can make liver problems worse. When should you call for help? Call 911 anytime you think you may need emergency care. For example, call if:    · You have trouble breathing.     · You vomit blood or what looks like coffee grounds. Call your doctor now or seek immediate medical care if:    · You have new or worse belly pain.     · You have a fever.    Watch closely for changes in your health, and be sure to contact your doctor if:    · You have any problems.     · Your belly is getting bigger.     · You are gaining weight. Where can you learn more? Go to https://thrdPlacepeInovus Solar.Quaam. org and sign in to your Paperfold account. Enter B970 in the [facility] box to learn more about \"Ascites: Care Instructions. \"     If you do not have an account, please click on the \"Sign Up Now\" link. Current as of: September 8, 2021               Content Version: 13.1  © 6491-3995 [employer]. Care instructions adapted under license by [facility] ([facility]). If you have questions about a medical condition or this instruction, always ask your healthcare professional. [address] any warranty or liability for your use of this information. Learning About Paracentesis  What is paracentesis? Paracentesis (say \"fmca-fh-sem-JEANMARIE-naomi\") is a procedure that removes fluid from the belly. The buildup of fluid may be caused by infection, inflammation, an injury, or other problems. Swelling from too much fluid may cause pain or trouble breathing. The doctor will remove the extra fluid with a needle attached to a tube. Why is paracentesis done? Paracentesis may be done to:  · Find the cause of fluid buildup in the belly. · Diagnose an infection in the peritoneal fluid. · Check for certain types of cancer. · Remove a large amount of fluid that is causing pain or trouble breathing or that is affecting how the kidneys or the intestines (bowel) are working. · Check for damage after a belly injury. How is the procedure done? You will empty your bladder before the procedure. Your doctor or nurse will clean the area of your belly where the needle will go in. Then he or she will put sterile towels around the area. You may get a shot of numbing medicine in the skin of your belly. Then your doctor will gently insert a needle where the fluid is.  He or she may attach a tube (catheter) to the site to help collect the fluid. After the fluid has drained, your doctor will take out the needle or catheter and put a bandage on the site. How long does a paracentesis take? The procedure may take from a few minutes to 30 minutes or more. What happens after the test?  You can do your normal activities after the procedure, unless your doctor tells you not to. After the procedure, you may have some clear fluid draining from the site, especially if a large amount of fluid was taken out. There will be less drainage in 1 to 2 days. Ask your doctor how much drainage to expect. A small gauze pad and bandage may be needed. You may need to change the bandage. If your doctor thinks that testing the fluid can help find the cause of a problem, he or she will send it to a lab. If fluid builds up in your belly again, your doctor may repeat this procedure. When should you call for help? Call 911 anytime you think you may need emergency care. For example, call if:  · You passed out (lost consciousness). Call your doctor now or seek immediate medical care if:  · You have symptoms of infection, such as:  ? Increased pain, swelling, warmth, or redness. ? Red streaks or pus. ? A fever. · You are dizzy or lightheaded, or you feel like you may faint. · You have new or worse belly pain. Watch closely for changes in your health, and be sure to contact your doctor if:  · Fluid builds up in your belly again. · You do not get better as expected. Where can you learn more? Go to https://OQOsoledad.Resonate. org and sign in to your Hammerhead Navigation account. Enter X447 in the [employer] box to learn more about \"Learning About Paracentesis. \"     If you do not have an account, please click on the \"Sign Up Now\" link. Current as of: September 8, 2021               Content Version: 13.1  © 2991-1799 [employer]wise[employer]. Care instructions adapted under license by [facility] ([facility]).  If you have questions about a medical condition or this instruction, always ask your healthcare professional. [place] [address] any warranty or liability for your use of this information.           Follow up:   Jaylin Roldan MD  [address] [facility]  [phone]    Schedule an appointment as soon as possible for a visit in 1 week      Elsie Méndez MD  [address] [facility]  [address] [phone]    Schedule an appointment as soon as possible for a visit in 2 weeks      Huston Landau., MD [address]  [address]  [phone]    Schedule an appointment as soon as possible for a visit in 1 week      [facility]  [address]  [id number]  Schedule an appointment as soon as possible for a visit in 2 weeks         Signed:  Marisa Dai DO  3/17/2022  10:07 AM

## 2022-05-04 ENCOUNTER — OFFICE VISIT (OUTPATIENT)
Dept: SURGERY | Age: 42
End: 2022-05-04

## 2022-05-04 VITALS
HEART RATE: 80 BPM | BODY MASS INDEX: 28.34 KG/M2 | SYSTOLIC BLOOD PRESSURE: 107 MMHG | DIASTOLIC BLOOD PRESSURE: 63 MMHG | WEIGHT: 187 LBS | HEIGHT: 68 IN | TEMPERATURE: 98.3 F

## 2022-05-04 DIAGNOSIS — N50.89 SCROTAL SWELLING: Primary | ICD-10-CM

## 2022-05-04 DIAGNOSIS — K70.31 ALCOHOLIC CIRRHOSIS OF LIVER WITH ASCITES (HCC): ICD-10-CM

## 2022-05-04 PROCEDURE — 99024 POSTOP FOLLOW-UP VISIT: CPT | Performed by: SURGERY

## 2022-05-04 NOTE — PROGRESS NOTES
General Surgery Office Note  Jamshid Mauricio MD, MS    Patient's Name/Date of Birth: Freda Mar III / 1980    Date: May 4, 2022     Chief compaint: Abdominal ascites, left hemiscrotum swelling    Surgeon: Olga Lidia Mahoney MD    Patient Active Problem List   Diagnosis    Cellulitis    UGI bleed    Abdominal pain    Secondary esophageal varices with bleeding ([address]    GIB (gastrointestinal bleeding)    Umbilical hernia without obstruction and without gangrene    Left inguinal hernia    Ascites       Subjective: Returns after evaluation with urology. He states his groin pain seems improved but he continues to have fluid accumulation. Findings of urology suggested hydrocele which is aspirated in the office by Dr. Bronwyn Klein. He states he had a significant improvement for about 24 hours and then the fluid began to accumulate again. Patient has a known history of cirrhosis he is on Lasix for management of his ascites secondary to hep C. He does follow with GI. Today in the office he complained of increasing swelling again in the left hemiscrotum. This is reducible and the fact that it feels like fluid can be reduced out of the scrotal sac. This is very consistent with a communicating hydrocele secondary to his ongoing liver dysfunction and cirrhosis and ascites. Objective:  /63 (Site: Left Upper Arm, Position: Sitting, Cuff Size: Medium Adult)   Pulse 80   Temp 98.3 °F (36.8 °C)   Ht 5' 8\" (1.727 m)   Wt 187 lb (84.8 kg)   BMI 28.43 kg/m²   Labs:  No results for input(s): WBC, HGB, HCT in the last 72 hours. Invalid input(s): PLR  Lab Results   Component Value Date    CREATININE 1.1 02/24/2022    BUN 16 02/24/2022     02/24/2022    K 4.0 02/24/2022     02/24/2022    CO2 26 02/24/2022     No results for input(s): LIPASE, AMYLASE in the last 72 hours.       Review of Systems -  General ROS: negative for - chills, fatigue or malaise  ENT ROS: negative for - hearing change, nasal congestion or nasal discharge  Allergy and Immunology ROS: negative for - hives, itchy/watery eyes or nasal congestion  Hematological and Lymphatic ROS: negative for - blood clots, blood transfusions, bruising or fatigue  Endocrine ROS: negative for - malaise/lethargy, mood swings, palpitations or polydipsia/polyuria  Respiratory ROS: negative for - sputum changes, stridor, tachypnea or wheezing  Cardiovascular ROS: negative for - irregular heartbeat, loss of consciousness, murmur or orthopnea  Gastrointestinal ROS: negative for - diarrhea, or hematemesis  Genito-Urinary ROS: negative for -  genital discharge, genital ulcers or hematuria  Musculoskeletal ROS: negative for - gait disturbance, muscle pain or muscular weakness  Psych/Neuro ROS: negative for - visual or auditory hallucinations, suicidal ideation    General appearance: AA, NAD  HEENT: NCAT, PERRLA, EOMI  Lungs: Clear, equal rise bilateral  Heart: Reg  Abdomen: soft, nondistended, nontender, incisions well-healed with distention and fluid wave  Ext: Atraumatic, no swelling  : No hernia recurrence, fluid accumulation in the left hemiscrotum that is reducible with manual manipulation    CT scan postop reviewed significant scrotal hematoma and edema    Assessment/Plan:  Valentino Borer III is a 40 y.o. male 6 weeks s/p laparoscopic left inguinal hernia repair with mesh and umbilical hernia repair by Dr. Mildred Boykin. Postop pain without evidence of recurrence, cirrhosis, ascites      I believe he does have a communicating hydrocele that due to his history of cirrhosis and ascites continues to reaccumulate fluid.   I discussed with him that management of his ascites is likely the most benefit he is can get from decreasing the scrotal swelling  And will bleed for surgical intervention and ligation of the hydrocele would necessarily improve his symptoms secondary to fluid likely will accumulate regardless  Would appreciate input from urology regarding any experience with communicating hydroceles and cirrhosis and ascites  Follow-up with primary surgeon for discussion of other surgical options but I do not feel that there is a significant benefit and likely significant risk of proceeding to surgery          Physician Signature: Electronically signed by Dr. Jose Silveira  [phone] (p)  5/4/2022  11:53 AM

## 2022-05-08 ENCOUNTER — HOSPITAL ENCOUNTER (EMERGENCY)
Age: 42
Discharge: LEFT BEFORE DISCHARGE | End: 2022-05-08
Attending: EMERGENCY MEDICINE
Payer: COMMERCIAL

## 2022-05-08 VITALS
RESPIRATION RATE: 16 BRPM | WEIGHT: 150 LBS | DIASTOLIC BLOOD PRESSURE: 75 MMHG | OXYGEN SATURATION: 99 % | HEIGHT: 68 IN | SYSTOLIC BLOOD PRESSURE: 128 MMHG | HEART RATE: 113 BPM | TEMPERATURE: 98.1 F | BODY MASS INDEX: 22.73 KG/M2

## 2022-05-08 DIAGNOSIS — T40.1X4A HEROIN OVERDOSE, UNDETERMINED INTENT, INITIAL ENCOUNTER (HCC): Primary | ICD-10-CM

## 2022-05-08 PROCEDURE — 4500000002 HC ER NO CHARGE

## 2022-05-08 PROCEDURE — 99283 EMERGENCY DEPT VISIT LOW MDM: CPT

## 2022-05-08 ASSESSMENT — PAIN - FUNCTIONAL ASSESSMENT: PAIN_FUNCTIONAL_ASSESSMENT: NONE - DENIES PAIN

## 2022-05-08 NOTE — ED PROVIDER NOTES
Name: Lang Diaz III   MRN: [id number]     --------------------------------------------- History of Present Illness ---------------------------------------------  5/8/22, Time: 6:52 PM EDT   Chief Complaint   Patient presents with    Drug Overdose     pt admits to taking heroine and overdosed. A&Ox4 in triage      HPI    Lang Diaz III is a 40 y.o. male, with hx of recent hernia repair (2/16/22 Dr. Byron Cotton), esophageal varices, who presents to the ED today for overdose. Went to see patient and he was gone. Pt eloped from the department before being seen by physician.      Angel Connell EM PGY-1           Shiela Block DO  Resident  05/08/22 [id number]

## 2022-06-30 ENCOUNTER — HOSPITAL ENCOUNTER (INPATIENT)
Age: 42
LOS: 1 days | Discharge: LEFT BEFORE DISCHARGE | DRG: 280 | End: 2022-07-01
Attending: EMERGENCY MEDICINE | Admitting: INTERNAL MEDICINE
Payer: COMMERCIAL

## 2022-06-30 DIAGNOSIS — N50.89 SCROTAL SWELLING: ICD-10-CM

## 2022-06-30 DIAGNOSIS — R18.8 OTHER ASCITES: Primary | ICD-10-CM

## 2022-06-30 PROBLEM — K74.60 DECOMPENSATION OF CIRRHOSIS OF LIVER (HCC): Status: ACTIVE | Noted: 2022-06-30

## 2022-06-30 PROBLEM — K72.90 DECOMPENSATION OF CIRRHOSIS OF LIVER (HCC): Status: ACTIVE | Noted: 2022-06-30

## 2022-06-30 LAB
ALBUMIN SERPL-MCNC: 2.9 G/DL (ref 3.5–5.2)
ALP BLD-CCNC: 108 U/L (ref 40–129)
ALT SERPL-CCNC: 33 U/L (ref 0–40)
ANION GAP SERPL CALCULATED.3IONS-SCNC: 5 MMOL/L (ref 7–16)
ANISOCYTOSIS: ABNORMAL
AST SERPL-CCNC: 66 U/L (ref 0–39)
BACTERIA: ABNORMAL /HPF
BASOPHILS ABSOLUTE: 0 E9/L (ref 0–0.2)
BASOPHILS RELATIVE PERCENT: 0.9 % (ref 0–2)
BILIRUB SERPL-MCNC: 1.3 MG/DL (ref 0–1.2)
BILIRUBIN DIRECT: 0.5 MG/DL (ref 0–0.3)
BILIRUBIN URINE: ABNORMAL
BILIRUBIN, INDIRECT: 0.8 MG/DL (ref 0–1)
BLOOD, URINE: NEGATIVE
BUN BLDV-MCNC: 12 MG/DL (ref 6–20)
CALCIUM SERPL-MCNC: 8 MG/DL (ref 8.6–10.2)
CHLORIDE BLD-SCNC: 105 MMOL/L (ref 98–107)
CLARITY: CLEAR
CO2: 25 MMOL/L (ref 22–29)
COLOR: YELLOW
CREAT SERPL-MCNC: 0.9 MG/DL (ref 0.7–1.2)
EOSINOPHILS ABSOLUTE: 0.55 E9/L (ref 0.05–0.5)
EOSINOPHILS RELATIVE PERCENT: 13 % (ref 0–6)
GFR AFRICAN AMERICAN: >60
GFR NON-AFRICAN AMERICAN: >60 ML/MIN/1.73
GLUCOSE BLD-MCNC: 115 MG/DL (ref 74–99)
GLUCOSE URINE: NEGATIVE MG/DL
HCT VFR BLD CALC: 27.8 % (ref 37–54)
HEMOGLOBIN: 8.7 G/DL (ref 12.5–16.5)
HYPOCHROMIA: ABNORMAL
KETONES, URINE: ABNORMAL MG/DL
LACTIC ACID: 1.4 MMOL/L (ref 0.5–2.2)
LEUKOCYTE ESTERASE, URINE: NEGATIVE
LIPASE: 42 U/L (ref 13–60)
LYMPHOCYTES ABSOLUTE: 0.25 E9/L (ref 1.5–4)
LYMPHOCYTES RELATIVE PERCENT: 6.1 % (ref 20–42)
MCH RBC QN AUTO: 25.6 PG (ref 26–35)
MCHC RBC AUTO-ENTMCNC: 31.3 % (ref 32–34.5)
MCV RBC AUTO: 81.8 FL (ref 80–99.9)
MONOCYTES ABSOLUTE: 0.34 E9/L (ref 0.1–0.95)
MONOCYTES RELATIVE PERCENT: 7.8 % (ref 2–12)
NEUTROPHILS ABSOLUTE: 3.07 E9/L (ref 1.8–7.3)
NEUTROPHILS RELATIVE PERCENT: 73 % (ref 43–80)
NITRITE, URINE: NEGATIVE
OVALOCYTES: ABNORMAL
PDW BLD-RTO: 18.7 FL (ref 11.5–15)
PH UA: 6 (ref 5–9)
PLATELET # BLD: 60 E9/L (ref 130–450)
PLATELET CONFIRMATION: NORMAL
PMV BLD AUTO: 9 FL (ref 7–12)
POIKILOCYTES: ABNORMAL
POLYCHROMASIA: ABNORMAL
POTASSIUM REFLEX MAGNESIUM: 3.9 MMOL/L (ref 3.5–5)
PROTEIN UA: ABNORMAL MG/DL
RBC # BLD: 3.4 E12/L (ref 3.8–5.8)
RBC UA: ABNORMAL /HPF (ref 0–2)
SODIUM BLD-SCNC: 135 MMOL/L (ref 132–146)
SPECIFIC GRAVITY UA: >=1.03 (ref 1–1.03)
TEAR DROP CELLS: ABNORMAL
TOTAL PROTEIN: 6.4 G/DL (ref 6.4–8.3)
UROBILINOGEN, URINE: 1 E.U./DL
WBC # BLD: 4.2 E9/L (ref 4.5–11.5)
WBC UA: ABNORMAL /HPF (ref 0–5)

## 2022-06-30 PROCEDURE — 83605 ASSAY OF LACTIC ACID: CPT

## 2022-06-30 PROCEDURE — 96365 THER/PROPH/DIAG IV INF INIT: CPT

## 2022-06-30 PROCEDURE — 89051 BODY FLUID CELL COUNT: CPT

## 2022-06-30 PROCEDURE — 81001 URINALYSIS AUTO W/SCOPE: CPT

## 2022-06-30 PROCEDURE — 99285 EMERGENCY DEPT VISIT HI MDM: CPT

## 2022-06-30 PROCEDURE — 6360000002 HC RX W HCPCS: Performed by: INTERNAL MEDICINE

## 2022-06-30 PROCEDURE — 2580000003 HC RX 258: Performed by: INTERNAL MEDICINE

## 2022-06-30 PROCEDURE — 6360000002 HC RX W HCPCS: Performed by: EMERGENCY MEDICINE

## 2022-06-30 PROCEDURE — P9047 ALBUMIN (HUMAN), 25%, 50ML: HCPCS | Performed by: INTERNAL MEDICINE

## 2022-06-30 PROCEDURE — 80076 HEPATIC FUNCTION PANEL: CPT

## 2022-06-30 PROCEDURE — 6360000002 HC RX W HCPCS: Performed by: STUDENT IN AN ORGANIZED HEALTH CARE EDUCATION/TRAINING PROGRAM

## 2022-06-30 PROCEDURE — 80053 COMPREHEN METABOLIC PANEL: CPT

## 2022-06-30 PROCEDURE — 83690 ASSAY OF LIPASE: CPT

## 2022-06-30 PROCEDURE — 1200000000 HC SEMI PRIVATE

## 2022-06-30 PROCEDURE — 85025 COMPLETE CBC W/AUTO DIFF WBC: CPT

## 2022-06-30 RX ORDER — ONDANSETRON 4 MG/1
4 TABLET, ORALLY DISINTEGRATING ORAL EVERY 8 HOURS PRN
Status: DISCONTINUED | OUTPATIENT
Start: 2022-06-30 | End: 2022-07-01 | Stop reason: HOSPADM

## 2022-06-30 RX ORDER — ACETAMINOPHEN 650 MG/1
650 SUPPOSITORY RECTAL EVERY 6 HOURS PRN
Status: DISCONTINUED | OUTPATIENT
Start: 2022-06-30 | End: 2022-06-30

## 2022-06-30 RX ORDER — HYDROMORPHONE HYDROCHLORIDE 1 MG/ML
1 INJECTION, SOLUTION INTRAMUSCULAR; INTRAVENOUS; SUBCUTANEOUS ONCE
Status: COMPLETED | OUTPATIENT
Start: 2022-06-30 | End: 2022-06-30

## 2022-06-30 RX ORDER — SODIUM CHLORIDE 9 MG/ML
INJECTION, SOLUTION INTRAVENOUS PRN
Status: DISCONTINUED | OUTPATIENT
Start: 2022-06-30 | End: 2022-07-01 | Stop reason: HOSPADM

## 2022-06-30 RX ORDER — ALBUMIN (HUMAN) 12.5 G/50ML
25 SOLUTION INTRAVENOUS ONCE
Status: COMPLETED | OUTPATIENT
Start: 2022-06-30 | End: 2022-06-30

## 2022-06-30 RX ORDER — MORPHINE SULFATE 4 MG/ML
4 INJECTION, SOLUTION INTRAMUSCULAR; INTRAVENOUS EVERY 6 HOURS PRN
Status: DISCONTINUED | OUTPATIENT
Start: 2022-06-30 | End: 2022-07-01 | Stop reason: SDUPTHER

## 2022-06-30 RX ORDER — POLYETHYLENE GLYCOL 3350 17 G/17G
17 POWDER, FOR SOLUTION ORAL DAILY PRN
Status: DISCONTINUED | OUTPATIENT
Start: 2022-06-30 | End: 2022-07-01 | Stop reason: HOSPADM

## 2022-06-30 RX ORDER — ONDANSETRON 2 MG/ML
4 INJECTION INTRAMUSCULAR; INTRAVENOUS EVERY 6 HOURS PRN
Status: DISCONTINUED | OUTPATIENT
Start: 2022-06-30 | End: 2022-07-01 | Stop reason: HOSPADM

## 2022-06-30 RX ORDER — DEXTROSE MONOHYDRATE 50 MG/ML
100 INJECTION, SOLUTION INTRAVENOUS PRN
Status: DISCONTINUED | OUTPATIENT
Start: 2022-06-30 | End: 2022-07-01 | Stop reason: HOSPADM

## 2022-06-30 RX ORDER — ACETAMINOPHEN 325 MG/1
650 TABLET ORAL EVERY 6 HOURS PRN
Status: DISCONTINUED | OUTPATIENT
Start: 2022-06-30 | End: 2022-06-30

## 2022-06-30 RX ORDER — SODIUM CHLORIDE 0.9 % (FLUSH) 0.9 %
5-40 SYRINGE (ML) INJECTION PRN
Status: DISCONTINUED | OUTPATIENT
Start: 2022-06-30 | End: 2022-07-01 | Stop reason: HOSPADM

## 2022-06-30 RX ORDER — MORPHINE SULFATE 2 MG/ML
2 INJECTION, SOLUTION INTRAMUSCULAR; INTRAVENOUS EVERY 4 HOURS PRN
Status: DISCONTINUED | OUTPATIENT
Start: 2022-06-30 | End: 2022-06-30

## 2022-06-30 RX ORDER — SODIUM CHLORIDE 0.9 % (FLUSH) 0.9 %
5-40 SYRINGE (ML) INJECTION EVERY 12 HOURS SCHEDULED
Status: DISCONTINUED | OUTPATIENT
Start: 2022-06-30 | End: 2022-07-01 | Stop reason: HOSPADM

## 2022-06-30 RX ADMIN — HYDROMORPHONE HYDROCHLORIDE 1 MG: 1 INJECTION, SOLUTION INTRAMUSCULAR; INTRAVENOUS; SUBCUTANEOUS at 18:56

## 2022-06-30 RX ADMIN — ALBUMIN (HUMAN) 25 G: 0.25 INJECTION, SOLUTION INTRAVENOUS at 21:21

## 2022-06-30 RX ADMIN — SODIUM CHLORIDE, PRESERVATIVE FREE 10 ML: 5 INJECTION INTRAVENOUS at 21:21

## 2022-06-30 RX ADMIN — HYDROMORPHONE HYDROCHLORIDE 1 MG: 1 INJECTION, SOLUTION INTRAMUSCULAR; INTRAVENOUS; SUBCUTANEOUS at 19:46

## 2022-06-30 RX ADMIN — MORPHINE SULFATE 2 MG: 2 INJECTION, SOLUTION INTRAMUSCULAR; INTRAVENOUS at 21:22

## 2022-06-30 ASSESSMENT — ENCOUNTER SYMPTOMS
COUGH: 0
DIARRHEA: 0
NAUSEA: 0
SHORTNESS OF BREATH: 0
TROUBLE SWALLOWING: 0
PHOTOPHOBIA: 0
VOMITING: 0
VOICE CHANGE: 0
ABDOMINAL PAIN: 1

## 2022-06-30 ASSESSMENT — PAIN - FUNCTIONAL ASSESSMENT
PAIN_FUNCTIONAL_ASSESSMENT: 0-10
PAIN_FUNCTIONAL_ASSESSMENT: ACTIVITIES ARE NOT PREVENTED

## 2022-06-30 ASSESSMENT — PAIN DESCRIPTION - PAIN TYPE: TYPE: CHRONIC PAIN

## 2022-06-30 ASSESSMENT — PAIN DESCRIPTION - FREQUENCY: FREQUENCY: CONTINUOUS

## 2022-06-30 ASSESSMENT — PAIN DESCRIPTION - LOCATION
LOCATION: ABDOMEN
LOCATION: ABDOMEN;SCROTUM
LOCATION: ABDOMEN

## 2022-06-30 ASSESSMENT — PAIN SCALES - GENERAL
PAINLEVEL_OUTOF10: 9
PAINLEVEL_OUTOF10: 8
PAINLEVEL_OUTOF10: 8
PAINLEVEL_OUTOF10: 10

## 2022-06-30 ASSESSMENT — PAIN DESCRIPTION - DESCRIPTORS
DESCRIPTORS: ACHING;DISCOMFORT;NAGGING
DESCRIPTORS: ACHING

## 2022-06-30 ASSESSMENT — PAIN DESCRIPTION - ONSET: ONSET: ON-GOING

## 2022-06-30 NOTE — ED NOTES
Pt medicated per orders, pt placed on cont pulse ox, bed low/locked with side rails up and call light within reach     Brianne Torres RN  06/30/22 1979

## 2022-06-30 NOTE — ED PROVIDER NOTES
HPI   Patient is a 69-year-old male with past medical history of cirrhosis secondary to hepatitis C, secondary esophageal varices status post banding and ascites secondary to cirrhosis presented to emergency department due to worsening abdominal and scrotal pain and swelling. Patient states that his symptoms have been ongoing for a few days. He states that he takes Lasix and spironolactone at home to help with his fluid overload from ascites. Patient was unable to fill his prescription for his diuretics and has been having worsening swelling in his abdomen and scrotum since then. Patient is endorsing significant pain in his abdomen and scrotum. Pain was gradual onset. It is sharp, constant and nonradiating. Patient has had similar pain in the past but this is worse than normal.  It is moderate in severity. Nothing in particular makes the pain better but movement makes the pain worse. Patient does endorse some shortness of breath. There is no particular association with exertion. Patient is otherwise denying any other symptoms including fever, chills, nausea, vomiting, chest pain, headache, lightheadedness, syncope, urinary symptoms, constipation or diarrhea. His symptoms are moderate with no remitting or exacerbating factors. Patient states that he does have a history of therapeutic thoracentesis as well as fluid aspiration from his scrotum as well. He has not required this in some time. Patient has had no recent sick contacts. Review of Systems   Constitutional: Negative for chills and fever. HENT: Negative for trouble swallowing and voice change. Eyes: Negative for photophobia and visual disturbance. Respiratory: Negative for cough and shortness of breath. Cardiovascular: Negative for chest pain and palpitations. Gastrointestinal: Positive for abdominal pain. Negative for diarrhea, nausea and vomiting.         Abdominal swelling and pain    Genitourinary: Positive for scrotal swelling. Negative for dysuria. Scrotal swelling and pain   Musculoskeletal: Negative for arthralgias. Skin: Negative for rash and wound. Neurological: Negative for dizziness and headaches. Psychiatric/Behavioral: Negative for behavioral problems and confusion. Physical Exam  Constitutional:       General: He is not in acute distress. Appearance: Normal appearance. He is not ill-appearing. HENT:      Head: Normocephalic and atraumatic. Eyes:      Pupils: Pupils are equal, round, and reactive to light. Cardiovascular:      Rate and Rhythm: Normal rate and regular rhythm. Pulses: Normal pulses. Heart sounds: Normal heart sounds. Pulmonary:      Effort: Pulmonary effort is normal. No respiratory distress. Breath sounds: Normal breath sounds. No wheezing or rales. Abdominal:      General: There is distension. Palpations: There is fluid wave. Tenderness: There is generalized abdominal tenderness. There is guarding. There is no right CVA tenderness, left CVA tenderness or rebound. Negative signs include Steinberg's sign. Comments: Abdomen felt tense. Moderate tenderness to palpation diffusely with some involuntary guarding. Genitourinary:     Testes:         Right: Swelling present. Left: Swelling present. Comments: Significant swelling of the scrotum. Moderate tenderness to palpation noted. No lesions or crepitus noted. Musculoskeletal:      Cervical back: Normal range of motion and neck supple. Skin:     General: Skin is warm and dry. Capillary Refill: Capillary refill takes less than 2 seconds. Neurological:      General: No focal deficit present. Mental Status: He is alert and oriented to person, place, and time. Cranial Nerves: No cranial nerve deficit.       Coordination: Coordination normal.   Psychiatric:         Mood and Affect: Mood normal.         Behavior: Behavior normal.          MDM   Patient is a 52-year-old male with past medical history of cirrhosis secondary to hepatitis C, secondary esophageal varices status post banding and ascites secondary to cirrhosis presented to emergency department due to worsening abdominal and scrotal pain and swelling. On initial evaluation patient was nontoxic-appearing, afebrile, hemodynamically stable but in moderate acute distress. Patient was very uncomfortable during exam secondary to pain. Physical exam was significant for significant swelling of the abdomen and scrotum. Patient had moderate tenderness to palpation over the abdomen diffusely as well as scrotum. Marked ascites of the abdomen noted. Significant scrotal swelling with no lesions or crepitus noted. Patient given Dilaudid for pain control in the emergency department. Lab work-up generally unremarkable and abnormal labs at baseline for patient's chronic medical conditions. Work-up results discussed with the patient and he was agreeable with plan for admission for potential therapeutic tap. Spoke with Dr. Claudy Wayne who accepted the patient for admission. The patient remained hemodynamically stable in the ED. ED Course as of 06/30/22 2223   Thu Jun 30, 2022   [id number] Dr Clinton Hardwick accepted the patient for admission [PP]   1936 Requesting something more for pain. Additional pain meds ordered and will reassess. [MS]      ED Course User Index  [MS] Lizz Friedman DO  [PP] Gertrude Dorado DO      --------------------------------------------- PAST HISTORY ---------------------------------------------  Past Medical History:  has a past medical history of Esophageal varices ([facility] [address] Past Surgical History:  has a past surgical history that includes Upper gastrointestinal endoscopy (N/A, 11/13/2020); Tonsillectomy; Upper gastrointestinal endoscopy (N/A, 1/6/2021); Upper gastrointestinal endoscopy (N/A, 5/10/2021);  Endoscopy, colon, diagnostic; Umbilical hernia repair (N/A, 9/15/2021); hernia repair; and hernia repair (Left, 2/7/2022). Social History:  reports that he has quit smoking. He smoked 0.50 packs per day. He has never used smokeless tobacco. He reports current alcohol use. He reports current drug use. Drugs: Opiates  and Methamphetamines (Crystal Meth). Family History: family history is not on file. The patients home medications have been reviewed.     Allergies: Pcn [penicillins]    -------------------------------------------------- RESULTS -------------------------------------------------    LABS:  Results for orders placed or performed during the hospital encounter of 06/30/22   CBC with Auto Differential   Result Value Ref Range    WBC 4.2 (L) 4.5 - 11.5 E9/L    RBC 3.40 (L) 3.80 - 5.80 E12/L    Hemoglobin 8.7 (L) 12.5 - 16.5 g/dL    Hematocrit 27.8 (L) 37.0 - 54.0 %    MCV 81.8 80.0 - 99.9 fL    MCH 25.6 (L) 26.0 - 35.0 pg    MCHC 31.3 (L) 32.0 - 34.5 %    RDW 18.7 (H) 11.5 - 15.0 fL    Platelets 60 (L) 525 - 450 E9/L    MPV 9.0 7.0 - 12.0 fL    Neutrophils % 73.0 43.0 - 80.0 %    Lymphocytes % 6.1 (L) 20.0 - 42.0 %    Monocytes % 7.8 2.0 - 12.0 %    Eosinophils % 13.0 (H) 0.0 - 6.0 %    Basophils % 0.9 0.0 - 2.0 %    Neutrophils Absolute 3.07 1.80 - 7.30 E9/L    Lymphocytes Absolute 0.25 (L) 1.50 - 4.00 E9/L    Monocytes Absolute 0.34 0.10 - 0.95 E9/L    Eosinophils Absolute 0.55 (H) 0.05 - 0.50 E9/L    Basophils Absolute 0.00 0.00 - 0.20 E9/L    Anisocytosis 2+     Polychromasia 1+     Hypochromia 1+     Poikilocytes 1+     Ovalocytes 1+     Tear Drop Cells 1+    Comprehensive Metabolic Panel w/ Reflex to MG   Result Value Ref Range    Sodium 135 132 - 146 mmol/L    Potassium reflex Magnesium 3.9 3.5 - 5.0 mmol/L    Chloride 105 98 - 107 mmol/L    CO2 25 22 - 29 mmol/L    Anion Gap 5 (L) 7 - 16 mmol/L    Glucose 115 (H) 74 - 99 mg/dL    BUN 12 6 - 20 mg/dL    CREATININE 0.9 0.7 - 1.2 mg/dL    GFR Non-African American >60 >=60 mL/min/1.73    GFR African American >60     Calcium 8.0 (L) 8.6 - 10.2 mg/dL    Total Protein 6.4 6.4 - 8.3 g/dL    Albumin 2.9 (L) 3.5 - 5.2 g/dL    Total Bilirubin 1.3 (H) 0.0 - 1.2 mg/dL    Alkaline Phosphatase 108 40 - 129 U/L    ALT 33 0 - 40 U/L    AST 66 (H) 0 - 39 U/L   Hepatic Function Panel   Result Value Ref Range    Bilirubin, Direct 0.5 (H) 0.0 - 0.3 mg/dL    Bilirubin, Indirect 0.8 0.0 - 1.0 mg/dL   Lipase   Result Value Ref Range    Lipase 42 13 - 60 U/L   Urinalysis with Microscopic   Result Value Ref Range    Color, UA Yellow Straw/Yellow    Clarity, UA Clear Clear    Glucose, Ur Negative Negative mg/dL    Bilirubin Urine SMALL (A) Negative    Ketones, Urine TRACE (A) Negative mg/dL    Specific Gravity, UA >=1.030 1.005 - 1.030    Blood, Urine Negative Negative    pH, UA 6.0 5.0 - 9.0    Protein, UA TRACE Negative mg/dL    Urobilinogen, Urine 1.0 <2.0 E.U./dL    Nitrite, Urine Negative Negative    Leukocyte Esterase, Urine Negative Negative    WBC, UA NONE 0 - 5 /HPF    RBC, UA NONE 0 - 2 /HPF    Bacteria, UA NONE SEEN None Seen /HPF   Lactic Acid   Result Value Ref Range    Lactic Acid 1.4 0.5 - 2.2 mmol/L   Platelet Confirmation   Result Value Ref Range    Platelet Confirmation CONFIRMED        RADIOLOGY:  IR INTERVENTIONAL RADIOLOGY PROCEDURE REQUEST    (Results Pending)       ------------------------- NURSING NOTES AND VITALS REVIEWED ---------------------------  Date / Time Roomed:  6/30/2022  5:49 PM  ED Bed Assignment:  [id number]    The nursing notes within the ED encounter and vital signs as below have been reviewed.      Patient Vitals for the past 24 hrs:   BP Temp Temp src Pulse Resp SpO2 Height Weight   06/30/22 2100 129/73 98 °F (36.7 °C) Temporal 74 16 100 % 5' 8\" (1.727 m) 169 lb (76.7 kg)   06/30/22 1949 116/62 -- -- 79 16 100 % -- --   06/30/22 1631 120/75 98.1 °F (36.7 °C) Oral 85 20 100 % 5' 8\" (1.727 m) 160 lb (72.6 kg)   06/30/22 1618 -- -- -- 93 -- 100 % -- --       Oxygen Saturation Interpretation: Normal    ------------------------------------------ PROGRESS NOTES ------------------------------------------  Counseling:  I have spoken with the patient and discussed todays results, in addition to providing specific details for the plan of care and counseling regarding the diagnosis and prognosis. Their questions are answered at this time and they are agreeable with the plan of admission.    --------------------------------- ADDITIONAL PROVIDER NOTES ---------------------------------  Consultations:  Time: 1926. Spoke with Dr. Max Cazares. Discussed case. They will admit the patient. This patient's ED course included: a personal history and physicial examination, re-evaluation prior to disposition, multiple bedside re-evaluations and IV medications    This patient has remained hemodynamically stable during their ED course. Diagnosis:  1. Other ascites    2. Scrotal swelling        Disposition:  Patient's disposition: Admit to med/surg floor  Patient's condition is stable.             Ronald Frank DO  Resident  06/30/22 [id number]

## 2022-06-30 NOTE — ED NOTES
Ongoing issue with swollen scrotum and ascites, c/o pain all over, short of breath, nausea and diarrhea     Rama Jordan RN  06/30/22 3555

## 2022-07-01 ENCOUNTER — APPOINTMENT (OUTPATIENT)
Dept: ULTRASOUND IMAGING | Age: 42
DRG: 280 | End: 2022-07-01
Payer: COMMERCIAL

## 2022-07-01 ENCOUNTER — APPOINTMENT (OUTPATIENT)
Dept: INTERVENTIONAL RADIOLOGY/VASCULAR | Age: 42
DRG: 280 | End: 2022-07-01
Payer: COMMERCIAL

## 2022-07-01 VITALS
SYSTOLIC BLOOD PRESSURE: 111 MMHG | RESPIRATION RATE: 18 BRPM | HEIGHT: 68 IN | DIASTOLIC BLOOD PRESSURE: 68 MMHG | WEIGHT: 169 LBS | BODY MASS INDEX: 25.61 KG/M2 | TEMPERATURE: 98 F | OXYGEN SATURATION: 100 % | HEART RATE: 83 BPM

## 2022-07-01 PROBLEM — D69.6 THROMBOCYTOPENIA (HCC): Status: ACTIVE | Noted: 2022-07-01

## 2022-07-01 PROBLEM — D64.9 ANEMIA: Status: ACTIVE | Noted: 2022-07-01

## 2022-07-01 LAB
ALBUMIN FLUID: 0.5 G/DL
ALBUMIN SERPL-MCNC: 3 G/DL (ref 3.5–5.2)
ALP BLD-CCNC: 96 U/L (ref 40–129)
ALT SERPL-CCNC: 29 U/L (ref 0–40)
AMYLASE FLUID: 20 U/L
ANION GAP SERPL CALCULATED.3IONS-SCNC: 5 MMOL/L (ref 7–16)
ANISOCYTOSIS: ABNORMAL
APPEARANCE FLUID: CLEAR
AST SERPL-CCNC: 56 U/L (ref 0–39)
BASOPHILS ABSOLUTE: 0.1 E9/L (ref 0–0.2)
BASOPHILS RELATIVE PERCENT: 2.6 % (ref 0–2)
BILIRUB SERPL-MCNC: 1.5 MG/DL (ref 0–1.2)
BUN BLDV-MCNC: 11 MG/DL (ref 6–20)
BURR CELLS: ABNORMAL
CALCIUM SERPL-MCNC: 7.9 MG/DL (ref 8.6–10.2)
CELL COUNT FLUID TYPE: NORMAL
CHLORIDE BLD-SCNC: 106 MMOL/L (ref 98–107)
CO2: 23 MMOL/L (ref 22–29)
COLOR FLUID: YELLOW
CREAT SERPL-MCNC: 0.9 MG/DL (ref 0.7–1.2)
EOSINOPHILS ABSOLUTE: 0.53 E9/L (ref 0.05–0.5)
EOSINOPHILS RELATIVE PERCENT: 13.9 % (ref 0–6)
FLUID TYPE: NORMAL
FLUID TYPE: NORMAL
GFR AFRICAN AMERICAN: >60
GFR NON-AFRICAN AMERICAN: >60 ML/MIN/1.73
GLUCOSE BLD-MCNC: 97 MG/DL (ref 74–99)
GLUCOSE, FLUID: 148 MG/DL
HCT VFR BLD CALC: 26.2 % (ref 37–54)
HEMOGLOBIN: 8.4 G/DL (ref 12.5–16.5)
HYPOCHROMIA: ABNORMAL
INR BLD: 1.7
LACTATE DEHYDROGENASE: 322 U/L (ref 135–225)
LD, FLUID: 58 U/L
LYMPHOCYTES ABSOLUTE: 0.19 E9/L (ref 1.5–4)
LYMPHOCYTES RELATIVE PERCENT: 5.2 % (ref 20–42)
MAGNESIUM: 1.9 MG/DL (ref 1.6–2.6)
MCH RBC QN AUTO: 25.8 PG (ref 26–35)
MCHC RBC AUTO-ENTMCNC: 32.1 % (ref 32–34.5)
MCV RBC AUTO: 80.4 FL (ref 80–99.9)
MONOCYTE, FLUID: 87 %
MONOCYTES ABSOLUTE: 0.15 E9/L (ref 0.1–0.95)
MONOCYTES RELATIVE PERCENT: 3.5 % (ref 2–12)
NEUTROPHIL, FLUID: 14 %
NEUTROPHILS ABSOLUTE: 2.85 E9/L (ref 1.8–7.3)
NEUTROPHILS RELATIVE PERCENT: 74.8 % (ref 43–80)
NUCLEATED CELLS FLUID: 37 /UL
OVALOCYTES: ABNORMAL
PDW BLD-RTO: 18.6 FL (ref 11.5–15)
PHOSPHORUS: 2.1 MG/DL (ref 2.5–4.5)
PLATELET # BLD: 46 E9/L (ref 130–450)
PLATELET CONFIRMATION: NORMAL
PMV BLD AUTO: 8.9 FL (ref 7–12)
POIKILOCYTES: ABNORMAL
POLYCHROMASIA: ABNORMAL
POTASSIUM SERPL-SCNC: 3.6 MMOL/L (ref 3.5–5)
PROCALCITONIN: 0.09 NG/ML (ref 0–0.08)
PROTEIN FLUID: 0.9 G/DL
PROTHROMBIN TIME: 19.3 SEC (ref 9.3–12.4)
RBC # BLD: 3.26 E12/L (ref 3.8–5.8)
RBC FLUID: 3000 /UL
SODIUM BLD-SCNC: 134 MMOL/L (ref 132–146)
T4 FREE: 0.78 NG/DL (ref 0.93–1.7)
TOTAL PROTEIN: 6.2 G/DL (ref 6.4–8.3)
TSH SERPL DL<=0.05 MIU/L-ACNC: 1.13 UIU/ML (ref 0.27–4.2)
WBC # BLD: 3.8 E9/L (ref 4.5–11.5)

## 2022-07-01 PROCEDURE — 6370000000 HC RX 637 (ALT 250 FOR IP): Performed by: INTERNAL MEDICINE

## 2022-07-01 PROCEDURE — 76705 ECHO EXAM OF ABDOMEN: CPT

## 2022-07-01 PROCEDURE — 0W9G3ZZ DRAINAGE OF PERITONEAL CAVITY, PERCUTANEOUS APPROACH: ICD-10-PCS | Performed by: SURGERY

## 2022-07-01 PROCEDURE — 85025 COMPLETE CBC W/AUTO DIFF WBC: CPT

## 2022-07-01 PROCEDURE — 85610 PROTHROMBIN TIME: CPT

## 2022-07-01 PROCEDURE — 6370000000 HC RX 637 (ALT 250 FOR IP): Performed by: HOSPITALIST

## 2022-07-01 PROCEDURE — 84145 PROCALCITONIN (PCT): CPT

## 2022-07-01 PROCEDURE — APPSS45 APP SPLIT SHARED TIME 31-45 MINUTES: Performed by: NURSE PRACTITIONER

## 2022-07-01 PROCEDURE — 93976 VASCULAR STUDY: CPT

## 2022-07-01 PROCEDURE — 87205 SMEAR GRAM STAIN: CPT

## 2022-07-01 PROCEDURE — 82150 ASSAY OF AMYLASE: CPT

## 2022-07-01 PROCEDURE — 49083 ABD PARACENTESIS W/IMAGING: CPT

## 2022-07-01 PROCEDURE — 87070 CULTURE OTHR SPECIMN AEROBIC: CPT

## 2022-07-01 PROCEDURE — 2580000003 HC RX 258: Performed by: INTERNAL MEDICINE

## 2022-07-01 PROCEDURE — 83615 LACTATE (LD) (LDH) ENZYME: CPT

## 2022-07-01 PROCEDURE — 2580000003 HC RX 258: Performed by: NURSE PRACTITIONER

## 2022-07-01 PROCEDURE — 82947 ASSAY GLUCOSE BLOOD QUANT: CPT

## 2022-07-01 PROCEDURE — 99239 HOSP IP/OBS DSCHRG MGMT >30: CPT | Performed by: STUDENT IN AN ORGANIZED HEALTH CARE EDUCATION/TRAINING PROGRAM

## 2022-07-01 PROCEDURE — 6360000002 HC RX W HCPCS: Performed by: NURSE PRACTITIONER

## 2022-07-01 PROCEDURE — 83735 ASSAY OF MAGNESIUM: CPT

## 2022-07-01 PROCEDURE — 84439 ASSAY OF FREE THYROXINE: CPT

## 2022-07-01 PROCEDURE — 84100 ASSAY OF PHOSPHORUS: CPT

## 2022-07-01 PROCEDURE — 84443 ASSAY THYROID STIM HORMONE: CPT

## 2022-07-01 PROCEDURE — 88112 CYTOPATH CELL ENHANCE TECH: CPT

## 2022-07-01 PROCEDURE — 88305 TISSUE EXAM BY PATHOLOGIST: CPT

## 2022-07-01 PROCEDURE — 2500000003 HC RX 250 WO HCPCS: Performed by: NURSE PRACTITIONER

## 2022-07-01 PROCEDURE — 84157 ASSAY OF PROTEIN OTHER: CPT

## 2022-07-01 PROCEDURE — 6360000002 HC RX W HCPCS: Performed by: HOSPITALIST

## 2022-07-01 PROCEDURE — 36415 COLL VENOUS BLD VENIPUNCTURE: CPT

## 2022-07-01 PROCEDURE — 80053 COMPREHEN METABOLIC PANEL: CPT

## 2022-07-01 PROCEDURE — 6360000002 HC RX W HCPCS: Performed by: INTERNAL MEDICINE

## 2022-07-01 PROCEDURE — 82042 OTHER SOURCE ALBUMIN QUAN EA: CPT

## 2022-07-01 PROCEDURE — C1729 CATH, DRAINAGE: HCPCS

## 2022-07-01 RX ORDER — PANTOPRAZOLE SODIUM 40 MG/1
40 TABLET, DELAYED RELEASE ORAL
Qty: 30 TABLET | Refills: 0 | Status: SHIPPED | OUTPATIENT
Start: 2022-07-02

## 2022-07-01 RX ORDER — FUROSEMIDE 40 MG/1
40 TABLET ORAL DAILY
Qty: 30 TABLET | Refills: 0 | Status: SHIPPED | OUTPATIENT
Start: 2022-07-02

## 2022-07-01 RX ORDER — MORPHINE SULFATE 4 MG/ML
4 INJECTION, SOLUTION INTRAMUSCULAR; INTRAVENOUS
Status: DISCONTINUED | OUTPATIENT
Start: 2022-07-01 | End: 2022-07-01

## 2022-07-01 RX ORDER — MORPHINE SULFATE 2 MG/ML
2 INJECTION, SOLUTION INTRAMUSCULAR; INTRAVENOUS
Status: DISCONTINUED | OUTPATIENT
Start: 2022-07-01 | End: 2022-07-01

## 2022-07-01 RX ORDER — MORPHINE SULFATE 4 MG/ML
4 INJECTION, SOLUTION INTRAMUSCULAR; INTRAVENOUS ONCE
Status: COMPLETED | OUTPATIENT
Start: 2022-07-01 | End: 2022-07-01

## 2022-07-01 RX ORDER — LIDOCAINE 4 G/G
1 PATCH TOPICAL DAILY
Status: DISCONTINUED | OUTPATIENT
Start: 2022-07-01 | End: 2022-07-01 | Stop reason: HOSPADM

## 2022-07-01 RX ORDER — SPIRONOLACTONE 25 MG/1
100 TABLET ORAL DAILY
Status: DISCONTINUED | OUTPATIENT
Start: 2022-07-01 | End: 2022-07-01 | Stop reason: HOSPADM

## 2022-07-01 RX ORDER — PANTOPRAZOLE SODIUM 40 MG/1
40 TABLET, DELAYED RELEASE ORAL
Status: DISCONTINUED | OUTPATIENT
Start: 2022-07-01 | End: 2022-07-01 | Stop reason: HOSPADM

## 2022-07-01 RX ORDER — ALBUMIN (HUMAN) 12.5 G/50ML
25 SOLUTION INTRAVENOUS ONCE
Status: DISCONTINUED | OUTPATIENT
Start: 2022-07-01 | End: 2022-07-01 | Stop reason: HOSPADM

## 2022-07-01 RX ORDER — FUROSEMIDE 40 MG/1
40 TABLET ORAL DAILY
Status: DISCONTINUED | OUTPATIENT
Start: 2022-07-01 | End: 2022-07-01 | Stop reason: HOSPADM

## 2022-07-01 RX ORDER — MORPHINE SULFATE 2 MG/ML
1 INJECTION, SOLUTION INTRAMUSCULAR; INTRAVENOUS EVERY 4 HOURS PRN
Status: DISCONTINUED | OUTPATIENT
Start: 2022-07-01 | End: 2022-07-01 | Stop reason: HOSPADM

## 2022-07-01 RX ORDER — NALOXONE HYDROCHLORIDE 0.4 MG/ML
0.4 INJECTION, SOLUTION INTRAMUSCULAR; INTRAVENOUS; SUBCUTANEOUS PRN
Status: DISCONTINUED | OUTPATIENT
Start: 2022-07-01 | End: 2022-07-01 | Stop reason: HOSPADM

## 2022-07-01 RX ADMIN — MORPHINE SULFATE 1 MG: 2 INJECTION, SOLUTION INTRAMUSCULAR; INTRAVENOUS at 14:54

## 2022-07-01 RX ADMIN — MORPHINE SULFATE 4 MG: 4 INJECTION, SOLUTION INTRAMUSCULAR; INTRAVENOUS at 05:15

## 2022-07-01 RX ADMIN — ONDANSETRON 4 MG: 4 TABLET, ORALLY DISINTEGRATING ORAL at 11:47

## 2022-07-01 RX ADMIN — POTASSIUM PHOSPHATE, MONOBASIC POTASSIUM PHOSPHATE, DIBASIC 10 MMOL: 224; 236 INJECTION, SOLUTION, CONCENTRATE INTRAVENOUS at 10:43

## 2022-07-01 RX ADMIN — SODIUM CHLORIDE, PRESERVATIVE FREE 10 ML: 5 INJECTION INTRAVENOUS at 08:15

## 2022-07-01 RX ADMIN — MORPHINE SULFATE 4 MG: 4 INJECTION, SOLUTION INTRAMUSCULAR; INTRAVENOUS at 02:10

## 2022-07-01 RX ADMIN — SPIRONOLACTONE 100 MG: 25 TABLET ORAL at 08:13

## 2022-07-01 RX ADMIN — FUROSEMIDE 40 MG: 40 TABLET ORAL at 08:13

## 2022-07-01 RX ADMIN — MORPHINE SULFATE 4 MG: 4 INJECTION, SOLUTION INTRAMUSCULAR; INTRAVENOUS at 09:26

## 2022-07-01 ASSESSMENT — PAIN SCALES - WONG BAKER: WONGBAKER_NUMERICALRESPONSE: 0

## 2022-07-01 ASSESSMENT — PAIN SCALES - GENERAL
PAINLEVEL_OUTOF10: 9
PAINLEVEL_OUTOF10: 10
PAINLEVEL_OUTOF10: 9
PAINLEVEL_OUTOF10: 8
PAINLEVEL_OUTOF10: 7
PAINLEVEL_OUTOF10: 9

## 2022-07-01 ASSESSMENT — PAIN DESCRIPTION - LOCATION
LOCATION: ABDOMEN;SCROTUM
LOCATION: ABDOMEN;BACK
LOCATION: ABDOMEN;BACK;SCROTUM

## 2022-07-01 ASSESSMENT — PAIN DESCRIPTION - ORIENTATION
ORIENTATION: MID
ORIENTATION: RIGHT

## 2022-07-01 ASSESSMENT — PAIN DESCRIPTION - ONSET: ONSET: ON-GOING

## 2022-07-01 ASSESSMENT — PAIN - FUNCTIONAL ASSESSMENT
PAIN_FUNCTIONAL_ASSESSMENT: ACTIVITIES ARE NOT PREVENTED

## 2022-07-01 ASSESSMENT — PAIN DESCRIPTION - FREQUENCY: FREQUENCY: CONTINUOUS

## 2022-07-01 ASSESSMENT — PAIN DESCRIPTION - DESCRIPTORS
DESCRIPTORS: ACHING;DISCOMFORT;NAGGING
DESCRIPTORS: ACHING;DISCOMFORT

## 2022-07-01 ASSESSMENT — PAIN DESCRIPTION - PAIN TYPE: TYPE: CHRONIC PAIN

## 2022-07-01 NOTE — PROGRESS NOTES
Patient called nurse to room c/o bright red blood after urinating. Noted red blood on urinal and around meatus. bleeding has stopped at this time.  Notified Margareth Huffman NP

## 2022-07-01 NOTE — CONSULTS
Elke Be M.D. [facility]  Dr. Zara Paz M.D.,  Dr. Duran Islas M.D.,  Dr. Jorje Jacobsen D.O.,  Dr. Luis Cardenas D.O. ,  Dr. Cindy Aguirre M.D.,  Dr. Rosa Martini, [facility]  40 y.o.  male      Re: Decompensated cirrhosis  Requesting physician: Dr. Piyush Burrell  Date:12:05 PM 7/1/2022          HPI: 81-year-old male patient with history of decompensated cirrhosis and hepatitis C presenting to the hospital yesterday afternoon to the emergency department with abdominal pain. Patient reports diffuse abdominal pain with associated abdominal distention. Patient reports that he follows with Dr. Shira Parmar from our office and he is on diuretics. Patient reports that he ran out of diuretics and did not get refill. Patient has been off his diuretics for at least 5 days now. Patient denies blood in the stool or melena. He reports somewhat darker bowel movement however. He denies hematemesis emesis of coffee-ground material.  He has previous history of hepatitis C which has been treated. Patient also has history of alcohol abuse. Upon presentation he was noted to have significant abdominal distention with paracentesis ordered and pending to be performed at this time. Laboratory work reveals unremarkable electrolytes including preserved renal function with BUN of 11 creatinine of 0.9. Liver profile shows alkaline phosphatase of 96, ALT of 29, AST of 56 and total bilirubin of 1.5 which appear closing consistent with patient baseline. CBC shows white blood cell count of 8.4 compared to hemoglobin of 7.8 in February of this year. Hematocrit is 26.2 and MCV is 80.4. Platelet count is decreased at 46 which appears to be worse than patient baseline. Previously was found to be iron deficient in February of this year.   INR on presentation was 1.7 which is consistent with baseline    Information sources:   -Patient  -medical record  -health care team    PMHx:  Past Medical History:   Diagnosis Date    Esophageal varices ([address]        PSHx:  Past Surgical History:   Procedure Laterality Date    ENDOSCOPY, COLON, DIAGNOSTIC      HERNIA REPAIR      HERNIA REPAIR Left 2/7/2022    LAPAROSCOPIC LEFT INGUINAL HERNIA REPAIR WITH MESH POSSIBLE OPEN POSSIBLE BILATERAL performed by Jolanta Raza MD at [facility] N/A 9/15/2021    LAPAROSCOPIC POSSIBLE OPEN UMBILICAL HERNIA REPAIR WITH [address] performed by Jolanta Raza MD at [place] [address]. 11/13/2020    EGD BIOPSY performed by Mine Crane MD at [address] [facility] [address] 1/6/2021    EGD BAND LIGATION performed by Rox Kerns MD at [address] [facility] [address] 5/10/2021    EGD BAND LIGATION performed by Rox Kerns MD at [address]:  Current Facility-Administered Medications   Medication Dose Route Frequency Provider Last Rate Last Admin    naloxone (NARCAN) injection 0.4 mg  0.4 mg IntraVENous PRN Cory Marlow MD        lidocaine 4 % external patch 1 patch  1 patch TransDERmal Daily Cory Marlow MD   1 patch at 07/01/22 0812    furosemide (LASIX) tablet 40 mg  40 mg Oral Daily Jun Arbour, DO   40 mg at 07/01/22 0813    spironolactone (ALDACTONE) tablet 100 mg  100 mg Oral Daily Jun Arbour, DO   100 mg at 07/01/22 0813    potassium phosphate 10 mmol in dextrose 5 % 250 mL IVPB  10 mmol IntraVENous Once CHRIS Muñoz - CNP 62.5 mL/hr at 07/01/22 1043 10 mmol at 07/01/22 1043    morphine (PF) injection 1 mg  1 mg IntraVENous Q4H PRN CHRIS Muñoz - CNP        glucose chewable tablet 16 g  4 tablet Oral PRN Jun Arbour, DO        dextrose bolus 10% 125 mL  125 mL IntraVENous PRN Jun Arbour, DO        Or    dextrose bolus 10% 250 mL  250 mL IntraVENous PRN Jun Arbour, DO        glucagon (rDNA) injection 1 mg  1 mg IntraMUSCular PRN Nina Arcos, DO        dextrose 5 % solution  100 mL/hr IntraVENous PRN Nina Arcos, DO        sodium chloride flush 0.9 % injection 5-40 mL  5-40 mL IntraVENous 2 times per day Nina Arcos, DO   10 mL at 07/01/22 0815    sodium chloride flush 0.9 % injection 5-40 mL  5-40 mL IntraVENous PRN Nina Arcos, DO        0.9 % sodium chloride infusion   IntraVENous PRN Nina Arcos, DO        ondansetron (ZOFRAN-ODT) disintegrating tablet 4 mg  4 mg Oral Q8H PRN Nina Arcos, DO   4 mg at 07/01/22 1147    Or    ondansetron (ZOFRAN) injection 4 mg  4 mg IntraVENous Q6H PRN Nina Arcos, DO        polyethylene glycol (GLYCOLAX) packet 17 g  17 g Oral Daily PRN Nina Arcos, DO           SocHx:  Social History     Socioeconomic History    Marital status: Single     Spouse name: Not on file    Number of children: Not on file    Years of education: Not on file    Highest education level: Not on file   Occupational History    Not on file   Tobacco Use    Smoking status: Former Smoker     Packs/day: 0.50    Smokeless tobacco: Never Used   Vaping Use    Vaping Use: Never used   Substance and Sexual Activity    Alcohol use: Yes     Comment: occasional-denies    Drug use: Yes     Types: Opiates , Methamphetamines (Crystal Meth), Heroin     Comment: last used medical marijuana 2/2. Former heroin user - last used one year ago    Sexual activity: Yes     Partners: Female   Other Topics Concern    Not on file   Social History Narrative    Not on file     Social Determinants of Health     Financial Resource Strain:     Difficulty of Paying Living Expenses: Not on file   Food Insecurity:     Worried About Running Out of Food in the Last Year: Not on file    Luciana of Food in the Last Year: Not on file   Transportation Needs:     Lack of Transportation (Medical): Not on file    Lack of Transportation (Non-Medical):  Not on file   Physical Activity:     Days of Exercise per Week: Not on 07/01/2022 06:35 AM    MCHC 32.1 07/01/2022 06:35 AM    RDW 18.6 07/01/2022 06:35 AM    PLT 46 07/01/2022 06:35 AM    MPV 8.9 07/01/2022 06:35 AM     Lab Results   Component Value Date/Time     07/01/2022 06:35 AM    K 3.6 07/01/2022 06:35 AM    K 3.9 06/30/2022 05:26 PM     07/01/2022 06:35 AM    CO2 23 07/01/2022 06:35 AM    BUN 11 07/01/2022 06:35 AM    CREATININE 0.9 07/01/2022 06:35 AM    CALCIUM 7.9 07/01/2022 06:35 AM    PROT 6.2 07/01/2022 06:35 AM    LABALBU 3.0 07/01/2022 06:35 AM    LABALBU 3.4 02/18/2012 05:00 AM    BILITOT 1.5 07/01/2022 06:35 AM    ALKPHOS 96 07/01/2022 06:35 AM    AST 56 07/01/2022 06:35 AM    ALT 29 07/01/2022 06:35 AM     Lab Results   Component Value Date/Time    LIPASE 42 06/30/2022 05:26 PM     Lab Results   Component Value Date/Time    AMYLASE 49 02/22/2015 09:30 PM         ASSESSMENT/PLAN:  Patient Active Problem List   Diagnosis    Cellulitis    UGI bleed    Abdominal pain    Secondary esophageal varices with bleeding ([address]    GIB (gastrointestinal bleeding)    Umbilical hernia without obstruction and without gangrene    Left inguinal hernia    Ascites    Decompensation of cirrhosis of liver ([address]     1. Cirrhosis  -Decompensated  -History of alcohol abuse/hepatitis C  -Hepatitis C reportedly treated -obtain viral load  -Ascites -Lasix and spironolactone; paracentesis  -Provide albumin with paracentesis  -Send ascitic fluid for fluid analysis  -Obtain AFP with dedicated liver imaging  -Low-salt diet -patient advised on low-salt diet at home    2. Anemia  -Hemoglobin at baseline without evidence of overt bleed  -Previously iron deficient in February of this year  -Obtain FOBT and monitor H&H  -Consider upper endoscopy (most recent approximately a year ago) in case of precipitous drop in H&H or overt bleed    3. History of hepatitis C  -Viral load as above    4.   Medication noncompliance  -As above    Thank you for the opportunity see this patient in

## 2022-07-01 NOTE — H&P
Department of Internal Medicine  History and Physical    PCP: Valentin Abrams MD  Admitting Physician: Dr. Ross Pike  Consultants:   Date of Service: 6/30/2022    CHIEF COMPLAINT: Abdominal pain and distention      HISTORY OF PRESENT ILLNESS:    Patient is 44-year-old male who presented to the ED due to abdominal pain and distention. Patient states he has been without one of his diuretic medications for the last few days. Patient abdominal pain and distention has progressed since Monday. He has associated nausea and emesis. He denies any fever or chills. States that he has not been able to urinate he does describe nasal congestion and shortness of breath with exertion. States he did have alcoholic beverages within the last 3 days. States secondary to the pain he has not been able to sleep    PAST MEDICAL Hx:  Past Medical History:   Diagnosis Date    Esophageal varices ([address]        PAST SURGICAL Hx:   Past Surgical History:   Procedure Laterality Date    ENDOSCOPY, COLON, DIAGNOSTIC      HERNIA REPAIR      HERNIA REPAIR Left 2/7/2022    LAPAROSCOPIC LEFT INGUINAL HERNIA REPAIR WITH MESH POSSIBLE OPEN POSSIBLE BILATERAL performed by Ryan Galan MD at [facility] N/A 9/15/2021    LAPAROSCOPIC POSSIBLE OPEN UMBILICAL HERNIA REPAIR WITH [address] performed by Ryan Galan MD at [address] [place] [address] 11/13/2020    EGD BIOPSY performed by Yumi Cole MD at [place] [address] N/A 1/6/2021    EGD BAND LIGATION performed by Erik Ortiz MD at [place] [address] 5/10/2021    EGD BAND LIGATION performed by Erik Ortiz MD at [address] Hx:  History reviewed. No pertinent family history. HOME MEDICATIONS:  Prior to Admission medications    Medication Sig Start Date End Date Taking?  Authorizing Provider   furosemide (LASIX) 40 MG tablet TAKE ONE TABLET BY MOUTH EVERY DAY 2/5/22   Historical Provider, MD   ibuprofen (ADVIL;MOTRIN) 800 MG tablet Take 1 tablet by mouth every 6 hours as needed for Pain 2/16/22   Nasra Olivares MD   NONFORMULARY daily as needed Medical marijuana edibles or vapes     Historical Provider, MD   spironolactone (ALDACTONE) 100 MG tablet Take 100 mg by mouth daily    Historical Provider, MD       ALLERGIES:  Pcn [penicillins]    SOCIAL Hx:  Social History     Socioeconomic History    Marital status: Single     Spouse name: Not on file    Number of children: Not on file    Years of education: Not on file    Highest education level: Not on file   Occupational History    Not on file   Tobacco Use    Smoking status: Former Smoker     Packs/day: 0.50    Smokeless tobacco: Never Used   Vaping Use    Vaping Use: Never used   Substance and Sexual Activity    Alcohol use: Yes     Comment: occasional-denies    Drug use: Yes     Types: Opiates , Methamphetamines (Crystal Meth)     Comment: last used medical marijuana 2/2    Sexual activity: Yes     Partners: Female   Other Topics Concern    Not on file   Social History Narrative    Not on file     Social Determinants of Health     Financial Resource Strain:     Difficulty of Paying Living Expenses: Not on file   Food Insecurity:     Worried About Running Out of Food in the Last Year: Not on file    [address] in the Last Year: Not on file   Transportation Needs:     Lack of Transportation (Medical): Not on file    Lack of Transportation (Non-Medical):  Not on file   Physical Activity:     Days of Exercise per Week: Not on file    Minutes of Exercise per Session: Not on file   Stress:     Feeling of Stress : Not on file   Social Connections:     Frequency of Communication with Friends and Family: Not on file    Frequency of Social Gatherings with Friends and Family: Not on file    Attends Rastafari Services: Not on file    Active Member of Clubs or Organizations: Not on file    Attends Club or Organization Meetings: Not on file    Marital Status: Not on file   Intimate Partner Violence:     Fear of Current or Ex-Partner: Not on file    Emotionally Abused: Not on file    Physically Abused: Not on file    Sexually Abused: Not on file   Housing Stability:     Unable to Pay for Housing in the Last Year: Not on file    Number of [place] in the Last Year: Not on file    Unstable Housing in the Last Year: Not on file       ROS: Positive in bold  General:   Denies chills, fatigue, fever, malaise, night sweats or weight loss    Psychological:   Denies anxiety, disorientation or hallucinations    ENT:    Denies epistaxis, headaches, vertigo or visual changes    Cardiovascular:   Denies any chest pain, irregular heartbeats, or palpitations. No paroxysmal nocturnal dyspnea. Respiratory:   Denies shortness of breath, coughing, sputum production, hemoptysis, or wheezing. No orthopnea. Gastrointestinal:   Denies nausea, vomiting, diarrhea, or constipation. Denies any abdominal pain. Denies change in bowel habits or stools. Genito-Urinary:    Denies any urgency, frequency, hematuria. Voiding without difficulty. Musculoskeletal:   Denies joint pain, joint stiffness, joint swelling or muscle pain    Neurology:    Denies any headache or focal neurological deficits. No weakness or paresthesia. Derm:    Denies any rashes, ulcers, or excoriations. Denies bruising. Extremities:   Denies any lower extremity swelling or edema. PHYSICAL EXAM: Abnormal findings noted  VITALS:  Vitals:    06/30/22 1949   BP: 116/62   Pulse: 79   Resp: 16   Temp:    SpO2: 100%         CONSTITUTIONAL:    Awake, alert, cooperative, no apparent distress, and appears stated age    EYES:    EOMI, sclera clear, conjunctiva normal    ENT:    Normocephalic, atraumatic,  External ears without lesions.      NECK:    Supple, symmetrical, trachea midline, no JVD    HEMATOLOGIC/LYMPHATICS:    No cervical lymphadenopathy and no supraclavicular lymphadenopathy    LUNGS:    Symmetric. No increased work of breathing, good air exchange, clear to auscultation bilaterally, no wheezes, rhonchi, or rales,     CARDIOVASCULAR:    Normal apical impulse, regular rate and rhythm, normal S1 and S2, no S3 or S4, and no murmur noted    ABDOMEN:     soft, non-distended, non-tender  Patient's abdomen is distended    MUSCULOSKELETAL:    There is no redness, warmth, or swelling of the joints. NEUROLOGIC:    Awake, alert, oriented to name, place and time. SKIN:    No bruising or bleeding. No redness, warmth, or swelling    EXTREMITIES:    Peripheral pulses present. No edema, cyanosis, or swelling.     LINES/CATHETERS     LABORATORY DATA:  CBC with Differential:    Lab Results   Component Value Date/Time    WBC 4.2 06/30/2022 05:26 PM    RBC 3.40 06/30/2022 05:26 PM    HGB 8.7 06/30/2022 05:26 PM    HCT 27.8 06/30/2022 05:26 PM    PLT 60 06/30/2022 05:26 PM    MCV 81.8 06/30/2022 05:26 PM    MCH 25.6 06/30/2022 05:26 PM    MCHC 31.3 06/30/2022 05:26 PM    RDW 18.7 06/30/2022 05:26 PM    SEGSPCT 61 02/20/2012 04:40 AM    LYMPHOPCT 6.1 06/30/2022 05:26 PM    MONOPCT 7.8 06/30/2022 05:26 PM    MYELOPCT 1.7 01/11/2021 06:40 AM    BASOPCT 0.9 06/30/2022 05:26 PM    MONOSABS 0.34 06/30/2022 05:26 PM    LYMPHSABS 0.25 06/30/2022 05:26 PM    EOSABS 0.55 06/30/2022 05:26 PM    BASOSABS 0.00 06/30/2022 05:26 PM     CMP:    Lab Results   Component Value Date/Time     06/30/2022 05:26 PM    K 3.9 06/30/2022 05:26 PM     06/30/2022 05:26 PM    CO2 25 06/30/2022 05:26 PM    BUN 12 06/30/2022 05:26 PM    CREATININE 0.9 06/30/2022 05:26 PM    GFRAA >60 06/30/2022 05:26 PM    LABGLOM >60 06/30/2022 05:26 PM    GLUCOSE 115 06/30/2022 05:26 PM    GLUCOSE 89 02/20/2012 04:40 AM    PROT 6.4 06/30/2022 05:26 PM    LABALBU 2.9 06/30/2022 05:26 PM    LABALBU 3.4 02/18/2012 05:00 AM    CALCIUM 8.0 06/30/2022 05:26 PM    BILITOT 1.3 06/30/2022 05:26 PM    ALKPHOS 108 06/30/2022 05:26 PM    AST 66 06/30/2022 05:26 PM    ALT 33 06/30/2022 05:26 PM       ASSESSMENT/PLAN:  1. Decompensated cirrhosis  2. Pancytopenia  3. 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 History of esophageal varices status post banding  5. History of tobacco abuse    Patient presented due to abdominal distention and discomfort. Patient states he has not been taking his diuretic medications as prescribed over the last 3 days due to inability to obtain medication. Paracentesis ordered. GI consulted.     Debby Briggs, [facility]  8:13 PM  6/30/2022    Electronically signed by Debby Briggs DO on 6/30/22 at 8:13 PM EDT

## 2022-07-01 NOTE — DISCHARGE SUMMARY
[facility] Physician Discharge Summary       MD [address]  Shiv Chacho [address] [facility] [address], [address] [facility]  [phone]            Activity level: Activity as tolerated    Diet: ADULT DIET; Regular; Low Sodium (2 gm)    Labs: Per GI    Condition at discharge: Stable    Dispo: Patient left against medical advice        Patient ID:  Tacey Spurling  [id number]  40 y.o.  1980    Admit date: 6/30/2022    Discharge date and time:  7/1/2022  5:38 PM    Admission Diagnoses: Principal Problem:    Decompensation of cirrhosis of liver ([address]  Active Problems:    Anemia    Thrombocytopenia ([address]    Ascites  Resolved Problems:    * No resolved hospital problems. *      Discharge Diagnoses: Principal Problem:    Decompensation of cirrhosis of liver (HCC)  Active Problems:    Anemia    Thrombocytopenia (HCC)    Ascites  Resolved Problems:    * No resolved hospital problems. *      Consults:  IP CONSULT TO GI  IP CONSULT TO UROLOGY    Procedures: Paracentesis    Hospital Course: Robles Gallegos is a 40year old male with a history of cirrhosis, alcohol and drug abuse, hepatitis C and esophageal varices that presented to the Emergency Department with abdominal and scrotal pain and distention. He had run out of his Lasix since Monday. GI was consulted and he was started on Rifaximin and Protonix. He had an ultrasound guided paracentesis with 4150 ml drained. He did have hematuria in addition to his scrotal swelling. Urology was consulted. The patient has seen a Urologist at [facility] and  surgery at that time was not indicated due to massive ascites and cirrhosis and was recommended to see their Hepatology Department. He was ordered Albumin post paracentesis but the patient refused and said that he was leaving AMA.   He did leave AMA, however new prescriptions were still sent to his Pharmacy for Lasix as well as the Rifaximin and Protonix that were started by GI. Discharge Exam:  Vitals:    07/01/22 0956 07/01/22 1454 07/01/22 1524 07/01/22 1615   BP:    111/68   Pulse:    83   Resp: 20 18 18    Temp:       TempSrc:       SpO2:       Weight:       Height:         General Appearance: alert and oriented to person, place and time and in no acute distress  Skin: warm and dry  Head: normocephalic and atraumatic  Eyes: pupils equal, round, and reactive to light, extraocular eye movements intact, conjunctivae normal  Neck: neck supple and non tender without mass   Pulmonary/Chest: clear to auscultation bilaterally- no wheezes, rales or rhonchi, normal air movement, no respiratory distress  Cardiovascular: normal rate, normal S1 and S2   Abdomen: tender, distended, normal bowel sounds  Extremities: no cyanosis, no clubbing and no edema  Neurologic: no cranial nerve deficit and speech normal    I/O last 3 completed shifts:  In: -   Out: 200 [Urine:200]  I/O this shift:  In: 10 [I.V.:10]  Out: 1200 [Urine:1200]      LABS:  Recent Labs     06/30/22  1726 07/01/22  0635    134   K 3.9 3.6    106   CO2 25 23   BUN 12 11   CREATININE 0.9 0.9   GLUCOSE 115* 97   CALCIUM 8.0* 7.9*       Recent Labs     06/30/22  1726 07/01/22  0635   WBC 4.2* 3.8*   RBC 3.40* 3.26*   HGB 8.7* 8.4*   HCT 27.8* 26.2*   MCV 81.8 80.4   MCH 25.6* 25.8*   MCHC 31.3* 32.1   RDW 18.7* 18.6*   PLT 60* 46*   MPV 9.0 8.9       No results for input(s): POCGLU in the last 72 hours. Imaging:  US LIVER    Result Date: 7/1/2022  EXAMINATION: RIGHT UPPER QUADRANT ULTRASOUND 7/1/2022 2:01 pm COMPARISON: CT abdomen and pelvis from February 20, 2022 HISTORY: ORDERING SYSTEM PROVIDED HISTORY: Rule out mass TECHNOLOGIST PROVIDED HISTORY: Reason for exam:->Rule out mass What reading provider will be dictating this exam?->CRC FINDINGS: LIVER:  Atrophic liver. Nodular liver contours.   No intrahepatic mass or intrahepatic ductal dilatation seen. BILIARY SYSTEM:  Gallbladder is contracted. Diffuse gallbladder wall thickening/edema. No shadowing stones or sludge. Negative sonographic Terisa Balloon sign reported. Common bile duct is within normal limits measuring 3-4 mm. RIGHT KIDNEY: Not well seen on this study. PANCREAS:  Visualized portions of the pancreas are unremarkable. OTHER: Small volume simple appearing intra-ascites. VASCULATURE: Normal color flow and spectral waveform in the main portal vein. Left and right portal veins show normal color flow and spectral waveforms. Right, middle, and left hepatic veins appear patent. Normal color flow and spectral waveform. Normal color flow and spectral waveform in the hepatic artery. 1.  Atrophic and cirrhotic appearing liver. No hepatic mass or intrahepatic ductal dilatation. 2.  Portal venous system and hepatic venous system appear patent with normal color flow and spectral waveforms. Normal color flow and spectral waveform in the hepatic artery. 3.  Gallbladder is contracted. Diffuse gallbladder wall thickening/edema. No cholelithiasis. Negative sonographic Terisa Balloon sign reported. 4.  Small volume simple appearing intra-ascites. IR US GUIDED PARACENTESIS    Result Date: 7/1/2022  PROCEDURE: PARACENTESIS WITHOUT IMAGE GUIDANCE US ABDOMEN LIMITED 7/1/2022 HISTORY: ORDERING SYSTEM PROVIDED HISTORY: paracentesis TECHNOLOGIST PROVIDED HISTORY: Reason for exam:->paracentesis TECHNIQUE: Informed consent was obtained after a detailed explanation of the procedure including risks, benefits, and alternatives. Universal protocol was followed. A limited ultrasound of the abdomen was performed. The right abdomen was prepped and draped in sterile fashion and local anesthesia was achieved with lidocaine. A 5 Somali needle sheath was advanced into ascites under direct ultrasound guidance and paracentesis was performed. The patient tolerated the procedure well.  FINDINGS: Limited ultrasound daily  Qty: 60 tablet, Refills: 0      pantoprazole (PROTONIX) 40 MG tablet Take 1 tablet by mouth every morning (before breakfast)  Qty: 30 tablet, Refills: 0         CONTINUE these medications which have CHANGED    Details   furosemide (LASIX) 40 MG tablet Take 1 tablet by mouth daily  Qty: 30 tablet, Refills: 0         CONTINUE these medications which have NOT CHANGED    Details   NONFORMULARY daily as needed Medical marijuana edibles or vapes       spironolactone (ALDACTONE) 100 MG tablet Take 100 mg by mouth daily         STOP taking these medications       ibuprofen (ADVIL;MOTRIN) 800 MG tablet Comments:   Reason for Stopping:                 Note that more than 30 minutes was spent in preparing discharge papers, discussing discharge with patient, medication review, etc.    NOTE: This report was transcribed using voice recognition software. Every effort was made to ensure accuracy; however, inadvertent computerized transcription errors may be present.      Signed:  Electronically signed by CHRIS Monreal CNP on 7/1/2022 at 5:38 PM

## 2022-07-01 NOTE — PROGRESS NOTES
Patient here for ultrasound guided paracentesis. Instructions given and questions answered. Consent signed. Abdomen scanned and marked under the guidance of procedural Radiologist.     1334  start procedure /75  76  18  100% on room air     1353  end procedure /66  73  18  100% on room air     4150 cc drained of hazy yellow colored ascites fluid.       Dry sterile dressing of 2x2 and tegaderm to RLQ     Specimen sent to the lab, RN notified to print and send labels    Nurse to nurse given to [facility]    Patient transported to [facility] [facility] for additional testing

## 2022-07-01 NOTE — CARE COORDINATION
7/1/2022 1145 CM note: No covid testing. Met with patient for transition of care needs. Pt is independent with ADLs and resides with his girlfriend. His PCP recently retired and pt requested [facility] PCP list-list given for pt to review. He obtains medications from [facility] . Pt is  for paracentesis today. He plans to [place] home at [place].  CM/SW to follow for new PCP appt and possible [facility] referral. Molina HARTMAN

## 2022-07-01 NOTE — H&P
[address] [place], [place] [address]ist Group   History and Physical      CHIEF COMPLAINT:  Abdominal and scrotal pain and distention    History of Present Illness:  40 y.o. male with a history of Cirrhosis, drug abuse, Hepatitis C, esophageal varices presents with abdominal and scrotal pain and distension. He reports that his overall health has been declining since his umbilical hernia surgery in September and then inguinal hernia surgery in February. He ran out of his Lasix prescription on Monday and said that he was unable to get a refill. He was only taking his Aldactone every other day because when he takes it without the Lasix he gets dehydrated. He has noticed increased distention in his abdomen and testicles since then and has been having difficulty urinating. He said that he had an appointment today to see his Urologist to possibly have his \"testicles drained. \"  He reports occasional alcohol use and shared a bottle of wine with his wife 2 days ago. He is a former smoker and quit over 5 years ago. He said that he is a former Heroin user and last used about one year ago. Informant(s) for H&P: Patient    REVIEW OF SYSTEMS:  no fevers, chills, cp, n/v, ha, vision/hearing changes, wt changes, hot/cold flashes, other open skin lesions, diarrhea, constipation, dysuria/hematuria unless noted in HPI. Complete ROS performed with the patient and is otherwise negative.       PMH:  Past Medical History:   Diagnosis Date    Esophageal varices ([address]        Surgical History:  Past Surgical History:   Procedure Laterality Date    ENDOSCOPY, COLON, DIAGNOSTIC      HERNIA REPAIR      HERNIA REPAIR Left 2/7/2022    LAPAROSCOPIC LEFT INGUINAL HERNIA REPAIR WITH MESH POSSIBLE OPEN POSSIBLE BILATERAL performed by Ryan Galan MD at [facility] N/A 9/15/2021    LAPAROSCOPIC POSSIBLE OPEN UMBILICAL HERNIA REPAIR WITH [address] performed by Ryan Galan MD at [address] [place] [address]  UPPER GASTROINTESTINAL ENDOSCOPY N/A 11/13/2020    EGD BIOPSY performed by Joellen Hernandez MD at [address] [facility] N/A 1/6/2021    EGD BAND LIGATION performed by Allyson Salinas MD at [address] [facility] N/A 5/10/2021    EGD BAND LIGATION performed by Allyson Salinas MD at [address]       Medications Prior to Admission:    Prior to Admission medications    Medication Sig Start Date End Date Taking? Authorizing Provider   furosemide (LASIX) 40 MG tablet TAKE ONE TABLET BY MOUTH EVERY DAY 2/5/22   Historical Provider, MD   ibuprofen (ADVIL;MOTRIN) 800 MG tablet Take 1 tablet by mouth every 6 hours as needed for Pain 2/16/22   Chantel Russell MD   NONFORMULARY daily as needed Medical marijuana edibles or vapes     Historical Provider, MD   spironolactone (ALDACTONE) 100 MG tablet Take 100 mg by mouth daily    Historical Provider, MD       Allergies:    Pcn [penicillins]    Social History:    reports that he has quit smoking. He smoked 0.50 packs per day. He has never used smokeless tobacco. He reports current alcohol use. He reports current drug use. Drugs: Opiates , Methamphetamines (Crystal Meth), and Heroin. Family History:   family history includes Diabetes in his father.      PHYSICAL EXAM:  Vitals:  /72   Pulse 80   Temp 98 °F (36.7 °C) (Oral)   Resp 20   Ht 5' 8\" (1.727 m)   Wt 169 lb (76.7 kg)   SpO2 100%   BMI 25.70 kg/m²     General Appearance: alert and oriented to person, place and time and in no acute distress  Skin: warm and dry  Head: normocephalic and atraumatic  Eyes: pupils equal, round, and reactive to light, extraocular eye movements intact, conjunctivae normal  Neck: neck supple and non tender without mass   Pulmonary/Chest: clear to auscultation bilaterally- no wheezes, rales or rhonchi, normal air movement, no respiratory distress  Cardiovascular: normal rate, normal S1 and S2   Abdomen: tender, firm, distended, normal bowel sounds   Extremities: no cyanosis, no clubbing and no edema  Neurologic: no cranial nerve deficit and speech normal    LABS:  Recent Labs     06/30/22  1726 07/01/22  0635    134   K 3.9 3.6    106   CO2 25 23   BUN 12 11   CREATININE 0.9 0.9   GLUCOSE 115* 97   CALCIUM 8.0* 7.9*       Recent Labs     06/30/22  1726 07/01/22  0635   WBC 4.2* 3.8*   RBC 3.40* 3.26*   HGB 8.7* 8.4*   HCT 27.8* 26.2*   MCV 81.8 80.4   MCH 25.6* 25.8*   MCHC 31.3* 32.1   RDW 18.7* 18.6*   PLT 60* 46*   MPV 9.0 8.9       No results for input(s): POCGLU in the last 72 hours. Radiology: No results found. EKG: ST    ASSESSMENT:      Principal Problem:    Decompensation of cirrhosis of liver (HCC)  Active Problems:    Anemia    Thrombocytopenia (HCC)    Ascites  Resolved Problems:    * No resolved hospital problems. *      PLAN:    1. Decompensate cirrhosis of liver:  History of alcohol abuse and Hepatitis C. Continue Lasix and Aldactone. For paracentesis today. Ultrasound of the liver and AFP tumor marking ordered by GI. Rifaximin started today. 2. Anemia:  Hgb is 8.4.  GI following and will consider EGD if precipitous drop in H&H or overt bleed. 3. Thrombocytopenia:  Platelet count is 46. No anticoagulation. SCDs ordered. 4. Testicular edema and hematuria:  Ultrasound of the scrotum to be done. Consult Urology. 5. Hypophosphatemia:  Replace and recheck in am.   6. Abnormal thyroid lab work:  TSH was 1.130 and T4 free was 0.78. Repeat in 2 weeks as an outpatient. 7. Hepatitis C:  Patient reports history of treatment. Viral load ordered by GI.   8. History of drug and alcohol abuse:  Patient reports that he only drinks occasionally and his last drink was 2 days ago when he drank a bottle of wine with his wife and his last heroin use was one year ago. Code Status: Full  DVT prophylaxis: SCDs.   No anticoagulation due to thrombocytopenia    NOTE: This report was transcribed using voice recognition software.  Every effort was made to ensure accuracy; however, inadvertent computerized transcription errors may be present.     Electronically signed by CHRIS Simental CNP on 7/1/2022 at 12:42 PM

## 2022-07-01 NOTE — CONSULTS
7/1/2022 2:42 PM  Service: Urology  Group: [facility] ([facility])    Jeronimo Astudillo III  [id number]     Chief Complaint:    Scrotal swelling, hematuria, ascites     History of Present Illness: The patient is a 40 y.o. male patient who has a history of cirrhosis and presented to the hospital yesterday with complaints of abdominal pain and distention. He underwent paracentesis today with interventional radiology. We were asked to evaluate him for scrotal swelling and hematuria. His urinalysis was unremarkable for any gross hematuria or microscopic hematuria. He reports he had one episode of blood tinged urine that has since resolved. He was seen by our practice in February for scrotal swelling following hernia repair. He had an umbilical hernia and left inguinal hernia repair with Dr. Jamila Johnson  He was seen by our practice when he presented to the hospital following the procedure for scrotal swelling. He was found to have a large hematoma and severe ascites. He underwent paracentesis at that time. He was then seen in our office in April for scrotal swelling  He had aspiration of left hydrocele for  1200cc of yellow fluid at that time. IT was discussed at that time that kathryn may be a connection to his abdomen and he does have bad ascites. It was discussed that surgery would be very difficult and likely unsuccessful due to his cirrhosis. He did go see [facility] Dr. Andrea Eckert reviewed his chart and agreed that  surgery was not indicated due to his massive ascites and cirrhosis. She recommended he see their hepatology department for management of his ascites.            Past Medical History:   Diagnosis Date    Esophageal varices (HCC)          Past Surgical History:   Procedure Laterality Date    ENDOSCOPY, COLON, DIAGNOSTIC      HERNIA REPAIR      HERNIA REPAIR Left 2/7/2022    LAPAROSCOPIC LEFT INGUINAL HERNIA REPAIR WITH MESH POSSIBLE OPEN POSSIBLE BILATERAL performed by Petra Gu MD at [facility] N/A 9/15/2021    LAPAROSCOPIC POSSIBLE OPEN UMBILICAL HERNIA REPAIR WITH [address] performed by Petra Gu MD at [address] 11/13/2020    EGD BIOPSY performed by Sekou Valdez MD at [address] [facility] N/A 1/6/2021    EGD BAND LIGATION performed by Mykel Dallas MD at [address] [facility] N/A 5/10/2021    EGD BAND LIGATION performed by Mykel Dallas MD at [address]       Medications Prior to Admission:    Medications Prior to Admission: furosemide (LASIX) 40 MG tablet, TAKE ONE TABLET BY MOUTH EVERY DAY  ibuprofen (ADVIL;MOTRIN) 800 MG tablet, Take 1 tablet by mouth every 6 hours as needed for Pain  NONFORMULARY, daily as needed Medical marijuana edibles or vapes   spironolactone (ALDACTONE) 100 MG tablet, Take 100 mg by mouth daily    Allergies:    Pcn [penicillins]    Social History:    reports that he has quit smoking. He smoked 0.50 packs per day. He has never used smokeless tobacco. He reports current alcohol use. He reports current drug use. Drugs: Opiates , Methamphetamines (Crystal Meth), and Heroin. Family History:   Non-contributory to this Urological problem  family history includes Diabetes in his father.     Review of Systems:  Constitutional: No fever or chills   Respiratory: negative for cough and hemoptysis  Cardiovascular: negative for chest pain and dyspnea  Gastrointestinal: + abdominal pain   Derm: negative for rash and skin lesion(s)  Neurological: negative for seizures and tremors  Musculoskeletal: Negative    Psychiatric: Negative   : As above in the HPI, otherwise negative  All other reviews are negative    Physical Exam:     Vitals:  /72   Pulse 80   Temp 98 °F (36.7 °C) (Oral)   Resp 20   Ht 5' 8\" (1.727 m)   Wt 169 lb (76.7 kg)   SpO2 100%   BMI 25.70 kg/m²     General:  Awake, alert,

## 2022-07-02 LAB — GRAM STAIN ORDERABLE: NORMAL

## 2022-07-04 LAB
BODY FLUID CULTURE, STERILE: NORMAL
GRAM STAIN RESULT: NORMAL

## 2023-01-05 ENCOUNTER — HOSPITAL ENCOUNTER (OUTPATIENT)
Dept: INTERVENTIONAL RADIOLOGY/VASCULAR | Age: 43
Discharge: HOME | End: 2023-01-07
Payer: COMMERCIAL

## 2023-01-05 VITALS
DIASTOLIC BLOOD PRESSURE: 65 MMHG | TEMPERATURE: 98.8 F | RESPIRATION RATE: 18 BRPM | HEART RATE: 90 BPM | SYSTOLIC BLOOD PRESSURE: 121 MMHG | OXYGEN SATURATION: 100 %

## 2023-01-05 DIAGNOSIS — R18.8 OTHER ASCITES: ICD-10-CM

## 2023-01-05 LAB
ALBUMIN FLUID: 0.5 G/DL
AMYLASE FLUID: 17 U/L
FLUID TYPE: NORMAL
INR BLD: 1.8
PLATELET # BLD: 52 E9/L (ref 130–450)
PLATELET CONFIRMATION: NORMAL
PROTEIN FLUID: 0.9 G/DL
PROTHROMBIN TIME: 19.1 SEC (ref 9.3–12.4)

## 2023-01-05 PROCEDURE — C1729 CATH, DRAINAGE: HCPCS

## 2023-01-05 PROCEDURE — 84157 ASSAY OF PROTEIN OTHER: CPT

## 2023-01-05 PROCEDURE — 7100000010 HC PHASE II RECOVERY - FIRST 15 MIN

## 2023-01-05 PROCEDURE — 2500000003 HC RX 250 WO HCPCS: Performed by: RADIOLOGY

## 2023-01-05 PROCEDURE — 36415 COLL VENOUS BLD VENIPUNCTURE: CPT

## 2023-01-05 PROCEDURE — 49083 ABD PARACENTESIS W/IMAGING: CPT

## 2023-01-05 PROCEDURE — 87070 CULTURE OTHR SPECIMN AEROBIC: CPT

## 2023-01-05 PROCEDURE — 85049 AUTOMATED PLATELET COUNT: CPT

## 2023-01-05 PROCEDURE — 82042 OTHER SOURCE ALBUMIN QUAN EA: CPT

## 2023-01-05 PROCEDURE — 85610 PROTHROMBIN TIME: CPT

## 2023-01-05 PROCEDURE — 82150 ASSAY OF AMYLASE: CPT

## 2023-01-05 PROCEDURE — 87205 SMEAR GRAM STAIN: CPT

## 2023-01-05 PROCEDURE — 88112 CYTOPATH CELL ENHANCE TECH: CPT

## 2023-01-05 PROCEDURE — 89051 BODY FLUID CELL COUNT: CPT

## 2023-01-05 PROCEDURE — 88305 TISSUE EXAM BY PATHOLOGIST: CPT

## 2023-01-05 RX ORDER — LIDOCAINE HYDROCHLORIDE 20 MG/ML
INJECTION, SOLUTION INFILTRATION; PERINEURAL
Status: COMPLETED | OUTPATIENT
Start: 2023-01-05 | End: 2023-01-05

## 2023-01-05 RX ADMIN — LIDOCAINE HYDROCHLORIDE 8 ML: 20 INJECTION, SOLUTION INFILTRATION; PERINEURAL at 09:15

## 2023-01-05 NOTE — PROGRESS NOTES
Pt did not require albumin for paracentesis. IV removed, pressure held until stasis. Pt discharged post procedure. Pt reports he feels well, VSS . Denies lightheadedness. Ambulated steadily to door without difficulty.

## 2023-01-06 LAB
APPEARANCE FLUID: CLEAR
CELL COUNT FLUID TYPE: NORMAL
COLOR FLUID: NORMAL
MONOCYTE, FLUID: 88 %
NEUTROPHIL, FLUID: 12 %
NUCLEATED CELLS FLUID: 100 /UL
RBC FLUID: <2000 /UL

## 2023-01-08 LAB
BODY FLUID CULTURE, STERILE: NORMAL
GRAM STAIN RESULT: NORMAL

## 2023-01-23 ENCOUNTER — APPOINTMENT (OUTPATIENT)
Dept: CT IMAGING | Age: 43
End: 2023-01-23
Payer: COMMERCIAL

## 2023-01-23 ENCOUNTER — APPOINTMENT (OUTPATIENT)
Dept: GENERAL RADIOLOGY | Age: 43
End: 2023-01-23
Payer: COMMERCIAL

## 2023-01-23 ENCOUNTER — HOSPITAL ENCOUNTER (EMERGENCY)
Age: 43
Discharge: HOME | End: 2023-01-23
Attending: STUDENT IN AN ORGANIZED HEALTH CARE EDUCATION/TRAINING PROGRAM
Payer: COMMERCIAL

## 2023-01-23 VITALS
OXYGEN SATURATION: 98 % | HEART RATE: 91 BPM | BODY MASS INDEX: 25.09 KG/M2 | RESPIRATION RATE: 16 BRPM | DIASTOLIC BLOOD PRESSURE: 58 MMHG | TEMPERATURE: 99.7 F | WEIGHT: 165 LBS | SYSTOLIC BLOOD PRESSURE: 113 MMHG

## 2023-01-23 DIAGNOSIS — R10.9 ABDOMINAL PAIN, UNSPECIFIED ABDOMINAL LOCATION: ICD-10-CM

## 2023-01-23 DIAGNOSIS — R11.2 NAUSEA AND VOMITING, UNSPECIFIED VOMITING TYPE: Primary | ICD-10-CM

## 2023-01-23 LAB
ALBUMIN SERPL-MCNC: 3.2 G/DL (ref 3.5–5.2)
ALP BLD-CCNC: 349 U/L (ref 40–129)
ALT SERPL-CCNC: 30 U/L (ref 0–40)
ANION GAP SERPL CALCULATED.3IONS-SCNC: 11 MMOL/L (ref 7–16)
ANISOCYTOSIS: ABNORMAL
APTT: 32.6 SEC (ref 24.5–35.1)
AST SERPL-CCNC: 72 U/L (ref 0–39)
BASOPHILS ABSOLUTE: 0 E9/L (ref 0–0.2)
BASOPHILS RELATIVE PERCENT: 0.4 % (ref 0–2)
BILIRUB SERPL-MCNC: 2.9 MG/DL (ref 0–1.2)
BILIRUBIN DIRECT: 1.4 MG/DL (ref 0–0.3)
BILIRUBIN, INDIRECT: 1.5 MG/DL (ref 0–1)
BUN BLDV-MCNC: 13 MG/DL (ref 6–20)
BURR CELLS: ABNORMAL
CALCIUM SERPL-MCNC: 8.7 MG/DL (ref 8.6–10.2)
CHLORIDE BLD-SCNC: 102 MMOL/L (ref 98–107)
CO2: 23 MMOL/L (ref 22–29)
CREAT SERPL-MCNC: 1 MG/DL (ref 0.7–1.2)
EOSINOPHILS ABSOLUTE: 0 E9/L (ref 0.05–0.5)
EOSINOPHILS RELATIVE PERCENT: 0.6 % (ref 0–6)
GFR SERPL CREATININE-BSD FRML MDRD: >60 ML/MIN/1.73
GLUCOSE BLD-MCNC: 157 MG/DL (ref 74–99)
HCT VFR BLD CALC: 31.6 % (ref 37–54)
HEMOGLOBIN: 11.1 G/DL (ref 12.5–16.5)
INR BLD: 1.5
LACTIC ACID: 2.1 MMOL/L (ref 0.5–2.2)
LACTIC ACID: 3.1 MMOL/L (ref 0.5–2.2)
LIPASE: 71 U/L (ref 13–60)
LYMPHOCYTES ABSOLUTE: 0 E9/L (ref 1.5–4)
LYMPHOCYTES RELATIVE PERCENT: 2.4 % (ref 20–42)
MCH RBC QN AUTO: 29.6 PG (ref 26–35)
MCHC RBC AUTO-ENTMCNC: 35.1 % (ref 32–34.5)
MCV RBC AUTO: 84.3 FL (ref 80–99.9)
MONOCYTES ABSOLUTE: 0.28 E9/L (ref 0.1–0.95)
MONOCYTES RELATIVE PERCENT: 4.3 % (ref 2–12)
NEUTROPHILS ABSOLUTE: 6.72 E9/L (ref 1.8–7.3)
NEUTROPHILS RELATIVE PERCENT: 95.7 % (ref 43–80)
OVALOCYTES: ABNORMAL
PDW BLD-RTO: 17.5 FL (ref 11.5–15)
PLATELET # BLD: 71 E9/L (ref 130–450)
PLATELET CONFIRMATION: NORMAL
PMV BLD AUTO: 9.6 FL (ref 7–12)
POIKILOCYTES: ABNORMAL
POLYCHROMASIA: ABNORMAL
POTASSIUM SERPL-SCNC: 4.4 MMOL/L (ref 3.5–5)
PROTHROMBIN TIME: 16.9 SEC (ref 9.3–12.4)
RBC # BLD: 3.75 E12/L (ref 3.8–5.8)
SODIUM BLD-SCNC: 136 MMOL/L (ref 132–146)
TARGET CELLS: ABNORMAL
TEAR DROP CELLS: ABNORMAL
TOTAL PROTEIN: 6.6 G/DL (ref 6.4–8.3)
WBC # BLD: 7 E9/L (ref 4.5–11.5)

## 2023-01-23 PROCEDURE — 85730 THROMBOPLASTIN TIME PARTIAL: CPT

## 2023-01-23 PROCEDURE — 6360000002 HC RX W HCPCS: Performed by: STUDENT IN AN ORGANIZED HEALTH CARE EDUCATION/TRAINING PROGRAM

## 2023-01-23 PROCEDURE — 6360000004 HC RX CONTRAST MEDICATION: Performed by: RADIOLOGY

## 2023-01-23 PROCEDURE — 85025 COMPLETE CBC W/AUTO DIFF WBC: CPT

## 2023-01-23 PROCEDURE — 99285 EMERGENCY DEPT VISIT HI MDM: CPT

## 2023-01-23 PROCEDURE — 71045 X-RAY EXAM CHEST 1 VIEW: CPT

## 2023-01-23 PROCEDURE — 83690 ASSAY OF LIPASE: CPT

## 2023-01-23 PROCEDURE — 96376 TX/PRO/DX INJ SAME DRUG ADON: CPT

## 2023-01-23 PROCEDURE — 2580000003 HC RX 258: Performed by: STUDENT IN AN ORGANIZED HEALTH CARE EDUCATION/TRAINING PROGRAM

## 2023-01-23 PROCEDURE — 96375 TX/PRO/DX INJ NEW DRUG ADDON: CPT

## 2023-01-23 PROCEDURE — 96372 THER/PROPH/DIAG INJ SC/IM: CPT

## 2023-01-23 PROCEDURE — 74177 CT ABD & PELVIS W/CONTRAST: CPT

## 2023-01-23 PROCEDURE — 93005 ELECTROCARDIOGRAM TRACING: CPT | Performed by: STUDENT IN AN ORGANIZED HEALTH CARE EDUCATION/TRAINING PROGRAM

## 2023-01-23 PROCEDURE — 85610 PROTHROMBIN TIME: CPT

## 2023-01-23 PROCEDURE — 96374 THER/PROPH/DIAG INJ IV PUSH: CPT

## 2023-01-23 PROCEDURE — 2580000003 HC RX 258: Performed by: EMERGENCY MEDICINE

## 2023-01-23 PROCEDURE — 80076 HEPATIC FUNCTION PANEL: CPT

## 2023-01-23 PROCEDURE — 36415 COLL VENOUS BLD VENIPUNCTURE: CPT

## 2023-01-23 PROCEDURE — 80048 BASIC METABOLIC PNL TOTAL CA: CPT

## 2023-01-23 PROCEDURE — 83605 ASSAY OF LACTIC ACID: CPT

## 2023-01-23 RX ORDER — FENTANYL CITRATE 0.05 MG/ML
50 INJECTION, SOLUTION INTRAMUSCULAR; INTRAVENOUS ONCE
Status: COMPLETED | OUTPATIENT
Start: 2023-01-23 | End: 2023-01-23

## 2023-01-23 RX ORDER — 0.9 % SODIUM CHLORIDE 0.9 %
1000 INTRAVENOUS SOLUTION INTRAVENOUS ONCE
Status: COMPLETED | OUTPATIENT
Start: 2023-01-23 | End: 2023-01-23

## 2023-01-23 RX ORDER — HYDROCODONE BITARTRATE AND ACETAMINOPHEN 5; 325 MG/1; MG/1
1 TABLET ORAL EVERY 6 HOURS PRN
Qty: 10 TABLET | Refills: 0 | Status: SHIPPED | OUTPATIENT
Start: 2023-01-23 | End: 2023-01-26

## 2023-01-23 RX ORDER — PROCHLORPERAZINE EDISYLATE 5 MG/ML
10 INJECTION INTRAMUSCULAR; INTRAVENOUS ONCE
Status: COMPLETED | OUTPATIENT
Start: 2023-01-23 | End: 2023-01-23

## 2023-01-23 RX ADMIN — PROCHLORPERAZINE EDISYLATE 10 MG: 5 INJECTION INTRAMUSCULAR; INTRAVENOUS at 06:38

## 2023-01-23 RX ADMIN — FENTANYL CITRATE 50 MCG: 0.05 INJECTION, SOLUTION INTRAMUSCULAR; INTRAVENOUS at 06:16

## 2023-01-23 RX ADMIN — SODIUM CHLORIDE 1000 ML: 9 INJECTION, SOLUTION INTRAVENOUS at 08:56

## 2023-01-23 RX ADMIN — TRIMETHOBENZAMIDE HYDROCHLORIDE 200 MG: 100 INJECTION INTRAMUSCULAR at 06:15

## 2023-01-23 RX ADMIN — FENTANYL CITRATE 50 MCG: 0.05 INJECTION, SOLUTION INTRAMUSCULAR; INTRAVENOUS at 11:25

## 2023-01-23 RX ADMIN — IOPAMIDOL 75 ML: 755 INJECTION, SOLUTION INTRAVENOUS at 08:28

## 2023-01-23 RX ADMIN — SODIUM CHLORIDE 1000 ML: 9 INJECTION, SOLUTION INTRAVENOUS at 06:15

## 2023-01-23 RX ADMIN — FENTANYL CITRATE 50 MCG: 50 INJECTION INTRAMUSCULAR; INTRAVENOUS at 08:57

## 2023-01-23 ASSESSMENT — PAIN SCALES - GENERAL
PAINLEVEL_OUTOF10: 8
PAINLEVEL_OUTOF10: 8
PAINLEVEL_OUTOF10: 10

## 2023-01-23 ASSESSMENT — PAIN DESCRIPTION - LOCATION
LOCATION: ABDOMEN
LOCATION: ABDOMEN

## 2023-01-23 ASSESSMENT — PAIN - FUNCTIONAL ASSESSMENT: PAIN_FUNCTIONAL_ASSESSMENT: INTOLERABLE, UNABLE TO DO ANY ACTIVE OR PASSIVE ACTIVITIES

## 2023-01-23 ASSESSMENT — PAIN DESCRIPTION - ONSET: ONSET: ON-GOING

## 2023-01-23 ASSESSMENT — PAIN DESCRIPTION - ORIENTATION
ORIENTATION: RIGHT;LEFT;UPPER;LOWER
ORIENTATION: MID;LOWER

## 2023-01-23 ASSESSMENT — PAIN DESCRIPTION - DESCRIPTORS: DESCRIPTORS: SHARP

## 2023-01-23 ASSESSMENT — PAIN DESCRIPTION - FREQUENCY: FREQUENCY: CONTINUOUS

## 2023-01-23 ASSESSMENT — PAIN DESCRIPTION - PAIN TYPE: TYPE: ACUTE PAIN
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
## 2023-01-23 NOTE — DISCHARGE INSTRUCTIONS
Increase fluids  Increase diet as tolerated  Use pain medicine as needed  Use nausea medicine as needed  If you notice any new worrisome symptoms please return to the emergency department for evaluation

## 2023-01-23 NOTE — ED NOTES
PT brought oral contrast to drink for CT imaging of abd. PT took very small sip and is refusing to drink any more at this time. PT is still nauseated and is passing flatus and burping. PT is very uncomfortable@ this time and refusing oral contrast fluids.      Stefani Moffett RN  01/23/23 7502

## 2023-01-23 NOTE — ED NOTES
Checked on pt, has nearly 800cc of NS from first bag still present. Will complete additional fluid orders/lab recheck with completion.      Delia Freed RN  01/23/23 7070
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
## 2023-01-23 NOTE — ED PROVIDER NOTES
[facility] EMERGENCY DEPARTMENT  EMERGENCY DEPARTMENT ENCOUNTER        Pt Name: Johnathan Yanez III  MRN: [id number]  Birthdate 1980  Date of evaluation: 1/23/2023  Provider: Lyle Dorsey DO  PCP: Blas Manzo Jr, MD  Note Started: 1:31 PM EST 1/23/23    CHIEF COMPLAINT       Chief Complaint   Patient presents with    Abdominal Pain    Emesis       HISTORY OF PRESENT ILLNESS: 1 or more Elements   History From: Patient    Limitations to history : None    Johnathan Yanez III is a 43 y.o. male Past medical history of hepatitis C, as well as decompensated liver cirrhosis status post TIPS.  Patient presents with chief complaint of abdominal pain as well as nausea and vomiting.  Patient states that he has had gradually worsening abdominal pain as well as multiple sites of nausea and vomiting since early this morning.  Patient describes abdominal pain as a sharp aching sensation he currently rates pain a 8 out of 10.  Patient denies any exacerbating relieving factors.  States that symptoms have been moderate in severity, since onset.  Patient also endorses multiple episodes of nausea with nonbloody nonbilious vomiting.  Patient states that he has had multiple similar episodes in his past secondary to his umbilical hernias.  Patient denies any fevers, chills, chest pain, cough, constipation, headache, lightheadedness, numbness or tingling    Nursing Notes were all reviewed and agreed with or any disagreements were addressed in the HPI.    REVIEW OF SYSTEMS :           Positives and Pertinent negatives as per HPI.     SURGICAL HISTORY     Past Surgical History:   Procedure Laterality Date    ENDOSCOPY, COLON, DIAGNOSTIC      HERNIA REPAIR      HERNIA REPAIR Left 2/7/2022    LAPAROSCOPIC LEFT INGUINAL HERNIA REPAIR WITH MESH POSSIBLE OPEN POSSIBLE BILATERAL performed by Shashank Candelario MD at [facility] OR    TONSILLECTOMY      UMBILICAL HERNIA REPAIR N/A 9/15/2021     LAPAROSCOPIC POSSIBLE OPEN UMBILICAL HERNIA REPAIR WITH [facility] performed by Candice Crocker MD at [address] [facility] N/A 11/13/2020    EGD BIOPSY performed by Coretta Zuñiga MD at [address] N/A 1/6/2021    EGD BAND LIGATION performed by Julio C Arceo MD at [address] N/A 5/10/2021    EGD BAND LIGATION performed by Julio C Arceo MD at [address] [facility]       Discharge Medication List as of 1/23/2023  4:52 PM        CONTINUE these medications which have NOT CHANGED    Details   furosemide (LASIX) 40 MG tablet Take 1 tablet by mouth daily, Disp-30 tablet, R-0Normal      rifAXIMin (XIFAXAN) 550 MG tablet Take 1 tablet by mouth 2 times daily, Disp-60 tablet, R-0Normal      pantoprazole (PROTONIX) 40 MG tablet Take 1 tablet by mouth every morning (before breakfast), Disp-30 tablet, R-0Normal      NONFORMULARY daily as needed Medical marijuana edibles or vapes Historical Med      spironolactone (ALDACTONE) 100 MG tablet Take 100 mg by mouth dailyHistorical Med             ALLERGIES     Pcn [penicillins]    FAMILYHISTORY       Family History   Problem Relation Age of Onset    Diabetes Father         SOCIAL HISTORY       Social History     Tobacco Use    Smoking status: Former     Packs/day: 0.50     Types: Cigarettes    Smokeless tobacco: Never   Vaping Use    Vaping Use: Never used   Substance Use Topics    Alcohol use: Yes     Comment: occasional-denies    Drug use: Yes     Types: Opiates , Methamphetamines (Crystal Meth), Heroin     Comment: last used medical marijuana 2/2.  Former heroin user - last used one year ago       [address] [place] [address] Opening: Spontaneous  Best Verbal Response: Oriented  Best Motor Response: Obeys commands  [place] Coma Scale Score: 15                CIWA Assessment  BP: (!) 113/58  Heart Rate: 91           PHYSICAL EXAM  1 or more Elements     ED Triage Vitals   BP Temp Temp Source Heart Rate Resp SpO2 Height Weight   01/23/23 0520 01/23/23 0520 01/23/23 0600 01/23/23 0512 01/23/23 0512 01/23/23 0512 -- 01/23/23 0520   (!) 117/56 97 °F (36.1 °C) Oral 80 18 100 %  165 lb (74.8 kg)              Constitutional/General: Alert and oriented x3  Head: Normocephalic and atraumatic  Eyes: PERRL, EOMI, conjunctiva normal, sclera non icteric  ENT:  Oropharynx clear, handling secretions, no trismus, no asymmetry of the posterior oropharynx or uvular edema  Neck: Supple, full ROM, no stridor, no meningeal signs  Respiratory: Lungs clear to auscultation bilaterally, no wheezes, rales, or rhonchi. Not in respiratory distress  Cardiovascular:  Regular rate. Regular rhythm. No murmurs, no gallops, no rubs. 2+ distal pulses. Equal extremity pulses. Chest: No chest wall tenderness  GI: Abdomen soft, moderate size umbilical hernia noted, mild skin changes noted patient states it is near his baseline. Hernia is not firm but is not easily reduced  Musculoskeletal: Moves all extremities x 4. Warm and well perfused, no clubbing, no cyanosis, no edema. Capillary refill <3 seconds  Integument: Jaundice skin warm and dry. No rashes.    Neurologic: GCS 15, no focal deficits, symmetric strength 5/5 in the upper and lower extremities bilaterally  Psychiatric: Normal Affect            DIAGNOSTIC RESULTS   LABS:    Labs Reviewed   CBC WITH AUTO DIFFERENTIAL - Abnormal; Notable for the following components:       Result Value    RBC 3.75 (*)     Hemoglobin 11.1 (*)     Hematocrit 31.6 (*)     MCHC 35.1 (*)     RDW 17.5 (*)     Platelets 71 (*)     Neutrophils % 95.7 (*)     Lymphocytes % 2.4 (*)     Lymphocytes Absolute 0.00 (*)     Eosinophils Absolute 0.00 (*)     All other components within normal limits   BASIC METABOLIC PANEL - Abnormal; Notable for the following components:    Glucose 157 (*)     All other components within normal limits   HEPATIC FUNCTION PANEL - Abnormal; Notable for the following components:    Albumin 3.2 (*)     Alkaline Phosphatase 349 (*)     AST 72 (*)     Total Bilirubin 2.9 (*)     Bilirubin, Direct 1.4 (*)     Bilirubin, Indirect 1.5 (*)     All other components within normal limits   LIPASE - Abnormal; Notable for the following components:    Lipase 71 (*)     All other components within normal limits   LACTIC ACID - Abnormal; Notable for the following components:    Lactic Acid 3.1 (*)     All other components within normal limits   PROTIME-INR - Abnormal; Notable for the following components:    Protime 16.9 (*)     All other components within normal limits   APTT   PLATELET CONFIRMATION   LACTIC ACID       As interpreted by me, the above displayed labs are abnormal. All other labs obtained during this visit were within normal range or not returned as of this dictation. EKG Interpretation  Interpreted by emergency department physician, Tereza Sampson DO  EKG #1:  Interpreted by emergency department physician unless otherwise noted. Time:  0558    Rate: 80  Rhythm: Sinus. Interpretation: EKG obtained demonstrated normal sinus rhythm, rate 80, normal axis, QTC mildly prolonged at 514, no acute ST segment changes. .  Comparison: changes compared to previous EKG. RADIOLOGY:   Non-plain film images such as CT, Ultrasound and MRI are read by the radiologist. Plain radiographic images are visualized and preliminarily interpreted by the ED Provider with the below findings:    CXR: Lung parenchyma clear bilaterally no bony abnormalities noted    Interpretation per the Radiologist below, if available at the time of this note:    CT ABDOMEN PELVIS W IV CONTRAST Additional Contrast? None   Final Result   Fluid dilated small bowel loops suggests small bowel obstruction. There is   ventral hernia at the umbilicus containing a knuckle of small bowel felt to   represent site of transition and obstruction. CT findings of cirrhosis and portal hypertension. Small volume diffuse   ascites. There is enhancement within the lumen of tips shunt. However middle hepatic   vein is unopacified potentially thrombosed. The gallbladder is collapsed with mild surrounding pericholecystic fluid   likely secondary to ascites. Partial visualization of a fluid extending along the left spermatic cord to   the scrotum. XR CHEST 1 VIEW   Final Result   No acute process allowing for suboptimal inspiration. CT ABDOMEN PELVIS W IV CONTRAST Additional Contrast? None    Result Date: 1/23/2023  EXAMINATION: CT OF THE ABDOMEN AND PELVIS WITH CONTRAST 1/23/2023 8:17 am TECHNIQUE: CT of the abdomen and pelvis was performed with the administration of intravenous contrast. Multiplanar reformatted images are provided for review. Automated exposure control, iterative reconstruction, and/or weight based adjustment of the mA/kV was utilized to reduce the radiation dose to as low as reasonably achievable. COMPARISON: February 20, 2022. HISTORY: ORDERING SYSTEM PROVIDED HISTORY: abd pain, hernia TECHNOLOGIST PROVIDED HISTORY: Additional Contrast?->None Reason for exam:->abd pain, hernia FINDINGS: Lower Chest: There is mild subpleural atelectasis lung bases. Organs: Lobular contour and lobar redistribution related to cirrhosis noted. Tips catheter noted. There is apparent contrast within the tips catheter. However there is probable occlusion of middle hepatic vein which is unopacified. Spleen is enlarged measuring 14 cm. Gallbladder is collapsed. Pericholecystic fluid is noted. Pancreas is within normal limits. Adrenal glands within normal limits. Kidneys are enhancing normally. GI/Bowel: There are dilated small bowel loops measuring 4.3 cm. There is ventral hernia at the level the umbilicus containing a small loop small bowel. The defect measures 4 cm transverse and 3.5 cm cranial caudal.  This is felt to represent the site of transition and obstruction.  Pelvis: Small volume ascites scattered in the abdomen pelvis.  There is fluid extending along left spermatic cord to the scrotum. Peritoneum/Retroperitoneum: No retroperitoneal adenopathy.  There is retroperitoneal varices. Bones/Soft Tissues: Probable motion L2-3.     Fluid dilated small bowel loops suggests small bowel obstruction.  There is ventral hernia at the umbilicus containing a knuckle of small bowel felt to represent site of transition and obstruction. CT findings of cirrhosis and portal hypertension.  Small volume diffuse ascites. There is enhancement within the lumen of tips shunt.  However middle hepatic vein is unopacified potentially thrombosed. The gallbladder is collapsed with mild surrounding pericholecystic fluid likely secondary to ascites. Partial visualization of a fluid extending along the left spermatic cord to the scrotum.     XR CHEST 1 VIEW    Result Date: 1/23/2023  EXAMINATION: ONE XRAY VIEW OF THE CHEST 1/23/2023 5:16 am COMPARISON: 10 May 2021 HISTORY: ORDERING SYSTEM PROVIDED HISTORY: epigastric pain TECHNOLOGIST PROVIDED HISTORY: Reason for exam:->epigastric pain FINDINGS: Suboptimal inspiration. The lungs are without acute focal process.  There is no effusion or pneumothorax. The cardiomediastinal silhouette is without acute process. The osseous structures are without acute process.     No acute process allowing for suboptimal inspiration.       No results found.    PROCEDURES   Unless otherwise noted below, none         PAST MEDICAL HISTORY/Chronic Conditions Affecting Care      has a past medical history of Esophageal varices (HCC).     EMERGENCY DEPARTMENT COURSE    Vitals:    Vitals:    01/23/23 0658 01/23/23 0700 01/23/23 1110 01/23/23 1345   BP: 126/69 126/69 (!) 112/55 (!) 113/58   Pulse: 99 99 92 91   Resp: 28 22 16 16   Temp:   98.6 °F (37 °C) 99.7 °F (37.6 °C)   TempSrc:   Oral Oral   SpO2: 100% 100% 97% 98%   Weight:           Patient was given the following  medications:  Medications   0.9 % sodium chloride bolus (0 mLs IntraVENous Stopped 1/23/23 0856)   trimethobenzamide (TIGAN) injection 200 mg (200 mg IntraMUSCular Given 1/23/23 0615)   fentaNYL (SUBLIMAZE) injection 50 mcg (50 mcg IntraVENous Given 1/23/23 0616)   prochlorperazine (COMPAZINE) injection 10 mg (10 mg IntraVENous Given 1/23/23 0638)   0.9 % sodium chloride bolus (0 mLs IntraVENous Stopped 1/23/23 1108)   0.9 % sodium chloride bolus (0 mLs IntraVENous Stopped 1/23/23 1108)   iopamidol (ISOVUE-370) 76 % injection 75 mL (75 mLs IntraVENous Given 1/23/23 0828)   fentaNYL (SUBLIMAZE) injection 50 mcg (50 mcg IntraVENous Given 1/23/23 0857)   fentaNYL (SUBLIMAZE) injection 50 mcg (50 mcg IntraVENous Given 1/23/23 1125)           Is this patient to be included in the SEP-1 Core Measure due to severe sepsis or septic shock? No Exclusion criteria - the patient is NOT to be included for SEP-1 Core Measure due to: 2+ SIRS criteria are not met        Medical Decision Making/Differential Diagnosis:    CC/HPI Summary, Social Determinants of health, Records Reviewed, DDx, testing done/not done, ED Course, Reassessment, disposition considerations/shared decision making with patient, consults, disposition:      ED Course as of 01/25/23 1156   Mon Jan 23, 2023   0621 ATTENDING PROVIDER ATTESTATION:     I have personally performed and/or participated in the history, exam, medical decision making, and procedures and agree with all pertinent clinical information unless otherwise noted. I have also reviewed and agree with the past medical, family and social history unless otherwise noted. I have discussed this patient in detail with the resident, and provided the instruction and education regarding patient here complaining of chronic and recurrent abdominal pain with nausea and vomiting overnight with no diarrhea. No blood in the emesis.   History of esophageal varices, hepatitis and TIPS procedure as well as a chronic umbilical hernia. Patient and family state the hernia looks better than it normally does today although it hurts him it is looking better less protuberant and otherwise looks normal skin color change. Denies chest pain, palpitations or shortness of breath. Denies lightheadedness or syncope. No fevers. .  My findings/plan: Patient with a soft abdomen with no gross distention and diffuse moderate tenderness anywhere palpated. Patient has a ping-pong ball sized umbilical hernias, soft but tender during palpation. Patient very dramatic. Heart rate regular. Lungs are clear and equal.  Blood pressure unremarkable. Patient lays and rolls his head away from he closes his eyes and exasperatedly has his wife give me history. No jaundice or icterus. Arms legs are neurovascular intact. No pretibial edema or calf pain. [NC]   [id number] EKG, normal sinus rhythm rate of 80. Prolonged QTC at 514. No ischemic ST-T wave changes, normal axis. Otherwise normal conduction. Otherwise agree with resident. 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 Patient presents with abdominal pain as well as nausea and vomiting. Vital signs stable presentation.   On physical exam patient does appear to be mildly jaundiced, heart regular rate and rhythm, lungs clear to station bilaterally, abdomen soft with mild generalized abdominal pain worsened around the periumbilical hernia, hernia does have some chronic skin changes noted is not easily reducible but is not firm or hard. Differential diagnosis includes gastroenteritis, incarcerated hernia, obstruction. EKG obtained demonstrate no acute ischemic changes. Mildly prolonged QT. CBC obtained demonstrated mild anemia hemoglobin 11.1, as well as thrombocytopenia platelet count of 71. BMP no acute abnormalities, hepatic function panel demonstrated elevated bilirubin of 2.9, direct 1.4 indirect 1.5, INR 1.5, lipase 71, APTT 32.6, lactic acid 2.1. Chest x-ray obtained demonstrated no acute abnormalities. CT scan of the abdomen pelvis demonstrated small bowel obstruction at the area of the umbilical hernia, chronic cirrhosis and portal hypertension noted. Patient is given 1 L of IV fluids, 300 mg of Tigan IM as well as multiple doses of 50 mcg of fentanyl IV. Due to obstruction inside hernia General surgery was consulted. Patient was seen evaluated by general surgery hernia was reduced at bedside. Patient did note improvement after reduction. Patient's pain was improved patient's nausea and vomiting also improved. Patient was able to tolerate oral fluids. Lactic acid was repeated and was 2.1. Plan consistent with small bowel obstruction likely secondary to incarcerated umbilical hernia. Shared decision making discussed with patient. This point do feel that patient is stable for discharge and close outpatient follow-up. Did discuss this with general surgery he was in agreement with plan. Plan of care discussed with patient for discharge, all questions were answered, patient was agreement plan of care and discharged home in stable condition. Disposition Considerations (Tests not ordered but considered, Shared Decision Making, Pt Expectation of Test or Tx.): Shared decision making discussed with patient on repeat evaluation patient does note some improvement after hernia being reduced.   He has now tolerating oral intake well. Nausea is currently under control. General surgeon is comfortable patient being discharged home and close outpatient follow-up. Patient is in agreement. FINAL IMPRESSION      1. Nausea and vomiting, unspecified vomiting type    2. Abdominal pain, unspecified abdominal location          DISPOSITION/PLAN     DISPOSITION Decision To Discharge 01/23/2023 03:29:43 PM      PATIENT REFERRED TO:  Tammie Braswell MD  [address] [place] [address] [phone]    Schedule an appointment as soon as possible for a visit       [address] [facility] Emergency Department  [facility]  [phone]  Go to   If symptoms worsen    King Beena MD  [facility]. [address] [facility] [address] [id number] [phone]    Schedule an appointment as soon as possible for a visit       DISCHARGE MEDICATIONS:  Discharge Medication List as of 1/23/2023  4:52 PM        START taking these medications    Details   HYDROcodone-acetaminophen (NORCO) 5-325 MG per tablet Take 1 tablet by mouth every 6 hours as needed for Pain for up to 3 days. Intended supply: 3 days. Take lowest dose possible to manage pain Max Daily Amount: 4 tablets, Disp-10 tablet, R-0Normal      trimethobenzamide (TIGAN) 300 MG capsule Take 1 capsule by mouth 3 times daily for 7 days, Disp-21 capsule, R-0Normal             DISCONTINUED MEDICATIONS:  Discharge Medication List as of 1/23/2023  4:52 PM             Patient was seen and evaluated by myself and my attending. Assessment and Plan discussed with attending provider, please see attestation for final plan of care.        (Please note that portions of this note were completed with a voice recognition program.  Efforts were made to edit the dictations but occasionally words are mis-transcribed.)    Tereza Sampson DO (electronically signed)           Arley Thornton DO  Resident  01/25/23 3335

## 2023-01-24 LAB
EKG ATRIAL RATE: 80 BPM
EKG P AXIS: 20 DEGREES
EKG P-R INTERVAL: 144 MS
EKG Q-T INTERVAL: 446 MS
EKG QRS DURATION: 78 MS
EKG QTC CALCULATION (BAZETT): 514 MS
EKG R AXIS: 41 DEGREES
EKG T AXIS: 26 DEGREES
EKG VENTRICULAR RATE: 80 BPM

## 2023-01-24 PROCEDURE — 93010 ELECTROCARDIOGRAM REPORT: CPT | Performed by: INTERNAL MEDICINE

## 2023-02-06 ENCOUNTER — HOSPITAL ENCOUNTER (EMERGENCY)
Age: 43
Discharge: HOME | End: 2023-02-06
Attending: EMERGENCY MEDICINE
Payer: COMMERCIAL

## 2023-02-06 ENCOUNTER — APPOINTMENT (OUTPATIENT)
Dept: CT IMAGING | Age: 43
End: 2023-02-06
Payer: COMMERCIAL

## 2023-02-06 VITALS
HEART RATE: 85 BPM | SYSTOLIC BLOOD PRESSURE: 107 MMHG | DIASTOLIC BLOOD PRESSURE: 68 MMHG | OXYGEN SATURATION: 96 % | TEMPERATURE: 97.7 F | RESPIRATION RATE: 14 BRPM

## 2023-02-06 DIAGNOSIS — R41.0 CONFUSION: Primary | ICD-10-CM

## 2023-02-06 LAB
ALBUMIN SERPL-MCNC: 2.8 G/DL (ref 3.5–5.2)
ALP BLD-CCNC: 440 U/L (ref 40–129)
ALT SERPL-CCNC: 28 U/L (ref 0–40)
AMMONIA: 58 UMOL/L (ref 16–60)
AMPHETAMINE SCREEN, URINE: NOT DETECTED
ANION GAP SERPL CALCULATED.3IONS-SCNC: 6 MMOL/L (ref 7–16)
ANISOCYTOSIS: ABNORMAL
AST SERPL-CCNC: 55 U/L (ref 0–39)
BARBITURATE SCREEN URINE: NOT DETECTED
BASOPHILS ABSOLUTE: 0 E9/L (ref 0–0.2)
BASOPHILS RELATIVE PERCENT: 0.3 % (ref 0–2)
BENZODIAZEPINE SCREEN, URINE: NOT DETECTED
BILIRUB SERPL-MCNC: 2.6 MG/DL (ref 0–1.2)
BILIRUBIN URINE: NEGATIVE
BLOOD, URINE: NEGATIVE
BUN BLDV-MCNC: 6 MG/DL (ref 6–20)
CALCIUM SERPL-MCNC: 7.9 MG/DL (ref 8.6–10.2)
CANNABINOID SCREEN URINE: POSITIVE
CHLORIDE BLD-SCNC: 107 MMOL/L (ref 98–107)
CHP ED QC CHECK: NORMAL
CLARITY: CLEAR
CO2: 24 MMOL/L (ref 22–29)
COCAINE METABOLITE SCREEN URINE: NOT DETECTED
COLOR: YELLOW
CREAT SERPL-MCNC: 1.1 MG/DL (ref 0.7–1.2)
EOSINOPHILS ABSOLUTE: 0.06 E9/L (ref 0.05–0.5)
EOSINOPHILS RELATIVE PERCENT: 1.8 % (ref 0–6)
FENTANYL SCREEN, URINE: POSITIVE
GFR SERPL CREATININE-BSD FRML MDRD: >60 ML/MIN/1.73
GLUCOSE BLD-MCNC: 108 MG/DL
GLUCOSE BLD-MCNC: 113 MG/DL (ref 74–99)
GLUCOSE URINE: NEGATIVE MG/DL
HCT VFR BLD CALC: 26.9 % (ref 37–54)
HEMOGLOBIN: 9.3 G/DL (ref 12.5–16.5)
KETONES, URINE: NEGATIVE MG/DL
LEUKOCYTE ESTERASE, URINE: NEGATIVE
LYMPHOCYTES ABSOLUTE: 0.2 E9/L (ref 1.5–4)
LYMPHOCYTES RELATIVE PERCENT: 6.1 % (ref 20–42)
Lab: ABNORMAL
MCH RBC QN AUTO: 29 PG (ref 26–35)
MCHC RBC AUTO-ENTMCNC: 34.6 % (ref 32–34.5)
MCV RBC AUTO: 83.8 FL (ref 80–99.9)
METER GLUCOSE: 108 MG/DL (ref 74–99)
METHADONE SCREEN, URINE: NOT DETECTED
MONOCYTES ABSOLUTE: 0.13 E9/L (ref 0.1–0.95)
MONOCYTES RELATIVE PERCENT: 3.5 % (ref 2–12)
NEUTROPHILS ABSOLUTE: 2.94 E9/L (ref 1.8–7.3)
NEUTROPHILS RELATIVE PERCENT: 88.6 % (ref 43–80)
NITRITE, URINE: NEGATIVE
OPIATE SCREEN URINE: NOT DETECTED
OVALOCYTES: ABNORMAL
OXYCODONE URINE: NOT DETECTED
PDW BLD-RTO: 17.2 FL (ref 11.5–15)
PH UA: 7 (ref 5–9)
PHENCYCLIDINE SCREEN URINE: NOT DETECTED
PLATELET # BLD: 57 E9/L (ref 130–450)
PLATELET CONFIRMATION: NORMAL
PMV BLD AUTO: 9.8 FL (ref 7–12)
POIKILOCYTES: ABNORMAL
POLYCHROMASIA: ABNORMAL
POTASSIUM SERPL-SCNC: 3.6 MMOL/L (ref 3.5–5)
PROTEIN UA: NEGATIVE MG/DL
RBC # BLD: 3.21 E12/L (ref 3.8–5.8)
SODIUM BLD-SCNC: 137 MMOL/L (ref 132–146)
SPECIFIC GRAVITY UA: 1.01 (ref 1–1.03)
TOTAL PROTEIN: 6.1 G/DL (ref 6.4–8.3)
UROBILINOGEN, URINE: 0.2 E.U./DL
WBC # BLD: 3.3 E9/L (ref 4.5–11.5)

## 2023-02-06 PROCEDURE — 81003 URINALYSIS AUTO W/O SCOPE: CPT

## 2023-02-06 PROCEDURE — 82962 GLUCOSE BLOOD TEST: CPT

## 2023-02-06 PROCEDURE — 82140 ASSAY OF AMMONIA: CPT

## 2023-02-06 PROCEDURE — 2580000003 HC RX 258: Performed by: EMERGENCY MEDICINE

## 2023-02-06 PROCEDURE — 80307 DRUG TEST PRSMV CHEM ANLYZR: CPT

## 2023-02-06 PROCEDURE — 70450 CT HEAD/BRAIN W/O DYE: CPT

## 2023-02-06 PROCEDURE — 80053 COMPREHEN METABOLIC PANEL: CPT

## 2023-02-06 PROCEDURE — 85025 COMPLETE CBC W/AUTO DIFF WBC: CPT

## 2023-02-06 PROCEDURE — 99284 EMERGENCY DEPT VISIT MOD MDM: CPT

## 2023-02-06 RX ORDER — 0.9 % SODIUM CHLORIDE 0.9 %
1000 INTRAVENOUS SOLUTION INTRAVENOUS ONCE
Status: COMPLETED | OUTPATIENT
Start: 2023-02-06 | End: 2023-02-06

## 2023-02-06 RX ORDER — SODIUM CHLORIDE 0.9 % (FLUSH) 0.9 %
10 SYRINGE (ML) INJECTION PRN
Status: DISCONTINUED | OUTPATIENT
Start: 2023-02-06 | End: 2023-02-06 | Stop reason: HOSPADM

## 2023-02-06 RX ADMIN — SODIUM CHLORIDE 1000 ML: 9 INJECTION, SOLUTION INTRAVENOUS at 13:43

## 2023-02-06 ASSESSMENT — ENCOUNTER SYMPTOMS
ABDOMINAL PAIN: 0
EYE REDNESS: 0
DIARRHEA: 0
SINUS PRESSURE: 0
NAUSEA: 0
EYE DISCHARGE: 0
COUGH: 0
SHORTNESS OF BREATH: 0
EYE PAIN: 0
BACK PAIN: 0
VOMITING: 0
WHEEZING: 0
DIFFICULTY BREATHING: 0
SORE THROAT: 0

## 2023-02-06 NOTE — ED PROVIDER NOTES
Patient with history of alcohol and drug abuse and subsequent esophageal varices and cirrhotic liver disease. He is to be on rifaximin and lactulose. He states he does not routinely take his medications as prescribed. He and his wife describes intermittent periods of confusion over the last several days. He is also been sleeping more than usual.    The history is provided by the patient and the spouse. Altered Mental Status  Presenting symptoms: confusion    Severity:  Mild  Most recent episode:  2 days ago  Episode history:  Unable to specify  Duration:  3 days  Timing:  Constant  Progression:  Unchanged  Chronicity:  New  Associated symptoms: no abdominal pain, no agitation, no difficulty breathing, no fever, no hallucinations, no headaches, no light-headedness, no nausea, no rash, no seizures, no slurred speech, no suicidal behavior, no vomiting and no weakness       Review of Systems   Constitutional:  Negative for chills and fever. HENT:  Negative for sinus pressure and sore throat. Eyes:  Negative for pain, discharge and redness. Respiratory:  Negative for cough, shortness of breath and wheezing. Cardiovascular:  Negative for chest pain. Gastrointestinal:  Negative for abdominal pain, diarrhea, nausea and vomiting. Genitourinary:  Negative for dysuria and frequency. Musculoskeletal:  Negative for arthralgias and back pain. Skin:  Negative for rash and wound. Neurological:  Negative for seizures, weakness, light-headedness and headaches. Hematological:  Negative for adenopathy. Psychiatric/Behavioral:  Positive for confusion. Negative for agitation and hallucinations. All other systems reviewed and are negative. Physical Exam  Vitals and nursing note reviewed. Constitutional:       Appearance: He is well-developed. HENT:      Head: Normocephalic and atraumatic. Eyes:      Pupils: Pupils are equal, round, and reactive to light.    Cardiovascular:      Rate and Rhythm: Normal rate and regular rhythm. Heart sounds: Normal heart sounds. No murmur heard. Pulmonary:      Effort: Pulmonary effort is normal. No respiratory distress. Breath sounds: Normal breath sounds. No wheezing or rales. Abdominal:      General: Bowel sounds are normal.      Palpations: Abdomen is soft. Tenderness: There is no abdominal tenderness. There is no guarding or rebound. Musculoskeletal:      Cervical back: Normal range of motion and neck supple. Skin:     General: Skin is warm and dry. Neurological:      Mental Status: He is alert and oriented to person, place, and time. GCS: GCS eye subscore is 4. GCS verbal subscore is 5. GCS motor subscore is 6. Cranial Nerves: No cranial nerve deficit. Coordination: Coordination normal.        Procedures     MDM  Patient with complaint of episodic periods of confusion and increased sleeping. Differential diagnoses include hepatic encephalopathy, infection, myocardial infarction, hypoglycemia, anemia, electrolyte abnormality, drug abuse, and stroke. CT rules out significant intracranial abnormality. Blood sugar is normal, no significant anemia on lab work. Transaminitis noted to be chronic. Ammonia level within normal limits and ruling out hepatic encephalopathy. Urine drug screen is positive for cannabinoids as well as fentanyl. Patient did give permission to discuss his lab results with his family member present. We discussed the positive finding for fentanyl on his drug screen. He becomes very quiet and refuses to discuss where this fentanyl could have come from. Chart review does reveal history of drug and alcohol abuse. This does present the possibility of fentanyl intoxication causing his symptoms. Currently, he is alert and oriented. No focal neurologic deficit.   He is comfortable going home to follow-up as an outpatient.           --------------------------------------------- PAST HISTORY ---------------------------------------------  Past Medical History:  has a past medical history of Esophageal varices ([facility] [address] Past Surgical History:  has a past surgical history that includes Upper gastrointestinal endoscopy (N/A, 11/13/2020); Tonsillectomy; Upper gastrointestinal endoscopy (N/A, 1/6/2021); Upper gastrointestinal endoscopy (N/A, 5/10/2021); Endoscopy, colon, diagnostic; Umbilical hernia repair (N/A, 9/15/2021); hernia repair; and hernia repair (Left, 2/7/2022). Social History:  reports that he has quit smoking. He smoked an average of .5 packs per day. He has never used smokeless tobacco. He reports current alcohol use. He reports current drug use. Drugs: Opiates , Methamphetamines (Crystal Meth), and Heroin. Family History: family history includes Diabetes in his father. The patients home medications have been reviewed.     Allergies: Pcn [penicillins]    -------------------------------------------------- RESULTS -------------------------------------------------  Labs:  Results for orders placed or performed during the hospital encounter of 02/06/23   CBC with Auto Differential   Result Value Ref Range    WBC 3.3 (L) 4.5 - 11.5 E9/L    RBC 3.21 (L) 3.80 - 5.80 E12/L    Hemoglobin 9.3 (L) 12.5 - 16.5 g/dL    Hematocrit 26.9 (L) 37.0 - 54.0 %    MCV 83.8 80.0 - 99.9 fL    MCH 29.0 26.0 - 35.0 pg    MCHC 34.6 (H) 32.0 - 34.5 %    RDW 17.2 (H) 11.5 - 15.0 fL    Platelets 57 (L) 084 - 450 E9/L    MPV 9.8 7.0 - 12.0 fL    Neutrophils % 88.6 (H) 43.0 - 80.0 %    Lymphocytes % 6.1 (L) 20.0 - 42.0 %    Monocytes % 3.5 2.0 - 12.0 %    Eosinophils % 1.8 0.0 - 6.0 %    Basophils % 0.3 0.0 - 2.0 %    Neutrophils Absolute 2.94 1.80 - 7.30 E9/L    Lymphocytes Absolute 0.20 (L) 1.50 - 4.00 E9/L    Monocytes Absolute 0.13 0.10 - 0.95 E9/L    Eosinophils Absolute 0.06 0.05 - 0.50 E9/L    Basophils Absolute 0.00 0.00 - 0.20 E9/L    Anisocytosis 1+     Polychromasia 1+     Poikilocytes 1+     Ovalocytes 1+    Comprehensive Metabolic Panel   Result Value Ref Range    Sodium 137 132 - 146 mmol/L    Potassium 3.6 3.5 - 5.0 mmol/L    Chloride 107 98 - 107 mmol/L    CO2 24 22 - 29 mmol/L    Anion Gap 6 (L) 7 - 16 mmol/L    Glucose 113 (H) 74 - 99 mg/dL    BUN 6 6 - 20 mg/dL    Creatinine 1.1 0.7 - 1.2 mg/dL    Est, Glom Filt Rate >60 >=60 mL/min/1.73    Calcium 7.9 (L) 8.6 - 10.2 mg/dL    Total Protein 6.1 (L) 6.4 - 8.3 g/dL    Albumin 2.8 (L) 3.5 - 5.2 g/dL    Total Bilirubin 2.6 (H) 0.0 - 1.2 mg/dL    Alkaline Phosphatase 440 (H) 40 - 129 U/L    ALT 28 0 - 40 U/L    AST 55 (H) 0 - 39 U/L   Ammonia   Result Value Ref Range    Ammonia 58.0 16.0 - 60.0 umol/L   Urine Drug Screen   Result Value Ref Range    Amphetamine Screen, Urine NOT DETECTED Negative <1000 ng/mL    Barbiturate Screen, Ur NOT DETECTED Negative < 200 ng/mL    Benzodiazepine Screen, Urine NOT DETECTED Negative < 200 ng/mL    Cannabinoid Scrn, Ur POSITIVE (A) Negative < 50ng/mL    Cocaine Metabolite Screen, Urine NOT DETECTED Negative < 300 ng/mL    Opiate Scrn, Ur NOT DETECTED Negative < 300ng/mL    PCP Screen, Urine NOT DETECTED Negative < 25 ng/mL    Methadone Screen, Urine NOT DETECTED Negative <300 ng/mL    Oxycodone Urine NOT DETECTED Negative <100 ng/mL    FENTANYL SCREEN, URINE POSITIVE (A) Negative <1 ng/mL    Drug Screen Comment: see below    Urinalysis   Result Value Ref Range    Color, UA Yellow Straw/Yellow    Clarity, UA Clear Clear    Glucose, Ur Negative Negative mg/dL    Bilirubin Urine Negative Negative    Ketones, Urine Negative Negative mg/dL    Specific Gravity, UA 1.010 1.005 - 1.030    Blood, Urine Negative Negative    pH, UA 7.0 5.0 - 9.0    Protein, UA Negative Negative mg/dL    Urobilinogen, Urine 0.2 <2.0 E.U./dL    Nitrite, Urine Negative Negative    Leukocyte Esterase, Urine Negative Negative   Platelet Confirmation   Result Value Ref Range    Platelet Confirmation CONFIRMED    POCT Glucose   Result Value Ref Range    Glucose 108 mg/dL    QC OK? ok    POCT Glucose   Result Value Ref Range    Meter Glucose 108 (H) 74 - 99 mg/dL       Radiology:  CT HEAD WO CONTRAST   Final Result   No acute intracranial abnormality. No significant interval changes since the study of November 2018.             ------------------------- NURSING NOTES AND VITALS REVIEWED ---------------------------  Date / Time Roomed:  2/6/2023  1:12 PM  ED Bed Assignment:  03/03    The nursing notes within the ED encounter and vital signs as below have been reviewed. /68   Pulse 85   Temp 97.7 °F (36.5 °C) (Oral)   Resp 14   SpO2 96%   Oxygen Saturation Interpretation: Normal      ------------------------------------------ PROGRESS NOTES ------------------------------------------  5:02 PM EST  I have spoken with the patient and discussed todays results, in addition to providing specific details for the plan of care and counseling regarding the diagnosis and prognosis. Their questions are answered at this time and they are agreeable with the plan. I discussed at length with them reasons for immediate return here for re evaluation. They will followup with their primary care physician in [facility] physicians by calling their offices tomorrow. --------------------------------- ADDITIONAL PROVIDER NOTES ---------------------------------  At this time the patient is without objective evidence of an acute process requiring hospitalization or inpatient management. They have remained hemodynamically stable throughout their entire ED visit and are stable for discharge with outpatient follow-up. The plan has been discussed in detail and they are aware of the specific conditions for emergent return, as well as the importance of follow-up. New Prescriptions    No medications on file       Diagnosis:  1. Confusion        Disposition:  Patient's disposition: Discharge to home  Patient's condition is stable.        Haseeb Yoon DO  02/06/23 112[address] [facility]

## 2023-05-13 ENCOUNTER — HOSPITAL ENCOUNTER (INPATIENT)
Age: 43
LOS: 2 days | Discharge: LEFT BEFORE DISCHARGE | DRG: 253 | End: 2023-05-15
Attending: EMERGENCY MEDICINE | Admitting: INTERNAL MEDICINE
Payer: COMMERCIAL

## 2023-05-13 ENCOUNTER — APPOINTMENT (OUTPATIENT)
Dept: GENERAL RADIOLOGY | Age: 43
DRG: 253 | End: 2023-05-13
Payer: COMMERCIAL

## 2023-05-13 DIAGNOSIS — K74.60 CIRRHOSIS OF LIVER WITHOUT ASCITES, UNSPECIFIED HEPATIC CIRRHOSIS TYPE (HCC): ICD-10-CM

## 2023-05-13 DIAGNOSIS — D64.9 ANEMIA, UNSPECIFIED TYPE: Primary | ICD-10-CM

## 2023-05-13 DIAGNOSIS — F10.10 ALCOHOL ABUSE: ICD-10-CM

## 2023-05-13 PROBLEM — K92.2 GI BLEED: Status: ACTIVE | Noted: 2023-05-13

## 2023-05-13 LAB
ABO + RH BLD: NORMAL
ALBUMIN SERPL-MCNC: 2.4 G/DL (ref 3.5–5.2)
ALP SERPL-CCNC: 308 U/L (ref 40–129)
ALT SERPL-CCNC: 43 U/L (ref 0–40)
AMMONIA PLAS-SCNC: 69 UMOL/L (ref 16–60)
AMPHET UR QL SCN: NOT DETECTED
ANION GAP SERPL CALCULATED.3IONS-SCNC: 7 MMOL/L (ref 7–16)
APAP SERPL-MCNC: <5 MCG/ML (ref 10–30)
AST SERPL-CCNC: 98 U/L (ref 0–39)
BARBITURATES UR QL SCN: NOT DETECTED
BASOPHILS # BLD: 0.02 E9/L (ref 0–0.2)
BASOPHILS NFR BLD: 0.5 % (ref 0–2)
BENZODIAZ UR QL SCN: NOT DETECTED
BILIRUB DIRECT SERPL-MCNC: 1.5 MG/DL (ref 0–0.3)
BILIRUB INDIRECT SERPL-MCNC: 0.8 MG/DL (ref 0–1)
BILIRUB SERPL-MCNC: 2.3 MG/DL (ref 0–1.2)
BILIRUB UR QL STRIP: NEGATIVE
BLD GP AB SCN SERPL QL: NORMAL
BLOOD BANK DISPENSE STATUS: NORMAL
BLOOD BANK PRODUCT CODE: NORMAL
BPU ID: NORMAL
BUN SERPL-MCNC: 9 MG/DL (ref 6–20)
CALCIUM SERPL-MCNC: 7.2 MG/DL (ref 8.6–10.2)
CANNABINOIDS UR QL SCN: POSITIVE
CHLORIDE SERPL-SCNC: 102 MMOL/L (ref 98–107)
CLARITY UR: CLEAR
CO2 SERPL-SCNC: 23 MMOL/L (ref 22–29)
COCAINE UR QL SCN: NOT DETECTED
COLOR UR: YELLOW
CREAT SERPL-MCNC: 1.3 MG/DL (ref 0.7–1.2)
DESCRIPTION BLOOD BANK: NORMAL
DRUG SCREEN COMMENT UR-IMP: ABNORMAL
EOSINOPHIL # BLD: 0.43 E9/L (ref 0.05–0.5)
EOSINOPHIL NFR BLD: 9.8 % (ref 0–6)
ERYTHROCYTE [DISTWIDTH] IN BLOOD BY AUTOMATED COUNT: 14.6 FL (ref 11.5–15)
ETHANOLAMINE SERPL-MCNC: 177 MG/DL (ref 0–0.08)
FENTANYL SCREEN, URINE: POSITIVE
GLUCOSE SERPL-MCNC: 127 MG/DL (ref 74–99)
GLUCOSE UR STRIP-MCNC: NEGATIVE MG/DL
HCT VFR BLD AUTO: 17.1 % (ref 37–54)
HCT VFR BLD AUTO: 18.9 % (ref 37–54)
HGB BLD-MCNC: 5.3 G/DL (ref 12.5–16.5)
HGB BLD-MCNC: 6.1 G/DL (ref 12.5–16.5)
HGB UR QL STRIP: NEGATIVE
IMM GRANULOCYTES # BLD: 0.03 E9/L
IMM GRANULOCYTES NFR BLD: 0.7 % (ref 0–5)
KETONES UR STRIP-MCNC: NEGATIVE MG/DL
LACTATE BLDV-SCNC: 1.4 MMOL/L (ref 0.5–2.2)
LEUKOCYTE ESTERASE UR QL STRIP: NEGATIVE
LIPASE: 73 U/L (ref 13–60)
LYMPHOCYTES # BLD: 0.63 E9/L (ref 1.5–4)
LYMPHOCYTES NFR BLD: 14.4 % (ref 20–42)
MAGNESIUM SERPL-MCNC: 2 MG/DL (ref 1.6–2.6)
MCH RBC QN AUTO: 29.1 PG (ref 26–35)
MCHC RBC AUTO-ENTMCNC: 31 % (ref 32–34.5)
MCV RBC AUTO: 94 FL (ref 80–99.9)
METHADONE UR QL SCN: NOT DETECTED
MONOCYTES # BLD: 0.6 E9/L (ref 0.1–0.95)
MONOCYTES NFR BLD: 13.7 % (ref 2–12)
NEUTROPHILS # BLD: 2.67 E9/L (ref 1.8–7.3)
NEUTS SEG NFR BLD: 60.9 % (ref 43–80)
NITRITE UR QL STRIP: NEGATIVE
OPIATES UR QL SCN: NOT DETECTED
OXYCODONE URINE: NOT DETECTED
PCP UR QL SCN: NOT DETECTED
PH UR STRIP: 6 [PH] (ref 5–9)
PLATELET # BLD AUTO: 68 E9/L (ref 130–450)
PLATELET CONFIRMATION: NORMAL
PMV BLD AUTO: 9.5 FL (ref 7–12)
POTASSIUM SERPL-SCNC: 3.9 MMOL/L (ref 3.5–5)
PROT SERPL-MCNC: 5.2 G/DL (ref 6.4–8.3)
PROT UR STRIP-MCNC: NEGATIVE MG/DL
RBC # BLD AUTO: 1.82 E12/L (ref 3.8–5.8)
SALICYLATES SERPL-MCNC: <0.3 MG/DL (ref 0–30)
SODIUM SERPL-SCNC: 132 MMOL/L (ref 132–146)
SP GR UR STRIP: <=1.005 (ref 1–1.03)
TROPONIN, HIGH SENSITIVITY: 12 NG/L (ref 0–11)
UROBILINOGEN UR STRIP-ACNC: 0.2 E.U./DL
WBC # BLD: 4.4 E9/L (ref 4.5–11.5)

## 2023-05-13 PROCEDURE — 80076 HEPATIC FUNCTION PANEL: CPT

## 2023-05-13 PROCEDURE — 82140 ASSAY OF AMMONIA: CPT

## 2023-05-13 PROCEDURE — 80179 DRUG ASSAY SALICYLATE: CPT

## 2023-05-13 PROCEDURE — 71045 X-RAY EXAM CHEST 1 VIEW: CPT

## 2023-05-13 PROCEDURE — 99285 EMERGENCY DEPT VISIT HI MDM: CPT

## 2023-05-13 PROCEDURE — 86901 BLOOD TYPING SEROLOGIC RH(D): CPT

## 2023-05-13 PROCEDURE — 86923 COMPATIBILITY TEST ELECTRIC: CPT

## 2023-05-13 PROCEDURE — 6360000002 HC RX W HCPCS: Performed by: INTERNAL MEDICINE

## 2023-05-13 PROCEDURE — 6370000000 HC RX 637 (ALT 250 FOR IP): Performed by: INTERNAL MEDICINE

## 2023-05-13 PROCEDURE — 83605 ASSAY OF LACTIC ACID: CPT

## 2023-05-13 PROCEDURE — 96365 THER/PROPH/DIAG IV INF INIT: CPT

## 2023-05-13 PROCEDURE — 1200000000 HC SEMI PRIVATE

## 2023-05-13 PROCEDURE — C9113 INJ PANTOPRAZOLE SODIUM, VIA: HCPCS | Performed by: EMERGENCY MEDICINE

## 2023-05-13 PROCEDURE — 99223 1ST HOSP IP/OBS HIGH 75: CPT | Performed by: INTERNAL MEDICINE

## 2023-05-13 PROCEDURE — 6360000002 HC RX W HCPCS: Performed by: EMERGENCY MEDICINE

## 2023-05-13 PROCEDURE — 83690 ASSAY OF LIPASE: CPT

## 2023-05-13 PROCEDURE — 86900 BLOOD TYPING SEROLOGIC ABO: CPT

## 2023-05-13 PROCEDURE — 36430 TRANSFUSION BLD/BLD COMPNT: CPT

## 2023-05-13 PROCEDURE — 85025 COMPLETE CBC W/AUTO DIFF WBC: CPT

## 2023-05-13 PROCEDURE — 93005 ELECTROCARDIOGRAM TRACING: CPT | Performed by: EMERGENCY MEDICINE

## 2023-05-13 PROCEDURE — 84484 ASSAY OF TROPONIN QUANT: CPT

## 2023-05-13 PROCEDURE — 81003 URINALYSIS AUTO W/O SCOPE: CPT

## 2023-05-13 PROCEDURE — 80143 DRUG ASSAY ACETAMINOPHEN: CPT

## 2023-05-13 PROCEDURE — 82077 ASSAY SPEC XCP UR&BREATH IA: CPT

## 2023-05-13 PROCEDURE — 80048 BASIC METABOLIC PNL TOTAL CA: CPT

## 2023-05-13 PROCEDURE — 80307 DRUG TEST PRSMV CHEM ANLYZR: CPT

## 2023-05-13 PROCEDURE — 85014 HEMATOCRIT: CPT

## 2023-05-13 PROCEDURE — 86850 RBC ANTIBODY SCREEN: CPT

## 2023-05-13 PROCEDURE — 6360000002 HC RX W HCPCS: Performed by: FAMILY MEDICINE

## 2023-05-13 PROCEDURE — 36415 COLL VENOUS BLD VENIPUNCTURE: CPT

## 2023-05-13 PROCEDURE — 83735 ASSAY OF MAGNESIUM: CPT

## 2023-05-13 PROCEDURE — 96366 THER/PROPH/DIAG IV INF ADDON: CPT

## 2023-05-13 PROCEDURE — 2580000003 HC RX 258: Performed by: EMERGENCY MEDICINE

## 2023-05-13 PROCEDURE — 85018 HEMOGLOBIN: CPT

## 2023-05-13 PROCEDURE — P9016 RBC LEUKOCYTES REDUCED: HCPCS

## 2023-05-13 PROCEDURE — 30233N1 TRANSFUSION OF NONAUTOLOGOUS RED BLOOD CELLS INTO PERIPHERAL VEIN, PERCUTANEOUS APPROACH: ICD-10-PCS | Performed by: INTERNAL MEDICINE

## 2023-05-13 RX ORDER — SODIUM CHLORIDE 9 MG/ML
INJECTION, SOLUTION INTRAVENOUS PRN
Status: DISCONTINUED | OUTPATIENT
Start: 2023-05-13 | End: 2023-05-14

## 2023-05-13 RX ORDER — FUROSEMIDE 10 MG/ML
40 INJECTION INTRAMUSCULAR; INTRAVENOUS EVERY 8 HOURS
Status: DISCONTINUED | OUTPATIENT
Start: 2023-05-13 | End: 2023-05-15

## 2023-05-13 RX ORDER — ACETAMINOPHEN 325 MG/1
650 TABLET ORAL EVERY 6 HOURS PRN
Status: DISCONTINUED | OUTPATIENT
Start: 2023-05-13 | End: 2023-05-15 | Stop reason: HOSPADM

## 2023-05-13 RX ORDER — ONDANSETRON 4 MG/1
4 TABLET, ORALLY DISINTEGRATING ORAL EVERY 8 HOURS PRN
Status: DISCONTINUED | OUTPATIENT
Start: 2023-05-13 | End: 2023-05-13

## 2023-05-13 RX ORDER — PROCHLORPERAZINE EDISYLATE 5 MG/ML
10 INJECTION INTRAMUSCULAR; INTRAVENOUS EVERY 6 HOURS PRN
Status: DISCONTINUED | OUTPATIENT
Start: 2023-05-13 | End: 2023-05-15 | Stop reason: HOSPADM

## 2023-05-13 RX ORDER — POLYETHYLENE GLYCOL 3350 17 G/17G
17 POWDER, FOR SOLUTION ORAL DAILY PRN
Status: DISCONTINUED | OUTPATIENT
Start: 2023-05-13 | End: 2023-05-15 | Stop reason: HOSPADM

## 2023-05-13 RX ORDER — GAUZE BANDAGE 2" X 2"
100 BANDAGE TOPICAL DAILY
Status: DISCONTINUED | OUTPATIENT
Start: 2023-05-13 | End: 2023-05-15 | Stop reason: HOSPADM

## 2023-05-13 RX ORDER — M-VIT,TX,IRON,MINS/CALC/FOLIC 27MG-0.4MG
1 TABLET ORAL DAILY
Status: DISCONTINUED | OUTPATIENT
Start: 2023-05-13 | End: 2023-05-15 | Stop reason: HOSPADM

## 2023-05-13 RX ORDER — ENOXAPARIN SODIUM 100 MG/ML
40 INJECTION SUBCUTANEOUS DAILY
Status: DISCONTINUED | OUTPATIENT
Start: 2023-05-13 | End: 2023-05-13

## 2023-05-13 RX ORDER — ACETAMINOPHEN 650 MG/1
650 SUPPOSITORY RECTAL EVERY 6 HOURS PRN
Status: DISCONTINUED | OUTPATIENT
Start: 2023-05-13 | End: 2023-05-15 | Stop reason: HOSPADM

## 2023-05-13 RX ORDER — SODIUM CHLORIDE 0.9 % (FLUSH) 0.9 %
SYRINGE (ML) INJECTION
Status: DISPENSED
Start: 2023-05-13 | End: 2023-05-14

## 2023-05-13 RX ORDER — SODIUM CHLORIDE 9 MG/ML
INJECTION, SOLUTION INTRAVENOUS
Status: DISPENSED
Start: 2023-05-13 | End: 2023-05-14

## 2023-05-13 RX ORDER — SODIUM CHLORIDE 9 MG/ML
INJECTION, SOLUTION INTRAVENOUS PRN
Status: DISCONTINUED | OUTPATIENT
Start: 2023-05-13 | End: 2023-05-15 | Stop reason: HOSPADM

## 2023-05-13 RX ORDER — ONDANSETRON 2 MG/ML
4 INJECTION INTRAMUSCULAR; INTRAVENOUS EVERY 6 HOURS PRN
Status: DISCONTINUED | OUTPATIENT
Start: 2023-05-13 | End: 2023-05-13

## 2023-05-13 RX ADMIN — Medication 1 TABLET: at 18:02

## 2023-05-13 RX ADMIN — PANTOPRAZOLE SODIUM 80 MG: 40 INJECTION, POWDER, FOR SOLUTION INTRAVENOUS at 13:43

## 2023-05-13 RX ADMIN — FUROSEMIDE 40 MG: 10 INJECTION, SOLUTION INTRAMUSCULAR; INTRAVENOUS at 18:02

## 2023-05-13 RX ADMIN — Medication 100 MG: at 18:02

## 2023-05-13 RX ADMIN — ACETAMINOPHEN 650 MG: 325 TABLET ORAL at 23:37

## 2023-05-13 RX ADMIN — PROCHLORPERAZINE EDISYLATE 10 MG: 5 INJECTION INTRAMUSCULAR; INTRAVENOUS at 23:29

## 2023-05-13 ASSESSMENT — ENCOUNTER SYMPTOMS
COUGH: 0
BACK PAIN: 0
WHEEZING: 0
SHORTNESS OF BREATH: 1
ABDOMINAL PAIN: 0
NAUSEA: 0
DIARRHEA: 0
SORE THROAT: 0
CHEST TIGHTNESS: 0
VOMITING: 0

## 2023-05-13 ASSESSMENT — PAIN SCALES - GENERAL
PAINLEVEL_OUTOF10: 0
PAINLEVEL_OUTOF10: 8

## 2023-05-13 ASSESSMENT — PAIN DESCRIPTION - DESCRIPTORS: DESCRIPTORS: ACHING;CRAMPING;DISCOMFORT;DULL

## 2023-05-13 ASSESSMENT — PAIN DESCRIPTION - PAIN TYPE: TYPE: ACUTE PAIN

## 2023-05-13 ASSESSMENT — PAIN DESCRIPTION - ORIENTATION: ORIENTATION: RIGHT;LEFT;LOWER

## 2023-05-13 ASSESSMENT — PAIN DESCRIPTION - LOCATION: LOCATION: ABDOMEN;BACK

## 2023-05-13 ASSESSMENT — PAIN - FUNCTIONAL ASSESSMENT: PAIN_FUNCTIONAL_ASSESSMENT: NONE - DENIES PAIN

## 2023-05-14 ENCOUNTER — ANESTHESIA (OUTPATIENT)
Dept: ENDOSCOPY | Age: 43
End: 2023-05-14
Payer: COMMERCIAL

## 2023-05-14 ENCOUNTER — APPOINTMENT (OUTPATIENT)
Dept: ULTRASOUND IMAGING | Age: 43
DRG: 253 | End: 2023-05-14
Payer: COMMERCIAL

## 2023-05-14 ENCOUNTER — ANESTHESIA EVENT (OUTPATIENT)
Dept: ENDOSCOPY | Age: 43
End: 2023-05-14
Payer: COMMERCIAL

## 2023-05-14 ENCOUNTER — APPOINTMENT (OUTPATIENT)
Dept: CT IMAGING | Age: 43
DRG: 253 | End: 2023-05-14
Payer: COMMERCIAL

## 2023-05-14 LAB
ANION GAP SERPL CALCULATED.3IONS-SCNC: 8 MMOL/L (ref 7–16)
BLOOD BANK DISPENSE STATUS: NORMAL
BLOOD BANK PRODUCT CODE: NORMAL
BPU ID: NORMAL
BUN SERPL-MCNC: 11 MG/DL (ref 6–20)
CALCIUM SERPL-MCNC: 7 MG/DL (ref 8.6–10.2)
CHLORIDE SERPL-SCNC: 101 MMOL/L (ref 98–107)
CO2 SERPL-SCNC: 22 MMOL/L (ref 22–29)
CREAT SERPL-MCNC: 1.2 MG/DL (ref 0.7–1.2)
DESCRIPTION BLOOD BANK: NORMAL
EKG ATRIAL RATE: 90 BPM
EKG P AXIS: 25 DEGREES
EKG P-R INTERVAL: 150 MS
EKG Q-T INTERVAL: 420 MS
EKG QRS DURATION: 82 MS
EKG QTC CALCULATION (BAZETT): 513 MS
EKG R AXIS: 43 DEGREES
EKG T AXIS: 29 DEGREES
EKG VENTRICULAR RATE: 90 BPM
ERYTHROCYTE [DISTWIDTH] IN BLOOD BY AUTOMATED COUNT: 15.2 FL (ref 11.5–15)
GLUCOSE SERPL-MCNC: 105 MG/DL (ref 74–99)
HCT VFR BLD AUTO: 20.1 % (ref 37–54)
HCT VFR BLD AUTO: 20.5 % (ref 37–54)
HCT VFR BLD AUTO: 21.7 % (ref 37–54)
HCT VFR BLD AUTO: 26.8 % (ref 37–54)
HGB BLD-MCNC: 6.1 G/DL (ref 12.5–16.5)
HGB BLD-MCNC: 6.2 G/DL (ref 12.5–16.5)
HGB BLD-MCNC: 7.1 G/DL (ref 12.5–16.5)
HGB BLD-MCNC: 8.7 G/DL (ref 12.5–16.5)
MCH RBC QN AUTO: 29 PG (ref 26–35)
MCHC RBC AUTO-ENTMCNC: 30.2 % (ref 32–34.5)
MCV RBC AUTO: 95.8 FL (ref 80–99.9)
PLATELET # BLD AUTO: 48 E9/L (ref 130–450)
PLATELET CONFIRMATION: NORMAL
PMV BLD AUTO: 9.9 FL (ref 7–12)
POTASSIUM SERPL-SCNC: 4 MMOL/L (ref 3.5–5)
RBC # BLD AUTO: 2.14 E12/L (ref 3.8–5.8)
SODIUM SERPL-SCNC: 131 MMOL/L (ref 132–146)
WBC # BLD: 3.1 E9/L (ref 4.5–11.5)

## 2023-05-14 PROCEDURE — 6370000000 HC RX 637 (ALT 250 FOR IP): Performed by: INTERNAL MEDICINE

## 2023-05-14 PROCEDURE — 6360000002 HC RX W HCPCS: Performed by: NURSE PRACTITIONER

## 2023-05-14 PROCEDURE — 2709999900 HC NON-CHARGEABLE SUPPLY: Performed by: INTERNAL MEDICINE

## 2023-05-14 PROCEDURE — 2580000003 HC RX 258

## 2023-05-14 PROCEDURE — 85027 COMPLETE CBC AUTOMATED: CPT

## 2023-05-14 PROCEDURE — 6370000000 HC RX 637 (ALT 250 FOR IP): Performed by: FAMILY MEDICINE

## 2023-05-14 PROCEDURE — 2580000003 HC RX 258: Performed by: FAMILY MEDICINE

## 2023-05-14 PROCEDURE — 3609013000 HC EGD TRANSORAL CONTROL BLEEDING ANY METHOD: Performed by: INTERNAL MEDICINE

## 2023-05-14 PROCEDURE — 6360000002 HC RX W HCPCS: Performed by: INTERNAL MEDICINE

## 2023-05-14 PROCEDURE — 76705 ECHO EXAM OF ABDOMEN: CPT

## 2023-05-14 PROCEDURE — 36430 TRANSFUSION BLD/BLD COMPNT: CPT

## 2023-05-14 PROCEDURE — 7100000011 HC PHASE II RECOVERY - ADDTL 15 MIN: Performed by: INTERNAL MEDICINE

## 2023-05-14 PROCEDURE — 85018 HEMOGLOBIN: CPT

## 2023-05-14 PROCEDURE — 93010 ELECTROCARDIOGRAM REPORT: CPT | Performed by: INTERNAL MEDICINE

## 2023-05-14 PROCEDURE — 93975 VASCULAR STUDY: CPT

## 2023-05-14 PROCEDURE — 6360000002 HC RX W HCPCS

## 2023-05-14 PROCEDURE — 7100000010 HC PHASE II RECOVERY - FIRST 15 MIN: Performed by: INTERNAL MEDICINE

## 2023-05-14 PROCEDURE — 2580000003 HC RX 258: Performed by: INTERNAL MEDICINE

## 2023-05-14 PROCEDURE — 80048 BASIC METABOLIC PNL TOTAL CA: CPT

## 2023-05-14 PROCEDURE — 36415 COLL VENOUS BLD VENIPUNCTURE: CPT

## 2023-05-14 PROCEDURE — 3609018200 HC EGD INJECTION SCLEROSIS ESOPHGL/GASTRIC VARICES: Performed by: INTERNAL MEDICINE

## 2023-05-14 PROCEDURE — 2580000003 HC RX 258: Performed by: NURSE PRACTITIONER

## 2023-05-14 PROCEDURE — C1769 GUIDE WIRE: HCPCS | Performed by: INTERNAL MEDICINE

## 2023-05-14 PROCEDURE — 85014 HEMATOCRIT: CPT

## 2023-05-14 PROCEDURE — 3700000001 HC ADD 15 MINUTES (ANESTHESIA): Performed by: INTERNAL MEDICINE

## 2023-05-14 PROCEDURE — 3700000000 HC ANESTHESIA ATTENDED CARE: Performed by: INTERNAL MEDICINE

## 2023-05-14 PROCEDURE — C9113 INJ PANTOPRAZOLE SODIUM, VIA: HCPCS | Performed by: INTERNAL MEDICINE

## 2023-05-14 PROCEDURE — A4216 STERILE WATER/SALINE, 10 ML: HCPCS | Performed by: NURSE PRACTITIONER

## 2023-05-14 PROCEDURE — C9113 INJ PANTOPRAZOLE SODIUM, VIA: HCPCS | Performed by: NURSE PRACTITIONER

## 2023-05-14 PROCEDURE — A4216 STERILE WATER/SALINE, 10 ML: HCPCS | Performed by: INTERNAL MEDICINE

## 2023-05-14 PROCEDURE — 99232 SBSQ HOSP IP/OBS MODERATE 35: CPT | Performed by: INTERNAL MEDICINE

## 2023-05-14 PROCEDURE — 2060000000 HC ICU INTERMEDIATE R&B

## 2023-05-14 PROCEDURE — 0W3P8ZZ CONTROL BLEEDING IN GASTROINTESTINAL TRACT, VIA NATURAL OR ARTIFICIAL OPENING ENDOSCOPIC: ICD-10-PCS | Performed by: INTERNAL MEDICINE

## 2023-05-14 PROCEDURE — 2500000003 HC RX 250 WO HCPCS

## 2023-05-14 PROCEDURE — 74176 CT ABD & PELVIS W/O CONTRAST: CPT

## 2023-05-14 PROCEDURE — 2720000010 HC SURG SUPPLY STERILE: Performed by: INTERNAL MEDICINE

## 2023-05-14 RX ORDER — LORAZEPAM 2 MG/ML
3 INJECTION INTRAMUSCULAR
Status: DISCONTINUED | OUTPATIENT
Start: 2023-05-14 | End: 2023-05-15 | Stop reason: HOSPADM

## 2023-05-14 RX ORDER — SODIUM CHLORIDE 0.9 % (FLUSH) 0.9 %
5-40 SYRINGE (ML) INJECTION PRN
Status: DISCONTINUED | OUTPATIENT
Start: 2023-05-14 | End: 2023-05-15 | Stop reason: HOSPADM

## 2023-05-14 RX ORDER — SODIUM CHLORIDE 9 MG/ML
INJECTION, SOLUTION INTRAVENOUS PRN
Status: DISCONTINUED | OUTPATIENT
Start: 2023-05-14 | End: 2023-05-15 | Stop reason: HOSPADM

## 2023-05-14 RX ORDER — LORAZEPAM 1 MG/1
1 TABLET ORAL
Status: DISCONTINUED | OUTPATIENT
Start: 2023-05-14 | End: 2023-05-15 | Stop reason: HOSPADM

## 2023-05-14 RX ORDER — LORAZEPAM 1 MG/1
2 TABLET ORAL
Status: DISCONTINUED | OUTPATIENT
Start: 2023-05-14 | End: 2023-05-15 | Stop reason: HOSPADM

## 2023-05-14 RX ORDER — EPINEPHRINE 1 MG/ML
INJECTION, SOLUTION, CONCENTRATE INTRAVENOUS PRN
Status: DISCONTINUED | OUTPATIENT
Start: 2023-05-14 | End: 2023-05-14 | Stop reason: ALTCHOICE

## 2023-05-14 RX ORDER — SODIUM CHLORIDE, SODIUM LACTATE, POTASSIUM CHLORIDE, CALCIUM CHLORIDE 600; 310; 30; 20 MG/100ML; MG/100ML; MG/100ML; MG/100ML
INJECTION, SOLUTION INTRAVENOUS CONTINUOUS PRN
Status: DISCONTINUED | OUTPATIENT
Start: 2023-05-14 | End: 2023-05-14 | Stop reason: SDUPTHER

## 2023-05-14 RX ORDER — ONDANSETRON 2 MG/ML
4 INJECTION INTRAMUSCULAR; INTRAVENOUS EVERY 6 HOURS PRN
Status: DISCONTINUED | OUTPATIENT
Start: 2023-05-14 | End: 2023-05-14 | Stop reason: ALTCHOICE

## 2023-05-14 RX ORDER — LACTULOSE 10 G/15ML
20 SOLUTION ORAL 2 TIMES DAILY
Status: DISCONTINUED | OUTPATIENT
Start: 2023-05-14 | End: 2023-05-15 | Stop reason: HOSPADM

## 2023-05-14 RX ORDER — LORAZEPAM 2 MG/ML
4 INJECTION INTRAMUSCULAR
Status: DISCONTINUED | OUTPATIENT
Start: 2023-05-14 | End: 2023-05-15 | Stop reason: HOSPADM

## 2023-05-14 RX ORDER — LORAZEPAM 1 MG/1
4 TABLET ORAL
Status: DISCONTINUED | OUTPATIENT
Start: 2023-05-14 | End: 2023-05-15 | Stop reason: HOSPADM

## 2023-05-14 RX ORDER — SODIUM CHLORIDE 9 MG/ML
INJECTION, SOLUTION INTRAVENOUS PRN
Status: DISCONTINUED | OUTPATIENT
Start: 2023-05-14 | End: 2023-05-14

## 2023-05-14 RX ORDER — LORAZEPAM 2 MG/ML
1 INJECTION INTRAMUSCULAR
Status: DISCONTINUED | OUTPATIENT
Start: 2023-05-14 | End: 2023-05-15 | Stop reason: HOSPADM

## 2023-05-14 RX ORDER — LORAZEPAM 2 MG/ML
2 INJECTION INTRAMUSCULAR
Status: DISCONTINUED | OUTPATIENT
Start: 2023-05-14 | End: 2023-05-15 | Stop reason: HOSPADM

## 2023-05-14 RX ORDER — LORAZEPAM 1 MG/1
3 TABLET ORAL
Status: DISCONTINUED | OUTPATIENT
Start: 2023-05-14 | End: 2023-05-15 | Stop reason: HOSPADM

## 2023-05-14 RX ORDER — OCTREOTIDE ACETATE 50 UG/ML
50 INJECTION, SOLUTION INTRAVENOUS; SUBCUTANEOUS ONCE
Status: COMPLETED | OUTPATIENT
Start: 2023-05-14 | End: 2023-05-14

## 2023-05-14 RX ORDER — PROPOFOL 10 MG/ML
INJECTION, EMULSION INTRAVENOUS PRN
Status: DISCONTINUED | OUTPATIENT
Start: 2023-05-14 | End: 2023-05-14 | Stop reason: SDUPTHER

## 2023-05-14 RX ORDER — SODIUM CHLORIDE 0.9 % (FLUSH) 0.9 %
5-40 SYRINGE (ML) INJECTION EVERY 12 HOURS SCHEDULED
Status: DISCONTINUED | OUTPATIENT
Start: 2023-05-14 | End: 2023-05-15 | Stop reason: HOSPADM

## 2023-05-14 RX ADMIN — PROPOFOL 600 MG: 10 INJECTION, EMULSION INTRAVENOUS at 12:38

## 2023-05-14 RX ADMIN — OCTREOTIDE ACETATE 50 MCG/HR: 500 INJECTION, SOLUTION INTRAVENOUS; SUBCUTANEOUS at 19:51

## 2023-05-14 RX ADMIN — Medication 1 TABLET: at 10:33

## 2023-05-14 RX ADMIN — FUROSEMIDE 40 MG: 10 INJECTION, SOLUTION INTRAMUSCULAR; INTRAVENOUS at 10:10

## 2023-05-14 RX ADMIN — SODIUM CHLORIDE 40 MG: 9 INJECTION INTRAMUSCULAR; INTRAVENOUS; SUBCUTANEOUS at 10:33

## 2023-05-14 RX ADMIN — OCTREOTIDE ACETATE 50 MCG: 50 INJECTION, SOLUTION INTRAVENOUS; SUBCUTANEOUS at 11:17

## 2023-05-14 RX ADMIN — PHENYLEPHRINE HYDROCHLORIDE 100 MCG: 10 INJECTION INTRAVENOUS at 12:44

## 2023-05-14 RX ADMIN — FUROSEMIDE 40 MG: 10 INJECTION, SOLUTION INTRAMUSCULAR; INTRAVENOUS at 17:01

## 2023-05-14 RX ADMIN — LORAZEPAM 1 MG: 1 TABLET ORAL at 18:14

## 2023-05-14 RX ADMIN — OCTREOTIDE ACETATE 50 MCG/HR: 500 INJECTION, SOLUTION INTRAVENOUS; SUBCUTANEOUS at 11:17

## 2023-05-14 RX ADMIN — LORAZEPAM 2 MG: 1 TABLET ORAL at 06:57

## 2023-05-14 RX ADMIN — LORAZEPAM 2 MG: 1 TABLET ORAL at 10:32

## 2023-05-14 RX ADMIN — PHENYLEPHRINE HYDROCHLORIDE 100 MCG: 10 INJECTION INTRAVENOUS at 13:05

## 2023-05-14 RX ADMIN — SODIUM CHLORIDE, POTASSIUM CHLORIDE, SODIUM LACTATE AND CALCIUM CHLORIDE: 600; 310; 30; 20 INJECTION, SOLUTION INTRAVENOUS at 12:30

## 2023-05-14 RX ADMIN — GLUCAGON HYDROCHLORIDE 1 MG: 1 INJECTION, POWDER, FOR SOLUTION INTRAMUSCULAR; INTRAVENOUS; SUBCUTANEOUS at 12:42

## 2023-05-14 RX ADMIN — SODIUM CHLORIDE, PRESERVATIVE FREE 10 ML: 5 INJECTION INTRAVENOUS at 10:19

## 2023-05-14 RX ADMIN — RIFAXIMIN 400 MG: 200 TABLET ORAL at 23:59

## 2023-05-14 RX ADMIN — LORAZEPAM 1 MG: 1 TABLET ORAL at 05:05

## 2023-05-14 RX ADMIN — PHENYLEPHRINE HYDROCHLORIDE 100 MCG: 10 INJECTION INTRAVENOUS at 12:58

## 2023-05-14 RX ADMIN — FUROSEMIDE 40 MG: 10 INJECTION, SOLUTION INTRAMUSCULAR; INTRAVENOUS at 01:38

## 2023-05-14 RX ADMIN — PHENYLEPHRINE HYDROCHLORIDE 100 MCG: 10 INJECTION INTRAVENOUS at 12:50

## 2023-05-14 RX ADMIN — SODIUM CHLORIDE, PRESERVATIVE FREE 10 ML: 5 INJECTION INTRAVENOUS at 20:43

## 2023-05-14 RX ADMIN — PROCHLORPERAZINE EDISYLATE 10 MG: 5 INJECTION INTRAMUSCULAR; INTRAVENOUS at 22:32

## 2023-05-14 RX ADMIN — SODIUM CHLORIDE 40 MG: 9 INJECTION INTRAMUSCULAR; INTRAVENOUS; SUBCUTANEOUS at 17:00

## 2023-05-14 RX ADMIN — SODIUM CHLORIDE 40 MG: 9 INJECTION INTRAMUSCULAR; INTRAVENOUS; SUBCUTANEOUS at 21:00

## 2023-05-14 RX ADMIN — Medication 100 MG: at 10:33

## 2023-05-14 ASSESSMENT — PAIN SCALES - GENERAL
PAINLEVEL_OUTOF10: 0
PAINLEVEL_OUTOF10: 0

## 2023-05-15 VITALS
BODY MASS INDEX: 26.87 KG/M2 | RESPIRATION RATE: 20 BRPM | TEMPERATURE: 100.1 F | HEIGHT: 68 IN | WEIGHT: 177.31 LBS | HEART RATE: 98 BPM | SYSTOLIC BLOOD PRESSURE: 108 MMHG | OXYGEN SATURATION: 100 % | DIASTOLIC BLOOD PRESSURE: 54 MMHG

## 2023-05-15 LAB
ANION GAP SERPL CALCULATED.3IONS-SCNC: 7 MMOL/L (ref 7–16)
BUN SERPL-MCNC: 25 MG/DL (ref 6–20)
CALCIUM SERPL-MCNC: 7.6 MG/DL (ref 8.6–10.2)
CHLORIDE SERPL-SCNC: 101 MMOL/L (ref 98–107)
CO2 SERPL-SCNC: 23 MMOL/L (ref 22–29)
CREAT SERPL-MCNC: 1.6 MG/DL (ref 0.7–1.2)
ERYTHROCYTE [DISTWIDTH] IN BLOOD BY AUTOMATED COUNT: 16.3 FL (ref 11.5–15)
GLUCOSE SERPL-MCNC: 143 MG/DL (ref 74–99)
HCT VFR BLD AUTO: 25.5 % (ref 37–54)
HGB BLD-MCNC: 8.2 G/DL (ref 12.5–16.5)
MCH RBC QN AUTO: 28.8 PG (ref 26–35)
MCHC RBC AUTO-ENTMCNC: 32.2 % (ref 32–34.5)
MCV RBC AUTO: 89.5 FL (ref 80–99.9)
PLATELET # BLD AUTO: 57 E9/L (ref 130–450)
PLATELET CONFIRMATION: NORMAL
PMV BLD AUTO: 10.5 FL (ref 7–12)
POTASSIUM SERPL-SCNC: 4.9 MMOL/L (ref 3.5–5)
RBC # BLD AUTO: 2.85 E12/L (ref 3.8–5.8)
REASON FOR REJECTION: NORMAL
REJECTED TEST: NORMAL
SODIUM SERPL-SCNC: 131 MMOL/L (ref 132–146)
TSH SERPL-MCNC: 0.98 UIU/ML (ref 0.27–4.2)
WBC # BLD: 8.8 E9/L (ref 4.5–11.5)

## 2023-05-15 PROCEDURE — 6360000002 HC RX W HCPCS: Performed by: INTERNAL MEDICINE

## 2023-05-15 PROCEDURE — 2580000003 HC RX 258: Performed by: INTERNAL MEDICINE

## 2023-05-15 PROCEDURE — P9047 ALBUMIN (HUMAN), 25%, 50ML: HCPCS | Performed by: INTERNAL MEDICINE

## 2023-05-15 PROCEDURE — 6370000000 HC RX 637 (ALT 250 FOR IP): Performed by: INTERNAL MEDICINE

## 2023-05-15 PROCEDURE — 99233 SBSQ HOSP IP/OBS HIGH 50: CPT | Performed by: INTERNAL MEDICINE

## 2023-05-15 PROCEDURE — 80048 BASIC METABOLIC PNL TOTAL CA: CPT

## 2023-05-15 PROCEDURE — C9113 INJ PANTOPRAZOLE SODIUM, VIA: HCPCS | Performed by: INTERNAL MEDICINE

## 2023-05-15 PROCEDURE — 85027 COMPLETE CBC AUTOMATED: CPT

## 2023-05-15 PROCEDURE — A4216 STERILE WATER/SALINE, 10 ML: HCPCS | Performed by: INTERNAL MEDICINE

## 2023-05-15 PROCEDURE — 84443 ASSAY THYROID STIM HORMONE: CPT

## 2023-05-15 PROCEDURE — 36415 COLL VENOUS BLD VENIPUNCTURE: CPT

## 2023-05-15 PROCEDURE — 6360000002 HC RX W HCPCS: Performed by: FAMILY MEDICINE

## 2023-05-15 RX ORDER — ALBUMIN (HUMAN) 12.5 G/50ML
25 SOLUTION INTRAVENOUS ONCE
Status: COMPLETED | OUTPATIENT
Start: 2023-05-15 | End: 2023-05-15

## 2023-05-15 RX ORDER — FUROSEMIDE 10 MG/ML
40 INJECTION INTRAMUSCULAR; INTRAVENOUS DAILY
Status: DISCONTINUED | OUTPATIENT
Start: 2023-05-16 | End: 2023-05-15 | Stop reason: HOSPADM

## 2023-05-15 RX ORDER — 0.9 % SODIUM CHLORIDE 0.9 %
250 INTRAVENOUS SOLUTION INTRAVENOUS ONCE
Status: COMPLETED | OUTPATIENT
Start: 2023-05-15 | End: 2023-05-15

## 2023-05-15 RX ORDER — ONDANSETRON 2 MG/ML
4 INJECTION INTRAMUSCULAR; INTRAVENOUS EVERY 6 HOURS PRN
Status: DISCONTINUED | OUTPATIENT
Start: 2023-05-15 | End: 2023-05-15 | Stop reason: HOSPADM

## 2023-05-15 RX ADMIN — SODIUM CHLORIDE 250 ML: 9 INJECTION, SOLUTION INTRAVENOUS at 11:52

## 2023-05-15 RX ADMIN — SODIUM CHLORIDE 40 MG: 9 INJECTION INTRAMUSCULAR; INTRAVENOUS; SUBCUTANEOUS at 10:29

## 2023-05-15 RX ADMIN — ALBUMIN (HUMAN) 25 G: 0.25 INJECTION, SOLUTION INTRAVENOUS at 10:35

## 2023-05-15 RX ADMIN — ACETAMINOPHEN 650 MG: 325 TABLET ORAL at 16:21

## 2023-05-15 RX ADMIN — PROCHLORPERAZINE EDISYLATE 10 MG: 5 INJECTION INTRAMUSCULAR; INTRAVENOUS at 16:26

## 2023-05-15 RX ADMIN — LORAZEPAM 2 MG: 1 TABLET ORAL at 00:00

## 2023-05-15 RX ADMIN — LACTULOSE 20 G: 20 SOLUTION ORAL at 00:03

## 2023-05-15 RX ADMIN — FUROSEMIDE 40 MG: 10 INJECTION, SOLUTION INTRAMUSCULAR; INTRAVENOUS at 04:15

## 2023-05-15 RX ADMIN — OCTREOTIDE ACETATE 50 MCG/HR: 500 INJECTION, SOLUTION INTRAVENOUS; SUBCUTANEOUS at 05:27

## 2023-05-15 RX ADMIN — Medication 1 TABLET: at 10:28

## 2023-05-15 RX ADMIN — SODIUM CHLORIDE 40 MG: 9 INJECTION INTRAMUSCULAR; INTRAVENOUS; SUBCUTANEOUS at 04:15

## 2023-05-15 RX ADMIN — SODIUM CHLORIDE, PRESERVATIVE FREE 10 ML: 5 INJECTION INTRAVENOUS at 11:20

## 2023-05-15 RX ADMIN — Medication 100 MG: at 10:28

## 2023-05-15 RX ADMIN — SODIUM CHLORIDE 40 MG: 9 INJECTION INTRAMUSCULAR; INTRAVENOUS; SUBCUTANEOUS at 16:24

## 2023-05-15 RX ADMIN — RIFAXIMIN 400 MG: 200 TABLET ORAL at 11:20

## 2023-05-15 RX ADMIN — RIFAXIMIN 400 MG: 200 TABLET ORAL at 14:45

## 2023-05-15 RX ADMIN — LACTULOSE 20 G: 20 SOLUTION ORAL at 10:28

## 2023-05-15 RX ADMIN — ONDANSETRON 4 MG: 2 INJECTION INTRAMUSCULAR; INTRAVENOUS at 02:00

## 2023-05-15 NOTE — CARE COORDINATION
Xifaxan as pt is currently taking 400 mg po tid will require a new script. Pt was previously on oral Xiofaxan at 500 mg po tid. CM attempting to obtain prior auth through cover my meds- but need actual script so this can be enter into cover my meds and reviewed with Rosie once again.  pts [facility] through [address] [id number]  KEN Renee    Electronically signed by ALEC Galeas on 5/15/2023 at 12:27 PM

## 2023-05-15 NOTE — CARE COORDINATION
Case Management Assessment  Initial Evaluation    Date/Time of Evaluation: 5/15/2023 1:07 PM  Assessment Completed by: Catarino Holloway RN    If patient is discharged prior to next notation, then this note serves as note for discharge by case management. Patient Name: Minh Choi III                   YOB: 1980  Diagnosis: Alcohol abuse [F10.10]  GI bleed [K92.2]  Cirrhosis of liver without ascites, unspecified hepatic cirrhosis type ([facility] [address] [K74.60]  Anemia, unspecified type [D64.9]                   Date / Time: 5/13/2023 12:46 PM    Patient Admission Status: Inpatient   Readmission Risk (Low < 19, Mod (19-27), High > 27): Readmission Risk Score: 14.1    Current PCP: No primary care provider on file. PCP verified by CM? Yes    Chart Reviewed: Yes      History Provided by: Patient  Patient Orientation: Alert and Oriented    Patient Cognition: Alert    Hospitalization in the last 30 days (Readmission):  No        Advance Directives:      Code Status: Full Code   Patient's Primary Decision Maker is: Legal Next of Kin      Discharge Planning:    Patient lives with: Spouse/Significant Other Type of Home: House  Primary Care Giver: Self  Patient Support Systems include: Spouse/Significant Other   Current Financial resources:    Current community resources:    Current services prior to admission: None            Current DME:              Type of Home Care services:  None    ADLS  Prior functional level: Independent in ADLs/IADLs  Current functional level: Independent in ADLs/IADLs    PT AM-PAC:   /24  OT AM-PAC:   /24    Family can provide assistance at DC: Yes  Would you like Case Management to discuss the discharge plan with any other family members/significant others, and if so, who?  No  Plans to Return to Present Housing: Yes  Other Identified Issues/Barriers to RETURNING to current housing: pts alcohol and drug abuse  Potential Assistance needed at discharge: N/A            Potential DME:

## 2023-05-15 NOTE — PROGRESS NOTES
Notified Dr. Ja Davis that patient wanted to leave AMA. Patient aware if he had the blood in stool or vomiting blood, he stated he would come back to the ER. Patient signed the [facility] paper.
PROGRESS NOTE  By Presley Moreno M.D. [facility]  Dr. Sindi Bautista M.D.,  Dr. Fabio Rankin M.D.,   Dr. Yong Yates D.O.,  Dr. Earnest Correa M.D.,  Dr. Sunny Escalera D.O.,          Dotty Whelan III  44 y.o.  male    SUBJECTIVE:  Somnolent. No family at bedside    OBJECTIVE:    BP (!) 112/45   Pulse 96   Temp 98.7 °F (37.1 °C) (Oral)   Resp 18   Ht 5' 8\" (1.727 m)   Wt 177 lb 5 oz (80.4 kg)   SpO2 96%   BMI 26.96 kg/m²     General: NAD/ male  HEENT: No discharge from nose or ears  Neck: Trachea midline/no JVD  Chest: CTA B  Cor: RRR/S1-S2  Abd.: Soft and nondistended  Extr.:With age and condition  Decreased muscle tone and bulk, consistent  Skin: Warm and dry        DATA:    Monitor data reviewed -sinus rhythm noted.        Lab Results   Component Value Date/Time    WBC 8.8 05/15/2023 03:21 AM    RBC 2.85 05/15/2023 03:21 AM    HGB 8.2 05/15/2023 03:21 AM    HCT 25.5 05/15/2023 03:21 AM    MCV 89.5 05/15/2023 03:21 AM    MCH 28.8 05/15/2023 03:21 AM    MCHC 32.2 05/15/2023 03:21 AM    RDW 16.3 05/15/2023 03:21 AM    PLT 57 05/15/2023 03:21 AM    MPV 10.5 05/15/2023 03:21 AM     Lab Results   Component Value Date/Time     05/15/2023 03:21 AM    K 4.9 05/15/2023 03:21 AM    K 3.9 06/30/2022 05:26 PM     05/15/2023 03:21 AM    CO2 23 05/15/2023 03:21 AM    BUN 25 05/15/2023 03:21 AM    CREATININE 1.6 05/15/2023 03:21 AM    CALCIUM 7.6 05/15/2023 03:21 AM    PROT 5.2 05/13/2023 01:23 PM    LABALBU 2.4 05/13/2023 01:23 PM    LABALBU 3.4 02/18/2012 05:00 AM    BILITOT 2.3 05/13/2023 01:23 PM    ALKPHOS 308 05/13/2023 01:23 PM    AST 98 05/13/2023 01:23 PM    ALT 43 05/13/2023 01:23 PM     Lab Results   Component Value Date/Time    LIPASE 73 05/13/2023 01:23 PM     Lab Results   Component Value Date/Time    AMYLASE 49 02/22/2015 09:30 PM         ASSESSMENT/PLAN:  Patient Active Problem List   Diagnosis    Cellulitis    UGI bleed    Abdominal pain    Secondary
Daily  therapeutic multivitamin-minerals 1 tablet, 1 tablet, Oral, Daily  furosemide (LASIX) injection 40 mg, 40 mg, IntraVENous, Q8H  prochlorperazine (COMPAZINE) injection 10 mg, 10 mg, IntraVENous, Q6H PRN  Physical    VITALS:  BP (!) 112/45   Pulse 96   Temp 98.7 °F (37.1 °C) (Oral)   Resp 18   Ht 5' 8\" (1.727 m)   Wt 177 lb 5 oz (80.4 kg)   SpO2 96%   BMI 26.96 kg/m²   CONSTITUTIONAL:  awake, cooperative, and no apparent distress  EYES:  extra-ocular muscles intact and vision intact  ENT:  normocepalic, without obvious abnormality  NECK:  supple, symmetrical, trachea midline  LUNGS:  no increased work of breathing, no retractions, and clear to auscultation  CARDIOVASCULAR:  normal apical pulses and normal S1 and S2  ABDOMEN:  normal bowel sounds, non-distended, and non-tender  MUSCULOSKELETAL:  full range of motion noted  NEUROLOGIC:  Mental Status Exam:  Level of Alertness:   lethargic  Orientation:   person, place, time  SKIN:  spider angioma  Data    CBC:   Lab Results   Component Value Date/Time    WBC 8.8 05/15/2023 03:21 AM    RBC 2.85 05/15/2023 03:21 AM    HGB 8.2 05/15/2023 03:21 AM    HCT 25.5 05/15/2023 03:21 AM    MCV 89.5 05/15/2023 03:21 AM    MCH 28.8 05/15/2023 03:21 AM    MCHC 32.2 05/15/2023 03:21 AM    RDW 16.3 05/15/2023 03:21 AM    PLT 57 05/15/2023 03:21 AM    MPV 10.5 05/15/2023 03:21 AM     BMP:    Lab Results   Component Value Date/Time     05/15/2023 03:21 AM    K 4.9 05/15/2023 03:21 AM    K 3.9 06/30/2022 05:26 PM     05/15/2023 03:21 AM    CO2 23 05/15/2023 03:21 AM    BUN 25 05/15/2023 03:21 AM    LABALBU 2.4 05/13/2023 01:23 PM    LABALBU 3.4 02/18/2012 05:00 AM    CREATININE 1.6 05/15/2023 03:21 AM    CALCIUM 7.6 05/15/2023 03:21 AM    GFRAA >60 07/01/2022 06:35 AM    LABGLOM 55 05/15/2023 03:21 AM    GLUCOSE 143 05/15/2023 03:21 AM    GLUCOSE 89 02/20/2012 04:40 AM       ASSESSMENT AND PLAN      Anemia due to GI bleed: s/p EGD with findings of GAVE and

## 2023-05-15 NOTE — PLAN OF CARE
Problem: Cardiovascular - Adult  Goal: Maintains optimal cardiac output and hemodynamic stability  Outcome: Progressing     Problem: Metabolic/Fluid and Electrolytes - Adult  Goal: Electrolytes maintained within normal limits  Outcome: Progressing     Problem: Metabolic/Fluid and Electrolytes - Adult  Goal: Hemodynamic stability and optimal renal function maintained  Outcome: Progressing

## 2023-05-17 LAB
BLOOD BANK DISPENSE STATUS: NORMAL
BLOOD BANK PRODUCT CODE: NORMAL
BPU ID: NORMAL
DESCRIPTION BLOOD BANK: NORMAL

## 2023-05-23 ENCOUNTER — HOSPITAL ENCOUNTER (EMERGENCY)
Age: 43
Discharge: HOME | End: 2023-05-23
Attending: EMERGENCY MEDICINE
Payer: COMMERCIAL

## 2023-05-23 VITALS
OXYGEN SATURATION: 100 % | RESPIRATION RATE: 18 BRPM | HEART RATE: 93 BPM | TEMPERATURE: 98 F | WEIGHT: 180 LBS | DIASTOLIC BLOOD PRESSURE: 70 MMHG | SYSTOLIC BLOOD PRESSURE: 114 MMHG | BODY MASS INDEX: 27.37 KG/M2

## 2023-05-23 DIAGNOSIS — D63.8 ANEMIA IN OTHER CHRONIC DISEASES CLASSIFIED ELSEWHERE: Primary | ICD-10-CM

## 2023-05-23 LAB
ABO + RH BLD: NORMAL
BASOPHILIC STIPPLING: ABNORMAL
BASOPHILS # BLD: 0 E9/L (ref 0–0.2)
BASOPHILS NFR BLD: 0.7 % (ref 0–2)
BLD GP AB SCN SERPL QL: NORMAL
BLOOD BANK DISPENSE STATUS: NORMAL
BLOOD BANK PRODUCT CODE: NORMAL
BPU ID: NORMAL
DESCRIPTION BLOOD BANK: NORMAL
EOSINOPHIL # BLD: 0.25 E9/L (ref 0.05–0.5)
EOSINOPHIL NFR BLD: 3.5 % (ref 0–6)
ERYTHROCYTE [DISTWIDTH] IN BLOOD BY AUTOMATED COUNT: 15.7 FL (ref 11.5–15)
HCT VFR BLD AUTO: 21.9 % (ref 37–54)
HGB BLD-MCNC: 7.1 G/DL (ref 12.5–16.5)
INR BLD: 1.5
LYMPHOCYTES # BLD: 0.28 E9/L (ref 1.5–4)
LYMPHOCYTES NFR BLD: 3.5 % (ref 20–42)
MCH RBC QN AUTO: 28.4 PG (ref 26–35)
MCHC RBC AUTO-ENTMCNC: 32.4 % (ref 32–34.5)
MCV RBC AUTO: 87.6 FL (ref 80–99.9)
MONOCYTES # BLD: 0.28 E9/L (ref 0.1–0.95)
MONOCYTES NFR BLD: 4.4 % (ref 2–12)
NEUTROPHILS # BLD: 6.23 E9/L (ref 1.8–7.3)
NEUTS SEG NFR BLD: 88.6 % (ref 43–80)
OVALOCYTES: ABNORMAL
PLATELET # BLD AUTO: 97 E9/L (ref 130–450)
PLATELET CONFIRMATION: NORMAL
PMV BLD AUTO: 9.4 FL (ref 7–12)
POIKILOCYTES: ABNORMAL
POLYCHROMASIA: ABNORMAL
PROTHROMBIN TIME: 17.8 SEC (ref 9.3–12.4)
RBC # BLD AUTO: 2.5 E12/L (ref 3.8–5.8)
SCHISTOCYTES: ABNORMAL
TARGET CELLS: ABNORMAL
WBC # BLD: 7 E9/L (ref 4.5–11.5)

## 2023-05-23 PROCEDURE — 99285 EMERGENCY DEPT VISIT HI MDM: CPT

## 2023-05-23 PROCEDURE — 85025 COMPLETE CBC W/AUTO DIFF WBC: CPT

## 2023-05-23 PROCEDURE — P9016 RBC LEUKOCYTES REDUCED: HCPCS

## 2023-05-23 PROCEDURE — 86901 BLOOD TYPING SEROLOGIC RH(D): CPT

## 2023-05-23 PROCEDURE — 2580000003 HC RX 258: Performed by: EMERGENCY MEDICINE

## 2023-05-23 PROCEDURE — 86923 COMPATIBILITY TEST ELECTRIC: CPT

## 2023-05-23 PROCEDURE — 86900 BLOOD TYPING SEROLOGIC ABO: CPT

## 2023-05-23 PROCEDURE — 36415 COLL VENOUS BLD VENIPUNCTURE: CPT

## 2023-05-23 PROCEDURE — 86850 RBC ANTIBODY SCREEN: CPT

## 2023-05-23 PROCEDURE — 85610 PROTHROMBIN TIME: CPT

## 2023-05-23 RX ORDER — SODIUM CHLORIDE 9 MG/ML
INJECTION, SOLUTION INTRAVENOUS ONCE
Status: COMPLETED | OUTPATIENT
Start: 2023-05-23 | End: 2023-05-23

## 2023-05-23 RX ORDER — SODIUM CHLORIDE 9 MG/ML
INJECTION, SOLUTION INTRAVENOUS PRN
Status: DISCONTINUED | OUTPATIENT
Start: 2023-05-23 | End: 2023-05-23 | Stop reason: HOSPADM

## 2023-05-23 RX ADMIN — SODIUM CHLORIDE: 900 INJECTION, SOLUTION INTRAVENOUS at 12:41

## 2023-05-23 ASSESSMENT — ENCOUNTER SYMPTOMS
BLOOD IN STOOL: 0
ANAL BLEEDING: 0
EYES NEGATIVE: 1
CONSTIPATION: 0
RECTAL PAIN: 0
RESPIRATORY NEGATIVE: 1
ABDOMINAL DISTENTION: 1
COLOR CHANGE: 0
VOMITING: 0

## 2023-05-23 NOTE — DISCHARGE INSTRUCTIONS
Continue home medications. Follow-up with your GI specialist next week for recheck of your hemoglobin. Return for worsening symptoms or concerns.

## 2023-05-23 NOTE — ED NOTES
Discharge instructions given. No further questions at this time.       Dylan Vasquez RN  05/23/23 0076

## 2023-05-23 NOTE — ED PROVIDER NOTES
HPI     Family History   Problem Relation Age of Onset    Diabetes Father      Past Surgical History:   Procedure Laterality Date    ENDOSCOPY, COLON, DIAGNOSTIC      HERNIA REPAIR      HERNIA REPAIR Left 2/7/2022    LAPAROSCOPIC LEFT INGUINAL HERNIA REPAIR WITH MESH POSSIBLE OPEN POSSIBLE BILATERAL performed by Yvette Rebollar MD at [address] [facility] N/A 9/15/2021    LAPAROSCOPIC POSSIBLE OPEN UMBILICAL HERNIA REPAIR WITH [address] performed by Yvette Rebollar MD at [address] [facility] [address] 11/13/2020    EGD BIOPSY performed by Gali Alvarado MD at [place] [address] N/A 1/6/2021    EGD BAND LIGATION performed by Wallace De Jesus MD at [address] [facility] 5/10/2021    EGD BAND LIGATION performed by Wallace De Jesus MD at [address] [facility] N/A 5/14/2023    EGD CONTROL HEMORRHAGE performed by Cherelle Gaytan MD at [place] [address]  5/14/2023    EGD ESOPHAGOGASTRODUODENOSCOPY SCLEROTHERAPY performed by Cherelle Gaytan MD at [facility] ENDOSCOPY     Past Medical History:   Diagnosis Date    Esophageal varices ([address]    Cirrhosis    Review of Systems   Constitutional: Negative. HENT: Negative. Eyes: Negative. Respiratory: Negative. Cardiovascular: Negative. Gastrointestinal:  Positive for abdominal distention. Negative for anal bleeding, blood in stool, constipation, rectal pain and vomiting. Genitourinary: Negative. Musculoskeletal:  Negative for arthralgias and gait problem. Skin:  Negative for color change, pallor and rash. Allergic/Immunologic: Negative for immunocompromised state. Neurological:  Negative for dizziness and headaches. Psychiatric/Behavioral:  Negative for agitation and behavioral problems. Physical Exam  Vitals and nursing note reviewed. Constitutional:       Appearance: Normal appearance.  He

## 2023-06-01 ENCOUNTER — HOSPITAL ENCOUNTER (INPATIENT)
Age: 43
LOS: 3 days | Discharge: LEFT BEFORE DISCHARGE | DRG: 280 | End: 2023-06-04
Attending: EMERGENCY MEDICINE | Admitting: INTERNAL MEDICINE
Payer: COMMERCIAL

## 2023-06-01 ENCOUNTER — APPOINTMENT (OUTPATIENT)
Dept: GENERAL RADIOLOGY | Age: 43
DRG: 280 | End: 2023-06-01
Payer: COMMERCIAL

## 2023-06-01 DIAGNOSIS — N50.89 SCROTAL EDEMA: ICD-10-CM

## 2023-06-01 DIAGNOSIS — R60.1 ANASARCA: ICD-10-CM

## 2023-06-01 DIAGNOSIS — K70.31 ALCOHOLIC CIRRHOSIS OF LIVER WITH ASCITES (HCC): Primary | ICD-10-CM

## 2023-06-01 LAB
ALBUMIN SERPL-MCNC: 2.5 G/DL (ref 3.5–5.2)
ALP SERPL-CCNC: 287 U/L (ref 40–129)
ALT SERPL-CCNC: 28 U/L (ref 0–40)
ANION GAP SERPL CALCULATED.3IONS-SCNC: 8 MMOL/L (ref 7–16)
ANISOCYTOSIS: ABNORMAL
AST SERPL-CCNC: 67 U/L (ref 0–39)
BACTERIA URNS QL MICRO: ABNORMAL /HPF
BASOPHILS # BLD: 0.07 E9/L (ref 0–0.2)
BASOPHILS NFR BLD: 1.2 % (ref 0–2)
BILIRUB DIRECT SERPL-MCNC: 1.7 MG/DL (ref 0–0.3)
BILIRUB INDIRECT SERPL-MCNC: 1.3 MG/DL (ref 0–1)
BILIRUB SERPL-MCNC: 3 MG/DL (ref 0–1.2)
BILIRUB UR QL STRIP: NEGATIVE
BNP BLD-MCNC: 188 PG/ML (ref 0–125)
BUN SERPL-MCNC: 14 MG/DL (ref 6–20)
BURR CELLS: ABNORMAL
CALCIUM SERPL-MCNC: 7.9 MG/DL (ref 8.6–10.2)
CHLORIDE SERPL-SCNC: 97 MMOL/L (ref 98–107)
CLARITY UR: CLEAR
CO2 SERPL-SCNC: 25 MMOL/L (ref 22–29)
COLOR UR: YELLOW
CREAT SERPL-MCNC: 1.3 MG/DL (ref 0.7–1.2)
CRYSTALS URNS MICRO: ABNORMAL /HPF
EKG ATRIAL RATE: 93 BPM
EKG P AXIS: 33 DEGREES
EKG P-R INTERVAL: 130 MS
EKG Q-T INTERVAL: 406 MS
EKG QRS DURATION: 76 MS
EKG QTC CALCULATION (BAZETT): 504 MS
EKG R AXIS: 39 DEGREES
EKG T AXIS: 37 DEGREES
EKG VENTRICULAR RATE: 93 BPM
EOSINOPHIL # BLD: 0.58 E9/L (ref 0.05–0.5)
EOSINOPHIL NFR BLD: 9.6 % (ref 0–6)
ERYTHROCYTE [DISTWIDTH] IN BLOOD BY AUTOMATED COUNT: 15.1 FL (ref 11.5–15)
GLUCOSE SERPL-MCNC: 116 MG/DL (ref 74–99)
GLUCOSE UR STRIP-MCNC: NEGATIVE MG/DL
HCT VFR BLD AUTO: 24.9 % (ref 37–54)
HGB BLD-MCNC: 8.1 G/DL (ref 12.5–16.5)
HGB UR QL STRIP: NEGATIVE
HYALINE CASTS #/AREA URNS LPF: ABNORMAL /LPF (ref 0–2)
IMM GRANULOCYTES # BLD: 0.05 E9/L
IMM GRANULOCYTES NFR BLD: 0.8 % (ref 0–5)
KETONES UR STRIP-MCNC: NEGATIVE MG/DL
LEUKOCYTE ESTERASE UR QL STRIP: NEGATIVE
LYMPHOCYTES # BLD: 0.76 E9/L (ref 1.5–4)
LYMPHOCYTES NFR BLD: 12.6 % (ref 20–42)
MCH RBC QN AUTO: 28.6 PG (ref 26–35)
MCHC RBC AUTO-ENTMCNC: 32.5 % (ref 32–34.5)
MCV RBC AUTO: 88 FL (ref 80–99.9)
MONOCYTES # BLD: 1.01 E9/L (ref 0.1–0.95)
MONOCYTES NFR BLD: 16.7 % (ref 2–12)
NEUTROPHILS # BLD: 3.58 E9/L (ref 1.8–7.3)
NEUTS SEG NFR BLD: 59.1 % (ref 43–80)
NITRITE UR QL STRIP: NEGATIVE
OVALOCYTES: ABNORMAL
PH UR STRIP: 6 [PH] (ref 5–9)
PLATELET # BLD AUTO: 88 E9/L (ref 130–450)
PLATELET CONFIRMATION: NORMAL
PMV BLD AUTO: 10 FL (ref 7–12)
POIKILOCYTES: ABNORMAL
POLYCHROMASIA: ABNORMAL
POTASSIUM SERPL-SCNC: 3.5 MMOL/L (ref 3.5–5)
PROT SERPL-MCNC: 5.4 G/DL (ref 6.4–8.3)
PROT UR STRIP-MCNC: NEGATIVE MG/DL
RBC # BLD AUTO: 2.83 E12/L (ref 3.8–5.8)
RBC #/AREA URNS HPF: ABNORMAL /HPF (ref 0–2)
SCHISTOCYTES: ABNORMAL
SODIUM SERPL-SCNC: 130 MMOL/L (ref 132–146)
SP GR UR STRIP: 1.02 (ref 1–1.03)
UROBILINOGEN UR STRIP-ACNC: 0.2 E.U./DL
WBC # BLD: 6.1 E9/L (ref 4.5–11.5)
WBC #/AREA URNS HPF: ABNORMAL /HPF (ref 0–5)

## 2023-06-01 PROCEDURE — 81001 URINALYSIS AUTO W/SCOPE: CPT

## 2023-06-01 PROCEDURE — 83880 ASSAY OF NATRIURETIC PEPTIDE: CPT

## 2023-06-01 PROCEDURE — 6370000000 HC RX 637 (ALT 250 FOR IP): Performed by: NURSE PRACTITIONER

## 2023-06-01 PROCEDURE — 80076 HEPATIC FUNCTION PANEL: CPT

## 2023-06-01 PROCEDURE — 6370000000 HC RX 637 (ALT 250 FOR IP): Performed by: EMERGENCY MEDICINE

## 2023-06-01 PROCEDURE — 80048 BASIC METABOLIC PNL TOTAL CA: CPT

## 2023-06-01 PROCEDURE — P9047 ALBUMIN (HUMAN), 25%, 50ML: HCPCS | Performed by: NURSE PRACTITIONER

## 2023-06-01 PROCEDURE — APPSS45 APP SPLIT SHARED TIME 31-45 MINUTES: Performed by: NURSE PRACTITIONER

## 2023-06-01 PROCEDURE — 71045 X-RAY EXAM CHEST 1 VIEW: CPT

## 2023-06-01 PROCEDURE — 6360000002 HC RX W HCPCS: Performed by: EMERGENCY MEDICINE

## 2023-06-01 PROCEDURE — 36415 COLL VENOUS BLD VENIPUNCTURE: CPT

## 2023-06-01 PROCEDURE — 99223 1ST HOSP IP/OBS HIGH 75: CPT | Performed by: INTERNAL MEDICINE

## 2023-06-01 PROCEDURE — 93005 ELECTROCARDIOGRAM TRACING: CPT | Performed by: EMERGENCY MEDICINE

## 2023-06-01 PROCEDURE — 93010 ELECTROCARDIOGRAM REPORT: CPT | Performed by: INTERNAL MEDICINE

## 2023-06-01 PROCEDURE — 2580000003 HC RX 258: Performed by: NURSE PRACTITIONER

## 2023-06-01 PROCEDURE — 2060000000 HC ICU INTERMEDIATE R&B

## 2023-06-01 PROCEDURE — 6360000002 HC RX W HCPCS: Performed by: NURSE PRACTITIONER

## 2023-06-01 PROCEDURE — 85025 COMPLETE CBC W/AUTO DIFF WBC: CPT

## 2023-06-01 PROCEDURE — 96374 THER/PROPH/DIAG INJ IV PUSH: CPT

## 2023-06-01 PROCEDURE — 99285 EMERGENCY DEPT VISIT HI MDM: CPT

## 2023-06-01 RX ORDER — SODIUM CHLORIDE 9 MG/ML
INJECTION, SOLUTION INTRAVENOUS PRN
Status: DISCONTINUED | OUTPATIENT
Start: 2023-06-01 | End: 2023-06-04 | Stop reason: HOSPADM

## 2023-06-01 RX ORDER — ONDANSETRON 2 MG/ML
4 INJECTION INTRAMUSCULAR; INTRAVENOUS EVERY 6 HOURS PRN
Status: DISCONTINUED | OUTPATIENT
Start: 2023-06-01 | End: 2023-06-04 | Stop reason: HOSPADM

## 2023-06-01 RX ORDER — POLYETHYLENE GLYCOL 3350 17 G/17G
17 POWDER, FOR SOLUTION ORAL DAILY PRN
Status: DISCONTINUED | OUTPATIENT
Start: 2023-06-01 | End: 2023-06-04 | Stop reason: HOSPADM

## 2023-06-01 RX ORDER — ACETAMINOPHEN 650 MG/1
650 SUPPOSITORY RECTAL EVERY 6 HOURS PRN
Status: DISCONTINUED | OUTPATIENT
Start: 2023-06-01 | End: 2023-06-04 | Stop reason: HOSPADM

## 2023-06-01 RX ORDER — SPIRONOLACTONE 25 MG/1
100 TABLET ORAL DAILY
Status: DISCONTINUED | OUTPATIENT
Start: 2023-06-01 | End: 2023-06-04 | Stop reason: HOSPADM

## 2023-06-01 RX ORDER — FUROSEMIDE 10 MG/ML
40 INJECTION INTRAMUSCULAR; INTRAVENOUS 3 TIMES DAILY
Status: CANCELLED | OUTPATIENT
Start: 2023-06-01

## 2023-06-01 RX ORDER — SODIUM CHLORIDE 0.9 % (FLUSH) 0.9 %
5-40 SYRINGE (ML) INJECTION PRN
Status: DISCONTINUED | OUTPATIENT
Start: 2023-06-01 | End: 2023-06-04 | Stop reason: HOSPADM

## 2023-06-01 RX ORDER — ALBUMIN (HUMAN) 12.5 G/50ML
25 SOLUTION INTRAVENOUS 3 TIMES DAILY
Status: COMPLETED | OUTPATIENT
Start: 2023-06-01 | End: 2023-06-03

## 2023-06-01 RX ORDER — ACETAMINOPHEN 325 MG/1
650 TABLET ORAL EVERY 6 HOURS PRN
Status: DISCONTINUED | OUTPATIENT
Start: 2023-06-01 | End: 2023-06-04 | Stop reason: HOSPADM

## 2023-06-01 RX ORDER — FUROSEMIDE 10 MG/ML
40 INJECTION INTRAMUSCULAR; INTRAVENOUS ONCE
Status: COMPLETED | OUTPATIENT
Start: 2023-06-01 | End: 2023-06-01

## 2023-06-01 RX ORDER — SODIUM CHLORIDE 0.9 % (FLUSH) 0.9 %
5-40 SYRINGE (ML) INJECTION EVERY 12 HOURS SCHEDULED
Status: DISCONTINUED | OUTPATIENT
Start: 2023-06-01 | End: 2023-06-04 | Stop reason: HOSPADM

## 2023-06-01 RX ORDER — OXYCODONE HYDROCHLORIDE 5 MG/1
5 TABLET ORAL ONCE
Status: COMPLETED | OUTPATIENT
Start: 2023-06-01 | End: 2023-06-01

## 2023-06-01 RX ORDER — ONDANSETRON 4 MG/1
4 TABLET, ORALLY DISINTEGRATING ORAL EVERY 8 HOURS PRN
Status: DISCONTINUED | OUTPATIENT
Start: 2023-06-01 | End: 2023-06-04 | Stop reason: HOSPADM

## 2023-06-01 RX ORDER — OXYCODONE HYDROCHLORIDE 5 MG/1
5 TABLET ORAL EVERY 4 HOURS PRN
Status: DISCONTINUED | OUTPATIENT
Start: 2023-06-01 | End: 2023-06-04 | Stop reason: HOSPADM

## 2023-06-01 RX ORDER — FUROSEMIDE 10 MG/ML
40 INJECTION INTRAMUSCULAR; INTRAVENOUS 3 TIMES DAILY
Status: DISCONTINUED | OUTPATIENT
Start: 2023-06-03 | End: 2023-06-04 | Stop reason: HOSPADM

## 2023-06-01 RX ADMIN — ALBUMIN (HUMAN) 25 G: 0.25 INJECTION, SOLUTION INTRAVENOUS at 16:46

## 2023-06-01 RX ADMIN — ALBUMIN (HUMAN) 25 G: 0.25 INJECTION, SOLUTION INTRAVENOUS at 21:01

## 2023-06-01 RX ADMIN — OXYCODONE HYDROCHLORIDE 5 MG: 5 TABLET ORAL at 16:47

## 2023-06-01 RX ADMIN — OXYCODONE HYDROCHLORIDE 5 MG: 5 TABLET ORAL at 20:50

## 2023-06-01 RX ADMIN — OXYCODONE 5 MG: 5 TABLET ORAL at 05:44

## 2023-06-01 RX ADMIN — FUROSEMIDE 40 MG: 10 INJECTION, SOLUTION INTRAMUSCULAR; INTRAVENOUS at 07:13

## 2023-06-01 RX ADMIN — OXYCODONE HYDROCHLORIDE 5 MG: 5 TABLET ORAL at 10:55

## 2023-06-01 RX ADMIN — SPIRONOLACTONE 100 MG: 25 TABLET ORAL at 10:55

## 2023-06-01 RX ADMIN — SODIUM CHLORIDE, PRESERVATIVE FREE 10 ML: 5 INJECTION INTRAVENOUS at 11:06

## 2023-06-01 ASSESSMENT — PAIN SCALES - GENERAL
PAINLEVEL_OUTOF10: 8
PAINLEVEL_OUTOF10: 0
PAINLEVEL_OUTOF10: 7

## 2023-06-01 ASSESSMENT — PAIN DESCRIPTION - DESCRIPTORS
DESCRIPTORS: SORE;TIGHTNESS;TENDER
DESCRIPTORS: DISCOMFORT;TENDER;THROBBING

## 2023-06-01 ASSESSMENT — PAIN DESCRIPTION - LOCATION
LOCATION: SCROTUM

## 2023-06-02 ENCOUNTER — APPOINTMENT (OUTPATIENT)
Dept: ULTRASOUND IMAGING | Age: 43
DRG: 280 | End: 2023-06-02
Payer: COMMERCIAL

## 2023-06-02 LAB
ABO + RH BLD: NORMAL
ALBUMIN SERPL-MCNC: 2.5 G/DL (ref 3.5–5.2)
ALP SERPL-CCNC: 221 U/L (ref 40–129)
ALT SERPL-CCNC: 20 U/L (ref 0–40)
ANION GAP SERPL CALCULATED.3IONS-SCNC: 7 MMOL/L (ref 7–16)
ANISOCYTOSIS: ABNORMAL
AST SERPL-CCNC: 52 U/L (ref 0–39)
BASOPHILS # BLD: 0 E9/L (ref 0–0.2)
BASOPHILS NFR BLD: 0.5 % (ref 0–2)
BILIRUB DIRECT SERPL-MCNC: 1.1 MG/DL (ref 0–0.3)
BILIRUB INDIRECT SERPL-MCNC: 0.9 MG/DL (ref 0–1)
BILIRUB SERPL-MCNC: 2 MG/DL (ref 0–1.2)
BLD GP AB SCN SERPL QL: NORMAL
BLOOD BANK DISPENSE STATUS: NORMAL
BLOOD BANK PRODUCT CODE: NORMAL
BPU ID: NORMAL
BUN SERPL-MCNC: 10 MG/DL (ref 6–20)
CALCIUM SERPL-MCNC: 7.7 MG/DL (ref 8.6–10.2)
CHLORIDE SERPL-SCNC: 101 MMOL/L (ref 98–107)
CO2 SERPL-SCNC: 26 MMOL/L (ref 22–29)
CREAT SERPL-MCNC: 1.2 MG/DL (ref 0.7–1.2)
DESCRIPTION BLOOD BANK: NORMAL
EOSINOPHIL # BLD: 0.31 E9/L (ref 0.05–0.5)
EOSINOPHIL NFR BLD: 14 % (ref 0–6)
ERYTHROCYTE [DISTWIDTH] IN BLOOD BY AUTOMATED COUNT: 15.3 FL (ref 11.5–15)
GLUCOSE SERPL-MCNC: 100 MG/DL (ref 74–99)
HCT VFR BLD AUTO: 19.6 % (ref 37–54)
HCT VFR BLD AUTO: 21.6 % (ref 37–54)
HGB BLD-MCNC: 6.5 G/DL (ref 12.5–16.5)
HGB BLD-MCNC: 7.3 G/DL (ref 12.5–16.5)
HYPOCHROMIA: ABNORMAL
LYMPHOCYTES # BLD: 0.29 E9/L (ref 1.5–4)
LYMPHOCYTES NFR BLD: 13.2 % (ref 20–42)
MCH RBC QN AUTO: 28 PG (ref 26–35)
MCHC RBC AUTO-ENTMCNC: 33.2 % (ref 32–34.5)
MCV RBC AUTO: 84.5 FL (ref 80–99.9)
MONOCYTES # BLD: 0.04 E9/L (ref 0.1–0.95)
MONOCYTES NFR BLD: 1.8 % (ref 2–12)
NEUTROPHILS # BLD: 1.56 E9/L (ref 1.8–7.3)
NEUTS SEG NFR BLD: 71 % (ref 43–80)
OVALOCYTES: ABNORMAL
PLATELET # BLD AUTO: 41 E9/L (ref 130–450)
PLATELET CONFIRMATION: NORMAL
PMV BLD AUTO: 9.7 FL (ref 7–12)
POIKILOCYTES: ABNORMAL
POLYCHROMASIA: ABNORMAL
POTASSIUM SERPL-SCNC: 3.7 MMOL/L (ref 3.5–5)
PROT SERPL-MCNC: 4.8 G/DL (ref 6.4–8.3)
RBC # BLD AUTO: 2.32 E12/L (ref 3.8–5.8)
SODIUM SERPL-SCNC: 134 MMOL/L (ref 132–146)
WBC # BLD: 2.2 E9/L (ref 4.5–11.5)

## 2023-06-02 PROCEDURE — 6360000002 HC RX W HCPCS: Performed by: NURSE PRACTITIONER

## 2023-06-02 PROCEDURE — 86850 RBC ANTIBODY SCREEN: CPT

## 2023-06-02 PROCEDURE — 36415 COLL VENOUS BLD VENIPUNCTURE: CPT

## 2023-06-02 PROCEDURE — P9016 RBC LEUKOCYTES REDUCED: HCPCS

## 2023-06-02 PROCEDURE — 99233 SBSQ HOSP IP/OBS HIGH 50: CPT | Performed by: INTERNAL MEDICINE

## 2023-06-02 PROCEDURE — APPSS30 APP SPLIT SHARED TIME 16-30 MINUTES: Performed by: NURSE PRACTITIONER

## 2023-06-02 PROCEDURE — 2580000003 HC RX 258: Performed by: INTERNAL MEDICINE

## 2023-06-02 PROCEDURE — 2500000003 HC RX 250 WO HCPCS: Performed by: NURSE PRACTITIONER

## 2023-06-02 PROCEDURE — A4216 STERILE WATER/SALINE, 10 ML: HCPCS | Performed by: NURSE PRACTITIONER

## 2023-06-02 PROCEDURE — 6370000000 HC RX 637 (ALT 250 FOR IP): Performed by: NURSE PRACTITIONER

## 2023-06-02 PROCEDURE — 6360000002 HC RX W HCPCS: Performed by: INTERNAL MEDICINE

## 2023-06-02 PROCEDURE — C9113 INJ PANTOPRAZOLE SODIUM, VIA: HCPCS | Performed by: NURSE PRACTITIONER

## 2023-06-02 PROCEDURE — 86923 COMPATIBILITY TEST ELECTRIC: CPT

## 2023-06-02 PROCEDURE — 30233N1 TRANSFUSION OF NONAUTOLOGOUS RED BLOOD CELLS INTO PERIPHERAL VEIN, PERCUTANEOUS APPROACH: ICD-10-PCS | Performed by: HOSPITALIST

## 2023-06-02 PROCEDURE — 80076 HEPATIC FUNCTION PANEL: CPT

## 2023-06-02 PROCEDURE — 80048 BASIC METABOLIC PNL TOTAL CA: CPT

## 2023-06-02 PROCEDURE — 51798 US URINE CAPACITY MEASURE: CPT

## 2023-06-02 PROCEDURE — 86901 BLOOD TYPING SEROLOGIC RH(D): CPT

## 2023-06-02 PROCEDURE — 85014 HEMATOCRIT: CPT

## 2023-06-02 PROCEDURE — 86900 BLOOD TYPING SEROLOGIC ABO: CPT

## 2023-06-02 PROCEDURE — 82272 OCCULT BLD FECES 1-3 TESTS: CPT

## 2023-06-02 PROCEDURE — 85018 HEMOGLOBIN: CPT

## 2023-06-02 PROCEDURE — P9047 ALBUMIN (HUMAN), 25%, 50ML: HCPCS | Performed by: NURSE PRACTITIONER

## 2023-06-02 PROCEDURE — 93976 VASCULAR STUDY: CPT

## 2023-06-02 PROCEDURE — 2060000000 HC ICU INTERMEDIATE R&B

## 2023-06-02 PROCEDURE — 36430 TRANSFUSION BLD/BLD COMPNT: CPT

## 2023-06-02 PROCEDURE — 85025 COMPLETE CBC W/AUTO DIFF WBC: CPT

## 2023-06-02 PROCEDURE — 2580000003 HC RX 258: Performed by: NURSE PRACTITIONER

## 2023-06-02 RX ORDER — SODIUM CHLORIDE 9 MG/ML
INJECTION, SOLUTION INTRAVENOUS PRN
Status: DISCONTINUED | OUTPATIENT
Start: 2023-06-02 | End: 2023-06-04 | Stop reason: HOSPADM

## 2023-06-02 RX ADMIN — OXYCODONE HYDROCHLORIDE 5 MG: 5 TABLET ORAL at 01:07

## 2023-06-02 RX ADMIN — OXYCODONE HYDROCHLORIDE 5 MG: 5 TABLET ORAL at 14:49

## 2023-06-02 RX ADMIN — OXYCODONE HYDROCHLORIDE 5 MG: 5 TABLET ORAL at 23:17

## 2023-06-02 RX ADMIN — ALBUMIN (HUMAN) 25 G: 0.25 INJECTION, SOLUTION INTRAVENOUS at 20:57

## 2023-06-02 RX ADMIN — SPIRONOLACTONE 100 MG: 25 TABLET ORAL at 08:31

## 2023-06-02 RX ADMIN — ALBUMIN (HUMAN) 25 G: 0.25 INJECTION, SOLUTION INTRAVENOUS at 08:31

## 2023-06-02 RX ADMIN — OXYCODONE HYDROCHLORIDE 5 MG: 5 TABLET ORAL at 05:32

## 2023-06-02 RX ADMIN — ALBUMIN (HUMAN) 25 G: 0.25 INJECTION, SOLUTION INTRAVENOUS at 14:50

## 2023-06-02 RX ADMIN — ANTI-FUNGAL POWDER MICONAZOLE NITRATE TALC FREE: 1.42 POWDER TOPICAL at 21:04

## 2023-06-02 RX ADMIN — OXYCODONE HYDROCHLORIDE 5 MG: 5 TABLET ORAL at 10:36

## 2023-06-02 RX ADMIN — CEFAZOLIN 2000 MG: 2 INJECTION, POWDER, FOR SOLUTION INTRAMUSCULAR; INTRAVENOUS at 18:43

## 2023-06-02 RX ADMIN — ANTI-FUNGAL POWDER MICONAZOLE NITRATE TALC FREE: 1.42 POWDER TOPICAL at 14:49

## 2023-06-02 RX ADMIN — OXYCODONE HYDROCHLORIDE 5 MG: 5 TABLET ORAL at 19:03

## 2023-06-02 RX ADMIN — SODIUM CHLORIDE, PRESERVATIVE FREE 40 MG: 5 INJECTION INTRAVENOUS at 21:03

## 2023-06-02 RX ADMIN — SODIUM CHLORIDE, PRESERVATIVE FREE 40 MG: 5 INJECTION INTRAVENOUS at 08:30

## 2023-06-02 ASSESSMENT — PAIN DESCRIPTION - LOCATION
LOCATION: SCROTUM

## 2023-06-02 ASSESSMENT — PAIN SCALES - GENERAL
PAINLEVEL_OUTOF10: 9
PAINLEVEL_OUTOF10: 0
PAINLEVEL_OUTOF10: 8
PAINLEVEL_OUTOF10: 0
PAINLEVEL_OUTOF10: 8
PAINLEVEL_OUTOF10: 7
PAINLEVEL_OUTOF10: 8
PAINLEVEL_OUTOF10: 0
PAINLEVEL_OUTOF10: 9
PAINLEVEL_OUTOF10: 9
PAINLEVEL_OUTOF10: 8

## 2023-06-02 ASSESSMENT — PAIN DESCRIPTION - DESCRIPTORS
DESCRIPTORS: THROBBING;TIGHTNESS;TENDER
DESCRIPTORS: TENDER;THROBBING;TIGHTNESS
DESCRIPTORS: TENDER;THROBBING;TIGHTNESS

## 2023-06-03 ENCOUNTER — ANESTHESIA EVENT (OUTPATIENT)
Dept: ENDOSCOPY | Age: 43
End: 2023-06-03
Payer: COMMERCIAL

## 2023-06-03 ENCOUNTER — ANESTHESIA (OUTPATIENT)
Dept: ENDOSCOPY | Age: 43
End: 2023-06-03
Payer: COMMERCIAL

## 2023-06-03 LAB
ALBUMIN SERPL-MCNC: 3 G/DL (ref 3.5–5.2)
ALP SERPL-CCNC: 173 U/L (ref 40–129)
ALT SERPL-CCNC: 17 U/L (ref 0–40)
ANION GAP SERPL CALCULATED.3IONS-SCNC: 8 MMOL/L (ref 7–16)
ANISOCYTOSIS: ABNORMAL
AST SERPL-CCNC: 36 U/L (ref 0–39)
BASOPHILS # BLD: 0.03 E9/L (ref 0–0.2)
BASOPHILS NFR BLD: 0.9 % (ref 0–2)
BILIRUB DIRECT SERPL-MCNC: 1.5 MG/DL (ref 0–0.3)
BILIRUB INDIRECT SERPL-MCNC: 2.4 MG/DL (ref 0–1)
BILIRUB SERPL-MCNC: 3.9 MG/DL (ref 0–1.2)
BUN SERPL-MCNC: 17 MG/DL (ref 6–20)
CALCIUM SERPL-MCNC: 8.2 MG/DL (ref 8.6–10.2)
CHLORIDE SERPL-SCNC: 102 MMOL/L (ref 98–107)
CO2 SERPL-SCNC: 24 MMOL/L (ref 22–29)
CREAT SERPL-MCNC: 1.1 MG/DL (ref 0.7–1.2)
EOSINOPHIL # BLD: 0.13 E9/L (ref 0.05–0.5)
EOSINOPHIL NFR BLD: 4.4 % (ref 0–6)
ERYTHROCYTE [DISTWIDTH] IN BLOOD BY AUTOMATED COUNT: 15.6 FL (ref 11.5–15)
GLUCOSE SERPL-MCNC: 98 MG/DL (ref 74–99)
HCT VFR BLD AUTO: 18.2 % (ref 37–54)
HCT VFR BLD AUTO: 22.2 % (ref 37–54)
HCT VFR BLD AUTO: 22.9 % (ref 37–54)
HCT VFR BLD AUTO: 24 % (ref 37–54)
HEMOCCULT STL QL: NORMAL
HGB BLD-MCNC: 6.2 G/DL (ref 12.5–16.5)
HGB BLD-MCNC: 7.3 G/DL (ref 12.5–16.5)
HGB BLD-MCNC: 7.7 G/DL (ref 12.5–16.5)
HGB BLD-MCNC: 7.9 G/DL (ref 12.5–16.5)
LYMPHOCYTES # BLD: 0.12 E9/L (ref 1.5–4)
LYMPHOCYTES NFR BLD: 4.4 % (ref 20–42)
MCH RBC QN AUTO: 27.9 PG (ref 26–35)
MCHC RBC AUTO-ENTMCNC: 32.9 % (ref 32–34.5)
MCV RBC AUTO: 84.7 FL (ref 80–99.9)
MONOCYTES # BLD: 0.12 E9/L (ref 0.1–0.95)
MONOCYTES NFR BLD: 4.4 % (ref 2–12)
NEUTROPHILS # BLD: 2.58 E9/L (ref 1.8–7.3)
NEUTS SEG NFR BLD: 85.9 % (ref 43–80)
PLATELET # BLD AUTO: 36 E9/L (ref 130–450)
PLATELET CONFIRMATION: NORMAL
PMV BLD AUTO: 10.4 FL (ref 7–12)
POTASSIUM SERPL-SCNC: 4.3 MMOL/L (ref 3.5–5)
PROT SERPL-MCNC: 4.6 G/DL (ref 6.4–8.3)
RBC # BLD AUTO: 2.62 E12/L (ref 3.8–5.8)
SODIUM SERPL-SCNC: 134 MMOL/L (ref 132–146)
WBC # BLD: 3 E9/L (ref 4.5–11.5)

## 2023-06-03 PROCEDURE — 99233 SBSQ HOSP IP/OBS HIGH 50: CPT | Performed by: INTERNAL MEDICINE

## 2023-06-03 PROCEDURE — 85025 COMPLETE CBC W/AUTO DIFF WBC: CPT

## 2023-06-03 PROCEDURE — 6360000002 HC RX W HCPCS: Performed by: INTERNAL MEDICINE

## 2023-06-03 PROCEDURE — 36430 TRANSFUSION BLD/BLD COMPNT: CPT

## 2023-06-03 PROCEDURE — 2580000003 HC RX 258: Performed by: NURSE ANESTHETIST, CERTIFIED REGISTERED

## 2023-06-03 PROCEDURE — C9113 INJ PANTOPRAZOLE SODIUM, VIA: HCPCS | Performed by: NURSE PRACTITIONER

## 2023-06-03 PROCEDURE — 6360000002 HC RX W HCPCS: Performed by: NURSE PRACTITIONER

## 2023-06-03 PROCEDURE — 7100000010 HC PHASE II RECOVERY - FIRST 15 MIN: Performed by: INTERNAL MEDICINE

## 2023-06-03 PROCEDURE — 6370000000 HC RX 637 (ALT 250 FOR IP): Performed by: NURSE PRACTITIONER

## 2023-06-03 PROCEDURE — 80048 BASIC METABOLIC PNL TOTAL CA: CPT

## 2023-06-03 PROCEDURE — 3700000000 HC ANESTHESIA ATTENDED CARE: Performed by: INTERNAL MEDICINE

## 2023-06-03 PROCEDURE — 6360000002 HC RX W HCPCS: Performed by: ANESTHESIOLOGY

## 2023-06-03 PROCEDURE — 3700000001 HC ADD 15 MINUTES (ANESTHESIA): Performed by: INTERNAL MEDICINE

## 2023-06-03 PROCEDURE — 85014 HEMATOCRIT: CPT

## 2023-06-03 PROCEDURE — 80074 ACUTE HEPATITIS PANEL: CPT

## 2023-06-03 PROCEDURE — 87522 HEPATITIS C REVRS TRNSCRPJ: CPT

## 2023-06-03 PROCEDURE — 82105 ALPHA-FETOPROTEIN SERUM: CPT

## 2023-06-03 PROCEDURE — 80076 HEPATIC FUNCTION PANEL: CPT

## 2023-06-03 PROCEDURE — P9047 ALBUMIN (HUMAN), 25%, 50ML: HCPCS | Performed by: NURSE PRACTITIONER

## 2023-06-03 PROCEDURE — 2060000000 HC ICU INTERMEDIATE R&B

## 2023-06-03 PROCEDURE — A4216 STERILE WATER/SALINE, 10 ML: HCPCS | Performed by: NURSE PRACTITIONER

## 2023-06-03 PROCEDURE — 7100000011 HC PHASE II RECOVERY - ADDTL 15 MIN: Performed by: INTERNAL MEDICINE

## 2023-06-03 PROCEDURE — 2709999900 HC NON-CHARGEABLE SUPPLY: Performed by: INTERNAL MEDICINE

## 2023-06-03 PROCEDURE — 6370000000 HC RX 637 (ALT 250 FOR IP): Performed by: INTERNAL MEDICINE

## 2023-06-03 PROCEDURE — 85018 HEMOGLOBIN: CPT

## 2023-06-03 PROCEDURE — 2580000003 HC RX 258: Performed by: INTERNAL MEDICINE

## 2023-06-03 PROCEDURE — 3609013000 HC EGD TRANSORAL CONTROL BLEEDING ANY METHOD: Performed by: INTERNAL MEDICINE

## 2023-06-03 PROCEDURE — 6370000000 HC RX 637 (ALT 250 FOR IP): Performed by: UROLOGY

## 2023-06-03 PROCEDURE — 2720000010 HC SURG SUPPLY STERILE: Performed by: INTERNAL MEDICINE

## 2023-06-03 PROCEDURE — 2580000003 HC RX 258: Performed by: NURSE PRACTITIONER

## 2023-06-03 PROCEDURE — 6360000002 HC RX W HCPCS: Performed by: NURSE ANESTHETIST, CERTIFIED REGISTERED

## 2023-06-03 PROCEDURE — 0W3P8ZZ CONTROL BLEEDING IN GASTROINTESTINAL TRACT, VIA NATURAL OR ARTIFICIAL OPENING ENDOSCOPIC: ICD-10-PCS | Performed by: INTERNAL MEDICINE

## 2023-06-03 PROCEDURE — 36415 COLL VENOUS BLD VENIPUNCTURE: CPT

## 2023-06-03 RX ORDER — MEPERIDINE HYDROCHLORIDE 50 MG/ML
12.5 INJECTION INTRAMUSCULAR; INTRAVENOUS; SUBCUTANEOUS
Status: COMPLETED | OUTPATIENT
Start: 2023-06-03 | End: 2023-06-03

## 2023-06-03 RX ORDER — SUCRALFATE 1 G/1
1 TABLET ORAL EVERY 6 HOURS SCHEDULED
Status: DISCONTINUED | OUTPATIENT
Start: 2023-06-03 | End: 2023-06-04 | Stop reason: HOSPADM

## 2023-06-03 RX ORDER — SODIUM CHLORIDE, SODIUM LACTATE, POTASSIUM CHLORIDE, CALCIUM CHLORIDE 600; 310; 30; 20 MG/100ML; MG/100ML; MG/100ML; MG/100ML
INJECTION, SOLUTION INTRAVENOUS CONTINUOUS PRN
Status: DISCONTINUED | OUTPATIENT
Start: 2023-06-03 | End: 2023-06-03 | Stop reason: SDUPTHER

## 2023-06-03 RX ORDER — PROPOFOL 10 MG/ML
INJECTION, EMULSION INTRAVENOUS PRN
Status: DISCONTINUED | OUTPATIENT
Start: 2023-06-03 | End: 2023-06-03 | Stop reason: SDUPTHER

## 2023-06-03 RX ORDER — MEPERIDINE HYDROCHLORIDE 25 MG/ML
INJECTION INTRAMUSCULAR; INTRAVENOUS; SUBCUTANEOUS
Status: DISPENSED
Start: 2023-06-03 | End: 2023-06-04

## 2023-06-03 RX ORDER — MEPERIDINE HYDROCHLORIDE 50 MG/ML
12.5 INJECTION INTRAMUSCULAR; INTRAVENOUS; SUBCUTANEOUS ONCE
Status: COMPLETED | OUTPATIENT
Start: 2023-06-03 | End: 2023-06-03

## 2023-06-03 RX ORDER — SODIUM CHLORIDE 9 MG/ML
INJECTION, SOLUTION INTRAVENOUS PRN
Status: DISCONTINUED | OUTPATIENT
Start: 2023-06-03 | End: 2023-06-04 | Stop reason: HOSPADM

## 2023-06-03 RX ADMIN — SODIUM CHLORIDE, PRESERVATIVE FREE 40 MG: 5 INJECTION INTRAVENOUS at 13:27

## 2023-06-03 RX ADMIN — SODIUM CHLORIDE, PRESERVATIVE FREE 10 ML: 5 INJECTION INTRAVENOUS at 21:26

## 2023-06-03 RX ADMIN — PROPOFOL 470 MG: 10 INJECTION, EMULSION INTRAVENOUS at 11:44

## 2023-06-03 RX ADMIN — OXYCODONE HYDROCHLORIDE 5 MG: 5 TABLET ORAL at 13:27

## 2023-06-03 RX ADMIN — NYSTATIN AND TRIAMCINOLONE ACETONIDE: 100000; 1 CREAM TOPICAL at 21:26

## 2023-06-03 RX ADMIN — MEPERIDINE HYDROCHLORIDE 12.5 MG: 50 INJECTION, SOLUTION INTRAMUSCULAR; INTRAVENOUS; SUBCUTANEOUS at 12:39

## 2023-06-03 RX ADMIN — FUROSEMIDE 40 MG: 10 INJECTION, SOLUTION INTRAMUSCULAR; INTRAVENOUS at 17:23

## 2023-06-03 RX ADMIN — MEPERIDINE HYDROCHLORIDE 12.5 MG: 50 INJECTION, SOLUTION INTRAMUSCULAR; INTRAVENOUS; SUBCUTANEOUS at 12:25

## 2023-06-03 RX ADMIN — SODIUM CHLORIDE, PRESERVATIVE FREE 40 MG: 5 INJECTION INTRAVENOUS at 21:26

## 2023-06-03 RX ADMIN — ALBUMIN (HUMAN) 25 G: 0.25 INJECTION, SOLUTION INTRAVENOUS at 13:35

## 2023-06-03 RX ADMIN — OXYCODONE HYDROCHLORIDE 5 MG: 5 TABLET ORAL at 04:35

## 2023-06-03 RX ADMIN — SUCRALFATE 1 G: 1 TABLET ORAL at 13:27

## 2023-06-03 RX ADMIN — OXYCODONE HYDROCHLORIDE 5 MG: 5 TABLET ORAL at 19:56

## 2023-06-03 RX ADMIN — SODIUM CHLORIDE, POTASSIUM CHLORIDE, SODIUM LACTATE AND CALCIUM CHLORIDE: 600; 310; 30; 20 INJECTION, SOLUTION INTRAVENOUS at 11:28

## 2023-06-03 RX ADMIN — SPIRONOLACTONE 100 MG: 25 TABLET ORAL at 13:27

## 2023-06-03 RX ADMIN — CEFAZOLIN 2000 MG: 2 INJECTION, POWDER, FOR SOLUTION INTRAMUSCULAR; INTRAVENOUS at 03:46

## 2023-06-03 RX ADMIN — FUROSEMIDE 40 MG: 10 INJECTION, SOLUTION INTRAMUSCULAR; INTRAVENOUS at 21:26

## 2023-06-03 RX ADMIN — CEFAZOLIN 2000 MG: 2 INJECTION, POWDER, FOR SOLUTION INTRAMUSCULAR; INTRAVENOUS at 17:23

## 2023-06-03 RX ADMIN — CEFAZOLIN 2000 MG: 2 INJECTION, POWDER, FOR SOLUTION INTRAMUSCULAR; INTRAVENOUS at 13:27

## 2023-06-03 RX ADMIN — ANTI-FUNGAL POWDER MICONAZOLE NITRATE TALC FREE: 1.42 POWDER TOPICAL at 21:28

## 2023-06-03 RX ADMIN — SUCRALFATE 1 G: 1 TABLET ORAL at 17:23

## 2023-06-03 ASSESSMENT — PAIN DESCRIPTION - LOCATION
LOCATION: ABDOMEN;SCROTUM
LOCATION: SCROTUM

## 2023-06-03 ASSESSMENT — PAIN DESCRIPTION - ORIENTATION: ORIENTATION: RIGHT;LEFT;LOWER

## 2023-06-03 ASSESSMENT — PAIN DESCRIPTION - DESCRIPTORS
DESCRIPTORS: ACHING;DISCOMFORT
DESCRIPTORS: ACHING;DISCOMFORT
DESCRIPTORS: DISCOMFORT;SHOOTING

## 2023-06-03 ASSESSMENT — PAIN SCALES - GENERAL
PAINLEVEL_OUTOF10: 8
PAINLEVEL_OUTOF10: 7
PAINLEVEL_OUTOF10: 7
PAINLEVEL_OUTOF10: 8
PAINLEVEL_OUTOF10: 7

## 2023-06-03 ASSESSMENT — PAIN DESCRIPTION - PAIN TYPE
TYPE: ACUTE PAIN
TYPE: ACUTE PAIN

## 2023-06-03 ASSESSMENT — PAIN DESCRIPTION - FREQUENCY: FREQUENCY: CONTINUOUS

## 2023-06-03 ASSESSMENT — LIFESTYLE VARIABLES: SMOKING_STATUS: 1

## 2023-06-03 ASSESSMENT — PAIN DESCRIPTION - ONSET: ONSET: PROGRESSIVE

## 2023-06-03 NOTE — ANESTHESIA PRE PROCEDURE
Department of Anesthesiology  Preprocedure Note       Name:  Ernestine Holcomb III   Age:  44 y.o.  :  1980                                          MRN:  [id number]         Date:  6/3/2023      Surgeon: Raghu Sawyer):  Trinity Linder MD    Procedure: Procedure(s):  EGD ESOPHAGOGASTRODUODENOSCOPY    Medications prior to admission:   Prior to Admission medications    Medication Sig Start Date End Date Taking? Authorizing Provider   furosemide (LASIX) 40 MG tablet Take 1 tablet by mouth daily 22   CHRIS Acosta CNP   pantoprazole (PROTONIX) 40 MG tablet Take 1 tablet by mouth every morning (before breakfast)  Patient not taking: Reported on 2023   CHRIS Acosta CNP   NONFORMULARY daily as needed Medical marijuana edibles or vapes     Historical Provider, MD   spironolactone (ALDACTONE) 100 MG tablet Take 1 tablet by mouth daily    Historical Provider, MD       Current medications:    No current facility-administered medications for this visit. No current outpatient medications on file.      Facility-Administered Medications Ordered in Other Visits   Medication Dose Route Frequency Provider Last Rate Last Admin    0.9 % sodium chloride infusion   IntraVENous PRN Denece How, DO        0.9 % sodium chloride infusion   IntraVENous PRN Maisha Call MD        pantoprazole (PROTONIX) 40 mg in sodium chloride (PF) 0.9 % 10 mL injection  40 mg IntraVENous Q12H CHRIS Huff NP   40 mg at 23    miconazole (MICOTIN) 2 % powder   Topical BID CHRIS Huff NP   Given at 23    nystatin-triamcinolone (MYCOLOG II) cream   Topical TID Jerome Armstrong MD        ceFAZolin (ANCEF) 2,000 mg in sterile water 20 mL IV syringe  2,000 mg IntraVENous Q8H Jaya Friend MD   2,000 mg at 23 0346    spironolactone (ALDACTONE) tablet 100 mg  100 mg Oral Daily CHRIS Huff NP   100 mg at 23 0831    sodium chloride flush

## 2023-06-03 NOTE — ANESTHESIA POSTPROCEDURE EVALUATION
Department of Anesthesiology  Postprocedure Note    Patient: Brooke Tobar III  MRN: [id number]  YOB: 1980  Date of evaluation: 6/3/2023      Procedure Summary     Date: 06/03/23 Room / Location: [address] [place] [address] 01 / [address] [place] [address]    Anesthesia Start: 1127 Anesthesia Stop: 0169    Procedure: EGD CONTROL HEMORRHAGE Diagnosis:       Anemia, unspecified type      (Anemia)    Surgeons: Selena De Souza MD Responsible Provider: Cyndi Sweet DO    Anesthesia Type: MAC ASA Status: 3          Anesthesia Type: No value filed.     Edmundo Phase I:      Edmundo Phase II:        Anesthesia Post Evaluation    Patient location during evaluation: PACU  Patient participation: complete - patient participated  Level of consciousness: awake and alert  Airway patency: patent  Nausea & Vomiting: no nausea and no vomiting  Complications: no  Cardiovascular status: hemodynamically stable  Respiratory status: acceptable  Hydration status: euvolemic

## 2023-06-04 VITALS
OXYGEN SATURATION: 98 % | RESPIRATION RATE: 16 BRPM | SYSTOLIC BLOOD PRESSURE: 105 MMHG | BODY MASS INDEX: 29.3 KG/M2 | HEIGHT: 68 IN | DIASTOLIC BLOOD PRESSURE: 58 MMHG | WEIGHT: 193.34 LBS | HEART RATE: 81 BPM | TEMPERATURE: 98.5 F

## 2023-06-04 LAB
AFP-TM SERPL-MCNC: 1 NG/ML (ref 0–9)
ALBUMIN SERPL-MCNC: 2.8 G/DL (ref 3.5–5.2)
ALP SERPL-CCNC: 159 U/L (ref 40–129)
ALT SERPL-CCNC: 12 U/L (ref 0–40)
ANION GAP SERPL CALCULATED.3IONS-SCNC: 11 MMOL/L (ref 7–16)
ANISOCYTOSIS: ABNORMAL
AST SERPL-CCNC: 31 U/L (ref 0–39)
BASOPHILS # BLD: 0 E9/L (ref 0–0.2)
BASOPHILS NFR BLD: 0.8 % (ref 0–2)
BILIRUB DIRECT SERPL-MCNC: 1.3 MG/DL (ref 0–0.3)
BILIRUB INDIRECT SERPL-MCNC: 1.3 MG/DL (ref 0–1)
BILIRUB SERPL-MCNC: 2.6 MG/DL (ref 0–1.2)
BLOOD BANK DISPENSE STATUS: NORMAL
BLOOD BANK DISPENSE STATUS: NORMAL
BLOOD BANK PRODUCT CODE: NORMAL
BLOOD BANK PRODUCT CODE: NORMAL
BPU ID: NORMAL
BPU ID: NORMAL
BUN SERPL-MCNC: 14 MG/DL (ref 6–20)
CALCIUM SERPL-MCNC: 8.3 MG/DL (ref 8.6–10.2)
CHLORIDE SERPL-SCNC: 105 MMOL/L (ref 98–107)
CO2 SERPL-SCNC: 23 MMOL/L (ref 22–29)
CREAT SERPL-MCNC: 1.3 MG/DL (ref 0.7–1.2)
DESCRIPTION BLOOD BANK: NORMAL
DESCRIPTION BLOOD BANK: NORMAL
EOSINOPHIL # BLD: 0.27 E9/L (ref 0.05–0.5)
EOSINOPHIL NFR BLD: 11.2 % (ref 0–6)
ERYTHROCYTE [DISTWIDTH] IN BLOOD BY AUTOMATED COUNT: 16 FL (ref 11.5–15)
GLUCOSE SERPL-MCNC: 90 MG/DL (ref 74–99)
HCT VFR BLD AUTO: 20.4 % (ref 37–54)
HCT VFR BLD AUTO: 24.3 % (ref 37–54)
HGB BLD-MCNC: 6.8 G/DL (ref 12.5–16.5)
HGB BLD-MCNC: 8.1 G/DL (ref 12.5–16.5)
LYMPHOCYTES # BLD: 0.19 E9/L (ref 1.5–4)
LYMPHOCYTES NFR BLD: 7.8 % (ref 20–42)
MAGNESIUM SERPL-MCNC: 1.6 MG/DL (ref 1.6–2.6)
MCH RBC QN AUTO: 28.1 PG (ref 26–35)
MCHC RBC AUTO-ENTMCNC: 33.3 % (ref 32–34.5)
MCV RBC AUTO: 84.3 FL (ref 80–99.9)
MONOCYTES # BLD: 0.07 E9/L (ref 0.1–0.95)
MONOCYTES NFR BLD: 2.6 % (ref 2–12)
NEUTROPHILS # BLD: 1.87 E9/L (ref 1.8–7.3)
NEUTS SEG NFR BLD: 78.4 % (ref 43–80)
PLATELET # BLD AUTO: 30 E9/L (ref 130–450)
PLATELET CONFIRMATION: NORMAL
PMV BLD AUTO: 10.8 FL (ref 7–12)
POLYCHROMASIA: ABNORMAL
POTASSIUM SERPL-SCNC: 3.5 MMOL/L (ref 3.5–5)
PROT SERPL-MCNC: 4.5 G/DL (ref 6.4–8.3)
RBC # BLD AUTO: 2.42 E12/L (ref 3.8–5.8)
SODIUM SERPL-SCNC: 139 MMOL/L (ref 132–146)
WBC # BLD: 2.4 E9/L (ref 4.5–11.5)

## 2023-06-04 PROCEDURE — 6370000000 HC RX 637 (ALT 250 FOR IP): Performed by: UROLOGY

## 2023-06-04 PROCEDURE — 36430 TRANSFUSION BLD/BLD COMPNT: CPT

## 2023-06-04 PROCEDURE — 2580000003 HC RX 258: Performed by: INTERNAL MEDICINE

## 2023-06-04 PROCEDURE — 36415 COLL VENOUS BLD VENIPUNCTURE: CPT

## 2023-06-04 PROCEDURE — 85014 HEMATOCRIT: CPT

## 2023-06-04 PROCEDURE — 6370000000 HC RX 637 (ALT 250 FOR IP): Performed by: NURSE PRACTITIONER

## 2023-06-04 PROCEDURE — 2580000003 HC RX 258: Performed by: NURSE PRACTITIONER

## 2023-06-04 PROCEDURE — 6370000000 HC RX 637 (ALT 250 FOR IP): Performed by: INTERNAL MEDICINE

## 2023-06-04 PROCEDURE — A4216 STERILE WATER/SALINE, 10 ML: HCPCS | Performed by: NURSE PRACTITIONER

## 2023-06-04 PROCEDURE — 85025 COMPLETE CBC W/AUTO DIFF WBC: CPT

## 2023-06-04 PROCEDURE — 83735 ASSAY OF MAGNESIUM: CPT

## 2023-06-04 PROCEDURE — 6370000000 HC RX 637 (ALT 250 FOR IP): Performed by: FAMILY MEDICINE

## 2023-06-04 PROCEDURE — 80048 BASIC METABOLIC PNL TOTAL CA: CPT

## 2023-06-04 PROCEDURE — C9113 INJ PANTOPRAZOLE SODIUM, VIA: HCPCS | Performed by: NURSE PRACTITIONER

## 2023-06-04 PROCEDURE — 99233 SBSQ HOSP IP/OBS HIGH 50: CPT | Performed by: INTERNAL MEDICINE

## 2023-06-04 PROCEDURE — 99239 HOSP IP/OBS DSCHRG MGMT >30: CPT | Performed by: INTERNAL MEDICINE

## 2023-06-04 PROCEDURE — 6360000002 HC RX W HCPCS: Performed by: NURSE PRACTITIONER

## 2023-06-04 PROCEDURE — 85018 HEMOGLOBIN: CPT

## 2023-06-04 PROCEDURE — 80076 HEPATIC FUNCTION PANEL: CPT

## 2023-06-04 PROCEDURE — 6360000002 HC RX W HCPCS: Performed by: INTERNAL MEDICINE

## 2023-06-04 RX ORDER — SUCRALFATE 1 G/1
1 TABLET ORAL
Status: DISCONTINUED | OUTPATIENT
Start: 2023-06-04 | End: 2023-06-04

## 2023-06-04 RX ORDER — SODIUM CHLORIDE 9 MG/ML
INJECTION, SOLUTION INTRAVENOUS PRN
Status: DISCONTINUED | OUTPATIENT
Start: 2023-06-04 | End: 2023-06-04 | Stop reason: HOSPADM

## 2023-06-04 RX ORDER — TRAZODONE HYDROCHLORIDE 50 MG/1
100 TABLET ORAL ONCE
Status: COMPLETED | OUTPATIENT
Start: 2023-06-04 | End: 2023-06-04

## 2023-06-04 RX ADMIN — FUROSEMIDE 40 MG: 10 INJECTION, SOLUTION INTRAMUSCULAR; INTRAVENOUS at 08:35

## 2023-06-04 RX ADMIN — CEFAZOLIN 2000 MG: 2 INJECTION, POWDER, FOR SOLUTION INTRAMUSCULAR; INTRAVENOUS at 01:39

## 2023-06-04 RX ADMIN — SPIRONOLACTONE 100 MG: 25 TABLET ORAL at 08:34

## 2023-06-04 RX ADMIN — CEFAZOLIN 2000 MG: 2 INJECTION, POWDER, FOR SOLUTION INTRAMUSCULAR; INTRAVENOUS at 08:34

## 2023-06-04 RX ADMIN — NYSTATIN AND TRIAMCINOLONE ACETONIDE: 100000; 1 CREAM TOPICAL at 08:35

## 2023-06-04 RX ADMIN — TRAZODONE HYDROCHLORIDE 100 MG: 50 TABLET ORAL at 02:21

## 2023-06-04 RX ADMIN — ANTI-FUNGAL POWDER MICONAZOLE NITRATE TALC FREE: 1.42 POWDER TOPICAL at 08:50

## 2023-06-04 RX ADMIN — SODIUM CHLORIDE, PRESERVATIVE FREE 40 MG: 5 INJECTION INTRAVENOUS at 08:34

## 2023-06-04 RX ADMIN — SUCRALFATE 1 G: 1 TABLET ORAL at 00:00

## 2023-06-04 RX ADMIN — OXYCODONE HYDROCHLORIDE 5 MG: 5 TABLET ORAL at 00:00

## 2023-06-04 RX ADMIN — SODIUM CHLORIDE, PRESERVATIVE FREE 10 ML: 5 INJECTION INTRAVENOUS at 08:45

## 2023-06-04 RX ADMIN — OXYCODONE HYDROCHLORIDE 5 MG: 5 TABLET ORAL at 08:45

## 2023-06-04 RX ADMIN — SUCRALFATE 1 G: 1 TABLET ORAL at 12:20

## 2023-06-04 RX ADMIN — OXYCODONE HYDROCHLORIDE 5 MG: 5 TABLET ORAL at 04:36

## 2023-06-04 RX ADMIN — SUCRALFATE 1 G: 1 TABLET ORAL at 04:36

## 2023-06-04 ASSESSMENT — PAIN SCALES - GENERAL
PAINLEVEL_OUTOF10: 8
PAINLEVEL_OUTOF10: 5

## 2023-06-05 LAB
HAV IGM SERPL QL IA: ABNORMAL
HBV CORE IGM SERPL QL IA: ABNORMAL
HBV SURFACE AG SERPL QL IA: ABNORMAL
HCV AB SERPL QL IA: REACTIVE
HCV RNA SERPL NAA+PROBE-ACNC: NOT DETECTED IU/ML
HCV RNA SERPL NAA+PROBE-LOG IU: NOT DETECTED LOG IU/ML
HCV RNA SERPL QL NAA+PROBE: NOT DETECTED

## 2023-08-23 ENCOUNTER — APPOINTMENT (OUTPATIENT)
Dept: CT IMAGING | Age: 43
DRG: 280 | End: 2023-08-23
Payer: COMMERCIAL

## 2023-08-23 ENCOUNTER — HOSPITAL ENCOUNTER (INPATIENT)
Age: 43
LOS: 4 days | Discharge: HOME | DRG: 280 | End: 2023-08-29
Attending: STUDENT IN AN ORGANIZED HEALTH CARE EDUCATION/TRAINING PROGRAM | Admitting: INTERNAL MEDICINE
Payer: COMMERCIAL

## 2023-08-23 DIAGNOSIS — D69.6 THROMBOCYTOPENIA (HCC): ICD-10-CM

## 2023-08-23 DIAGNOSIS — K02.9 DENTAL DECAY: ICD-10-CM

## 2023-08-23 DIAGNOSIS — E80.6 HYPERBILIRUBINEMIA: ICD-10-CM

## 2023-08-23 DIAGNOSIS — K70.30 ALCOHOLIC CIRRHOSIS OF LIVER WITHOUT ASCITES (HCC): Primary | ICD-10-CM

## 2023-08-23 PROBLEM — K74.60 LIVER CIRRHOSIS (HCC): Status: ACTIVE | Noted: 2023-08-23

## 2023-08-23 PROBLEM — K70.10 ACUTE ALCOHOLIC HEPATITIS: Status: ACTIVE | Noted: 2023-08-23

## 2023-08-23 LAB
ALBUMIN SERPL-MCNC: 3.5 G/DL (ref 3.5–5.2)
ALP SERPL-CCNC: 232 U/L (ref 40–129)
ALT SERPL-CCNC: 95 U/L (ref 0–40)
AMPHET UR QL SCN: NEGATIVE
ANION GAP SERPL CALCULATED.3IONS-SCNC: 8 MMOL/L (ref 7–16)
AST SERPL-CCNC: 320 U/L (ref 0–39)
BARBITURATES UR QL SCN: NEGATIVE
BENZODIAZ UR QL: NEGATIVE
BILIRUB DIRECT SERPL-MCNC: 3.1 MG/DL (ref 0–0.3)
BILIRUB INDIRECT SERPL-MCNC: 3 MG/DL (ref 0–1)
BILIRUB SERPL-MCNC: 6.1 MG/DL (ref 0–1.2)
BUN SERPL-MCNC: 11 MG/DL (ref 6–20)
BUPRENORPHINE UR QL: NEGATIVE
CALCIUM SERPL-MCNC: 8.6 MG/DL (ref 8.6–10.2)
CANNABINOIDS UR QL SCN: POSITIVE
CHLORIDE SERPL-SCNC: 88 MMOL/L (ref 98–107)
CO2 SERPL-SCNC: 34 MMOL/L (ref 22–29)
COCAINE UR QL SCN: NEGATIVE
CREAT SERPL-MCNC: 1.5 MG/DL (ref 0.7–1.2)
FENTANYL UR QL: POSITIVE
GFR SERPL CREATININE-BSD FRML MDRD: >60 ML/MIN/1.73M2
GLUCOSE SERPL-MCNC: 125 MG/DL (ref 74–99)
INR PPP: 1.7
LACTATE BLDV-SCNC: 1.3 MMOL/L (ref 0.5–2.2)
LACTATE BLDV-SCNC: 2.8 MMOL/L (ref 0.5–2.2)
LIPASE SERPL-CCNC: 46 U/L (ref 13–60)
METHADONE UR QL: NEGATIVE
OPIATES UR QL SCN: NEGATIVE
OXYCODONE UR QL SCN: NEGATIVE
PCP UR QL SCN: NEGATIVE
POTASSIUM SERPL-SCNC: 3.2 MMOL/L (ref 3.5–5)
PROT SERPL-MCNC: 6.7 G/DL (ref 6.4–8.3)
PROTHROMBIN TIME: 19.4 SEC (ref 9.3–12.4)
SODIUM SERPL-SCNC: 130 MMOL/L (ref 132–146)
TEST INFORMATION: ABNORMAL
TROPONIN I SERPL HS-MCNC: 31 NG/L (ref 0–11)
TROPONIN I SERPL HS-MCNC: 31 NG/L (ref 0–11)

## 2023-08-23 PROCEDURE — G0378 HOSPITAL OBSERVATION PER HR: HCPCS

## 2023-08-23 PROCEDURE — 6370000000 HC RX 637 (ALT 250 FOR IP): Performed by: STUDENT IN AN ORGANIZED HEALTH CARE EDUCATION/TRAINING PROGRAM

## 2023-08-23 PROCEDURE — 96361 HYDRATE IV INFUSION ADD-ON: CPT

## 2023-08-23 PROCEDURE — 85025 COMPLETE CBC W/AUTO DIFF WBC: CPT

## 2023-08-23 PROCEDURE — 85610 PROTHROMBIN TIME: CPT

## 2023-08-23 PROCEDURE — 2580000003 HC RX 258: Performed by: INTERNAL MEDICINE

## 2023-08-23 PROCEDURE — 99285 EMERGENCY DEPT VISIT HI MDM: CPT

## 2023-08-23 PROCEDURE — 82248 BILIRUBIN DIRECT: CPT

## 2023-08-23 PROCEDURE — 80179 DRUG ASSAY SALICYLATE: CPT

## 2023-08-23 PROCEDURE — 96374 THER/PROPH/DIAG INJ IV PUSH: CPT

## 2023-08-23 PROCEDURE — 80053 COMPREHEN METABOLIC PANEL: CPT

## 2023-08-23 PROCEDURE — 2580000003 HC RX 258: Performed by: STUDENT IN AN ORGANIZED HEALTH CARE EDUCATION/TRAINING PROGRAM

## 2023-08-23 PROCEDURE — 84484 ASSAY OF TROPONIN QUANT: CPT

## 2023-08-23 PROCEDURE — 6360000004 HC RX CONTRAST MEDICATION: Performed by: RADIOLOGY

## 2023-08-23 PROCEDURE — 80143 DRUG ASSAY ACETAMINOPHEN: CPT

## 2023-08-23 PROCEDURE — 83605 ASSAY OF LACTIC ACID: CPT

## 2023-08-23 PROCEDURE — 6360000002 HC RX W HCPCS: Performed by: STUDENT IN AN ORGANIZED HEALTH CARE EDUCATION/TRAINING PROGRAM

## 2023-08-23 PROCEDURE — 93005 ELECTROCARDIOGRAM TRACING: CPT | Performed by: STUDENT IN AN ORGANIZED HEALTH CARE EDUCATION/TRAINING PROGRAM

## 2023-08-23 PROCEDURE — 74177 CT ABD & PELVIS W/CONTRAST: CPT

## 2023-08-23 PROCEDURE — G0480 DRUG TEST DEF 1-7 CLASSES: HCPCS

## 2023-08-23 PROCEDURE — 83690 ASSAY OF LIPASE: CPT

## 2023-08-23 PROCEDURE — 80307 DRUG TEST PRSMV CHEM ANLYZR: CPT

## 2023-08-23 RX ORDER — SODIUM CHLORIDE 9 MG/ML
INJECTION, SOLUTION INTRAVENOUS PRN
Status: DISCONTINUED | OUTPATIENT
Start: 2023-08-23 | End: 2023-08-27 | Stop reason: SDUPTHER

## 2023-08-23 RX ORDER — LORAZEPAM 2 MG/ML
1 INJECTION INTRAMUSCULAR
Status: DISCONTINUED | OUTPATIENT
Start: 2023-08-23 | End: 2023-08-29 | Stop reason: HOSPADM

## 2023-08-23 RX ORDER — SENNOSIDES A AND B 8.6 MG/1
1 TABLET, FILM COATED ORAL DAILY PRN
Status: DISCONTINUED | OUTPATIENT
Start: 2023-08-23 | End: 2023-08-29 | Stop reason: HOSPADM

## 2023-08-23 RX ORDER — PANTOPRAZOLE SODIUM 40 MG/1
40 TABLET, DELAYED RELEASE ORAL
Status: DISCONTINUED | OUTPATIENT
Start: 2023-08-24 | End: 2023-08-24

## 2023-08-23 RX ORDER — 0.9 % SODIUM CHLORIDE 0.9 %
1000 INTRAVENOUS SOLUTION INTRAVENOUS ONCE
Status: COMPLETED | OUTPATIENT
Start: 2023-08-23 | End: 2023-08-23

## 2023-08-23 RX ORDER — LORAZEPAM 2 MG/ML
3 INJECTION INTRAMUSCULAR
Status: DISCONTINUED | OUTPATIENT
Start: 2023-08-23 | End: 2023-08-29 | Stop reason: HOSPADM

## 2023-08-23 RX ORDER — LORAZEPAM 2 MG/ML
2 INJECTION INTRAMUSCULAR
Status: DISCONTINUED | OUTPATIENT
Start: 2023-08-23 | End: 2023-08-29 | Stop reason: HOSPADM

## 2023-08-23 RX ORDER — SODIUM CHLORIDE 0.9 % (FLUSH) 0.9 %
10 SYRINGE (ML) INJECTION PRN
Status: DISCONTINUED | OUTPATIENT
Start: 2023-08-23 | End: 2023-08-29 | Stop reason: HOSPADM

## 2023-08-23 RX ORDER — LORAZEPAM 2 MG/ML
4 INJECTION INTRAMUSCULAR
Status: DISCONTINUED | OUTPATIENT
Start: 2023-08-23 | End: 2023-08-29 | Stop reason: HOSPADM

## 2023-08-23 RX ORDER — LORAZEPAM 1 MG/1
2 TABLET ORAL
Status: DISCONTINUED | OUTPATIENT
Start: 2023-08-23 | End: 2023-08-29 | Stop reason: HOSPADM

## 2023-08-23 RX ORDER — LORAZEPAM 1 MG/1
3 TABLET ORAL
Status: DISCONTINUED | OUTPATIENT
Start: 2023-08-23 | End: 2023-08-29 | Stop reason: HOSPADM

## 2023-08-23 RX ORDER — LORAZEPAM 1 MG/1
4 TABLET ORAL
Status: DISCONTINUED | OUTPATIENT
Start: 2023-08-23 | End: 2023-08-29 | Stop reason: HOSPADM

## 2023-08-23 RX ORDER — SODIUM CHLORIDE 9 MG/ML
INJECTION, SOLUTION INTRAVENOUS PRN
Status: DISCONTINUED | OUTPATIENT
Start: 2023-08-23 | End: 2023-08-29 | Stop reason: HOSPADM

## 2023-08-23 RX ORDER — SODIUM CHLORIDE 0.9 % (FLUSH) 0.9 %
5-40 SYRINGE (ML) INJECTION EVERY 12 HOURS SCHEDULED
Status: DISCONTINUED | OUTPATIENT
Start: 2023-08-23 | End: 2023-08-27 | Stop reason: SDUPTHER

## 2023-08-23 RX ORDER — SODIUM CHLORIDE 0.9 % (FLUSH) 0.9 %
10 SYRINGE (ML) INJECTION EVERY 12 HOURS SCHEDULED
Status: DISCONTINUED | OUTPATIENT
Start: 2023-08-23 | End: 2023-08-29 | Stop reason: HOSPADM

## 2023-08-23 RX ORDER — LANOLIN ALCOHOL/MO/W.PET/CERES
100 CREAM (GRAM) TOPICAL DAILY
Status: DISCONTINUED | OUTPATIENT
Start: 2023-08-23 | End: 2023-08-29 | Stop reason: HOSPADM

## 2023-08-23 RX ORDER — SODIUM CHLORIDE 0.9 % (FLUSH) 0.9 %
5-40 SYRINGE (ML) INJECTION PRN
Status: DISCONTINUED | OUTPATIENT
Start: 2023-08-23 | End: 2023-08-27 | Stop reason: SDUPTHER

## 2023-08-23 RX ORDER — LORAZEPAM 1 MG/1
1 TABLET ORAL
Status: DISCONTINUED | OUTPATIENT
Start: 2023-08-23 | End: 2023-08-29 | Stop reason: HOSPADM

## 2023-08-23 RX ADMIN — SODIUM CHLORIDE 1000 ML: 9 INJECTION, SOLUTION INTRAVENOUS at 21:44

## 2023-08-23 RX ADMIN — IOPAMIDOL 75 ML: 755 INJECTION, SOLUTION INTRAVENOUS at 19:09

## 2023-08-23 RX ADMIN — Medication 100 MG: at 21:47

## 2023-08-23 RX ADMIN — SODIUM CHLORIDE, PRESERVATIVE FREE 10 ML: 5 INJECTION INTRAVENOUS at 23:39

## 2023-08-23 RX ADMIN — LORAZEPAM 2 MG: 2 INJECTION INTRAMUSCULAR; INTRAVENOUS at 21:49

## 2023-08-23 ASSESSMENT — ENCOUNTER SYMPTOMS
BACK PAIN: 0
SORE THROAT: 0
SINUS PRESSURE: 0
NAUSEA: 1
DIARRHEA: 0
SHORTNESS OF BREATH: 0
EYE REDNESS: 0
WHEEZING: 0
ABDOMINAL PAIN: 1
COUGH: 0
EYE DISCHARGE: 0
EYE PAIN: 0
VOMITING: 1

## 2023-08-23 ASSESSMENT — PAIN DESCRIPTION - LOCATION: LOCATION: GENERALIZED

## 2023-08-23 ASSESSMENT — PAIN DESCRIPTION - PAIN TYPE: TYPE: CHRONIC PAIN

## 2023-08-23 ASSESSMENT — PAIN - FUNCTIONAL ASSESSMENT
PAIN_FUNCTIONAL_ASSESSMENT: INTOLERABLE, UNABLE TO DO ANY ACTIVE OR PASSIVE ACTIVITIES
PAIN_FUNCTIONAL_ASSESSMENT: 0-10

## 2023-08-23 ASSESSMENT — PAIN SCALES - GENERAL: PAINLEVEL_OUTOF10: 8

## 2023-08-23 NOTE — ED TRIAGE NOTES
FIRST PROVIDER CONTACT ASSESSMENT NOTE      Department of Emergency Medicine   Admit Date: No admission date for patient encounter. Chief Complaint: Abdominal Pain (Pt c/o generalized abd pain, insomnia, leg pain. Pt states he has esophageal varices, cirrhosis, TIPS device in liver. Pt states he was on a 10 day drinking binge and now feels unwell. Last drink yesterday. )      History of Present Illness:    Yong Salinas III is a 44 y.o. male who presents to the ED for diffuse abdominal pain. Leg and arm cramping. Shakiness. History of cirrhosis and esophageal varices. TIPS device in the liver. Went on a drinking binge.     GI is Dr Germaine Sandhu, and [facility]  Surgeon is Dr Rosanna Gomez  Last paracentesis January 2023      -----------------END OF FIRST PROVIDER CONTACT ASSESSMENT NOTE--------------  Electronically signed by LAZARUS Calderon   DD: 8/23/23

## 2023-08-23 NOTE — ED PROVIDER NOTES
mmol/L    Anion Gap 6 (L) 7 - 16 mmol/L    Alkaline Phosphatase 154 (H) 40 - 129 U/L    ALT 55 (H) 0 - 40 U/L     (H) 0 - 39 U/L    Total Bilirubin 4.3 (H) 0.0 - 1.2 mg/dL    Total Protein 4.9 (L) 6.4 - 8.3 g/dL    Albumin 2.2 (L) 3.5 - 5.2 g/dL   Phosphorus   Result Value Ref Range    Phosphorus 2.5 2.5 - 4.5 mg/dL   Magnesium   Result Value Ref Range    Magnesium 1.9 1.6 - 2.6 mg/dL   Hemoglobin and Hematocrit   Result Value Ref Range    Hemoglobin 7.1 (L) 12.5 - 16.5 g/dL    Hematocrit 21.7 (L) 37.0 - 54.0 %   Platelet Confirmation   Result Value Ref Range    Platelet Confirmation CONFIRMED    Hemoglobin and Hematocrit   Result Value Ref Range    Hemoglobin 7.6 (L) 12.5 - 16.5 g/dL    Hematocrit 22.8 (L) 37.0 - 54.0 %   Magnesium   Result Value Ref Range    Magnesium 1.6 1.6 - 2.6 mg/dL   Phosphorus   Result Value Ref Range    Phosphorus 2.0 (L) 2.5 - 4.5 mg/dL   Protime-INR   Result Value Ref Range    Protime 23.1 (H) 9.3 - 12.4 sec    INR 2.1    CBC with Auto Differential   Result Value Ref Range    WBC 1.7 (L) 4.5 - 11.5 k/uL    RBC 2.54 (L) 3.80 - 5.80 m/uL    Hemoglobin 7.4 (L) 12.5 - 16.5 g/dL    Hematocrit 22.9 (L) 37.0 - 54.0 %    MCV 90.2 80.0 - 99.9 fL    MCH 29.1 26.0 - 35.0 pg    MCHC 32.3 32.0 - 34.5 g/dL    RDW 15.9 (H) 11.5 - 15.0 %    Platelets 31 (L) 082 - 450 k/uL    MPV 10.8 7.0 - 12.0 fL    Neutrophils % 74 43.0 - 80.0 %    Lymphocytes % 17 (L) 20.0 - 42.0 %    Monocytes % 4 2.0 - 12.0 %    Eosinophils % 4 0 - 6 %    Basophils % 1 0.0 - 2.0 %    Neutrophils Absolute 1.26 (L) 1.80 - 7.30 k/uL    Lymphocytes Absolute 0.30 (L) 1.50 - 4.00 k/uL    Monocytes Absolute 0.07 (L) 0.10 - 0.95 k/uL    Eosinophils Absolute 0.06 0.05 - 0.50 k/uL    Basophils Absolute 0.01 0.00 - 0.20 k/uL   Comprehensive Metabolic Panel   Result Value Ref Range    Glucose 123 (H) 74 - 99 mg/dL    BUN 7 6 - 20 mg/dL    Creatinine 1.3 (H) 0.70 - 1.20 mg/dL    Est, Glom Filt Rate >60 >60 mL/min/1.73m2    Calcium 7.8

## 2023-08-24 LAB
ALBUMIN SERPL-MCNC: 2.8 G/DL (ref 3.5–5.2)
ALP SERPL-CCNC: 188 U/L (ref 40–129)
ALT SERPL-CCNC: 80 U/L (ref 0–40)
AMMONIA PLAS-SCNC: 65 UMOL/L (ref 16–60)
ANION GAP SERPL CALCULATED.3IONS-SCNC: 4 MMOL/L (ref 7–16)
ANION GAP SERPL CALCULATED.3IONS-SCNC: 8 MMOL/L (ref 7–16)
APAP SERPL-MCNC: <5 UG/ML (ref 10–30)
AST SERPL-CCNC: 248 U/L (ref 0–39)
BASOPHILS # BLD: 0.03 K/UL (ref 0–0.2)
BASOPHILS # BLD: 0.03 K/UL (ref 0–0.2)
BASOPHILS NFR BLD: 1 % (ref 0–2)
BASOPHILS NFR BLD: 1 % (ref 0–2)
BILIRUB SERPL-MCNC: 4.5 MG/DL (ref 0–1.2)
BUN SERPL-MCNC: 10 MG/DL (ref 6–20)
BUN SERPL-MCNC: 9 MG/DL (ref 6–20)
CALCIUM SERPL-MCNC: 8.1 MG/DL (ref 8.6–10.2)
CALCIUM SERPL-MCNC: 8.4 MG/DL (ref 8.6–10.2)
CEA SERPL-MCNC: 3.6 NG/ML (ref 0–5.2)
CHLORIDE SERPL-SCNC: 91 MMOL/L (ref 98–107)
CHLORIDE SERPL-SCNC: 99 MMOL/L (ref 98–107)
CO2 SERPL-SCNC: 31 MMOL/L (ref 22–29)
CO2 SERPL-SCNC: 32 MMOL/L (ref 22–29)
CREAT SERPL-MCNC: 1.3 MG/DL (ref 0.7–1.2)
CREAT SERPL-MCNC: 1.4 MG/DL (ref 0.7–1.2)
EKG ATRIAL RATE: 96 BPM
EKG P AXIS: 58 DEGREES
EKG P-R INTERVAL: 124 MS
EKG Q-T INTERVAL: 420 MS
EKG QRS DURATION: 82 MS
EKG QTC CALCULATION (BAZETT): 530 MS
EKG R AXIS: 48 DEGREES
EKG T AXIS: 49 DEGREES
EKG VENTRICULAR RATE: 96 BPM
EOSINOPHIL # BLD: 0.15 K/UL (ref 0.05–0.5)
EOSINOPHIL # BLD: 0.2 K/UL (ref 0.05–0.5)
EOSINOPHILS RELATIVE PERCENT: 3 % (ref 0–6)
EOSINOPHILS RELATIVE PERCENT: 7 % (ref 0–6)
ERYTHROCYTE [DISTWIDTH] IN BLOOD BY AUTOMATED COUNT: 14.8 % (ref 11.5–15)
ERYTHROCYTE [DISTWIDTH] IN BLOOD BY AUTOMATED COUNT: 15.3 % (ref 11.5–15)
ETHANOLAMINE SERPL-MCNC: <10 MG/DL
FERRITIN SERPL-MCNC: 40 NG/ML
FOLATE SERPL-MCNC: 9.6 NG/ML (ref 4.8–24.2)
GFR SERPL CREATININE-BSD FRML MDRD: >60 ML/MIN/1.73M2
GFR SERPL CREATININE-BSD FRML MDRD: >60 ML/MIN/1.73M2
GLUCOSE SERPL-MCNC: 125 MG/DL (ref 74–99)
GLUCOSE SERPL-MCNC: 126 MG/DL (ref 74–99)
HCT VFR BLD AUTO: 23.2 % (ref 37–54)
HCT VFR BLD AUTO: 27.2 % (ref 37–54)
HGB BLD-MCNC: 7.9 G/DL (ref 12.5–16.5)
HGB BLD-MCNC: 9.4 G/DL (ref 12.5–16.5)
IMM GRANULOCYTES # BLD AUTO: 0.03 K/UL (ref 0–0.58)
IMM GRANULOCYTES # BLD AUTO: 0.04 K/UL (ref 0–0.58)
IMM GRANULOCYTES NFR BLD: 1 % (ref 0–5)
IMM GRANULOCYTES NFR BLD: 1 % (ref 0–5)
IRON SATN MFR SERPL: 13 % (ref 20–55)
IRON SERPL-MCNC: 38 UG/DL (ref 59–158)
LYMPHOCYTES NFR BLD: 0.46 K/UL (ref 1.5–4)
LYMPHOCYTES NFR BLD: 0.5 K/UL (ref 1.5–4)
LYMPHOCYTES RELATIVE PERCENT: 16 % (ref 20–42)
LYMPHOCYTES RELATIVE PERCENT: 9 % (ref 20–42)
MAGNESIUM SERPL-MCNC: 1.9 MG/DL (ref 1.6–2.6)
MCH RBC QN AUTO: 29.5 PG (ref 26–35)
MCH RBC QN AUTO: 29.9 PG (ref 26–35)
MCHC RBC AUTO-ENTMCNC: 34.1 G/DL (ref 32–34.5)
MCHC RBC AUTO-ENTMCNC: 34.6 G/DL (ref 32–34.5)
MCV RBC AUTO: 85.3 FL (ref 80–99.9)
MCV RBC AUTO: 87.9 FL (ref 80–99.9)
MONOCYTES NFR BLD: 0.55 K/UL (ref 0.1–0.95)
MONOCYTES NFR BLD: 0.8 K/UL (ref 0.1–0.95)
MONOCYTES NFR BLD: 15 % (ref 2–12)
MONOCYTES NFR BLD: 19 % (ref 2–12)
NEUTROPHILS NFR BLD: 56 % (ref 43–80)
NEUTROPHILS NFR BLD: 72 % (ref 43–80)
NEUTS SEG NFR BLD: 1.6 K/UL (ref 1.8–7.3)
NEUTS SEG NFR BLD: 3.84 K/UL (ref 1.8–7.3)
PLATELET # BLD AUTO: 20 K/UL (ref 130–450)
PLATELET # BLD AUTO: 54 K/UL (ref 130–450)
PLATELET CONFIRMATION: NORMAL
PLATELET CONFIRMATION: NORMAL
PLATELET ESTIMATE: ABNORMAL
PMV BLD AUTO: 10.4 FL (ref 7–12)
PMV BLD AUTO: 9.5 FL (ref 7–12)
POTASSIUM SERPL-SCNC: 2.7 MMOL/L (ref 3.5–5)
POTASSIUM SERPL-SCNC: 3.2 MMOL/L (ref 3.5–5)
PROT SERPL-MCNC: 5.5 G/DL (ref 6.4–8.3)
RBC # BLD AUTO: 2.64 M/UL (ref 3.8–5.8)
RBC # BLD AUTO: 3.19 M/UL (ref 3.8–5.8)
SALICYLATES SERPL-MCNC: <0.3 MG/DL (ref 0–30)
SODIUM SERPL-SCNC: 130 MMOL/L (ref 132–146)
SODIUM SERPL-SCNC: 135 MMOL/L (ref 132–146)
TIBC SERPL-MCNC: 285 UG/DL (ref 250–450)
TOXIC TRICYCLIC SC,BLOOD: NEGATIVE
VIT B12 SERPL-MCNC: 1770 PG/ML (ref 211–946)
WBC OTHER # BLD: 2.8 K/UL (ref 4.5–11.5)
WBC OTHER # BLD: 5.4 K/UL (ref 4.5–11.5)

## 2023-08-24 PROCEDURE — C9113 INJ PANTOPRAZOLE SODIUM, VIA: HCPCS | Performed by: HOSPITALIST

## 2023-08-24 PROCEDURE — 82607 VITAMIN B-12: CPT

## 2023-08-24 PROCEDURE — 96375 TX/PRO/DX INJ NEW DRUG ADDON: CPT

## 2023-08-24 PROCEDURE — 6360000002 HC RX W HCPCS: Performed by: INTERNAL MEDICINE

## 2023-08-24 PROCEDURE — 83550 IRON BINDING TEST: CPT

## 2023-08-24 PROCEDURE — 6360000002 HC RX W HCPCS: Performed by: HOSPITALIST

## 2023-08-24 PROCEDURE — G0378 HOSPITAL OBSERVATION PER HR: HCPCS

## 2023-08-24 PROCEDURE — 83735 ASSAY OF MAGNESIUM: CPT

## 2023-08-24 PROCEDURE — 82746 ASSAY OF FOLIC ACID SERUM: CPT

## 2023-08-24 PROCEDURE — 6360000002 HC RX W HCPCS: Performed by: STUDENT IN AN ORGANIZED HEALTH CARE EDUCATION/TRAINING PROGRAM

## 2023-08-24 PROCEDURE — 93010 ELECTROCARDIOGRAM REPORT: CPT | Performed by: INTERNAL MEDICINE

## 2023-08-24 PROCEDURE — 2580000003 HC RX 258: Performed by: HOSPITALIST

## 2023-08-24 PROCEDURE — A4216 STERILE WATER/SALINE, 10 ML: HCPCS | Performed by: HOSPITALIST

## 2023-08-24 PROCEDURE — 96376 TX/PRO/DX INJ SAME DRUG ADON: CPT

## 2023-08-24 PROCEDURE — 82378 CARCINOEMBRYONIC ANTIGEN: CPT

## 2023-08-24 PROCEDURE — 6370000000 HC RX 637 (ALT 250 FOR IP): Performed by: HOSPITALIST

## 2023-08-24 PROCEDURE — 82140 ASSAY OF AMMONIA: CPT

## 2023-08-24 PROCEDURE — 6370000000 HC RX 637 (ALT 250 FOR IP): Performed by: INTERNAL MEDICINE

## 2023-08-24 PROCEDURE — 85025 COMPLETE CBC W/AUTO DIFF WBC: CPT

## 2023-08-24 PROCEDURE — 80053 COMPREHEN METABOLIC PANEL: CPT

## 2023-08-24 PROCEDURE — 83540 ASSAY OF IRON: CPT

## 2023-08-24 PROCEDURE — 82728 ASSAY OF FERRITIN: CPT

## 2023-08-24 PROCEDURE — 99223 1ST HOSP IP/OBS HIGH 75: CPT

## 2023-08-24 PROCEDURE — 80048 BASIC METABOLIC PNL TOTAL CA: CPT

## 2023-08-24 PROCEDURE — 2580000003 HC RX 258: Performed by: INTERNAL MEDICINE

## 2023-08-24 RX ORDER — PENTOXIFYLLINE 400 MG/1
400 TABLET, EXTENDED RELEASE ORAL
Status: DISCONTINUED | OUTPATIENT
Start: 2023-08-24 | End: 2023-08-29 | Stop reason: HOSPADM

## 2023-08-24 RX ORDER — POTASSIUM CHLORIDE 7.45 MG/ML
10 INJECTION INTRAVENOUS
Status: COMPLETED | OUTPATIENT
Start: 2023-08-24 | End: 2023-08-24

## 2023-08-24 RX ORDER — LACTULOSE 10 G/15ML
20 SOLUTION ORAL 2 TIMES DAILY
Status: DISCONTINUED | OUTPATIENT
Start: 2023-08-24 | End: 2023-08-25

## 2023-08-24 RX ORDER — POTASSIUM CHLORIDE 20 MEQ/1
20 TABLET, EXTENDED RELEASE ORAL ONCE
Status: COMPLETED | OUTPATIENT
Start: 2023-08-24 | End: 2023-08-24

## 2023-08-24 RX ORDER — POTASSIUM CHLORIDE 20 MEQ/1
40 TABLET, EXTENDED RELEASE ORAL ONCE
Status: COMPLETED | OUTPATIENT
Start: 2023-08-24 | End: 2023-08-24

## 2023-08-24 RX ADMIN — LORAZEPAM 4 MG: 2 INJECTION INTRAMUSCULAR; INTRAVENOUS at 01:04

## 2023-08-24 RX ADMIN — POTASSIUM CHLORIDE 10 MEQ: 10 INJECTION, SOLUTION INTRAVENOUS at 13:32

## 2023-08-24 RX ADMIN — SODIUM CHLORIDE, PRESERVATIVE FREE 40 MG: 5 INJECTION INTRAVENOUS at 19:55

## 2023-08-24 RX ADMIN — POTASSIUM CHLORIDE 40 MEQ: 1500 TABLET, EXTENDED RELEASE ORAL at 19:55

## 2023-08-24 RX ADMIN — LORAZEPAM 1 MG: 2 INJECTION INTRAMUSCULAR; INTRAVENOUS at 00:04

## 2023-08-24 RX ADMIN — PENTOXIFYLLINE 400 MG: 400 TABLET, EXTENDED RELEASE ORAL at 16:39

## 2023-08-24 RX ADMIN — POTASSIUM CHLORIDE 10 MEQ: 10 INJECTION, SOLUTION INTRAVENOUS at 12:30

## 2023-08-24 RX ADMIN — POTASSIUM CHLORIDE 10 MEQ: 10 INJECTION, SOLUTION INTRAVENOUS at 09:39

## 2023-08-24 RX ADMIN — LACTULOSE 20 G: 20 SOLUTION ORAL at 19:55

## 2023-08-24 RX ADMIN — POTASSIUM CHLORIDE 10 MEQ: 10 INJECTION, SOLUTION INTRAVENOUS at 10:56

## 2023-08-24 RX ADMIN — POTASSIUM CHLORIDE 10 MEQ: 10 INJECTION, SOLUTION INTRAVENOUS at 05:03

## 2023-08-24 RX ADMIN — LORAZEPAM 4 MG: 2 INJECTION INTRAMUSCULAR; INTRAVENOUS at 02:41

## 2023-08-24 RX ADMIN — POTASSIUM CHLORIDE 20 MEQ: 1500 TABLET, EXTENDED RELEASE ORAL at 05:00

## 2023-08-24 RX ADMIN — SODIUM CHLORIDE, PRESERVATIVE FREE 10 ML: 5 INJECTION INTRAVENOUS at 19:57

## 2023-08-24 RX ADMIN — POTASSIUM CHLORIDE 10 MEQ: 10 INJECTION, SOLUTION INTRAVENOUS at 08:03

## 2023-08-24 RX ADMIN — RIFAXIMIN 400 MG: 200 TABLET ORAL at 16:39

## 2023-08-24 RX ADMIN — RIFAXIMIN 400 MG: 200 TABLET ORAL at 19:55

## 2023-08-24 ASSESSMENT — PAIN SCALES - WONG BAKER: WONGBAKER_NUMERICALRESPONSE: 0

## 2023-08-24 NOTE — ED NOTES
The following labs were labeled with appropriate pt sticker and tubed to lab:     [] Blue     [] Lavender   [] on ice  [x] Green/yellow  [] Green/black [] on ice  [x] Grey  [] on ice  [] Yellow  [x] Red  [] Type/ Screen  [] ABG  [] VBG    [] COVID-19 swab    [] Rapid  [] PCR  [] Flu swab  [] Peds Viral Panel     [] Urine Sample  [] Fecal Sample  [] Pelvic Cultures  [] Blood Cultures  [] X 2  [] STREP Cultures      Gregory Richardson LPN  98/16/50 [id number]

## 2023-08-25 ENCOUNTER — APPOINTMENT (OUTPATIENT)
Dept: ULTRASOUND IMAGING | Age: 43
DRG: 280 | End: 2023-08-25
Payer: COMMERCIAL

## 2023-08-25 LAB
AMMONIA PLAS-SCNC: 100 UMOL/L (ref 16–60)
ANION GAP SERPL CALCULATED.3IONS-SCNC: 6 MMOL/L (ref 7–16)
BUN SERPL-MCNC: 9 MG/DL (ref 6–20)
CALCIUM SERPL-MCNC: 8.3 MG/DL (ref 8.6–10.2)
CHLORIDE SERPL-SCNC: 101 MMOL/L (ref 98–107)
CHLORIDE UR-SCNC: <20 MMOL/L
CO2 SERPL-SCNC: 28 MMOL/L (ref 22–29)
CREAT SERPL-MCNC: 1.3 MG/DL (ref 0.7–1.2)
CREAT UR-MCNC: 125.7 MG/DL (ref 40–278)
GFR SERPL CREATININE-BSD FRML MDRD: >60 ML/MIN/1.73M2
GLUCOSE SERPL-MCNC: 162 MG/DL (ref 74–99)
MAGNESIUM SERPL-MCNC: 2 MG/DL (ref 1.6–2.6)
OSMOLALITY UR: 330 MOSM/KG (ref 300–900)
PHOSPHATE SERPL-MCNC: 1.8 MG/DL (ref 2.5–4.5)
POTASSIUM SERPL-SCNC: 3.6 MMOL/L (ref 3.5–5)
SODIUM SERPL-SCNC: 135 MMOL/L (ref 132–146)
SODIUM UR-SCNC: 83 MMOL/L
TOTAL PROTEIN, URINE: 10 MG/DL (ref 0–12)
URINE TOTAL PROTEIN CREATININE RATIO: 0.08 (ref 0–0.2)

## 2023-08-25 PROCEDURE — 82436 ASSAY OF URINE CHLORIDE: CPT

## 2023-08-25 PROCEDURE — 82140 ASSAY OF AMMONIA: CPT

## 2023-08-25 PROCEDURE — G0378 HOSPITAL OBSERVATION PER HR: HCPCS

## 2023-08-25 PROCEDURE — C9113 INJ PANTOPRAZOLE SODIUM, VIA: HCPCS | Performed by: HOSPITALIST

## 2023-08-25 PROCEDURE — 2060000000 HC ICU INTERMEDIATE R&B

## 2023-08-25 PROCEDURE — 84156 ASSAY OF PROTEIN URINE: CPT

## 2023-08-25 PROCEDURE — 6370000000 HC RX 637 (ALT 250 FOR IP): Performed by: HOSPITALIST

## 2023-08-25 PROCEDURE — 2580000003 HC RX 258: Performed by: INTERNAL MEDICINE

## 2023-08-25 PROCEDURE — 83735 ASSAY OF MAGNESIUM: CPT

## 2023-08-25 PROCEDURE — 80048 BASIC METABOLIC PNL TOTAL CA: CPT

## 2023-08-25 PROCEDURE — 6370000000 HC RX 637 (ALT 250 FOR IP): Performed by: STUDENT IN AN ORGANIZED HEALTH CARE EDUCATION/TRAINING PROGRAM

## 2023-08-25 PROCEDURE — A4216 STERILE WATER/SALINE, 10 ML: HCPCS | Performed by: HOSPITALIST

## 2023-08-25 PROCEDURE — 36415 COLL VENOUS BLD VENIPUNCTURE: CPT

## 2023-08-25 PROCEDURE — 84100 ASSAY OF PHOSPHORUS: CPT

## 2023-08-25 PROCEDURE — 84300 ASSAY OF URINE SODIUM: CPT

## 2023-08-25 PROCEDURE — 96376 TX/PRO/DX INJ SAME DRUG ADON: CPT

## 2023-08-25 PROCEDURE — 83935 ASSAY OF URINE OSMOLALITY: CPT

## 2023-08-25 PROCEDURE — 2580000003 HC RX 258: Performed by: HOSPITALIST

## 2023-08-25 PROCEDURE — 6360000002 HC RX W HCPCS: Performed by: HOSPITALIST

## 2023-08-25 PROCEDURE — 76770 US EXAM ABDO BACK WALL COMP: CPT

## 2023-08-25 PROCEDURE — 2500000003 HC RX 250 WO HCPCS: Performed by: INTERNAL MEDICINE

## 2023-08-25 PROCEDURE — 82570 ASSAY OF URINE CREATININE: CPT

## 2023-08-25 RX ORDER — LACTULOSE 10 G/15ML
20 SOLUTION ORAL 3 TIMES DAILY
Status: DISCONTINUED | OUTPATIENT
Start: 2023-08-25 | End: 2023-08-29 | Stop reason: HOSPADM

## 2023-08-25 RX ORDER — SODIUM CHLORIDE, SODIUM LACTATE, POTASSIUM CHLORIDE, CALCIUM CHLORIDE 600; 310; 30; 20 MG/100ML; MG/100ML; MG/100ML; MG/100ML
INJECTION, SOLUTION INTRAVENOUS CONTINUOUS
Status: DISCONTINUED | OUTPATIENT
Start: 2023-08-25 | End: 2023-08-28

## 2023-08-25 RX ADMIN — SODIUM CHLORIDE, PRESERVATIVE FREE 40 MG: 5 INJECTION INTRAVENOUS at 19:53

## 2023-08-25 RX ADMIN — SODIUM CHLORIDE, PRESERVATIVE FREE 10 ML: 5 INJECTION INTRAVENOUS at 07:50

## 2023-08-25 RX ADMIN — PENTOXIFYLLINE 400 MG: 400 TABLET, EXTENDED RELEASE ORAL at 07:50

## 2023-08-25 RX ADMIN — PENTOXIFYLLINE 400 MG: 400 TABLET, EXTENDED RELEASE ORAL at 11:15

## 2023-08-25 RX ADMIN — RIFAXIMIN 400 MG: 200 TABLET ORAL at 07:49

## 2023-08-25 RX ADMIN — POTASSIUM PHOSPHATE, MONOBASIC AND POTASSIUM PHOSPHATE, DIBASIC 20 MMOL: 224; 236 INJECTION, SOLUTION, CONCENTRATE INTRAVENOUS at 19:58

## 2023-08-25 RX ADMIN — Medication 100 MG: at 07:50

## 2023-08-25 RX ADMIN — LACTULOSE 20 G: 20 SOLUTION ORAL at 07:49

## 2023-08-25 RX ADMIN — SODIUM CHLORIDE, PRESERVATIVE FREE 40 MG: 5 INJECTION INTRAVENOUS at 07:49

## 2023-08-25 RX ADMIN — PENTOXIFYLLINE 400 MG: 400 TABLET, EXTENDED RELEASE ORAL at 16:42

## 2023-08-25 RX ADMIN — RIFAXIMIN 400 MG: 200 TABLET ORAL at 14:10

## 2023-08-25 RX ADMIN — RIFAXIMIN 400 MG: 200 TABLET ORAL at 19:53

## 2023-08-25 RX ADMIN — LACTULOSE 20 G: 20 SOLUTION ORAL at 19:52

## 2023-08-25 RX ADMIN — SODIUM CHLORIDE, POTASSIUM CHLORIDE, SODIUM LACTATE AND CALCIUM CHLORIDE: 600; 310; 30; 20 INJECTION, SOLUTION INTRAVENOUS at 19:57

## 2023-08-25 RX ADMIN — SODIUM CHLORIDE, PRESERVATIVE FREE 10 ML: 5 INJECTION INTRAVENOUS at 19:53

## 2023-08-25 RX ADMIN — LACTULOSE 20 G: 20 SOLUTION ORAL at 14:10

## 2023-08-25 ASSESSMENT — PAIN SCALES - GENERAL
PAINLEVEL_OUTOF10: 0
PAINLEVEL_OUTOF10: 0

## 2023-08-25 ASSESSMENT — ENCOUNTER SYMPTOMS
NAUSEA: 0
VOMITING: 0
DIARRHEA: 0
COUGH: 0
SHORTNESS OF BREATH: 0

## 2023-08-25 NOTE — CARE COORDINATION
CASE MANAGEMENT. .... Met with patient at the bedside. He was sleeping, but arouses easily. Anirudh Barriga voices being independent from home with his wife, Keshia Gunter. States he has received counseling for ETOH/Drug abuse through [employer] and a \"place in [facility] [place]. \" His most recent visit was with [facility] a few weeks ago. Offered for him to speak with [facility] ([facility]), for other options etc... He declines. For now he remains with sitter in room and is on CIWA scale. He was sseen by Palliative Care - no needs - s/o. GI on board. Psych and Renal consulted. Monitoring labs. Adjusting meds. Discharge plan is home. Will follow for needs and assist accordingly.

## 2023-08-26 LAB
ALBUMIN SERPL-MCNC: 2.4 G/DL (ref 3.5–5.2)
ALP SERPL-CCNC: 164 U/L (ref 40–129)
ALT SERPL-CCNC: 63 U/L (ref 0–40)
AMMONIA PLAS-SCNC: 59 UMOL/L (ref 16–60)
ANION GAP SERPL CALCULATED.3IONS-SCNC: 8 MMOL/L (ref 7–16)
AST SERPL-CCNC: 137 U/L (ref 0–39)
BASOPHILS # BLD: 0.01 K/UL (ref 0–0.2)
BASOPHILS # BLD: 0.06 K/UL (ref 0–0.2)
BASOPHILS NFR BLD: 1 % (ref 0–2)
BASOPHILS NFR BLD: 3 % (ref 0–2)
BILIRUB SERPL-MCNC: 3.7 MG/DL (ref 0–1.2)
BUN SERPL-MCNC: 7 MG/DL (ref 6–20)
CALCIUM SERPL-MCNC: 8.2 MG/DL (ref 8.6–10.2)
CHLORIDE SERPL-SCNC: 103 MMOL/L (ref 98–107)
CO2 SERPL-SCNC: 24 MMOL/L (ref 22–29)
CREAT SERPL-MCNC: 1.2 MG/DL (ref 0.7–1.2)
EOSINOPHIL # BLD: 0.06 K/UL (ref 0.05–0.5)
EOSINOPHIL # BLD: 0.07 K/UL (ref 0.05–0.5)
EOSINOPHILS RELATIVE PERCENT: 3 % (ref 0–6)
EOSINOPHILS RELATIVE PERCENT: 4 % (ref 0–6)
ERYTHROCYTE [DISTWIDTH] IN BLOOD BY AUTOMATED COUNT: 15.6 % (ref 11.5–15)
ERYTHROCYTE [DISTWIDTH] IN BLOOD BY AUTOMATED COUNT: 16.1 % (ref 11.5–15)
GFR SERPL CREATININE-BSD FRML MDRD: >60 ML/MIN/1.73M2
GLUCOSE SERPL-MCNC: 93 MG/DL (ref 74–99)
HCT VFR BLD AUTO: 21.7 % (ref 37–54)
HCT VFR BLD AUTO: 22.2 % (ref 37–54)
HCT VFR BLD AUTO: 23.2 % (ref 37–54)
HGB BLD-MCNC: 7.1 G/DL (ref 12.5–16.5)
HGB BLD-MCNC: 7.5 G/DL (ref 12.5–16.5)
HGB BLD-MCNC: 7.7 G/DL (ref 12.5–16.5)
IMM GRANULOCYTES # BLD AUTO: <0.03 K/UL (ref 0–0.58)
IMM GRANULOCYTES NFR BLD: 1 % (ref 0–5)
INR PPP: 2
LYMPHOCYTES NFR BLD: 0.1 K/UL (ref 1.5–4)
LYMPHOCYTES NFR BLD: 0.25 K/UL (ref 1.5–4)
LYMPHOCYTES RELATIVE PERCENT: 13 % (ref 20–42)
LYMPHOCYTES RELATIVE PERCENT: 4 % (ref 20–42)
MAGNESIUM SERPL-MCNC: 1.9 MG/DL (ref 1.6–2.6)
MCH RBC QN AUTO: 29.6 PG (ref 26–35)
MCH RBC QN AUTO: 29.8 PG (ref 26–35)
MCHC RBC AUTO-ENTMCNC: 33.2 G/DL (ref 32–34.5)
MCHC RBC AUTO-ENTMCNC: 33.8 G/DL (ref 32–34.5)
MCV RBC AUTO: 88.1 FL (ref 80–99.9)
MCV RBC AUTO: 89.2 FL (ref 80–99.9)
MONOCYTES NFR BLD: 0.08 K/UL (ref 0.1–0.95)
MONOCYTES NFR BLD: 0.17 K/UL (ref 0.1–0.95)
MONOCYTES NFR BLD: 4 % (ref 2–12)
MONOCYTES NFR BLD: 9 % (ref 2–12)
NEUTROPHILS NFR BLD: 73 % (ref 43–80)
NEUTROPHILS NFR BLD: 87 % (ref 43–80)
NEUTS SEG NFR BLD: 1.42 K/UL (ref 1.8–7.3)
NEUTS SEG NFR BLD: 2 K/UL (ref 1.8–7.3)
PHOSPHATE SERPL-MCNC: 3.6 MG/DL (ref 2.5–4.5)
PLATELET # BLD AUTO: 29 K/UL (ref 130–450)
PLATELET # BLD AUTO: 30 K/UL (ref 130–450)
PLATELET CONFIRMATION: NORMAL
PLATELET CONFIRMATION: NORMAL
PMV BLD AUTO: 11.1 FL (ref 7–12)
PMV BLD AUTO: 12.3 FL (ref 7–12)
POTASSIUM SERPL-SCNC: 3.9 MMOL/L (ref 3.5–5)
PROT SERPL-MCNC: 5 G/DL (ref 6.4–8.3)
PROTHROMBIN TIME: 22.8 SEC (ref 9.3–12.4)
RBC # BLD AUTO: 2.52 M/UL (ref 3.8–5.8)
RBC # BLD AUTO: 2.6 M/UL (ref 3.8–5.8)
RBC # BLD: ABNORMAL 10*6/UL
RBC # BLD: NORMAL 10*6/UL
SODIUM SERPL-SCNC: 135 MMOL/L (ref 132–146)
WBC # BLD: ABNORMAL 10*3/UL
WBC # BLD: ABNORMAL 10*3/UL
WBC OTHER # BLD: 1.9 K/UL (ref 4.5–11.5)
WBC OTHER # BLD: 2.3 K/UL (ref 4.5–11.5)

## 2023-08-26 PROCEDURE — 2060000000 HC ICU INTERMEDIATE R&B

## 2023-08-26 PROCEDURE — 6370000000 HC RX 637 (ALT 250 FOR IP): Performed by: STUDENT IN AN ORGANIZED HEALTH CARE EDUCATION/TRAINING PROGRAM

## 2023-08-26 PROCEDURE — 85610 PROTHROMBIN TIME: CPT

## 2023-08-26 PROCEDURE — 85014 HEMATOCRIT: CPT

## 2023-08-26 PROCEDURE — 85025 COMPLETE CBC W/AUTO DIFF WBC: CPT

## 2023-08-26 PROCEDURE — 6370000000 HC RX 637 (ALT 250 FOR IP): Performed by: HOSPITALIST

## 2023-08-26 PROCEDURE — 36415 COLL VENOUS BLD VENIPUNCTURE: CPT

## 2023-08-26 PROCEDURE — 82140 ASSAY OF AMMONIA: CPT

## 2023-08-26 PROCEDURE — 80053 COMPREHEN METABOLIC PANEL: CPT

## 2023-08-26 PROCEDURE — 83735 ASSAY OF MAGNESIUM: CPT

## 2023-08-26 PROCEDURE — 6370000000 HC RX 637 (ALT 250 FOR IP): Performed by: INTERNAL MEDICINE

## 2023-08-26 PROCEDURE — 6360000002 HC RX W HCPCS: Performed by: STUDENT IN AN ORGANIZED HEALTH CARE EDUCATION/TRAINING PROGRAM

## 2023-08-26 PROCEDURE — 85018 HEMOGLOBIN: CPT

## 2023-08-26 PROCEDURE — 84100 ASSAY OF PHOSPHORUS: CPT

## 2023-08-26 RX ORDER — TEMAZEPAM 15 MG/1
15 CAPSULE ORAL NIGHTLY PRN
Status: DISCONTINUED | OUTPATIENT
Start: 2023-08-26 | End: 2023-08-29 | Stop reason: HOSPADM

## 2023-08-26 RX ORDER — PANTOPRAZOLE SODIUM 40 MG/1
40 TABLET, DELAYED RELEASE ORAL
Status: DISCONTINUED | OUTPATIENT
Start: 2023-08-26 | End: 2023-08-29 | Stop reason: HOSPADM

## 2023-08-26 RX ADMIN — RIFAXIMIN 400 MG: 200 TABLET ORAL at 08:23

## 2023-08-26 RX ADMIN — PANTOPRAZOLE SODIUM 40 MG: 40 TABLET, DELAYED RELEASE ORAL at 15:35

## 2023-08-26 RX ADMIN — PENTOXIFYLLINE 400 MG: 400 TABLET, EXTENDED RELEASE ORAL at 17:08

## 2023-08-26 RX ADMIN — RIFAXIMIN 400 MG: 200 TABLET ORAL at 20:33

## 2023-08-26 RX ADMIN — TEMAZEPAM 15 MG: 15 CAPSULE ORAL at 20:49

## 2023-08-26 RX ADMIN — LORAZEPAM 1 MG: 1 TABLET ORAL at 20:49

## 2023-08-26 RX ADMIN — PENTOXIFYLLINE 400 MG: 400 TABLET, EXTENDED RELEASE ORAL at 08:24

## 2023-08-26 RX ADMIN — PENTOXIFYLLINE 400 MG: 400 TABLET, EXTENDED RELEASE ORAL at 11:11

## 2023-08-26 RX ADMIN — LORAZEPAM 2 MG: 2 INJECTION INTRAMUSCULAR; INTRAVENOUS at 11:17

## 2023-08-26 RX ADMIN — RIFAXIMIN 400 MG: 200 TABLET ORAL at 14:03

## 2023-08-26 RX ADMIN — PANTOPRAZOLE SODIUM 40 MG: 40 TABLET, DELAYED RELEASE ORAL at 08:24

## 2023-08-26 RX ADMIN — Medication 100 MG: at 08:24

## 2023-08-26 RX ADMIN — LACTULOSE 20 G: 20 SOLUTION ORAL at 08:24

## 2023-08-26 ASSESSMENT — PAIN DESCRIPTION - LOCATION: LOCATION: HEAD

## 2023-08-26 ASSESSMENT — PAIN SCALES - GENERAL: PAINLEVEL_OUTOF10: 5

## 2023-08-27 LAB
ALBUMIN SERPL-MCNC: 2.2 G/DL (ref 3.5–5.2)
ALP SERPL-CCNC: 154 U/L (ref 40–129)
ALT SERPL-CCNC: 55 U/L (ref 0–40)
ANION GAP SERPL CALCULATED.3IONS-SCNC: 6 MMOL/L (ref 7–16)
AST SERPL-CCNC: 103 U/L (ref 0–39)
BASOPHILS # BLD: 0.01 K/UL (ref 0–0.2)
BASOPHILS NFR BLD: 1 % (ref 0–2)
BILIRUB SERPL-MCNC: 4.3 MG/DL (ref 0–1.2)
BUN SERPL-MCNC: 6 MG/DL (ref 6–20)
CALCIUM SERPL-MCNC: 8 MG/DL (ref 8.6–10.2)
CHLORIDE SERPL-SCNC: 108 MMOL/L (ref 98–107)
CO2 SERPL-SCNC: 21 MMOL/L (ref 22–29)
CREAT SERPL-MCNC: 1.2 MG/DL (ref 0.7–1.2)
EOSINOPHIL # BLD: 0.12 K/UL (ref 0.05–0.5)
EOSINOPHILS RELATIVE PERCENT: 7 % (ref 0–6)
ERYTHROCYTE [DISTWIDTH] IN BLOOD BY AUTOMATED COUNT: 15.9 % (ref 11.5–15)
GFR SERPL CREATININE-BSD FRML MDRD: >60 ML/MIN/1.73M2
GLUCOSE BLD-MCNC: 104 MG/DL (ref 74–99)
GLUCOSE SERPL-MCNC: 97 MG/DL (ref 74–99)
HCT VFR BLD AUTO: 22.6 % (ref 37–54)
HCT VFR BLD AUTO: 22.8 % (ref 37–54)
HGB BLD-MCNC: 7.5 G/DL (ref 12.5–16.5)
HGB BLD-MCNC: 7.6 G/DL (ref 12.5–16.5)
IMM GRANULOCYTES # BLD AUTO: <0.03 K/UL (ref 0–0.58)
IMM GRANULOCYTES NFR BLD: 1 % (ref 0–5)
LYMPHOCYTES NFR BLD: 0.27 K/UL (ref 1.5–4)
LYMPHOCYTES RELATIVE PERCENT: 15 % (ref 20–42)
MAGNESIUM SERPL-MCNC: 1.9 MG/DL (ref 1.6–2.6)
MCH RBC QN AUTO: 30 PG (ref 26–35)
MCHC RBC AUTO-ENTMCNC: 33.2 G/DL (ref 32–34.5)
MCV RBC AUTO: 90.4 FL (ref 80–99.9)
MONOCYTES NFR BLD: 0.18 K/UL (ref 0.1–0.95)
MONOCYTES NFR BLD: 10 % (ref 2–12)
NEUTROPHILS NFR BLD: 68 % (ref 43–80)
NEUTS SEG NFR BLD: 1.27 K/UL (ref 1.8–7.3)
PHOSPHATE SERPL-MCNC: 2.5 MG/DL (ref 2.5–4.5)
PLATELET # BLD AUTO: 31 K/UL (ref 130–450)
PLATELET CONFIRMATION: NORMAL
PMV BLD AUTO: 12.2 FL (ref 7–12)
POTASSIUM SERPL-SCNC: 4.2 MMOL/L (ref 3.5–5)
PROT SERPL-MCNC: 4.9 G/DL (ref 6.4–8.3)
RBC # BLD AUTO: 2.5 M/UL (ref 3.8–5.8)
RBC # BLD: ABNORMAL 10*6/UL
SODIUM SERPL-SCNC: 135 MMOL/L (ref 132–146)
WBC OTHER # BLD: 1.9 K/UL (ref 4.5–11.5)

## 2023-08-27 PROCEDURE — 6370000000 HC RX 637 (ALT 250 FOR IP): Performed by: INTERNAL MEDICINE

## 2023-08-27 PROCEDURE — 85018 HEMOGLOBIN: CPT

## 2023-08-27 PROCEDURE — 85014 HEMATOCRIT: CPT

## 2023-08-27 PROCEDURE — 80053 COMPREHEN METABOLIC PANEL: CPT

## 2023-08-27 PROCEDURE — 6370000000 HC RX 637 (ALT 250 FOR IP): Performed by: NURSE PRACTITIONER

## 2023-08-27 PROCEDURE — 84100 ASSAY OF PHOSPHORUS: CPT

## 2023-08-27 PROCEDURE — 2060000000 HC ICU INTERMEDIATE R&B

## 2023-08-27 PROCEDURE — 6370000000 HC RX 637 (ALT 250 FOR IP): Performed by: HOSPITALIST

## 2023-08-27 PROCEDURE — 6370000000 HC RX 637 (ALT 250 FOR IP): Performed by: STUDENT IN AN ORGANIZED HEALTH CARE EDUCATION/TRAINING PROGRAM

## 2023-08-27 PROCEDURE — 82962 GLUCOSE BLOOD TEST: CPT

## 2023-08-27 PROCEDURE — 85025 COMPLETE CBC W/AUTO DIFF WBC: CPT

## 2023-08-27 PROCEDURE — 83735 ASSAY OF MAGNESIUM: CPT

## 2023-08-27 RX ORDER — FERROUS SULFATE 325(65) MG
325 TABLET ORAL 2 TIMES DAILY WITH MEALS
Status: DISCONTINUED | OUTPATIENT
Start: 2023-08-27 | End: 2023-08-29

## 2023-08-27 RX ORDER — CYCLOBENZAPRINE HCL 10 MG
10 TABLET ORAL EVERY 8 HOURS PRN
Status: DISCONTINUED | OUTPATIENT
Start: 2023-08-27 | End: 2023-08-29 | Stop reason: HOSPADM

## 2023-08-27 RX ORDER — MULTIVIT WITH MINERALS/LUTEIN
250 TABLET ORAL 2 TIMES DAILY
Status: DISCONTINUED | OUTPATIENT
Start: 2023-08-27 | End: 2023-08-29 | Stop reason: HOSPADM

## 2023-08-27 RX ADMIN — PANTOPRAZOLE SODIUM 40 MG: 40 TABLET, DELAYED RELEASE ORAL at 15:54

## 2023-08-27 RX ADMIN — CYCLOBENZAPRINE 10 MG: 10 TABLET, FILM COATED ORAL at 20:05

## 2023-08-27 RX ADMIN — LACTULOSE 20 G: 20 SOLUTION ORAL at 09:05

## 2023-08-27 RX ADMIN — LACTULOSE 20 G: 20 SOLUTION ORAL at 12:55

## 2023-08-27 RX ADMIN — CYCLOBENZAPRINE 10 MG: 10 TABLET, FILM COATED ORAL at 11:25

## 2023-08-27 RX ADMIN — PENTOXIFYLLINE 400 MG: 400 TABLET, EXTENDED RELEASE ORAL at 15:55

## 2023-08-27 RX ADMIN — PANTOPRAZOLE SODIUM 40 MG: 40 TABLET, DELAYED RELEASE ORAL at 09:04

## 2023-08-27 RX ADMIN — FERROUS SULFATE TAB 325 MG (65 MG ELEMENTAL FE) 325 MG: 325 (65 FE) TAB at 11:24

## 2023-08-27 RX ADMIN — PENTOXIFYLLINE 400 MG: 400 TABLET, EXTENDED RELEASE ORAL at 11:24

## 2023-08-27 RX ADMIN — TEMAZEPAM 15 MG: 15 CAPSULE ORAL at 20:05

## 2023-08-27 RX ADMIN — FERROUS SULFATE TAB 325 MG (65 MG ELEMENTAL FE) 325 MG: 325 (65 FE) TAB at 18:45

## 2023-08-27 RX ADMIN — RIFAXIMIN 400 MG: 200 TABLET ORAL at 20:05

## 2023-08-27 RX ADMIN — Medication 250 MG: at 11:25

## 2023-08-27 RX ADMIN — RIFAXIMIN 400 MG: 200 TABLET ORAL at 09:04

## 2023-08-27 RX ADMIN — PENTOXIFYLLINE 400 MG: 400 TABLET, EXTENDED RELEASE ORAL at 09:05

## 2023-08-27 RX ADMIN — Medication 100 MG: at 09:04

## 2023-08-27 RX ADMIN — Medication 250 MG: at 20:05

## 2023-08-27 RX ADMIN — RIFAXIMIN 400 MG: 200 TABLET ORAL at 12:55

## 2023-08-27 ASSESSMENT — PAIN DESCRIPTION - DESCRIPTORS: DESCRIPTORS: POUNDING;ACHING

## 2023-08-27 ASSESSMENT — PAIN SCALES - GENERAL
PAINLEVEL_OUTOF10: 8
PAINLEVEL_OUTOF10: 10

## 2023-08-27 ASSESSMENT — PAIN DESCRIPTION - LOCATION
LOCATION: BACK;NECK
LOCATION: NECK;BACK

## 2023-08-28 LAB
ALBUMIN SERPL-MCNC: 2.3 G/DL (ref 3.5–5.2)
ALP SERPL-CCNC: 154 U/L (ref 40–129)
ALT SERPL-CCNC: 44 U/L (ref 0–40)
ANION GAP SERPL CALCULATED.3IONS-SCNC: 5 MMOL/L (ref 7–16)
AST SERPL-CCNC: 64 U/L (ref 0–39)
BASOPHILS # BLD: 0.01 K/UL (ref 0–0.2)
BASOPHILS NFR BLD: 1 % (ref 0–2)
BILIRUB SERPL-MCNC: 3.3 MG/DL (ref 0–1.2)
BUN SERPL-MCNC: 7 MG/DL (ref 6–20)
CALCIUM SERPL-MCNC: 7.8 MG/DL (ref 8.6–10.2)
CHLORIDE SERPL-SCNC: 108 MMOL/L (ref 98–107)
CO2 SERPL-SCNC: 22 MMOL/L (ref 22–29)
CREAT SERPL-MCNC: 1.3 MG/DL (ref 0.7–1.2)
EOSINOPHIL # BLD: 0.06 K/UL (ref 0.05–0.5)
EOSINOPHILS RELATIVE PERCENT: 4 % (ref 0–6)
ERYTHROCYTE [DISTWIDTH] IN BLOOD BY AUTOMATED COUNT: 15.9 % (ref 11.5–15)
GFR SERPL CREATININE-BSD FRML MDRD: >60 ML/MIN/1.73M2
GLUCOSE SERPL-MCNC: 123 MG/DL (ref 74–99)
HCT VFR BLD AUTO: 22.6 % (ref 37–54)
HCT VFR BLD AUTO: 22.9 % (ref 37–54)
HCT VFR BLD AUTO: 22.9 % (ref 37–54)
HGB BLD-MCNC: 7.4 G/DL (ref 12.5–16.5)
HGB BLD-MCNC: 7.5 G/DL (ref 12.5–16.5)
HGB BLD-MCNC: 7.5 G/DL (ref 12.5–16.5)
INR PPP: 2.1
LYMPHOCYTES NFR BLD: 0.3 K/UL (ref 1.5–4)
LYMPHOCYTES RELATIVE PERCENT: 17 % (ref 20–42)
MAGNESIUM SERPL-MCNC: 1.6 MG/DL (ref 1.6–2.6)
MCH RBC QN AUTO: 29.1 PG (ref 26–35)
MCHC RBC AUTO-ENTMCNC: 32.3 G/DL (ref 32–34.5)
MCV RBC AUTO: 90.2 FL (ref 80–99.9)
MONOCYTES NFR BLD: 0.07 K/UL (ref 0.1–0.95)
MONOCYTES NFR BLD: 4 % (ref 2–12)
NEUTROPHILS NFR BLD: 74 % (ref 43–80)
NEUTS SEG NFR BLD: 1.26 K/UL (ref 1.8–7.3)
PATH REV BLD -IMP: NORMAL
PHOSPHATE SERPL-MCNC: 2 MG/DL (ref 2.5–4.5)
PLATELET # BLD AUTO: 31 K/UL (ref 130–450)
PLATELET CONFIRMATION: NORMAL
PMV BLD AUTO: 10.8 FL (ref 7–12)
POTASSIUM SERPL-SCNC: 3.8 MMOL/L (ref 3.5–5)
PROT SERPL-MCNC: 4.8 G/DL (ref 6.4–8.3)
PROTHROMBIN TIME: 23.1 SEC (ref 9.3–12.4)
RBC # BLD AUTO: 2.54 M/UL (ref 3.8–5.8)
SODIUM SERPL-SCNC: 135 MMOL/L (ref 132–146)
WBC OTHER # BLD: 1.7 K/UL (ref 4.5–11.5)

## 2023-08-28 PROCEDURE — 85610 PROTHROMBIN TIME: CPT

## 2023-08-28 PROCEDURE — 2500000003 HC RX 250 WO HCPCS: Performed by: INTERNAL MEDICINE

## 2023-08-28 PROCEDURE — 6370000000 HC RX 637 (ALT 250 FOR IP): Performed by: NURSE PRACTITIONER

## 2023-08-28 PROCEDURE — 6370000000 HC RX 637 (ALT 250 FOR IP): Performed by: INTERNAL MEDICINE

## 2023-08-28 PROCEDURE — 85014 HEMATOCRIT: CPT

## 2023-08-28 PROCEDURE — 6370000000 HC RX 637 (ALT 250 FOR IP): Performed by: STUDENT IN AN ORGANIZED HEALTH CARE EDUCATION/TRAINING PROGRAM

## 2023-08-28 PROCEDURE — 84100 ASSAY OF PHOSPHORUS: CPT

## 2023-08-28 PROCEDURE — 85025 COMPLETE CBC W/AUTO DIFF WBC: CPT

## 2023-08-28 PROCEDURE — 6370000000 HC RX 637 (ALT 250 FOR IP): Performed by: HOSPITALIST

## 2023-08-28 PROCEDURE — 2580000003 HC RX 258: Performed by: INTERNAL MEDICINE

## 2023-08-28 PROCEDURE — 80053 COMPREHEN METABOLIC PANEL: CPT

## 2023-08-28 PROCEDURE — 85018 HEMOGLOBIN: CPT

## 2023-08-28 PROCEDURE — 1200000000 HC SEMI PRIVATE

## 2023-08-28 PROCEDURE — 83735 ASSAY OF MAGNESIUM: CPT

## 2023-08-28 RX ORDER — OXYCODONE HYDROCHLORIDE 5 MG/1
2.5 TABLET ORAL EVERY 12 HOURS PRN
Status: DISCONTINUED | OUTPATIENT
Start: 2023-08-28 | End: 2023-08-28

## 2023-08-28 RX ORDER — OXYCODONE HYDROCHLORIDE 5 MG/1
2.5 TABLET ORAL EVERY 8 HOURS PRN
Status: DISCONTINUED | OUTPATIENT
Start: 2023-08-28 | End: 2023-08-29 | Stop reason: HOSPADM

## 2023-08-28 RX ORDER — ACETAMINOPHEN 325 MG/1
650 TABLET ORAL EVERY 4 HOURS PRN
Status: DISCONTINUED | OUTPATIENT
Start: 2023-08-28 | End: 2023-08-29 | Stop reason: HOSPADM

## 2023-08-28 RX ADMIN — TEMAZEPAM 15 MG: 15 CAPSULE ORAL at 23:41

## 2023-08-28 RX ADMIN — PANTOPRAZOLE SODIUM 40 MG: 40 TABLET, DELAYED RELEASE ORAL at 09:17

## 2023-08-28 RX ADMIN — Medication 100 MG: at 09:17

## 2023-08-28 RX ADMIN — ACETAMINOPHEN 650 MG: 325 TABLET ORAL at 18:06

## 2023-08-28 RX ADMIN — PENTOXIFYLLINE 400 MG: 400 TABLET, EXTENDED RELEASE ORAL at 12:43

## 2023-08-28 RX ADMIN — SODIUM CHLORIDE, PRESERVATIVE FREE 10 ML: 5 INJECTION INTRAVENOUS at 09:18

## 2023-08-28 RX ADMIN — PANTOPRAZOLE SODIUM 40 MG: 40 TABLET, DELAYED RELEASE ORAL at 16:23

## 2023-08-28 RX ADMIN — Medication 250 MG: at 21:32

## 2023-08-28 RX ADMIN — LACTULOSE 20 G: 20 SOLUTION ORAL at 09:17

## 2023-08-28 RX ADMIN — OXYCODONE HYDROCHLORIDE 2.5 MG: 5 TABLET ORAL at 21:32

## 2023-08-28 RX ADMIN — PENTOXIFYLLINE 400 MG: 400 TABLET, EXTENDED RELEASE ORAL at 16:23

## 2023-08-28 RX ADMIN — RIFAXIMIN 400 MG: 200 TABLET ORAL at 12:43

## 2023-08-28 RX ADMIN — CYCLOBENZAPRINE 10 MG: 10 TABLET, FILM COATED ORAL at 09:18

## 2023-08-28 RX ADMIN — OXYCODONE HYDROCHLORIDE 2.5 MG: 5 TABLET ORAL at 12:39

## 2023-08-28 RX ADMIN — FERROUS SULFATE TAB 325 MG (65 MG ELEMENTAL FE) 325 MG: 325 (65 FE) TAB at 09:17

## 2023-08-28 RX ADMIN — PENTOXIFYLLINE 400 MG: 400 TABLET, EXTENDED RELEASE ORAL at 09:17

## 2023-08-28 RX ADMIN — POTASSIUM PHOSPHATE, MONOBASIC AND POTASSIUM PHOSPHATE, DIBASIC 20 MMOL: 224; 236 INJECTION, SOLUTION, CONCENTRATE INTRAVENOUS at 17:31

## 2023-08-28 RX ADMIN — Medication 250 MG: at 09:17

## 2023-08-28 RX ADMIN — RIFAXIMIN 400 MG: 200 TABLET ORAL at 09:17

## 2023-08-28 RX ADMIN — RIFAXIMIN 400 MG: 200 TABLET ORAL at 21:32

## 2023-08-28 RX ADMIN — SODIUM CHLORIDE, PRESERVATIVE FREE 10 ML: 5 INJECTION INTRAVENOUS at 21:33

## 2023-08-28 RX ADMIN — FERROUS SULFATE TAB 325 MG (65 MG ELEMENTAL FE) 325 MG: 325 (65 FE) TAB at 16:23

## 2023-08-28 ASSESSMENT — PAIN SCALES - GENERAL
PAINLEVEL_OUTOF10: 6
PAINLEVEL_OUTOF10: 10
PAINLEVEL_OUTOF10: 7
PAINLEVEL_OUTOF10: 8
PAINLEVEL_OUTOF10: 6
PAINLEVEL_OUTOF10: 6

## 2023-08-28 ASSESSMENT — PAIN DESCRIPTION - DESCRIPTORS
DESCRIPTORS: THROBBING;ACHING
DESCRIPTORS: ACHING;THROBBING
DESCRIPTORS: THROBBING;ACHING
DESCRIPTORS: SHARP
DESCRIPTORS: ACHING
DESCRIPTORS: SHARP

## 2023-08-28 ASSESSMENT — PAIN DESCRIPTION - ORIENTATION
ORIENTATION: LEFT;LOWER
ORIENTATION: LEFT;RIGHT;MID

## 2023-08-28 ASSESSMENT — PAIN DESCRIPTION - FREQUENCY
FREQUENCY: CONTINUOUS
FREQUENCY: CONTINUOUS

## 2023-08-28 ASSESSMENT — ENCOUNTER SYMPTOMS
SHORTNESS OF BREATH: 0
VOMITING: 0
DIARRHEA: 0
COUGH: 0
NAUSEA: 0

## 2023-08-28 ASSESSMENT — PAIN DESCRIPTION - LOCATION
LOCATION: NECK
LOCATION: TEETH
LOCATION: NECK
LOCATION: GENERALIZED
LOCATION: TEETH
LOCATION: TEETH

## 2023-08-28 ASSESSMENT — PAIN - FUNCTIONAL ASSESSMENT
PAIN_FUNCTIONAL_ASSESSMENT: PREVENTS OR INTERFERES SOME ACTIVE ACTIVITIES AND ADLS

## 2023-08-28 NOTE — CARE COORDINATION
Peer Recovery Support Note    Name: Carmelina Floyd III  Date: 8/28/2023    Chief Complaint   Patient presents with    Abdominal Pain     Pt c/o generalized abd pain, insomnia, leg pain. Pt states he has esophageal varices, cirrhosis, TIPS device in liver. Pt states he was on a 10 day drinking binge and now feels unwell. Last drink yesterday. Peer Support met with patient.   [] Support and education provided  [] Resources provided   [] Treatment referral:   [] Other:   [x] Patient declined peer recovery services     Referred By:     Notes:     Nir Sargent, 8/28/2023

## 2023-08-28 NOTE — CARE COORDINATION
CASE MANAGEMENT. .. Chart reviewed. Awaiting input from GI for poss dc today. Monitoring labs. Spoke with Ela Savage from [facility]. Patient is not interested in their services. Plan is home. Will follow.

## 2023-08-29 VITALS
OXYGEN SATURATION: 99 % | HEIGHT: 68 IN | TEMPERATURE: 98 F | HEART RATE: 82 BPM | DIASTOLIC BLOOD PRESSURE: 53 MMHG | WEIGHT: 182 LBS | RESPIRATION RATE: 16 BRPM | BODY MASS INDEX: 27.58 KG/M2 | SYSTOLIC BLOOD PRESSURE: 109 MMHG

## 2023-08-29 PROBLEM — K70.10 ACUTE ALCOHOLIC HEPATITIS: Status: RESOLVED | Noted: 2023-08-23 | Resolved: 2023-08-29

## 2023-08-29 LAB
ALBUMIN SERPL-MCNC: 2.1 G/DL (ref 3.5–5.2)
ALP SERPL-CCNC: 147 U/L (ref 40–129)
ALT SERPL-CCNC: 37 U/L (ref 0–40)
ANION GAP SERPL CALCULATED.3IONS-SCNC: 7 MMOL/L (ref 7–16)
AST SERPL-CCNC: 51 U/L (ref 0–39)
BASOPHILS # BLD: 0.05 K/UL (ref 0–0.2)
BASOPHILS NFR BLD: 3 % (ref 0–2)
BILIRUB SERPL-MCNC: 2.6 MG/DL (ref 0–1.2)
BUN SERPL-MCNC: 8 MG/DL (ref 6–20)
CALCIUM SERPL-MCNC: 7.7 MG/DL (ref 8.6–10.2)
CHLORIDE SERPL-SCNC: 110 MMOL/L (ref 98–107)
CO2 SERPL-SCNC: 20 MMOL/L (ref 22–29)
CREAT SERPL-MCNC: 1.3 MG/DL (ref 0.7–1.2)
EOSINOPHIL # BLD: 0.09 K/UL (ref 0.05–0.5)
EOSINOPHILS RELATIVE PERCENT: 6 % (ref 0–6)
ERYTHROCYTE [DISTWIDTH] IN BLOOD BY AUTOMATED COUNT: 15.9 % (ref 11.5–15)
GFR SERPL CREATININE-BSD FRML MDRD: >60 ML/MIN/1.73M2
GLUCOSE SERPL-MCNC: 94 MG/DL (ref 74–99)
HCT VFR BLD AUTO: 22.4 % (ref 37–54)
HGB BLD-MCNC: 7.3 G/DL (ref 12.5–16.5)
LYMPHOCYTES NFR BLD: 0.15 K/UL (ref 1.5–4)
LYMPHOCYTES RELATIVE PERCENT: 10 % (ref 20–42)
MAGNESIUM SERPL-MCNC: 1.8 MG/DL (ref 1.6–2.6)
MCH RBC QN AUTO: 29.6 PG (ref 26–35)
MCHC RBC AUTO-ENTMCNC: 32.6 G/DL (ref 32–34.5)
MCV RBC AUTO: 90.7 FL (ref 80–99.9)
MONOCYTES NFR BLD: 0.05 K/UL (ref 0.1–0.95)
MONOCYTES NFR BLD: 3 % (ref 2–12)
NEUTROPHILS NFR BLD: 78 % (ref 43–80)
NEUTS SEG NFR BLD: 1.17 K/UL (ref 1.8–7.3)
PHOSPHATE SERPL-MCNC: 2.9 MG/DL (ref 2.5–4.5)
PLATELET # BLD AUTO: 27 K/UL (ref 130–450)
PLATELET CONFIRMATION: NORMAL
PLATELET ESTIMATE: ABNORMAL
PMV BLD AUTO: 10.7 FL (ref 7–12)
POTASSIUM SERPL-SCNC: 3.5 MMOL/L (ref 3.5–5)
PROT SERPL-MCNC: 4.6 G/DL (ref 6.4–8.3)
RBC # BLD AUTO: 2.47 M/UL (ref 3.8–5.8)
RBC # BLD: ABNORMAL 10*6/UL
SODIUM SERPL-SCNC: 137 MMOL/L (ref 132–146)
WBC OTHER # BLD: 1.5 K/UL (ref 4.5–11.5)

## 2023-08-29 PROCEDURE — 6370000000 HC RX 637 (ALT 250 FOR IP): Performed by: HOSPITALIST

## 2023-08-29 PROCEDURE — 6370000000 HC RX 637 (ALT 250 FOR IP): Performed by: INTERNAL MEDICINE

## 2023-08-29 PROCEDURE — 6360000002 HC RX W HCPCS: Performed by: PHYSICIAN ASSISTANT

## 2023-08-29 PROCEDURE — 6370000000 HC RX 637 (ALT 250 FOR IP): Performed by: STUDENT IN AN ORGANIZED HEALTH CARE EDUCATION/TRAINING PROGRAM

## 2023-08-29 PROCEDURE — 84100 ASSAY OF PHOSPHORUS: CPT

## 2023-08-29 PROCEDURE — 2580000003 HC RX 258: Performed by: PHYSICIAN ASSISTANT

## 2023-08-29 PROCEDURE — 80053 COMPREHEN METABOLIC PANEL: CPT

## 2023-08-29 PROCEDURE — 83735 ASSAY OF MAGNESIUM: CPT

## 2023-08-29 PROCEDURE — 6370000000 HC RX 637 (ALT 250 FOR IP): Performed by: NURSE PRACTITIONER

## 2023-08-29 PROCEDURE — 2580000003 HC RX 258: Performed by: INTERNAL MEDICINE

## 2023-08-29 PROCEDURE — 85025 COMPLETE CBC W/AUTO DIFF WBC: CPT

## 2023-08-29 RX ORDER — PENTOXIFYLLINE 400 MG/1
400 TABLET, EXTENDED RELEASE ORAL
Qty: 90 TABLET | Refills: 3 | Status: SHIPPED | OUTPATIENT
Start: 2023-08-29

## 2023-08-29 RX ORDER — LANOLIN ALCOHOL/MO/W.PET/CERES
100 CREAM (GRAM) TOPICAL DAILY
Qty: 30 TABLET | Refills: 3 | Status: SHIPPED | OUTPATIENT
Start: 2023-08-29

## 2023-08-29 RX ORDER — LACTULOSE 10 G/15ML
20 SOLUTION ORAL 3 TIMES DAILY
Qty: 237 ML | Refills: 0 | Status: SHIPPED | OUTPATIENT
Start: 2023-08-29

## 2023-08-29 RX ORDER — OXYCODONE HYDROCHLORIDE 5 MG/1
2.5 TABLET ORAL EVERY 8 HOURS PRN
Qty: 5 TABLET | Refills: 0 | Status: SHIPPED | OUTPATIENT
Start: 2023-08-29 | End: 2023-09-01

## 2023-08-29 RX ORDER — FERROUS SULFATE 325(65) MG
325 TABLET ORAL 2 TIMES DAILY WITH MEALS
Qty: 30 TABLET | Refills: 3 | Status: SHIPPED | OUTPATIENT
Start: 2023-08-29

## 2023-08-29 RX ADMIN — PENTOXIFYLLINE 400 MG: 400 TABLET, EXTENDED RELEASE ORAL at 08:22

## 2023-08-29 RX ADMIN — PENTOXIFYLLINE 400 MG: 400 TABLET, EXTENDED RELEASE ORAL at 12:44

## 2023-08-29 RX ADMIN — OXYCODONE HYDROCHLORIDE 2.5 MG: 5 TABLET ORAL at 06:18

## 2023-08-29 RX ADMIN — Medication 250 MG: at 08:22

## 2023-08-29 RX ADMIN — SODIUM CHLORIDE, PRESERVATIVE FREE 10 ML: 5 INJECTION INTRAVENOUS at 08:25

## 2023-08-29 RX ADMIN — SODIUM CHLORIDE 25 MG: 9 INJECTION, SOLUTION INTRAVENOUS at 11:34

## 2023-08-29 RX ADMIN — SODIUM CHLORIDE, PRESERVATIVE FREE 10 ML: 5 INJECTION INTRAVENOUS at 11:35

## 2023-08-29 RX ADMIN — RIFAXIMIN 400 MG: 200 TABLET ORAL at 08:22

## 2023-08-29 RX ADMIN — Medication 100 MG: at 08:22

## 2023-08-29 RX ADMIN — FERROUS SULFATE TAB 325 MG (65 MG ELEMENTAL FE) 325 MG: 325 (65 FE) TAB at 08:22

## 2023-08-29 RX ADMIN — PANTOPRAZOLE SODIUM 40 MG: 40 TABLET, DELAYED RELEASE ORAL at 05:22

## 2023-08-29 RX ADMIN — RIFAXIMIN 400 MG: 200 TABLET ORAL at 13:48

## 2023-08-29 RX ADMIN — SODIUM CHLORIDE 100 MG: 9 INJECTION, SOLUTION INTRAVENOUS at 12:48

## 2023-08-29 ASSESSMENT — PAIN DESCRIPTION - DESCRIPTORS: DESCRIPTORS: STABBING

## 2023-08-29 ASSESSMENT — PAIN DESCRIPTION - ORIENTATION: ORIENTATION: LEFT;LOWER

## 2023-08-29 ASSESSMENT — PAIN DESCRIPTION - LOCATION: LOCATION: TEETH

## 2023-08-29 ASSESSMENT — ENCOUNTER SYMPTOMS
NAUSEA: 0
SHORTNESS OF BREATH: 0
DIARRHEA: 0
COUGH: 0
VOMITING: 0

## 2023-08-29 ASSESSMENT — PAIN SCALES - GENERAL: PAINLEVEL_OUTOF10: 8

## 2023-08-29 NOTE — CONSULTS
[facility] MD Freda Chinchilla MD Mikal Canning, MD Andee Ruffini, CHARISSE Catherine  Consultation  Patient's Name: Justin Askew III  7:07 PM  8/25/2023    Nephrologist: Freda Hanna MD    Reason for Consult: CANDY  Requesting Physician:  Adama Guzmán DO    Chief Complaint: Nausea, vomiting, abdominal pain    History Obtained From: Patient, chart    History of Present Ilness:    Mr. Carlos Rosado is a 81-year-old gentleman with history of alcoholism and alcohol induced cirrhosis on a binge for the 6 days that preceded his presentation after relapse in his alcohol use. He presented to [facility] was transferred to [facility]. He declined to me answering how much he was drinking other than it was [place] lot. \"  Lyn Drop me he is ready to go home as he feels a lot better now does not feel the need to stay. For the most part would not participate in the examination is in the saying that he is no longer nauseated, has not thrown up since yesterday, has no swelling, no other complaint. Sitter at bedside says he has been uncooperative, though not unpleasant. Serum creatinine on presentation 8/23 was 1.4 mg/dL, which compares with a creatinine of 1.3 on 6/4, 1.1 on 6/3. Creatinine since February of this year is emergency 1.1 1.6 mg/dL with presumed baseline 1.1 to 1.2 mg/dL. Most of his laboratory studies in the previous year showed creatinine 0.9 to 1.1 mg/dL. Creatinine this morning is 1.3 mg/dL. Urine output has not been recorded, though he has not been cooperating.     Past Medical History:   Diagnosis Date    Cirrhosis ([address])     Esophageal varices (HCC)     ETOH abuse     GAVE (gastric antral vascular ectasia)     Hepatitis C     Portal hypertension (HCC)        Past Surgical History:   Procedure Laterality Date    ENDOSCOPY, COLON, DIAGNOSTIC      HERNIA REPAIR      HERNIA REPAIR Left 2/7/2022    LAPAROSCOPIC LEFT INGUINAL HERNIA REPAIR WITH
Consult received for anxiety/depression/alcohol/polysubstance abuse. Chart reviewed and discussed with RN. Attempted to speak with patient; however, he becomes increasingly irritable with questions and ultimately declines full consultation. He is not interested in starting any medication at this time. He is not suicidal, homicidal, manic or psychotic. Explained to patient that we have a dual diagnosis IOP at [facility] and also outpatient treatment available at [facility] - I am happy to see him at either location and he can referred if agreeable.     Electronically signed by Tio Glez MD on 8/25/2023 at 5:25 PM
Department of Medicine  Division of Hematology/Oncology  Attending Consult Note      Requesting Physician:  Bea Rodgers DO  Reason for Consult:  pancytopenia, liver disease    CHIEF COMPLAINT:  abdominal pain,insomnia, leg pain    History Obtained From:  patient, chart review and team    HISTORY OF PRESENT ILLNESS:      The patient is a 44 y.o. male with significant past medical history of alcohol dependence, cirrhosis, gastric antral vascular ectasia, hep c, portal HTN and esophageal varices, TIPS who presents from home after a 10 day binge of alcohol use. Patient completed work-up and was admitted with diagnosis(es) of hyperbilirubinemia, thrombocytopenia, alcoholic cirrhosis of liver without ascites. Hematology consulted for pancytopenia. Patient awake and alert this morning. He anxious for discharge and reports his biggest issue is his anxiety and psych issues. He denies any current shortness of breath, chest pain, nausea, vomiting, diarrhea or constipation, bleeding or bruising.     Past Medical History:        Diagnosis Date    Cirrhosis ([address])     Esophageal varices ([address])     ETOH abuse     GAVE (gastric antral vascular ectasia)     Hepatitis C     Portal hypertension ([address])        Past Surgical History:        Procedure Laterality Date    ENDOSCOPY, COLON, DIAGNOSTIC      HERNIA REPAIR      HERNIA REPAIR Left 2/7/2022    LAPAROSCOPIC LEFT INGUINAL HERNIA REPAIR WITH MESH POSSIBLE OPEN POSSIBLE BILATERAL performed by Wesly Scales MD at [address] [facility] N/A 9/15/2021    LAPAROSCOPIC POSSIBLE OPEN UMBILICAL HERNIA REPAIR WITH [place] performed by Wesly Scales MD at [address] [facility] 11/13/2020    EGD BIOPSY performed by Keila Chavez MD at [address] [facility] N/A 1/6/2021    EGD BAND LIGATION performed by Dara Meyer MD at [address] 5/10/2021    EGD
Palliative Care Department  [phone]  Palliative Care Initial Consult  Provider Russel Sena, APRN - [place]     Philly Rios III  [id number]  Hospital Day: 2  Date of Initial Consult: 8/24/23  Referring Provider: Castro Conklin DO  Palliative Medicine was consulted for assistance with: Goals of care, symptom management    HPI:   Mayi Son is a 44 y.o. with a medical history of alcoholic liver cirrhosis, alcohol withdrawal, esophageal varices, TIPS procedure who was admitted on 8/23/2023 from home with a CHIEF COMPLAINT of abdominal pain. Per documentation, patient has been binge drinking the last 6 days. ED work-up significant for: Sodium 130, potassium 3.2, CO2 34, creatinine 1.5, lactic acid 2.8, alk phos 232, ALT 95, , bilirubin 6.1, hemoglobin 9.4, platelets 54, urine drug screen positive for cannabinoid and fentanyl. Palliative medicine consulted for goals of care and symptom management. ASSESSMENT/PLAN:     Pertinent Hospital Diagnoses     Acute alcoholic hepatitis  Liver cirrhosis  Urine drug screen positive for fentanyl  Alcohol dependence with withdrawal    Palliative Care Encounter / Counseling Regarding Goals of Care  Please see detailed goals of care discussion as below  At this time, Philly Rios III, Does Not have capacity for medical decision-making.   Capacity is time limited and situation/question specific  During encounter Funmilayo figueredo was surrogate medical decision-maker  Outcome of goals of care meeting:   DC planning with case management  Recommend psychiatric evaluation once medically stable  Code status Full Code  Advanced Directives: no POA or living will in [facility]  Surrogate/Legal NOK:  Theo Smyth (te[place]): [phone]    Spiritual assessment: no spiritual distress identified  Bereavement and grief: to be determined  Referrals to: none today  SUBJECTIVE:     Current medical issues leading to Palliative Medicine involvement include   Active
CALCIUM 8.1 08/24/2023 02:50 AM    PROT 5.5 08/24/2023 02:50 AM    LABALBU 2.8 08/24/2023 02:50 AM    LABALBU 3.4 02/18/2012 05:00 AM    BILITOT 4.5 08/24/2023 02:50 AM    ALKPHOS 188 08/24/2023 02:50 AM     08/24/2023 02:50 AM    ALT 80 08/24/2023 02:50 AM     Lab Results   Component Value Date/Time    LIPASE 46 08/23/2023 05:59 PM     Lab Results   Component Value Date/Time    AMYLASE 49 02/22/2015 09:30 PM         ASSESSMENT/PLAN:  Patient Active Problem List   Diagnosis    Cellulitis    UGI bleed    Abdominal pain    Secondary esophageal varices with bleeding (HCC)    GIB (gastrointestinal bleeding)    Umbilical hernia without obstruction and without gangrene    Left inguinal hernia    Ascites    Decompensation of cirrhosis of liver (HCC)    Anemia    Thrombocytopenia (HCC)    Scrotal swelling    Hepatitis C virus infection without hepatic coma    GI bleed    Alcoholic cirrhosis of liver with ascites (HCC)    Liver cirrhosis (HCC)    Acute alcoholic hepatitis     Anemia  -Acute on chronic  -Normocytic  -Previously iron deficient  -Repeat iron studies   -No evidence of overt bleed  -Monitor hemoglobin - defer transfusion to admitting  -Recent  EGD 6/3/2023 showing normal examined esophagus, no varices, GAVE treated with APC, no gastric varices, normal examined proximal small bowel  -Previously refused colonoscopy  -See the repeat upper endoscopy in case of precipitous drop in H&H or overt bleed     2.   Cirrhosis  -Decompensated/alcoholic  -Ongoing alcohol abuse  -MELD-Na 27 based on 8/23/2023 labs  -DF 40.1 with lab work yesterday -indication for steroids if infection is ruled out  -Start pentoxifylline  -Evaluated at [facility] at 5/21  -History of hepatitis C - see associated section  -No prior evidence of iron overload  -nonelevated AFP in June of this year  -History of ascites - TIPS procedure in the past several months at [facility] -obtain ultrasound  -Hepatic encephalopathy - see associated section  -History

## 2023-08-29 NOTE — PATIENT CARE CONFERENCE
P Quality Flow/Interdisciplinary Rounds Progress Note        Quality Flow Rounds held on August 29, 2023    Disciplines Attending:  Bedside Nurse, , and Nursing Unit Leadership    Yareli Block III was admitted on 8/23/2023  4:50 PM    Anticipated Discharge Date:       Disposition:    Dennis Score:  Dennis Scale Score: 20    Readmission Risk              Risk of Unplanned Readmission:  24           Discussed patient goal for the day, patient clinical progression, and barriers to discharge.   The following Goal(s) of the Day/Commitment(s) have been identified:  Discharge - Obtain Order and Diagnostics - Report Results      Prabhakar Rajput RN  August 29, 2023

## 2023-08-29 NOTE — DISCHARGE SUMMARY
Discharge Summary     Patient ID:  Hannah Lal  [id number]  44 y.o. 1980 male  Desiree RodgersDO        Admit date: 8/23/2023    Discharge date and time:  8/29/2023  1:52 PM      Activity level: As tolerated  Diet:Regular with thiamine supplement and refrain from alcohol. Labs: Will need CBC and CMP in 1 week  Disposition: Home   Condition on Discharge:Stable but poor prognosis if keeps drinking  DME: None     Admit Diagnoses:   Patient Active Problem List   Diagnosis    Cellulitis    UGI bleed    Abdominal pain    Secondary esophageal varices with bleeding (HCC)    GIB (gastrointestinal bleeding)    Umbilical hernia without obstruction and without gangrene    Left inguinal hernia    Ascites    Decompensation of cirrhosis of liver (HCC)    Anemia    Thrombocytopenia (HCC)    Scrotal swelling    Hepatitis C virus infection without hepatic coma    GI bleed    Alcoholic cirrhosis of liver with ascites (HCC)    Liver cirrhosis ([address])       Discharge Diagnoses: Principal Problem:    Liver cirrhosis ([address])  Resolved Problems:    Acute alcoholic hepatitis      Consults:  IP CONSULT TO SOCIAL WORK  IP CONSULT TO HOSPITALIST  IP CONSULT TO SOCIAL WORK  IP CONSULT TO GI  IP CONSULT TO PALLIATIVE CARE  IP CONSULT TO NEPHROLOGY  IP CONSULT TO PSYCHIATRY  IP CONSULT TO IV TEAM  IP CONSULT TO ONCOLOGY    Procedures: None     Hospital Course: Patient is a 44year old male who presented to [address] ER due to nausea, vomiting and abdominal pain. Patient has history of alcoholic liver cirrhosis and admitted to drinking again for the last 6 days. He was admitted to [address] [place] [address] in June for relapse of alcohol use. Patient reported to ER that his last drink was yesterday. Patient unable to provide any history this am so all history obtained from chart review and discussion with ER physician. Patient reportedly was drinking 3 tall boys a day for the last 6 days.  Patient in ER found to have elevated LFTs and

## 2023-08-31 ENCOUNTER — HOSPITAL ENCOUNTER (INPATIENT)
Age: 43
LOS: 2 days | Discharge: HOME | DRG: 249 | End: 2023-09-02
Attending: EMERGENCY MEDICINE | Admitting: STUDENT IN AN ORGANIZED HEALTH CARE EDUCATION/TRAINING PROGRAM
Payer: COMMERCIAL

## 2023-08-31 ENCOUNTER — APPOINTMENT (OUTPATIENT)
Dept: CT IMAGING | Age: 43
DRG: 249 | End: 2023-08-31
Payer: COMMERCIAL

## 2023-08-31 DIAGNOSIS — K70.30 ALCOHOLIC CIRRHOSIS, UNSPECIFIED WHETHER ASCITES PRESENT (HCC): ICD-10-CM

## 2023-08-31 DIAGNOSIS — K52.9 ENTEROCOLITIS: Primary | ICD-10-CM

## 2023-08-31 PROBLEM — K92.0 HEMATEMESIS: Status: ACTIVE | Noted: 2023-08-31

## 2023-08-31 LAB
ABO + RH BLD: NORMAL
ALBUMIN SERPL-MCNC: 2.3 G/DL (ref 3.5–5.2)
ALBUMIN SERPL-MCNC: 2.6 G/DL (ref 3.5–5.2)
ALP SERPL-CCNC: 145 U/L (ref 40–129)
ALP SERPL-CCNC: 155 U/L (ref 40–129)
ALT SERPL-CCNC: 34 U/L (ref 0–40)
ALT SERPL-CCNC: 35 U/L (ref 0–40)
AMMONIA PLAS-SCNC: 37 UMOL/L (ref 16–60)
AMPHET UR QL SCN: NEGATIVE
ANION GAP SERPL CALCULATED.3IONS-SCNC: 5 MMOL/L (ref 7–16)
ANION GAP SERPL CALCULATED.3IONS-SCNC: 7 MMOL/L (ref 7–16)
ARM BAND NUMBER: NORMAL
AST SERPL-CCNC: 59 U/L (ref 0–39)
AST SERPL-CCNC: 68 U/L (ref 0–39)
BARBITURATES UR QL SCN: NEGATIVE
BASOPHILS # BLD: 0.01 K/UL (ref 0–0.2)
BASOPHILS # BLD: 0.02 K/UL (ref 0–0.2)
BASOPHILS # BLD: 0.07 K/UL (ref 0–0.2)
BASOPHILS NFR BLD: 0 % (ref 0–2)
BASOPHILS NFR BLD: 1 % (ref 0–2)
BASOPHILS NFR BLD: 4 % (ref 0–2)
BENZODIAZ UR QL: NEGATIVE
BILIRUB DIRECT SERPL-MCNC: 1.6 MG/DL (ref 0–0.3)
BILIRUB INDIRECT SERPL-MCNC: 1.2 MG/DL (ref 0–1)
BILIRUB SERPL-MCNC: 2.8 MG/DL (ref 0–1.2)
BILIRUB SERPL-MCNC: 2.9 MG/DL (ref 0–1.2)
BILIRUB UR QL STRIP: NEGATIVE
BLOOD BANK SAMPLE EXPIRATION: NORMAL
BLOOD GROUP ANTIBODIES SERPL: NEGATIVE
BUN SERPL-MCNC: 6 MG/DL (ref 6–20)
BUN SERPL-MCNC: 6 MG/DL (ref 6–20)
BUPRENORPHINE UR QL: NEGATIVE
CALCIUM SERPL-MCNC: 7.5 MG/DL (ref 8.6–10.2)
CALCIUM SERPL-MCNC: 7.7 MG/DL (ref 8.6–10.2)
CANNABINOIDS UR QL SCN: POSITIVE
CHLORIDE SERPL-SCNC: 109 MMOL/L (ref 98–107)
CHLORIDE SERPL-SCNC: 110 MMOL/L (ref 98–107)
CLARITY UR: CLEAR
CO2 SERPL-SCNC: 19 MMOL/L (ref 22–29)
CO2 SERPL-SCNC: 21 MMOL/L (ref 22–29)
COCAINE UR QL SCN: NEGATIVE
COLOR UR: YELLOW
CREAT SERPL-MCNC: 1.1 MG/DL (ref 0.7–1.2)
CREAT SERPL-MCNC: 1.2 MG/DL (ref 0.7–1.2)
EOSINOPHIL # BLD: 0.09 K/UL (ref 0.05–0.5)
EOSINOPHIL # BLD: 0.12 K/UL (ref 0.05–0.5)
EOSINOPHIL # BLD: 0.15 K/UL (ref 0.05–0.5)
EOSINOPHILS RELATIVE PERCENT: 4 % (ref 0–6)
EOSINOPHILS RELATIVE PERCENT: 5 % (ref 0–6)
EOSINOPHILS RELATIVE PERCENT: 7 % (ref 0–6)
ERYTHROCYTE [DISTWIDTH] IN BLOOD BY AUTOMATED COUNT: 15.8 % (ref 11.5–15)
ERYTHROCYTE [DISTWIDTH] IN BLOOD BY AUTOMATED COUNT: 15.9 % (ref 11.5–15)
ERYTHROCYTE [DISTWIDTH] IN BLOOD BY AUTOMATED COUNT: 15.9 % (ref 11.5–15)
ETHANOLAMINE SERPL-MCNC: <10 MG/DL
FENTANYL UR QL: POSITIVE
GFR SERPL CREATININE-BSD FRML MDRD: >60 ML/MIN/1.73M2
GFR SERPL CREATININE-BSD FRML MDRD: >60 ML/MIN/1.73M2
GLUCOSE BLD-MCNC: 137 MG/DL (ref 74–99)
GLUCOSE SERPL-MCNC: 87 MG/DL (ref 74–99)
GLUCOSE SERPL-MCNC: 98 MG/DL (ref 74–99)
GLUCOSE UR STRIP-MCNC: NEGATIVE MG/DL
HCT VFR BLD AUTO: 22.9 % (ref 37–54)
HCT VFR BLD AUTO: 23.5 % (ref 37–54)
HCT VFR BLD AUTO: 24 % (ref 37–54)
HCT VFR BLD AUTO: 24.7 % (ref 37–54)
HGB BLD-MCNC: 7.4 G/DL (ref 12.5–16.5)
HGB BLD-MCNC: 7.6 G/DL (ref 12.5–16.5)
HGB BLD-MCNC: 7.7 G/DL (ref 12.5–16.5)
HGB BLD-MCNC: 8 G/DL (ref 12.5–16.5)
HGB UR QL STRIP.AUTO: NEGATIVE
IMM GRANULOCYTES # BLD AUTO: 0.03 K/UL (ref 0–0.58)
IMM GRANULOCYTES NFR BLD: 1 % (ref 0–5)
INR PPP: 2.2
KETONES UR STRIP-MCNC: NEGATIVE MG/DL
LACTATE BLDV-SCNC: 1.2 MMOL/L (ref 0.5–1.9)
LACTATE BLDV-SCNC: 1.2 MMOL/L (ref 0.5–1.9)
LEUKOCYTE ESTERASE UR QL STRIP: NEGATIVE
LIPASE SERPL-CCNC: 60 U/L (ref 13–60)
LYMPHOCYTES NFR BLD: 0.2 K/UL (ref 1.5–4)
LYMPHOCYTES NFR BLD: 0.33 K/UL (ref 1.5–4)
LYMPHOCYTES NFR BLD: 0.33 K/UL (ref 1.5–4)
LYMPHOCYTES RELATIVE PERCENT: 10 % (ref 20–42)
LYMPHOCYTES RELATIVE PERCENT: 14 % (ref 20–42)
LYMPHOCYTES RELATIVE PERCENT: 15 % (ref 20–42)
MAGNESIUM SERPL-MCNC: 1.7 MG/DL (ref 1.6–2.6)
MCH RBC QN AUTO: 29.2 PG (ref 26–35)
MCH RBC QN AUTO: 29.6 PG (ref 26–35)
MCH RBC QN AUTO: 29.7 PG (ref 26–35)
MCHC RBC AUTO-ENTMCNC: 31.7 G/DL (ref 32–34.5)
MCHC RBC AUTO-ENTMCNC: 32.4 G/DL (ref 32–34.5)
MCHC RBC AUTO-ENTMCNC: 32.8 G/DL (ref 32–34.5)
MCV RBC AUTO: 90.4 FL (ref 80–99.9)
MCV RBC AUTO: 91.8 FL (ref 80–99.9)
MCV RBC AUTO: 92.3 FL (ref 80–99.9)
METHADONE UR QL: NEGATIVE
MICROORGANISM SPEC CULT: NORMAL
MICROORGANISM SPEC CULT: NORMAL
MONOCYTES NFR BLD: 0.04 K/UL (ref 0.1–0.95)
MONOCYTES NFR BLD: 0.22 K/UL (ref 0.1–0.95)
MONOCYTES NFR BLD: 0.26 K/UL (ref 0.1–0.95)
MONOCYTES NFR BLD: 12 % (ref 2–12)
MONOCYTES NFR BLD: 2 % (ref 2–12)
MONOCYTES NFR BLD: 9 % (ref 2–12)
MYELOCYTES ABSOLUTE COUNT: 0.02 K/UL
MYELOCYTES: 1 %
NEUTROPHILS NFR BLD: 65 % (ref 43–80)
NEUTROPHILS NFR BLD: 70 % (ref 43–80)
NEUTROPHILS NFR BLD: 80 % (ref 43–80)
NEUTS SEG NFR BLD: 1.46 K/UL (ref 1.8–7.3)
NEUTS SEG NFR BLD: 1.65 K/UL (ref 1.8–7.3)
NEUTS SEG NFR BLD: 1.68 K/UL (ref 1.8–7.3)
NITRITE UR QL STRIP: NEGATIVE
OPIATES UR QL SCN: NEGATIVE
OXYCODONE UR QL SCN: POSITIVE
PARTIAL THROMBOPLASTIN TIME: 40.3 SEC (ref 24.5–35.1)
PCP UR QL SCN: NEGATIVE
PH UR STRIP: 8 [PH] (ref 5–9)
PLATELET # BLD AUTO: 36 K/UL (ref 130–450)
PLATELET # BLD AUTO: 38 K/UL (ref 130–450)
PLATELET # BLD AUTO: 56 K/UL (ref 130–450)
PLATELET CONFIRMATION: NORMAL
PLATELET CONFIRMATION: NORMAL
PMV BLD AUTO: 10.2 FL (ref 7–12)
PMV BLD AUTO: 10.7 FL (ref 7–12)
PMV BLD AUTO: 11.8 FL (ref 7–12)
POTASSIUM SERPL-SCNC: 3.5 MMOL/L (ref 3.5–5)
POTASSIUM SERPL-SCNC: 3.8 MMOL/L (ref 3.5–5)
PROT SERPL-MCNC: 5.1 G/DL (ref 6.4–8.3)
PROT SERPL-MCNC: 5.1 G/DL (ref 6.4–8.3)
PROT UR STRIP-MCNC: NEGATIVE MG/DL
PROTHROMBIN TIME: 23.7 SEC (ref 9.3–12.4)
RBC # BLD AUTO: 2.6 M/UL (ref 3.8–5.8)
RBC # BLD AUTO: 2.6 M/UL (ref 3.8–5.8)
RBC # BLD AUTO: 2.69 M/UL (ref 3.8–5.8)
RBC # BLD: ABNORMAL 10*6/UL
RBC #/AREA URNS HPF: NORMAL /HPF
SERVICE CMNT-IMP: NORMAL
SERVICE CMNT-IMP: NORMAL
SODIUM SERPL-SCNC: 135 MMOL/L (ref 132–146)
SODIUM SERPL-SCNC: 136 MMOL/L (ref 132–146)
SP GR UR STRIP: 1.01 (ref 1–1.03)
SPECIMEN DESCRIPTION: NORMAL
SPECIMEN DESCRIPTION: NORMAL
TEST INFORMATION: ABNORMAL
TROPONIN I SERPL HS-MCNC: 24 NG/L (ref 0–11)
TROPONIN I SERPL HS-MCNC: 26 NG/L (ref 0–11)
UROBILINOGEN UR STRIP-ACNC: 0.2 EU/DL (ref 0–1)
WBC #/AREA URNS HPF: NORMAL /HPF
WBC OTHER # BLD: 2.1 K/UL (ref 4.5–11.5)
WBC OTHER # BLD: 2.3 K/UL (ref 4.5–11.5)
WBC OTHER # BLD: 2.4 K/UL (ref 4.5–11.5)

## 2023-08-31 PROCEDURE — 85018 HEMOGLOBIN: CPT

## 2023-08-31 PROCEDURE — 85014 HEMATOCRIT: CPT

## 2023-08-31 PROCEDURE — 2580000003 HC RX 258

## 2023-08-31 PROCEDURE — 86901 BLOOD TYPING SEROLOGIC RH(D): CPT

## 2023-08-31 PROCEDURE — 6370000000 HC RX 637 (ALT 250 FOR IP): Performed by: NURSE PRACTITIONER

## 2023-08-31 PROCEDURE — 2580000003 HC RX 258: Performed by: INTERNAL MEDICINE

## 2023-08-31 PROCEDURE — 74177 CT ABD & PELVIS W/CONTRAST: CPT

## 2023-08-31 PROCEDURE — 82140 ASSAY OF AMMONIA: CPT

## 2023-08-31 PROCEDURE — 80053 COMPREHEN METABOLIC PANEL: CPT

## 2023-08-31 PROCEDURE — 2000000000 HC ICU R&B

## 2023-08-31 PROCEDURE — 83735 ASSAY OF MAGNESIUM: CPT

## 2023-08-31 PROCEDURE — 6370000000 HC RX 637 (ALT 250 FOR IP): Performed by: STUDENT IN AN ORGANIZED HEALTH CARE EDUCATION/TRAINING PROGRAM

## 2023-08-31 PROCEDURE — G0480 DRUG TEST DEF 1-7 CLASSES: HCPCS

## 2023-08-31 PROCEDURE — 6360000004 HC RX CONTRAST MEDICATION: Performed by: RADIOLOGY

## 2023-08-31 PROCEDURE — 87040 BLOOD CULTURE FOR BACTERIA: CPT

## 2023-08-31 PROCEDURE — 36415 COLL VENOUS BLD VENIPUNCTURE: CPT

## 2023-08-31 PROCEDURE — 6360000002 HC RX W HCPCS: Performed by: INTERNAL MEDICINE

## 2023-08-31 PROCEDURE — 85025 COMPLETE CBC W/AUTO DIFF WBC: CPT

## 2023-08-31 PROCEDURE — 99222 1ST HOSP IP/OBS MODERATE 55: CPT | Performed by: STUDENT IN AN ORGANIZED HEALTH CARE EDUCATION/TRAINING PROGRAM

## 2023-08-31 PROCEDURE — 6360000002 HC RX W HCPCS

## 2023-08-31 PROCEDURE — A4216 STERILE WATER/SALINE, 10 ML: HCPCS | Performed by: STUDENT IN AN ORGANIZED HEALTH CARE EDUCATION/TRAINING PROGRAM

## 2023-08-31 PROCEDURE — C9113 INJ PANTOPRAZOLE SODIUM, VIA: HCPCS | Performed by: STUDENT IN AN ORGANIZED HEALTH CARE EDUCATION/TRAINING PROGRAM

## 2023-08-31 PROCEDURE — 81001 URINALYSIS AUTO W/SCOPE: CPT

## 2023-08-31 PROCEDURE — 6360000002 HC RX W HCPCS: Performed by: STUDENT IN AN ORGANIZED HEALTH CARE EDUCATION/TRAINING PROGRAM

## 2023-08-31 PROCEDURE — 2500000003 HC RX 250 WO HCPCS: Performed by: INTERNAL MEDICINE

## 2023-08-31 PROCEDURE — 2580000003 HC RX 258: Performed by: STUDENT IN AN ORGANIZED HEALTH CARE EDUCATION/TRAINING PROGRAM

## 2023-08-31 PROCEDURE — C9113 INJ PANTOPRAZOLE SODIUM, VIA: HCPCS

## 2023-08-31 PROCEDURE — 83605 ASSAY OF LACTIC ACID: CPT

## 2023-08-31 PROCEDURE — 82248 BILIRUBIN DIRECT: CPT

## 2023-08-31 PROCEDURE — 99285 EMERGENCY DEPT VISIT HI MDM: CPT

## 2023-08-31 PROCEDURE — 84484 ASSAY OF TROPONIN QUANT: CPT

## 2023-08-31 PROCEDURE — 80307 DRUG TEST PRSMV CHEM ANLYZR: CPT

## 2023-08-31 PROCEDURE — 99291 CRITICAL CARE FIRST HOUR: CPT | Performed by: INTERNAL MEDICINE

## 2023-08-31 PROCEDURE — 96374 THER/PROPH/DIAG INJ IV PUSH: CPT

## 2023-08-31 PROCEDURE — 86850 RBC ANTIBODY SCREEN: CPT

## 2023-08-31 PROCEDURE — 83690 ASSAY OF LIPASE: CPT

## 2023-08-31 PROCEDURE — 93005 ELECTROCARDIOGRAM TRACING: CPT

## 2023-08-31 PROCEDURE — 2580000003 HC RX 258: Performed by: RADIOLOGY

## 2023-08-31 PROCEDURE — 85610 PROTHROMBIN TIME: CPT

## 2023-08-31 PROCEDURE — 86900 BLOOD TYPING SEROLOGIC ABO: CPT

## 2023-08-31 PROCEDURE — 85730 THROMBOPLASTIN TIME PARTIAL: CPT

## 2023-08-31 PROCEDURE — 82962 GLUCOSE BLOOD TEST: CPT

## 2023-08-31 RX ORDER — SODIUM CHLORIDE 0.9 % (FLUSH) 0.9 %
10 SYRINGE (ML) INJECTION EVERY 12 HOURS SCHEDULED
Status: DISCONTINUED | OUTPATIENT
Start: 2023-08-31 | End: 2023-08-31 | Stop reason: SDUPTHER

## 2023-08-31 RX ORDER — LORAZEPAM 2 MG/ML
1 INJECTION INTRAMUSCULAR
Status: DISCONTINUED | OUTPATIENT
Start: 2023-08-31 | End: 2023-09-02 | Stop reason: HOSPADM

## 2023-08-31 RX ORDER — LORAZEPAM 2 MG/ML
3 INJECTION INTRAMUSCULAR
Status: DISCONTINUED | OUTPATIENT
Start: 2023-08-31 | End: 2023-09-02 | Stop reason: HOSPADM

## 2023-08-31 RX ORDER — PANTOPRAZOLE SODIUM 40 MG/1
40 TABLET, DELAYED RELEASE ORAL
Status: DISCONTINUED | OUTPATIENT
Start: 2023-08-31 | End: 2023-08-31

## 2023-08-31 RX ORDER — LANOLIN ALCOHOL/MO/W.PET/CERES
100 CREAM (GRAM) TOPICAL DAILY
Status: DISCONTINUED | OUTPATIENT
Start: 2023-08-31 | End: 2023-08-31 | Stop reason: SDUPTHER

## 2023-08-31 RX ORDER — LORAZEPAM 2 MG/ML
4 INJECTION INTRAMUSCULAR
Status: DISCONTINUED | OUTPATIENT
Start: 2023-08-31 | End: 2023-09-02 | Stop reason: HOSPADM

## 2023-08-31 RX ORDER — SODIUM CHLORIDE 0.9 % (FLUSH) 0.9 %
10 SYRINGE (ML) INJECTION PRN
Status: DISCONTINUED | OUTPATIENT
Start: 2023-08-31 | End: 2023-08-31 | Stop reason: SDUPTHER

## 2023-08-31 RX ORDER — PREDNISOLONE SODIUM PHOSPHATE 15 MG/5ML
40 SOLUTION ORAL DAILY
Status: DISCONTINUED | OUTPATIENT
Start: 2023-08-31 | End: 2023-09-02 | Stop reason: HOSPADM

## 2023-08-31 RX ORDER — ENOXAPARIN SODIUM 100 MG/ML
40 INJECTION SUBCUTANEOUS DAILY
Status: DISCONTINUED | OUTPATIENT
Start: 2023-08-31 | End: 2023-09-02 | Stop reason: HOSPADM

## 2023-08-31 RX ORDER — LANOLIN ALCOHOL/MO/W.PET/CERES
100 CREAM (GRAM) TOPICAL DAILY
Status: DISCONTINUED | OUTPATIENT
Start: 2023-08-31 | End: 2023-08-31

## 2023-08-31 RX ORDER — ALBUMIN (HUMAN) 12.5 G/50ML
25 SOLUTION INTRAVENOUS EVERY 8 HOURS
Status: DISCONTINUED | OUTPATIENT
Start: 2023-09-01 | End: 2023-09-01

## 2023-08-31 RX ORDER — LORAZEPAM 1 MG/1
4 TABLET ORAL
Status: DISCONTINUED | OUTPATIENT
Start: 2023-08-31 | End: 2023-09-02 | Stop reason: HOSPADM

## 2023-08-31 RX ORDER — PANTOPRAZOLE SODIUM 40 MG/10ML
INJECTION, POWDER, LYOPHILIZED, FOR SOLUTION INTRAVENOUS
Status: COMPLETED
Start: 2023-08-31 | End: 2023-08-31

## 2023-08-31 RX ORDER — HYDRALAZINE HYDROCHLORIDE 20 MG/ML
10 INJECTION INTRAMUSCULAR; INTRAVENOUS EVERY 4 HOURS PRN
Status: DISCONTINUED | OUTPATIENT
Start: 2023-08-31 | End: 2023-09-02 | Stop reason: HOSPADM

## 2023-08-31 RX ORDER — LORAZEPAM 1 MG/1
2 TABLET ORAL
Status: DISCONTINUED | OUTPATIENT
Start: 2023-08-31 | End: 2023-09-02 | Stop reason: HOSPADM

## 2023-08-31 RX ORDER — SODIUM CHLORIDE 9 MG/ML
INJECTION, SOLUTION INTRAVENOUS PRN
Status: DISCONTINUED | OUTPATIENT
Start: 2023-08-31 | End: 2023-08-31 | Stop reason: SDUPTHER

## 2023-08-31 RX ORDER — FOLIC ACID 5 MG/ML
1 INJECTION, SOLUTION INTRAMUSCULAR; INTRAVENOUS; SUBCUTANEOUS DAILY
Status: DISCONTINUED | OUTPATIENT
Start: 2023-08-31 | End: 2023-09-02 | Stop reason: HOSPADM

## 2023-08-31 RX ORDER — METRONIDAZOLE 500 MG/100ML
500 INJECTION, SOLUTION INTRAVENOUS EVERY 8 HOURS
Status: DISCONTINUED | OUTPATIENT
Start: 2023-08-31 | End: 2023-09-02 | Stop reason: HOSPADM

## 2023-08-31 RX ORDER — FENTANYL CITRATE 50 UG/ML
25 INJECTION, SOLUTION INTRAMUSCULAR; INTRAVENOUS ONCE
Status: COMPLETED | OUTPATIENT
Start: 2023-08-31 | End: 2023-08-31

## 2023-08-31 RX ORDER — SENNOSIDES A AND B 8.6 MG/1
1 TABLET, FILM COATED ORAL DAILY PRN
Status: DISCONTINUED | OUTPATIENT
Start: 2023-08-31 | End: 2023-09-02 | Stop reason: HOSPADM

## 2023-08-31 RX ORDER — FENTANYL CITRATE 50 UG/ML
50 INJECTION, SOLUTION INTRAMUSCULAR; INTRAVENOUS ONCE
Status: COMPLETED | OUTPATIENT
Start: 2023-08-31 | End: 2023-08-31

## 2023-08-31 RX ORDER — LORAZEPAM 2 MG/ML
0.5 INJECTION INTRAMUSCULAR ONCE
Status: COMPLETED | OUTPATIENT
Start: 2023-08-31 | End: 2023-08-31

## 2023-08-31 RX ORDER — PREDNISOLONE SODIUM PHOSPHATE 15 MG/5ML
40 SOLUTION ORAL DAILY
Status: DISCONTINUED | OUTPATIENT
Start: 2023-08-31 | End: 2023-08-31 | Stop reason: SDUPTHER

## 2023-08-31 RX ORDER — LANOLIN ALCOHOL/MO/W.PET/CERES
3 CREAM (GRAM) TOPICAL NIGHTLY PRN
Status: DISCONTINUED | OUTPATIENT
Start: 2023-08-31 | End: 2023-09-02 | Stop reason: HOSPADM

## 2023-08-31 RX ORDER — OXYCODONE HYDROCHLORIDE 5 MG/1
2.5 TABLET ORAL EVERY 8 HOURS PRN
Status: DISCONTINUED | OUTPATIENT
Start: 2023-08-31 | End: 2023-09-01

## 2023-08-31 RX ORDER — THIAMINE HYDROCHLORIDE 100 MG/ML
100 INJECTION, SOLUTION INTRAMUSCULAR; INTRAVENOUS DAILY
Status: DISCONTINUED | OUTPATIENT
Start: 2023-08-31 | End: 2023-09-02 | Stop reason: HOSPADM

## 2023-08-31 RX ORDER — PANTOPRAZOLE SODIUM 40 MG/10ML
40 INJECTION, POWDER, LYOPHILIZED, FOR SOLUTION INTRAVENOUS 2 TIMES DAILY
Status: DISCONTINUED | OUTPATIENT
Start: 2023-08-31 | End: 2023-08-31 | Stop reason: SDUPTHER

## 2023-08-31 RX ORDER — LORAZEPAM 1 MG/1
3 TABLET ORAL
Status: DISCONTINUED | OUTPATIENT
Start: 2023-08-31 | End: 2023-09-02 | Stop reason: HOSPADM

## 2023-08-31 RX ORDER — ACETAMINOPHEN 325 MG/1
650 TABLET ORAL EVERY 6 HOURS PRN
Status: DISCONTINUED | OUTPATIENT
Start: 2023-08-31 | End: 2023-09-02 | Stop reason: HOSPADM

## 2023-08-31 RX ORDER — 0.9 % SODIUM CHLORIDE 0.9 %
500 INTRAVENOUS SOLUTION INTRAVENOUS ONCE
Status: COMPLETED | OUTPATIENT
Start: 2023-08-31 | End: 2023-08-31

## 2023-08-31 RX ORDER — LORAZEPAM 1 MG/1
1 TABLET ORAL
Status: DISCONTINUED | OUTPATIENT
Start: 2023-08-31 | End: 2023-09-02 | Stop reason: HOSPADM

## 2023-08-31 RX ORDER — LORAZEPAM 2 MG/ML
2 INJECTION INTRAMUSCULAR
Status: DISCONTINUED | OUTPATIENT
Start: 2023-08-31 | End: 2023-09-02 | Stop reason: HOSPADM

## 2023-08-31 RX ORDER — SODIUM CHLORIDE 0.9 % (FLUSH) 0.9 %
5-40 SYRINGE (ML) INJECTION EVERY 12 HOURS SCHEDULED
Status: DISCONTINUED | OUTPATIENT
Start: 2023-08-31 | End: 2023-09-02 | Stop reason: HOSPADM

## 2023-08-31 RX ORDER — ONDANSETRON 4 MG/1
4 TABLET, ORALLY DISINTEGRATING ORAL EVERY 8 HOURS PRN
Status: DISCONTINUED | OUTPATIENT
Start: 2023-08-31 | End: 2023-09-02 | Stop reason: HOSPADM

## 2023-08-31 RX ORDER — SODIUM CHLORIDE 9 MG/ML
INJECTION, SOLUTION INTRAVENOUS PRN
Status: DISCONTINUED | OUTPATIENT
Start: 2023-08-31 | End: 2023-09-02 | Stop reason: HOSPADM

## 2023-08-31 RX ORDER — SODIUM CHLORIDE 0.9 % (FLUSH) 0.9 %
5-40 SYRINGE (ML) INJECTION PRN
Status: DISCONTINUED | OUTPATIENT
Start: 2023-08-31 | End: 2023-09-02 | Stop reason: HOSPADM

## 2023-08-31 RX ORDER — SODIUM CHLORIDE, SODIUM LACTATE, POTASSIUM CHLORIDE, CALCIUM CHLORIDE 600; 310; 30; 20 MG/100ML; MG/100ML; MG/100ML; MG/100ML
INJECTION, SOLUTION INTRAVENOUS CONTINUOUS
Status: DISCONTINUED | OUTPATIENT
Start: 2023-08-31 | End: 2023-09-01

## 2023-08-31 RX ORDER — ACETAMINOPHEN 650 MG/1
650 SUPPOSITORY RECTAL EVERY 6 HOURS PRN
Status: DISCONTINUED | OUTPATIENT
Start: 2023-08-31 | End: 2023-09-02 | Stop reason: HOSPADM

## 2023-08-31 RX ORDER — ONDANSETRON 2 MG/ML
4 INJECTION INTRAMUSCULAR; INTRAVENOUS EVERY 6 HOURS PRN
Status: DISCONTINUED | OUTPATIENT
Start: 2023-08-31 | End: 2023-09-02 | Stop reason: HOSPADM

## 2023-08-31 RX ORDER — PENTOXIFYLLINE 400 MG/1
400 TABLET, EXTENDED RELEASE ORAL
Status: DISCONTINUED | OUTPATIENT
Start: 2023-08-31 | End: 2023-09-02 | Stop reason: HOSPADM

## 2023-08-31 RX ORDER — PHENOBARBITAL SODIUM 65 MG/ML
65 INJECTION INTRAMUSCULAR EVERY 8 HOURS SCHEDULED
Status: DISCONTINUED | OUTPATIENT
Start: 2023-08-31 | End: 2023-09-02 | Stop reason: HOSPADM

## 2023-08-31 RX ORDER — LACTULOSE 10 G/15ML
20 SOLUTION ORAL 3 TIMES DAILY
Status: DISCONTINUED | OUTPATIENT
Start: 2023-08-31 | End: 2023-09-02 | Stop reason: HOSPADM

## 2023-08-31 RX ORDER — SODIUM CHLORIDE 0.9 % (FLUSH) 0.9 %
10 SYRINGE (ML) INJECTION PRN
Status: COMPLETED | OUTPATIENT
Start: 2023-08-31 | End: 2023-08-31

## 2023-08-31 RX ADMIN — SODIUM CHLORIDE, PRESERVATIVE FREE 10 ML: 5 INJECTION INTRAVENOUS at 09:11

## 2023-08-31 RX ADMIN — SODIUM CHLORIDE, POTASSIUM CHLORIDE, SODIUM LACTATE AND CALCIUM CHLORIDE: 600; 310; 30; 20 INJECTION, SOLUTION INTRAVENOUS at 23:51

## 2023-08-31 RX ADMIN — METRONIDAZOLE 500 MG: 500 INJECTION, SOLUTION INTRAVENOUS at 17:09

## 2023-08-31 RX ADMIN — LACTULOSE 20 G: 20 SOLUTION ORAL at 20:56

## 2023-08-31 RX ADMIN — RIFAXIMIN 550 MG: 550 TABLET ORAL at 09:10

## 2023-08-31 RX ADMIN — LORAZEPAM 3 MG: 2 INJECTION INTRAMUSCULAR; INTRAVENOUS at 12:21

## 2023-08-31 RX ADMIN — SODIUM CHLORIDE, POTASSIUM CHLORIDE, SODIUM LACTATE AND CALCIUM CHLORIDE: 600; 310; 30; 20 INJECTION, SOLUTION INTRAVENOUS at 12:31

## 2023-08-31 RX ADMIN — PHENOBARBITAL SODIUM 65 MG: 65 INJECTION INTRAMUSCULAR at 20:56

## 2023-08-31 RX ADMIN — ONDANSETRON 4 MG: 2 INJECTION INTRAMUSCULAR; INTRAVENOUS at 11:39

## 2023-08-31 RX ADMIN — FENTANYL CITRATE 25 MCG: 50 INJECTION, SOLUTION INTRAMUSCULAR; INTRAVENOUS at 12:24

## 2023-08-31 RX ADMIN — PENTOXIFYLLINE 400 MG: 400 TABLET, EXTENDED RELEASE ORAL at 18:10

## 2023-08-31 RX ADMIN — OCTREOTIDE ACETATE 25 MCG/HR: 500 INJECTION, SOLUTION INTRAVENOUS; SUBCUTANEOUS at 12:56

## 2023-08-31 RX ADMIN — PREDNISOLONE SODIUM PHOSPHATE 40 MG: 15 SOLUTION ORAL at 15:17

## 2023-08-31 RX ADMIN — LACTULOSE 20 G: 20 SOLUTION ORAL at 14:43

## 2023-08-31 RX ADMIN — PANTOPRAZOLE SODIUM 40 MG: 40 INJECTION, POWDER, FOR SOLUTION INTRAVENOUS at 20:56

## 2023-08-31 RX ADMIN — SODIUM CHLORIDE 500 ML: 9 INJECTION, SOLUTION INTRAVENOUS at 05:29

## 2023-08-31 RX ADMIN — THIAMINE HYDROCHLORIDE 100 MG: 100 INJECTION, SOLUTION INTRAMUSCULAR; INTRAVENOUS at 16:47

## 2023-08-31 RX ADMIN — CEFTRIAXONE SODIUM 2000 MG: 2 INJECTION, POWDER, FOR SOLUTION INTRAMUSCULAR; INTRAVENOUS at 16:14

## 2023-08-31 RX ADMIN — IOPAMIDOL 75 ML: 755 INJECTION, SOLUTION INTRAVENOUS at 05:19

## 2023-08-31 RX ADMIN — OXYCODONE HYDROCHLORIDE 2.5 MG: 5 TABLET ORAL at 07:51

## 2023-08-31 RX ADMIN — Medication 100 MG: at 09:10

## 2023-08-31 RX ADMIN — SODIUM CHLORIDE, PRESERVATIVE FREE 10 ML: 5 INJECTION INTRAVENOUS at 20:57

## 2023-08-31 RX ADMIN — PANTOPRAZOLE SODIUM 40 MG: 40 INJECTION, POWDER, FOR SOLUTION INTRAVENOUS at 12:36

## 2023-08-31 RX ADMIN — LACTULOSE 20 G: 20 SOLUTION ORAL at 09:09

## 2023-08-31 RX ADMIN — LORAZEPAM 1 MG: 1 TABLET ORAL at 07:50

## 2023-08-31 RX ADMIN — PENTOXIFYLLINE 400 MG: 400 TABLET, EXTENDED RELEASE ORAL at 09:10

## 2023-08-31 RX ADMIN — ENOXAPARIN SODIUM 40 MG: 100 INJECTION SUBCUTANEOUS at 09:14

## 2023-08-31 RX ADMIN — LORAZEPAM 0.5 MG: 2 INJECTION INTRAMUSCULAR; INTRAVENOUS at 06:13

## 2023-08-31 RX ADMIN — FENTANYL CITRATE 50 MCG: 50 INJECTION, SOLUTION INTRAMUSCULAR; INTRAVENOUS at 03:44

## 2023-08-31 RX ADMIN — PANTOPRAZOLE SODIUM 40 MG: 40 TABLET, DELAYED RELEASE ORAL at 07:51

## 2023-08-31 RX ADMIN — PHENOBARBITAL SODIUM 65 MG: 65 INJECTION INTRAMUSCULAR at 15:31

## 2023-08-31 RX ADMIN — Medication 10 ML: at 05:20

## 2023-08-31 RX ADMIN — FOLIC ACID 1 MG: 5 INJECTION, SOLUTION INTRAMUSCULAR; INTRAVENOUS; SUBCUTANEOUS at 17:27

## 2023-08-31 RX ADMIN — OXYCODONE HYDROCHLORIDE 2.5 MG: 5 TABLET ORAL at 21:46

## 2023-08-31 ASSESSMENT — PAIN DESCRIPTION - ORIENTATION
ORIENTATION: RIGHT;LEFT;LOWER;UPPER
ORIENTATION: LOWER
ORIENTATION: RIGHT;LEFT;LOWER;UPPER
ORIENTATION: RIGHT;LEFT;LOWER;UPPER
ORIENTATION: RIGHT;LEFT;UPPER;LOWER
ORIENTATION: RIGHT;LEFT;LOWER;UPPER
ORIENTATION: RIGHT;LEFT;UPPER;LOWER
ORIENTATION: RIGHT;LEFT;LOWER;UPPER

## 2023-08-31 ASSESSMENT — PAIN DESCRIPTION - LOCATION
LOCATION: ABDOMEN

## 2023-08-31 ASSESSMENT — PAIN - FUNCTIONAL ASSESSMENT
PAIN_FUNCTIONAL_ASSESSMENT: 0-10
PAIN_FUNCTIONAL_ASSESSMENT: PREVENTS OR INTERFERES SOME ACTIVE ACTIVITIES AND ADLS
PAIN_FUNCTIONAL_ASSESSMENT: PREVENTS OR INTERFERES SOME ACTIVE ACTIVITIES AND ADLS

## 2023-08-31 ASSESSMENT — PAIN SCALES - GENERAL
PAINLEVEL_OUTOF10: 10
PAINLEVEL_OUTOF10: 10
PAINLEVEL_OUTOF10: 7
PAINLEVEL_OUTOF10: 5
PAINLEVEL_OUTOF10: 10
PAINLEVEL_OUTOF10: 9
PAINLEVEL_OUTOF10: 10
PAINLEVEL_OUTOF10: 8
PAINLEVEL_OUTOF10: 4
PAINLEVEL_OUTOF10: 5

## 2023-08-31 ASSESSMENT — PAIN DESCRIPTION - DESCRIPTORS
DESCRIPTORS: SHARP
DESCRIPTORS: DULL
DESCRIPTORS: SHARP
DESCRIPTORS: SHARP

## 2023-08-31 ASSESSMENT — ENCOUNTER SYMPTOMS
ABDOMINAL DISTENTION: 1
NAUSEA: 0
VOMITING: 0
CONSTIPATION: 0
COUGH: 0
RECTAL PAIN: 0
DIARRHEA: 1
ABDOMINAL PAIN: 1
ALLERGIC/IMMUNOLOGIC NEGATIVE: 1
SHORTNESS OF BREATH: 0
BLOOD IN STOOL: 0

## 2023-08-31 ASSESSMENT — PAIN DESCRIPTION - FREQUENCY
FREQUENCY: CONTINUOUS
FREQUENCY: CONTINUOUS

## 2023-08-31 ASSESSMENT — PAIN DESCRIPTION - ONSET
ONSET: SUDDEN
ONSET: ON-GOING

## 2023-08-31 ASSESSMENT — PAIN SCALES - WONG BAKER
WONGBAKER_NUMERICALRESPONSE: 0

## 2023-08-31 ASSESSMENT — PAIN DESCRIPTION - PAIN TYPE
TYPE: ACUTE PAIN
TYPE: ACUTE PAIN

## 2023-08-31 NOTE — CARE COORDINATION
CM transition of care. Pt presented to [facility] ER secondary to abdominal pain. Recently d/c from [facility] with enterocolitis. Pt has hx of alcohol abuse. GI, ID, and psych consulted. Met with pt. He lives with his wife. He has been weak and she assists him as needed. He drives when he feels well. Reports he has not had a drink since prior to his last admission. Has a Medical Marijuana card and uses nightly. He reports that he is not eating or sleeping. Offered PRS to pt. He reports he has information from his last admission. He reports his plan is to follow up with OP counseling services after he is d/c. D/c needs unclear at this time. May need therapy evals to assess when pt is able. Per chart review notes pt is now with hematemesis. Pt accepted to ICU for admission. CM/SINAN to follow. Ashley Goss RN  [phone]    Case Management Assessment  Initial Evaluation    Date/Time of Evaluation: 8/31/2023 12:11 PM  Assessment Completed by: Zack Valencia RN    If patient is discharged prior to next notation, then this note serves as note for discharge by case management. Patient Name: Mehran Nath III                   YOB: 1980  Diagnosis: Enterocolitis [K52.9]  Hematemesis [K92.0]                   Date / Time: 8/31/2023  2:33 AM    Patient Admission Status: Inpatient   Readmission Risk (Low < 19, Mod (19-27), High > 27): Readmission Risk Score: 24.4    Current PCP: Chris Love, DO  PCP verified by CM? Yes    Chart Reviewed: Yes      History Provided by: Patient  Patient Orientation: Alert and Oriented    Patient Cognition: Alert    Hospitalization in the last 30 days (Readmission):  Yes    If yes, Readmission Assessment in CM Navigator will be completed.     Readmission Assessment  Number of Days since last admission?: 1-7 days  Previous Disposition: Home with Family  Who is being Interviewed: Patient  What was the patient's/caregiver's Potential DME:    Patient expects to discharge to:    Plan for transportation at discharge:      Financial    Payor: [place] / Plan: Carlota Bang / Product Type: *No Product type* /     Does insurance require precert for SNF: Yes    Potential assistance Purchasing Medications:    Meds-to-Beds request: Yes      [facility] [address] [facility], [id number] [place] [address] Drive  [address] [facility] [address]  Phone: [phone] Fax: [phone]      Notes:    Factors facilitating achievement of predicted outcomes: Cooperative and Pleasant    Barriers to discharge: Medical complications    Additional Case Management Notes: The Plan for Transition of Care is related to the following treatment goals of Enterocolitis [K52.9]  Hematemesis [K55.9]    IF APPLICABLE: The Patient and/or patient representative [facility] and his family were provided with a choice of provider and agrees with the discharge plan. Freedom of choice list with basic dialogue that supports the patient's individualized plan of care/goals and shares the quality data associated with the providers was provided to:     Patient Representative Name:       The Patient and/or Patient Representative Agree with the Discharge Plan?       Laine Easton RN  Case Management Department  Ph: [phone] Fax:

## 2023-08-31 NOTE — CARE COORDINATION
Now with hematemesis per nursing staff     Hx of varices     Stat HH   Type and screen   Stat GI consult  NPO   PPI IV BID   Octreotide   Critical care consult for consideration of ICU placement     Talked to nursing staff     Spoke with Dr Kristen Leigh ICU attending ok for admission to ICU for closer monitoring

## 2023-08-31 NOTE — FLOWSHEET NOTE
Pt presents to the ED secondary to non traumatic generalized ABD pain with N/V progressing over the past 24 hours. Pt denies any chest pain, SOB or dizziness. Pt has no further complaints at this time.

## 2023-08-31 NOTE — ED NOTES
Pt states he ate some breakfast and became nauseous with some vomiting noted. No distress at this time.       Bc Larkin RN  08/31/23 0521

## 2023-08-31 NOTE — CONSULTS
Our Psych team for Substance abuse saw the patient on 8/25/23 during last  admission and recommended Dual Diagnosis outpatient services. Please see the note below from [facility]. Please follow the recommendation per . If he presents with suicidal, homicidal, psychotic or manic state, please document and re-consult psychiatry. If you have question or concern, please let us know, otherwise Psychiatry will sign off. Note below from Psychiatry Services on 8/25/23 :    Consult received for anxiety/depression/alcohol/polysubstance abuse. Chart reviewed and discussed with RN. Attempted to speak with patient; however, he becomes increasingly irritable with questions and ultimately declines full consultation. He is not interested in starting any medication at this time. He is not suicidal, homicidal, manic or psychotic. Explained to patient that we have a dual diagnosis IOP at [facility] and also outpatient treatment available at [facility] - I am happy to see him at either location and he can referred if agreeable.

## 2023-08-31 NOTE — H&P
Deferred   Extremities: no lower extremity edema, extremities atraumatic, no cyanosis, no clubbing, 2+ pedal pulses palpated  Skin: normal color, normal texture, normal turgor, no rashes, no lesions  Neurologic:5/5 muscle strength throughout, normal muscle tone throughout, face symmetric, hearing intact, tongue midline, speech appropriate without slurring, sensation to fine touch intact in upper and lower extremities    Labs-   Lab Results   Component Value Date    WBC 2.1 (L) 08/31/2023    HGB 7.6 (L) 08/31/2023    HCT 24.0 (L) 08/31/2023    PLT 56 (L) 08/31/2023     08/31/2023    K 3.8 08/31/2023     (H) 08/31/2023    CREATININE 1.1 08/31/2023    BUN 6 08/31/2023    CO2 19 (L) 08/31/2023    GLUCOSE 87 08/31/2023    ALT 34 08/31/2023    AST 68 (H) 08/31/2023    INR 2.1 08/28/2023     Lab Results   Component Value Date    CKTOTAL 237 (H) 11/03/2018    CKMB 3.5 02/22/2015    TROPONINI <0.01 01/06/2021       Last echocardiogram:    Recent Radiological Studies:  CT ABDOMEN PELVIS W IV CONTRAST Additional Contrast? None   Final Result   Diffuse wall thickening identified of the colon with abnormal wall thickening   of the distal small bowel to suggest a diffuse enterocolitis. No significant   obvious obstruction. Fluid and air-filled dilated small bowel with no   definite evidence of transition or obstruction. Cirrhotic morphology of the liver with splenomegaly, portal venous   hypertension and presence of a tips shunt. There is small to moderate amount   of ascites. Large hydrocele identified on the left with small left inguinal hernia and   Ree herniated umbilical hernia containing bowel and fat. No definite signs   of strangulation.              Assessment  Active Hospital Problems    Diagnosis     Anemia [D64.9]      Priority: Medium    Decompensation of cirrhosis of liver (HCC) [K72.90, K74.60]      Priority: Medium    Enterocolitis [Y95.5]     Alcoholic cirrhosis of liver with ascites

## 2023-08-31 NOTE — CONSULTS
REPAIR      HERNIA REPAIR Left 2/7/2022    LAPAROSCOPIC LEFT INGUINAL HERNIA REPAIR WITH MESH POSSIBLE OPEN POSSIBLE BILATERAL performed by Gisela Fischer MD at [address] [place] [address] N/A 9/15/2021    LAPAROSCOPIC POSSIBLE OPEN UMBILICAL HERNIA REPAIR WITH [place] performed by Gisela Fischer MD at [address] [facility] 11/13/2020    EGD BIOPSY performed by Flor Roa MD at [address] [facility] N/A 1/6/2021    EGD BAND LIGATION performed by Jesus Rock MD at [address] 5/10/2021    EGD BAND LIGATION performed by Jesus Rock MD at [address] [facility] 5/14/2023    EGD CONTROL HEMORRHAGE performed by Christianne Barkley MD at [address] [place] [address]  5/14/2023    EGD ESOPHAGOGASTRODUODENOSCOPY SCLEROTHERAPY performed by Christianne Barkley MD at [address] 6/3/2023    EGD CONTROL HEMORRHAGE performed by Jesus Rock MD at [address] [facility]       Medications Prior to Admission:    Prior to Admission medications    Medication Sig Start Date End Date Taking? Authorizing Provider   ferrous sulfate (IRON 325) 325 (65 Fe) MG tablet Take 1 tablet by mouth 2 times daily (with meals) 8/29/23   Yessy Rodgers,    lactulose (CHRONULAC) 10 GM/15ML solution Take 30 mLs by mouth 3 times daily 8/29/23   Yessy Rodgers DO   oxyCODONE (ROXICODONE) 5 MG immediate release tablet Take 0.5 tablets by mouth every 8 hours as needed for Pain for up to 3 days.  Max Daily Amount: 7.5 mg 8/29/23 9/1/23  Yessy Rodgers DO   pentoxifylline (TRENTAL) 400 MG extended release tablet Take 1 tablet by mouth 3 times daily (with meals) 8/29/23   [facility] [place],    rifAXIMin (XIFAXAN) 200 MG tablet Take 2 tablets by mouth 3 times daily for 10 days 8/29/23 9/8/23  Yessy Rodgers,    thiamine 100 MG tablet Take 1 is enlarged and seems to be fluid-filled. Extremities: Extremities warm to touch, pink, with no edema. LABS:    CBC  Recent Labs     08/31/23  0800 08/31/23  1249   WBC 2.1*  --    HGB 7.6* 7.4*   HCT 24.0* 22.9*   PLT 56*  --      BMP  Recent Labs     08/31/23  0800      K 3.8   *   CO2 19*   BUN 6   CREATININE 1.1   CALCIUM 7.5*     Liver Function  Recent Labs     08/31/23  0308 08/31/23  0800   LIPASE 60  --    BILITOT 2.8* 2.9*   BILIDIR 1.6*  --    AST 59* 68*   ALT 35 34   ALKPHOS 155* 145*   PROT 5.1* 5.1*   LABALBU 2.6* 2.3*     No results for input(s): LACTATE in the last 72 hours. Recent Labs     08/31/23  1555   INR 2.2         RADIOLOGY: I reviewed all relevant imaging from this admission as well as the past studies available in the EMR including: CT abdomen/pelvis from ED    My assessment of the reviewed imaging is in HPI      ASSESSMENT:  44 y.o. male with history of cirrhosis and esophageal varices status post TIPS. Patient has hydrocele, but no inguinal hernias appreciated on physical exam.  As noted in radiology report, CT scan also does not show any bowel in the scrotum. Patient's umbilical hernia is soft, skin color changes around it is most likely chronic. Again, no bowel in CT scan noted or on physical exam.     PLAN:  No surgical interventions planned. Patient has no obstruction. He is also a poor surgical candidate given his cirrhosis with a MELD score of 22. If acute obstruction does occur during this hospital stay, we recommend transfer to tertiary center. Pain nausea control as needed  Okay for regular diet from our standpoint  Please feel free to reach out to us for any questions or concerns.      Electronically signed by Donna Haddad DO on 8/31/23 at 4:54 PM EDT      General Surgery Assessment/Plan Attestation  [facility]urgical [facility] Harrison Riedel, MD, MS, FACS, Fer    Patient's Name/Date of Birth: Arnulfo Bloch

## 2023-08-31 NOTE — ED PROVIDER NOTES
urinary symptoms. Patient reporting no hematemesis. Patient is a past smoker. Patient is awake alert he is pale appearing heart exam normal lungs are clear abdomen soft but diffusely tender throughout he has noted mid abdominal hernia that is reducible. There is no discoloration of the skin. Patient has no edema he has normal strength in all extremities pulses are intact distally. Patient differential includes bowel obstruction as well as perforated viscus as well as peritonitis as well as pancreatitis as well as GI bleed. EKG sinus rhythm with T wave inversion in lead III. It was compared to EKG from 8/23/2023 no segment changes. I do agree with interpretation by resident. Patient's white count was 2.3 hemoglobin was 7.7 prior to that it was 7.3 platelet count was 38 patient's sodium is 136 potassium 3.5 CO2 is 21 BUN is 6 creatinine is 1.2 magnesium is 1.7 lactic is 1.2 patient's troponin was 20 6 repeat was 24 patient's alk phos was 155 total bilirubin is 2.8. Patient's urinalysis was nitrite and leukocyte esterase negative. Patient CT and pelvis showed diffuse wall thickening identified of the colon with abnormal wall thickening distal small bowel to suggest diffuse enterocolitis no bowel obstruction there is noted fluid and air-fluid dilated small bowel with no evidence of transition or obstruction. Cirrhotic morphology of liver with splenomegaly portal venous hypertension presence of TIPS shunt there is small to moderate amount of ascites. There is noted large hydrocele identified left with small left inguinal hernia patient was ordered normal saline. Patient was medicated for pain with fentanyl as well as given and recheck still having abdominal pain. Patient also given Ativan 0.5. Patient was made aware of results and findings vital signs currently stable. Patient will be admitted we did speak to on-call internal medicine.   Patient was excepted by Jumana Harrison MD  08/31/23

## 2023-08-31 NOTE — CONSULTS
[address]   Gastroenterology, Hepatology, &  Advanced Endoscopy    Consult       ASSESSMENT AND PLAN:    Decompensated EtOH liver disease, portal HTN, cirrhosis, anemia, Hepatic encephalopathy, Ascites with hx of TIPS shunt and Esophageal varices. Na-MELD 22  Discriminant Function 58.6      PLAN:  Potential EGD Friday  Will monitor Hgb, signs of gross bleed. Pt on PPI and Octreotide. Hgb has been stable over the last 5 days. Pt with recent EGD performed 6/3/23 failed to demonstrate varices per report. Enterocolitis likely secondary to Ascites. Add SPA 25 g IV TID, hold when albumin >3.5  Add Prednisolone 40 mg qd  Continue Xifaxan, Lactulose          HISTORY OF PRESENT ILLNESS:      Recent discharge on 8/28/23 seen by . Previously Presented from home after a 10 day binge of alcohol use prior to last admission. Pt with a significant history of Decompensated EtOH liver disease, cirrhosis, anemia (EGD 6/3/2023 showing normal examined esophagus, no varices, GAVE treated with APC, no gastric varices, normal examined proximal small bowel. Declined C-Scope. Hepatitis C (treated, viral load not detected), Hepatic encephalopathy, Ascites with hx of TIPS shunt and Esophageal varices. History of Heroin usage but states he is clean. CT abd/pelvis 8/31/23. FINDINGS:  Lower Chest: Findings to suggest atelectatic changes identified lung bases  bilaterally. Trace left pleural effusion. Organs: Cirrhotic morphology identified liver with tips shunt in place. The  spleen is enlarged. Findings of portal venous hypertension with multiple  varices identified as well as ascites within the abdomen and pelvis. The  gallbladder is contracted. Mild wall thickening. Pancreas is homogeneous in  appearance. Both adrenal glands are within normal limits. Symmetric  enhancement of the renal parenchyma. No stones or distension seen in the  renal collecting system. GI/Bowel: Stomach is unremarkable.   There smoking use included cigarettes. He has a 1.00 pack-year smoking history. He has never used smokeless tobacco.  ETOH:   reports current alcohol use. DRUGS:   reports that he does not currently use drugs after having used the following drugs: Opiates  and Heroin.   Family History:       Problem Relation Age of Onset    Diabetes Father          Current Facility-Administered Medications:     [Held by provider] enoxaparin (LOVENOX) injection 40 mg, 40 mg, SubCUTAneous, Daily, Parvin Peng MD, 40 mg at 08/31/23 0914    ondansetron (ZOFRAN-ODT) disintegrating tablet 4 mg, 4 mg, Oral, Q8H PRN **OR** ondansetron (ZOFRAN) injection 4 mg, 4 mg, IntraVENous, Q6H PRN, Parvin Peng MD, 4 mg at 08/31/23 1139    senna (SENOKOT) tablet 8.6 mg, 1 tablet, Oral, Daily PRN, Parvin Peng MD    acetaminophen (TYLENOL) tablet 650 mg, 650 mg, Oral, Q6H PRN **OR** acetaminophen (TYLENOL) suppository 650 mg, 650 mg, Rectal, Q6H PRN, Parvin Peng MD    hydrALAZINE (APRESOLINE) injection 10 mg, 10 mg, IntraVENous, Q4H PRN, Parvin Peng MD    melatonin tablet 3 mg, 3 mg, Oral, Nightly PRN, Parvin Peng MD    lactulose (CHRONULAC) 10 GM/15ML solution 20 g, 20 g, Oral, TID, Parvin Peng MD, 20 g at 08/31/23 4239    oxyCODONE (ROXICODONE) immediate release tablet 2.5 mg, 2.5 mg, Oral, Q8H PRN, Parvin Peng MD, 2.5 mg at 08/31/23 0751    pentoxifylline (TRENTAL) extended release tablet 400 mg, 400 mg, Oral, TID WC, Parvin Peng MD, 400 mg at 08/31/23 0910    rifAXIMin (XIFAXAN) tablet 550 mg, 550 mg, Oral, BID, Parvin Peng MD, 550 mg at 08/31/23 0910    sodium chloride flush 0.9 % injection 5-40 mL, 5-40 mL, IntraVENous, 2 times per day, Parvin Peng MD, 10 mL at 08/31/23 0911    sodium chloride flush 0.9 % injection 5-40 mL, 5-40 mL, IntraVENous, PRN, Parvin Peng MD, 10 mL at 08/31/23 0911    0.9 % sodium chloride infusion, , IntraVENous, PRN, Parvin Peng MD    thiamine tablet 100 mg, 100 mg, Oral, Daily, Parvin Peng MD, 100 mg

## 2023-08-31 NOTE — CARE COORDINATION
Care Coordination: Admit today, transfer from [facility]. Consult noted on chart for Alcohol rehab. Pt states he is not sure if he needs it and asked if I can call wife for specifics of assessment. Wife is waiting for update of why pt did transfer from [facility]. Agrees with referral for alcohol rehab and is willing to speak to [facility].  has difficulty retaining information at times. States he was completely independent  prior to hospitalizations but started drinking again. Feels he would benefit from alcohol rehab. Follows with Dr Keke Mendenhall, uses [facility] and she will transport as needed to home.  Referral made to javier from peer recovery    Electronically signed by Ruby Correa RN on 8/31/2023 at 3:04 PM

## 2023-08-31 NOTE — CONSULTS
[address] [facility] Pulmonary, Critical Care and Sleep Medicine  H&P Note    Ryanne Black MD, MS    Patient: El Sidhu III  MRN: [id number]  : 1980    Encounter Time: 3:10 PM     Date of Admission: 2023  2:33 AM    Primary Care Physician: Bronwyn Anders DO    Reason for ADMISSION: Hematemesis     HISTORY OF PRESENT ILLNESS : El Sidhu III 44 y.o. male was seen in consultation regarding the above chief compliant. History of alcoholic liver cirrhosis, portal hypertension, esophageal varices/recurrent ascites s/p TIPS shunt, hepatic and cephalopathy, presenting with generalized weakness and dizziness to the ER. He was also complaining of severe diarrhea that started yesterday. He states that he has been ill for the past few days and had nausea and vomiting prior to this. What prompted him to come to the ER today was severe excruciating pain in his left inguinal area where he has his hernia. He also reports taking clindamycin a few months ago for his dental infection. In the ER patient had some emesis/hematemesis. CT abdomen showing diffuse wall thickening of colon suggestive of diffuse enterocolitis. Large hydrocele on the left with small left inguinal hernia and herniated umbilical hernia. Bedside ultrasound of abdomen did not reveal any significant ascites fluid pockets. Significant history of alcohol abuse, last alcohol intake about a week ago. PAST MEDICAL HISTORY:  has a past medical history of Cirrhosis ([address]), Esophageal varices ([address]), ETOH abuse, GAVE (gastric antral vascular ectasia), Hepatitis C, and Portal hypertension ([address] SURGICAL HISTORY:  has a past surgical history that includes Upper gastrointestinal endoscopy (N/A, 2020); Tonsillectomy; Upper gastrointestinal endoscopy (N/A, 2021); Upper gastrointestinal endoscopy (N/A, 5/10/2021);  Endoscopy, colon, diagnostic; Umbilical hernia repair (N/A, 9/15/2021); hernia

## 2023-08-31 NOTE — CARE COORDINATION
[facility] Support Note    Name: Betina Shabazz III  Date: 8/31/2023    Chief Complaint   Patient presents with    Abdominal Pain     Pain \"in hernia\" from s/p area radiating up into abd and back. Hx cirrhosis. Pt states testicles are swollen, chronic. Pt states he has no energy       Peer Support met with patient. [x] Support and education provided  [x] Resources provided   [] Treatment referral:   [] Other:   [] Patient declined peer recovery services     Referred By: Jenifer Rojas RN CM    Notes: Peer went and talked to pt in hospital room. Pt's drug of choice was heroin but now it is alcohol. Pt must have changed his mind about Rehab, because now he is no longer interested. Peer gave pt her business card and 3 different pamphlets on alcohol treatment inpatient, our New Start IOP program at the hospital and is AA for you.     Signed: Trang Araujo, 8/31/2023

## 2023-09-01 LAB
ALBUMIN SERPL-MCNC: 2.7 G/DL (ref 3.5–5.2)
ALP SERPL-CCNC: 138 U/L (ref 40–129)
ALT SERPL-CCNC: 29 U/L (ref 0–40)
ANION GAP SERPL CALCULATED.3IONS-SCNC: 7 MMOL/L (ref 7–16)
AST SERPL-CCNC: 48 U/L (ref 0–39)
BASOPHILS # BLD: 0.01 K/UL (ref 0–0.2)
BASOPHILS NFR BLD: 0 % (ref 0–2)
BILIRUB SERPL-MCNC: 3 MG/DL (ref 0–1.2)
BNP SERPL-MCNC: 393 PG/ML (ref 0–125)
BUN SERPL-MCNC: 7 MG/DL (ref 6–20)
CALCIUM SERPL-MCNC: 7.5 MG/DL (ref 8.6–10.2)
CHLORIDE SERPL-SCNC: 108 MMOL/L (ref 98–107)
CO2 SERPL-SCNC: 18 MMOL/L (ref 22–29)
CREAT SERPL-MCNC: 1.2 MG/DL (ref 0.7–1.2)
CRP SERPL HS-MCNC: <3 MG/L (ref 0–5)
EOSINOPHIL # BLD: 0.01 K/UL (ref 0.05–0.5)
EOSINOPHILS RELATIVE PERCENT: 0 % (ref 0–6)
ERYTHROCYTE [DISTWIDTH] IN BLOOD BY AUTOMATED COUNT: 16.2 % (ref 11.5–15)
GFR SERPL CREATININE-BSD FRML MDRD: >60 ML/MIN/1.73M2
GLUCOSE BLD-MCNC: 177 MG/DL (ref 74–99)
GLUCOSE SERPL-MCNC: 154 MG/DL (ref 74–99)
HCT VFR BLD AUTO: 25.1 % (ref 37–54)
HGB BLD-MCNC: 8.3 G/DL (ref 12.5–16.5)
IMM GRANULOCYTES # BLD AUTO: <0.03 K/UL (ref 0–0.58)
IMM GRANULOCYTES NFR BLD: 1 % (ref 0–5)
INR PPP: 2.2
LYMPHOCYTES NFR BLD: 0.17 K/UL (ref 1.5–4)
LYMPHOCYTES RELATIVE PERCENT: 7 % (ref 20–42)
MCH RBC QN AUTO: 30.2 PG (ref 26–35)
MCHC RBC AUTO-ENTMCNC: 33.1 G/DL (ref 32–34.5)
MCV RBC AUTO: 91.3 FL (ref 80–99.9)
MONOCYTES NFR BLD: 0.04 K/UL (ref 0.1–0.95)
MONOCYTES NFR BLD: 2 % (ref 2–12)
NEUTROPHILS NFR BLD: 90 % (ref 43–80)
NEUTS SEG NFR BLD: 2.17 K/UL (ref 1.8–7.3)
PARTIAL THROMBOPLASTIN TIME: 42.3 SEC (ref 24.5–35.1)
PLATELET CONFIRMATION: NORMAL
PLATELET CONFIRMATION: NORMAL
PLATELET, FLUORESCENCE: 36 K/UL (ref 130–450)
PMV BLD AUTO: 13.9 FL (ref 7–12)
POTASSIUM SERPL-SCNC: 4.2 MMOL/L (ref 3.5–5)
PROCALCITONIN SERPL-MCNC: 0.08 NG/ML (ref 0–0.08)
PROT SERPL-MCNC: 5.1 G/DL (ref 6.4–8.3)
PROTHROMBIN TIME: 23.9 SEC (ref 9.3–12.4)
RBC # BLD AUTO: 2.75 M/UL (ref 3.8–5.8)
SODIUM SERPL-SCNC: 133 MMOL/L (ref 132–146)
WBC OTHER # BLD: 2.4 K/UL (ref 4.5–11.5)

## 2023-09-01 PROCEDURE — 2580000003 HC RX 258: Performed by: STUDENT IN AN ORGANIZED HEALTH CARE EDUCATION/TRAINING PROGRAM

## 2023-09-01 PROCEDURE — 99222 1ST HOSP IP/OBS MODERATE 55: CPT | Performed by: SURGERY

## 2023-09-01 PROCEDURE — 99233 SBSQ HOSP IP/OBS HIGH 50: CPT | Performed by: INTERNAL MEDICINE

## 2023-09-01 PROCEDURE — 6360000002 HC RX W HCPCS: Performed by: STUDENT IN AN ORGANIZED HEALTH CARE EDUCATION/TRAINING PROGRAM

## 2023-09-01 PROCEDURE — 99232 SBSQ HOSP IP/OBS MODERATE 35: CPT | Performed by: NURSE PRACTITIONER

## 2023-09-01 PROCEDURE — 6370000000 HC RX 637 (ALT 250 FOR IP): Performed by: STUDENT IN AN ORGANIZED HEALTH CARE EDUCATION/TRAINING PROGRAM

## 2023-09-01 PROCEDURE — 85730 THROMBOPLASTIN TIME PARTIAL: CPT

## 2023-09-01 PROCEDURE — 6360000002 HC RX W HCPCS: Performed by: NURSE PRACTITIONER

## 2023-09-01 PROCEDURE — P9047 ALBUMIN (HUMAN), 25%, 50ML: HCPCS | Performed by: NURSE PRACTITIONER

## 2023-09-01 PROCEDURE — C9113 INJ PANTOPRAZOLE SODIUM, VIA: HCPCS | Performed by: STUDENT IN AN ORGANIZED HEALTH CARE EDUCATION/TRAINING PROGRAM

## 2023-09-01 PROCEDURE — 82962 GLUCOSE BLOOD TEST: CPT

## 2023-09-01 PROCEDURE — 6370000000 HC RX 637 (ALT 250 FOR IP): Performed by: NURSE PRACTITIONER

## 2023-09-01 PROCEDURE — 84145 PROCALCITONIN (PCT): CPT

## 2023-09-01 PROCEDURE — 80053 COMPREHEN METABOLIC PANEL: CPT

## 2023-09-01 PROCEDURE — 2500000003 HC RX 250 WO HCPCS: Performed by: INTERNAL MEDICINE

## 2023-09-01 PROCEDURE — 85025 COMPLETE CBC W/AUTO DIFF WBC: CPT

## 2023-09-01 PROCEDURE — 36415 COLL VENOUS BLD VENIPUNCTURE: CPT

## 2023-09-01 PROCEDURE — 2060000000 HC ICU INTERMEDIATE R&B

## 2023-09-01 PROCEDURE — 2580000003 HC RX 258: Performed by: INTERNAL MEDICINE

## 2023-09-01 PROCEDURE — A4216 STERILE WATER/SALINE, 10 ML: HCPCS | Performed by: STUDENT IN AN ORGANIZED HEALTH CARE EDUCATION/TRAINING PROGRAM

## 2023-09-01 PROCEDURE — 86140 C-REACTIVE PROTEIN: CPT

## 2023-09-01 PROCEDURE — 85610 PROTHROMBIN TIME: CPT

## 2023-09-01 PROCEDURE — 6360000002 HC RX W HCPCS: Performed by: INTERNAL MEDICINE

## 2023-09-01 PROCEDURE — 83880 ASSAY OF NATRIURETIC PEPTIDE: CPT

## 2023-09-01 RX ADMIN — PANTOPRAZOLE SODIUM 40 MG: 40 INJECTION, POWDER, FOR SOLUTION INTRAVENOUS at 20:46

## 2023-09-01 RX ADMIN — RIFAXIMIN 550 MG: 550 TABLET ORAL at 11:39

## 2023-09-01 RX ADMIN — PHENOBARBITAL SODIUM 65 MG: 65 INJECTION INTRAMUSCULAR at 04:55

## 2023-09-01 RX ADMIN — LACTULOSE 20 G: 20 SOLUTION ORAL at 14:17

## 2023-09-01 RX ADMIN — LACTULOSE 20 G: 20 SOLUTION ORAL at 20:46

## 2023-09-01 RX ADMIN — LACTULOSE 20 G: 20 SOLUTION ORAL at 11:40

## 2023-09-01 RX ADMIN — LORAZEPAM 2 MG: 2 INJECTION INTRAMUSCULAR; INTRAVENOUS at 17:06

## 2023-09-01 RX ADMIN — LORAZEPAM 2 MG: 1 TABLET ORAL at 20:46

## 2023-09-01 RX ADMIN — THIAMINE HYDROCHLORIDE 100 MG: 100 INJECTION, SOLUTION INTRAMUSCULAR; INTRAVENOUS at 11:40

## 2023-09-01 RX ADMIN — SODIUM CHLORIDE, PRESERVATIVE FREE 10 ML: 5 INJECTION INTRAVENOUS at 20:46

## 2023-09-01 RX ADMIN — ALBUMIN (HUMAN) 25 G: 0.25 INJECTION, SOLUTION INTRAVENOUS at 00:01

## 2023-09-01 RX ADMIN — ALBUMIN (HUMAN) 25 G: 0.25 INJECTION, SOLUTION INTRAVENOUS at 11:36

## 2023-09-01 RX ADMIN — PHENOBARBITAL SODIUM 65 MG: 65 INJECTION INTRAMUSCULAR at 14:17

## 2023-09-01 RX ADMIN — PHENOBARBITAL SODIUM 65 MG: 65 INJECTION INTRAMUSCULAR at 20:46

## 2023-09-01 RX ADMIN — METRONIDAZOLE 500 MG: 500 INJECTION, SOLUTION INTRAVENOUS at 17:00

## 2023-09-01 RX ADMIN — SODIUM CHLORIDE, PRESERVATIVE FREE 10 ML: 5 INJECTION INTRAVENOUS at 11:43

## 2023-09-01 RX ADMIN — ACETAMINOPHEN 650 MG: 325 TABLET ORAL at 17:40

## 2023-09-01 RX ADMIN — RIFAXIMIN 550 MG: 550 TABLET ORAL at 21:28

## 2023-09-01 RX ADMIN — METRONIDAZOLE 500 MG: 500 INJECTION, SOLUTION INTRAVENOUS at 00:25

## 2023-09-01 RX ADMIN — PANTOPRAZOLE SODIUM 40 MG: 40 INJECTION, POWDER, FOR SOLUTION INTRAVENOUS at 11:41

## 2023-09-01 RX ADMIN — CEFTRIAXONE SODIUM 2000 MG: 2 INJECTION, POWDER, FOR SOLUTION INTRAMUSCULAR; INTRAVENOUS at 17:01

## 2023-09-01 RX ADMIN — PENTOXIFYLLINE 400 MG: 400 TABLET, EXTENDED RELEASE ORAL at 11:39

## 2023-09-01 RX ADMIN — METRONIDAZOLE 500 MG: 500 INJECTION, SOLUTION INTRAVENOUS at 10:37

## 2023-09-01 RX ADMIN — PREDNISOLONE SODIUM PHOSPHATE 40 MG: 15 SOLUTION ORAL at 11:42

## 2023-09-01 RX ADMIN — FOLIC ACID 1 MG: 5 INJECTION, SOLUTION INTRAMUSCULAR; INTRAVENOUS; SUBCUTANEOUS at 11:40

## 2023-09-01 ASSESSMENT — PAIN DESCRIPTION - LOCATION
LOCATION: HEAD
LOCATION: ABDOMEN
LOCATION: ABDOMEN

## 2023-09-01 ASSESSMENT — PAIN SCALES - GENERAL
PAINLEVEL_OUTOF10: 2
PAINLEVEL_OUTOF10: 5

## 2023-09-01 ASSESSMENT — PAIN DESCRIPTION - DESCRIPTORS: DESCRIPTORS: ACHING

## 2023-09-01 NOTE — PLAN OF CARE
Problem: Discharge Planning  Goal: Discharge to home or other facility with appropriate resources  8/31/2023 2128 by Caron Ramesh RN  Outcome: Progressing  8/31/2023 2127 by Caron Ramesh RN  Outcome: Progressing     Problem: Pain  Goal: Verbalizes/displays adequate comfort level or baseline comfort level  8/31/2023 2128 by Caron Ramesh RN  Outcome: Progressing  8/31/2023 2127 by Caron Ramesh RN  Outcome: Progressing     Problem: Neurosensory - Adult  Goal: Achieves stable or improved neurological status  Outcome: Progressing  Goal: Absence of seizures  Outcome: Progressing  Goal: Remains free of injury related to seizures activity  Outcome: Progressing  Goal: Achieves maximal functionality and self care  Outcome: Progressing     Problem: Respiratory - Adult  Goal: Achieves optimal ventilation and oxygenation  Outcome: Progressing     Problem: Cardiovascular - Adult  Goal: Maintains optimal cardiac output and hemodynamic stability  Outcome: Progressing  Goal: Absence of cardiac dysrhythmias or at baseline  Outcome: Progressing     Problem: Skin/Tissue Integrity - Adult  Goal: Skin integrity remains intact  Outcome: Progressing  Goal: Oral mucous membranes remain intact  Outcome: Progressing     Problem: Musculoskeletal - Adult  Goal: Return mobility to safest level of function  Outcome: Progressing  Goal: Maintain proper alignment of affected body part  Outcome: Progressing  Goal: Return ADL status to a safe level of function  Outcome: Progressing     Problem: Gastrointestinal - Adult  Goal: Minimal or absence of nausea and vomiting  Outcome: Progressing  Goal: Maintains or returns to baseline bowel function  Outcome: Progressing     Problem: Genitourinary - Adult  Goal: Absence of urinary retention  Outcome: Progressing     Problem: Infection - Adult  Goal: Absence of infection at discharge  Outcome: Progressing  Goal: Absence of infection during hospitalization  Outcome:

## 2023-09-01 NOTE — PROGRESS NOTES
[address] [facility] Pulmonary, Critical Care and Sleep Medicine  H&P Note    Alessio Watson MD, MS    Patient: Brandy Morris III  MRN: [id number]  : 1980    Encounter Time: 2:45 PM     Date of Admission: 2023  2:33 AM    Primary Care Physician: Freddy Sampson DO    Reason for ADMISSION: Hematemesis     HISTORY OF PRESENT ILLNESS : Brandy Morris III 44 y.o. male was seen in consultation regarding the above chief compliant. History of alcoholic liver cirrhosis, portal hypertension, esophageal varices/recurrent ascites s/p TIPS shunt, hepatic and cephalopathy, presenting with generalized weakness and dizziness to the ER. He was also complaining of severe diarrhea that started yesterday. He states that he has been ill for the past few days and had nausea and vomiting prior to this. What prompted him to come to the ER today was severe excruciating pain in his left inguinal area where he has his hernia. He also reports taking clindamycin a few months ago for his dental infection. In the ER patient had some emesis/hematemesis. CT abdomen showing diffuse wall thickening of colon suggestive of diffuse enterocolitis. Large hydrocele on the left with small left inguinal hernia and herniated umbilical hernia. Bedside ultrasound of abdomen did not reveal any significant ascites fluid pockets. Significant history of alcohol abuse, last alcohol intake about a week ago.    =============================================    Brandy Morris III was seen and examined on 23      Discussed with GI, no plans of EGD today. Starting liquid diet, advance as tolerated. Hemoglobin stable, hemodynamically stable.     Plan for IR paracentesis, continuing empiric antibiotics.    =============================================      PAST MEDICAL HISTORY:  has a past medical history of Cirrhosis ([address]), Esophageal varices ([address]), ETOH abuse, GAVE (gastric antral vascular ectasia),

## 2023-09-01 NOTE — PROCEDURES
Per Dr Bandar Bowen INR will need corrected to below 2.0, platelets above 50, and hold Trental starting today 9/1/23. Also, please hold lovenox starting on 9/4/23. Will review case on on Tuesday 9/5/23 with our physician once conditions are met. . Department closed on Monday 9/4/23. Tentatively plan for Tuesday 9/5/23.

## 2023-09-01 NOTE — PROGRESS NOTES
cranial nerves 2-12 grossly intact, no slurred speech    Most Recent Labs  Lab Results   Component Value Date    WBC 2.4 (L) 09/01/2023    HGB 8.3 (L) 09/01/2023    HCT 25.1 (L) 09/01/2023    PLT 36 (L) 08/31/2023     09/01/2023    K 4.2 09/01/2023     (H) 09/01/2023    CREATININE 1.2 09/01/2023    BUN 7 09/01/2023    CO2 18 (L) 09/01/2023    GLUCOSE 154 (H) 09/01/2023    ALT 29 09/01/2023    AST 48 (H) 09/01/2023    INR 2.2 09/01/2023    TSH 0.980 05/15/2023    LABA1C 4.9 01/09/2021       CT ABDOMEN PELVIS W IV CONTRAST Additional Contrast? None   Final Result   Diffuse wall thickening identified of the colon with abnormal wall thickening   of the distal small bowel to suggest a diffuse enterocolitis. No significant   obvious obstruction. Fluid and air-filled dilated small bowel with no   definite evidence of transition or obstruction. Cirrhotic morphology of the liver with splenomegaly, portal venous   hypertension and presence of a tips shunt. There is small to moderate amount   of ascites. Large hydrocele identified on the left with small left inguinal hernia and   Ree herniated umbilical hernia containing bowel and fat. No definite signs   of strangulation.              Echocardiogram       Assessment   Active Hospital Problems    Diagnosis     Anemia [D64.9]      Priority: Medium    Decompensation of cirrhosis of liver (HCC) [K72.90, K74.60]      Priority: Medium    Enterocolitis [K52.9]     Hematemesis [E10.6]     Alcoholic cirrhosis of liver with ascites (HCC) [K70.31]     Ascites [R18.8]     Cellulitis [Y94.74]          Plan  Hematemesis   IV PPI BID   Octreotide   SCD hold anti coagulation   ICU admission 8/31 appreciate critical care recs   GI consult   No plans for scopes   Counts stable   Transfer out of ICU     Intractable abdominal pain   CT abdomen reviewed  Possible enterocolitis   Possible SBP   ID consult for abx recs appreciated   Rocephin currently   Not able to perform para 8/31    Liver cirrhosis   Continue lactulose xifaxin   Monitor LFTs   GI consult     ETOH abuse   Has not drank in over a week   CIWA protocol     Probable drug abuse / addiction   Psych consult   Consideration of rehab on DC   Stop opiates now     PT/OT  Home medications to be reconciled and confirmed prior to being ordered  DVT prophylaxis   Code Status Full   Discharge plan: TBD pending clinical improvement     Electronically signed by Ashley Arce MD on 9/1/2023 at 8:40 AM    I can be reached through [facility].

## 2023-09-02 ENCOUNTER — HOSPITAL ENCOUNTER (EMERGENCY)
Age: 43
Discharge: HOME | End: 2023-09-02
Payer: COMMERCIAL

## 2023-09-02 VITALS
OXYGEN SATURATION: 96 % | DIASTOLIC BLOOD PRESSURE: 74 MMHG | BODY MASS INDEX: 25.01 KG/M2 | TEMPERATURE: 98.7 F | SYSTOLIC BLOOD PRESSURE: 136 MMHG | HEIGHT: 68 IN | RESPIRATION RATE: 12 BRPM | WEIGHT: 165 LBS | HEART RATE: 93 BPM

## 2023-09-02 VITALS
HEIGHT: 69 IN | HEART RATE: 80 BPM | WEIGHT: 170 LBS | BODY MASS INDEX: 25.18 KG/M2 | SYSTOLIC BLOOD PRESSURE: 100 MMHG | RESPIRATION RATE: 18 BRPM | DIASTOLIC BLOOD PRESSURE: 58 MMHG | OXYGEN SATURATION: 96 % | TEMPERATURE: 99.1 F

## 2023-09-02 DIAGNOSIS — T50.901A ACCIDENTAL DRUG OVERDOSE, INITIAL ENCOUNTER: Primary | ICD-10-CM

## 2023-09-02 LAB
ALBUMIN SERPL-MCNC: 2.7 G/DL (ref 3.5–5.2)
ALP SERPL-CCNC: 131 U/L (ref 40–129)
ALT SERPL-CCNC: 28 U/L (ref 0–40)
ANION GAP SERPL CALCULATED.3IONS-SCNC: 7 MMOL/L (ref 7–16)
AST SERPL-CCNC: 46 U/L (ref 0–39)
BASOPHILS # BLD: 0 K/UL (ref 0–0.2)
BASOPHILS # BLD: 0.01 K/UL (ref 0–0.2)
BASOPHILS # BLD: 0.05 K/UL (ref 0–0.2)
BASOPHILS NFR BLD: 0 % (ref 0–2)
BASOPHILS NFR BLD: 0 % (ref 0–2)
BASOPHILS NFR BLD: 1 % (ref 0–2)
BILIRUB SERPL-MCNC: 2.4 MG/DL (ref 0–1.2)
BUN SERPL-MCNC: 8 MG/DL (ref 6–20)
CALCIUM SERPL-MCNC: 7.6 MG/DL (ref 8.6–10.2)
CHLORIDE SERPL-SCNC: 112 MMOL/L (ref 98–107)
CO2 SERPL-SCNC: 20 MMOL/L (ref 22–29)
CREAT SERPL-MCNC: 1.2 MG/DL (ref 0.7–1.2)
EOSINOPHIL # BLD: 0 K/UL (ref 0.05–0.5)
EOSINOPHILS RELATIVE PERCENT: 0 % (ref 0–6)
ERYTHROCYTE [DISTWIDTH] IN BLOOD BY AUTOMATED COUNT: 16.4 % (ref 11.5–15)
ERYTHROCYTE [DISTWIDTH] IN BLOOD BY AUTOMATED COUNT: 16.5 % (ref 11.5–15)
ERYTHROCYTE [DISTWIDTH] IN BLOOD BY AUTOMATED COUNT: 16.8 % (ref 11.5–15)
GFR SERPL CREATININE-BSD FRML MDRD: >60 ML/MIN/1.73M2
GLUCOSE BLD-MCNC: 111 MG/DL (ref 74–99)
GLUCOSE SERPL-MCNC: 114 MG/DL (ref 74–99)
HCT VFR BLD AUTO: 23 % (ref 37–54)
HCT VFR BLD AUTO: 23.4 % (ref 37–54)
HCT VFR BLD AUTO: 25.7 % (ref 37–54)
HGB BLD-MCNC: 7.4 G/DL (ref 12.5–16.5)
HGB BLD-MCNC: 7.5 G/DL (ref 12.5–16.5)
HGB BLD-MCNC: 8.4 G/DL (ref 12.5–16.5)
IMM GRANULOCYTES # BLD AUTO: 0.04 K/UL (ref 0–0.58)
IMM GRANULOCYTES # BLD AUTO: 0.04 K/UL (ref 0–0.58)
IMM GRANULOCYTES NFR BLD: 1 % (ref 0–5)
IMM GRANULOCYTES NFR BLD: 1 % (ref 0–5)
INR PPP: 2.1
LYMPHOCYTES NFR BLD: 0.19 K/UL (ref 1.5–4)
LYMPHOCYTES NFR BLD: 0.2 K/UL (ref 1.5–4)
LYMPHOCYTES NFR BLD: 0.25 K/UL (ref 1.5–4)
LYMPHOCYTES RELATIVE PERCENT: 4 % (ref 20–42)
LYMPHOCYTES RELATIVE PERCENT: 4 % (ref 20–42)
LYMPHOCYTES RELATIVE PERCENT: 5 % (ref 20–42)
MCH RBC QN AUTO: 29.5 PG (ref 26–35)
MCH RBC QN AUTO: 30 PG (ref 26–35)
MCH RBC QN AUTO: 30.2 PG (ref 26–35)
MCHC RBC AUTO-ENTMCNC: 32.1 G/DL (ref 32–34.5)
MCHC RBC AUTO-ENTMCNC: 32.2 G/DL (ref 32–34.5)
MCHC RBC AUTO-ENTMCNC: 32.7 G/DL (ref 32–34.5)
MCV RBC AUTO: 91.6 FL (ref 80–99.9)
MCV RBC AUTO: 91.8 FL (ref 80–99.9)
MCV RBC AUTO: 94.4 FL (ref 80–99.9)
MONOCYTES NFR BLD: 0.11 K/UL (ref 0.1–0.95)
MONOCYTES NFR BLD: 0.15 K/UL (ref 0.1–0.95)
MONOCYTES NFR BLD: 0.24 K/UL (ref 0.1–0.95)
MONOCYTES NFR BLD: 3 % (ref 2–12)
MONOCYTES NFR BLD: 3 % (ref 2–12)
MONOCYTES NFR BLD: 5 % (ref 2–12)
NEUTROPHILS NFR BLD: 90 % (ref 43–80)
NEUTROPHILS NFR BLD: 92 % (ref 43–80)
NEUTROPHILS NFR BLD: 92 % (ref 43–80)
NEUTS SEG NFR BLD: 4.08 K/UL (ref 1.8–7.3)
NEUTS SEG NFR BLD: 4.69 K/UL (ref 1.8–7.3)
NEUTS SEG NFR BLD: 4.72 K/UL (ref 1.8–7.3)
PARTIAL THROMBOPLASTIN TIME: 39.5 SEC (ref 24.5–35.1)
PLATELET # BLD AUTO: 36 K/UL (ref 130–450)
PLATELET # BLD AUTO: 39 K/UL (ref 130–450)
PLATELET # BLD AUTO: 40 K/UL (ref 130–450)
PLATELET CONFIRMATION: NORMAL
PLATELET CONFIRMATION: NORMAL
PMV BLD AUTO: 10 FL (ref 7–12)
PMV BLD AUTO: 10.1 FL (ref 7–12)
PMV BLD AUTO: 10.3 FL (ref 7–12)
POTASSIUM SERPL-SCNC: 4 MMOL/L (ref 3.5–5)
PROT SERPL-MCNC: 5.1 G/DL (ref 6.4–8.3)
PROTHROMBIN TIME: 23.1 SEC (ref 9.3–12.4)
RBC # BLD AUTO: 2.48 M/UL (ref 3.8–5.8)
RBC # BLD AUTO: 2.51 M/UL (ref 3.8–5.8)
RBC # BLD AUTO: 2.8 M/UL (ref 3.8–5.8)
RBC # BLD: ABNORMAL 10*6/UL
SODIUM SERPL-SCNC: 139 MMOL/L (ref 132–146)
WBC OTHER # BLD: 4.4 K/UL (ref 4.5–11.5)
WBC OTHER # BLD: 5.1 K/UL (ref 4.5–11.5)
WBC OTHER # BLD: 5.3 K/UL (ref 4.5–11.5)

## 2023-09-02 PROCEDURE — 6360000002 HC RX W HCPCS: Performed by: INTERNAL MEDICINE

## 2023-09-02 PROCEDURE — C9113 INJ PANTOPRAZOLE SODIUM, VIA: HCPCS | Performed by: STUDENT IN AN ORGANIZED HEALTH CARE EDUCATION/TRAINING PROGRAM

## 2023-09-02 PROCEDURE — 99283 EMERGENCY DEPT VISIT LOW MDM: CPT

## 2023-09-02 PROCEDURE — 80053 COMPREHEN METABOLIC PANEL: CPT

## 2023-09-02 PROCEDURE — 36415 COLL VENOUS BLD VENIPUNCTURE: CPT

## 2023-09-02 PROCEDURE — 85610 PROTHROMBIN TIME: CPT

## 2023-09-02 PROCEDURE — 6360000002 HC RX W HCPCS: Performed by: STUDENT IN AN ORGANIZED HEALTH CARE EDUCATION/TRAINING PROGRAM

## 2023-09-02 PROCEDURE — A4216 STERILE WATER/SALINE, 10 ML: HCPCS | Performed by: STUDENT IN AN ORGANIZED HEALTH CARE EDUCATION/TRAINING PROGRAM

## 2023-09-02 PROCEDURE — 82962 GLUCOSE BLOOD TEST: CPT

## 2023-09-02 PROCEDURE — 85025 COMPLETE CBC W/AUTO DIFF WBC: CPT

## 2023-09-02 PROCEDURE — 85730 THROMBOPLASTIN TIME PARTIAL: CPT

## 2023-09-02 PROCEDURE — 6370000000 HC RX 637 (ALT 250 FOR IP): Performed by: NURSE PRACTITIONER

## 2023-09-02 PROCEDURE — 6370000000 HC RX 637 (ALT 250 FOR IP): Performed by: STUDENT IN AN ORGANIZED HEALTH CARE EDUCATION/TRAINING PROGRAM

## 2023-09-02 PROCEDURE — 2580000003 HC RX 258: Performed by: STUDENT IN AN ORGANIZED HEALTH CARE EDUCATION/TRAINING PROGRAM

## 2023-09-02 RX ADMIN — THIAMINE HYDROCHLORIDE 100 MG: 100 INJECTION, SOLUTION INTRAMUSCULAR; INTRAVENOUS at 08:21

## 2023-09-02 RX ADMIN — LORAZEPAM 1 MG: 1 TABLET ORAL at 01:10

## 2023-09-02 RX ADMIN — PHENOBARBITAL SODIUM 65 MG: 65 INJECTION INTRAMUSCULAR at 05:14

## 2023-09-02 RX ADMIN — METRONIDAZOLE 500 MG: 500 INJECTION, SOLUTION INTRAVENOUS at 01:11

## 2023-09-02 RX ADMIN — PREDNISOLONE SODIUM PHOSPHATE 40 MG: 15 SOLUTION ORAL at 08:21

## 2023-09-02 RX ADMIN — SODIUM CHLORIDE, PRESERVATIVE FREE 10 ML: 5 INJECTION INTRAVENOUS at 08:38

## 2023-09-02 RX ADMIN — LORAZEPAM 1 MG: 1 TABLET ORAL at 08:14

## 2023-09-02 RX ADMIN — RIFAXIMIN 550 MG: 550 TABLET ORAL at 08:20

## 2023-09-02 RX ADMIN — LACTULOSE 20 G: 20 SOLUTION ORAL at 08:28

## 2023-09-02 RX ADMIN — METRONIDAZOLE 500 MG: 500 INJECTION, SOLUTION INTRAVENOUS at 08:36

## 2023-09-02 RX ADMIN — PANTOPRAZOLE SODIUM 40 MG: 40 INJECTION, POWDER, FOR SOLUTION INTRAVENOUS at 08:19

## 2023-09-02 ASSESSMENT — PAIN SCALES - WONG BAKER
WONGBAKER_NUMERICALRESPONSE: 0
WONGBAKER_NUMERICALRESPONSE: 0

## 2023-09-02 ASSESSMENT — PAIN - FUNCTIONAL ASSESSMENT: PAIN_FUNCTIONAL_ASSESSMENT: NONE - DENIES PAIN

## 2023-09-02 ASSESSMENT — PAIN SCALES - GENERAL: PAINLEVEL_OUTOF10: 0

## 2023-09-02 NOTE — PLAN OF CARE
Problem: Discharge Planning  Goal: Discharge to home or other facility with appropriate resources  Outcome: Progressing     Problem: Pain  Goal: Verbalizes/displays adequate comfort level or baseline comfort level  Outcome: Progressing     Problem: Neurosensory - Adult  Goal: Achieves stable or improved neurological status  Outcome: Progressing

## 2023-09-02 NOTE — PROGRESS NOTES
Internal Medicine Progress Note    Patient's name: Marina Parker III  : 1980  Chief complaints (on day of admission): Abdominal Pain (Pain \"in hernia\" from s/p area radiating up into abd and back. Hx cirrhosis. Pt states testicles are swollen, chronic. Pt states he has no energy)  Admission date: 2023  Date of service: 2023   [facility]  Primary care physician: Robin Palomo DO  Reason for visit: Follow-up for hematemesis     Subjective  Sharon Tena was seen and examined at bedside       Patient wants to be discharged   Explained plans for paracentesis and need for continued IV abx at this time to rule out potential life threatening infection sbp. He asked me if he can go home and then come back to the hospital later. I explained to him that he can not come and go to the hospital as he pleases that is not how this works given the severity of his disease. Informed by nursing staff of his intent to leave AMA.   Counts stable   Talked to Dr Nurys Philip no plans for scopes   Talked to critical care agree transfer to floors   Talked to PCP strongly suspect malingering pain meds and drug abuse   Stop opiates   Patient already wanting to leave AMA   Told him I will not be discharging him at this time   Also informed him that if he does sign out AMA I will not be re admitting him to the hospital    Review of Systems  There are no new complaints of chest pain, shortness of breath, abdominal pain, nausea, vomiting, diarrhea, constipation unless otherwise mentioned above.      Hospital Medications  Current Facility-Administered Medications   Medication Dose Route Frequency Provider Last Rate Last Admin    [Held by provider] enoxaparin (LOVENOX) injection 40 mg  40 mg SubCUTAneous Daily Jose D Bernal MD   40 mg at 23 0914    ondansetron (ZOFRAN-ODT) disintegrating tablet 4 mg  4 mg Oral Q8H PRN Jose D Bernal MD        Or    ondansetron [facility]) injection 4 mg  4 mg IntraVENous Q6H

## 2023-09-02 NOTE — PROGRESS NOTES
Pt signed AMA form at this time despite education given by the doctor and nurse. Pt calmly grabbed items and left at this time after monitor and IV's were removed.

## 2023-09-03 LAB
EKG ATRIAL RATE: 82 BPM
EKG P AXIS: 60 DEGREES
EKG P-R INTERVAL: 124 MS
EKG Q-T INTERVAL: 426 MS
EKG QRS DURATION: 78 MS
EKG QTC CALCULATION (BAZETT): 497 MS
EKG R AXIS: 68 DEGREES
EKG T AXIS: 6 DEGREES
EKG VENTRICULAR RATE: 82 BPM

## 2023-09-03 PROCEDURE — 93010 ELECTROCARDIOGRAM REPORT: CPT | Performed by: INTERNAL MEDICINE

## 2023-09-03 NOTE — ED NOTES
Patient refused treatment. Demanded AMA forms upon arrival stating he was fine and didn't need to be here.      Addie Hardin RN  09/02/23 2046

## 2023-09-03 NOTE — ED PROVIDER NOTES
Reassessment, Tests Considered, Patient expectation:   Patient presented following an accidental overdose. Patient refused to answer questions. Patient had lungs clear to auscultation. Patient was alert and oriented to person, place, time, and purpose. Patient stated no HI or SI. Patient was instructed on the dangers of leaving against medical advice. Patient still wanted to leave AMA. Paperwork was signed. Chronic Conditions:   Past Medical History:   Diagnosis Date    Cirrhosis ([address])     Esophageal varices (HCC)     ETOH abuse     GAVE (gastric antral vascular ectasia)     Hepatitis C     Portal hypertension ([address])          Records Reviewed: None    CONSULTS: (Who and What was discussed)  None      FINAL IMPRESSION      1. Accidental drug overdose, initial encounter          DISPOSITION/PLAN     DISPOSITION [place] 09/02/2023 08:47:52 PM      PATIENT REFERRED TO:  No follow-up provider specified. DISCHARGE MEDICATIONS:  Discharge Medication List as of 9/2/2023  8:48 PM               (Please note that portions of this note were completed with a voice recognition program.  Efforts were made to edit the dictations but occasionally words are mis-transcribed.)    Bijan Del Cid DO (electronically signed)           Bijan Del Cid DO  Resident  09/03/23 0045        ATTENDING PROVIDER ATTESTATION:     Rosalee Boateng III presented to the emergency department for evaluation of Drug Overdose ((Smoked \"a blunt tonight\" went unresponsive) 2mg IN narcan, 2mg IV narcan given patient now AxOx4)    I have reviewed and discussed the case, including pertinent history (medical, surgical, family and social) and exam findings with the Resident and the Nurse assigned to Rosalee Boateng III. I have personally performed and/or participated in the history, exam, medical decision making, and procedures and agree with all pertinent clinical information.        I have reviewed my findings and recommendations with [facility]

## 2023-09-03 NOTE — ED NOTES
Pt states he does not want his vitals taken, he states that I need to get him the [facility] paperwork and he wants to sign out. Provider informed.      Jerry Knox RN  09/02/23 2036

## 2023-09-04 PROBLEM — N43.3 LEFT HYDROCELE: Status: ACTIVE | Noted: 2023-09-04

## 2023-09-05 LAB
MICROORGANISM SPEC CULT: NORMAL
MICROORGANISM SPEC CULT: NORMAL
SERVICE CMNT-IMP: NORMAL
SERVICE CMNT-IMP: NORMAL
SPECIMEN DESCRIPTION: NORMAL
SPECIMEN DESCRIPTION: NORMAL

## 2023-09-05 NOTE — DISCHARGE SUMMARY
limits. Symmetric enhancement of the renal parenchyma. No stones or distension seen in the renal collecting system. GI/Bowel: Stomach is unremarkable. There is dilated fluid-filled small bowel loops. There is some wall thickening. Findings may be related to the surrounding ascites. Findings to suggest an enteritis. There is abnormal wall thickening diffusely throughout the colon to suggest a diffuse colitis. No signs of obstruction. The appendix is unremarkable. Pelvis: The bladder reveals wall thickening. There is incomplete distension. Wall thickening is likely related to the incomplete distension. The prostate is unremarkable. Peritoneum/Retroperitoneum: No abdominal retroperitoneal lymphadenopathy. Verus E seen throughout the retroperitoneum. No significant atherosclerotic disease within the abdominal aorta or iliac vessels. No pelvic adenopathy. Bones/Soft Tissues: Large hydrocele identified on the left. There is anasarca seen within the soft tissues. Bony structures reveal no evidence of acute or aggressive osseous abnormality. There is an Ree herniated umbilical hernia containing fluid as well as a small bowel loop. No definite evidence of strangulation. Small left inguinal hernia containing fluid and fat. Diffuse wall thickening identified of the colon with abnormal wall thickening of the distal small bowel to suggest a diffuse enterocolitis. No significant obvious obstruction. Fluid and air-filled dilated small bowel with no definite evidence of transition or obstruction. Cirrhotic morphology of the liver with splenomegaly, portal venous hypertension and presence of a tips shunt. There is small to moderate amount of ascites. Large hydrocele identified on the left with small left inguinal hernia and Ree herniated umbilical hernia containing bowel and fat. No definite signs of strangulation.      CT ABDOMEN PELVIS W IV CONTRAST Additional Contrast? None    Result Date:

## 2023-09-14 ENCOUNTER — HOSPITAL ENCOUNTER (OUTPATIENT)
Dept: PSYCHIATRY | Age: 43
Setting detail: THERAPIES SERIES
Discharge: HOME | End: 2023-09-14
Payer: COMMERCIAL

## 2023-09-14 PROCEDURE — H0015 ALCOHOL AND/OR DRUG SERVICES: HCPCS

## 2023-09-14 ASSESSMENT — PATIENT HEALTH QUESTIONNAIRE - PHQ9
5. POOR APPETITE OR OVEREATING: 3
4. FEELING TIRED OR HAVING LITTLE ENERGY: 3
7. TROUBLE CONCENTRATING ON THINGS, SUCH AS READING THE NEWSPAPER OR WATCHING TELEVISION: 3
8. MOVING OR SPEAKING SO SLOWLY THAT OTHER PEOPLE COULD HAVE NOTICED. OR THE OPPOSITE, BEING SO FIGETY OR RESTLESS THAT YOU HAVE BEEN MOVING AROUND A LOT MORE THAN USUAL: 3
SUM OF ALL RESPONSES TO PHQ QUESTIONS 1-9: 24
SUM OF ALL RESPONSES TO PHQ9 QUESTIONS 1 & 2: 6
9. THOUGHTS THAT YOU WOULD BE BETTER OFF DEAD, OR OF HURTING YOURSELF: 0
10. IF YOU CHECKED OFF ANY PROBLEMS, HOW DIFFICULT HAVE THESE PROBLEMS MADE IT FOR YOU TO DO YOUR WORK, TAKE CARE OF THINGS AT HOME, OR GET ALONG WITH OTHER PEOPLE: 2
6. FEELING BAD ABOUT YOURSELF - OR THAT YOU ARE A FAILURE OR HAVE LET YOURSELF OR YOUR FAMILY DOWN: 3
SUM OF ALL RESPONSES TO PHQ QUESTIONS 1-9: 24
SUM OF ALL RESPONSES TO PHQ QUESTIONS 1-9: 24
1. LITTLE INTEREST OR PLEASURE IN DOING THINGS: 3
2. FEELING DOWN, DEPRESSED OR HOPELESS: 3
3. TROUBLE FALLING OR STAYING ASLEEP: 3
SUM OF ALL RESPONSES TO PHQ QUESTIONS 1-9: 24

## 2023-09-14 ASSESSMENT — ANXIETY QUESTIONNAIRES
IF YOU CHECKED OFF ANY PROBLEMS ON THIS QUESTIONNAIRE, HOW DIFFICULT HAVE THESE PROBLEMS MADE IT FOR YOU TO DO YOUR WORK, TAKE CARE OF THINGS AT HOME, OR GET ALONG WITH OTHER PEOPLE: VERY DIFFICULT
6. BECOMING EASILY ANNOYED OR IRRITABLE: 1
7. FEELING AFRAID AS IF SOMETHING AWFUL MIGHT HAPPEN: 1
1. FEELING NERVOUS, ANXIOUS, OR ON EDGE: 3
2. NOT BEING ABLE TO STOP OR CONTROL WORRYING: 1
GAD7 TOTAL SCORE: 10
4. TROUBLE RELAXING: 3
5. BEING SO RESTLESS THAT IT IS HARD TO SIT STILL: 0
3. WORRYING TOO MUCH ABOUT DIFFERENT THINGS: 1

## 2024-01-01 ENCOUNTER — HOSPITAL ENCOUNTER (EMERGENCY)
Age: 44
Discharge: LEFT BEFORE DISCHARGE | End: 2024-03-13
Payer: COMMERCIAL

## 2024-01-01 ENCOUNTER — APPOINTMENT (OUTPATIENT)
Dept: GENERAL RADIOLOGY | Age: 44
DRG: 423 | End: 2024-01-01
Payer: COMMERCIAL

## 2024-01-01 ENCOUNTER — APPOINTMENT (OUTPATIENT)
Dept: CT IMAGING | Age: 44
DRG: 423 | End: 2024-01-01
Payer: COMMERCIAL

## 2024-01-01 ENCOUNTER — APPOINTMENT (OUTPATIENT)
Dept: GENERAL RADIOLOGY | Age: 44
End: 2024-01-01
Payer: COMMERCIAL

## 2024-01-01 ENCOUNTER — HOSPITAL ENCOUNTER (INPATIENT)
Age: 44
LOS: 1 days | DRG: 423 | End: 2024-03-15
Attending: STUDENT IN AN ORGANIZED HEALTH CARE EDUCATION/TRAINING PROGRAM | Admitting: INTERNAL MEDICINE
Payer: COMMERCIAL

## 2024-01-01 VITALS
SYSTOLIC BLOOD PRESSURE: 117 MMHG | OXYGEN SATURATION: 100 % | HEART RATE: 93 BPM | WEIGHT: 155 LBS | DIASTOLIC BLOOD PRESSURE: 59 MMHG | TEMPERATURE: 98.1 F | BODY MASS INDEX: 23.57 KG/M2 | RESPIRATION RATE: 18 BRPM

## 2024-01-01 VITALS
TEMPERATURE: 98.4 F | DIASTOLIC BLOOD PRESSURE: 25 MMHG | WEIGHT: 152 LBS | HEIGHT: 68 IN | SYSTOLIC BLOOD PRESSURE: 43 MMHG | BODY MASS INDEX: 23.04 KG/M2 | OXYGEN SATURATION: 77 % | RESPIRATION RATE: 16 BRPM

## 2024-01-01 DIAGNOSIS — Z53.29 LEFT AGAINST MEDICAL ADVICE: ICD-10-CM

## 2024-01-01 DIAGNOSIS — R18.8 OTHER ASCITES: ICD-10-CM

## 2024-01-01 DIAGNOSIS — E72.20 HYPERAMMONEMIA (HCC): Primary | ICD-10-CM

## 2024-01-01 DIAGNOSIS — E87.20 LACTIC ACIDOSIS: ICD-10-CM

## 2024-01-01 DIAGNOSIS — S70.02XA CONTUSION OF LEFT HIP, INITIAL ENCOUNTER: Primary | ICD-10-CM

## 2024-01-01 DIAGNOSIS — Z87.19 HISTORY OF CIRRHOSIS: ICD-10-CM

## 2024-01-01 DIAGNOSIS — D64.9 ANEMIA, UNSPECIFIED TYPE: ICD-10-CM

## 2024-01-01 LAB
ABO/RH: NORMAL
ALBUMIN SERPL-MCNC: 3.5 G/DL (ref 3.5–5.2)
ALBUMIN SERPL-MCNC: 3.7 G/DL (ref 3.5–5.2)
ALP SERPL-CCNC: 129 U/L (ref 40–129)
ALP SERPL-CCNC: 137 U/L (ref 40–129)
ALT SERPL-CCNC: 33 U/L (ref 0–40)
ALT SERPL-CCNC: 35 U/L (ref 0–40)
AMMONIA PLAS-SCNC: 103 UMOL/L (ref 16–60)
AMMONIA PLAS-SCNC: 135 UMOL/L (ref 16–60)
ANION GAP SERPL CALCULATED.3IONS-SCNC: 10 MMOL/L (ref 7–16)
ANION GAP SERPL CALCULATED.3IONS-SCNC: 12 MMOL/L (ref 7–16)
ANTIBODY SCREEN: NEGATIVE
ARM BAND NUMBER: NORMAL
AST SERPL-CCNC: 56 U/L (ref 0–39)
AST SERPL-CCNC: 61 U/L (ref 0–39)
BACTERIA URNS QL MICRO: ABNORMAL
BASOPHILS # BLD: 0.02 K/UL (ref 0–0.2)
BASOPHILS # BLD: 0.02 K/UL (ref 0–0.2)
BASOPHILS NFR BLD: 1 % (ref 0–2)
BASOPHILS NFR BLD: 1 % (ref 0–2)
BILIRUB DIRECT SERPL-MCNC: 2 MG/DL (ref 0–0.3)
BILIRUB INDIRECT SERPL-MCNC: 3.1 MG/DL (ref 0–1)
BILIRUB SERPL-MCNC: 5.1 MG/DL (ref 0–1.2)
BILIRUB SERPL-MCNC: 5.9 MG/DL (ref 0–1.2)
BILIRUB UR QL STRIP: ABNORMAL
BLOOD BANK BLOOD PRODUCT EXPIRATION DATE: NORMAL
BLOOD BANK DISPENSE STATUS: NORMAL
BLOOD BANK ISBT PRODUCT BLOOD TYPE: 6200
BLOOD BANK PRODUCT CODE: NORMAL
BLOOD BANK SAMPLE EXPIRATION: NORMAL
BLOOD BANK UNIT TYPE AND RH: NORMAL
BPU ID: NORMAL
BUN SERPL-MCNC: 8 MG/DL (ref 6–20)
BUN SERPL-MCNC: 8 MG/DL (ref 6–20)
CALCIUM SERPL-MCNC: 8.6 MG/DL (ref 8.6–10.2)
CALCIUM SERPL-MCNC: 9 MG/DL (ref 8.6–10.2)
CHLORIDE SERPL-SCNC: 109 MMOL/L (ref 98–107)
CHLORIDE SERPL-SCNC: 111 MMOL/L (ref 98–107)
CLARITY UR: CLEAR
CO2 SERPL-SCNC: 18 MMOL/L (ref 22–29)
CO2 SERPL-SCNC: 20 MMOL/L (ref 22–29)
COHB: 0.9 % (ref 0–1.5)
COLOR UR: YELLOW
COMPONENT: NORMAL
CREAT SERPL-MCNC: 0.9 MG/DL (ref 0.7–1.2)
CREAT SERPL-MCNC: 0.9 MG/DL (ref 0.7–1.2)
CRITICAL: ABNORMAL
CRITICAL: ABNORMAL
CROSSMATCH RESULT: NORMAL
DATE ANALYZED: ABNORMAL
DATE ANALYZED: ABNORMAL
DATE OF COLLECTION: ABNORMAL
DATE OF COLLECTION: ABNORMAL
EOSINOPHIL # BLD: 0.1 K/UL (ref 0.05–0.5)
EOSINOPHIL # BLD: 0.13 K/UL (ref 0.05–0.5)
EOSINOPHILS RELATIVE PERCENT: 4 % (ref 0–6)
EOSINOPHILS RELATIVE PERCENT: 6 % (ref 0–6)
EPI CELLS #/AREA URNS HPF: ABNORMAL /HPF
ERYTHROCYTE [DISTWIDTH] IN BLOOD BY AUTOMATED COUNT: 19.4 % (ref 11.5–15)
ERYTHROCYTE [DISTWIDTH] IN BLOOD BY AUTOMATED COUNT: 19.8 % (ref 11.5–15)
FIO2: 100 %
GFR SERPL CREATININE-BSD FRML MDRD: >60 ML/MIN/1.73M2
GFR SERPL CREATININE-BSD FRML MDRD: >60 ML/MIN/1.73M2
GLUCOSE SERPL-MCNC: 103 MG/DL (ref 74–99)
GLUCOSE SERPL-MCNC: 109 MG/DL (ref 74–99)
GLUCOSE UR STRIP-MCNC: NEGATIVE MG/DL
HCO3: 10.1 MMOL/L (ref 22–26)
HCT VFR BLD AUTO: 19.3 % (ref 37–54)
HCT VFR BLD AUTO: 21.2 % (ref 37–54)
HCT VFR BLD AUTO: 21.8 % (ref 37–54)
HGB BLD-MCNC: 6.6 G/DL (ref 12.5–16.5)
HGB BLD-MCNC: 7.2 G/DL (ref 12.5–16.5)
HGB BLD-MCNC: 7.6 G/DL (ref 12.5–16.5)
HGB UR QL STRIP.AUTO: NEGATIVE
HHB: 0.7 % (ref 0–5)
IMM GRANULOCYTES # BLD AUTO: <0.03 K/UL (ref 0–0.58)
IMM GRANULOCYTES NFR BLD: 0 % (ref 0–5)
INR PPP: 2.9
INR PPP: 3.1
KETONES UR STRIP-MCNC: ABNORMAL MG/DL
LAB: ABNORMAL
LAB: ABNORMAL
LACTATE BLDV-SCNC: 2 MMOL/L (ref 0.5–2.2)
LACTATE BLDV-SCNC: 2.5 MMOL/L (ref 0.5–2.2)
LEUKOCYTE ESTERASE UR QL STRIP: NEGATIVE
LIPASE SERPL-CCNC: 135 U/L (ref 13–60)
LYMPHOCYTES NFR BLD: 0.28 K/UL (ref 1.5–4)
LYMPHOCYTES NFR BLD: 0.34 K/UL (ref 1.5–4)
LYMPHOCYTES RELATIVE PERCENT: 12 % (ref 20–42)
LYMPHOCYTES RELATIVE PERCENT: 16 % (ref 20–42)
Lab: 845
Lab: 939
MAGNESIUM SERPL-MCNC: 1.8 MG/DL (ref 1.6–2.6)
MCH RBC QN AUTO: 33.8 PG (ref 26–35)
MCH RBC QN AUTO: 34.2 PG (ref 26–35)
MCHC RBC AUTO-ENTMCNC: 34.2 G/DL (ref 32–34.5)
MCHC RBC AUTO-ENTMCNC: 34.9 G/DL (ref 32–34.5)
MCV RBC AUTO: 100 FL (ref 80–99.9)
MCV RBC AUTO: 96.9 FL (ref 80–99.9)
METHB: 0.3 % (ref 0–1.5)
MODE: ABNORMAL
MODE: ABNORMAL
MONOCYTES NFR BLD: 0.17 K/UL (ref 0.1–0.95)
MONOCYTES NFR BLD: 0.34 K/UL (ref 0.1–0.95)
MONOCYTES NFR BLD: 15 % (ref 2–12)
MONOCYTES NFR BLD: 8 % (ref 2–12)
MUCOUS THREADS URNS QL MICRO: PRESENT
NEUTROPHILS NFR BLD: 68 % (ref 43–80)
NEUTROPHILS NFR BLD: 70 % (ref 43–80)
NEUTS SEG NFR BLD: 1.53 K/UL (ref 1.8–7.3)
NEUTS SEG NFR BLD: 1.6 K/UL (ref 1.8–7.3)
NITRITE UR QL STRIP: NEGATIVE
O2 CONTENT: 9.8 ML/DL
O2 SATURATION: 99.3 % (ref 92–98.5)
O2HB: 98.1 % (ref 94–97)
OPERATOR ID: 3114
OPERATOR ID: ABNORMAL
PATIENT TEMP: 37 C
PATIENT TEMP: 37 C
PCO2: 25.7 MMHG (ref 35–45)
PCO2: <15 MMHG (ref 35–45)
PFO2: 4.98 MMHG/%
PH BLOOD GAS: 7.21 (ref 7.35–7.45)
PH BLOOD GAS: 7.47 (ref 7.35–7.45)
PH UR STRIP: 6 [PH] (ref 5–9)
PLATELET # BLD AUTO: 28 K/UL (ref 130–450)
PLATELET # BLD AUTO: 29 K/UL (ref 130–450)
PLATELET CONFIRMATION: NORMAL
PLATELET CONFIRMATION: NORMAL
PMV BLD AUTO: 10.1 FL (ref 7–12)
PMV BLD AUTO: 10.8 FL (ref 7–12)
PO2: 168.2 MMHG (ref 75–100)
PO2: 498.2 MMHG (ref 75–100)
POTASSIUM SERPL-SCNC: 3.5 MMOL/L (ref 3.5–5)
POTASSIUM SERPL-SCNC: 3.5 MMOL/L (ref 3.5–5)
PROT SERPL-MCNC: 5.5 G/DL (ref 6.4–8.3)
PROT SERPL-MCNC: 6 G/DL (ref 6.4–8.3)
PROT UR STRIP-MCNC: NEGATIVE MG/DL
PROTHROMBIN TIME: 32.9 SEC (ref 9.3–12.4)
PROTHROMBIN TIME: 34.9 SEC (ref 9.3–12.4)
RBC # BLD AUTO: 1.93 M/UL (ref 3.8–5.8)
RBC # BLD AUTO: 2.25 M/UL (ref 3.8–5.8)
RBC # BLD: ABNORMAL 10*6/UL
RBC #/AREA URNS HPF: ABNORMAL /HPF
RI(T): 0.38
SODIUM SERPL-SCNC: 139 MMOL/L (ref 132–146)
SODIUM SERPL-SCNC: 141 MMOL/L (ref 132–146)
SOURCE, BLOOD GAS: ABNORMAL
SOURCE, BLOOD GAS: ABNORMAL
SP GR UR STRIP: >1.03 (ref 1–1.03)
THB: 6.8 G/DL (ref 11.5–16.5)
THB: <5 G/DL (ref 11.5–16.5)
TIME ANALYZED: 847
TIME ANALYZED: 943
TRANSFUSION STATUS: NORMAL
UNIT DIVISION: 0
UNIT ISSUE DATE/TIME: NORMAL
UROBILINOGEN UR STRIP-ACNC: 0.2 EU/DL (ref 0–1)
WBC #/AREA URNS HPF: ABNORMAL /HPF
WBC OTHER # BLD: 2.2 K/UL (ref 4.5–11.5)
WBC OTHER # BLD: 2.4 K/UL (ref 4.5–11.5)

## 2024-01-01 PROCEDURE — 85018 HEMOGLOBIN: CPT

## 2024-01-01 PROCEDURE — 6360000004 HC RX CONTRAST MEDICATION: Performed by: RADIOLOGY

## 2024-01-01 PROCEDURE — 85014 HEMATOCRIT: CPT

## 2024-01-01 PROCEDURE — 92950 HEART/LUNG RESUSCITATION CPR: CPT

## 2024-01-01 PROCEDURE — 31500 INSERT EMERGENCY AIRWAY: CPT

## 2024-01-01 PROCEDURE — APPSS60 APP SPLIT SHARED TIME 46-60 MINUTES: Performed by: NURSE PRACTITIONER

## 2024-01-01 PROCEDURE — 82140 ASSAY OF AMMONIA: CPT

## 2024-01-01 PROCEDURE — 6360000002 HC RX W HCPCS

## 2024-01-01 PROCEDURE — 85610 PROTHROMBIN TIME: CPT

## 2024-01-01 PROCEDURE — 5A12012 PERFORMANCE OF CARDIAC OUTPUT, SINGLE, MANUAL: ICD-10-PCS | Performed by: STUDENT IN AN ORGANIZED HEALTH CARE EDUCATION/TRAINING PROGRAM

## 2024-01-01 PROCEDURE — 2500000003 HC RX 250 WO HCPCS: Performed by: INTERNAL MEDICINE

## 2024-01-01 PROCEDURE — 81001 URINALYSIS AUTO W/SCOPE: CPT

## 2024-01-01 PROCEDURE — 96374 THER/PROPH/DIAG INJ IV PUSH: CPT

## 2024-01-01 PROCEDURE — 86900 BLOOD TYPING SEROLOGIC ABO: CPT

## 2024-01-01 PROCEDURE — 0BH18EZ INSERTION OF ENDOTRACHEAL AIRWAY INTO TRACHEA, VIA NATURAL OR ARTIFICIAL OPENING ENDOSCOPIC: ICD-10-PCS | Performed by: STUDENT IN AN ORGANIZED HEALTH CARE EDUCATION/TRAINING PROGRAM

## 2024-01-01 PROCEDURE — 36430 TRANSFUSION BLD/BLD COMPNT: CPT

## 2024-01-01 PROCEDURE — 83690 ASSAY OF LIPASE: CPT

## 2024-01-01 PROCEDURE — 83605 ASSAY OF LACTIC ACID: CPT

## 2024-01-01 PROCEDURE — 6370000000 HC RX 637 (ALT 250 FOR IP): Performed by: NURSE PRACTITIONER

## 2024-01-01 PROCEDURE — 73502 X-RAY EXAM HIP UNI 2-3 VIEWS: CPT

## 2024-01-01 PROCEDURE — 82805 BLOOD GASES W/O2 SATURATION: CPT

## 2024-01-01 PROCEDURE — 99283 EMERGENCY DEPT VISIT LOW MDM: CPT

## 2024-01-01 PROCEDURE — 82248 BILIRUBIN DIRECT: CPT

## 2024-01-01 PROCEDURE — 80053 COMPREHEN METABOLIC PANEL: CPT

## 2024-01-01 PROCEDURE — 6360000002 HC RX W HCPCS: Performed by: INTERNAL MEDICINE

## 2024-01-01 PROCEDURE — 2580000003 HC RX 258

## 2024-01-01 PROCEDURE — 85025 COMPLETE CBC W/AUTO DIFF WBC: CPT

## 2024-01-01 PROCEDURE — 99285 EMERGENCY DEPT VISIT HI MDM: CPT

## 2024-01-01 PROCEDURE — 82803 BLOOD GASES ANY COMBINATION: CPT

## 2024-01-01 PROCEDURE — 86901 BLOOD TYPING SEROLOGIC RH(D): CPT

## 2024-01-01 PROCEDURE — 5A1935Z RESPIRATORY VENTILATION, LESS THAN 24 CONSECUTIVE HOURS: ICD-10-PCS | Performed by: STUDENT IN AN ORGANIZED HEALTH CARE EDUCATION/TRAINING PROGRAM

## 2024-01-01 PROCEDURE — 83735 ASSAY OF MAGNESIUM: CPT

## 2024-01-01 PROCEDURE — 86923 COMPATIBILITY TEST ELECTRIC: CPT

## 2024-01-01 PROCEDURE — 2500000003 HC RX 250 WO HCPCS

## 2024-01-01 PROCEDURE — 86850 RBC ANTIBODY SCREEN: CPT

## 2024-01-01 PROCEDURE — 99238 HOSP IP/OBS DSCHRG MGMT 30/<: CPT | Performed by: STUDENT IN AN ORGANIZED HEALTH CARE EDUCATION/TRAINING PROGRAM

## 2024-01-01 PROCEDURE — 2580000003 HC RX 258: Performed by: INTERNAL MEDICINE

## 2024-01-01 PROCEDURE — P9016 RBC LEUKOCYTES REDUCED: HCPCS

## 2024-01-01 PROCEDURE — 1200000000 HC SEMI PRIVATE

## 2024-01-01 PROCEDURE — 2580000003 HC RX 258: Performed by: NURSE PRACTITIONER

## 2024-01-01 PROCEDURE — 30233N1 TRANSFUSION OF NONAUTOLOGOUS RED BLOOD CELLS INTO PERIPHERAL VEIN, PERCUTANEOUS APPROACH: ICD-10-PCS | Performed by: STUDENT IN AN ORGANIZED HEALTH CARE EDUCATION/TRAINING PROGRAM

## 2024-01-01 PROCEDURE — 02HV33Z INSERTION OF INFUSION DEVICE INTO SUPERIOR VENA CAVA, PERCUTANEOUS APPROACH: ICD-10-PCS | Performed by: STUDENT IN AN ORGANIZED HEALTH CARE EDUCATION/TRAINING PROGRAM

## 2024-01-01 PROCEDURE — 6370000000 HC RX 637 (ALT 250 FOR IP)

## 2024-01-01 PROCEDURE — 04HY32Z INSERTION OF MONITORING DEVICE INTO LOWER ARTERY, PERCUTANEOUS APPROACH: ICD-10-PCS | Performed by: STUDENT IN AN ORGANIZED HEALTH CARE EDUCATION/TRAINING PROGRAM

## 2024-01-01 PROCEDURE — 74177 CT ABD & PELVIS W/CONTRAST: CPT

## 2024-01-01 PROCEDURE — 99223 1ST HOSP IP/OBS HIGH 75: CPT | Performed by: INTERNAL MEDICINE

## 2024-01-01 RX ORDER — CHLORHEXIDINE GLUCONATE ORAL RINSE 1.2 MG/ML
15 SOLUTION DENTAL 2 TIMES DAILY
Status: DISCONTINUED | OUTPATIENT
Start: 2024-01-01 | End: 2024-01-01 | Stop reason: HOSPADM

## 2024-01-01 RX ORDER — LACTULOSE 10 G/15ML
20 SOLUTION ORAL 3 TIMES DAILY
Status: DISCONTINUED | OUTPATIENT
Start: 2024-01-01 | End: 2024-01-01 | Stop reason: HOSPADM

## 2024-01-01 RX ORDER — POTASSIUM CHLORIDE 7.45 MG/ML
10 INJECTION INTRAVENOUS PRN
Status: DISCONTINUED | OUTPATIENT
Start: 2024-01-01 | End: 2024-01-01 | Stop reason: HOSPADM

## 2024-01-01 RX ORDER — EPINEPHRINE IN SOD CHLOR,ISO 1 MG/10 ML
SYRINGE (ML) INTRAVENOUS
Status: COMPLETED | OUTPATIENT
Start: 2024-01-01 | End: 2024-01-01

## 2024-01-01 RX ORDER — SODIUM CHLORIDE 0.9 % (FLUSH) 0.9 %
5-40 SYRINGE (ML) INJECTION EVERY 12 HOURS SCHEDULED
Status: DISCONTINUED | OUTPATIENT
Start: 2024-01-01 | End: 2024-01-01 | Stop reason: HOSPADM

## 2024-01-01 RX ORDER — SODIUM CHLORIDE 0.9 % (FLUSH) 0.9 %
5-40 SYRINGE (ML) INJECTION PRN
Status: DISCONTINUED | OUTPATIENT
Start: 2024-01-01 | End: 2024-01-01 | Stop reason: HOSPADM

## 2024-01-01 RX ORDER — GABAPENTIN 100 MG/1
100 CAPSULE ORAL
COMMUNITY
Start: 2024-01-01

## 2024-01-01 RX ORDER — LACTULOSE 10 G/15ML
20 SOLUTION ORAL ONCE
Status: COMPLETED | OUTPATIENT
Start: 2024-01-01 | End: 2024-01-01

## 2024-01-01 RX ORDER — MAGNESIUM SULFATE IN WATER 40 MG/ML
2000 INJECTION, SOLUTION INTRAVENOUS PRN
Status: DISCONTINUED | OUTPATIENT
Start: 2024-01-01 | End: 2024-01-01 | Stop reason: HOSPADM

## 2024-01-01 RX ORDER — SODIUM CHLORIDE 0.9 % (FLUSH) 0.9 %
5-40 SYRINGE (ML) INJECTION PRN
Status: DISCONTINUED | OUTPATIENT
Start: 2024-01-01 | End: 2024-01-01

## 2024-01-01 RX ORDER — 0.9 % SODIUM CHLORIDE 0.9 %
1000 INTRAVENOUS SOLUTION INTRAVENOUS ONCE
Status: COMPLETED | OUTPATIENT
Start: 2024-01-01 | End: 2024-01-01

## 2024-01-01 RX ORDER — OXYCODONE HYDROCHLORIDE 5 MG/1
5 TABLET ORAL ONCE
Status: COMPLETED | OUTPATIENT
Start: 2024-01-01 | End: 2024-01-01

## 2024-01-01 RX ORDER — SODIUM CHLORIDE 9 MG/ML
INJECTION, SOLUTION INTRAVENOUS PRN
Status: DISCONTINUED | OUTPATIENT
Start: 2024-01-01 | End: 2024-01-01

## 2024-01-01 RX ORDER — PROCHLORPERAZINE MALEATE 10 MG
10 TABLET ORAL EVERY 8 HOURS PRN
Status: DISCONTINUED | OUTPATIENT
Start: 2024-01-01 | End: 2024-01-01 | Stop reason: HOSPADM

## 2024-01-01 RX ORDER — SODIUM CHLORIDE 9 MG/ML
INJECTION, SOLUTION INTRAVENOUS PRN
Status: DISCONTINUED | OUTPATIENT
Start: 2024-01-01 | End: 2024-01-01 | Stop reason: HOSPADM

## 2024-01-01 RX ORDER — ONDANSETRON 4 MG/1
4 TABLET, ORALLY DISINTEGRATING ORAL EVERY 8 HOURS PRN
Status: DISCONTINUED | OUTPATIENT
Start: 2024-01-01 | End: 2024-01-01 | Stop reason: CLARIF

## 2024-01-01 RX ORDER — POLYETHYLENE GLYCOL 3350 17 G/17G
17 POWDER, FOR SOLUTION ORAL DAILY PRN
Status: DISCONTINUED | OUTPATIENT
Start: 2024-01-01 | End: 2024-01-01 | Stop reason: HOSPADM

## 2024-01-01 RX ORDER — ACETAMINOPHEN 650 MG/1
650 SUPPOSITORY RECTAL EVERY 6 HOURS PRN
Status: DISCONTINUED | OUTPATIENT
Start: 2024-01-01 | End: 2024-01-01 | Stop reason: HOSPADM

## 2024-01-01 RX ORDER — PROCHLORPERAZINE EDISYLATE 5 MG/ML
10 INJECTION INTRAMUSCULAR; INTRAVENOUS EVERY 6 HOURS PRN
Status: DISCONTINUED | OUTPATIENT
Start: 2024-01-01 | End: 2024-01-01 | Stop reason: HOSPADM

## 2024-01-01 RX ORDER — ACETAMINOPHEN 325 MG/1
325 TABLET ORAL EVERY 6 HOURS PRN
COMMUNITY
Start: 2024-01-01 | End: 2024-01-01 | Stop reason: ALTCHOICE

## 2024-01-01 RX ORDER — CALCIUM CHLORIDE 100 MG/ML
INJECTION INTRAVENOUS; INTRAVENTRICULAR
Status: COMPLETED | OUTPATIENT
Start: 2024-01-01 | End: 2024-01-01

## 2024-01-01 RX ORDER — POTASSIUM CHLORIDE 20 MEQ/1
40 TABLET, EXTENDED RELEASE ORAL PRN
Status: DISCONTINUED | OUTPATIENT
Start: 2024-01-01 | End: 2024-01-01 | Stop reason: HOSPADM

## 2024-01-01 RX ORDER — ONDANSETRON 2 MG/ML
4 INJECTION INTRAMUSCULAR; INTRAVENOUS EVERY 6 HOURS PRN
Status: DISCONTINUED | OUTPATIENT
Start: 2024-01-01 | End: 2024-01-01 | Stop reason: CLARIF

## 2024-01-01 RX ORDER — ACETAMINOPHEN 325 MG/1
650 TABLET ORAL EVERY 6 HOURS PRN
Status: DISCONTINUED | OUTPATIENT
Start: 2024-01-01 | End: 2024-01-01 | Stop reason: HOSPADM

## 2024-01-01 RX ORDER — FENTANYL CITRATE 50 UG/ML
50 INJECTION, SOLUTION INTRAMUSCULAR; INTRAVENOUS ONCE
Status: COMPLETED | OUTPATIENT
Start: 2024-01-01 | End: 2024-01-01

## 2024-01-01 RX ORDER — CIPROFLOXACIN 500 MG/1
500 TABLET, FILM COATED ORAL 2 TIMES DAILY
COMMUNITY
Start: 2024-01-01

## 2024-01-01 RX ORDER — VASOPRESSIN 20 U/ML
INJECTION PARENTERAL
Status: DISCONTINUED
Start: 2024-01-01 | End: 2024-01-01 | Stop reason: HOSPADM

## 2024-01-01 RX ADMIN — EPINEPHRINE 1 MG: 0.1 INJECTION, SOLUTION ENDOTRACHEAL; INTRACARDIAC; INTRAVENOUS at 09:24

## 2024-01-01 RX ADMIN — NOREPINEPHRINE BITARTRATE 50 MCG/MIN: 1 INJECTION, SOLUTION, CONCENTRATE INTRAVENOUS at 09:39

## 2024-01-01 RX ADMIN — NOREPINEPHRINE BITARTRATE 20 MCG/MIN: 1 INJECTION, SOLUTION, CONCENTRATE INTRAVENOUS at 09:24

## 2024-01-01 RX ADMIN — EPINEPHRINE 1 MG: 0.1 INJECTION, SOLUTION ENDOTRACHEAL; INTRACARDIAC; INTRAVENOUS at 09:10

## 2024-01-01 RX ADMIN — IOPAMIDOL 75 ML: 755 INJECTION, SOLUTION INTRAVENOUS at 16:16

## 2024-01-01 RX ADMIN — EPINEPHRINE 1 MG: 0.1 INJECTION, SOLUTION ENDOTRACHEAL; INTRACARDIAC; INTRAVENOUS at 09:03

## 2024-01-01 RX ADMIN — EPINEPHRINE 1 MG: 0.1 INJECTION, SOLUTION ENDOTRACHEAL; INTRACARDIAC; INTRAVENOUS at 09:37

## 2024-01-01 RX ADMIN — CALCIUM CHLORIDE 1000 MG: 100 INJECTION, SOLUTION INTRAVENOUS; INTRAVENTRICULAR at 09:12

## 2024-01-01 RX ADMIN — SODIUM CHLORIDE 1000 ML: 9 INJECTION, SOLUTION INTRAVENOUS at 15:47

## 2024-01-01 RX ADMIN — EPINEPHRINE 1 MG: 0.1 INJECTION, SOLUTION ENDOTRACHEAL; INTRACARDIAC; INTRAVENOUS at 09:20

## 2024-01-01 RX ADMIN — EPINEPHRINE 1 MG: 0.1 INJECTION, SOLUTION ENDOTRACHEAL; INTRACARDIAC; INTRAVENOUS at 09:06

## 2024-01-01 RX ADMIN — FENTANYL CITRATE 50 MCG: 50 INJECTION INTRAMUSCULAR; INTRAVENOUS at 15:48

## 2024-01-01 RX ADMIN — LACTULOSE 20 G: 20 SOLUTION ORAL at 21:23

## 2024-01-01 RX ADMIN — Medication 50 MEQ: at 09:16

## 2024-01-01 RX ADMIN — SODIUM CHLORIDE, PRESERVATIVE FREE 10 ML: 5 INJECTION INTRAVENOUS at 21:15

## 2024-01-01 RX ADMIN — OXYCODONE 5 MG: 5 TABLET ORAL at 19:33

## 2024-01-01 RX ADMIN — EPINEPHRINE 1 MG: 0.1 INJECTION, SOLUTION ENDOTRACHEAL; INTRACARDIAC; INTRAVENOUS at 09:14

## 2024-01-01 RX ADMIN — ACETAMINOPHEN 650 MG: 325 TABLET ORAL at 01:52

## 2024-01-01 RX ADMIN — LACTULOSE 20 G: 20 SOLUTION ORAL at 16:39

## 2024-01-01 ASSESSMENT — PAIN DESCRIPTION - LOCATION
LOCATION: HIP
LOCATION: ABDOMEN

## 2024-01-01 ASSESSMENT — PULMONARY FUNCTION TESTS
PIF_VALUE: 22
PIF_VALUE: 22
PIF_VALUE: 21
PIF_VALUE: 21
PIF_VALUE: 23
PIF_VALUE: 20
PIF_VALUE: 22

## 2024-01-01 ASSESSMENT — PAIN - FUNCTIONAL ASSESSMENT
PAIN_FUNCTIONAL_ASSESSMENT: ACTIVITIES ARE NOT PREVENTED
PAIN_FUNCTIONAL_ASSESSMENT: 0-10

## 2024-01-01 ASSESSMENT — LIFESTYLE VARIABLES
HOW OFTEN DO YOU HAVE A DRINK CONTAINING ALCOHOL: NEVER
HOW OFTEN DO YOU HAVE A DRINK CONTAINING ALCOHOL: NEVER
HOW MANY STANDARD DRINKS CONTAINING ALCOHOL DO YOU HAVE ON A TYPICAL DAY: PATIENT DOES NOT DRINK

## 2024-01-01 ASSESSMENT — PAIN SCALES - GENERAL
PAINLEVEL_OUTOF10: 9
PAINLEVEL_OUTOF10: 8
PAINLEVEL_OUTOF10: 9

## 2024-01-01 ASSESSMENT — PAIN DESCRIPTION - DESCRIPTORS
DESCRIPTORS: THROBBING;SHARP
DESCRIPTORS: DULL;STABBING;THROBBING

## 2024-01-01 ASSESSMENT — PAIN DESCRIPTION - ORIENTATION: ORIENTATION: RIGHT

## 2024-01-07 ENCOUNTER — APPOINTMENT (OUTPATIENT)
Dept: CT IMAGING | Age: 44
DRG: 720 | End: 2024-01-07
Payer: COMMERCIAL

## 2024-01-07 ENCOUNTER — HOSPITAL ENCOUNTER (INPATIENT)
Age: 44
LOS: 8 days | Discharge: HOME | DRG: 720 | End: 2024-01-15
Attending: STUDENT IN AN ORGANIZED HEALTH CARE EDUCATION/TRAINING PROGRAM | Admitting: INTERNAL MEDICINE
Payer: COMMERCIAL

## 2024-01-07 ENCOUNTER — APPOINTMENT (OUTPATIENT)
Dept: ULTRASOUND IMAGING | Age: 44
DRG: 720 | End: 2024-01-07
Payer: COMMERCIAL

## 2024-01-07 ENCOUNTER — APPOINTMENT (OUTPATIENT)
Dept: GENERAL RADIOLOGY | Age: 44
DRG: 720 | End: 2024-01-07
Payer: COMMERCIAL

## 2024-01-07 DIAGNOSIS — N43.3 HYDROCELE, UNSPECIFIED HYDROCELE TYPE: ICD-10-CM

## 2024-01-07 DIAGNOSIS — R57.9 SHOCK (HCC): ICD-10-CM

## 2024-01-07 DIAGNOSIS — L03.119 CELLULITIS OF LOWER EXTREMITY, UNSPECIFIED LATERALITY: ICD-10-CM

## 2024-01-07 DIAGNOSIS — R18.8 OTHER ASCITES: ICD-10-CM

## 2024-01-07 DIAGNOSIS — N17.9 AKI (ACUTE KIDNEY INJURY) (HCC): ICD-10-CM

## 2024-01-07 DIAGNOSIS — K72.00 ACUTE LIVER FAILURE WITHOUT HEPATIC COMA: Primary | ICD-10-CM

## 2024-01-07 DIAGNOSIS — D64.9 ACUTE ANEMIA: ICD-10-CM

## 2024-01-07 DIAGNOSIS — S80.11XA HEMATOMA OF RIGHT LOWER EXTREMITY, INITIAL ENCOUNTER: ICD-10-CM

## 2024-01-07 DIAGNOSIS — K52.9 COLITIS: ICD-10-CM

## 2024-01-07 DIAGNOSIS — K70.31 ASCITES DUE TO ALCOHOLIC CIRRHOSIS (HCC): ICD-10-CM

## 2024-01-07 PROBLEM — K72.90 HEPATIC FAILURE, UNSPECIFIED WITHOUT COMA (HCC): Status: ACTIVE | Noted: 2024-01-07

## 2024-01-07 PROBLEM — E87.1 HYPONATREMIA: Status: ACTIVE | Noted: 2024-01-07

## 2024-01-07 LAB
ALBUMIN SERPL-MCNC: 2 G/DL (ref 3.5–5.2)
ALP SERPL-CCNC: 356 U/L (ref 40–129)
ALT SERPL-CCNC: 34 U/L (ref 0–40)
AMMONIA PLAS-SCNC: 39 UMOL/L (ref 16–60)
AMPHET UR QL SCN: NEGATIVE
ANION GAP SERPL CALCULATED.3IONS-SCNC: 11 MMOL/L (ref 7–16)
ANION GAP SERPL CALCULATED.3IONS-SCNC: 11 MMOL/L (ref 7–16)
ANION GAP SERPL CALCULATED.3IONS-SCNC: 13 MMOL/L (ref 7–16)
AST SERPL-CCNC: 73 U/L (ref 0–39)
BARBITURATES UR QL SCN: NEGATIVE
BASOPHILS # BLD: 0 K/UL (ref 0–0.2)
BASOPHILS # BLD: 0.02 K/UL (ref 0–0.2)
BASOPHILS NFR BLD: 0 % (ref 0–2)
BASOPHILS NFR BLD: 0 % (ref 0–2)
BENZODIAZ UR QL: NEGATIVE
BILIRUB DIRECT SERPL-MCNC: 2.5 MG/DL (ref 0–0.3)
BILIRUB INDIRECT SERPL-MCNC: 1.4 MG/DL (ref 0–1)
BILIRUB SERPL-MCNC: 3.9 MG/DL (ref 0–1.2)
BUN SERPL-MCNC: 65 MG/DL (ref 6–20)
BUN SERPL-MCNC: 71 MG/DL (ref 6–20)
BUN SERPL-MCNC: 72 MG/DL (ref 6–20)
BUPRENORPHINE UR QL: NEGATIVE
CA-I BLD-SCNC: 1.01 MMOL/L (ref 1.15–1.33)
CALCIUM SERPL-MCNC: 7.3 MG/DL (ref 8.6–10.2)
CALCIUM SERPL-MCNC: 7.6 MG/DL (ref 8.6–10.2)
CALCIUM SERPL-MCNC: 7.6 MG/DL (ref 8.6–10.2)
CANNABINOIDS UR QL SCN: POSITIVE
CHLORIDE SERPL-SCNC: 83 MMOL/L (ref 98–107)
CHLORIDE SERPL-SCNC: 86 MMOL/L (ref 98–107)
CHLORIDE SERPL-SCNC: 88 MMOL/L (ref 98–107)
CHLORIDE UR-SCNC: <10 MMOL/L
CHLORIDE UR-SCNC: <10 MMOL/L
CO2 SERPL-SCNC: 23 MMOL/L (ref 22–29)
CO2 SERPL-SCNC: 24 MMOL/L (ref 22–29)
CO2 SERPL-SCNC: 24 MMOL/L (ref 22–29)
COCAINE UR QL SCN: NEGATIVE
CORTIS SERPL-MCNC: 15.2 UG/DL (ref 2.7–18.4)
CREAT SERPL-MCNC: 4.3 MG/DL (ref 0.7–1.2)
CREAT SERPL-MCNC: 5.2 MG/DL (ref 0.7–1.2)
CREAT SERPL-MCNC: 5.3 MG/DL (ref 0.7–1.2)
CREAT UR-MCNC: 155.8 MG/DL (ref 40–278)
CREAT UR-MCNC: 156.6 MG/DL (ref 40–278)
CREAT UR-MCNC: 191.4 MG/DL (ref 40–278)
EOSINOPHIL # BLD: 0 K/UL (ref 0.05–0.5)
EOSINOPHIL # BLD: 0.11 K/UL (ref 0.05–0.5)
EOSINOPHILS RELATIVE PERCENT: 0 % (ref 0–6)
EOSINOPHILS RELATIVE PERCENT: 1 % (ref 0–6)
ERYTHROCYTE [DISTWIDTH] IN BLOOD BY AUTOMATED COUNT: 16.1 % (ref 11.5–15)
ERYTHROCYTE [DISTWIDTH] IN BLOOD BY AUTOMATED COUNT: 17.7 % (ref 11.5–15)
FENTANYL UR QL: POSITIVE
GFR SERPL CREATININE-BSD FRML MDRD: 13 ML/MIN/1.73M2
GFR SERPL CREATININE-BSD FRML MDRD: 13 ML/MIN/1.73M2
GFR SERPL CREATININE-BSD FRML MDRD: 17 ML/MIN/1.73M2
GLUCOSE SERPL-MCNC: 124 MG/DL (ref 74–99)
GLUCOSE SERPL-MCNC: 126 MG/DL (ref 74–99)
GLUCOSE SERPL-MCNC: 133 MG/DL (ref 74–99)
HCT VFR BLD AUTO: 11.8 % (ref 37–54)
HCT VFR BLD AUTO: 18 % (ref 37–54)
HCT VFR BLD AUTO: 19.4 % (ref 37–54)
HGB BLD-MCNC: 3.8 G/DL (ref 12.5–16.5)
HGB BLD-MCNC: 5.9 G/DL (ref 12.5–16.5)
HGB BLD-MCNC: 6.5 G/DL (ref 12.5–16.5)
IMM GRANULOCYTES # BLD AUTO: 0.29 K/UL (ref 0–0.58)
IMM GRANULOCYTES NFR BLD: 3 % (ref 0–5)
INR PPP: 2
LACTATE BLDV-SCNC: 2.2 MMOL/L (ref 0.5–2.2)
LACTATE BLDV-SCNC: 2.4 MMOL/L (ref 0.5–1.9)
LACTATE BLDV-SCNC: 3.2 MMOL/L (ref 0.5–1.9)
LACTATE BLDV-SCNC: 3.4 MMOL/L (ref 0.5–2.2)
LIPASE SERPL-CCNC: 63 U/L (ref 13–60)
LYMPHOCYTES NFR BLD: 0.28 K/UL (ref 1.5–4)
LYMPHOCYTES NFR BLD: 0.48 K/UL (ref 1.5–4)
LYMPHOCYTES RELATIVE PERCENT: 3 % (ref 20–42)
LYMPHOCYTES RELATIVE PERCENT: 3 % (ref 20–42)
MAGNESIUM SERPL-MCNC: 2.4 MG/DL (ref 1.6–2.6)
MCH RBC QN AUTO: 26.8 PG (ref 26–35)
MCH RBC QN AUTO: 27.2 PG (ref 26–35)
MCHC RBC AUTO-ENTMCNC: 32.2 G/DL (ref 32–34.5)
MCHC RBC AUTO-ENTMCNC: 32.8 G/DL (ref 32–34.5)
MCV RBC AUTO: 82.9 FL (ref 80–99.9)
MCV RBC AUTO: 83.1 FL (ref 80–99.9)
METAMYELOCYTES ABSOLUTE COUNT: 0.16 K/UL (ref 0–0.12)
METAMYELOCYTES: 1 % (ref 0–1)
METHADONE UR QL: NEGATIVE
MONOCYTES NFR BLD: 0.32 K/UL (ref 0.1–0.95)
MONOCYTES NFR BLD: 1 K/UL (ref 0.1–0.95)
MONOCYTES NFR BLD: 2 % (ref 2–12)
MONOCYTES NFR BLD: 9 % (ref 2–12)
MYELOCYTES ABSOLUTE COUNT: 0.32 K/UL
MYELOCYTES: 2 %
NEUTROPHILS NFR BLD: 85 % (ref 43–80)
NEUTROPHILS NFR BLD: 93 % (ref 43–80)
NEUTS SEG NFR BLD: 17.21 K/UL (ref 1.8–7.3)
NEUTS SEG NFR BLD: 9.5 K/UL (ref 1.8–7.3)
OPIATES UR QL SCN: NEGATIVE
OSMOLALITY SERPL: 280 MOSM/KG (ref 285–310)
OSMOLALITY UR: 339 MOSM/KG (ref 300–900)
OXYCODONE UR QL SCN: NEGATIVE
PCP UR QL SCN: NEGATIVE
PHOSPHATE SERPL-MCNC: 4.8 MG/DL (ref 2.5–4.5)
PLATELET # BLD AUTO: 31 K/UL (ref 130–450)
PLATELET # BLD AUTO: 65 K/UL (ref 130–450)
PLATELET CONFIRMATION: NORMAL
PLATELET CONFIRMATION: NORMAL
PMV BLD AUTO: 10.3 FL (ref 7–12)
PMV BLD AUTO: 10.4 FL (ref 7–12)
POTASSIUM SERPL-SCNC: 4 MMOL/L (ref 3.5–5)
POTASSIUM SERPL-SCNC: 4.3 MMOL/L (ref 3.5–5)
POTASSIUM SERPL-SCNC: 4.4 MMOL/L (ref 3.5–5)
PROT SERPL-MCNC: 4.6 G/DL (ref 6.4–8.3)
PROTHROMBIN TIME: 22.1 SEC (ref 9.3–12.4)
RBC # BLD AUTO: 1.42 M/UL (ref 3.8–5.8)
RBC # BLD AUTO: 2.17 M/UL (ref 3.8–5.8)
RBC # BLD: ABNORMAL 10*6/UL
SODIUM SERPL-SCNC: 120 MMOL/L (ref 132–146)
SODIUM SERPL-SCNC: 121 MMOL/L (ref 132–146)
SODIUM SERPL-SCNC: 122 MMOL/L (ref 132–146)
SODIUM UR-SCNC: <10 MMOL/L
SODIUM UR-SCNC: <10 MMOL/L
TEST INFORMATION: ABNORMAL
TOTAL PROTEIN, URINE: 9 MG/DL (ref 0–12)
URINE TOTAL PROTEIN CREATININE RATIO: 0.06 (ref 0–0.2)
WBC # BLD: ABNORMAL 10*3/UL
WBC OTHER # BLD: 11.2 K/UL (ref 4.5–11.5)
WBC OTHER # BLD: 18.5 K/UL (ref 4.5–11.5)

## 2024-01-07 PROCEDURE — 87040 BLOOD CULTURE FOR BACTERIA: CPT

## 2024-01-07 PROCEDURE — 82140 ASSAY OF AMMONIA: CPT

## 2024-01-07 PROCEDURE — 36556 INSERT NON-TUNNEL CV CATH: CPT

## 2024-01-07 PROCEDURE — 02HV33Z INSERTION OF INFUSION DEVICE INTO SUPERIOR VENA CAVA, PERCUTANEOUS APPROACH: ICD-10-PCS | Performed by: INTERNAL MEDICINE

## 2024-01-07 PROCEDURE — 87154 CUL TYP ID BLD PTHGN 6+ TRGT: CPT

## 2024-01-07 PROCEDURE — 2580000003 HC RX 258: Performed by: EMERGENCY MEDICINE

## 2024-01-07 PROCEDURE — P9047 ALBUMIN (HUMAN), 25%, 50ML: HCPCS | Performed by: EMERGENCY MEDICINE

## 2024-01-07 PROCEDURE — 83690 ASSAY OF LIPASE: CPT

## 2024-01-07 PROCEDURE — 86900 BLOOD TYPING SEROLOGIC ABO: CPT

## 2024-01-07 PROCEDURE — 80307 DRUG TEST PRSMV CHEM ANLYZR: CPT

## 2024-01-07 PROCEDURE — 6360000002 HC RX W HCPCS: Performed by: HOSPITALIST

## 2024-01-07 PROCEDURE — 85025 COMPLETE CBC W/AUTO DIFF WBC: CPT

## 2024-01-07 PROCEDURE — 2000000000 HC ICU R&B

## 2024-01-07 PROCEDURE — 6360000002 HC RX W HCPCS: Performed by: NURSE PRACTITIONER

## 2024-01-07 PROCEDURE — 86927 PLASMA FRESH FROZEN: CPT

## 2024-01-07 PROCEDURE — 6360000002 HC RX W HCPCS: Performed by: EMERGENCY MEDICINE

## 2024-01-07 PROCEDURE — 96365 THER/PROPH/DIAG IV INF INIT: CPT

## 2024-01-07 PROCEDURE — 2580000003 HC RX 258: Performed by: INTERNAL MEDICINE

## 2024-01-07 PROCEDURE — 80053 COMPREHEN METABOLIC PANEL: CPT

## 2024-01-07 PROCEDURE — 82330 ASSAY OF CALCIUM: CPT

## 2024-01-07 PROCEDURE — 36592 COLLECT BLOOD FROM PICC: CPT

## 2024-01-07 PROCEDURE — 85018 HEMOGLOBIN: CPT

## 2024-01-07 PROCEDURE — 74176 CT ABD & PELVIS W/O CONTRAST: CPT

## 2024-01-07 PROCEDURE — 87081 CULTURE SCREEN ONLY: CPT

## 2024-01-07 PROCEDURE — 85610 PROTHROMBIN TIME: CPT

## 2024-01-07 PROCEDURE — 84100 ASSAY OF PHOSPHORUS: CPT

## 2024-01-07 PROCEDURE — 2580000003 HC RX 258: Performed by: NURSE PRACTITIONER

## 2024-01-07 PROCEDURE — C9113 INJ PANTOPRAZOLE SODIUM, VIA: HCPCS | Performed by: NURSE PRACTITIONER

## 2024-01-07 PROCEDURE — 96375 TX/PRO/DX INJ NEW DRUG ADDON: CPT

## 2024-01-07 PROCEDURE — 2500000003 HC RX 250 WO HCPCS: Performed by: EMERGENCY MEDICINE

## 2024-01-07 PROCEDURE — 6370000000 HC RX 637 (ALT 250 FOR IP): Performed by: INTERNAL MEDICINE

## 2024-01-07 PROCEDURE — 6360000002 HC RX W HCPCS: Performed by: INTERNAL MEDICINE

## 2024-01-07 PROCEDURE — 82248 BILIRUBIN DIRECT: CPT

## 2024-01-07 PROCEDURE — 93005 ELECTROCARDIOGRAM TRACING: CPT | Performed by: EMERGENCY MEDICINE

## 2024-01-07 PROCEDURE — 2580000003 HC RX 258: Performed by: HOSPITALIST

## 2024-01-07 PROCEDURE — P9047 ALBUMIN (HUMAN), 25%, 50ML: HCPCS | Performed by: INTERNAL MEDICINE

## 2024-01-07 PROCEDURE — 80048 BASIC METABOLIC PNL TOTAL CA: CPT

## 2024-01-07 PROCEDURE — 6370000000 HC RX 637 (ALT 250 FOR IP): Performed by: NURSE PRACTITIONER

## 2024-01-07 PROCEDURE — 82436 ASSAY OF URINE CHLORIDE: CPT

## 2024-01-07 PROCEDURE — 86901 BLOOD TYPING SEROLOGIC RH(D): CPT

## 2024-01-07 PROCEDURE — 71045 X-RAY EXAM CHEST 1 VIEW: CPT

## 2024-01-07 PROCEDURE — 82570 ASSAY OF URINE CREATININE: CPT

## 2024-01-07 PROCEDURE — 84300 ASSAY OF URINE SODIUM: CPT

## 2024-01-07 PROCEDURE — 86923 COMPATIBILITY TEST ELECTRIC: CPT

## 2024-01-07 PROCEDURE — 96376 TX/PRO/DX INJ SAME DRUG ADON: CPT

## 2024-01-07 PROCEDURE — 84156 ASSAY OF PROTEIN URINE: CPT

## 2024-01-07 PROCEDURE — 99285 EMERGENCY DEPT VISIT HI MDM: CPT

## 2024-01-07 PROCEDURE — 36430 TRANSFUSION BLD/BLD COMPNT: CPT

## 2024-01-07 PROCEDURE — 81001 URINALYSIS AUTO W/SCOPE: CPT

## 2024-01-07 PROCEDURE — 83605 ASSAY OF LACTIC ACID: CPT

## 2024-01-07 PROCEDURE — 86850 RBC ANTIBODY SCREEN: CPT

## 2024-01-07 PROCEDURE — 85014 HEMATOCRIT: CPT

## 2024-01-07 PROCEDURE — P9017 PLASMA 1 DONOR FRZ W/IN 8 HR: HCPCS

## 2024-01-07 PROCEDURE — 82105 ALPHA-FETOPROTEIN SERUM: CPT

## 2024-01-07 PROCEDURE — 83735 ASSAY OF MAGNESIUM: CPT

## 2024-01-07 PROCEDURE — 96368 THER/DIAG CONCURRENT INF: CPT

## 2024-01-07 PROCEDURE — 83930 ASSAY OF BLOOD OSMOLALITY: CPT

## 2024-01-07 PROCEDURE — 83935 ASSAY OF URINE OSMOLALITY: CPT

## 2024-01-07 PROCEDURE — 82533 TOTAL CORTISOL: CPT

## 2024-01-07 PROCEDURE — P9016 RBC LEUKOCYTES REDUCED: HCPCS

## 2024-01-07 RX ORDER — ONDANSETRON 4 MG/1
4 TABLET, ORALLY DISINTEGRATING ORAL EVERY 8 HOURS PRN
Status: DISCONTINUED | OUTPATIENT
Start: 2024-01-07 | End: 2024-01-15 | Stop reason: HOSPADM

## 2024-01-07 RX ORDER — SODIUM CHLORIDE 0.9 % (FLUSH) 0.9 %
10 SYRINGE (ML) INJECTION EVERY 12 HOURS SCHEDULED
Status: DISCONTINUED | OUTPATIENT
Start: 2024-01-07 | End: 2024-01-07

## 2024-01-07 RX ORDER — ACETAMINOPHEN 325 MG/1
650 TABLET ORAL EVERY 6 HOURS PRN
Status: DISCONTINUED | OUTPATIENT
Start: 2024-01-07 | End: 2024-01-07

## 2024-01-07 RX ORDER — OXYCODONE HYDROCHLORIDE 5 MG/1
5 TABLET ORAL EVERY 4 HOURS PRN
Status: DISCONTINUED | OUTPATIENT
Start: 2024-01-07 | End: 2024-01-11

## 2024-01-07 RX ORDER — SENNOSIDES A AND B 8.6 MG/1
1 TABLET, FILM COATED ORAL DAILY PRN
Status: DISCONTINUED | OUTPATIENT
Start: 2024-01-07 | End: 2024-01-15 | Stop reason: HOSPADM

## 2024-01-07 RX ORDER — ONDANSETRON 2 MG/ML
4 INJECTION INTRAMUSCULAR; INTRAVENOUS ONCE
Status: COMPLETED | OUTPATIENT
Start: 2024-01-07 | End: 2024-01-07

## 2024-01-07 RX ORDER — PENTOXIFYLLINE 400 MG/1
400 TABLET, EXTENDED RELEASE ORAL
Status: DISCONTINUED | OUTPATIENT
Start: 2024-01-07 | End: 2024-01-15 | Stop reason: HOSPADM

## 2024-01-07 RX ORDER — PROCHLORPERAZINE EDISYLATE 5 MG/ML
10 INJECTION INTRAMUSCULAR; INTRAVENOUS ONCE
Status: COMPLETED | OUTPATIENT
Start: 2024-01-07 | End: 2024-01-07

## 2024-01-07 RX ORDER — PROCHLORPERAZINE EDISYLATE 5 MG/ML
10 INJECTION INTRAMUSCULAR; INTRAVENOUS EVERY 6 HOURS PRN
Status: DISCONTINUED | OUTPATIENT
Start: 2024-01-07 | End: 2024-01-15 | Stop reason: HOSPADM

## 2024-01-07 RX ORDER — SODIUM CHLORIDE 0.9 % (FLUSH) 0.9 %
5-40 SYRINGE (ML) INJECTION EVERY 12 HOURS SCHEDULED
Status: DISCONTINUED | OUTPATIENT
Start: 2024-01-07 | End: 2024-01-15 | Stop reason: HOSPADM

## 2024-01-07 RX ORDER — METRONIDAZOLE 500 MG/100ML
500 INJECTION, SOLUTION INTRAVENOUS EVERY 8 HOURS
Status: DISCONTINUED | OUTPATIENT
Start: 2024-01-07 | End: 2024-01-07

## 2024-01-07 RX ORDER — SODIUM CHLORIDE 9 MG/ML
INJECTION, SOLUTION INTRAVENOUS PRN
Status: DISCONTINUED | OUTPATIENT
Start: 2024-01-07 | End: 2024-01-08

## 2024-01-07 RX ORDER — SODIUM CHLORIDE 0.9 % (FLUSH) 0.9 %
10 SYRINGE (ML) INJECTION PRN
Status: DISCONTINUED | OUTPATIENT
Start: 2024-01-07 | End: 2024-01-08

## 2024-01-07 RX ORDER — ALBUMIN (HUMAN) 12.5 G/50ML
25 SOLUTION INTRAVENOUS ONCE
Status: COMPLETED | OUTPATIENT
Start: 2024-01-07 | End: 2024-01-07

## 2024-01-07 RX ORDER — SODIUM CHLORIDE 0.9 % (FLUSH) 0.9 %
5-40 SYRINGE (ML) INJECTION PRN
Status: DISCONTINUED | OUTPATIENT
Start: 2024-01-07 | End: 2024-01-15 | Stop reason: HOSPADM

## 2024-01-07 RX ORDER — FENTANYL CITRATE 50 UG/ML
50 INJECTION, SOLUTION INTRAMUSCULAR; INTRAVENOUS ONCE
Status: COMPLETED | OUTPATIENT
Start: 2024-01-07 | End: 2024-01-07

## 2024-01-07 RX ORDER — FUROSEMIDE 10 MG/ML
40 INJECTION INTRAMUSCULAR; INTRAVENOUS ONCE
Status: COMPLETED | OUTPATIENT
Start: 2024-01-07 | End: 2024-01-07

## 2024-01-07 RX ORDER — LANOLIN ALCOHOL/MO/W.PET/CERES
100 CREAM (GRAM) TOPICAL DAILY
Status: DISCONTINUED | OUTPATIENT
Start: 2024-01-07 | End: 2024-01-15 | Stop reason: HOSPADM

## 2024-01-07 RX ORDER — 0.9 % SODIUM CHLORIDE 0.9 %
500 INTRAVENOUS SOLUTION INTRAVENOUS ONCE
Status: COMPLETED | OUTPATIENT
Start: 2024-01-07 | End: 2024-01-07

## 2024-01-07 RX ORDER — PANTOPRAZOLE SODIUM 40 MG/10ML
40 INJECTION, POWDER, LYOPHILIZED, FOR SOLUTION INTRAVENOUS 2 TIMES DAILY
Status: DISCONTINUED | OUTPATIENT
Start: 2024-01-07 | End: 2024-01-15 | Stop reason: HOSPADM

## 2024-01-07 RX ORDER — ONDANSETRON 2 MG/ML
4 INJECTION INTRAMUSCULAR; INTRAVENOUS EVERY 6 HOURS PRN
Status: DISCONTINUED | OUTPATIENT
Start: 2024-01-07 | End: 2024-01-15 | Stop reason: HOSPADM

## 2024-01-07 RX ORDER — FENTANYL CITRATE 50 UG/ML
25 INJECTION, SOLUTION INTRAMUSCULAR; INTRAVENOUS ONCE
Status: COMPLETED | OUTPATIENT
Start: 2024-01-07 | End: 2024-01-07

## 2024-01-07 RX ORDER — FOLIC ACID 1 MG/1
1 TABLET ORAL DAILY
Status: DISCONTINUED | OUTPATIENT
Start: 2024-01-07 | End: 2024-01-15 | Stop reason: HOSPADM

## 2024-01-07 RX ORDER — LORAZEPAM 2 MG/ML
2 INJECTION INTRAMUSCULAR
Status: DISCONTINUED | OUTPATIENT
Start: 2024-01-07 | End: 2024-01-15 | Stop reason: HOSPADM

## 2024-01-07 RX ORDER — MULTIVITAMIN WITH IRON
1 TABLET ORAL DAILY
Status: DISCONTINUED | OUTPATIENT
Start: 2024-01-07 | End: 2024-01-15 | Stop reason: HOSPADM

## 2024-01-07 RX ORDER — FERROUS SULFATE 325(65) MG
325 TABLET ORAL 2 TIMES DAILY WITH MEALS
Status: DISCONTINUED | OUTPATIENT
Start: 2024-01-07 | End: 2024-01-11

## 2024-01-07 RX ORDER — MIDODRINE HYDROCHLORIDE 5 MG/1
2.5 TABLET ORAL
Status: DISCONTINUED | OUTPATIENT
Start: 2024-01-07 | End: 2024-01-15 | Stop reason: HOSPADM

## 2024-01-07 RX ORDER — LORAZEPAM 1 MG/1
2 TABLET ORAL
Status: DISCONTINUED | OUTPATIENT
Start: 2024-01-07 | End: 2024-01-15 | Stop reason: HOSPADM

## 2024-01-07 RX ORDER — ACETAMINOPHEN 650 MG/1
650 SUPPOSITORY RECTAL EVERY 6 HOURS PRN
Status: DISCONTINUED | OUTPATIENT
Start: 2024-01-07 | End: 2024-01-07

## 2024-01-07 RX ORDER — LACTULOSE 10 G/15ML
20 SOLUTION ORAL 3 TIMES DAILY
Status: DISCONTINUED | OUTPATIENT
Start: 2024-01-07 | End: 2024-01-15 | Stop reason: HOSPADM

## 2024-01-07 RX ORDER — METRONIDAZOLE 500 MG/100ML
500 INJECTION, SOLUTION INTRAVENOUS ONCE
Status: COMPLETED | OUTPATIENT
Start: 2024-01-07 | End: 2024-01-07

## 2024-01-07 RX ORDER — LORAZEPAM 2 MG/ML
3 INJECTION INTRAMUSCULAR
Status: DISCONTINUED | OUTPATIENT
Start: 2024-01-07 | End: 2024-01-15 | Stop reason: HOSPADM

## 2024-01-07 RX ORDER — LORAZEPAM 1 MG/1
3 TABLET ORAL
Status: DISCONTINUED | OUTPATIENT
Start: 2024-01-07 | End: 2024-01-15 | Stop reason: HOSPADM

## 2024-01-07 RX ORDER — ALBUMIN (HUMAN) 12.5 G/50ML
25 SOLUTION INTRAVENOUS EVERY 8 HOURS
Status: DISCONTINUED | OUTPATIENT
Start: 2024-01-07 | End: 2024-01-08

## 2024-01-07 RX ORDER — FENTANYL CITRATE 50 UG/ML
25 INJECTION, SOLUTION INTRAMUSCULAR; INTRAVENOUS
Status: DISPENSED | OUTPATIENT
Start: 2024-01-07 | End: 2024-01-08

## 2024-01-07 RX ORDER — OCTREOTIDE ACETATE 50 UG/ML
50 INJECTION, SOLUTION INTRAVENOUS; SUBCUTANEOUS ONCE
Status: COMPLETED | OUTPATIENT
Start: 2024-01-07 | End: 2024-01-07

## 2024-01-07 RX ORDER — SODIUM CHLORIDE 9 MG/ML
INJECTION, SOLUTION INTRAVENOUS PRN
Status: DISCONTINUED | OUTPATIENT
Start: 2024-01-07 | End: 2024-01-15 | Stop reason: HOSPADM

## 2024-01-07 RX ORDER — LORAZEPAM 2 MG/ML
1 INJECTION INTRAMUSCULAR
Status: DISCONTINUED | OUTPATIENT
Start: 2024-01-07 | End: 2024-01-15 | Stop reason: HOSPADM

## 2024-01-07 RX ORDER — LORAZEPAM 1 MG/1
4 TABLET ORAL
Status: DISCONTINUED | OUTPATIENT
Start: 2024-01-07 | End: 2024-01-15 | Stop reason: HOSPADM

## 2024-01-07 RX ORDER — LORAZEPAM 2 MG/ML
4 INJECTION INTRAMUSCULAR
Status: DISCONTINUED | OUTPATIENT
Start: 2024-01-07 | End: 2024-01-15 | Stop reason: HOSPADM

## 2024-01-07 RX ORDER — LORAZEPAM 1 MG/1
1 TABLET ORAL
Status: DISCONTINUED | OUTPATIENT
Start: 2024-01-07 | End: 2024-01-15 | Stop reason: HOSPADM

## 2024-01-07 RX ADMIN — ALBUMIN (HUMAN) 25 G: 0.25 INJECTION, SOLUTION INTRAVENOUS at 12:07

## 2024-01-07 RX ADMIN — FENTANYL CITRATE 25 MCG: 50 INJECTION, SOLUTION INTRAMUSCULAR; INTRAVENOUS at 15:41

## 2024-01-07 RX ADMIN — SODIUM CHLORIDE, PRESERVATIVE FREE 10 ML: 5 INJECTION INTRAVENOUS at 10:01

## 2024-01-07 RX ADMIN — PROCHLORPERAZINE EDISYLATE 10 MG: 5 INJECTION INTRAMUSCULAR; INTRAVENOUS at 05:13

## 2024-01-07 RX ADMIN — LACTULOSE 20 G: 20 SOLUTION ORAL at 21:10

## 2024-01-07 RX ADMIN — MULTIVITAMIN TABLET 1 TABLET: TABLET at 17:54

## 2024-01-07 RX ADMIN — Medication 100 MG: at 10:00

## 2024-01-07 RX ADMIN — FERROUS SULFATE TAB 325 MG (65 MG ELEMENTAL FE) 325 MG: 325 (65 FE) TAB at 10:00

## 2024-01-07 RX ADMIN — METRONIDAZOLE 500 MG: 500 INJECTION, SOLUTION INTRAVENOUS at 04:28

## 2024-01-07 RX ADMIN — PENTOXIFYLLINE 400 MG: 400 TABLET, EXTENDED RELEASE ORAL at 10:00

## 2024-01-07 RX ADMIN — FENTANYL CITRATE 25 MCG: 50 INJECTION, SOLUTION INTRAMUSCULAR; INTRAVENOUS at 18:53

## 2024-01-07 RX ADMIN — FENTANYL CITRATE 50 MCG: 50 INJECTION, SOLUTION INTRAMUSCULAR; INTRAVENOUS at 05:12

## 2024-01-07 RX ADMIN — OXYCODONE 5 MG: 5 TABLET ORAL at 21:09

## 2024-01-07 RX ADMIN — CEFTRIAXONE SODIUM 2000 MG: 2 INJECTION, POWDER, FOR SOLUTION INTRAMUSCULAR; INTRAVENOUS at 04:27

## 2024-01-07 RX ADMIN — FENTANYL CITRATE 25 MCG: 50 INJECTION, SOLUTION INTRAMUSCULAR; INTRAVENOUS at 13:01

## 2024-01-07 RX ADMIN — VANCOMYCIN HYDROCHLORIDE 1500 MG: 500 INJECTION, POWDER, LYOPHILIZED, FOR SOLUTION INTRAVENOUS at 05:14

## 2024-01-07 RX ADMIN — OXYCODONE 5 MG: 5 TABLET ORAL at 16:40

## 2024-01-07 RX ADMIN — FENTANYL CITRATE 50 MCG: 50 INJECTION, SOLUTION INTRAMUSCULAR; INTRAVENOUS at 01:56

## 2024-01-07 RX ADMIN — PANTOPRAZOLE SODIUM 40 MG: 40 INJECTION, POWDER, FOR SOLUTION INTRAVENOUS at 21:10

## 2024-01-07 RX ADMIN — PENTOXIFYLLINE 400 MG: 400 TABLET, EXTENDED RELEASE ORAL at 16:41

## 2024-01-07 RX ADMIN — PANTOPRAZOLE SODIUM 40 MG: 40 INJECTION, POWDER, FOR SOLUTION INTRAVENOUS at 08:21

## 2024-01-07 RX ADMIN — OCTREOTIDE ACETATE 50 MCG/HR: 500 INJECTION, SOLUTION INTRAVENOUS; SUBCUTANEOUS at 08:14

## 2024-01-07 RX ADMIN — SODIUM CHLORIDE 500 ML: 9 INJECTION, SOLUTION INTRAVENOUS at 01:53

## 2024-01-07 RX ADMIN — MIDODRINE HYDROCHLORIDE 2.5 MG: 5 TABLET ORAL at 17:54

## 2024-01-07 RX ADMIN — ONDANSETRON 4 MG: 2 INJECTION INTRAMUSCULAR; INTRAVENOUS at 01:56

## 2024-01-07 RX ADMIN — ALBUMIN (HUMAN) 25 G: 0.25 INJECTION, SOLUTION INTRAVENOUS at 08:27

## 2024-01-07 RX ADMIN — ONDANSETRON 4 MG: 2 INJECTION INTRAMUSCULAR; INTRAVENOUS at 08:20

## 2024-01-07 RX ADMIN — SODIUM CHLORIDE 5 MCG/MIN: 9 INJECTION, SOLUTION INTRAVENOUS at 13:26

## 2024-01-07 RX ADMIN — FERROUS SULFATE TAB 325 MG (65 MG ELEMENTAL FE) 325 MG: 325 (65 FE) TAB at 16:41

## 2024-01-07 RX ADMIN — OCTREOTIDE ACETATE 50 MCG: 50 INJECTION, SOLUTION INTRAVENOUS; SUBCUTANEOUS at 08:08

## 2024-01-07 RX ADMIN — METRONIDAZOLE 500 MG: 500 INJECTION, SOLUTION INTRAVENOUS at 14:07

## 2024-01-07 RX ADMIN — SODIUM CHLORIDE, PRESERVATIVE FREE 10 ML: 5 INJECTION INTRAVENOUS at 21:10

## 2024-01-07 RX ADMIN — FENTANYL CITRATE 25 MCG: 50 INJECTION, SOLUTION INTRAMUSCULAR; INTRAVENOUS at 22:10

## 2024-01-07 RX ADMIN — FUROSEMIDE 40 MG: 10 INJECTION, SOLUTION INTRAMUSCULAR; INTRAVENOUS at 13:08

## 2024-01-07 RX ADMIN — ALBUMIN (HUMAN) 25 G: 0.25 INJECTION, SOLUTION INTRAVENOUS at 21:16

## 2024-01-07 RX ADMIN — FOLIC ACID 1 MG: 1 TABLET ORAL at 17:54

## 2024-01-07 ASSESSMENT — PAIN SCALES - GENERAL
PAINLEVEL_OUTOF10: 5
PAINLEVEL_OUTOF10: 7
PAINLEVEL_OUTOF10: 4
PAINLEVEL_OUTOF10: 6
PAINLEVEL_OUTOF10: 7
PAINLEVEL_OUTOF10: 8
PAINLEVEL_OUTOF10: 10
PAINLEVEL_OUTOF10: 8
PAINLEVEL_OUTOF10: 9

## 2024-01-07 ASSESSMENT — PAIN DESCRIPTION - LOCATION
LOCATION: GENERALIZED
LOCATION: BACK
LOCATION: GENERALIZED

## 2024-01-07 ASSESSMENT — PAIN DESCRIPTION - DESCRIPTORS
DESCRIPTORS: DISCOMFORT
DESCRIPTORS: DISCOMFORT;DULL

## 2024-01-07 ASSESSMENT — PAIN - FUNCTIONAL ASSESSMENT: PAIN_FUNCTIONAL_ASSESSMENT: ACTIVITIES ARE NOT PREVENTED

## 2024-01-07 ASSESSMENT — PAIN DESCRIPTION - PAIN TYPE
TYPE: CHRONIC PAIN
TYPE: CHRONIC PAIN

## 2024-01-07 NOTE — CARE COORDINATION
Internal Medicine On-call Care Coordination Note    I was called by the ED physician because they recommended admission for this patient and we cover their PCP.  The history as I understand it after discussion with the ED physician is as follows:    Presents with abdominal pain and body swelling  Per report patient is very swollen and seeping everywhere especially in his scrotum and lower extremities  Hx cirrhosis, alcoholism, anemia  Hgb 3.8-- ordered 2 units PRBCs  Also with cellulitis BLE  Imaging showing colitis  Given rocephin, flagyl and vanc  Na 122- given albumin  BPs soft but stable  ED felt he was stable for intermediate    I placed admission orders.  Including:    GI, ID, and nephrology consults  NPO pending plan  IV flagyl and rocephin  Antiemetics    Either Dr. Chapa, or our coverage will see the patient for H&P.    Electronically signed by CHRIS Coles CNP on 1/7/2024 at 6:21 AM

## 2024-01-07 NOTE — H&P
Internal Medicine History & Physical     Chief Complaint: Abdominal Pain (Cirrhosis x 2 years, ascites)  Reason for Admission:  cirrhosis with ascites  Primary Care Physician: Yessy Rodgers DO  Code status: full    History of Present Illness  Johnathan is a 43 y.o. year old male who  has a past medical history of Cirrhosis (HCC), Esophageal varices (HCC), ETOH abuse, GAVE (gastric antral vascular ectasia), Hepatitis C, and Portal hypertension (HCC)..     The patient presented to the ER yesterday for abdominal pain and generalized body swelling with generalized body pain.  Patient states that he has noticed a lot of worsening swelling into the scrotum bilateral lower extremities and abdomen.  Apparently he has not been taking his medications as he is supposed to be.  Patient states that he has been sober and has not been drinking any alcohol.  Of note in the emergency room they started him on octreotide drip and albumin drip.  Despite this he his blood pressures were still hypotensive with systolics in the 80s.  In addition to this his hemoglobin was at 3.8 and he is currently about to get his third unit of blood.        Therapy in ED-   Medications   0.9 % sodium chloride infusion (has no administration in time range)   albumin human 25% IV solution 25 g (has no administration in time range)   0.9 % sodium chloride infusion (has no administration in time range)   ferrous sulfate (IRON 325) tablet 325 mg (has no administration in time range)   lactulose (CHRONULAC) 10 GM/15ML solution 20 g (has no administration in time range)   pentoxifylline (TRENTAL) extended release tablet 400 mg (has no administration in time range)   thiamine tablet 100 mg (has no administration in time range)   pantoprazole (PROTONIX) injection 40 mg (has no administration in time range)   cefTRIAXone (ROCEPHIN) 2,000 mg in sterile water 20 mL IV syringe (has no administration in time range)   metroNIDAZOLE (FLAGYL) 500 mg in 0.9% NaCl 100

## 2024-01-07 NOTE — CONSENT
Informed Consent for Blood Component Transfusion Note    I have discussed with the patient the rationale for blood component transfusion; its benefits in treating or preventing fatigue, organ damage, or death; and its risk which includes mild transfusion reactions, rare risk of blood borne infection, or more serious but rare reactions. I have discussed the alternatives to transfusion, including the risk and consequences of not receiving transfusion. The patient had an opportunity to ask questions and had agreed to proceed with transfusion of blood components.    Electronically signed by Brunilda Banda MD on 1/7/24 at 2:39 PM EST

## 2024-01-07 NOTE — PLAN OF CARE

## 2024-01-07 NOTE — ED PROVIDER NOTES
1/7/24  12:55 PM EST      Patient presently admitted and boarded in the department pending bed availability.  Intervention required by emergency physician.     Interval HPI: Patient seen and admitted by previous physician for hyponatremia, alcoholic liver cirrhosis, low hemoglobin.  Gastroenterology, nephrology and hospitalist were initially consulted patient received albumin ordered octreotide drip, 2 units of packed red blood cells for low hemoglobin of 3.8.  Patient was having hypotensive blood pressures and patient's PCP was in the room discussing admission on reevaluation blood pressure 88/36.  He appears stable otherwise vital signs otherwise reassuring.  However he has received multiple liters of fluid and is still hypotensive.  Decisions been made to intervene on the case and be further involved in patient's care    Interval physical exam:     Constitutional/General: Alert and oriented x3, chronically ill-appearing  Head: Normocephalic and atraumatic  Eyes: PERRL, EOMI, sclera non icteric  Mouth: Oropharynx clear, handling secretions,   Neck: Supple, full ROM, no stridor, no meningeal signs  Respiratory: Lungs clear to auscultation bilaterally,Not in respiratory distress    Cardiovascular:  Regular rate. Regular rhythm.  2+ distal pulses. Equal extremity pulses.    GI: Fluid wave noted generalized body swelling consistent with liver cirrhosis and ascites abdomen Soft, Non tender, Non distended. No rebound, guarding, or rigidity.   Musculoskeletal: Moves all extremities x 4. Warm and well perfused,  Capillary refill <3 seconds  Integument: skin warm and dry. No rashes.   Neurologic: Glascow Coma Scale  Best Eye Response 4 - Opens eyes on own   Best Verbal Response 5 - Alert and oriented   Best Motor Response 6 - Follows simple motor commands   Total 15         Medications   0.9 % sodium chloride infusion (has no administration in time range)   0.9 % sodium chloride infusion (has no administration in 
evidence of hyperkalemia although he is hyponatremic to 122 potentially secondary to severe third spacing.  Lactic acid 3.4 with repeat of 3.3, held off on fluid boluses secondary to hyponatremia.  Consult to nephrology pending.  Chronic liver failure with a bilirubin of 3.9.    With concern for cellulitis and colitis, given IV vancomycin, IV Flagyl, IV Rocephin and he tolerated well.  He did require repeat dose of IV fentanyl as well as IV Compazine.  With his acute anemia, attempted to obtain Hemoccult however he is refusing to roll over onto his side secondary to body wide pain and saying he is unable to with all of his swelling.  He notes that he knows what dark stool looks like and he is not having any of that.  He is not having midepigastric burning either.  Blood cultures have all been sent and pending, urine electrolyte studies ordered.    Internal medicine will accept for admission and management of acute on chronic liver failure with acute kidney injury in addition to colitis and cellulitis and acute anemia.  Patient is understanding of plan for admission.    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  Last admission was 8/31/2023 for enterocolitis. [MM]   0200 EKG:  This EKG is signed and interpreted by me.    Rate: 96  Rhythm: Sinus  Interpretation: Normal sinus rhythm, normal axis, nonspecific ST changes throughout, prolonged QT, QTc is 487  Comparison: no previous EKG available    [KG]   0309 WBC(!): 18.5 [MM]   0310 RBC(!): 1.42 [MM]   0310 Hemoglobin Quant(!!): 3.8 [MM]   0310 Hematocrit(!!): 11.8 [MM]   0310 Lipase(!): 63 [MM]   0310 Albumin(!): 2.0 [MM]   0310 Alk Phos(!): 356 [MM]   0310 Glucose, Random(!): 124 [MM]   0310 BUN,BUNPL(!): 72 [MM]   0310 Creatinine(!): 5.2 [MM]   0310 Potassium: 4.4 [MM]   0310 Est, Glom Filt Rate(!): 13 [MM]   0449 Patient refused rectal exam. [MM]   0541 Discussed

## 2024-01-07 NOTE — CONSULTS
CONSULT  Hardeep Kennedy M.D.  [facility]  Dr. Mellissa Cuellar M.D.,  Dr. Atif Johnson M.D.,  Dr. Tino Bal D.O.,  Dr. Jonnie Cai D.O. ,  Dr. Scot Huizar M.D.,          Johnathan PABLO [facility] III  43 y.o.  male      Re: \"Cirrhosis, colitis, anemia\"  Requesting physician: Arti Tuttle,, APRN/Dr. Chapa\  Date:1:22 PM 1/7/2024          HPI: 43-year-old male patient seen in the hospital for above described issue.  Patient is well-known to me from previous hospital admission.  He has history of decompensated cirrhosis secondary to ongoing alcohol abuse.  Most recently he underwent upper endoscopy in June 2023 revealing gastric antral venous ectasia but no varices.  Patient has been consistently refusing colonoscopy including on this admission.  Patient reports being nauseous but denies hematemesis or emesis of coffee-ground material.  He denies blood in the stool or black/tarry stools.  Patient complains of somewhat diffuse abdominal discomfort but his most significant complaint is of lower extremity and scrotal edema.  Patient reports that he has been not compliant with his diuretics at home.    Information sources:   -Patient  -medical record  -health care team    PMHx:  Past Medical History:   Diagnosis Date    Cirrhosis (HCC)     Esophageal varices (HCC)     ETOH abuse     GAVE (gastric antral vascular ectasia)     Hepatitis C     Portal hypertension (HCC)        PSHx:  Past Surgical History:   Procedure Laterality Date    ENDOSCOPY, COLON, DIAGNOSTIC      HERNIA REPAIR      HERNIA REPAIR Left 2/7/2022    LAPAROSCOPIC LEFT INGUINAL HERNIA REPAIR WITH MESH POSSIBLE OPEN POSSIBLE BILATERAL performed by Shashank Candelario MD at [facility] OR    TONSILLECTOMY      UMBILICAL HERNIA REPAIR N/A 9/15/2021    LAPAROSCOPIC POSSIBLE OPEN UMBILICAL HERNIA REPAIR WITH MSH performed by Shashank Candelario MD at [facility] OR    UPPER GASTROINTESTINAL ENDOSCOPY N/A 11/13/2020    EGD BIOPSY performed by Milo Carrasco MD

## 2024-01-07 NOTE — CONSULTS
[facility]  [facility]    Consultation Note     Admit Date: 1/7/2024  1:24 AM    Reason for Consult:   cellulitis    Attending Physician:  Brunilda Banda MD     Chief Complaint: body swelling    HISTORY OF PRESENT ILLNESS:   The patient is a 43 y.o.  male known to the Infectious Diseases service. The patient was seen recently by ID on 9/2023 for enterocolitis. Patient has hx of alcohol cirrhosis with esophageal varices and s/p TIPS 1/2023, hx of HepC was treated for enterocolitis with IV ceftriaxone/flagyl . Patient presented to er with abdominal pain and gen body swelling since he stopped taking lasix for couple days. He has abdominal pain and scrotal swelling and bilateral leg swelling along with pain.     Since admission pt is afebrile. BP is borderline. On RA. Labs showed white count of 18, hgb is 3.8, platelet count is 65, ZEINAB 9 positive for cannabinoid and fentanyl.Lfts are abnormal with high bilirubin. Cr of 5.3/Bun of 71. Hyponatremia, blood cx in process. Ct abdomen showed Marked scrotal wall edema with large left hydrocele.  2. Cirrhosis with splenomegaly and moderate ascites. Status post TIPS.  3. Proximal colonic wall thickening, which could be secondary to portal  colopathy or colitis.  4. Small right greater than left pleural effusions with associated  atelectasis.  5. Status post umbilical hernia mesh repair.  CXR was clear and pt is on ceftriaxone/flagyl and I got consulted for antibiotic management.    Past Medical History:        Diagnosis Date    Cirrhosis (HCC)     Esophageal varices (HCC)     ETOH abuse     GAVE (gastric antral vascular ectasia)     Hepatitis C     Portal hypertension (HCC)      Past Surgical History:        Procedure Laterality Date    ENDOSCOPY, COLON, DIAGNOSTIC      HERNIA REPAIR      HERNIA REPAIR Left 2/7/2022    LAPAROSCOPIC LEFT INGUINAL HERNIA REPAIR WITH MESH POSSIBLE OPEN POSSIBLE BILATERAL performed by Shashank Candelario MD at [facility] OR

## 2024-01-07 NOTE — ED NOTES
The following labs were labeled with appropriate pt sticker and tubed to lab:     [x] Blue     [x] Lavender   [] on ice  [x] Green/yellow  [] Green/black [] on ice  [x] Grey  [x] on ice  [] Yellow  [] Red  [] Pink  [] Type/ Screen  [] ABG  [] VBG    [] COVID-19 swab    [] Rapid  [] PCR  [] Flu swab  [] Peds Viral Panel     [] Urine Sample  [] Fecal Sample  [] Pelvic Cultures  [] Blood Cultures  [] X 2  [] STREP Cultures  [] Wound Cultures

## 2024-01-07 NOTE — CONSULTS
CRITICAL CARE CONSULT NOTE      Patient - Johnathan Yanez III   MRN -  [id number]   [facility] # - [id number]   - 1980      Date of Admission -  2024  1:24 AM  Date of evaluation -  2024/0218-A   Hospital Day - 0      CC/REASON FOR ICU ADMISSION:  Shock  HISTORY OF PRESENT ILLNESS:     This is a 43-year-old male with history of anemia, esophageal varices with cirrhosis, GAVE, hepatitis C, EtOH abuse that presents emergency department with lower extremity pain and swelling.  Reports the pain and swelling been going on for period time.  Patient found to have hemoglobin of 3.8.  No active bleeding noted.  Patient given several units of PRBCs.    Past Medical History:   Diagnosis Date    Cirrhosis (HCC)     Esophageal varices (HCC)     ETOH abuse     GAVE (gastric antral vascular ectasia)     Hepatitis C     Portal hypertension (HCC)      Past Surgical History:   Procedure Laterality Date    ENDOSCOPY, COLON, DIAGNOSTIC      HERNIA REPAIR      HERNIA REPAIR Left 2022    LAPAROSCOPIC LEFT INGUINAL HERNIA REPAIR WITH MESH POSSIBLE OPEN POSSIBLE BILATERAL performed by Shashank Candelario MD at [facility] OR    TONSILLECTOMY      UMBILICAL HERNIA REPAIR N/A 9/15/2021    LAPAROSCOPIC POSSIBLE OPEN UMBILICAL HERNIA REPAIR WITH MSH performed by Shashank Candelario MD at [facility] OR    UPPER GASTROINTESTINAL ENDOSCOPY N/A 2020    EGD BIOPSY performed by Milo Carrasco MD at [facility] ENDOSCOPY    UPPER GASTROINTESTINAL ENDOSCOPY N/A 2021    EGD BAND LIGATION performed by JOIE Huizar MD at [facility] ENDOSCOPY    UPPER GASTROINTESTINAL ENDOSCOPY N/A 5/10/2021    EGD BAND LIGATION performed by JOIE Huizar MD at [facility] OR    UPPER GASTROINTESTINAL ENDOSCOPY N/A 2023    EGD CONTROL HEMORRHAGE performed by Atif Johnson MD at [facility] ENDOSCOPY    UPPER GASTROINTESTINAL ENDOSCOPY  2023    EGD ESOPHAGOGASTRODUODENOSCOPY SCLEROTHERAPY performed by Atif Johnson MD at [facility] ENDOSCOPY    UPPER GASTROINTESTINAL

## 2024-01-08 ENCOUNTER — APPOINTMENT (OUTPATIENT)
Dept: ULTRASOUND IMAGING | Age: 44
DRG: 720 | End: 2024-01-08
Payer: COMMERCIAL

## 2024-01-08 LAB
ALBUMIN SERPL-MCNC: 2.8 G/DL (ref 3.5–5.2)
ALP SERPL-CCNC: 119 U/L (ref 40–129)
ALT SERPL-CCNC: 27 U/L (ref 0–40)
ANION GAP SERPL CALCULATED.3IONS-SCNC: 11 MMOL/L (ref 7–16)
ANION GAP SERPL CALCULATED.3IONS-SCNC: 12 MMOL/L (ref 7–16)
ARM BAND NUMBER: NORMAL
ARM BAND NUMBER: NORMAL
AST SERPL-CCNC: 55 U/L (ref 0–39)
BACTERIA URNS QL MICRO: ABNORMAL
BASOPHILS # BLD: 0.02 K/UL (ref 0–0.2)
BASOPHILS NFR BLD: 0 % (ref 0–2)
BILIRUB SERPL-MCNC: 9.7 MG/DL (ref 0–1.2)
BILIRUB UR QL STRIP: NEGATIVE
BLOOD BANK BLOOD PRODUCT EXPIRATION DATE: NORMAL
BLOOD BANK BLOOD PRODUCT EXPIRATION DATE: NORMAL
BLOOD BANK DISPENSE STATUS: NORMAL
BLOOD BANK DISPENSE STATUS: NORMAL
BLOOD BANK ISBT PRODUCT BLOOD TYPE: 6200
BLOOD BANK ISBT PRODUCT BLOOD TYPE: 6200
BLOOD BANK PRODUCT CODE: NORMAL
BLOOD BANK PRODUCT CODE: NORMAL
BLOOD BANK UNIT TYPE AND RH: NORMAL
BLOOD BANK UNIT TYPE AND RH: NORMAL
BPU ID: NORMAL
BPU ID: NORMAL
BUN SERPL-MCNC: 56 MG/DL (ref 6–20)
BUN SERPL-MCNC: 66 MG/DL (ref 6–20)
CALCIUM SERPL-MCNC: 8 MG/DL (ref 8.6–10.2)
CALCIUM SERPL-MCNC: 8.4 MG/DL (ref 8.6–10.2)
CHLORIDE SERPL-SCNC: 90 MMOL/L (ref 98–107)
CHLORIDE SERPL-SCNC: 91 MMOL/L (ref 98–107)
CLARITY UR: CLEAR
CO2 SERPL-SCNC: 24 MMOL/L (ref 22–29)
CO2 SERPL-SCNC: 25 MMOL/L (ref 22–29)
COLOR UR: YELLOW
COMPONENT: NORMAL
COMPONENT: NORMAL
CREAT SERPL-MCNC: 3 MG/DL (ref 0.7–1.2)
CREAT SERPL-MCNC: 4.1 MG/DL (ref 0.7–1.2)
EOSINOPHIL # BLD: 0.12 K/UL (ref 0.05–0.5)
EOSINOPHILS RELATIVE PERCENT: 1 % (ref 0–6)
EPI CELLS #/AREA URNS HPF: ABNORMAL /HPF
ERYTHROCYTE [DISTWIDTH] IN BLOOD BY AUTOMATED COUNT: 15.8 % (ref 11.5–15)
GFR SERPL CREATININE-BSD FRML MDRD: 18 ML/MIN/1.73M2
GFR SERPL CREATININE-BSD FRML MDRD: 26 ML/MIN/1.73M2
GLUCOSE SERPL-MCNC: 134 MG/DL (ref 74–99)
GLUCOSE SERPL-MCNC: 158 MG/DL (ref 74–99)
GLUCOSE UR STRIP-MCNC: NEGATIVE MG/DL
HCT VFR BLD AUTO: 21.7 % (ref 37–54)
HCT VFR BLD AUTO: 22 % (ref 37–54)
HCT VFR BLD AUTO: 22.1 % (ref 37–54)
HGB BLD-MCNC: 7.4 G/DL (ref 12.5–16.5)
HGB BLD-MCNC: 7.4 G/DL (ref 12.5–16.5)
HGB BLD-MCNC: 7.5 G/DL (ref 12.5–16.5)
HGB UR QL STRIP.AUTO: ABNORMAL
IMM GRANULOCYTES # BLD AUTO: 0.48 K/UL (ref 0–0.58)
IMM GRANULOCYTES NFR BLD: 4 % (ref 0–5)
KETONES UR STRIP-MCNC: NEGATIVE MG/DL
LEUKOCYTE ESTERASE UR QL STRIP: NEGATIVE
LYMPHOCYTES NFR BLD: 0.43 K/UL (ref 1.5–4)
LYMPHOCYTES RELATIVE PERCENT: 4 % (ref 20–42)
MAGNESIUM SERPL-MCNC: 2.4 MG/DL (ref 1.6–2.6)
MCH RBC QN AUTO: 28.6 PG (ref 26–35)
MCHC RBC AUTO-ENTMCNC: 34.1 G/DL (ref 32–34.5)
MCV RBC AUTO: 83.8 FL (ref 80–99.9)
MONOCYTES NFR BLD: 1.27 K/UL (ref 0.1–0.95)
MONOCYTES NFR BLD: 10 % (ref 2–12)
NEUTROPHILS NFR BLD: 81 % (ref 43–80)
NEUTS SEG NFR BLD: 9.88 K/UL (ref 1.8–7.3)
NITRITE UR QL STRIP: NEGATIVE
PH UR STRIP: 5.5 [PH] (ref 5–9)
PHOSPHATE SERPL-MCNC: 4.6 MG/DL (ref 2.5–4.5)
PLATELET # BLD AUTO: 33 K/UL (ref 130–450)
PLATELET CONFIRMATION: NORMAL
PMV BLD AUTO: 10.3 FL (ref 7–12)
POTASSIUM SERPL-SCNC: 3.9 MMOL/L (ref 3.5–5)
POTASSIUM SERPL-SCNC: 4.4 MMOL/L (ref 3.5–5)
PROT SERPL-MCNC: 5 G/DL (ref 6.4–8.3)
PROT UR STRIP-MCNC: NEGATIVE MG/DL
RBC # BLD AUTO: 2.59 M/UL (ref 3.8–5.8)
RBC # BLD: ABNORMAL 10*6/UL
RBC #/AREA URNS HPF: ABNORMAL /HPF
SODIUM SERPL-SCNC: 126 MMOL/L (ref 132–146)
SODIUM SERPL-SCNC: 127 MMOL/L (ref 132–146)
SP GR UR STRIP: 1.01 (ref 1–1.03)
TRANSFUSION STATUS: NORMAL
TRANSFUSION STATUS: NORMAL
UNIT DIVISION: 0
UNIT DIVISION: 0
UNIT ISSUE DATE/TIME: NORMAL
UNIT ISSUE DATE/TIME: NORMAL
UROBILINOGEN UR STRIP-ACNC: 0.2 EU/DL (ref 0–1)
WBC #/AREA URNS HPF: ABNORMAL /HPF
WBC OTHER # BLD: 12.2 K/UL (ref 4.5–11.5)

## 2024-01-08 PROCEDURE — 85025 COMPLETE CBC W/AUTO DIFF WBC: CPT

## 2024-01-08 PROCEDURE — P9047 ALBUMIN (HUMAN), 25%, 50ML: HCPCS | Performed by: INTERNAL MEDICINE

## 2024-01-08 PROCEDURE — 93975 VASCULAR STUDY: CPT

## 2024-01-08 PROCEDURE — 80053 COMPREHEN METABOLIC PANEL: CPT

## 2024-01-08 PROCEDURE — 6370000000 HC RX 637 (ALT 250 FOR IP): Performed by: INTERNAL MEDICINE

## 2024-01-08 PROCEDURE — 6360000002 HC RX W HCPCS: Performed by: INTERNAL MEDICINE

## 2024-01-08 PROCEDURE — 2580000003 HC RX 258: Performed by: NURSE PRACTITIONER

## 2024-01-08 PROCEDURE — 6370000000 HC RX 637 (ALT 250 FOR IP): Performed by: NURSE PRACTITIONER

## 2024-01-08 PROCEDURE — 2580000003 HC RX 258: Performed by: HOSPITALIST

## 2024-01-08 PROCEDURE — 80048 BASIC METABOLIC PNL TOTAL CA: CPT

## 2024-01-08 PROCEDURE — 6360000002 HC RX W HCPCS: Performed by: NURSE PRACTITIONER

## 2024-01-08 PROCEDURE — 36592 COLLECT BLOOD FROM PICC: CPT

## 2024-01-08 PROCEDURE — 6370000000 HC RX 637 (ALT 250 FOR IP)

## 2024-01-08 PROCEDURE — 6360000002 HC RX W HCPCS: Performed by: HOSPITALIST

## 2024-01-08 PROCEDURE — 85014 HEMATOCRIT: CPT

## 2024-01-08 PROCEDURE — 76705 ECHO EXAM OF ABDOMEN: CPT

## 2024-01-08 PROCEDURE — 87086 URINE CULTURE/COLONY COUNT: CPT

## 2024-01-08 PROCEDURE — 6370000000 HC RX 637 (ALT 250 FOR IP): Performed by: HOSPITALIST

## 2024-01-08 PROCEDURE — 84100 ASSAY OF PHOSPHORUS: CPT

## 2024-01-08 PROCEDURE — 2580000003 HC RX 258

## 2024-01-08 PROCEDURE — 2580000003 HC RX 258: Performed by: INTERNAL MEDICINE

## 2024-01-08 PROCEDURE — 2060000000 HC ICU INTERMEDIATE R&B

## 2024-01-08 PROCEDURE — C9113 INJ PANTOPRAZOLE SODIUM, VIA: HCPCS | Performed by: NURSE PRACTITIONER

## 2024-01-08 PROCEDURE — 85018 HEMOGLOBIN: CPT

## 2024-01-08 PROCEDURE — A4216 STERILE WATER/SALINE, 10 ML: HCPCS

## 2024-01-08 PROCEDURE — 83735 ASSAY OF MAGNESIUM: CPT

## 2024-01-08 RX ORDER — PHYTONADIONE 5 MG/1
10 TABLET ORAL ONCE
Status: COMPLETED | OUTPATIENT
Start: 2024-01-08 | End: 2024-01-08

## 2024-01-08 RX ORDER — ALBUMIN (HUMAN) 12.5 G/50ML
25 SOLUTION INTRAVENOUS EVERY 12 HOURS
Status: COMPLETED | OUTPATIENT
Start: 2024-01-08 | End: 2024-01-09

## 2024-01-08 RX ORDER — CYCLOBENZAPRINE HCL 10 MG
10 TABLET ORAL ONCE
Status: COMPLETED | OUTPATIENT
Start: 2024-01-08 | End: 2024-01-08

## 2024-01-08 RX ORDER — SODIUM CHLORIDE 9 MG/ML
INJECTION INTRAVENOUS
Status: COMPLETED
Start: 2024-01-08 | End: 2024-01-08

## 2024-01-08 RX ADMIN — FENTANYL CITRATE 25 MCG: 50 INJECTION, SOLUTION INTRAMUSCULAR; INTRAVENOUS at 07:42

## 2024-01-08 RX ADMIN — OCTREOTIDE ACETATE 50 MCG/HR: 500 INJECTION, SOLUTION INTRAVENOUS; SUBCUTANEOUS at 13:59

## 2024-01-08 RX ADMIN — SODIUM CHLORIDE, PRESERVATIVE FREE 10 ML: 5 INJECTION INTRAVENOUS at 09:00

## 2024-01-08 RX ADMIN — LACTULOSE 20 G: 20 SOLUTION ORAL at 20:27

## 2024-01-08 RX ADMIN — Medication 100 MG: at 13:52

## 2024-01-08 RX ADMIN — PHYTONADIONE 10 MG: 5 TABLET ORAL at 13:56

## 2024-01-08 RX ADMIN — OXYCODONE 5 MG: 5 TABLET ORAL at 09:35

## 2024-01-08 RX ADMIN — PENTOXIFYLLINE 400 MG: 400 TABLET, EXTENDED RELEASE ORAL at 14:11

## 2024-01-08 RX ADMIN — MIDODRINE HYDROCHLORIDE 2.5 MG: 5 TABLET ORAL at 09:36

## 2024-01-08 RX ADMIN — PANTOPRAZOLE SODIUM 40 MG: 40 INJECTION, POWDER, FOR SOLUTION INTRAVENOUS at 20:27

## 2024-01-08 RX ADMIN — FERROUS SULFATE TAB 325 MG (65 MG ELEMENTAL FE) 325 MG: 325 (65 FE) TAB at 13:57

## 2024-01-08 RX ADMIN — OXYCODONE 5 MG: 5 TABLET ORAL at 00:30

## 2024-01-08 RX ADMIN — OXYCODONE 5 MG: 5 TABLET ORAL at 14:11

## 2024-01-08 RX ADMIN — WATER 2000 MG: 1 INJECTION INTRAMUSCULAR; INTRAVENOUS; SUBCUTANEOUS at 05:17

## 2024-01-08 RX ADMIN — OXYCODONE 5 MG: 5 TABLET ORAL at 22:17

## 2024-01-08 RX ADMIN — OCTREOTIDE ACETATE 50 MCG/HR: 500 INJECTION, SOLUTION INTRAVENOUS; SUBCUTANEOUS at 02:49

## 2024-01-08 RX ADMIN — CYCLOBENZAPRINE 10 MG: 10 TABLET, FILM COATED ORAL at 10:08

## 2024-01-08 RX ADMIN — OXYCODONE 5 MG: 5 TABLET ORAL at 18:26

## 2024-01-08 RX ADMIN — ALBUMIN (HUMAN) 25 G: 0.25 INJECTION, SOLUTION INTRAVENOUS at 04:46

## 2024-01-08 RX ADMIN — FENTANYL CITRATE 25 MCG: 50 INJECTION, SOLUTION INTRAMUSCULAR; INTRAVENOUS at 04:09

## 2024-01-08 RX ADMIN — ALBUMIN (HUMAN) 25 G: 0.25 INJECTION, SOLUTION INTRAVENOUS at 17:13

## 2024-01-08 RX ADMIN — LACTULOSE 20 G: 20 SOLUTION ORAL at 13:57

## 2024-01-08 RX ADMIN — MIDODRINE HYDROCHLORIDE 2.5 MG: 5 TABLET ORAL at 18:27

## 2024-01-08 RX ADMIN — MULTIVITAMIN TABLET 1 TABLET: TABLET at 13:53

## 2024-01-08 RX ADMIN — PENTOXIFYLLINE 400 MG: 400 TABLET, EXTENDED RELEASE ORAL at 18:27

## 2024-01-08 RX ADMIN — MIDODRINE HYDROCHLORIDE 2.5 MG: 5 TABLET ORAL at 13:55

## 2024-01-08 RX ADMIN — SODIUM CHLORIDE, PRESERVATIVE FREE 10 ML: 5 INJECTION INTRAVENOUS at 20:27

## 2024-01-08 RX ADMIN — PANTOPRAZOLE SODIUM 40 MG: 40 INJECTION, POWDER, FOR SOLUTION INTRAVENOUS at 09:36

## 2024-01-08 RX ADMIN — SODIUM CHLORIDE 10 ML: 9 INJECTION INTRAMUSCULAR; INTRAVENOUS; SUBCUTANEOUS at 09:36

## 2024-01-08 ASSESSMENT — PAIN SCALES - GENERAL
PAINLEVEL_OUTOF10: 3
PAINLEVEL_OUTOF10: 7
PAINLEVEL_OUTOF10: 4
PAINLEVEL_OUTOF10: 0
PAINLEVEL_OUTOF10: 2
PAINLEVEL_OUTOF10: 7
PAINLEVEL_OUTOF10: 4
PAINLEVEL_OUTOF10: 7
PAINLEVEL_OUTOF10: 0
PAINLEVEL_OUTOF10: 4
PAINLEVEL_OUTOF10: 7
PAINLEVEL_OUTOF10: 0
PAINLEVEL_OUTOF10: 0
PAINLEVEL_OUTOF10: 9
PAINLEVEL_OUTOF10: 0
PAINLEVEL_OUTOF10: 7

## 2024-01-08 ASSESSMENT — PAIN - FUNCTIONAL ASSESSMENT
PAIN_FUNCTIONAL_ASSESSMENT: ACTIVITIES ARE NOT PREVENTED
PAIN_FUNCTIONAL_ASSESSMENT: PREVENTS OR INTERFERES SOME ACTIVE ACTIVITIES AND ADLS
PAIN_FUNCTIONAL_ASSESSMENT: PREVENTS OR INTERFERES SOME ACTIVE ACTIVITIES AND ADLS
PAIN_FUNCTIONAL_ASSESSMENT: ACTIVITIES ARE NOT PREVENTED

## 2024-01-08 ASSESSMENT — PAIN DESCRIPTION - ONSET
ONSET: ON-GOING
ONSET: GRADUAL
ONSET: ON-GOING

## 2024-01-08 ASSESSMENT — PAIN DESCRIPTION - FREQUENCY
FREQUENCY: INTERMITTENT
FREQUENCY: CONTINUOUS

## 2024-01-08 ASSESSMENT — PAIN DESCRIPTION - DESCRIPTORS
DESCRIPTORS: ACHING;DISCOMFORT
DESCRIPTORS: SPASM;ACHING
DESCRIPTORS: ACHING;DISCOMFORT

## 2024-01-08 ASSESSMENT — PAIN DESCRIPTION - PAIN TYPE
TYPE: CHRONIC PAIN

## 2024-01-08 ASSESSMENT — PAIN DESCRIPTION - LOCATION
LOCATION: BACK
LOCATION: BACK
LOCATION: BACK;LEG
LOCATION: BACK
LOCATION: BACK

## 2024-01-08 ASSESSMENT — PAIN DESCRIPTION - ORIENTATION: ORIENTATION: RIGHT;LEFT

## 2024-01-08 NOTE — PLAN OF CARE
Problem: Pain  Goal: Verbalizes/displays adequate comfort level or baseline comfort level  1/8/2024 0004 by Alicia Arce, RN  Outcome: Progressing     Problem: Skin/Tissue Integrity  Goal: Absence of new skin breakdown  Description: 1.  Monitor for areas of redness and/or skin breakdown  2.  Assess vascular access sites hourly  3.  Every 4-6 hours minimum:  Change oxygen saturation probe site  4.  Every 4-6 hours:  If on nasal continuous positive airway pressure, respiratory therapy assess nares and determine need for appliance change or resting period.  1/8/2024 0004 by Alicia Arce, RN  Outcome: Progressing     Problem: Safety - Adult  Goal: Free from fall injury  1/8/2024 0004 by Alicia Arce, RN  Outcome: Progressing

## 2024-01-08 NOTE — CONSULTS
[place] [address]                                  CONSULTATION    PATIENT NAME: TONIO KWOK                :        1980  MED REC NO:   [id number]                            ROOM:       0218  ACCOUNT NO:   [id number]                           ADMIT DATE: 2024  PROVIDER:     Rajeev Patel MD    CONSULT DATE:  2024    REASON FOR CONSULTATION:  Hyponatremia and acute kidney injury.    HISTORY OF PRESENT ILLNESS:  The patient is 43-year-old male who I am  being asked to see for acute kidney injury and hyponatremia.  The  patient has history of liver cirrhosis due to hep C and ethanol abuse.   He presented to the hospital with hypovolemic shock.    The patient is seen in the ICU.  He is on room air.  He is lying flat.   He is on octreotide drip.  Creatinine is up to 5.3.  Baseline creatinine  is around 1.2.  He does have 2 to 3+ lower extremity edema.  He does  have ascites.  He has a Aldana in place and there is around 500 mL urine  output in the Aldana.    REVIEW OF SYSTEMS:  Twelve point done and negative except for those  mentioned above.    ALLERGIES:  Include PENICILLIN.    PAST MEDICAL HISTORY:  Includes liver cirrhosis.    PAST SURGICAL HISTORY:  Include endoscopy.    SOCIAL HISTORY:  Quit smoking.  Positive for alcoholism.    FAMILY HISTORY:  Reviewed and noncontributory.    CURRENT MEDICATIONS:  Include Rocephin and midodrine.    PHYSICAL EXAMINATION:  VITAL SIGNS:  Blood pressure 109/55, heart rate 91.  HEAD:  Atraumatic and normocephalic.  NECK:  Supple, symmetrical.  EYES:  Pupils are round and reactive to light bilaterally.  HEART:  Normal rate.  ABDOMEN:  Distended.  Soft and nontender.  Bowel sounds are active.  No  organomegaly.  EXTREMITIES:  Two plus edema.  LUNGS:  Decreased breath sounds at the bases.  SKIN:  Dry.    BLOOD WORK:  Sodium 12, creatinine 5.3, bicarb 23.  Hemoglobin

## 2024-01-08 NOTE — PLAN OF CARE
Problem: Pain  Goal: Verbalizes/displays adequate comfort level or baseline comfort level  1/8/2024 0754 by Jaja Hampton RN  Outcome: Progressing  1/8/2024 0004 by Alicia Arce RN  Outcome: Progressing     Problem: Skin/Tissue Integrity  Goal: Absence of new skin breakdown  Description: 1.  Monitor for areas of redness and/or skin breakdown  2.  Assess vascular access sites hourly  3.  Every 4-6 hours minimum:  Change oxygen saturation probe site  4.  Every 4-6 hours:  If on nasal continuous positive airway pressure, respiratory therapy assess nares and determine need for appliance change or resting period.  1/8/2024 0754 by Jaja Hampton, RN  Outcome: Progressing  1/8/2024 0004 by Alicia Arce RN  Outcome: Progressing     Problem: Safety - Adult  Goal: Free from fall injury  1/8/2024 0754 by Jaja Hampton, RN  Outcome: Progressing  1/8/2024 0004 by Alicia Arce RN  Outcome: Progressing     Problem: ABCDS Injury Assessment  Goal: Absence of physical injury  Outcome: Progressing

## 2024-01-08 NOTE — CARE COORDINATION
Transition of Care-Met with patient at the bedside, introduced myself and CM role in discharge planning. Patient lives with his fiance in a two story home, bed/bath set up on the second floor. Patient uses a cane and walker to ambulate. PCP is Dr. Rodgers, preferred pharmacy is [facility]. Patient is requesting to go to SNF at discharge, left lists for him to review, will need to follow up. PT/OT ARE ORDERED.    Bella LOPEZ, RN  [facility]

## 2024-01-08 NOTE — ACP (ADVANCE CARE PLANNING)
Advance Care Planning   Healthcare Decision Maker:    Primary Decision Maker: Peggy Rodriguez - [facility] - [phone]    Click here to complete Healthcare Decision Makers including selection of the Healthcare Decision Maker Relationship (ie \"Primary\").

## 2024-01-08 NOTE — PATIENT CARE CONFERENCE
Intensive Care Daily Quality Rounding Checklist      ICU Team Members: Dr. Spivey, Dr. Cintron (resident), clinical pharmacist, charge nurse, bedside nurse, respiratory therapist     ICU Day #: NUMBER: 2    Intubation Date: N/A    Ventilator Day #: N/A    Central Line Insertion Date: January 7        Day #: NUMBER: 2        Indication: CVCIndication: Hemodynamically unstable requiring volume resuscitation     Arterial Line Insertion Date: N/A      Day #: N/A    Temporary Hemodialysis Catheter Insertion Date: N/A      Day # N/A    DVT Prophylaxis: PCDs    GI Prophylaxis: Protonix    Aldana Catheter Insertion Date: January 7       Day #: 2      Indications: Need for fluid management of the critically ill patient in a critical care setting      Continued need (if yes, reason documented and discussed with physician): yes,     Skin Issues/ Wounds and ordered treatment discussed on rounds:     Goals/ Plans for the Day: Daily labs, monitor H&H, ultrasound of abdomen today, EGD on Tuesday, OK to transfer to stepdown floor    Reviewed plan and goals for day with patient and/or representative: yes

## 2024-01-09 ENCOUNTER — APPOINTMENT (OUTPATIENT)
Dept: ULTRASOUND IMAGING | Age: 44
DRG: 720 | End: 2024-01-09
Payer: COMMERCIAL

## 2024-01-09 ENCOUNTER — APPOINTMENT (OUTPATIENT)
Dept: GENERAL RADIOLOGY | Age: 44
DRG: 720 | End: 2024-01-09
Payer: COMMERCIAL

## 2024-01-09 LAB
ALBUMIN SERPL-MCNC: 3.4 G/DL (ref 3.5–5.2)
ALP SERPL-CCNC: 96 U/L (ref 40–129)
ALT SERPL-CCNC: 24 U/L (ref 0–40)
AMMONIA PLAS-SCNC: 28 UMOL/L (ref 16–60)
ANION GAP SERPL CALCULATED.3IONS-SCNC: 13 MMOL/L (ref 7–16)
AST SERPL-CCNC: 44 U/L (ref 0–39)
BASOPHILS # BLD: 0.02 K/UL (ref 0–0.2)
BASOPHILS NFR BLD: 0 % (ref 0–2)
BILIRUB SERPL-MCNC: 8.7 MG/DL (ref 0–1.2)
BUN SERPL-MCNC: 56 MG/DL (ref 6–20)
CALCIUM SERPL-MCNC: 8.6 MG/DL (ref 8.6–10.2)
CHLORIDE SERPL-SCNC: 94 MMOL/L (ref 98–107)
CO2 SERPL-SCNC: 24 MMOL/L (ref 22–29)
CREAT SERPL-MCNC: 2.8 MG/DL (ref 0.7–1.2)
EOSINOPHIL # BLD: 0.06 K/UL (ref 0.05–0.5)
EOSINOPHILS RELATIVE PERCENT: 0 % (ref 0–6)
ERYTHROCYTE [DISTWIDTH] IN BLOOD BY AUTOMATED COUNT: 15.7 % (ref 11.5–15)
GFR SERPL CREATININE-BSD FRML MDRD: 28 ML/MIN/1.73M2
GLUCOSE SERPL-MCNC: 139 MG/DL (ref 74–99)
HCT VFR BLD AUTO: 20.9 % (ref 37–54)
HCT VFR BLD AUTO: 21.4 % (ref 37–54)
HCT VFR BLD AUTO: 21.5 % (ref 37–54)
HCT VFR BLD AUTO: 22 % (ref 37–54)
HGB BLD-MCNC: 7.1 G/DL (ref 12.5–16.5)
HGB BLD-MCNC: 7.2 G/DL (ref 12.5–16.5)
HGB BLD-MCNC: 7.4 G/DL (ref 12.5–16.5)
HGB BLD-MCNC: 7.4 G/DL (ref 12.5–16.5)
IMM GRANULOCYTES # BLD AUTO: 0.57 K/UL (ref 0–0.58)
IMM GRANULOCYTES NFR BLD: 4 % (ref 0–5)
INR PPP: 2.5
LYMPHOCYTES NFR BLD: 0.28 K/UL (ref 1.5–4)
LYMPHOCYTES RELATIVE PERCENT: 2 % (ref 20–42)
MAGNESIUM SERPL-MCNC: 2.6 MG/DL (ref 1.6–2.6)
MCH RBC QN AUTO: 28.7 PG (ref 26–35)
MCHC RBC AUTO-ENTMCNC: 34.4 G/DL (ref 32–34.5)
MCV RBC AUTO: 83.3 FL (ref 80–99.9)
MICROORGANISM SPEC CULT: NO GROWTH
MICROORGANISM SPEC CULT: NORMAL
MONOCYTES NFR BLD: 1.17 K/UL (ref 0.1–0.95)
MONOCYTES NFR BLD: 7 % (ref 2–12)
NEUTROPHILS NFR BLD: 87 % (ref 43–80)
NEUTS SEG NFR BLD: 14.33 K/UL (ref 1.8–7.3)
PHOSPHATE SERPL-MCNC: 3.4 MG/DL (ref 2.5–4.5)
PLATELET # BLD AUTO: 41 K/UL (ref 130–450)
PLATELET CONFIRMATION: NORMAL
PMV BLD AUTO: 10.5 FL (ref 7–12)
POTASSIUM SERPL-SCNC: 4.1 MMOL/L (ref 3.5–5)
PROT SERPL-MCNC: 5.4 G/DL (ref 6.4–8.3)
PROTHROMBIN TIME: 27.9 SEC (ref 9.3–12.4)
RBC # BLD AUTO: 2.58 M/UL (ref 3.8–5.8)
RBC # BLD: ABNORMAL 10*6/UL
SODIUM SERPL-SCNC: 131 MMOL/L (ref 132–146)
SPECIMEN DESCRIPTION: NORMAL
SPECIMEN DESCRIPTION: NORMAL
WBC OTHER # BLD: 16.4 K/UL (ref 4.5–11.5)

## 2024-01-09 PROCEDURE — 2580000003 HC RX 258: Performed by: HOSPITALIST

## 2024-01-09 PROCEDURE — 85610 PROTHROMBIN TIME: CPT

## 2024-01-09 PROCEDURE — 85025 COMPLETE CBC W/AUTO DIFF WBC: CPT

## 2024-01-09 PROCEDURE — 6360000002 HC RX W HCPCS

## 2024-01-09 PROCEDURE — 82140 ASSAY OF AMMONIA: CPT

## 2024-01-09 PROCEDURE — 72100 X-RAY EXAM L-S SPINE 2/3 VWS: CPT

## 2024-01-09 PROCEDURE — 0DJ08ZZ INSPECTION OF UPPER INTESTINAL TRACT, VIA NATURAL OR ARTIFICIAL OPENING ENDOSCOPIC: ICD-10-PCS | Performed by: INTERNAL MEDICINE

## 2024-01-09 PROCEDURE — 36592 COLLECT BLOOD FROM PICC: CPT

## 2024-01-09 PROCEDURE — 6370000000 HC RX 637 (ALT 250 FOR IP): Performed by: INTERNAL MEDICINE

## 2024-01-09 PROCEDURE — 2580000003 HC RX 258: Performed by: INTERNAL MEDICINE

## 2024-01-09 PROCEDURE — 85018 HEMOGLOBIN: CPT

## 2024-01-09 PROCEDURE — 6360000002 HC RX W HCPCS: Performed by: HOSPITALIST

## 2024-01-09 PROCEDURE — P9047 ALBUMIN (HUMAN), 25%, 50ML: HCPCS | Performed by: INTERNAL MEDICINE

## 2024-01-09 PROCEDURE — 2060000000 HC ICU INTERMEDIATE R&B

## 2024-01-09 PROCEDURE — 6360000002 HC RX W HCPCS: Performed by: INTERNAL MEDICINE

## 2024-01-09 PROCEDURE — 80053 COMPREHEN METABOLIC PANEL: CPT

## 2024-01-09 PROCEDURE — 76999 ECHO EXAMINATION PROCEDURE: CPT

## 2024-01-09 PROCEDURE — 84100 ASSAY OF PHOSPHORUS: CPT

## 2024-01-09 PROCEDURE — 2580000003 HC RX 258: Performed by: NURSE PRACTITIONER

## 2024-01-09 PROCEDURE — 83735 ASSAY OF MAGNESIUM: CPT

## 2024-01-09 PROCEDURE — 85014 HEMATOCRIT: CPT

## 2024-01-09 PROCEDURE — 6360000002 HC RX W HCPCS: Performed by: NURSE PRACTITIONER

## 2024-01-09 PROCEDURE — 6370000000 HC RX 637 (ALT 250 FOR IP): Performed by: NURSE PRACTITIONER

## 2024-01-09 PROCEDURE — C9113 INJ PANTOPRAZOLE SODIUM, VIA: HCPCS | Performed by: NURSE PRACTITIONER

## 2024-01-09 PROCEDURE — P9047 ALBUMIN (HUMAN), 25%, 50ML: HCPCS

## 2024-01-09 RX ORDER — ALBUMIN (HUMAN) 12.5 G/50ML
25 SOLUTION INTRAVENOUS EVERY 12 HOURS
Status: COMPLETED | OUTPATIENT
Start: 2024-01-09 | End: 2024-01-10

## 2024-01-09 RX ADMIN — PENTOXIFYLLINE 400 MG: 400 TABLET, EXTENDED RELEASE ORAL at 10:54

## 2024-01-09 RX ADMIN — PANTOPRAZOLE SODIUM 40 MG: 40 INJECTION, POWDER, FOR SOLUTION INTRAVENOUS at 21:58

## 2024-01-09 RX ADMIN — PANTOPRAZOLE SODIUM 40 MG: 40 INJECTION, POWDER, FOR SOLUTION INTRAVENOUS at 09:25

## 2024-01-09 RX ADMIN — OXYCODONE 5 MG: 5 TABLET ORAL at 09:25

## 2024-01-09 RX ADMIN — FOLIC ACID 1 MG: 1 TABLET ORAL at 09:25

## 2024-01-09 RX ADMIN — MIDODRINE HYDROCHLORIDE 2.5 MG: 5 TABLET ORAL at 10:54

## 2024-01-09 RX ADMIN — OXYCODONE 5 MG: 5 TABLET ORAL at 06:43

## 2024-01-09 RX ADMIN — Medication 100 MG: at 09:25

## 2024-01-09 RX ADMIN — FERROUS SULFATE TAB 325 MG (65 MG ELEMENTAL FE) 325 MG: 325 (65 FE) TAB at 09:25

## 2024-01-09 RX ADMIN — LACTULOSE 20 G: 20 SOLUTION ORAL at 21:58

## 2024-01-09 RX ADMIN — MIDODRINE HYDROCHLORIDE 2.5 MG: 5 TABLET ORAL at 09:25

## 2024-01-09 RX ADMIN — MIDODRINE HYDROCHLORIDE 2.5 MG: 5 TABLET ORAL at 18:49

## 2024-01-09 RX ADMIN — LACTULOSE 20 G: 20 SOLUTION ORAL at 09:25

## 2024-01-09 RX ADMIN — WATER 2000 MG: 1 INJECTION INTRAMUSCULAR; INTRAVENOUS; SUBCUTANEOUS at 05:32

## 2024-01-09 RX ADMIN — FERROUS SULFATE TAB 325 MG (65 MG ELEMENTAL FE) 325 MG: 325 (65 FE) TAB at 16:38

## 2024-01-09 RX ADMIN — ALBUMIN (HUMAN) 25 G: 0.25 INJECTION, SOLUTION INTRAVENOUS at 16:39

## 2024-01-09 RX ADMIN — OCTREOTIDE ACETATE 50 MCG/HR: 500 INJECTION, SOLUTION INTRAVENOUS; SUBCUTANEOUS at 00:51

## 2024-01-09 RX ADMIN — OXYCODONE 5 MG: 5 TABLET ORAL at 16:38

## 2024-01-09 RX ADMIN — SODIUM CHLORIDE, PRESERVATIVE FREE 10 ML: 5 INJECTION INTRAVENOUS at 21:58

## 2024-01-09 RX ADMIN — OXYCODONE 5 MG: 5 TABLET ORAL at 01:41

## 2024-01-09 RX ADMIN — ALBUMIN (HUMAN) 25 G: 0.25 INJECTION, SOLUTION INTRAVENOUS at 04:44

## 2024-01-09 RX ADMIN — LACTULOSE 20 G: 20 SOLUTION ORAL at 14:38

## 2024-01-09 RX ADMIN — PENTOXIFYLLINE 400 MG: 400 TABLET, EXTENDED RELEASE ORAL at 09:25

## 2024-01-09 RX ADMIN — PENTOXIFYLLINE 400 MG: 400 TABLET, EXTENDED RELEASE ORAL at 16:38

## 2024-01-09 RX ADMIN — MULTIVITAMIN TABLET 1 TABLET: TABLET at 09:25

## 2024-01-09 ASSESSMENT — PAIN SCALES - GENERAL
PAINLEVEL_OUTOF10: 3
PAINLEVEL_OUTOF10: 4
PAINLEVEL_OUTOF10: 0
PAINLEVEL_OUTOF10: 3
PAINLEVEL_OUTOF10: 0

## 2024-01-09 NOTE — PATIENT CARE CONFERENCE
Intensive Care Daily Quality Rounding Checklist        ICU Team Members: Charge nurse and bedside nurse     ICU Day #: NUMBER: 3 ( 1/8/24)     Intubation Date: N/A     Ventilator Day #: N/A     Central Line Insertion Date: January 7                                                    Day #: NUMBER: 3                                                    Indication: CVC Indication: Hemodynamically unstable requiring volume resuscitation      Arterial Line Insertion Date: N/A                             Day #: N/A     Temporary Hemodialysis Catheter Insertion Date: N/A                             Day # N/A     DVT Prophylaxis: PCDs    GI Prophylaxis: Protonix     Aldana Catheter Insertion Date: January 7                                        Day #: 3                             Indications: Need for fluid management of the critically ill patient in a critical care setting                             Continued need (if yes, reason documented and discussed with physician): yes, swelling of scrotum     Skin Issues/ Wounds and ordered treatment discussed on rounds: SOS precautions     Goals/ Plans for the Day: Daily labs, monitor H&H, EGD at some point, await bed on step down floor     Reviewed plan and goals for day with patient and/or representative: yes

## 2024-01-09 NOTE — CARE COORDINATION
Follow up visit to patient at bedside.  He reviewed previously provided SNF listings    He voices selection of the following  [facility]    If after pt/ot evals, tracy referral is warranted, can initiate calls to facilities above    Patient will need followed and assisted with discharge planning as necessary.     Joaquim Restrepo, MSN RN  [facility] Case Management  [phone]

## 2024-01-10 ENCOUNTER — APPOINTMENT (OUTPATIENT)
Dept: CT IMAGING | Age: 44
DRG: 720 | End: 2024-01-10
Payer: COMMERCIAL

## 2024-01-10 LAB
ACB COMPLEX DNA BLD POS QL NAA+NON-PROBE: NOT DETECTED
ALBUMIN SERPL-MCNC: 3.4 G/DL (ref 3.5–5.2)
ALP SERPL-CCNC: 87 U/L (ref 40–129)
ALT SERPL-CCNC: 18 U/L (ref 0–40)
AMMONIA PLAS-SCNC: 21 UMOL/L (ref 16–60)
ANION GAP SERPL CALCULATED.3IONS-SCNC: 14 MMOL/L (ref 7–16)
AST SERPL-CCNC: 37 U/L (ref 0–39)
B FRAGILIS DNA BLD POS QL NAA+NON-PROBE: NOT DETECTED
BASOPHILS # BLD: 0.02 K/UL (ref 0–0.2)
BASOPHILS NFR BLD: 0 % (ref 0–2)
BILIRUB SERPL-MCNC: 6.5 MG/DL (ref 0–1.2)
BIOFIRE TEST COMMENT: ABNORMAL
BLACTX-M ISLT/SPM QL: NOT DETECTED
BLAIMP ISLT/SPM QL: NOT DETECTED
BLAKPC ISLT/SPM QL: NOT DETECTED
BLAOXA-48-LIKE ISLT/SPM QL: NOT DETECTED
BLAVIM ISLT/SPM QL: NOT DETECTED
BUN SERPL-MCNC: 51 MG/DL (ref 6–20)
C ALBICANS DNA BLD POS QL NAA+NON-PROBE: NOT DETECTED
C AURIS DNA BLD POS QL NAA+NON-PROBE: NOT DETECTED
C GATTII+NEOFOR DNA BLD POS QL NAA+N-PRB: NOT DETECTED
C GLABRATA DNA BLD POS QL NAA+NON-PROBE: NOT DETECTED
C KRUSEI DNA BLD POS QL NAA+NON-PROBE: NOT DETECTED
C PARAP DNA BLD POS QL NAA+NON-PROBE: NOT DETECTED
C TROPICLS DNA BLD POS QL NAA+NON-PROBE: NOT DETECTED
CALCIUM SERPL-MCNC: 8.7 MG/DL (ref 8.6–10.2)
CHLORIDE SERPL-SCNC: 99 MMOL/L (ref 98–107)
CO2 SERPL-SCNC: 23 MMOL/L (ref 22–29)
COLISTIN RES MCR-1 ISLT/SPM QL: NOT DETECTED
CREAT SERPL-MCNC: 2.3 MG/DL (ref 0.7–1.2)
E CLOAC COMP DNA BLD POS NAA+NON-PROBE: NOT DETECTED
E COLI DNA BLD POS QL NAA+NON-PROBE: DETECTED
E FAECALIS DNA BLD POS QL NAA+NON-PROBE: NOT DETECTED
E FAECIUM DNA BLD POS QL NAA+NON-PROBE: NOT DETECTED
EKG ATRIAL RATE: 96 BPM
EKG P AXIS: 42 DEGREES
EKG P-R INTERVAL: 148 MS
EKG Q-T INTERVAL: 386 MS
EKG QRS DURATION: 82 MS
EKG QTC CALCULATION (BAZETT): 487 MS
EKG R AXIS: 49 DEGREES
EKG T AXIS: 21 DEGREES
EKG VENTRICULAR RATE: 96 BPM
ENTEROBACTERALES DNA BLD POS NAA+N-PRB: DETECTED
EOSINOPHIL # BLD: 0.1 K/UL (ref 0.05–0.5)
EOSINOPHILS RELATIVE PERCENT: 1 % (ref 0–6)
ERYTHROCYTE [DISTWIDTH] IN BLOOD BY AUTOMATED COUNT: 16 % (ref 11.5–15)
GFR SERPL CREATININE-BSD FRML MDRD: 35 ML/MIN/1.73M2
GLUCOSE SERPL-MCNC: 131 MG/DL (ref 74–99)
GP B STREP DNA BLD POS QL NAA+NON-PROBE: NOT DETECTED
HAEM INFLU DNA BLD POS QL NAA+NON-PROBE: NOT DETECTED
HCT VFR BLD AUTO: 18.5 % (ref 37–54)
HCT VFR BLD AUTO: 22 % (ref 37–54)
HGB BLD-MCNC: 6.2 G/DL (ref 12.5–16.5)
HGB BLD-MCNC: 7.3 G/DL (ref 12.5–16.5)
IMM GRANULOCYTES # BLD AUTO: 0.65 K/UL (ref 0–0.58)
IMM GRANULOCYTES NFR BLD: 4 % (ref 0–5)
INR PPP: 2.9
K OXYTOCA DNA BLD POS QL NAA+NON-PROBE: NOT DETECTED
KLEBSIELLA SP DNA BLD POS QL NAA+NON-PRB: NOT DETECTED
KLEBSIELLA SP DNA BLD POS QL NAA+NON-PRB: NOT DETECTED
L MONOCYTOG DNA BLD POS QL NAA+NON-PROBE: NOT DETECTED
LYMPHOCYTES NFR BLD: 0.56 K/UL (ref 1.5–4)
LYMPHOCYTES RELATIVE PERCENT: 3 % (ref 20–42)
MAGNESIUM SERPL-MCNC: 2.5 MG/DL (ref 1.6–2.6)
MCH RBC QN AUTO: 28.4 PG (ref 26–35)
MCHC RBC AUTO-ENTMCNC: 33.5 G/DL (ref 32–34.5)
MCV RBC AUTO: 84.9 FL (ref 80–99.9)
MICROORGANISM SPEC CULT: ABNORMAL
MICROORGANISM/AGENT SPEC: ABNORMAL
MONOCYTES NFR BLD: 1.47 K/UL (ref 0.1–0.95)
MONOCYTES NFR BLD: 8 % (ref 2–12)
N MEN DNA BLD POS QL NAA+NON-PROBE: NOT DETECTED
NEUTROPHILS NFR BLD: 84 % (ref 43–80)
NEUTS SEG NFR BLD: 15.2 K/UL (ref 1.8–7.3)
P AERUGINOSA DNA BLD POS NAA+NON-PROBE: NOT DETECTED
PHOSPHATE SERPL-MCNC: 2.8 MG/DL (ref 2.5–4.5)
PLATELET # BLD AUTO: 51 K/UL (ref 130–450)
PLATELET CONFIRMATION: NORMAL
PMV BLD AUTO: 10.2 FL (ref 7–12)
POTASSIUM SERPL-SCNC: 3.9 MMOL/L (ref 3.5–5)
PROT SERPL-MCNC: 5.3 G/DL (ref 6.4–8.3)
PROTEUS SP DNA BLD POS QL NAA+NON-PROBE: NOT DETECTED
PROTHROMBIN TIME: 32.6 SEC (ref 9.3–12.4)
RBC # BLD AUTO: 2.18 M/UL (ref 3.8–5.8)
RBC # BLD: ABNORMAL 10*6/UL
RESISTANT GENE NDM BY PCR: NOT DETECTED
S AUREUS DNA BLD POS QL NAA+NON-PROBE: NOT DETECTED
S AUREUS+CONS DNA BLD POS NAA+NON-PROBE: NOT DETECTED
S EPIDERMIDIS DNA BLD POS QL NAA+NON-PRB: NOT DETECTED
S LUGDUNENSIS DNA BLD POS QL NAA+NON-PRB: NOT DETECTED
S MALTOPHILIA DNA BLD POS QL NAA+NON-PRB: NOT DETECTED
S MARCESCENS DNA BLD POS NAA+NON-PROBE: NOT DETECTED
S PNEUM DNA BLD POS QL NAA+NON-PROBE: NOT DETECTED
S PYO DNA BLD POS QL NAA+NON-PROBE: NOT DETECTED
SALMONELLA DNA BLD POS QL NAA+NON-PROBE: NOT DETECTED
SERVICE CMNT-IMP: ABNORMAL
SODIUM SERPL-SCNC: 136 MMOL/L (ref 132–146)
SPECIMEN DESCRIPTION: ABNORMAL
STREPTOCOCCUS DNA BLD POS NAA+NON-PROBE: NOT DETECTED
WBC OTHER # BLD: 18 K/UL (ref 4.5–11.5)

## 2024-01-10 PROCEDURE — 6370000000 HC RX 637 (ALT 250 FOR IP): Performed by: INTERNAL MEDICINE

## 2024-01-10 PROCEDURE — P9016 RBC LEUKOCYTES REDUCED: HCPCS

## 2024-01-10 PROCEDURE — P9047 ALBUMIN (HUMAN), 25%, 50ML: HCPCS

## 2024-01-10 PROCEDURE — 6360000002 HC RX W HCPCS: Performed by: NURSE PRACTITIONER

## 2024-01-10 PROCEDURE — 83735 ASSAY OF MAGNESIUM: CPT

## 2024-01-10 PROCEDURE — 85018 HEMOGLOBIN: CPT

## 2024-01-10 PROCEDURE — 36430 TRANSFUSION BLD/BLD COMPNT: CPT

## 2024-01-10 PROCEDURE — 36592 COLLECT BLOOD FROM PICC: CPT

## 2024-01-10 PROCEDURE — 99222 1ST HOSP IP/OBS MODERATE 55: CPT | Performed by: SURGERY

## 2024-01-10 PROCEDURE — 2580000003 HC RX 258: Performed by: HOSPITALIST

## 2024-01-10 PROCEDURE — 2060000000 HC ICU INTERMEDIATE R&B

## 2024-01-10 PROCEDURE — 85014 HEMATOCRIT: CPT

## 2024-01-10 PROCEDURE — 2580000003 HC RX 258: Performed by: NURSE PRACTITIONER

## 2024-01-10 PROCEDURE — 82140 ASSAY OF AMMONIA: CPT

## 2024-01-10 PROCEDURE — 93010 ELECTROCARDIOGRAM REPORT: CPT | Performed by: INTERNAL MEDICINE

## 2024-01-10 PROCEDURE — 6360000002 HC RX W HCPCS: Performed by: HOSPITALIST

## 2024-01-10 PROCEDURE — C9113 INJ PANTOPRAZOLE SODIUM, VIA: HCPCS | Performed by: NURSE PRACTITIONER

## 2024-01-10 PROCEDURE — 6370000000 HC RX 637 (ALT 250 FOR IP): Performed by: NURSE PRACTITIONER

## 2024-01-10 PROCEDURE — 80053 COMPREHEN METABOLIC PANEL: CPT

## 2024-01-10 PROCEDURE — 85610 PROTHROMBIN TIME: CPT

## 2024-01-10 PROCEDURE — 84100 ASSAY OF PHOSPHORUS: CPT

## 2024-01-10 PROCEDURE — 73700 CT LOWER EXTREMITY W/O DYE: CPT

## 2024-01-10 PROCEDURE — 6360000002 HC RX W HCPCS

## 2024-01-10 PROCEDURE — 85025 COMPLETE CBC W/AUTO DIFF WBC: CPT

## 2024-01-10 PROCEDURE — 2580000003 HC RX 258: Performed by: INTERNAL MEDICINE

## 2024-01-10 RX ORDER — SODIUM CHLORIDE 9 MG/ML
INJECTION, SOLUTION INTRAVENOUS PRN
Status: DISCONTINUED | OUTPATIENT
Start: 2024-01-10 | End: 2024-01-11 | Stop reason: SDUPTHER

## 2024-01-10 RX ADMIN — LACTULOSE 20 G: 20 SOLUTION ORAL at 15:24

## 2024-01-10 RX ADMIN — OCTREOTIDE ACETATE 50 MCG/HR: 500 INJECTION, SOLUTION INTRAVENOUS; SUBCUTANEOUS at 23:32

## 2024-01-10 RX ADMIN — PANTOPRAZOLE SODIUM 40 MG: 40 INJECTION, POWDER, FOR SOLUTION INTRAVENOUS at 20:48

## 2024-01-10 RX ADMIN — FERROUS SULFATE TAB 325 MG (65 MG ELEMENTAL FE) 325 MG: 325 (65 FE) TAB at 18:21

## 2024-01-10 RX ADMIN — MIDODRINE HYDROCHLORIDE 2.5 MG: 5 TABLET ORAL at 09:29

## 2024-01-10 RX ADMIN — Medication 100 MG: at 09:28

## 2024-01-10 RX ADMIN — PENTOXIFYLLINE 400 MG: 400 TABLET, EXTENDED RELEASE ORAL at 12:22

## 2024-01-10 RX ADMIN — MIDODRINE HYDROCHLORIDE 2.5 MG: 5 TABLET ORAL at 12:23

## 2024-01-10 RX ADMIN — PENTOXIFYLLINE 400 MG: 400 TABLET, EXTENDED RELEASE ORAL at 09:28

## 2024-01-10 RX ADMIN — OCTREOTIDE ACETATE 50 MCG/HR: 500 INJECTION, SOLUTION INTRAVENOUS; SUBCUTANEOUS at 00:17

## 2024-01-10 RX ADMIN — MIDODRINE HYDROCHLORIDE 2.5 MG: 5 TABLET ORAL at 17:24

## 2024-01-10 RX ADMIN — OXYCODONE 5 MG: 5 TABLET ORAL at 09:28

## 2024-01-10 RX ADMIN — FERROUS SULFATE TAB 325 MG (65 MG ELEMENTAL FE) 325 MG: 325 (65 FE) TAB at 09:29

## 2024-01-10 RX ADMIN — MULTIVITAMIN TABLET 1 TABLET: TABLET at 09:28

## 2024-01-10 RX ADMIN — ALBUMIN (HUMAN) 25 G: 0.25 INJECTION, SOLUTION INTRAVENOUS at 05:17

## 2024-01-10 RX ADMIN — SODIUM CHLORIDE, PRESERVATIVE FREE 10 ML: 5 INJECTION INTRAVENOUS at 20:49

## 2024-01-10 RX ADMIN — PENTOXIFYLLINE 400 MG: 400 TABLET, EXTENDED RELEASE ORAL at 17:23

## 2024-01-10 RX ADMIN — PANTOPRAZOLE SODIUM 40 MG: 40 INJECTION, POWDER, FOR SOLUTION INTRAVENOUS at 09:44

## 2024-01-10 RX ADMIN — FOLIC ACID 1 MG: 1 TABLET ORAL at 09:29

## 2024-01-10 RX ADMIN — OCTREOTIDE ACETATE 50 MCG/HR: 500 INJECTION, SOLUTION INTRAVENOUS; SUBCUTANEOUS at 09:19

## 2024-01-10 RX ADMIN — WATER 2000 MG: 1 INJECTION INTRAMUSCULAR; INTRAVENOUS; SUBCUTANEOUS at 05:30

## 2024-01-10 ASSESSMENT — PAIN SCALES - GENERAL
PAINLEVEL_OUTOF10: 0

## 2024-01-10 NOTE — CARE COORDINATION
Peer Recovery Support Note    Name: Johnathan Yanez III  Date: 1/10/2024    Chief Complaint   Patient presents with    Abdominal Pain     Cirrhosis x 2 years, ascites       Peer Support met with patient.  [x] Support and education provided  [x] Resources provided   [] Treatment referral:   [] Other:   [] Patient declined peer recovery services     Referred By: DEVAN MÁRQUEZ    Notes: Patient was groggy when I entered his room. Peer explained her role here at the hospital. Patient has been to treatment in the past at [facility]. When patient gets closer to being discharged and medically cleared, peer will assist if patient is willing to get the help that he needs. Patient asked for me to leave resources for his wife to look over, which peer did. Will continue to follow.    Signed: Gauri Luque, 1/10/2024

## 2024-01-10 NOTE — CARE COORDINATION
Met with patient  Communicated to him the importance of participating in pt/ot evals.  He voiced understanding and agreement.  He also added that he would desire a link to resources to aide him in staying sober.  Referral was called to Gauri with [employer] who voiced intention to meet with patient.  Joaquim Restrepo, MSN RN  [facility] Case Management  [phone]

## 2024-01-10 NOTE — CONSULTS
GENERAL SURGERY  CONSULT NOTE    Patient's Name/Date of Birth: Johnathan Yanez III / 1980    Date: January 10, 2024     PCP: Yessy Rodgers DO     Chief Complaint:   Chief Complaint   Patient presents with    Abdominal Pain     Cirrhosis x 2 years, ascites       Physician Consulted: Dr. Granados  Reason for Consult: Abscess versus hematoma right lower extremity  Referring Physician: Dr. Rodgers    HPI  Johnathan Yanez III is a 43 y.o. male with past medical history of cirrhosis, portal hypertension status post TIPS procedure, esophageal varices, alcohol abuse, and hepatits C who initially presented to the ED with abdominal pain and generalized body swelling.  Patient was admitted for  acute liver failure and was transferred to the ICU for closer monitoring.  GI was consulted.  The patient has a right lower extremity cellulitis for which ID was consulted and patient underwent a right leg ultrasound which was concerning for hematoma versus abscess.  General surgery was consulted for further evaluation.  Patient states he does not recall when his right lower extremity started swelling and states that the doctors here  noticed it..  Denies any trauma to the area.  States area is tender to palpation.  Denies any numbness/tingling into his lower extremities.     US with a complex fluid collection in the posterior calf measuring  up to 5.6 cm.    Labs reviewed.  Hemoglobin 6.2, platelets 51, INR 2.9    Patient received 1 dose of vancomycin.  Patient has been on ceftriaxone for 4 days.     Past Medical History:   Diagnosis Date    Cirrhosis (HCC)     Esophageal varices (HCC)     ETOH abuse     GAVE (gastric antral vascular ectasia)     Hepatitis C     Portal hypertension (HCC)        Past Surgical History:   Procedure Laterality Date    ENDOSCOPY, COLON, DIAGNOSTIC      HERNIA REPAIR      HERNIA REPAIR Left 2/7/2022    LAPAROSCOPIC LEFT INGUINAL HERNIA REPAIR WITH MESH POSSIBLE OPEN POSSIBLE BILATERAL

## 2024-01-10 NOTE — PATIENT CARE CONFERENCE
Intensive Care Daily Quality Rounding Checklist        ICU Team Members: Charge nurse     ICU Day #: N/A--- (IM 1/8/24)     Intubation Date: N/A     Ventilator Day #: N/A     Central Line Insertion Date: January 7                                                    Day #: NUMBER: 4                                                    Indication: CVC Indication: Hemodynamically unstable requiring volume resuscitation      Arterial Line Insertion Date: N/A                             Day #: N/A     Temporary Hemodialysis Catheter Insertion Date: N/A                             Day # N/A     DVT Prophylaxis: PCDs    GI Prophylaxis: Protonix     Aldana Catheter Insertion Date: January 7                                        Day #: 4                             Indications: Need for fluid management of the critically ill patient in a critical care setting                             Continued need (if yes, reason documented and discussed with physician): yes, swelling of scrotum     Skin Issues/ Wounds and ordered treatment discussed on rounds: SOS precautions     Goals/ Plans for the Day:  EGD at some point

## 2024-01-10 NOTE — PLAN OF CARE
Problem: Discharge Planning  Goal: Discharge to home or other facility with appropriate resources  Outcome: Progressing  Flowsheets (Taken 1/9/2024 2000)  Discharge to home or other facility with appropriate resources: Identify barriers to discharge with patient and caregiver     Problem: Pain  Goal: Verbalizes/displays adequate comfort level or baseline comfort level  Outcome: Progressing  Flowsheets  Taken 1/10/2024 0000  Verbalizes/displays adequate comfort level or baseline comfort level: Assess pain using appropriate pain scale  Taken 1/9/2024 2100  Verbalizes/displays adequate comfort level or baseline comfort level: Assess pain using appropriate pain scale     Problem: Skin/Tissue Integrity  Goal: Absence of new skin breakdown  Description: 1.  Monitor for areas of redness and/or skin breakdown  2.  Assess vascular access sites hourly  3.  Every 4-6 hours minimum:  Change oxygen saturation probe site  4.  Every 4-6 hours:  If on nasal continuous positive airway pressure, respiratory therapy assess nares and determine need for appliance change or resting period.  Outcome: Progressing     Problem: Safety - Adult  Goal: Free from fall injury  Outcome: Progressing     Problem: ABCDS Injury Assessment  Goal: Absence of physical injury  Outcome: Progressing

## 2024-01-11 ENCOUNTER — ANESTHESIA EVENT (OUTPATIENT)
Dept: ENDOSCOPY | Age: 44
End: 2024-01-11
Payer: COMMERCIAL

## 2024-01-11 ENCOUNTER — ANESTHESIA (OUTPATIENT)
Dept: ENDOSCOPY | Age: 44
End: 2024-01-11
Payer: COMMERCIAL

## 2024-01-11 PROBLEM — S80.11XA HEMATOMA OF RIGHT LOWER EXTREMITY: Status: ACTIVE | Noted: 2024-01-11

## 2024-01-11 LAB
ALBUMIN SERPL-MCNC: 2.8 G/DL (ref 3.5–5.2)
ALP SERPL-CCNC: 93 U/L (ref 40–129)
ALT SERPL-CCNC: 20 U/L (ref 0–40)
AMMONIA PLAS-SCNC: 24 UMOL/L (ref 16–60)
ANION GAP SERPL CALCULATED.3IONS-SCNC: 10 MMOL/L (ref 7–16)
AST SERPL-CCNC: 42 U/L (ref 0–39)
BASOPHILS # BLD: 0.02 K/UL (ref 0–0.2)
BASOPHILS NFR BLD: 0 % (ref 0–2)
BILIRUB SERPL-MCNC: 6.5 MG/DL (ref 0–1.2)
BUN SERPL-MCNC: 45 MG/DL (ref 6–20)
CALCIUM SERPL-MCNC: 8.5 MG/DL (ref 8.6–10.2)
CHLORIDE SERPL-SCNC: 102 MMOL/L (ref 98–107)
CO2 SERPL-SCNC: 25 MMOL/L (ref 22–29)
CREAT SERPL-MCNC: 1.9 MG/DL (ref 0.7–1.2)
EOSINOPHIL # BLD: 0.23 K/UL (ref 0.05–0.5)
EOSINOPHILS RELATIVE PERCENT: 2 % (ref 0–6)
ERYTHROCYTE [DISTWIDTH] IN BLOOD BY AUTOMATED COUNT: 16.7 % (ref 11.5–15)
GFR SERPL CREATININE-BSD FRML MDRD: 45 ML/MIN/1.73M2
GLUCOSE SERPL-MCNC: 118 MG/DL (ref 74–99)
HCT VFR BLD AUTO: 20.5 % (ref 37–54)
HCT VFR BLD AUTO: 22.6 % (ref 37–54)
HGB BLD-MCNC: 6.9 G/DL (ref 12.5–16.5)
HGB BLD-MCNC: 7.5 G/DL (ref 12.5–16.5)
IMM GRANULOCYTES # BLD AUTO: 0.39 K/UL (ref 0–0.58)
IMM GRANULOCYTES NFR BLD: 3 % (ref 0–5)
INR PPP: 2.1
INR PPP: 2.7
LYMPHOCYTES NFR BLD: 0.58 K/UL (ref 1.5–4)
LYMPHOCYTES RELATIVE PERCENT: 4 % (ref 20–42)
MAGNESIUM SERPL-MCNC: 2.2 MG/DL (ref 1.6–2.6)
MCH RBC QN AUTO: 28.6 PG (ref 26–35)
MCHC RBC AUTO-ENTMCNC: 33.7 G/DL (ref 32–34.5)
MCV RBC AUTO: 85.1 FL (ref 80–99.9)
MONOCYTES NFR BLD: 1.03 K/UL (ref 0.1–0.95)
MONOCYTES NFR BLD: 7 % (ref 2–12)
NEUTROPHILS NFR BLD: 85 % (ref 43–80)
NEUTS SEG NFR BLD: 12.54 K/UL (ref 1.8–7.3)
PHOSPHATE SERPL-MCNC: 2.4 MG/DL (ref 2.5–4.5)
PLATELET # BLD AUTO: 55 K/UL (ref 130–450)
PLATELET CONFIRMATION: NORMAL
PMV BLD AUTO: 9.6 FL (ref 7–12)
POTASSIUM SERPL-SCNC: 3.4 MMOL/L (ref 3.5–5)
PROT SERPL-MCNC: 5.1 G/DL (ref 6.4–8.3)
PROTHROMBIN TIME: 23 SEC (ref 9.3–12.4)
PROTHROMBIN TIME: 29.7 SEC (ref 9.3–12.4)
RBC # BLD AUTO: 2.41 M/UL (ref 3.8–5.8)
RBC # BLD: ABNORMAL 10*6/UL
SODIUM SERPL-SCNC: 137 MMOL/L (ref 132–146)
WBC OTHER # BLD: 14.8 K/UL (ref 4.5–11.5)

## 2024-01-11 PROCEDURE — 88112 CYTOPATH CELL ENHANCE TECH: CPT

## 2024-01-11 PROCEDURE — 30233N1 TRANSFUSION OF NONAUTOLOGOUS RED BLOOD CELLS INTO PERIPHERAL VEIN, PERCUTANEOUS APPROACH: ICD-10-PCS | Performed by: INTERNAL MEDICINE

## 2024-01-11 PROCEDURE — 86901 BLOOD TYPING SEROLOGIC RH(D): CPT

## 2024-01-11 PROCEDURE — 85014 HEMATOCRIT: CPT

## 2024-01-11 PROCEDURE — 85018 HEMOGLOBIN: CPT

## 2024-01-11 PROCEDURE — 86850 RBC ANTIBODY SCREEN: CPT

## 2024-01-11 PROCEDURE — 2060000000 HC ICU INTERMEDIATE R&B

## 2024-01-11 PROCEDURE — 97535 SELF CARE MNGMENT TRAINING: CPT

## 2024-01-11 PROCEDURE — P9017 PLASMA 1 DONOR FRZ W/IN 8 HR: HCPCS

## 2024-01-11 PROCEDURE — 97161 PT EVAL LOW COMPLEX 20 MIN: CPT

## 2024-01-11 PROCEDURE — 83735 ASSAY OF MAGNESIUM: CPT

## 2024-01-11 PROCEDURE — 2720000010 HC SURG SUPPLY STERILE: Performed by: INTERNAL MEDICINE

## 2024-01-11 PROCEDURE — 88305 TISSUE EXAM BY PATHOLOGIST: CPT

## 2024-01-11 PROCEDURE — 3700000000 HC ANESTHESIA ATTENDED CARE: Performed by: INTERNAL MEDICINE

## 2024-01-11 PROCEDURE — 36430 TRANSFUSION BLD/BLD COMPNT: CPT

## 2024-01-11 PROCEDURE — 3700000001 HC ADD 15 MINUTES (ANESTHESIA): Performed by: INTERNAL MEDICINE

## 2024-01-11 PROCEDURE — 7100000011 HC PHASE II RECOVERY - ADDTL 15 MIN: Performed by: INTERNAL MEDICINE

## 2024-01-11 PROCEDURE — 7100000010 HC PHASE II RECOVERY - FIRST 15 MIN: Performed by: INTERNAL MEDICINE

## 2024-01-11 PROCEDURE — 6360000002 HC RX W HCPCS: Performed by: NURSE ANESTHETIST, CERTIFIED REGISTERED

## 2024-01-11 PROCEDURE — 6370000000 HC RX 637 (ALT 250 FOR IP): Performed by: NURSE PRACTITIONER

## 2024-01-11 PROCEDURE — 2580000003 HC RX 258: Performed by: NURSE PRACTITIONER

## 2024-01-11 PROCEDURE — 6370000000 HC RX 637 (ALT 250 FOR IP): Performed by: INTERNAL MEDICINE

## 2024-01-11 PROCEDURE — 6370000000 HC RX 637 (ALT 250 FOR IP)

## 2024-01-11 PROCEDURE — 86900 BLOOD TYPING SEROLOGIC ABO: CPT

## 2024-01-11 PROCEDURE — 82140 ASSAY OF AMMONIA: CPT

## 2024-01-11 PROCEDURE — 6360000002 HC RX W HCPCS: Performed by: INTERNAL MEDICINE

## 2024-01-11 PROCEDURE — 97530 THERAPEUTIC ACTIVITIES: CPT

## 2024-01-11 PROCEDURE — 85610 PROTHROMBIN TIME: CPT

## 2024-01-11 PROCEDURE — 36592 COLLECT BLOOD FROM PICC: CPT

## 2024-01-11 PROCEDURE — 99231 SBSQ HOSP IP/OBS SF/LOW 25: CPT | Performed by: SURGERY

## 2024-01-11 PROCEDURE — 6360000002 HC RX W HCPCS: Performed by: NURSE PRACTITIONER

## 2024-01-11 PROCEDURE — 0W9G3ZZ DRAINAGE OF PERITONEAL CAVITY, PERCUTANEOUS APPROACH: ICD-10-PCS | Performed by: INTERNAL MEDICINE

## 2024-01-11 PROCEDURE — 85025 COMPLETE CBC W/AUTO DIFF WBC: CPT

## 2024-01-11 PROCEDURE — 2709999900 HC NON-CHARGEABLE SUPPLY: Performed by: INTERNAL MEDICINE

## 2024-01-11 PROCEDURE — 86923 COMPATIBILITY TEST ELECTRIC: CPT

## 2024-01-11 PROCEDURE — P9016 RBC LEUKOCYTES REDUCED: HCPCS

## 2024-01-11 PROCEDURE — 86927 PLASMA FRESH FROZEN: CPT

## 2024-01-11 PROCEDURE — 97165 OT EVAL LOW COMPLEX 30 MIN: CPT

## 2024-01-11 PROCEDURE — 2580000003 HC RX 258: Performed by: INTERNAL MEDICINE

## 2024-01-11 PROCEDURE — 2580000003 HC RX 258: Performed by: NURSE ANESTHETIST, CERTIFIED REGISTERED

## 2024-01-11 PROCEDURE — 80053 COMPREHEN METABOLIC PANEL: CPT

## 2024-01-11 PROCEDURE — 3609013000 HC EGD TRANSORAL CONTROL BLEEDING ANY METHOD: Performed by: INTERNAL MEDICINE

## 2024-01-11 PROCEDURE — 84100 ASSAY OF PHOSPHORUS: CPT

## 2024-01-11 PROCEDURE — C9113 INJ PANTOPRAZOLE SODIUM, VIA: HCPCS | Performed by: NURSE PRACTITIONER

## 2024-01-11 RX ORDER — SODIUM CHLORIDE 9 MG/ML
INJECTION, SOLUTION INTRAVENOUS CONTINUOUS PRN
Status: DISCONTINUED | OUTPATIENT
Start: 2024-01-11 | End: 2024-01-11 | Stop reason: SDUPTHER

## 2024-01-11 RX ORDER — IPRATROPIUM BROMIDE AND ALBUTEROL SULFATE 2.5; .5 MG/3ML; MG/3ML
1 SOLUTION RESPIRATORY (INHALATION)
Status: DISCONTINUED | OUTPATIENT
Start: 2024-01-11 | End: 2024-01-11 | Stop reason: HOSPADM

## 2024-01-11 RX ORDER — PROPOFOL 10 MG/ML
INJECTION, EMULSION INTRAVENOUS PRN
Status: DISCONTINUED | OUTPATIENT
Start: 2024-01-11 | End: 2024-01-11 | Stop reason: SDUPTHER

## 2024-01-11 RX ORDER — DEXTROSE MONOHYDRATE 100 MG/ML
INJECTION, SOLUTION INTRAVENOUS CONTINUOUS PRN
Status: DISCONTINUED | OUTPATIENT
Start: 2024-01-11 | End: 2024-01-11 | Stop reason: HOSPADM

## 2024-01-11 RX ORDER — SODIUM CHLORIDE 0.9 % (FLUSH) 0.9 %
5-40 SYRINGE (ML) INJECTION EVERY 12 HOURS SCHEDULED
Status: DISCONTINUED | OUTPATIENT
Start: 2024-01-11 | End: 2024-01-11 | Stop reason: HOSPADM

## 2024-01-11 RX ORDER — SODIUM CHLORIDE 9 MG/ML
INJECTION, SOLUTION INTRAVENOUS PRN
Status: DISCONTINUED | OUTPATIENT
Start: 2024-01-11 | End: 2024-01-15 | Stop reason: HOSPADM

## 2024-01-11 RX ORDER — SODIUM CHLORIDE 9 MG/ML
INJECTION, SOLUTION INTRAVENOUS PRN
Status: DISCONTINUED | OUTPATIENT
Start: 2024-01-11 | End: 2024-01-11 | Stop reason: SDUPTHER

## 2024-01-11 RX ORDER — DIPHENHYDRAMINE HYDROCHLORIDE 50 MG/ML
12.5 INJECTION INTRAMUSCULAR; INTRAVENOUS
Status: DISCONTINUED | OUTPATIENT
Start: 2024-01-11 | End: 2024-01-11 | Stop reason: HOSPADM

## 2024-01-11 RX ORDER — PHYTONADIONE 10 MG/ML
10 INJECTION, EMULSION INTRAMUSCULAR; INTRAVENOUS; SUBCUTANEOUS ONCE
Status: DISCONTINUED | OUTPATIENT
Start: 2024-01-11 | End: 2024-01-11 | Stop reason: CLARIF

## 2024-01-11 RX ORDER — SODIUM CHLORIDE 9 MG/ML
INJECTION, SOLUTION INTRAVENOUS PRN
Status: DISCONTINUED | OUTPATIENT
Start: 2024-01-11 | End: 2024-01-11 | Stop reason: HOSPADM

## 2024-01-11 RX ORDER — FENTANYL CITRATE 50 UG/ML
INJECTION, SOLUTION INTRAMUSCULAR; INTRAVENOUS PRN
Status: DISCONTINUED | OUTPATIENT
Start: 2024-01-11 | End: 2024-01-11 | Stop reason: SDUPTHER

## 2024-01-11 RX ORDER — ONDANSETRON 2 MG/ML
4 INJECTION INTRAMUSCULAR; INTRAVENOUS
Status: DISCONTINUED | OUTPATIENT
Start: 2024-01-11 | End: 2024-01-11 | Stop reason: HOSPADM

## 2024-01-11 RX ORDER — SODIUM CHLORIDE 0.9 % (FLUSH) 0.9 %
5-40 SYRINGE (ML) INJECTION PRN
Status: DISCONTINUED | OUTPATIENT
Start: 2024-01-11 | End: 2024-01-11 | Stop reason: HOSPADM

## 2024-01-11 RX ORDER — POTASSIUM CHLORIDE 20 MEQ/1
40 TABLET, EXTENDED RELEASE ORAL ONCE
Status: COMPLETED | OUTPATIENT
Start: 2024-01-11 | End: 2024-01-11

## 2024-01-11 RX ADMIN — PENTOXIFYLLINE 400 MG: 400 TABLET, EXTENDED RELEASE ORAL at 17:13

## 2024-01-11 RX ADMIN — SODIUM CHLORIDE 100 MG: 9 INJECTION, SOLUTION INTRAVENOUS at 19:47

## 2024-01-11 RX ADMIN — PANTOPRAZOLE SODIUM 40 MG: 40 INJECTION, POWDER, FOR SOLUTION INTRAVENOUS at 10:32

## 2024-01-11 RX ADMIN — POTASSIUM CHLORIDE 40 MEQ: 1500 TABLET, EXTENDED RELEASE ORAL at 06:52

## 2024-01-11 RX ADMIN — SODIUM CHLORIDE, PRESERVATIVE FREE 10 ML: 5 INJECTION INTRAVENOUS at 17:12

## 2024-01-11 RX ADMIN — PANTOPRAZOLE SODIUM 40 MG: 40 INJECTION, POWDER, FOR SOLUTION INTRAVENOUS at 21:03

## 2024-01-11 RX ADMIN — SODIUM CHLORIDE 25 MG: 9 INJECTION, SOLUTION INTRAVENOUS at 18:22

## 2024-01-11 RX ADMIN — PHYTONADIONE 10 MG: 10 INJECTION, EMULSION INTRAMUSCULAR; INTRAVENOUS; SUBCUTANEOUS at 17:11

## 2024-01-11 RX ADMIN — MIDODRINE HYDROCHLORIDE 2.5 MG: 5 TABLET ORAL at 09:32

## 2024-01-11 RX ADMIN — SODIUM CHLORIDE, PRESERVATIVE FREE 10 ML: 5 INJECTION INTRAVENOUS at 21:03

## 2024-01-11 RX ADMIN — SODIUM CHLORIDE: 9 INJECTION, SOLUTION INTRAVENOUS at 14:59

## 2024-01-11 RX ADMIN — MIDODRINE HYDROCHLORIDE 2.5 MG: 5 TABLET ORAL at 17:12

## 2024-01-11 RX ADMIN — SODIUM CHLORIDE, PRESERVATIVE FREE 10 ML: 5 INJECTION INTRAVENOUS at 18:23

## 2024-01-11 RX ADMIN — FENTANYL CITRATE 25 MCG: 50 INJECTION, SOLUTION INTRAMUSCULAR; INTRAVENOUS at 15:04

## 2024-01-11 RX ADMIN — WATER 2000 MG: 1 INJECTION INTRAMUSCULAR; INTRAVENOUS; SUBCUTANEOUS at 06:28

## 2024-01-11 RX ADMIN — PROPOFOL 140 MG: 10 INJECTION, EMULSION INTRAVENOUS at 15:05

## 2024-01-11 ASSESSMENT — PAIN SCALES - GENERAL
PAINLEVEL_OUTOF10: 0

## 2024-01-11 ASSESSMENT — PAIN - FUNCTIONAL ASSESSMENT: PAIN_FUNCTIONAL_ASSESSMENT: 0-10

## 2024-01-11 NOTE — ANESTHESIA POSTPROCEDURE EVALUATION
Department of Anesthesiology  Postprocedure Note    Patient: Johnathan Yanez III  MRN: [id number]  YOB: 1980  Date of evaluation: 1/11/2024    Procedure Summary     Date: 01/11/24 Room / Location: [address]    Anesthesia Start: 1459 Anesthesia Stop: 1517    Procedure: EGD CONTROL HEMORRHAGE Diagnosis:       Anemia, unspecified type      (Anemia, unspecified type [D64.9])    Surgeons: Tino Bal DO Responsible Provider: Amadou Newsome DO    Anesthesia Type: MAC ASA Status: 3          Anesthesia Type: No value filed.    Edmundo Phase I:      Edmundo Phase II:      Anesthesia Post Evaluation    Patient location during evaluation: bedside  Patient participation: complete - patient participated  Level of consciousness: awake  Airway patency: patent  Nausea & Vomiting: no vomiting and no nausea  Cardiovascular status: hemodynamically stable  Respiratory status: spontaneous ventilation and room air  Hydration status: stable  Pain management: adequate        No notable events documented.

## 2024-01-11 NOTE — PATIENT CARE CONFERENCE
Intensive Care Daily Quality Rounding Checklist      ICU Team Members: Charge nurse and bedside nurse    ICU Day #: N/A---IM since 1/8/24    Intubation Date: N/A    Ventilator Day #: N/A    Central Line Insertion Date: January 7        Day #: NUMBER: 5        Indication: CVCIndication: Hemodynamically unstable requiring volume resuscitation     Arterial Line Insertion Date: N/A      Day #: N/A    Temporary Hemodialysis Catheter Insertion Date: N/A      Day # N/A    DVT Prophylaxis: PCDs    GI Prophylaxis: Protonix     Aldana Catheter Insertion Date: January 7       Day #: 5      Indications: Need for fluid management of the critically ill patient in a critical care setting      Continued need (if yes, reason documented and discussed with physician): yes, swelling of scrotum     Skin Issues/ Wounds and ordered treatment discussed on rounds: SOS precautions     Goals/ Plans for the Day: EGD today    Reviewed plan and goals for day with patient and/or representative: Yes'

## 2024-01-11 NOTE — CARE COORDINATION
Referral initiated to [facility] with request for review of acceptance for SNF admission  Will await her review and response regarding bed availability/acceptance.  Joaquim Restrepo, MSN RN  [facility] Case Management  [phone]

## 2024-01-11 NOTE — OP NOTE
views are limited but grossly normal.    Esophagus:   Mucosa is normal other than 2 very small varices seen, no red rodolfo anne, not amendable to banding.  GEJ at ~39 cm.      Stomach:   Antrum with moderate friable GAVE, treated with APC at 30 W with good coagulation and hemostasis.    Gastric body with moderate portal HTN gastropathy.    Retroflexed views showed fundus and cardia with moderate portal HTN gastropathy and no gastric varices.    Duodenum: Bulb is normal.    Second portion of duodenum is normal.  No fresh of old blood.  No AVM seen.    IMPRESSION AND PLAN:     1.  2 very small varices seen, no red rodolfo anne, not amendable to banding.      2.  Moderate friable GAVE, treated with APC at 30 W with good coagulation and hemostasis.    3.  Moderate Portal HTN Gastropathy    4.  Normal duodenum to D2    5.  Continue PPI daily.  Repeat EGD in 4-6 weeks for repeat APC of GAVE.  Recommend IV iron replacement and subQ Vit K.  No NSAID's.  No EtOH!  Pt again refusing colonoscopy and states will reconsider this down the road.  Pt understands possible colitis and missed or late diagnosis of colon cancer by refusing and delaying.  Recommend outpt capsule endoscopy.  Ok for soft diet given refusal of colonoscopy.  Our service will follow.      Follow up as outpatient in office, call [phone] to schedule for appointment.      COLEEN DELGADO DO  1/11/2024  2:45 PM     Electronically signed by COLEEN DELGADO DO on 1/11/2024 at 2:44 PM

## 2024-01-11 NOTE — ANESTHESIA PRE PROCEDURE
3.5 06/04/2023 05:43 AM     01/11/2024 05:10 AM    CO2 25 01/11/2024 05:10 AM    BUN 45 01/11/2024 05:10 AM    CREATININE 1.9 01/11/2024 05:10 AM    GFRAA >60 07/01/2022 06:35 AM    LABGLOM 45 01/11/2024 05:10 AM    GLUCOSE 118 01/11/2024 05:10 AM    GLUCOSE 89 02/20/2012 04:40 AM    PROT 5.1 01/11/2024 05:10 AM    CALCIUM 8.5 01/11/2024 05:10 AM    BILITOT 6.5 01/11/2024 05:10 AM    ALKPHOS 93 01/11/2024 05:10 AM    AST 42 01/11/2024 05:10 AM    ALT 20 01/11/2024 05:10 AM       POC Tests: No results for input(s): \"POCGLU\", \"POCNA\", \"POCK\", \"POCCL\", \"POCBUN\", \"POCHEMO\", \"POCHCT\" in the last 72 hours.    Coags:   Lab Results   Component Value Date/Time    PROTIME 23.0 01/11/2024 01:20 PM    PROTIME 12.3 02/17/2012 10:17 PM    INR 2.1 01/11/2024 01:20 PM    APTT 39.5 09/02/2023 05:29 AM    APTT 32.6 01/23/2023 05:50 AM       HCG (If Applicable): No results found for: \"PREGTESTUR\", \"PREGSERUM\", \"HCG\", \"HCGQUANT\"     ABGs: No results found for: \"PHART\", \"PO2ART\", \"PGZ1IXQ\", \"ICX6QLM\", \"BEART\", \"Y0KZTEPA\"     Type & Screen (If Applicable):  No results found for: \"LABABO\", \"LABRH\"    Drug/Infectious Status (If Applicable):  No results found for: \"HIV\", \"HEPCAB\"    COVID-19 Screening (If Applicable): No results found for: \"COVID19\"        Anesthesia Evaluation  Patient summary reviewed  Airway: Mallampati: II  TM distance: >3 FB   Neck ROM: full  Mouth opening: > = 3 FB   Dental:          Pulmonary:Negative Pulmonary ROS and normal exam                               Cardiovascular:          ECG reviewed                        Neuro/Psych:   Negative Neuro/Psych ROS              GI/Hepatic/Renal:   (+) hepatitis: C, liver disease (Cirrhosis): portal hypertension          Endo/Other:                      ROS comment: EtOH abuse Abdominal:             Vascular: negative vascular ROS.         Other Findings:       Anesthesia Plan      MAC     ASA 3       Induction: intravenous.    MIPS: Prophylactic antiemetics

## 2024-01-12 ENCOUNTER — APPOINTMENT (OUTPATIENT)
Dept: ULTRASOUND IMAGING | Age: 44
DRG: 720 | End: 2024-01-12
Payer: COMMERCIAL

## 2024-01-12 LAB
ABO/RH: NORMAL
ABO/RH: NORMAL
ALBUMIN FLD-MCNC: 0.7 G/DL
ALBUMIN SERPL-MCNC: 3 G/DL (ref 3.5–5.2)
ALP SERPL-CCNC: 79 U/L (ref 40–129)
ALT SERPL-CCNC: 19 U/L (ref 0–40)
AMYLASE FLD-CCNC: 82 U/L
ANION GAP SERPL CALCULATED.3IONS-SCNC: 9 MMOL/L (ref 7–16)
ANTIBODY SCREEN: NEGATIVE
ANTIBODY SCREEN: NEGATIVE
APPEARANCE FLD: CLEAR
ARM BAND NUMBER: NORMAL
AST SERPL-CCNC: 43 U/L (ref 0–39)
BASOPHILS # BLD: 0.04 K/UL (ref 0–0.2)
BASOPHILS NFR BLD: 0 % (ref 0–2)
BILIRUB SERPL-MCNC: 5.5 MG/DL (ref 0–1.2)
BLOOD BANK BLOOD PRODUCT EXPIRATION DATE: NORMAL
BLOOD BANK DISPENSE STATUS: NORMAL
BLOOD BANK ISBT PRODUCT BLOOD TYPE: 6200
BLOOD BANK PRODUCT CODE: NORMAL
BLOOD BANK SAMPLE EXPIRATION: NORMAL
BLOOD BANK SAMPLE EXPIRATION: NORMAL
BLOOD BANK UNIT TYPE AND RH: NORMAL
BODY FLD TYPE: NORMAL
BPU ID: NORMAL
BUN SERPL-MCNC: 37 MG/DL (ref 6–20)
CALCIUM SERPL-MCNC: 8.1 MG/DL (ref 8.6–10.2)
CHLORIDE SERPL-SCNC: 103 MMOL/L (ref 98–107)
CLOT CHECK: NORMAL
CO2 SERPL-SCNC: 25 MMOL/L (ref 22–29)
COLOR FLD: YELLOW
COMPONENT: NORMAL
CREAT SERPL-MCNC: 1.6 MG/DL (ref 0.7–1.2)
CROSSMATCH RESULT: NORMAL
EOSINOPHIL # BLD: 0.35 K/UL (ref 0.05–0.5)
EOSINOPHILS RELATIVE PERCENT: 3 % (ref 0–6)
ERYTHROCYTE [DISTWIDTH] IN BLOOD BY AUTOMATED COUNT: 16.9 % (ref 11.5–15)
GFR SERPL CREATININE-BSD FRML MDRD: 56 ML/MIN/1.73M2
GLUCOSE FLD-MCNC: 106 MG/DL
GLUCOSE SERPL-MCNC: 113 MG/DL (ref 74–99)
HCT VFR BLD AUTO: 22.7 % (ref 37–54)
HCT VFR BLD AUTO: 23 % (ref 37–54)
HCT VFR BLD AUTO: 23.1 % (ref 37–54)
HCT VFR BLD AUTO: 23.2 % (ref 37–54)
HGB BLD-MCNC: 7.3 G/DL (ref 12.5–16.5)
HGB BLD-MCNC: 7.6 G/DL (ref 12.5–16.5)
HGB BLD-MCNC: 7.6 G/DL (ref 12.5–16.5)
HGB BLD-MCNC: 7.8 G/DL (ref 12.5–16.5)
IMM GRANULOCYTES # BLD AUTO: 0.19 K/UL (ref 0–0.58)
IMM GRANULOCYTES NFR BLD: 2 % (ref 0–5)
INR PPP: 2.5
LDH FLD L TO P-CCNC: 59 U/L
LYMPHOCYTES NFR BLD: 0.56 K/UL (ref 1.5–4)
LYMPHOCYTES RELATIVE PERCENT: 5 % (ref 20–42)
MAGNESIUM SERPL-MCNC: 2.1 MG/DL (ref 1.6–2.6)
MCH RBC QN AUTO: 28.2 PG (ref 26–35)
MCHC RBC AUTO-ENTMCNC: 32.2 G/DL (ref 32–34.5)
MCV RBC AUTO: 87.6 FL (ref 80–99.9)
MICROORGANISM SPEC CULT: NORMAL
MONOCYTES NFR BLD: 0.77 K/UL (ref 0.1–0.95)
MONOCYTES NFR BLD: 6 % (ref 2–12)
MONOCYTES NFR FLD: 70 %
NEUTROPHILS NFR BLD: 85 % (ref 43–80)
NEUTROPHILS NFR FLD: 31 %
NEUTS SEG NFR BLD: 10.4 K/UL (ref 1.8–7.3)
PLATELET # BLD AUTO: 45 K/UL (ref 130–450)
PLATELET CONFIRMATION: NORMAL
PMV BLD AUTO: 8.6 FL (ref 7–12)
POTASSIUM SERPL-SCNC: 3.3 MMOL/L (ref 3.5–5)
PROT FLD-MCNC: 1 G/DL
PROT SERPL-MCNC: 5 G/DL (ref 6.4–8.3)
PROTHROMBIN TIME: 27.4 SEC (ref 9.3–12.4)
RBC # BLD AUTO: 2.59 M/UL (ref 3.8–5.8)
RBC # BLD: ABNORMAL 10*6/UL
RBC # FLD: <2000 CELLS/UL
SERVICE CMNT-IMP: NORMAL
SODIUM SERPL-SCNC: 137 MMOL/L (ref 132–146)
SPECIMEN DESCRIPTION: NORMAL
SPECIMEN TYPE: NORMAL
TRANSFUSION STATUS: NORMAL
UNIT DIVISION: 0
UNIT ISSUE DATE/TIME: NORMAL
WBC # FLD: 174 CELLS/UL
WBC OTHER # BLD: 12.3 K/UL (ref 4.5–11.5)

## 2024-01-12 PROCEDURE — 6360000002 HC RX W HCPCS

## 2024-01-12 PROCEDURE — C1729 CATH, DRAINAGE: HCPCS

## 2024-01-12 PROCEDURE — 2580000003 HC RX 258: Performed by: NURSE PRACTITIONER

## 2024-01-12 PROCEDURE — 82042 OTHER SOURCE ALBUMIN QUAN EA: CPT

## 2024-01-12 PROCEDURE — 89051 BODY FLUID CELL COUNT: CPT

## 2024-01-12 PROCEDURE — 82150 ASSAY OF AMYLASE: CPT

## 2024-01-12 PROCEDURE — 6360000002 HC RX W HCPCS: Performed by: NURSE PRACTITIONER

## 2024-01-12 PROCEDURE — C9113 INJ PANTOPRAZOLE SODIUM, VIA: HCPCS | Performed by: NURSE PRACTITIONER

## 2024-01-12 PROCEDURE — 6360000002 HC RX W HCPCS: Performed by: INTERNAL MEDICINE

## 2024-01-12 PROCEDURE — 6370000000 HC RX 637 (ALT 250 FOR IP): Performed by: NURSE PRACTITIONER

## 2024-01-12 PROCEDURE — 2580000003 HC RX 258: Performed by: INTERNAL MEDICINE

## 2024-01-12 PROCEDURE — 83615 LACTATE (LD) (LDH) ENZYME: CPT

## 2024-01-12 PROCEDURE — 80053 COMPREHEN METABOLIC PANEL: CPT

## 2024-01-12 PROCEDURE — 6370000000 HC RX 637 (ALT 250 FOR IP): Performed by: INTERNAL MEDICINE

## 2024-01-12 PROCEDURE — 84157 ASSAY OF PROTEIN OTHER: CPT

## 2024-01-12 PROCEDURE — 85610 PROTHROMBIN TIME: CPT

## 2024-01-12 PROCEDURE — 87070 CULTURE OTHR SPECIMN AEROBIC: CPT

## 2024-01-12 PROCEDURE — 85025 COMPLETE CBC W/AUTO DIFF WBC: CPT

## 2024-01-12 PROCEDURE — 2060000000 HC ICU INTERMEDIATE R&B

## 2024-01-12 PROCEDURE — 87205 SMEAR GRAM STAIN: CPT

## 2024-01-12 PROCEDURE — 2500000003 HC RX 250 WO HCPCS: Performed by: PHYSICIAN ASSISTANT

## 2024-01-12 PROCEDURE — 82945 GLUCOSE OTHER FLUID: CPT

## 2024-01-12 PROCEDURE — 85018 HEMOGLOBIN: CPT

## 2024-01-12 PROCEDURE — 85014 HEMATOCRIT: CPT

## 2024-01-12 PROCEDURE — 83735 ASSAY OF MAGNESIUM: CPT

## 2024-01-12 PROCEDURE — P9047 ALBUMIN (HUMAN), 25%, 50ML: HCPCS | Performed by: INTERNAL MEDICINE

## 2024-01-12 RX ORDER — POTASSIUM CHLORIDE 20 MEQ/1
20 TABLET, EXTENDED RELEASE ORAL ONCE
Status: COMPLETED | OUTPATIENT
Start: 2024-01-12 | End: 2024-01-12

## 2024-01-12 RX ORDER — FUROSEMIDE 10 MG/ML
40 INJECTION INTRAMUSCULAR; INTRAVENOUS ONCE
Status: COMPLETED | OUTPATIENT
Start: 2024-01-12 | End: 2024-01-12

## 2024-01-12 RX ORDER — TRAMADOL HYDROCHLORIDE 50 MG/1
50 TABLET ORAL 3 TIMES DAILY PRN
Status: DISCONTINUED | OUTPATIENT
Start: 2024-01-12 | End: 2024-01-14

## 2024-01-12 RX ORDER — LIDOCAINE HYDROCHLORIDE 10 MG/ML
INJECTION, SOLUTION INFILTRATION; PERINEURAL PRN
Status: COMPLETED | OUTPATIENT
Start: 2024-01-12 | End: 2024-01-12

## 2024-01-12 RX ORDER — POTASSIUM CHLORIDE 20 MEQ/1
40 TABLET, EXTENDED RELEASE ORAL ONCE
Status: COMPLETED | OUTPATIENT
Start: 2024-01-12 | End: 2024-01-12

## 2024-01-12 RX ORDER — ALBUMIN (HUMAN) 12.5 G/50ML
50 SOLUTION INTRAVENOUS ONCE
Status: COMPLETED | OUTPATIENT
Start: 2024-01-12 | End: 2024-01-12

## 2024-01-12 RX ADMIN — PENTOXIFYLLINE 400 MG: 400 TABLET, EXTENDED RELEASE ORAL at 12:19

## 2024-01-12 RX ADMIN — Medication 100 MG: at 08:04

## 2024-01-12 RX ADMIN — PANTOPRAZOLE SODIUM 40 MG: 40 INJECTION, POWDER, FOR SOLUTION INTRAVENOUS at 08:06

## 2024-01-12 RX ADMIN — SODIUM CHLORIDE, PRESERVATIVE FREE 10 ML: 5 INJECTION INTRAVENOUS at 18:21

## 2024-01-12 RX ADMIN — SODIUM CHLORIDE, PRESERVATIVE FREE 10 ML: 5 INJECTION INTRAVENOUS at 12:13

## 2024-01-12 RX ADMIN — MIDODRINE HYDROCHLORIDE 2.5 MG: 5 TABLET ORAL at 17:13

## 2024-01-12 RX ADMIN — SODIUM CHLORIDE, PRESERVATIVE FREE 10 ML: 5 INJECTION INTRAVENOUS at 20:11

## 2024-01-12 RX ADMIN — POTASSIUM CHLORIDE 40 MEQ: 1500 TABLET, EXTENDED RELEASE ORAL at 12:13

## 2024-01-12 RX ADMIN — PANTOPRAZOLE SODIUM 40 MG: 40 INJECTION, POWDER, FOR SOLUTION INTRAVENOUS at 20:11

## 2024-01-12 RX ADMIN — SODIUM CHLORIDE, PRESERVATIVE FREE 10 ML: 5 INJECTION INTRAVENOUS at 16:16

## 2024-01-12 RX ADMIN — SODIUM CHLORIDE, PRESERVATIVE FREE 10 ML: 5 INJECTION INTRAVENOUS at 08:15

## 2024-01-12 RX ADMIN — PENTOXIFYLLINE 400 MG: 400 TABLET, EXTENDED RELEASE ORAL at 08:08

## 2024-01-12 RX ADMIN — LIDOCAINE HYDROCHLORIDE 10 ML: 10 INJECTION, SOLUTION INFILTRATION; PERINEURAL at 11:33

## 2024-01-12 RX ADMIN — MULTIVITAMIN TABLET 1 TABLET: TABLET at 08:04

## 2024-01-12 RX ADMIN — ALBUMIN (HUMAN) 50 G: 0.25 INJECTION, SOLUTION INTRAVENOUS at 14:58

## 2024-01-12 RX ADMIN — FUROSEMIDE 40 MG: 10 INJECTION, SOLUTION INTRAMUSCULAR; INTRAVENOUS at 12:13

## 2024-01-12 RX ADMIN — MIDODRINE HYDROCHLORIDE 2.5 MG: 5 TABLET ORAL at 12:19

## 2024-01-12 RX ADMIN — TRAMADOL HYDROCHLORIDE 50 MG: 50 TABLET, COATED ORAL at 21:13

## 2024-01-12 RX ADMIN — FOLIC ACID 1 MG: 1 TABLET ORAL at 08:04

## 2024-01-12 RX ADMIN — PENTOXIFYLLINE 400 MG: 400 TABLET, EXTENDED RELEASE ORAL at 17:13

## 2024-01-12 RX ADMIN — SODIUM CHLORIDE 125 MG: 900 INJECTION INTRAVENOUS at 17:17

## 2024-01-12 RX ADMIN — WATER 2000 MG: 1 INJECTION INTRAMUSCULAR; INTRAVENOUS; SUBCUTANEOUS at 08:04

## 2024-01-12 RX ADMIN — SODIUM CHLORIDE, PRESERVATIVE FREE 10 ML: 5 INJECTION INTRAVENOUS at 08:04

## 2024-01-12 RX ADMIN — POTASSIUM CHLORIDE 20 MEQ: 1500 TABLET, EXTENDED RELEASE ORAL at 05:15

## 2024-01-12 RX ADMIN — MIDODRINE HYDROCHLORIDE 2.5 MG: 5 TABLET ORAL at 08:04

## 2024-01-12 ASSESSMENT — PAIN SCALES - GENERAL
PAINLEVEL_OUTOF10: 0
PAINLEVEL_OUTOF10: 6

## 2024-01-12 ASSESSMENT — PAIN DESCRIPTION - DESCRIPTORS: DESCRIPTORS: ACHING

## 2024-01-12 ASSESSMENT — PAIN DESCRIPTION - LOCATION: LOCATION: LEG

## 2024-01-12 NOTE — BRIEF OP NOTE
Diagnosis: Ascites    Procedure: Ultrasound Guided Paracentesis    Findings: Small to moderate volume ascites, successful catheter placement with hazy yellow ascitic fluid return    Specimen: Approximately 60cc obtained and sent for analysis (orders from referring physician).  Total amount of fluid removed to be reported in PACS.    EBL: Minimal.    Complication: None immediately post procedure.    Plan:  Return to floor/room following paracentesis.

## 2024-01-12 NOTE — OR NURSING
Patient brought into room, discussed procedure. Joan Parker PA-C answered all questions and concerns related to the procedure. Treatment consent signed. Patient positioned and sterile prepped for the procedure. 1% Lidocaine administered by Joan Parker PA-C.  A total of 1650 mL hazy yellow ascitic fluid was taken. Patient tolerated procedure well. Site cleaned and dressed with a bandage. Vitals monitored and remained stable throughout. Nurse to nurse called and patient transported back to room.

## 2024-01-12 NOTE — CARE COORDINATION
Social Work / Discharge PLanning :SW spoke to [facility] from [place] in regards to if SNF can accept patient.. She will be coming into the hospital to meet with patient. REferral to [facility] for ST rehab. Mariposa stated insurance will ONLY approve a SNF stay for two weeks. Await results of evaluation and if they can accept. Will need pre-cert.  SW to follow. Electronically signed by JOHN Mireles on 1/12/24 at 10:19 AM EST     Addendum: [facility]osa from [place] updated SW that [facility] does NOT have a bed but [facility] does. [facility] met with patient and updated him. Patient is adamant he is going HOME at discharge and NOT to a SNF. PT am/pac 16/24. Patient has a ww. Await plan. SW to follow. Electronically signed by JOHN Mireles on 1/12/24 at 2:28 PM EST

## 2024-01-13 LAB
ALBUMIN SERPL-MCNC: 3.3 G/DL (ref 3.5–5.2)
ALP SERPL-CCNC: 69 U/L (ref 40–129)
ALT SERPL-CCNC: 17 U/L (ref 0–40)
ANION GAP SERPL CALCULATED.3IONS-SCNC: 9 MMOL/L (ref 7–16)
AST SERPL-CCNC: 44 U/L (ref 0–39)
BASOPHILS # BLD: 0.03 K/UL (ref 0–0.2)
BASOPHILS NFR BLD: 0 % (ref 0–2)
BILIRUB SERPL-MCNC: 5 MG/DL (ref 0–1.2)
BUN SERPL-MCNC: 29 MG/DL (ref 6–20)
CALCIUM SERPL-MCNC: 8.2 MG/DL (ref 8.6–10.2)
CHLORIDE SERPL-SCNC: 103 MMOL/L (ref 98–107)
CO2 SERPL-SCNC: 25 MMOL/L (ref 22–29)
CREAT SERPL-MCNC: 1.4 MG/DL (ref 0.7–1.2)
EOSINOPHIL # BLD: 0.27 K/UL (ref 0.05–0.5)
EOSINOPHILS RELATIVE PERCENT: 3 % (ref 0–6)
ERYTHROCYTE [DISTWIDTH] IN BLOOD BY AUTOMATED COUNT: 17 % (ref 11.5–15)
GFR SERPL CREATININE-BSD FRML MDRD: >60 ML/MIN/1.73M2
GLUCOSE SERPL-MCNC: 93 MG/DL (ref 74–99)
HCT VFR BLD AUTO: 22.7 % (ref 37–54)
HCT VFR BLD AUTO: 25.5 % (ref 37–54)
HCT VFR BLD AUTO: 25.6 % (ref 37–54)
HGB BLD-MCNC: 7.4 G/DL (ref 12.5–16.5)
HGB BLD-MCNC: 8.3 G/DL (ref 12.5–16.5)
HGB BLD-MCNC: 8.4 G/DL (ref 12.5–16.5)
IMM GRANULOCYTES # BLD AUTO: 0.08 K/UL (ref 0–0.58)
IMM GRANULOCYTES NFR BLD: 1 % (ref 0–5)
INR PPP: 2.8
LYMPHOCYTES NFR BLD: 0.42 K/UL (ref 1.5–4)
LYMPHOCYTES RELATIVE PERCENT: 5 % (ref 20–42)
MAGNESIUM SERPL-MCNC: 2 MG/DL (ref 1.6–2.6)
MCH RBC QN AUTO: 28.9 PG (ref 26–35)
MCHC RBC AUTO-ENTMCNC: 32.6 G/DL (ref 32–34.5)
MCV RBC AUTO: 88.7 FL (ref 80–99.9)
MONOCYTES NFR BLD: 0.56 K/UL (ref 0.1–0.95)
MONOCYTES NFR BLD: 7 % (ref 2–12)
NEUTROPHILS NFR BLD: 83 % (ref 43–80)
NEUTS SEG NFR BLD: 6.92 K/UL (ref 1.8–7.3)
PLATELET # BLD AUTO: 36 K/UL (ref 130–450)
PLATELET CONFIRMATION: NORMAL
PMV BLD AUTO: 9 FL (ref 7–12)
POTASSIUM SERPL-SCNC: 3.4 MMOL/L (ref 3.5–5)
PROT SERPL-MCNC: 5.2 G/DL (ref 6.4–8.3)
PROTHROMBIN TIME: 31.8 SEC (ref 9.3–12.4)
RBC # BLD AUTO: 2.56 M/UL (ref 3.8–5.8)
RBC # BLD: ABNORMAL 10*6/UL
SODIUM SERPL-SCNC: 137 MMOL/L (ref 132–146)
WBC OTHER # BLD: 8.3 K/UL (ref 4.5–11.5)

## 2024-01-13 PROCEDURE — 80053 COMPREHEN METABOLIC PANEL: CPT

## 2024-01-13 PROCEDURE — 2060000000 HC ICU INTERMEDIATE R&B

## 2024-01-13 PROCEDURE — 6370000000 HC RX 637 (ALT 250 FOR IP): Performed by: NURSE PRACTITIONER

## 2024-01-13 PROCEDURE — 83735 ASSAY OF MAGNESIUM: CPT

## 2024-01-13 PROCEDURE — 6370000000 HC RX 637 (ALT 250 FOR IP): Performed by: INTERNAL MEDICINE

## 2024-01-13 PROCEDURE — 2580000003 HC RX 258: Performed by: NURSE PRACTITIONER

## 2024-01-13 PROCEDURE — 2580000003 HC RX 258: Performed by: INTERNAL MEDICINE

## 2024-01-13 PROCEDURE — 85025 COMPLETE CBC W/AUTO DIFF WBC: CPT

## 2024-01-13 PROCEDURE — 6360000002 HC RX W HCPCS: Performed by: NURSE PRACTITIONER

## 2024-01-13 PROCEDURE — 85014 HEMATOCRIT: CPT

## 2024-01-13 PROCEDURE — C9113 INJ PANTOPRAZOLE SODIUM, VIA: HCPCS | Performed by: NURSE PRACTITIONER

## 2024-01-13 PROCEDURE — 85610 PROTHROMBIN TIME: CPT

## 2024-01-13 PROCEDURE — 85018 HEMOGLOBIN: CPT

## 2024-01-13 RX ORDER — TORSEMIDE 20 MG/1
20 TABLET ORAL DAILY
Status: DISCONTINUED | OUTPATIENT
Start: 2024-01-13 | End: 2024-01-15 | Stop reason: HOSPADM

## 2024-01-13 RX ORDER — POTASSIUM CHLORIDE 20 MEQ/1
20 TABLET, EXTENDED RELEASE ORAL ONCE
Status: COMPLETED | OUTPATIENT
Start: 2024-01-13 | End: 2024-01-13

## 2024-01-13 RX ORDER — FOLIC ACID 1 MG/1
1 TABLET ORAL DAILY
Qty: 30 TABLET | Refills: 3 | Status: SHIPPED | OUTPATIENT
Start: 2024-01-13

## 2024-01-13 RX ORDER — TORSEMIDE 20 MG/1
20 TABLET ORAL DAILY
Qty: 30 TABLET | Refills: 3 | Status: SHIPPED | OUTPATIENT
Start: 2024-01-13

## 2024-01-13 RX ORDER — MIDODRINE HYDROCHLORIDE 2.5 MG/1
2.5 TABLET ORAL
Qty: 90 TABLET | Refills: 3 | Status: SHIPPED | OUTPATIENT
Start: 2024-01-13

## 2024-01-13 RX ORDER — TRAMADOL HYDROCHLORIDE 50 MG/1
50 TABLET ORAL 3 TIMES DAILY PRN
Qty: 10 TABLET | Refills: 0 | Status: SHIPPED | OUTPATIENT
Start: 2024-01-13 | End: 2024-01-16

## 2024-01-13 RX ADMIN — TRAMADOL HYDROCHLORIDE 50 MG: 50 TABLET, COATED ORAL at 14:13

## 2024-01-13 RX ADMIN — MIDODRINE HYDROCHLORIDE 2.5 MG: 5 TABLET ORAL at 11:57

## 2024-01-13 RX ADMIN — Medication 100 MG: at 08:38

## 2024-01-13 RX ADMIN — MIDODRINE HYDROCHLORIDE 2.5 MG: 5 TABLET ORAL at 17:05

## 2024-01-13 RX ADMIN — SODIUM CHLORIDE, PRESERVATIVE FREE 10 ML: 5 INJECTION INTRAVENOUS at 08:32

## 2024-01-13 RX ADMIN — PANTOPRAZOLE SODIUM 40 MG: 40 INJECTION, POWDER, FOR SOLUTION INTRAVENOUS at 08:39

## 2024-01-13 RX ADMIN — PANTOPRAZOLE SODIUM 40 MG: 40 INJECTION, POWDER, FOR SOLUTION INTRAVENOUS at 20:11

## 2024-01-13 RX ADMIN — PENTOXIFYLLINE 400 MG: 400 TABLET, EXTENDED RELEASE ORAL at 11:57

## 2024-01-13 RX ADMIN — MULTIVITAMIN TABLET 1 TABLET: TABLET at 08:39

## 2024-01-13 RX ADMIN — SODIUM CHLORIDE, PRESERVATIVE FREE 10 ML: 5 INJECTION INTRAVENOUS at 20:11

## 2024-01-13 RX ADMIN — PENTOXIFYLLINE 400 MG: 400 TABLET, EXTENDED RELEASE ORAL at 08:39

## 2024-01-13 RX ADMIN — MIDODRINE HYDROCHLORIDE 2.5 MG: 5 TABLET ORAL at 08:38

## 2024-01-13 RX ADMIN — WATER 2000 MG: 1 INJECTION INTRAMUSCULAR; INTRAVENOUS; SUBCUTANEOUS at 08:39

## 2024-01-13 RX ADMIN — FOLIC ACID 1 MG: 1 TABLET ORAL at 08:38

## 2024-01-13 RX ADMIN — TRAMADOL HYDROCHLORIDE 50 MG: 50 TABLET, COATED ORAL at 22:23

## 2024-01-13 RX ADMIN — POTASSIUM CHLORIDE 20 MEQ: 1500 TABLET, EXTENDED RELEASE ORAL at 17:36

## 2024-01-13 RX ADMIN — TORSEMIDE 20 MG: 20 TABLET ORAL at 20:11

## 2024-01-13 RX ADMIN — TRAMADOL HYDROCHLORIDE 50 MG: 50 TABLET, COATED ORAL at 06:06

## 2024-01-13 RX ADMIN — PENTOXIFYLLINE 400 MG: 400 TABLET, EXTENDED RELEASE ORAL at 17:05

## 2024-01-13 ASSESSMENT — PAIN DESCRIPTION - DESCRIPTORS
DESCRIPTORS: PRESSURE;ACHING
DESCRIPTORS: ACHING
DESCRIPTORS: ACHING;DISCOMFORT

## 2024-01-13 ASSESSMENT — PAIN DESCRIPTION - ORIENTATION
ORIENTATION: RIGHT;LEFT
ORIENTATION: RIGHT;LEFT
ORIENTATION: MID

## 2024-01-13 ASSESSMENT — PAIN SCALES - GENERAL
PAINLEVEL_OUTOF10: 8
PAINLEVEL_OUTOF10: 0
PAINLEVEL_OUTOF10: 7
PAINLEVEL_OUTOF10: 0
PAINLEVEL_OUTOF10: 7

## 2024-01-13 ASSESSMENT — PAIN DESCRIPTION - LOCATION
LOCATION: BACK
LOCATION: LEG
LOCATION: LEG

## 2024-01-13 NOTE — HOME CARE
[facility] received referral. Will follow after discharge.  [facility] WILL NEED PHARMACY AGREEMENT COMPLETED AND IVAT SCRIPT FAXED -[phone] PRIOR TO DISCHARGE.    PATIENT WILL NEED TO AGREE TO IVAT PRICING AND BE WILLING TO LEARN IVAT IN THE HOME PRIOR TO DISCHARGE.  Celi Simmons LPN, [facility] IV PRICING:  TONIO KWOK    Ordered therapy at time of quote: CEFTRIAXONE 2GM IV Q24H  Coverage:$3 COPAY PER DISPENSE  Total pt responsibility (after deductible met): $3/WEEK  Celi Simmons LPN, [facility]

## 2024-01-13 NOTE — PLAN OF CARE
Problem: Discharge Planning  Goal: Discharge to home or other facility with appropriate resources  1/13/2024 1043 by Alyx Heart RN  Outcome: Progressing  1/12/2024 2343 by Jonnie Mahmood RN  Outcome: Progressing     Problem: Pain  Goal: Verbalizes/displays adequate comfort level or baseline comfort level  1/13/2024 1043 by Alyx Heart RN  Outcome: Progressing  1/12/2024 2343 by Jonnie Mahmood RN  Outcome: Progressing     Problem: Skin/Tissue Integrity  Goal: Absence of new skin breakdown  Description: 1.  Monitor for areas of redness and/or skin breakdown  2.  Assess vascular access sites hourly  3.  Every 4-6 hours minimum:  Change oxygen saturation probe site  4.  Every 4-6 hours:  If on nasal continuous positive airway pressure, respiratory therapy assess nares and determine need for appliance change or resting period.  1/13/2024 1043 by Alyx Heart RN  Outcome: Progressing  1/12/2024 2343 by Jonnie Mahmood RN  Outcome: Progressing     Problem: Safety - Adult  Goal: Free from fall injury  1/13/2024 1043 by Alyx Heart RN  Outcome: Progressing  1/12/2024 2343 by Jonnie Mahmood RN  Outcome: Progressing     Problem: ABCDS Injury Assessment  Goal: Absence of physical injury  1/13/2024 1043 by Alyx Heart RN  Outcome: Progressing  1/12/2024 2343 by Jonnie Mahmood RN  Outcome: Progressing     Problem: Nutrition Deficit:  Goal: Optimize nutritional status  1/13/2024 1043 by Alyx Heart RN  Outcome: Progressing  1/12/2024 2343 by Jonnie Mahmood RN  Outcome: Progressing

## 2024-01-13 NOTE — CARE COORDINATION
Social Work / Discharge Planning : SW met with patient and discuss d/c plan at length. Plan is HOME. If patient can be discharged on orals, can go and [facility] Can follow up on Monday. Will NEED orders.. IF IV is NEEDED. This is NOT set up. REferral was made to [facility] via message. Will need IV script and cordinate services with [facility]. Patient is fully aware. Patient has ALL info in regards to his addiction and services that are available. . SW asked patient about initial plan of a SNF and patient stated some confusion with family and that family thought SNF would help deal with his addiction. THis is NOT what a SNF does. Patient understand that a SNF will NOT provide addiction services at [place] and patient stated he knows and that why he doesn't want to do. Patient stated he can do physicial rehab at home.  Patient is NOT agreeable to a SNF but agreeable to [facility]. REferral was made to [facility] via Celi message. Need to receive response. Again, if IV needed, patient aware that this CANNOT be set up today and needs coordinated. Patient states he has a ww and cane at home. AWait ID final plan. SW to follow. Electronically signed by JOHN Mireles on 1/13/24 at 10:58 AM EST

## 2024-01-13 NOTE — PLAN OF CARE
Problem: Discharge Planning  Goal: Discharge to home or other facility with appropriate resources  Outcome: Progressing     Problem: Pain  Goal: Verbalizes/displays adequate comfort level or baseline comfort level  Outcome: Progressing     Problem: Skin/Tissue Integrity  Goal: Absence of new skin breakdown  Description: 1.  Monitor for areas of redness and/or skin breakdown  2.  Assess vascular access sites hourly  3.  Every 4-6 hours minimum:  Change oxygen saturation probe site  4.  Every 4-6 hours:  If on nasal continuous positive airway pressure, respiratory therapy assess nares and determine need for appliance change or resting period.  Outcome: Progressing     Problem: Safety - Adult  Goal: Free from fall injury  Outcome: Progressing     Problem: ABCDS Injury Assessment  Goal: Absence of physical injury  Outcome: Progressing     Problem: Nutrition Deficit:  Goal: Optimize nutritional status  Outcome: Progressing

## 2024-01-14 LAB
ALBUMIN SERPL-MCNC: 3.3 G/DL (ref 3.5–5.2)
ALP SERPL-CCNC: 71 U/L (ref 40–129)
ALT SERPL-CCNC: 18 U/L (ref 0–40)
ANION GAP SERPL CALCULATED.3IONS-SCNC: 10 MMOL/L (ref 7–16)
AST SERPL-CCNC: 45 U/L (ref 0–39)
BASOPHILS # BLD: 0.04 K/UL (ref 0–0.2)
BASOPHILS NFR BLD: 0 % (ref 0–2)
BILIRUB SERPL-MCNC: 5.2 MG/DL (ref 0–1.2)
BUN SERPL-MCNC: 22 MG/DL (ref 6–20)
CALCIUM SERPL-MCNC: 8.3 MG/DL (ref 8.6–10.2)
CHLORIDE SERPL-SCNC: 101 MMOL/L (ref 98–107)
CO2 SERPL-SCNC: 25 MMOL/L (ref 22–29)
CREAT SERPL-MCNC: 1.3 MG/DL (ref 0.7–1.2)
EOSINOPHIL # BLD: 0.35 K/UL (ref 0.05–0.5)
EOSINOPHILS RELATIVE PERCENT: 4 % (ref 0–6)
ERYTHROCYTE [DISTWIDTH] IN BLOOD BY AUTOMATED COUNT: 17.4 % (ref 11.5–15)
GFR SERPL CREATININE-BSD FRML MDRD: >60 ML/MIN/1.73M2
GLUCOSE SERPL-MCNC: 94 MG/DL (ref 74–99)
HCT VFR BLD AUTO: 23.6 % (ref 37–54)
HCT VFR BLD AUTO: 24.5 % (ref 37–54)
HGB BLD-MCNC: 7.9 G/DL (ref 12.5–16.5)
HGB BLD-MCNC: 8.2 G/DL (ref 12.5–16.5)
IMM GRANULOCYTES # BLD AUTO: 0.08 K/UL (ref 0–0.58)
IMM GRANULOCYTES NFR BLD: 1 % (ref 0–5)
INR PPP: 2.9
LYMPHOCYTES NFR BLD: 0.43 K/UL (ref 1.5–4)
LYMPHOCYTES RELATIVE PERCENT: 5 % (ref 20–42)
MAGNESIUM SERPL-MCNC: 1.6 MG/DL (ref 1.6–2.6)
MCH RBC QN AUTO: 29.5 PG (ref 26–35)
MCHC RBC AUTO-ENTMCNC: 33.5 G/DL (ref 32–34.5)
MCV RBC AUTO: 88.1 FL (ref 80–99.9)
MONOCYTES NFR BLD: 0.59 K/UL (ref 0.1–0.95)
MONOCYTES NFR BLD: 6 % (ref 2–12)
NEUTROPHILS NFR BLD: 84 % (ref 43–80)
NEUTS SEG NFR BLD: 7.74 K/UL (ref 1.8–7.3)
PHOSPHATE SERPL-MCNC: 2 MG/DL (ref 2.5–4.5)
PLATELET # BLD AUTO: 48 K/UL (ref 130–450)
PLATELET CONFIRMATION: NORMAL
PMV BLD AUTO: 9.5 FL (ref 7–12)
POTASSIUM SERPL-SCNC: 3.1 MMOL/L (ref 3.5–5)
PROT SERPL-MCNC: 5.3 G/DL (ref 6.4–8.3)
PROTHROMBIN TIME: 32.2 SEC (ref 9.3–12.4)
RBC # BLD AUTO: 2.68 M/UL (ref 3.8–5.8)
RBC # BLD: ABNORMAL 10*6/UL
SODIUM SERPL-SCNC: 136 MMOL/L (ref 132–146)
WBC OTHER # BLD: 9.2 K/UL (ref 4.5–11.5)

## 2024-01-14 PROCEDURE — 80053 COMPREHEN METABOLIC PANEL: CPT

## 2024-01-14 PROCEDURE — 6370000000 HC RX 637 (ALT 250 FOR IP): Performed by: INTERNAL MEDICINE

## 2024-01-14 PROCEDURE — 6370000000 HC RX 637 (ALT 250 FOR IP): Performed by: NURSE PRACTITIONER

## 2024-01-14 PROCEDURE — 2500000003 HC RX 250 WO HCPCS: Performed by: HOSPITALIST

## 2024-01-14 PROCEDURE — 2580000003 HC RX 258: Performed by: INTERNAL MEDICINE

## 2024-01-14 PROCEDURE — 2580000003 HC RX 258: Performed by: NURSE PRACTITIONER

## 2024-01-14 PROCEDURE — 2500000003 HC RX 250 WO HCPCS: Performed by: INTERNAL MEDICINE

## 2024-01-14 PROCEDURE — 85025 COMPLETE CBC W/AUTO DIFF WBC: CPT

## 2024-01-14 PROCEDURE — 6360000002 HC RX W HCPCS: Performed by: INTERNAL MEDICINE

## 2024-01-14 PROCEDURE — 1200000000 HC SEMI PRIVATE

## 2024-01-14 PROCEDURE — C9113 INJ PANTOPRAZOLE SODIUM, VIA: HCPCS | Performed by: NURSE PRACTITIONER

## 2024-01-14 PROCEDURE — 6360000002 HC RX W HCPCS: Performed by: NURSE PRACTITIONER

## 2024-01-14 PROCEDURE — 85018 HEMOGLOBIN: CPT

## 2024-01-14 PROCEDURE — 85610 PROTHROMBIN TIME: CPT

## 2024-01-14 PROCEDURE — 84100 ASSAY OF PHOSPHORUS: CPT

## 2024-01-14 PROCEDURE — 85014 HEMATOCRIT: CPT

## 2024-01-14 PROCEDURE — 83735 ASSAY OF MAGNESIUM: CPT

## 2024-01-14 RX ORDER — TRAMADOL HYDROCHLORIDE 50 MG/1
50 TABLET ORAL EVERY 6 HOURS PRN
Status: DISCONTINUED | OUTPATIENT
Start: 2024-01-14 | End: 2024-01-15 | Stop reason: HOSPADM

## 2024-01-14 RX ORDER — POTASSIUM CHLORIDE 20 MEQ/1
40 TABLET, EXTENDED RELEASE ORAL ONCE
Status: COMPLETED | OUTPATIENT
Start: 2024-01-14 | End: 2024-01-14

## 2024-01-14 RX ADMIN — TRAMADOL HYDROCHLORIDE 50 MG: 50 TABLET, COATED ORAL at 06:34

## 2024-01-14 RX ADMIN — ONDANSETRON 4 MG: 2 INJECTION INTRAMUSCULAR; INTRAVENOUS at 22:30

## 2024-01-14 RX ADMIN — PANTOPRAZOLE SODIUM 40 MG: 40 INJECTION, POWDER, FOR SOLUTION INTRAVENOUS at 20:35

## 2024-01-14 RX ADMIN — MULTIVITAMIN TABLET 1 TABLET: TABLET at 09:00

## 2024-01-14 RX ADMIN — FOLIC ACID 1 MG: 1 TABLET ORAL at 09:00

## 2024-01-14 RX ADMIN — SODIUM CHLORIDE, PRESERVATIVE FREE 10 ML: 5 INJECTION INTRAVENOUS at 20:47

## 2024-01-14 RX ADMIN — TRAMADOL HYDROCHLORIDE 50 MG: 50 TABLET, COATED ORAL at 12:31

## 2024-01-14 RX ADMIN — TRAMADOL HYDROCHLORIDE 50 MG: 50 TABLET, COATED ORAL at 18:19

## 2024-01-14 RX ADMIN — LORAZEPAM 2 MG: 2 INJECTION INTRAMUSCULAR; INTRAVENOUS at 22:30

## 2024-01-14 RX ADMIN — MICONAZOLE NITRATE: 20 POWDER TOPICAL at 21:23

## 2024-01-14 RX ADMIN — POTASSIUM CHLORIDE 40 MEQ: 1500 TABLET, EXTENDED RELEASE ORAL at 12:35

## 2024-01-14 RX ADMIN — Medication 100 MG: at 09:00

## 2024-01-14 RX ADMIN — MIDODRINE HYDROCHLORIDE 2.5 MG: 5 TABLET ORAL at 12:19

## 2024-01-14 RX ADMIN — TORSEMIDE 20 MG: 20 TABLET ORAL at 09:00

## 2024-01-14 RX ADMIN — MIDODRINE HYDROCHLORIDE 2.5 MG: 5 TABLET ORAL at 08:59

## 2024-01-14 RX ADMIN — SODIUM CHLORIDE, PRESERVATIVE FREE 10 ML: 5 INJECTION INTRAVENOUS at 09:00

## 2024-01-14 RX ADMIN — MIDODRINE HYDROCHLORIDE 2.5 MG: 5 TABLET ORAL at 18:19

## 2024-01-14 RX ADMIN — WATER 2000 MG: 1 INJECTION INTRAMUSCULAR; INTRAVENOUS; SUBCUTANEOUS at 09:00

## 2024-01-14 RX ADMIN — PENTOXIFYLLINE 400 MG: 400 TABLET, EXTENDED RELEASE ORAL at 12:20

## 2024-01-14 RX ADMIN — PANTOPRAZOLE SODIUM 40 MG: 40 INJECTION, POWDER, FOR SOLUTION INTRAVENOUS at 09:09

## 2024-01-14 RX ADMIN — POTASSIUM PHOSPHATE 20 MMOL: 236; 224 INJECTION, SOLUTION INTRAVENOUS at 13:26

## 2024-01-14 RX ADMIN — PENTOXIFYLLINE 400 MG: 400 TABLET, EXTENDED RELEASE ORAL at 09:00

## 2024-01-14 ASSESSMENT — PAIN DESCRIPTION - LOCATION
LOCATION: LEG;BACK
LOCATION: LEG
LOCATION: LEG;BACK
LOCATION: LEG

## 2024-01-14 ASSESSMENT — PAIN DESCRIPTION - ORIENTATION
ORIENTATION: RIGHT

## 2024-01-14 ASSESSMENT — PAIN DESCRIPTION - DESCRIPTORS
DESCRIPTORS: ACHING;DISCOMFORT
DESCRIPTORS: PRESSURE
DESCRIPTORS: PRESSURE
DESCRIPTORS: ACHING

## 2024-01-14 ASSESSMENT — PAIN SCALES - GENERAL
PAINLEVEL_OUTOF10: 8
PAINLEVEL_OUTOF10: 7
PAINLEVEL_OUTOF10: 9
PAINLEVEL_OUTOF10: 7
PAINLEVEL_OUTOF10: 0
PAINLEVEL_OUTOF10: 8

## 2024-01-14 ASSESSMENT — PAIN - FUNCTIONAL ASSESSMENT: PAIN_FUNCTIONAL_ASSESSMENT: ACTIVITIES ARE NOT PREVENTED

## 2024-01-15 VITALS
HEART RATE: 78 BPM | BODY MASS INDEX: 29.27 KG/M2 | DIASTOLIC BLOOD PRESSURE: 55 MMHG | HEIGHT: 68 IN | SYSTOLIC BLOOD PRESSURE: 104 MMHG | OXYGEN SATURATION: 94 % | TEMPERATURE: 98.9 F | WEIGHT: 193.12 LBS | RESPIRATION RATE: 17 BRPM

## 2024-01-15 LAB
ALBUMIN SERPL-MCNC: 3.1 G/DL (ref 3.5–5.2)
ALP SERPL-CCNC: 69 U/L (ref 40–129)
ALT SERPL-CCNC: 18 U/L (ref 0–40)
ANION GAP SERPL CALCULATED.3IONS-SCNC: 9 MMOL/L (ref 7–16)
AST SERPL-CCNC: 44 U/L (ref 0–39)
BASOPHILS # BLD: 0.02 K/UL (ref 0–0.2)
BASOPHILS NFR BLD: 0 % (ref 0–2)
BILIRUB SERPL-MCNC: 4.2 MG/DL (ref 0–1.2)
BUN SERPL-MCNC: 17 MG/DL (ref 6–20)
CALCIUM SERPL-MCNC: 8 MG/DL (ref 8.6–10.2)
CHLORIDE SERPL-SCNC: 103 MMOL/L (ref 98–107)
CO2 SERPL-SCNC: 26 MMOL/L (ref 22–29)
CREAT SERPL-MCNC: 1.4 MG/DL (ref 0.7–1.2)
EOSINOPHIL # BLD: 0.29 K/UL (ref 0.05–0.5)
EOSINOPHILS RELATIVE PERCENT: 4 % (ref 0–6)
ERYTHROCYTE [DISTWIDTH] IN BLOOD BY AUTOMATED COUNT: 18.6 % (ref 11.5–15)
GFR SERPL CREATININE-BSD FRML MDRD: >60 ML/MIN/1.73M2
GLUCOSE SERPL-MCNC: 99 MG/DL (ref 74–99)
HCT VFR BLD AUTO: 22.9 % (ref 37–54)
HGB BLD-MCNC: 7.5 G/DL (ref 12.5–16.5)
IMM GRANULOCYTES # BLD AUTO: 0.06 K/UL (ref 0–0.58)
IMM GRANULOCYTES NFR BLD: 1 % (ref 0–5)
INR PPP: 2.7
LYMPHOCYTES NFR BLD: 0.49 K/UL (ref 1.5–4)
LYMPHOCYTES RELATIVE PERCENT: 7 % (ref 20–42)
MAGNESIUM SERPL-MCNC: 1.5 MG/DL (ref 1.6–2.6)
MCH RBC QN AUTO: 29.2 PG (ref 26–35)
MCHC RBC AUTO-ENTMCNC: 32.8 G/DL (ref 32–34.5)
MCV RBC AUTO: 89.1 FL (ref 80–99.9)
MONOCYTES NFR BLD: 0.56 K/UL (ref 0.1–0.95)
MONOCYTES NFR BLD: 8 % (ref 2–12)
NEUTROPHILS NFR BLD: 80 % (ref 43–80)
NEUTS SEG NFR BLD: 5.73 K/UL (ref 1.8–7.3)
PHOSPHATE SERPL-MCNC: 2.5 MG/DL (ref 2.5–4.5)
PLATELET # BLD AUTO: 44 K/UL (ref 130–450)
PLATELET CONFIRMATION: NORMAL
PMV BLD AUTO: 8.8 FL (ref 7–12)
POTASSIUM SERPL-SCNC: 3.1 MMOL/L (ref 3.5–5)
PROT SERPL-MCNC: 5.2 G/DL (ref 6.4–8.3)
PROTHROMBIN TIME: 30.4 SEC (ref 9.3–12.4)
RBC # BLD AUTO: 2.57 M/UL (ref 3.8–5.8)
RBC # BLD: ABNORMAL 10*6/UL
SODIUM SERPL-SCNC: 138 MMOL/L (ref 132–146)
WBC OTHER # BLD: 7.2 K/UL (ref 4.5–11.5)

## 2024-01-15 PROCEDURE — 2580000003 HC RX 258: Performed by: NURSE PRACTITIONER

## 2024-01-15 PROCEDURE — 85610 PROTHROMBIN TIME: CPT

## 2024-01-15 PROCEDURE — 6360000002 HC RX W HCPCS

## 2024-01-15 PROCEDURE — 84100 ASSAY OF PHOSPHORUS: CPT

## 2024-01-15 PROCEDURE — 85025 COMPLETE CBC W/AUTO DIFF WBC: CPT

## 2024-01-15 PROCEDURE — 36415 COLL VENOUS BLD VENIPUNCTURE: CPT

## 2024-01-15 PROCEDURE — 6360000002 HC RX W HCPCS: Performed by: NURSE PRACTITIONER

## 2024-01-15 PROCEDURE — 6370000000 HC RX 637 (ALT 250 FOR IP): Performed by: INTERNAL MEDICINE

## 2024-01-15 PROCEDURE — 83735 ASSAY OF MAGNESIUM: CPT

## 2024-01-15 PROCEDURE — 6370000000 HC RX 637 (ALT 250 FOR IP): Performed by: NURSE PRACTITIONER

## 2024-01-15 PROCEDURE — 2580000003 HC RX 258: Performed by: INTERNAL MEDICINE

## 2024-01-15 PROCEDURE — 80053 COMPREHEN METABOLIC PANEL: CPT

## 2024-01-15 PROCEDURE — C9113 INJ PANTOPRAZOLE SODIUM, VIA: HCPCS | Performed by: NURSE PRACTITIONER

## 2024-01-15 PROCEDURE — 6370000000 HC RX 637 (ALT 250 FOR IP)

## 2024-01-15 RX ORDER — CEFDINIR 300 MG/1
300 CAPSULE ORAL 2 TIMES DAILY
Qty: 2 CAPSULE | Refills: 0 | Status: SHIPPED | OUTPATIENT
Start: 2024-01-15 | End: 2024-01-16

## 2024-01-15 RX ORDER — MAGNESIUM SULFATE IN WATER 40 MG/ML
2000 INJECTION, SOLUTION INTRAVENOUS ONCE
Status: COMPLETED | OUTPATIENT
Start: 2024-01-15 | End: 2024-01-15

## 2024-01-15 RX ORDER — POTASSIUM CHLORIDE 20 MEQ/1
40 TABLET, EXTENDED RELEASE ORAL ONCE
Status: COMPLETED | OUTPATIENT
Start: 2024-01-15 | End: 2024-01-15

## 2024-01-15 RX ADMIN — SODIUM CHLORIDE, PRESERVATIVE FREE 10 ML: 5 INJECTION INTRAVENOUS at 08:40

## 2024-01-15 RX ADMIN — TORSEMIDE 20 MG: 20 TABLET ORAL at 08:41

## 2024-01-15 RX ADMIN — Medication 100 MG: at 08:41

## 2024-01-15 RX ADMIN — MIDODRINE HYDROCHLORIDE 2.5 MG: 5 TABLET ORAL at 12:35

## 2024-01-15 RX ADMIN — FOLIC ACID 1 MG: 1 TABLET ORAL at 08:41

## 2024-01-15 RX ADMIN — PENTOXIFYLLINE 400 MG: 400 TABLET, EXTENDED RELEASE ORAL at 08:42

## 2024-01-15 RX ADMIN — MIDODRINE HYDROCHLORIDE 2.5 MG: 5 TABLET ORAL at 08:41

## 2024-01-15 RX ADMIN — MULTIVITAMIN TABLET 1 TABLET: TABLET at 08:41

## 2024-01-15 RX ADMIN — PANTOPRAZOLE SODIUM 40 MG: 40 INJECTION, POWDER, FOR SOLUTION INTRAVENOUS at 08:40

## 2024-01-15 RX ADMIN — PENTOXIFYLLINE 400 MG: 400 TABLET, EXTENDED RELEASE ORAL at 12:35

## 2024-01-15 RX ADMIN — POTASSIUM CHLORIDE 40 MEQ: 1500 TABLET, EXTENDED RELEASE ORAL at 08:41

## 2024-01-15 RX ADMIN — TRAMADOL HYDROCHLORIDE 50 MG: 50 TABLET, COATED ORAL at 08:42

## 2024-01-15 RX ADMIN — WATER 2000 MG: 1 INJECTION INTRAMUSCULAR; INTRAVENOUS; SUBCUTANEOUS at 09:14

## 2024-01-15 RX ADMIN — SODIUM CHLORIDE, PRESERVATIVE FREE 10 ML: 5 INJECTION INTRAVENOUS at 09:15

## 2024-01-15 RX ADMIN — TRAMADOL HYDROCHLORIDE 50 MG: 50 TABLET, COATED ORAL at 02:29

## 2024-01-15 RX ADMIN — MICONAZOLE NITRATE: 20 POWDER TOPICAL at 08:55

## 2024-01-15 RX ADMIN — MAGNESIUM SULFATE HEPTAHYDRATE 2000 MG: 40 INJECTION, SOLUTION INTRAVENOUS at 08:49

## 2024-01-15 ASSESSMENT — PAIN DESCRIPTION - ORIENTATION: ORIENTATION: LEFT

## 2024-01-15 ASSESSMENT — PAIN DESCRIPTION - LOCATION
LOCATION: SCROTUM
LOCATION: BACK;NECK;LEG

## 2024-01-15 ASSESSMENT — PAIN SCALES - GENERAL
PAINLEVEL_OUTOF10: 8
PAINLEVEL_OUTOF10: 8

## 2024-01-15 ASSESSMENT — PAIN DESCRIPTION - DESCRIPTORS
DESCRIPTORS: ACHING
DESCRIPTORS: ACHING

## 2024-01-15 ASSESSMENT — ENCOUNTER SYMPTOMS
SHORTNESS OF BREATH: 0
DIARRHEA: 0
NAUSEA: 0
VOMITING: 0
COUGH: 0

## 2024-01-15 ASSESSMENT — PAIN - FUNCTIONAL ASSESSMENT: PAIN_FUNCTIONAL_ASSESSMENT: PREVENTS OR INTERFERES SOME ACTIVE ACTIVITIES AND ADLS

## 2024-01-15 NOTE — CARE COORDINATION
1/15/2024  Social Work Discharge Planning:Await ATB plan. Cellulitis.Acute liver failure. From home with fiance.Positive drug screen. [facility] 16/24. Pt declining WHIT. Pt has resources for addiction services. [facility] is following ;however wont be able to follow if Pt returns with PICC. Electronically signed by JOHN Earl on 1/15/2024 at 9:30 AM

## 2024-01-15 NOTE — PROGRESS NOTES
Physician Progress Note      PATIENT:               TONIO KWOK  CSN #:                  [id number]  :                       1980  ADMIT DATE:       2024 1:24 AM  DISCH DATE:  RESPONDING  PROVIDER #:        Yessy Rodgers DO          QUERY TEXT:    Pt admitted with abdominal pain. Noted documentation of Septicemia per ID   notes. If possible, please document in progress notes and discharge summary:    The medical record reflects the following:  Risk Factors: acute liver failure  Clinical Indicators: WBC 18.5, BP 95/56, respirations up to 30, per ID \"...E.   coli septicemia: Right leg cellulitis versus vs  SBP: Right leg abscess vs   hematoma...\", per IM ...Reviewed CT scan. Likely hematoma...\"  Treatment: ID consult, IV Rocephin    Thank you,  Rhoda Vazquez RN, BSN, CDIS  Clinical Documentation Integrity  [email or web address]  Options provided:  -- Sepsis confirmed present on admission  -- Sepsis ruled out  -- Defer to ID consultant documentation regarding Sepsis  -- Other - I will add my own diagnosis  -- Disagree - Not applicable / Not valid  -- Disagree - Clinically unable to determine / Unknown  -- Refer to Clinical Documentation Reviewer    PROVIDER RESPONSE TEXT:    The diagnosis of Sepsis was confirmed as present on admission.    Query created by: Rhoda Vazquez on 2024 12:47 PM      Electronically signed by:  Yessy Rodgers DO 2024 2:04 PM          
  [facility] Quality Flow/Interdisciplinary Rounds Progress Note        Quality Flow Rounds held on January 12, 2024    Disciplines Attending:  Bedside Nurse, , , and Nursing Unit Leadership    Johnathan Yanez III was admitted on 1/7/2024  1:24 AM    Anticipated Discharge Date:  Expected Discharge Date: 01/15/24    Disposition:    Dennis Score:  Dennis Scale Score: 17    Readmission Risk              Risk of Unplanned Readmission:  37           Discussed patient goal for the day, patient clinical progression, and barriers to discharge.  The following Goal(s) of the Day/Commitment(s) have been identified:   Paracentesis today.      JAVIER BRYAN RN  January 12, 2024        
2 units RBC's and 1 unit FFP ordered per Dr. Banda.   
4 Eyes Skin Assessment     NAME:  Johnathan Yanez III  YOB: 1980  MEDICAL RECORD NUMBER:  [id number]    The patient is being assessed for  Admission    I agree that at least one RN has performed a thorough Head to Toe Skin Assessment on the patient. ALL assessment sites listed below have been assessed.      Areas assessed by both nurses:    Head, Face, Ears, Shoulders, Back, Chest, Arms, Elbows, Hands, Sacrum. Buttock, Coccyx, Ischium, Legs. Feet and Heels, and Under Medical Devices         Does the Patient have a Wound? No noted wound(s)       Dennis Prevention initiated by RN: Yes  Wound Care Orders initiated by RN: No    Pressure Injury (Stage 3,4, Unstageable, DTI, NWPT, and Complex wounds) if present, place Wound referral order by RN under : No    New Ostomies, if present place, Ostomy referral order under : No     Nurse 1 eSignature: Electronically signed by Farzana Coles RN on 1/7/24 at 3:52 PM EST    **SHARE this note so that the co-signing nurse can place an eSignature**    Nurse 2 eSignature: {Esignature:[id number]}   
4 Eyes Skin Assessment     NAME:  Johnathan Yanez III  YOB: 1980  MEDICAL RECORD NUMBER:  [id number]    The patient is being assessed for  Admission    I agree that at least one RN has performed a thorough Head to Toe Skin Assessment on the patient. ALL assessment sites listed below have been assessed.      Areas assessed by both nurses:    Head, Face, Ears, Shoulders, Back, Chest, Arms, Elbows, Hands, Sacrum. Buttock, Coccyx, Ischium, Legs. Feet and Heels, and Under Medical Devices         Does the Patient have a Wound? No noted wound(s)       Dennis Prevention initiated by RN: No  Wound Care Orders initiated by RN: No    Pressure Injury (Stage 3,4, Unstageable, DTI, NWPT, and Complex wounds) if present, place Wound referral order by RN under : No    New Ostomies, if present place, Ostomy referral order under : No     Nurse 1 eSignature: Electronically signed by Bronwyn Plasencia RN on 1/12/24 at 12:18 AM EST    **SHARE this note so that the co-signing nurse can place an eSignature**    Nurse 2 eSignature: Electronically signed by Rehan Edwards RN on 1/12/24 at 12:22 AM EST    
[facility]  MD Mario Tom MD Ali Hassan, MD Lisa Kniska, CNP   Teresa Enriquez, CHARISSE Arvizu, AL  Progress Note    1/12/2024    SUBJECTIVE:   1/8: Seen in the ICU. Levo discontinued. Denies dyspnea, chest pain, or palpitations. Still has edema. He is on room air. BP stable.     1/9: Seen in the ICU. No acute distress. He is on room air. Denies dyspnea. Still with lower extremity edema. Improved. For transfer out of the ICU. Waiting on a bed.     1/10 comfortable on RA     1/11: Feels better today. On room air. He denies dyspnea. BP is stable.     1/12: Laying in bed. No acute distress. Tired. Vital signs are stable. He is on room air, denies dyspnea. Does have substantial lower ext edema.     PROBLEM LIST:    Principal Problem:    Hepatic failure, unspecified without coma (HCC)  Active Problems:    Decompensation of cirrhosis of liver (HCC)    Anemia    Scrotal swelling    Hepatitis C virus infection without hepatic coma    Cellulitis    Abdominal pain    Alcoholic cirrhosis of liver with ascites (HCC)    Hyponatremia    Hematoma of right lower extremity  Resolved Problems:    * No resolved hospital problems. *         DIET:    ADULT DIET; Easy to Chew     MEDS (scheduled):    ferric gluconate (FERRLECIT) 125 mg in sodium chloride 0.9 % 100 mL IVPB  125 mg IntraVENous Once    lactulose  20 g Oral TID    pentoxifylline  400 mg Oral TID WC    thiamine  100 mg Oral Daily    pantoprazole  40 mg IntraVENous BID    cefTRIAXone (ROCEPHIN) IV  2,000 mg IntraVENous Q24H    midodrine  2.5 mg Oral TID WC    multivitamin  1 tablet Oral Daily    folic acid  1 mg Oral Daily    sodium chloride flush  5-40 mL IntraVENous 2 times per day       MEDS (infusions):   sodium chloride      sodium chloride         MEDS (prn):  sodium chloride, sodium chloride, ondansetron **OR** ondansetron, senna, prochlorperazine, sodium chloride flush, LORazepam **OR** LORazepam **OR** LORazepam 
[facility]  MD Mario Tom MD Ali Hassan, MD Lisa Kniska, CNP   Teresa Enriquez, CHARISSE Arvizu, CNP  Progress Note    1/8/2024    SUBJECTIVE:   1/8: Seen in the ICU. Levo discontinued. Denies dyspnea, chest pain, or palpitations. Still has edema. He is on room air. BP stable.     PROBLEM LIST:    Principal Problem:    Hepatic failure, unspecified without coma (HCC)  Active Problems:    Decompensation of cirrhosis of liver (HCC)    Anemia    Scrotal swelling    Hepatitis C virus infection without hepatic coma    Cellulitis    Abdominal pain    Alcoholic cirrhosis of liver with ascites (HCC)    Hyponatremia  Resolved Problems:    * No resolved hospital problems. *         DIET:    Diet NPO Exceptions are: Sips of Water with Meds     MEDS (scheduled):    ferrous sulfate  325 mg Oral BID WC    lactulose  20 g Oral TID    pentoxifylline  400 mg Oral TID WC    thiamine  100 mg Oral Daily    pantoprazole  40 mg IntraVENous BID    cefTRIAXone (ROCEPHIN) IV  2,000 mg IntraVENous Q24H    albumin human 25%  25 g IntraVENous Q8H    midodrine  2.5 mg Oral TID WC    multivitamin  1 tablet Oral Daily    folic acid  1 mg Oral Daily    sodium chloride flush  5-40 mL IntraVENous 2 times per day       MEDS (infusions):   sodium chloride      sodium chloride      sodium chloride      octreotide (SANDOSTATIN) 500 mcg in sodium chloride 0.9 % 100 mL infusion 50 mcg/hr (01/08/24 0249)    sodium chloride      norepinephrine Stopped (01/07/24 1623)    sodium chloride      sodium chloride      sodium chloride      sodium chloride         MEDS (prn):  sodium chloride, sodium chloride, sodium chloride flush, sodium chloride, ondansetron **OR** ondansetron, senna, prochlorperazine, sodium chloride, fentanNYL, oxyCODONE, sodium chloride, sodium chloride, sodium chloride flush, sodium chloride, LORazepam **OR** LORazepam **OR** LORazepam **OR** LORazepam **OR** LORazepam **OR** LORazepam 
[facility]  MD Mario Tom, MD Rajeev Patel, MD Tesha Correa, CNP   Teresa Enriquez, ANP  Carrie Arvizu, CNP  Progress Note    1/9/2024    SUBJECTIVE:   1/8: Seen in the ICU. Levo discontinued. Denies dyspnea, chest pain, or palpitations. Still has edema. He is on room air. BP stable.     1/9: Seen in the ICU. No acute distress. He is on room air. Denies dyspnea. Still with lower extremity edema. Improved. For transfer out of the ICU. Waiting on a bed.     PROBLEM LIST:    Principal Problem:    Hepatic failure, unspecified without coma (HCC)  Active Problems:    Decompensation of cirrhosis of liver (HCC)    Anemia    Scrotal swelling    Hepatitis C virus infection without hepatic coma    Cellulitis    Abdominal pain    Alcoholic cirrhosis of liver with ascites (HCC)    Hyponatremia  Resolved Problems:    * No resolved hospital problems. *         DIET:    ADULT DIET; Regular     MEDS (scheduled):    albumin human 25%  25 g IntraVENous Q12H    ferrous sulfate  325 mg Oral BID WC    lactulose  20 g Oral TID    pentoxifylline  400 mg Oral TID WC    thiamine  100 mg Oral Daily    pantoprazole  40 mg IntraVENous BID    cefTRIAXone (ROCEPHIN) IV  2,000 mg IntraVENous Q24H    midodrine  2.5 mg Oral TID WC    multivitamin  1 tablet Oral Daily    folic acid  1 mg Oral Daily    sodium chloride flush  5-40 mL IntraVENous 2 times per day       MEDS (infusions):   sodium chloride      octreotide (SANDOSTATIN) 500 mcg in sodium chloride 0.9 % 100 mL infusion 50 mcg/hr (01/09/24 0051)       MEDS (prn):  sodium chloride, ondansetron **OR** ondansetron, senna, prochlorperazine, oxyCODONE, sodium chloride flush, LORazepam **OR** LORazepam **OR** LORazepam **OR** LORazepam **OR** LORazepam **OR** LORazepam **OR** LORazepam **OR** LORazepam    PHYSICAL EXAM:     Patient Vitals for the past 24 hrs:   BP Temp Temp src Pulse Resp SpO2   01/09/24 0400 115/61 98 °F (36.7 °C) Oral 90 22 95 % 
[facility]  MD Mario Tom, MD Rajeev Ptael, MD Tesha Correa, CNP   Teresa Enriquez, CHARISSE Arvizu, AL  Progress Note    1/11/2024    SUBJECTIVE:   1/8: Seen in the ICU. Levo discontinued. Denies dyspnea, chest pain, or palpitations. Still has edema. He is on room air. BP stable.     1/9: Seen in the ICU. No acute distress. He is on room air. Denies dyspnea. Still with lower extremity edema. Improved. For transfer out of the ICU. Waiting on a bed.     1/10 comfortable on RA     1/11: Feels better today. On room air. He denies dyspnea. BP is stable.     PROBLEM LIST:    Principal Problem:    Hepatic failure, unspecified without coma (HCC)  Active Problems:    Decompensation of cirrhosis of liver (HCC)    Anemia    Scrotal swelling    Hepatitis C virus infection without hepatic coma    Cellulitis    Abdominal pain    Alcoholic cirrhosis of liver with ascites (HCC)    Hyponatremia  Resolved Problems:    * No resolved hospital problems. *         DIET:    Diet NPO Exceptions are: Sips of Water with Meds     MEDS (scheduled):    ferrous sulfate  325 mg Oral BID WC    lactulose  20 g Oral TID    pentoxifylline  400 mg Oral TID WC    thiamine  100 mg Oral Daily    pantoprazole  40 mg IntraVENous BID    cefTRIAXone (ROCEPHIN) IV  2,000 mg IntraVENous Q24H    midodrine  2.5 mg Oral TID WC    multivitamin  1 tablet Oral Daily    folic acid  1 mg Oral Daily    sodium chloride flush  5-40 mL IntraVENous 2 times per day       MEDS (infusions):   sodium chloride      sodium chloride      octreotide (SANDOSTATIN) 500 mcg in sodium chloride 0.9 % 100 mL infusion 50 mcg/hr (01/10/24 1432)       MEDS (prn):  sodium chloride, sodium chloride, ondansetron **OR** ondansetron, senna, prochlorperazine, sodium chloride flush, LORazepam **OR** LORazepam **OR** LORazepam **OR** LORazepam **OR** LORazepam **OR** LORazepam **OR** LORazepam **OR** LORazepam    PHYSICAL EXAM:     Patient 
[facility]  MD Mario Tom, MD Tesha Pierre, CNP   Teresa Enriquez, CHARISSE Arvizu, AL  Progress Note    1/10/2024    SUBJECTIVE:   1/8: Seen in the ICU. Levo discontinued. Denies dyspnea, chest pain, or palpitations. Still has edema. He is on room air. BP stable.     1/9: Seen in the ICU. No acute distress. He is on room air. Denies dyspnea. Still with lower extremity edema. Improved. For transfer out of the ICU. Waiting on a bed.     1/10 comfortable on RA     PROBLEM LIST:    Principal Problem:    Hepatic failure, unspecified without coma (HCC)  Active Problems:    Decompensation of cirrhosis of liver (HCC)    Anemia    Scrotal swelling    Hepatitis C virus infection without hepatic coma    Cellulitis    Abdominal pain    Alcoholic cirrhosis of liver with ascites (HCC)    Hyponatremia  Resolved Problems:    * No resolved hospital problems. *         DIET:    ADULT DIET; Regular  Diet NPO Exceptions are: Sips of Water with Meds  Diet NPO Exceptions are: Sips of Water with Meds     MEDS (scheduled):    ferrous sulfate  325 mg Oral BID WC    lactulose  20 g Oral TID    pentoxifylline  400 mg Oral TID WC    thiamine  100 mg Oral Daily    pantoprazole  40 mg IntraVENous BID    cefTRIAXone (ROCEPHIN) IV  2,000 mg IntraVENous Q24H    midodrine  2.5 mg Oral TID WC    multivitamin  1 tablet Oral Daily    folic acid  1 mg Oral Daily    sodium chloride flush  5-40 mL IntraVENous 2 times per day       MEDS (infusions):   sodium chloride      sodium chloride      octreotide (SANDOSTATIN) 500 mcg in sodium chloride 0.9 % 100 mL infusion 50 mcg/hr (01/10/24 0919)       MEDS (prn):  sodium chloride, sodium chloride, ondansetron **OR** ondansetron, senna, prochlorperazine, oxyCODONE, sodium chloride flush, LORazepam **OR** LORazepam **OR** LORazepam **OR** LORazepam **OR** LORazepam **OR** LORazepam **OR** LORazepam **OR** LORazepam    PHYSICAL EXAM:     Patient 
[facility]  MD Mario Tom, MD Tesha Pierre, CNP   Teresa Enriquez, CHARISSE Arvizu, AL  Progress Note    1/13/2024    SUBJECTIVE:   1/8: Seen in the ICU. Levo discontinued. Denies dyspnea, chest pain, or palpitations. Still has edema. He is on room air. BP stable.     1/9: Seen in the ICU. No acute distress. He is on room air. Denies dyspnea. Still with lower extremity edema. Improved. For transfer out of the ICU. Waiting on a bed.     1/10 comfortable on RA     1/11: Feels better today. On room air. He denies dyspnea. BP is stable.     1/12: Laying in bed. No acute distress. Tired. Vital signs are stable. He is on room air, denies dyspnea. Does have substantial lower ext edema.     1/13: Feeling better.  Breathing comfortably supine on room air.  peripheral swelling has improved.  Asks about discharge to home, changing IV antibiotics to p.o.    PROBLEM LIST:    Principal Problem:    Hepatic failure, unspecified without coma (HCC)  Active Problems:    Decompensation of cirrhosis of liver (HCC)    Anemia    Scrotal swelling    Hepatitis C virus infection without hepatic coma    Cellulitis    Abdominal pain    Alcoholic cirrhosis of liver with ascites (HCC)    Hyponatremia    Hematoma of right lower extremity  Resolved Problems:    * No resolved hospital problems. *         DIET:    ADULT DIET; Easy to Chew     MEDS (scheduled):    lactulose  20 g Oral TID    pentoxifylline  400 mg Oral TID WC    thiamine  100 mg Oral Daily    pantoprazole  40 mg IntraVENous BID    cefTRIAXone (ROCEPHIN) IV  2,000 mg IntraVENous Q24H    midodrine  2.5 mg Oral TID     multivitamin  1 tablet Oral Daily    folic acid  1 mg Oral Daily    sodium chloride flush  5-40 mL IntraVENous 2 times per day       MEDS (infusions):   sodium chloride      sodium chloride         MEDS (prn):  traMADol, sodium chloride, sodium chloride, ondansetron **OR** ondansetron, senna, 
[facility]  MD Mario Tom, MD Tesha Pierre, CNP   Teresa Enriquez, CHARISSE Arvizu, AL  Progress Note    1/15/2024    SUBJECTIVE:   1/8: Seen in the ICU. Levo discontinued. Denies dyspnea, chest pain, or palpitations. Still has edema. He is on room air. BP stable.     1/9: Seen in the ICU. No acute distress. He is on room air. Denies dyspnea. Still with lower extremity edema. Improved. For transfer out of the ICU. Waiting on a bed.     1/10 comfortable on RA     1/11: Feels better today. On room air. He denies dyspnea. BP is stable.     1/12: Laying in bed. No acute distress. Tired. Vital signs are stable. He is on room air, denies dyspnea. Does have substantial lower ext edema.     1/13: Feeling better.  Breathing comfortably supine on room air.  peripheral swelling has improved.  Asks about discharge to home, changing IV antibiotics to p.o.    1/15: Laying in bed. No acute distress. He is on room air. Hopeful for discharge today. Has been refusing lactulose, though will take it once he goes home. Appetite is good. Swelling much improved.     PROBLEM LIST:    Principal Problem:    Hepatic failure, unspecified without coma (HCC)  Active Problems:    Decompensation of cirrhosis of liver (HCC)    Anemia    Scrotal swelling    Hepatitis C virus infection without hepatic coma    Cellulitis    Abdominal pain    Alcoholic cirrhosis of liver with ascites (HCC)    Hyponatremia    Hematoma of right lower extremity  Resolved Problems:    * No resolved hospital problems. *         DIET:    ADULT DIET; Easy to Chew     MEDS (scheduled):    miconazole   Topical BID    torsemide  20 mg Oral Daily    lactulose  20 g Oral TID    pentoxifylline  400 mg Oral TID WC    thiamine  100 mg Oral Daily    pantoprazole  40 mg IntraVENous BID    cefTRIAXone (ROCEPHIN) IV  2,000 mg IntraVENous Q24H    midodrine  2.5 mg Oral TID WC    multivitamin  1 tablet Oral Daily    folic 
CLINICAL PHARMACY NOTE: MEDS TO BEDS    Total # of Prescriptions Filled: 1   The following medications were delivered to the patient:  Cefdinir 300    Additional Documentation:   
CRITICAL CARE CONSULT NOTE      Patient - Johnathan Yanez III   MRN -  [id number]   [facility] # - [id number]   - 1980      Date of Admission -  2024  1:24 AM  Date of evaluation -  2024/0218-A   Hospital Day - 0      CC/REASON FOR ICU ADMISSION:  Shock  HISTORY OF PRESENT ILLNESS:     This is a 43-year-old male with history of anemia, esophageal varices with cirrhosis, GAVE, hepatitis C, EtOH abuse that presents emergency department with lower extremity pain and swelling.  Reports the pain and swelling been going on for period time.  Patient found to have hemoglobin of 3.8.  No active bleeding noted.  Patient given several units of PRBCs.    Past Medical History:   Diagnosis Date    Cirrhosis (HCC)     Esophageal varices (HCC)     ETOH abuse     GAVE (gastric antral vascular ectasia)     Hepatitis C     Portal hypertension (HCC)      Past Surgical History:   Procedure Laterality Date    ENDOSCOPY, COLON, DIAGNOSTIC      HERNIA REPAIR      HERNIA REPAIR Left 2022    LAPAROSCOPIC LEFT INGUINAL HERNIA REPAIR WITH MESH POSSIBLE OPEN POSSIBLE BILATERAL performed by Shashank Candelario MD at [facility] OR    TONSILLECTOMY      UMBILICAL HERNIA REPAIR N/A 9/15/2021    LAPAROSCOPIC POSSIBLE OPEN UMBILICAL HERNIA REPAIR WITH MSH performed by Shashank Candelario MD at [facility] OR    UPPER GASTROINTESTINAL ENDOSCOPY N/A 2020    [place] [facility]    UPPER GASTROINTESTINAL ENDOSCOPY N/A 2021    EGD BAND LIGATION performed by JOIE Huizar MD at [facility] ENDOSCOPY    UPPER GASTROINTESTINAL ENDOSCOPY N/A 5/10/2021    EGD BAND LIGATION performed by JOIE Huizar MD at [facility] OR    UPPER GASTROINTESTINAL ENDOSCOPY N/A 2023    EGD CONTROL HEMORRHAGE performed by Atif Johnson MD at [facility] ENDOSCOPY    UPPER GASTROINTESTINAL ENDOSCOPY  2023    EGD ESOPHAGOGASTRODUODENOSCOPY SCLEROTHERAPY performed by Atif Johnson MD at [facility] ENDOSCOPY    UPPER GASTROINTESTINAL 
CRITICAL CARE PROGRESS NOTE      Patient - Johnathan Yanez III   MRN -  [id number]   [facility] # - [id number]   - 1980      Date of Admission -  2024  1:24 AM  Date of evaluation -  2024/0218-A   Hospital Day - 1      CC/REASON FOR ICU ADMISSION:  Shock  HISTORY OF PRESENT ILLNESS:   24: No acute overnight events. His hemoglobin was last read at 7.4 status post 5 units of blood since he first came to the ED. Surgery is following case and pt is pending an EGD. He is receiving SBP prophylaxis, he has an CANDY and elevated bilirubin. Pt complains of body wide pain, given muscle relaxer and analgesics. His blood cultures were positive and he may have some cellulitis of the right lower extremity. ID consulted. Pt is stable for transfer out of the ICU.     This is a 43-year-old male with history of anemia, esophageal varices with cirrhosis, GAVE, hepatitis C, EtOH abuse that presents emergency department with lower extremity pain and swelling.  Reports the pain and swelling have been going on for period time.  Patient found to have hemoglobin of 3.8.  No active bleeding noted.  Patient given several units of PRBCs.    Past Medical History:   Diagnosis Date    Cirrhosis (HCC)     Esophageal varices (HCC)     ETOH abuse     GAVE (gastric antral vascular ectasia)     Hepatitis C     Portal hypertension (HCC)      Past Surgical History:   Procedure Laterality Date    ENDOSCOPY, COLON, DIAGNOSTIC      HERNIA REPAIR      HERNIA REPAIR Left 2022    LAPAROSCOPIC LEFT INGUINAL HERNIA REPAIR WITH MESH POSSIBLE OPEN POSSIBLE BILATERAL performed by Shashank Candelario MD at [facility] OR    TONSILLECTOMY      UMBILICAL HERNIA REPAIR N/A 9/15/2021    LAPAROSCOPIC POSSIBLE OPEN UMBILICAL HERNIA REPAIR WITH MSH performed by Shashank Candelario MD at [facility] OR    UPPER GASTROINTESTINAL ENDOSCOPY N/A 2020    EGD BIOPSY [place]o [place]rasco [place] [facility] [place]    UPPER GASTROINTESTINAL ENDOSCOPY N/A 2021    
Called Dr. Maldonado answering service due to fentanyl and oxycodone are  at 1600 today.  Patient has been asking for pain meds.  
Comprehensive Nutrition Assessment    Type and Reason for Visit:  Initial, RD Nutrition Re-Screen/LOS    Nutrition Recommendations/Plan:   Advance diet as tolerated, when medically feasible  Recommend Low Na diet when PO resumed  Continue inpatient monitoring     Malnutrition Assessment:  Malnutrition Status:  At risk for malnutrition (Comment) (01/11/24 1131)    Context:  Chronic Illness     Findings of the 6 clinical characteristics of malnutrition:  Energy Intake:  Mild decrease in energy intake (Comment)  Weight Loss:  Unable to assess (wt fluctuations likely with fluid status/ascites)     Body Fat Loss:  No significant body fat loss     Muscle Mass Loss:  No significant muscle mass loss    Fluid Accumulation:  Unable to assess Generalized (multiple factors (hepatic, renal))   Strength:  Not Performed    Nutrition Assessment:    Pt admit 2/2 cirrhosis w/ascites, CANDY-likely hepatorenal syndrome, UGIB. PMH includes ETOH/polysubstance abuse, Hep C, cirrhosis, CHF, GAVE, esophageal varices. Currently NPO for EGD today. Will continue to monitor pt tolerance, as PO resumed and advanced after procedure. Recommend progress to Low Na diet and will continue inpatient monitoring    Nutrition Related Findings:    A&Ox4, jaundice, poor PO PTA, chronic nausea, distended round abd +BS, +2 gen edema, K+ 3.4, diarrhea (on lactulose) ammonia WNL Wound Type: None (Cellulitis of RLE)       Current Nutrition Intake & Therapies:    Average Meal Intake: NPO (not able to eat x 3 d PTA)  Average Supplements Intake: NPO  Diet NPO Exceptions are: Sips of Water with Meds    Anthropometric Measures:  Height: 172.7 cm (5' 8\")  Ideal Body Weight (IBW): 154 lbs (70 kg)    Admission Body Weight: 89.5 kg (197 lb 5 oz) (bedscale 1/7)  Current Body Weight: 86 kg (189 lb 9.5 oz), 123.1 % IBW. Weight Source: Bed Scale (1/11)  Current BMI (kg/m2): 28.8  Usual Body Weight: 87.7 kg (193 lb 5 oz) (6/1/23 per [facility])  % Weight Change (Calculated): 
Department of Internal Medicine  Division of Pulmonary, Critical Care and Sleep Medicine  ICU Attending Addendum    Attending Physician Statement  Johnathan Yanez III was seen, examined and discussed with the multi-disciplinary ICU team during rounds. I have personally seen and examined the patient and the key elements of the encounter were performed by me.      The medications & laboratory data was discussed and adjusted where necessary. The radiographic images were reviewed either as a group or with radiologist if felt dis-concordant with the exam or history. The above findings were corroborated, plans confirmed and changes made if needed.   Family is updated at the bedside if available. Key issues of the case were discussed among consultants.  Critical Care time is documented if appropriate. Changes to the notes are place in italicized print.     Briefly,     Assessment:     S/p Shock multifactorial hypovolemic, vasodilatory  Right lower extremity cellulitis  Decompensated cirrhosis with history of EtOH  Acute on chronic anemia  Cirrhosis with history of esophageal varices  Recurrent ascites with history of TIPS  EtOH abuse  Lower extremity edema  Hyponatremia     Consultants: GI, ID     Continue finishing of PRBCs, serial H&H and transfuse for HgB <7  GI recommendations possible EGD Today?  Antibiotics per ID, ceftriaxone. BC e.coli  Check cortisol - start steroids pending results  Resuscitation with albumin q12 hs x 2 doses  Continue low dose midodrine  Off Pressors for MAP >65  Thiamine, MVI, folic acid  CIWA protocol  Lactulose     D/c ICU  PCCM s/o             
Discontinued every 6 hours H & H draws per Dr. Nino Lindsey, daily CBC to continue.  
Dr. Cintron aware of lipase, probnp levels.    
Dr. Cintron aware of order for paracentesis per Dr. Munoz.  Platelets 33.  
GENERAL SURGERY  DAILY PROGRESS NOTE  1/11/2024    Chief Complaint   Patient presents with    Abdominal Pain     Cirrhosis x 2 years, ascites       Subjective:  Pt states that he is doing well this morning. Reports pain around RLE calf is improving. Denies any fevers or chills.    Objective:  BP (!) 114/55   Pulse 92   Temp 98.3 °F (36.8 °C)   Resp 24   Ht 1.727 m (5' 8\")   Wt 86 kg (189 lb 9.5 oz)   SpO2 98%   BMI 28.83 kg/m²     GENERAL:  Laying in bed, awake, alert, cooperative, no apparent distress  HEAD: Normocephalic, atraumatic  EYES: No sclera icterus, pupils equal  LUNGS:  No increased work of breathing on room air  CARDIOVASCULAR:  RR and normotensive  ABDOMEN:  Soft, non-tender, non-distended  EXTREMITIES: RLE calf with cellulitis and small area of fluctuance but no TTP  SKIN: Warm and dry    Assessment/Plan:  43 y.o. male with history of EtOH cirrhosis, found to have RLE cellulitis with questionable hematoma vs abscess    - Okay for diet from surgical POV  - CT RLE reviewed with no air in fluid collection and small collection  - Clinically the patient is improving with the cellulitis and there are no boils or signs of worsening infection  - Continue abx  - No plans for surgical intervention at this time due to the patient clinically improving and there are increased risks with his elevated INR and decreased platelets from cirrhosis    Electronically signed by Patrick Dao MD on 1/11/2024 at 8:55 AM      General Surgery Progress Note  [facility]  Maury Granados MD, MS    Patient's Name/Date of Birth: Johnathan Yanez III / 1980    Date: January 11, 2024     Surgeon: MD Darwin    Chief Complaint: RLE cellulitis    Patient Active Problem List   Diagnosis    Cellulitis    UGI bleed    Abdominal pain    Secondary esophageal varices with bleeding (HCC)    GIB (gastrointestinal bleeding)    Umbilical hernia without obstruction and without gangrene    Left inguinal hernia 
Immediately prior to the procedure the patient's History and Physical was reviewed- there are no changes with the current vitals.  /81   Pulse 73   Temp 98.3 °F (36.8 °C)   Resp (!) 31   Ht 1.727 m (5' 8\")   Wt 86 kg (189 lb 9.5 oz)   SpO2 97%   BMI 28.83 kg/m²     No CP/SOB.  Risks/benefits d/w pt.  All questions answered.  Proceed with EGD.    Did d/w pt if continues to drink has poor 2 year survival chance without hope of liver transplant if continues EtOH Abuse.  Recommend AA.    Pt with H/O of esophageal varices in past with banding.  TIPS at [facility] around 1/2023 per notes.  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    COLEEN DELGADO,   1/11/2024  2:43 PM          
Infectious Disease  Progress Note  [facility]    Chief Complaint: Cellulitis    Subjective: Patient in bed . Doing ok today. Wants to get discharge. He says right leg pain is much better. He says he will make sure he takes diuretics and prevent fluid over load again.    Scheduled Meds:   miconazole   Topical BID    torsemide  20 mg Oral Daily    lactulose  20 g Oral TID    pentoxifylline  400 mg Oral TID WC    thiamine  100 mg Oral Daily    pantoprazole  40 mg IntraVENous BID    cefTRIAXone (ROCEPHIN) IV  2,000 mg IntraVENous Q24H    midodrine  2.5 mg Oral TID WC    multivitamin  1 tablet Oral Daily    folic acid  1 mg Oral Daily    sodium chloride flush  5-40 mL IntraVENous 2 times per day     Continuous Infusions:   sodium chloride      sodium chloride       PRN Meds:traMADol, sodium chloride, sodium chloride, ondansetron **OR** ondansetron, senna, prochlorperazine, sodium chloride flush, LORazepam **OR** LORazepam **OR** LORazepam **OR** LORazepam **OR** LORazepam **OR** LORazepam **OR** LORazepam **OR** LORazepam    Prior to Admission medications    Medication Sig Start Date End Date Taking? Authorizing Provider   cefdinir (OMNICEF) 300 MG capsule Take 1 capsule by mouth 2 times daily for 1 day 1/15/24 1/16/24 Yes Lucie Munoz MD   folic acid (FOLVITE) 1 MG tablet Take 1 tablet by mouth daily 1/13/24  Yes Nino Lindsey MD   midodrine (PROAMATINE) 2.5 MG tablet Take 1 tablet by mouth 3 times daily (with meals) 1/13/24  Yes Nino Lindsey MD   traMADol (ULTRAM) 50 MG tablet Take 1 tablet by mouth 3 times daily as needed for Pain for up to 3 days. Max Daily Amount: 150 mg 1/13/24 1/16/24 Yes Nino Lindsey MD   torsemide (DEMADEX) 20 MG tablet Take 1 tablet by mouth daily 1/13/24  Yes Mario Velazquez MD   ferrous sulfate (IRON 325) 325 (65 Fe) MG tablet Take 1 tablet by mouth 2 times daily (with meals) 8/29/23   Yessy Rodgers DO   lactulose (CHRONULAC) 10 GM/15ML solution Take 30 mLs by mouth 3 times daily 
Infectious Disease  Progress Note  [facility]    Chief Complaint: Cellulitis    Subjective: Patient in bed nursing present.  Having a little bit of loose bowel movements.  No nausea vomiting.    Scheduled Meds:   lactulose  20 g Oral TID    pentoxifylline  400 mg Oral TID WC    thiamine  100 mg Oral Daily    pantoprazole  40 mg IntraVENous BID    cefTRIAXone (ROCEPHIN) IV  2,000 mg IntraVENous Q24H    midodrine  2.5 mg Oral TID WC    multivitamin  1 tablet Oral Daily    folic acid  1 mg Oral Daily    sodium chloride flush  5-40 mL IntraVENous 2 times per day     Continuous Infusions:   sodium chloride      sodium chloride       PRN Meds:traMADol, sodium chloride, sodium chloride, ondansetron **OR** ondansetron, senna, prochlorperazine, sodium chloride flush, LORazepam **OR** LORazepam **OR** LORazepam **OR** LORazepam **OR** LORazepam **OR** LORazepam **OR** LORazepam **OR** LORazepam    Prior to Admission medications    Medication Sig Start Date End Date Taking? Authorizing Provider   folic acid (FOLVITE) 1 MG tablet Take 1 tablet by mouth daily 1/13/24  Yes Nino Lindsey MD   midodrine (PROAMATINE) 2.5 MG tablet Take 1 tablet by mouth 3 times daily (with meals) 1/13/24  Yes Nino Lindsey MD   traMADol (ULTRAM) 50 MG tablet Take 1 tablet by mouth 3 times daily as needed for Pain for up to 3 days. Max Daily Amount: 150 mg 1/13/24 1/16/24 Yes Nino Lindsey MD   ferrous sulfate (IRON 325) 325 (65 Fe) MG tablet Take 1 tablet by mouth 2 times daily (with meals) 8/29/23   Yessy Rodgers DO   lactulose (CHRONULAC) 10 GM/15ML solution Take 30 mLs by mouth 3 times daily 8/29/23   Yessy Rodgers DO   pentoxifylline (TRENTAL) 400 MG extended release tablet Take 1 tablet by mouth 3 times daily (with meals) 8/29/23   Yessy Rodgers DO   thiamine 100 MG tablet Take 1 tablet by mouth daily 8/29/23   Yessy Rodgers DO   pantoprazole (PROTONIX) 40 MG tablet Take 1 tablet by mouth every morning (before breakfast) 7/2/22  
Infectious Disease  Progress Note  [facility]    Chief Complaint: Cellulitis    Subjective: Patient in bed nursing present.  Having a little bit of loose bowel movements.  No nausea vomiting.    Scheduled Meds:   potassium phosphate IVPB (PERIPHERAL LINE)  20 mmol IntraVENous Once    torsemide  20 mg Oral Daily    lactulose  20 g Oral TID    pentoxifylline  400 mg Oral TID WC    thiamine  100 mg Oral Daily    pantoprazole  40 mg IntraVENous BID    cefTRIAXone (ROCEPHIN) IV  2,000 mg IntraVENous Q24H    midodrine  2.5 mg Oral TID WC    multivitamin  1 tablet Oral Daily    folic acid  1 mg Oral Daily    sodium chloride flush  5-40 mL IntraVENous 2 times per day     Continuous Infusions:   sodium chloride      sodium chloride       PRN Meds:traMADol, sodium chloride, sodium chloride, ondansetron **OR** ondansetron, senna, prochlorperazine, sodium chloride flush, LORazepam **OR** LORazepam **OR** LORazepam **OR** LORazepam **OR** LORazepam **OR** LORazepam **OR** LORazepam **OR** LORazepam    Prior to Admission medications    Medication Sig Start Date End Date Taking? Authorizing Provider   folic acid (FOLVITE) 1 MG tablet Take 1 tablet by mouth daily 1/13/24  Yes Nino Lindsey MD   midodrine (PROAMATINE) 2.5 MG tablet Take 1 tablet by mouth 3 times daily (with meals) 1/13/24  Yes Nino Lindsey MD   traMADol (ULTRAM) 50 MG tablet Take 1 tablet by mouth 3 times daily as needed for Pain for up to 3 days. Max Daily Amount: 150 mg 1/13/24 1/16/24 Yes Nino Lindsey MD   torsemide (DEMADEX) 20 MG tablet Take 1 tablet by mouth daily 1/13/24  Yes Mario Velazquez MD   ferrous sulfate (IRON 325) 325 (65 Fe) MG tablet Take 1 tablet by mouth 2 times daily (with meals) 8/29/23   Yessy Rodgers DO   lactulose (CHRONULAC) 10 GM/15ML solution Take 30 mLs by mouth 3 times daily 8/29/23   Yessy Rodgers DO   pentoxifylline (TRENTAL) 400 MG extended release tablet Take 1 tablet by mouth 3 times daily (with meals) 8/29/23   Ana Maria 
Infectious Disease  Progress Note  [facility]    Chief Complaint: Cellulitis    Subjective: Patient is feeling ok today. No fever. Scrotal swelling is better. Right leg is .no fever.     Scheduled Meds:   albumin human 25%  25 g IntraVENous Q12H    ferrous sulfate  325 mg Oral BID WC    lactulose  20 g Oral TID    pentoxifylline  400 mg Oral TID WC    thiamine  100 mg Oral Daily    pantoprazole  40 mg IntraVENous BID    cefTRIAXone (ROCEPHIN) IV  2,000 mg IntraVENous Q24H    midodrine  2.5 mg Oral TID WC    multivitamin  1 tablet Oral Daily    folic acid  1 mg Oral Daily    sodium chloride flush  5-40 mL IntraVENous 2 times per day     Continuous Infusions:   sodium chloride      octreotide (SANDOSTATIN) 500 mcg in sodium chloride 0.9 % 100 mL infusion 50 mcg/hr (01/09/24 0051)     PRN Meds:sodium chloride, ondansetron **OR** ondansetron, senna, prochlorperazine, oxyCODONE, sodium chloride flush, LORazepam **OR** LORazepam **OR** LORazepam **OR** LORazepam **OR** LORazepam **OR** LORazepam **OR** LORazepam **OR** LORazepam    Prior to Admission medications    Medication Sig Start Date End Date Taking? Authorizing Provider   ferrous sulfate (IRON 325) 325 (65 Fe) MG tablet Take 1 tablet by mouth 2 times daily (with meals) 8/29/23   Yessy Rodgers,    lactulose (CHRONULAC) 10 GM/15ML solution Take 30 mLs by mouth 3 times daily 8/29/23   Yessy Rodgers,    pentoxifylline (TRENTAL) 400 MG extended release tablet Take 1 tablet by mouth 3 times daily (with meals) 8/29/23   Yessy Rodgers, DO   thiamine 100 MG tablet Take 1 tablet by mouth daily 8/29/23   Yessy Rodgers,    pantoprazole (PROTONIX) 40 MG tablet Take 1 tablet by mouth every morning (before breakfast) 7/2/22   Naomy Iyer, CHRIS - CNP        ROS:  As mentioned in subjective, all other systems negative      /61   Pulse 90   Temp 98 °F (36.7 °C) (Oral)   Resp 22   Ht 1.727 m (5' 8\")   Wt 89.5 kg (197 lb 5 oz)   SpO2 95% 
Infectious Disease  Progress Note  [facility]    Chief Complaint: Cellulitis    Subjective: Patient is feeling ok today. No fever. Scrotal swelling is better. Right leg is .no fever. No new complaints. Sleepy today.    Scheduled Meds:   ferrous sulfate  325 mg Oral BID WC    lactulose  20 g Oral TID    pentoxifylline  400 mg Oral TID WC    thiamine  100 mg Oral Daily    pantoprazole  40 mg IntraVENous BID    cefTRIAXone (ROCEPHIN) IV  2,000 mg IntraVENous Q24H    midodrine  2.5 mg Oral TID WC    multivitamin  1 tablet Oral Daily    folic acid  1 mg Oral Daily    sodium chloride flush  5-40 mL IntraVENous 2 times per day     Continuous Infusions:   sodium chloride      sodium chloride      octreotide (SANDOSTATIN) 500 mcg in sodium chloride 0.9 % 100 mL infusion 50 mcg/hr (01/10/24 0919)     PRN Meds:sodium chloride, sodium chloride, ondansetron **OR** ondansetron, senna, prochlorperazine, oxyCODONE, sodium chloride flush, LORazepam **OR** LORazepam **OR** LORazepam **OR** LORazepam **OR** LORazepam **OR** LORazepam **OR** LORazepam **OR** LORazepam    Prior to Admission medications    Medication Sig Start Date End Date Taking? Authorizing Provider   ferrous sulfate (IRON 325) 325 (65 Fe) MG tablet Take 1 tablet by mouth 2 times daily (with meals) 8/29/23   Yessy Rodgers,    lactulose (CHRONULAC) 10 GM/15ML solution Take 30 mLs by mouth 3 times daily 8/29/23   Yessy Rodgers,    pentoxifylline (TRENTAL) 400 MG extended release tablet Take 1 tablet by mouth 3 times daily (with meals) 8/29/23   Yessy Rodgers, DO   thiamine 100 MG tablet Take 1 tablet by mouth daily 8/29/23   Yessy Rodgers,    pantoprazole (PROTONIX) 40 MG tablet Take 1 tablet by mouth every morning (before breakfast) 7/2/22   Naomy Iyer, APRN - CNP        ROS:  As mentioned in subjective, all other systems negative      /68   Pulse 89   Temp 98.2 °F (36.8 °C)   Resp 25   Ht 1.727 m (5' 8\")   Wt 89.5 kg (197 
Infectious Disease  Progress Note  [facility]    Chief Complaint: Cellulitis    Subjective: Patient is feeling ok today. No fever. Scrotal swelling is better. Sitting in chair today. He thinks he might have had laying cross legged over last few days at home might have caused redness on the right leg or from dogs.     Scheduled Meds:   ferrous sulfate  325 mg Oral BID WC    lactulose  20 g Oral TID    pentoxifylline  400 mg Oral TID WC    thiamine  100 mg Oral Daily    pantoprazole  40 mg IntraVENous BID    cefTRIAXone (ROCEPHIN) IV  2,000 mg IntraVENous Q24H    midodrine  2.5 mg Oral TID WC    multivitamin  1 tablet Oral Daily    folic acid  1 mg Oral Daily    sodium chloride flush  5-40 mL IntraVENous 2 times per day     Continuous Infusions:   sodium chloride      sodium chloride      octreotide (SANDOSTATIN) 500 mcg in sodium chloride 0.9 % 100 mL infusion 50 mcg/hr (01/10/24 6798)     PRN Meds:sodium chloride, sodium chloride, ondansetron **OR** ondansetron, senna, prochlorperazine, sodium chloride flush, LORazepam **OR** LORazepam **OR** LORazepam **OR** LORazepam **OR** LORazepam **OR** LORazepam **OR** LORazepam **OR** LORazepam    Prior to Admission medications    Medication Sig Start Date End Date Taking? Authorizing Provider   ferrous sulfate (IRON 325) 325 (65 Fe) MG tablet Take 1 tablet by mouth 2 times daily (with meals) 8/29/23   Yessy Rodgers,    lactulose (CHRONULAC) 10 GM/15ML solution Take 30 mLs by mouth 3 times daily 8/29/23   Yessy Rodgers,    pentoxifylline (TRENTAL) 400 MG extended release tablet Take 1 tablet by mouth 3 times daily (with meals) 8/29/23   Yessy Rodgers, DO   thiamine 100 MG tablet Take 1 tablet by mouth daily 8/29/23   Yessy Rodgers,    pantoprazole (PROTONIX) 40 MG tablet Take 1 tablet by mouth every morning (before breakfast) 7/2/22   Naomy Iyer, APRN - CNP        ROS:  As mentioned in subjective, all other systems negative      /81   Pulse 90  
Infectious Disease  Progress Note  [facility]    Chief Complaint: Cellulitis    Subjective: Patient is feeling sad that his legs are swollen. No fever or chills. Abdominal pain is better. He had paracentesis today. Tolerating antibiotics well.    Scheduled Meds:   ferric gluconate (FERRLECIT) 125 mg in sodium chloride 0.9 % 100 mL IVPB  125 mg IntraVENous Once    lactulose  20 g Oral TID    pentoxifylline  400 mg Oral TID WC    thiamine  100 mg Oral Daily    pantoprazole  40 mg IntraVENous BID    cefTRIAXone (ROCEPHIN) IV  2,000 mg IntraVENous Q24H    midodrine  2.5 mg Oral TID WC    multivitamin  1 tablet Oral Daily    folic acid  1 mg Oral Daily    sodium chloride flush  5-40 mL IntraVENous 2 times per day     Continuous Infusions:   sodium chloride      sodium chloride       PRN Meds:sodium chloride, sodium chloride, ondansetron **OR** ondansetron, senna, prochlorperazine, sodium chloride flush, LORazepam **OR** LORazepam **OR** LORazepam **OR** LORazepam **OR** LORazepam **OR** LORazepam **OR** LORazepam **OR** LORazepam    Prior to Admission medications    Medication Sig Start Date End Date Taking? Authorizing Provider   ferrous sulfate (IRON 325) 325 (65 Fe) MG tablet Take 1 tablet by mouth 2 times daily (with meals) 8/29/23   Yessy Rodgers,    lactulose (CHRONULAC) 10 GM/15ML solution Take 30 mLs by mouth 3 times daily 8/29/23   Yessy Rodgers,    pentoxifylline (TRENTAL) 400 MG extended release tablet Take 1 tablet by mouth 3 times daily (with meals) 8/29/23   Yessy Rodgers,    thiamine 100 MG tablet Take 1 tablet by mouth daily 8/29/23   Yessy Rodgers,    pantoprazole (PROTONIX) 40 MG tablet Take 1 tablet by mouth every morning (before breakfast) 7/2/22   Naomy Iyer, APRN - CNP        ROS:  As mentioned in subjective, all other systems negative      /61   Pulse 72   Temp 99.5 °F (37.5 °C) (Oral)   Resp 18   Ht 1.727 m (5' 8\")   Wt 92.9 kg (204 lb 12.8 oz)   SpO2 98%   
Infectious Disease  Progress Note  [facility]    Chief Complaint: Cellulitis    Subjective: Patient is feeling the same he is still swollen in the scrotum and have abdominal pain.  No fever overnight.  Still in the ICU.    Scheduled Meds:   albumin human 25%  25 g IntraVENous Q12H    ferrous sulfate  325 mg Oral BID WC    lactulose  20 g Oral TID    pentoxifylline  400 mg Oral TID WC    thiamine  100 mg Oral Daily    pantoprazole  40 mg IntraVENous BID    cefTRIAXone (ROCEPHIN) IV  2,000 mg IntraVENous Q24H    midodrine  2.5 mg Oral TID WC    multivitamin  1 tablet Oral Daily    folic acid  1 mg Oral Daily    sodium chloride flush  5-40 mL IntraVENous 2 times per day     Continuous Infusions:   sodium chloride      octreotide (SANDOSTATIN) 500 mcg in sodium chloride 0.9 % 100 mL infusion 50 mcg/hr (01/08/24 0249)     PRN Meds:sodium chloride, ondansetron **OR** ondansetron, senna, prochlorperazine, fentanNYL, oxyCODONE, sodium chloride flush, LORazepam **OR** LORazepam **OR** LORazepam **OR** LORazepam **OR** LORazepam **OR** LORazepam **OR** LORazepam **OR** LORazepam    Prior to Admission medications    Medication Sig Start Date End Date Taking? Authorizing Provider   ferrous sulfate (IRON 325) 325 (65 Fe) MG tablet Take 1 tablet by mouth 2 times daily (with meals) 8/29/23   Yessy Rodgers, DO   lactulose (CHRONULAC) 10 GM/15ML solution Take 30 mLs by mouth 3 times daily 8/29/23   Yessy Rodgers,    pentoxifylline (TRENTAL) 400 MG extended release tablet Take 1 tablet by mouth 3 times daily (with meals) 8/29/23   Yessy Rodgers, DO   thiamine 100 MG tablet Take 1 tablet by mouth daily 8/29/23   Yessy Rodgers,    pantoprazole (PROTONIX) 40 MG tablet Take 1 tablet by mouth every morning (before breakfast) 7/2/22   Naomy Iyer, APRN - CNP        ROS:  As mentioned in subjective, all other systems negative      BP (!) 109/56   Pulse 84   Temp 97.4 °F (36.3 °C) (Oral)   Resp 20   Ht 1.727 m (5' 
Internal Medicine Progress Note    Patient's name: Johnathan Yanez III  : 1980  Chief complaints (on day of admission): Abdominal Pain (Cirrhosis x 2 years, ascites)  Admission date: 2024  Date of service: 2024   Room: [address] INTERMEDIATE  Primary care physician: Yessy Rodgers DO  Reason for visit: Follow-up for cirrhosis    Subjective  Johnathan was seen and examined.  Patient is feeling well today.  Denies any other complaints.  States right leg swelling is improving.  Was thinking about going home.      Review of Systems  There are no new complaints of chest pain, shortness of breath, abdominal pain, nausea, vomiting, diarrhea, constipation.    Hospital Medications  Current Facility-Administered Medications   Medication Dose Route Frequency Provider Last Rate Last Admin    traMADol (ULTRAM) tablet 50 mg  50 mg Oral Q6H PRN Arti Tuttle APRN - CNP        torsemide (DEMADEX) tablet 20 mg  20 mg Oral Daily Mario Velazquez MD   20 mg at 24 0900    0.9 % sodium chloride infusion   IntraVENous PRN Hardeep Kennedy MD        lactulose (CHRONULAC) 10 GM/15ML solution 20 g  20 g Oral TID Arti Tuttle APRN - CNP   20 g at 01/10/24 1524    pentoxifylline (TRENTAL) extended release tablet 400 mg  400 mg Oral TID WC Arti Tuttle APRN - CNP   400 mg at 24 0900    thiamine tablet 100 mg  100 mg Oral Daily Arti Tuttle APRN - CNP   100 mg at 24 0900    pantoprazole (PROTONIX) injection 40 mg  40 mg IntraVENous BID Arti Tuttle APRN - CNP   40 mg at 24 0909    cefTRIAXone (ROCEPHIN) 2,000 mg in sterile water 20 mL IV syringe  2,000 mg IntraVENous Q24H Arti Tuttle APRN - CNP   2,000 mg at 24 0900    0.9 % sodium chloride infusion   IntraVENous PRN Arti Tuttle APRN - CNP        ondansetron (ZOFRAN-ODT) disintegrating tablet 4 mg  4 mg Oral Q8H PRN Arti Tuttle APRN - CNP        Or    ondansetron (ZOFRAN) injection 4 mg  4 mg 
Nurse to nurse report called to 5W for transfer.  
Occupational Therapy  Date:1/10/2024  Patient Name: Johnahtan Yanez III  Room: 0218/0218-A     Occupational Therapy (OT) order received, patient's medical record reviewed, and OT evaluation attempted this date; patient declined to participate in OOB activities. OT evaluation to be re-attempted at later time/date, as able/appropriate.    Faranz Delcid, OTR/L  License Number: [id number]        
PROGRESS NOTE  By Hardeep Kennedy M.D.    [facility]  Dr. Mellissa Cuellar M.D.,  Dr. Atif Johnson M.D.,   Dr. Tino Bal D.O.,  Dr. Scot Huizar M.D.,  Dr. Jonnie Cai D.O.,          Johnathan Yanez III  43 y.o.  male    SUBJECTIVE:  No new complaints.  No family at bedside    OBJECTIVE:    /61   Pulse 90   Temp 98 °F (36.7 °C) (Oral)   Resp 22   Ht 1.727 m (5' 8\")   Wt 89.5 kg (197 lb 5 oz)   SpO2 95%   BMI 30.00 kg/m²     General: NAD/ male.  AAOx3  HEENT: Persistent scleral icterus/moist oral mucosa  Neck: Supple with trachea midline  Chest: Symmetric excursion/nonlabored respirations  Cor: Appears regular  Abd.: Soft and obese/distended  Extr.:  Bilateral lower extremity edema  Skin: Warm and dry      DATA:    Monitor data reviewed -sinus rhythm noted noted.       Lab Results   Component Value Date/Time    WBC PENDING 01/09/2024 05:20 AM    RBC PENDING 01/09/2024 05:20 AM    HGB 7.1 01/09/2024 11:45 AM    HCT 20.9 01/09/2024 11:45 AM    MCV PENDING 01/09/2024 05:20 AM    MCH PENDING 01/09/2024 05:20 AM    MCHC PENDING 01/09/2024 05:20 AM    RDW PENDING 01/09/2024 05:20 AM    PLT PENDING 01/09/2024 05:20 AM    MPV PENDING 01/09/2024 05:20 AM     Lab Results   Component Value Date/Time     01/09/2024 05:20 AM    K 4.1 01/09/2024 05:20 AM    K 3.5 06/04/2023 05:43 AM    CL 94 01/09/2024 05:20 AM    CO2 24 01/09/2024 05:20 AM    BUN 56 01/09/2024 05:20 AM    CREATININE 2.8 01/09/2024 05:20 AM    CALCIUM 8.6 01/09/2024 05:20 AM    PROT 5.4 01/09/2024 05:20 AM    LABALBU 3.4 01/09/2024 05:20 AM    LABALBU 3.4 02/18/2012 05:00 AM    BILITOT 8.7 01/09/2024 05:20 AM    ALKPHOS 96 01/09/2024 05:20 AM    AST 44 01/09/2024 05:20 AM    ALT 24 01/09/2024 05:20 AM     Lab Results   Component Value Date/Time    LIPASE 63 01/07/2024 01:48 AM     Lab Results   Component Value Date/Time    AMYLASE 49 02/22/2015 09:30 PM         ASSESSMENT/PLAN:  Patient Active Problem List 
PROGRESS NOTE  By Hardeep Kennedy M.D.    [facility]  Dr. Mellissa Cuellar M.D.,  Dr. Atif Johnson M.D.,   Dr. Tino Bal D.O.,  Dr. Scot Huizar M.D.,  KAI AstorgaO.,          Johnathan Yanez III  43 y.o.  male    SUBJECTIVE:  Feels okay.  Denies nausea vomiting    OBJECTIVE:    BP (!) 85/54   Pulse 64   Temp 98.4 °F (36.9 °C) (Oral)   Resp 18   Ht 1.727 m (5' 8\")   Wt 92.9 kg (204 lb 12.8 oz)   SpO2 97%   BMI 31.14 kg/m²     General: NAD/ male.  AAOx3  HEENT: Persistent scleral icterus/moist oral mucosa  Neck: Supple/trachea midline  Chest: Symmetrical excursion/labored respirations  Cor: Regular  Abd.:.  Appears nontender  Extr.:  Persistent bilateral lower extremity edema  Skin: Warm and dry      DATA:    Monitor data reviewed -sinus rhythm noted.       Lab Results   Component Value Date/Time    WBC 12.3 01/12/2024 03:00 AM    RBC 2.59 01/12/2024 03:00 AM    HGB 7.8 01/12/2024 09:45 AM    HCT 23.2 01/12/2024 09:45 AM    MCV 87.6 01/12/2024 03:00 AM    MCH 28.2 01/12/2024 03:00 AM    MCHC 32.2 01/12/2024 03:00 AM    RDW 16.9 01/12/2024 03:00 AM    PLT 45 01/12/2024 03:00 AM    MPV 8.6 01/12/2024 03:00 AM     Lab Results   Component Value Date/Time     01/12/2024 03:00 AM    K 3.3 01/12/2024 03:00 AM    K 3.5 06/04/2023 05:43 AM     01/12/2024 03:00 AM    CO2 25 01/12/2024 03:00 AM    BUN 37 01/12/2024 03:00 AM    CREATININE 1.6 01/12/2024 03:00 AM    CALCIUM 8.1 01/12/2024 03:00 AM    PROT 5.0 01/12/2024 03:00 AM    LABALBU 3.0 01/12/2024 03:00 AM    LABALBU 3.4 02/18/2012 05:00 AM    BILITOT 5.5 01/12/2024 03:00 AM    ALKPHOS 79 01/12/2024 03:00 AM    AST 43 01/12/2024 03:00 AM    ALT 19 01/12/2024 03:00 AM     Lab Results   Component Value Date/Time    LIPASE 63 01/07/2024 01:48 AM     Lab Results   Component Value Date/Time    AMYLASE 49 02/22/2015 09:30 PM         ASSESSMENT/PLAN:  Patient Active Problem List   Diagnosis    Cellulitis    UGI bleed    
PROGRESS NOTE  By Hardeep Kennedy M.D.    [facility]  Dr. Mellissa Cuellar M.D.,  Dr. Atif Johnson M.D.,   Dr. Tino Bal D.O.,  Dr. Scot Huizar M.D.,  KAI AstorgaO.,          Johnathan Yanez III  43 y.o.  male    SUBJECTIVE:  No new complaints.  Wife at bedside    OBJECTIVE:    BP (!) 109/56   Pulse 84   Temp 97.4 °F (36.3 °C) (Oral)   Resp 20   Ht 1.727 m (5' 8\")   Wt 89.5 kg (197 lb 5 oz)   SpO2 100%   BMI 30.00 kg/m²     General: NAD/ male.  AAOx3  HEENT: Persistent scleral icterus/moist oral mucosa  Neck: Supple/trachea midline  Chest: Symmetric excursion/nonlabored respirations  Cor: Regular  Abd.: Soft and nontender.  Distended  Extr.:  Persistent 3+ edema BLE  Skin: Warm and dry/persistent icterus        DATA:    Monitor data reviewed -sinus rhythm noted.       Lab Results   Component Value Date/Time    WBC 12.2 01/08/2024 04:10 AM    RBC 2.59 01/08/2024 04:10 AM    HGB 7.4 01/08/2024 04:10 AM    HCT 21.7 01/08/2024 04:10 AM    MCV 83.8 01/08/2024 04:10 AM    MCH 28.6 01/08/2024 04:10 AM    MCHC 34.1 01/08/2024 04:10 AM    RDW 15.8 01/08/2024 04:10 AM    PLT 33 01/08/2024 04:10 AM    MPV 10.3 01/08/2024 04:10 AM     Lab Results   Component Value Date/Time     01/08/2024 04:10 AM    K 4.4 01/08/2024 04:10 AM    K 3.5 06/04/2023 05:43 AM    CL 91 01/08/2024 04:10 AM    CO2 25 01/08/2024 04:10 AM    BUN 66 01/08/2024 04:10 AM    CREATININE 4.1 01/08/2024 04:10 AM    CALCIUM 8.0 01/08/2024 04:10 AM    PROT 5.0 01/08/2024 04:10 AM    LABALBU 2.8 01/08/2024 04:10 AM    LABALBU 3.4 02/18/2012 05:00 AM    BILITOT 9.7 01/08/2024 04:10 AM    ALKPHOS 119 01/08/2024 04:10 AM    AST 55 01/08/2024 04:10 AM    ALT 27 01/08/2024 04:10 AM     Lab Results   Component Value Date/Time    LIPASE 63 01/07/2024 01:48 AM     Lab Results   Component Value Date/Time    AMYLASE 49 02/22/2015 09:30 PM         ASSESSMENT/PLAN:  Patient Active Problem List   Diagnosis    Cellulitis    
PROGRESS NOTE  By Hardeep Kennedy M.D.    [facility]  Dr. Mellissa Cuellar M.D.,  Dr. Atif Johnson M.D.,   KAI CrouchO.,  Dr. Scot Huizar M.D.,  KAI AstorgaO.,          Johnathan Yanez III  43 y.o.  male    SUBJECTIVE:  Denies abdominal pain.  Denies nausea vomiting.  No family at bedside    OBJECTIVE:    /68   Pulse 89   Temp 98.2 °F (36.8 °C)   Resp 25   Ht 1.727 m (5' 8\")   Wt 89.5 kg (197 lb 5 oz)   SpO2 96%   BMI 30.00 kg/m²     General: NAD/ male  HEENT: Persistent scleral icterus/moist oral mucosa/poor dentition  Neck: Supple with trachea midline  Chest: CTAB  Cor: Regular  Abd.: Soft/appears nontender but distended and obese  Extr.:  BLE 3+ edema  Skin: Warm and dry/persistent icterus        DATA:    Monitor data reviewed -sinus rhythm noted.       Lab Results   Component Value Date/Time    WBC 18.0 01/10/2024 05:35 AM    RBC 2.18 01/10/2024 05:35 AM    HGB 6.2 01/10/2024 05:35 AM    HCT 18.5 01/10/2024 05:35 AM    MCV 84.9 01/10/2024 05:35 AM    MCH 28.4 01/10/2024 05:35 AM    MCHC 33.5 01/10/2024 05:35 AM    RDW 16.0 01/10/2024 05:35 AM    PLT 51 01/10/2024 05:35 AM    MPV 10.2 01/10/2024 05:35 AM     Lab Results   Component Value Date/Time     01/10/2024 05:35 AM    K 3.9 01/10/2024 05:35 AM    K 3.5 06/04/2023 05:43 AM    CL 99 01/10/2024 05:35 AM    CO2 23 01/10/2024 05:35 AM    BUN 51 01/10/2024 05:35 AM    CREATININE 2.3 01/10/2024 05:35 AM    CALCIUM 8.7 01/10/2024 05:35 AM    PROT 5.3 01/10/2024 05:35 AM    LABALBU 3.4 01/10/2024 05:35 AM    LABALBU 3.4 02/18/2012 05:00 AM    BILITOT 6.5 01/10/2024 05:35 AM    ALKPHOS 87 01/10/2024 05:35 AM    AST 37 01/10/2024 05:35 AM    ALT 18 01/10/2024 05:35 AM     Lab Results   Component Value Date/Time    LIPASE 63 01/07/2024 01:48 AM     Lab Results   Component Value Date/Time    AMYLASE 49 02/22/2015 09:30 PM         ASSESSMENT/PLAN:  Patient Active Problem List   Diagnosis    Cellulitis    
Patient admitted from [facility] to , with the following belongings; backpack with underwear, shirt, shorts, sweatshirt. Sweat pants, slippers, socks, blanket, cell phone, cell phone , eyeglasses, placed on monitor, patient oriented to room and unit visiting hours.  Patient guide at bedside, reviewed patient rights and responsibilities. MRSA nasal swab obtained.  Bed alarm on.  Call light within reach.    
Physical Therapy  Facility/Department: [address] ICU  Physical Therapy Initial Assessment    Name: Johnathan Yanez III  : 1980  MRN: [id number]  Date of Service: 2024          Patient Diagnosis(es): The primary encounter diagnosis was Acute liver failure without hepatic coma. Diagnoses of CANDY (acute kidney injury) (HCC), Acute anemia, Cellulitis of lower extremity, unspecified laterality, Colitis, Hydrocele, unspecified hydrocele type, Shock (HCC), and Other ascites were also pertinent to this visit.  Past Medical History:  has a past medical history of Cirrhosis (HCC), Esophageal varices (HCC), ETOH abuse, GAVE (gastric antral vascular ectasia), Hepatitis C, and Portal hypertension (HCC).  Past Surgical History:  has a past surgical history that includes Upper gastrointestinal endoscopy (N/A, 2020); Tonsillectomy; Upper gastrointestinal endoscopy (N/A, 2021); Upper gastrointestinal endoscopy (N/A, 5/10/2021); Endoscopy, colon, diagnostic; Umbilical hernia repair (N/A, 9/15/2021); hernia repair; hernia repair (Left, 2022); Upper gastrointestinal endoscopy (N/A, 2023); Upper gastrointestinal endoscopy (2023); and Upper gastrointestinal endoscopy (N/A, 6/3/2023).       Requires PT Follow-Up: Yes     Evaluating Therapist: Giuliana Rodriguez PT     Referring Provider:    Yessy Rodgers DO     PT order : PT eval and treat     Room #: 218   DIAGNOSIS: The primary encounter diagnosis was Acute liver failure without hepatic coma. Diagnoses of CANDY (acute kidney injury) (HCC), Acute anemia, Cellulitis of lower extremity, unspecified laterality, Colitis, Hydrocele, unspecified hydrocele type, Shock (HCC), and Other ascites were also pertinent to this visit.  PRECAUTIONS: falls     Social:  Pt lives with  fiance  in a  2  floor plan  with bed and bath on first floor  steps and  1 rails to enter.  Prior to admission pt walked with no AD or cane as needed. HAs ww      Initial Evaluation  Date: 
Physical Therapy  Facility/Department: [address] ICU  Physical Therapy Initial Assessment    Name: Johnathan Yanez III  : 1980  MRN: [id number]  Date of Service: 1/10/2024         Attempted to see pt for PT evaluation, pt declined due to fatigue.   [place] [id number]  
Progress Note  Date:1/10/2024       Room:0218/0218-  Patient Name:Johnathan Yanez III     YOB: 1980     Age:43 y.o.    Patient seen for follow up of acute liver failure, acute anemia and hypotension. Patient was down graded to intermediate but bed still in ICU. He states continues to have back pain but notes its improving. He had US of Right lower extremity which showed abscess vs hematoma. No issues overnight per nursing except patient has defecated on the floor twice since admission.     Subjective    Subjective:  Symptoms:  No shortness of breath, cough, chest pain, headache, chest pressure or diarrhea.    Diet:  No nausea or vomiting.    Activity level: Impaired due to pain.       Review of Systems   Respiratory:  Negative for cough and shortness of breath.    Cardiovascular:  Negative for chest pain.   Gastrointestinal:  Negative for diarrhea, nausea and vomiting.     Objective         Vitals Last 24 Hours:  TEMPERATURE:  Temp  Av.5 °F (36.9 °C)  Min: 98.1 °F (36.7 °C)  Max: 99.1 °F (37.3 °C)  RESPIRATIONS RANGE: Resp  Av.5  Min: 17  Max: 37  PULSE OXIMETRY RANGE: SpO2  Av.7 %  Min: 91 %  Max: 98 %  PULSE RANGE: Pulse  Av.7  Min: 85  Max: 100  BLOOD PRESSURE RANGE: Systolic (24hrs), Av , Min:93 , Max:124   ; Diastolic (24hrs), Av, Min:52, Max:70    I/O (24Hr):    Intake/Output Summary (Last 24 hours) at 1/10/2024 0525  Last data filed at 2024 2303  Gross per 24 hour   Intake 575.28 ml   Output 1400 ml   Net -824.72 ml       Objective:  General Appearance:  Comfortable, ill-appearing and in no acute distress.    Vital signs: (most recent): Blood pressure (!) 114/53, pulse 98, temperature 98.7 °F (37.1 °C), temperature source Oral, resp. rate (!) 34, height 1.727 m (5' 8\"), weight 89.5 kg (197 lb 5 oz), SpO2 91 %.    Lungs:  Normal effort and normal respiratory rate.  Breath sounds clear to auscultation.  No rales, decreased breath sounds, wheezes or rhonchi.  
Progress Note  Date:1/15/2024       Room:[facility]/0521  Patient Name:Johnathan Yanez III     YOB: 1980     Age:43 y.o.    Patient seen for follow up of acute liver failure, acute anemia and hypotension. Patient this am laying in bed. He is feeling better. Wants to discharge home today. Needs picc line.     Subjective    Subjective:  Symptoms:  No shortness of breath, cough, chest pain, headache, chest pressure or diarrhea.    Diet:  No nausea or vomiting.    Activity level: Impaired due to pain.       Review of Systems   Respiratory:  Negative for cough and shortness of breath.    Cardiovascular:  Negative for chest pain.   Gastrointestinal:  Negative for diarrhea, nausea and vomiting.     Objective         Vitals Last 24 Hours:  TEMPERATURE:  Temp  Av.7 °F (37.1 °C)  Min: 98.3 °F (36.8 °C)  Max: 98.9 °F (37.2 °C)  RESPIRATIONS RANGE: Resp  Av.6  Min: 17  Max: 20  PULSE OXIMETRY RANGE: SpO2  Av.7 %  Min: 94 %  Max: 95 %  PULSE RANGE: Pulse  Av.8  Min: 74  Max: 89  BLOOD PRESSURE RANGE: Systolic (24hrs), Av , Min:108 , Max:134   ; Diastolic (24hrs), Av, Min:53, Max:67    I/O (24Hr):    Intake/Output Summary (Last 24 hours) at 1/15/2024 0513  Last data filed at 2024 1329  Gross per 24 hour   Intake --   Output 1250 ml   Net -1250 ml       Objective:  General Appearance:  Comfortable, ill-appearing and in no acute distress.    Vital signs: (most recent): Blood pressure 134/67, pulse 89, temperature 98.9 °F (37.2 °C), temperature source Oral, resp. rate 18, height 1.727 m (5' 8\"), weight 87.6 kg (193 lb 1.9 oz), SpO2 94 %.    Lungs:  Normal effort and normal respiratory rate.  Breath sounds clear to auscultation.  No rales, decreased breath sounds, wheezes or rhonchi.    Heart: Normal rate.  Regular rhythm.  S1 normal and S2 normal.  No gallop.   Abdomen: Abdomen is soft and distended.  There are signs of ascites. Bowel sounds are normal.   There is no abdominal 
Progress Note  Date:2024       Room:0218/0218-  Patient Name:Johnathan Yanez III     YOB: 1980     Age:43 y.o.    Patient seen for follow up of acute liver failure, acute anemia and hypotension. Patient laying in ICU. He states continues to have back pain. Not happy that fentanyl was discontinued.  He denies any other complaints.     Subjective    Subjective:  Symptoms:  No shortness of breath, cough, chest pain, headache, chest pressure or diarrhea.    Diet:  No nausea or vomiting.    Activity level: Impaired due to pain.       Review of Systems   Respiratory:  Negative for cough and shortness of breath.    Cardiovascular:  Negative for chest pain.   Gastrointestinal:  Negative for diarrhea, nausea and vomiting.     Objective         Vitals Last 24 Hours:  TEMPERATURE:  Temp  Av.9 °F (36.6 °C)  Min: 97.4 °F (36.3 °C)  Max: 98.4 °F (36.9 °C)  RESPIRATIONS RANGE: Resp  Av.3  Min: 17  Max: 32  PULSE OXIMETRY RANGE: SpO2  Av.8 %  Min: 94 %  Max: 100 %  PULSE RANGE: Pulse  Av.6  Min: 78  Max: 87  BLOOD PRESSURE RANGE: Systolic (24hrs), Av , Min:99 , Max:114   ; Diastolic (24hrs), Av, Min:47, Max:65    I/O (24Hr):    Intake/Output Summary (Last 24 hours) at 2024 0530  Last data filed at 2024  Gross per 24 hour   Intake 496.91 ml   Output 1195 ml   Net -698.09 ml       Objective:  General Appearance:  Comfortable, ill-appearing and in no acute distress.    Vital signs: (most recent): Blood pressure 114/63, pulse 85, temperature 98 °F (36.7 °C), temperature source Oral, resp. rate 24, height 1.727 m (5' 8\"), weight 89.5 kg (197 lb 5 oz), SpO2 95 %.    Lungs:  Normal effort and normal respiratory rate.  Breath sounds clear to auscultation.  No rales, decreased breath sounds, wheezes or rhonchi.    Heart: Normal rate.  Regular rhythm.  S1 normal and S2 normal.  No gallop.   Abdomen: Abdomen is soft and distended.  There are signs of ascites. Bowel sounds are 
Progress Note  Date:2024       Room:0218/0218-A  Patient Name:Johnathan Yanez III     YOB: 1980     Age:43 y.o.    Patient seen for follow up of acute liver failure, acute anemia and hypotension. Patient laying in ICU. He states he feels terrible. He is currently having back pain and wondering if he can have pillows for his back.     Subjective    Subjective:  Symptoms:  No shortness of breath, cough, chest pain, headache, chest pressure or diarrhea.    Diet:  No nausea or vomiting.    Activity level: Impaired due to pain.       Review of Systems   Respiratory:  Negative for cough and shortness of breath.    Cardiovascular:  Negative for chest pain.   Gastrointestinal:  Negative for diarrhea, nausea and vomiting.     Objective         Vitals Last 24 Hours:  TEMPERATURE:  Temp  Av.2 °F (36.8 °C)  Min: 97.5 °F (36.4 °C)  Max: 99 °F (37.2 °C)  RESPIRATIONS RANGE: Resp  Av.7  Min: 12  Max: 30  PULSE OXIMETRY RANGE: SpO2  Av.8 %  Min: 94 %  Max: 99 %  PULSE RANGE: Pulse  Av.7  Min: 82  Max: 98  BLOOD PRESSURE RANGE: Systolic (24hrs), Av , Min:78 , Max:118   ; Diastolic (24hrs), Av, Min:36, Max:56    I/O (24Hr):    Intake/Output Summary (Last 24 hours) at 2024 0529  Last data filed at 2024 0521  Gross per 24 hour   Intake 3012.04 ml   Output 1725 ml   Net 1287.04 ml     Objective:  General Appearance:  Comfortable, ill-appearing and in no acute distress.    Vital signs: (most recent): Blood pressure (!) 103/51, pulse 85, temperature 98.4 °F (36.9 °C), temperature source Oral, resp. rate 16, height 1.727 m (5' 8\"), weight 89.5 kg (197 lb 5 oz), SpO2 94 %.    Lungs:  Normal effort and normal respiratory rate.  Breath sounds clear to auscultation.  No rales, decreased breath sounds, wheezes or rhonchi.    Heart: Normal rate.  Regular rhythm.  S1 normal and S2 normal.  No gallop.   Abdomen: Abdomen is soft and distended.  There are signs of ascites. Bowel sounds are 
Progress Note  Date:2024       Room:[facility]0634-  Patient Name:Johnathan Yanez III     YOB: 1980     Age:43 y.o.    Patient seen for follow up of acute liver failure, acute anemia and hypotension. Patient this am laying in bed. Back pain has significantly improved. He is feeling better. Was transferred to [facility] overnight per nursing and has been doing well just sleeping.     Subjective    Subjective:  Symptoms:  No shortness of breath, cough, chest pain, headache, chest pressure or diarrhea.    Diet:  No nausea or vomiting.    Activity level: Impaired due to pain.       Review of Systems   Respiratory:  Negative for cough and shortness of breath.    Cardiovascular:  Negative for chest pain.   Gastrointestinal:  Negative for diarrhea, nausea and vomiting.     Objective         Vitals Last 24 Hours:  TEMPERATURE:  Temp  Av.2 °F (36.8 °C)  Min: 97.3 °F (36.3 °C)  Max: 98.8 °F (37.1 °C)  RESPIRATIONS RANGE: Resp  Av.7  Min: 16  Max: 31  PULSE OXIMETRY RANGE: SpO2  Av.6 %  Min: 93 %  Max: 98 %  PULSE RANGE: Pulse  Av.3  Min: 68  Max: 98  BLOOD PRESSURE RANGE: Systolic (24hrs), Av , Min:105 , Max:131   ; Diastolic (24hrs), Av, Min:52, Max:109    I/O (24Hr):    Intake/Output Summary (Last 24 hours) at 2024 0452  Last data filed at 2024 0256  Gross per 24 hour   Intake 1567.42 ml   Output 2150 ml   Net -582.58 ml       Objective:  General Appearance:  Comfortable, ill-appearing and in no acute distress.    Vital signs: (most recent): Blood pressure (!) 108/53, pulse 79, temperature 98.8 °F (37.1 °C), temperature source Temporal, resp. rate 20, height 1.727 m (5' 8\"), weight 92.9 kg (204 lb 12.8 oz), SpO2 93 %.    Lungs:  Normal effort and normal respiratory rate.  Breath sounds clear to auscultation.  No rales, decreased breath sounds, wheezes or rhonchi.    Heart: Normal rate.  Regular rhythm.  S1 normal and S2 normal.  No gallop.   Abdomen: Abdomen is 
Pt placed call light on, then bed alarm started going off. This nurse and other nurses on the unit went into room to find patient standing at the foot of the bed and had pooped all over the floor. Patient stated \" this was a bad idea, I just didn't want to poop in bed.\" This nurse and other nurses placed him on bedside commode and cleaned up room. Patient placed back into bed and educated on the need to stay in bed and wait for someone to help him. Call light within reach and bed alarm back on.   
Pt refusing lactulose. Pt educated    Electronically signed by Marie Rogers RN on 1/14/2024 at 10:03 PM   
Pt seen full note dictated  [id number]  
Pt transferred to 6S per bed with transport, telemetry monitor on, all belongings sent with patient including shirt, sweat pants, shoes, undergarments, cell phone, , blanket, glasses, duffel bag, cane, ID.  Pt states he notified wife of new room number    
Spoke with Lacy at Dr. Vila's office. Notified reason for call was pt potassium of 3.3 this am. States she will notify the physician.  
Spoke with ultrasound technician. Pt refused scan due to pain. Will try tomorrow for scan. Patient educated on NPO after midnight status.   
Taken off floor via cart for paracentesis.  
[facility] lab and sent to lab.  
education to increase follow through with safety techniques and functional independence  * Recommendation of environmental modifications for increased safety with functional transfers/mobility and ADLs  * Therapeutic exercise to improve motor endurance, ROM, and functional strength for ADLs/functional transfers  * Therapeutic activities to facilitate/challenge dynamic balance, stand tolerance for increased safety and independence with ADLs  * Therapeutic activities to facilitate gross/fine motor skills for increased independence with ADLs  * Neuro-muscular re-education: facilitation of righting/equilibrium reactions, midline orientation, scapular stability/mobility, normalization of muscle tone, and facilitation of volitional active controled movement    Recommended Adaptive Equipment: TBD     Home Living: Patient lives with his fiance in a 2-floor home with a few steps to enter; patient's bedroom and bathroom are on the 1st or 2nd floor, reports he typically stays on the first floor.  Bathroom Setup: walk in shower  Equipment Owned: cane, FWW    Prior Level of Function (PLOF): Per patient, patient was independent with ADLs, independent with IADLs, and modified independent with functional mobility (cane as needed) prior to this hospitalization.    Pain Level: Patient reports pain from edema   Cognition: Patient alert and oriented, cooperative, able to follow simple directions  Memory: fair  Sequencing: fair  Problem Solving: fair  Judgement/Safety: fair    Functional Assessment:  AM-PAC Daily Activity Raw Score: 15/24   Initial Eval Status  Date: 1/11/2024 Treatment Status  Date:  Short Term Goals = Long Term Goals   Feeding setup  independent   Grooming CGA, washing hands while standing at sink  independent   UB Dressing MIN A for gown management in sitting  independent    LB Dressing MAX A for sock management  Mod I - with use of AE, as needed/appropriate   Bathing MOD A  Mod I - with use of AE/DME, as 
tablet by mouth every morning (before breakfast) 7/2/22   Naomy Iyer, APRN - CNP        ROS:  As mentioned in subjective, all other systems negative      /63   Pulse 75   Temp 98.2 °F (36.8 °C) (Oral)   Resp 18   Ht 1.727 m (5' 8\")   Wt 88.9 kg (196 lb)   SpO2 96%   BMI 29.80 kg/m²     Physical Exam  Constitutional: The patient is awake, alert, and oriented.   Skin: Warm and dry. Jaundice. Pale looking  HEENT: Eyes show round, and reactive pupils. dry mucous membranes, no ulcerations, no thrush.   Neck: Supple to movements. No lymphadenopathy.   Chest: No use of accessory muscles to breathe. Symmetrical expansion. Auscultation reveals no wheezing, crackles, or rhonchi.   Cardiovascular: S1 and S2 are rhythmic and regular. No murmurs appreciated.   Abdomen: soft,. Distended.  Nontender  Genitourinary: Aldana catheter with severe swelling of the scrotum with mild redness  Extremities: No clubbing, no cyanosis,   Musculoskeletal: right leg no has another lateral area that is fluctuant.   Neurological: awake alert oriented x 3  Lines: peripheral, right FEM CVC    Labs, Cultures reviewed  Radiology and other consultants notes reviewed    Microbiology:  Blood cultures 1 out of 2 from 1/6 turned positive for gram-negative antonio.  8 BC ID showed E. coli.  MRSA nasal screen : negative  UA looks clean urine cx negative    1/12/24 body FL - neg so far    Assessment  E. coli septicemia:  Right leg cellulitis versus vs  SBP:  Right leg abscess vs hematoma: has a foreign body on the CT.Reviewed surgery note.  Leukocytosis:  Severe anemia/decompensated liver cirrhosis:S/p EGD yesterday : reviewed report. Had 2 small varices and mod friable GAVE.   S/p paracentesis today removing 1650 fluid. Analysis pending    Plan  Continue IV ceftriaxone 2 g daily for now    Follow ascitic fluid analysis. If its positive it helps to diagnose SBP but patient is on antibiotics for the past 5 days it doesnot rule it out if its 
LORazepam    Objective  Most Recent Recorded Vitals  BP (!) 108/53   Pulse 74   Temp 98.3 °F (36.8 °C) (Oral)   Resp 18   Ht 1.727 m (5' 8\")   Wt 88.9 kg (196 lb)   SpO2 95%   BMI 29.80 kg/m²   I/O last 3 completed shifts:  In: -   Out: 1150 [Urine:1150]  No intake/output data recorded.    Physical Exam:  General: The patient is awake, alert, and oriented.  Pleasant  Skin: Warm and dry. Jaundice. Pale looking  HEENT: Eyes show round, and reactive pupils. dry mucous membranes, no ulcerations, no thrush.   Neck: Supple to movements. No lymphadenopathy.   Chest: No use of accessory muscles to breathe. Symmetrical expansion. Auscultation reveals no wheezing, crackles, or rhonchi.   Cardiovascular: S1 and S2 are rhythmic and regular. No murmurs appreciated.   Abdomen: soft, minimal distention.  Nontender  Genitourinary: Deferred  Extremities: No clubbing, no cyanosis, mild edema of the right leg, no redness, mild swelling at 2-3+.  No evidence of cellulitis  Musculoskeletal: right leg no has another lateral area that is fluctuant.  Improving per patient  Neurological: awake alert oriented x 3    Most Recent Labs  Lab Results   Component Value Date    WBC 9.2 01/14/2024    HGB 7.9 (L) 01/14/2024    HCT 23.6 (L) 01/14/2024    PLT 48 (L) 01/14/2024     01/14/2024    K 3.1 (L) 01/14/2024     01/14/2024    CREATININE 1.3 (H) 01/14/2024    BUN 22 (H) 01/14/2024    CO2 25 01/14/2024    GLUCOSE 94 01/14/2024    ALT 18 01/14/2024    AST 45 (H) 01/14/2024    INR 2.9 01/14/2024    APTT 39.5 (H) 09/02/2023    TSH 0.980 05/15/2023    LABA1C 4.9 01/09/2021       US GUIDED PARACENTESIS   Final Result   Post ultrasound-guided paracentesis with a total of 1650 mL of hazy yellow   ascitic fluid removed.         CT TIBIA FIBULA RIGHT WO CONTRAST   Final Result   1. Subcutaneous mass of slight increased attenuation involving subcutaneous   fat of posterior mid right leg which may represent a subcutaneous hematoma   versus 
emergency medical condition->Emergency Medical Condition (MA) FINDINGS: Lower Chest: Small right greater than left pleural effusion with associated atelectasis. Organs: The liver is cirrhotic.  Status post TIPS.  There is splenomegaly. The gallbladder, pancreas, adrenals, and kidneys are unremarkable. GI/Bowel: There is no evidence of bowel obstruction.  There is wall thickening of the proximal colon.  The appendix is normal. Pelvis: The urinary bladder is distended.  The prostate is unremarkable. Peritoneum/Retroperitoneum: There is moderate free fluid.  No extraluminal gas.  No evidence of lymphadenopathy.  Aorta is normal in caliber.  Status post interval umbilical hernia mesh repair. Bones/Soft Tissues:  No acute abnormality of the visualized osseous structures.  There is diffuse subcutaneous edema.  There is marked scrotal wall edema with large left hydrocele.     1. Marked scrotal wall edema with large left hydrocele. 2. Cirrhosis with splenomegaly and moderate ascites. Status post TIPS. 3. Proximal colonic wall thickening, which could be secondary to portal colopathy or colitis. 4. Small right greater than left pleural effusions with associated atelectasis. 5. Status post umbilical hernia mesh repair.     XR CHEST PORTABLE    Result Date: 1/7/2024  EXAMINATION: ONE XRAY VIEW OF THE CHEST 1/7/2024 2:38 am COMPARISON: 06/01/2023 HISTORY: ORDERING SYSTEM PROVIDED HISTORY: short of breath TECHNOLOGIST PROVIDED HISTORY: Reason for exam:->short of breath FINDINGS: The lungs are without acute focal process.  Right basilar atelectasis.  There is no effusion or pneumothorax. The cardiomediastinal silhouette is without acute process. The osseous structures are without acute process.     No acute process.     Assessment//Plan           Hospital Problems             Last Modified POA    * (Principal) Hepatic failure, unspecified without coma (HCC) 1/7/2024 Yes    Decompensation of cirrhosis of liver (HCC) 1/7/2024 Yes

## 2024-01-15 NOTE — PATIENT CARE CONFERENCE
[facility] Quality Flow/Interdisciplinary Rounds Progress Note        Quality Flow Rounds held on January 15, 2024    Disciplines Attending:  Bedside Nurse, , , and Nursing Unit Leadership    Johnathan Yanez III was admitted on 1/7/2024  1:24 AM    Anticipated Discharge Date:  Expected Discharge Date: 01/15/24    Disposition:    Dennis Score:  Dennis Scale Score: 15    Readmission Risk              Risk of Unplanned Readmission:  36           Discussed patient goal for the day, patient clinical progression, and barriers to discharge.  The following Goal(s) of the Day/Commitment(s) have been identified:  Discharge - Obtain Order planning?      Galina Bennett RN  January 15, 2024

## 2024-01-15 NOTE — DISCHARGE SUMMARY
CHEST PORTABLE    Result Date: 1/7/2024  EXAMINATION: ONE XRAY VIEW OF THE CHEST 1/7/2024 2:38 am COMPARISON: 06/01/2023 HISTORY: ORDERING SYSTEM PROVIDED HISTORY: short of breath TECHNOLOGIST PROVIDED HISTORY: Reason for exam:->short of breath FINDINGS: The lungs are without acute focal process.  Right basilar atelectasis.  There is no effusion or pneumothorax. The cardiomediastinal silhouette is without acute process. The osseous structures are without acute process.     No acute process.       Patient Instructions:   Discharge Medication List as of 1/15/2024  2:19 PM        START taking these medications    Details   cefdinir (OMNICEF) 300 MG capsule Take 1 capsule by mouth 2 times daily for 1 day, Disp-2 capsule, R-0Normal      folic acid (FOLVITE) 1 MG tablet Take 1 tablet by mouth daily, Disp-30 tablet, R-3Normal      midodrine (PROAMATINE) 2.5 MG tablet Take 1 tablet by mouth 3 times daily (with meals), Disp-90 tablet, R-3Normal      traMADol (ULTRAM) 50 MG tablet Take 1 tablet by mouth 3 times daily as needed for Pain for up to 3 days. Max Daily Amount: 150 mg, Disp-10 tablet, R-0Normal      torsemide (DEMADEX) 20 MG tablet Take 1 tablet by mouth daily, Disp-30 tablet, R-3Normal           CONTINUE these medications which have NOT CHANGED    Details   ferrous sulfate (IRON 325) 325 (65 Fe) MG tablet Take 1 tablet by mouth 2 times daily (with meals), Disp-30 tablet, R-3Normal      lactulose (CHRONULAC) 10 GM/15ML solution Take 30 mLs by mouth 3 times daily, Disp-237 mL, R-0Normal      pentoxifylline (TRENTAL) 400 MG extended release tablet Take 1 tablet by mouth 3 times daily (with meals), Disp-90 tablet, R-3Normal      thiamine 100 MG tablet Take 1 tablet by mouth daily, Disp-30 tablet, R-3Normal      pantoprazole (PROTONIX) 40 MG tablet Take 1 tablet by mouth every morning (before breakfast), Disp-30 tablet, R-0Normal               Note that more than 30 minutes was spent in preparing discharge papers, 
taking these medications      folic acid 1 MG tablet  Commonly known as: FOLVITE  Take 1 tablet by mouth daily     midodrine 2.5 MG tablet  Commonly known as: PROAMATINE  Take 1 tablet by mouth 3 times daily (with meals)     traMADol 50 MG tablet  Commonly known as: ULTRAM  Take 1 tablet by mouth 3 times daily as needed for Pain for up to 3 days. Max Daily Amount: 150 mg            CONTINUE taking these medications      ferrous sulfate 325 (65 Fe) MG tablet  Commonly known as: IRON 325  Take 1 tablet by mouth 2 times daily (with meals)     lactulose 10 GM/15ML solution  Commonly known as: CHRONULAC  Take 30 mLs by mouth 3 times daily     pantoprazole 40 MG tablet  Commonly known as: PROTONIX  Take 1 tablet by mouth every morning (before breakfast)     pentoxifylline 400 MG extended release tablet  Commonly known as: TRENTAL  Take 1 tablet by mouth 3 times daily (with meals)     thiamine 100 MG tablet  Take 1 tablet by mouth daily               Where to Get Your Medications        These medications were sent to [id number] - [address] -  [phone] - F [phone]  [address] [place] [id number]      Phone: [phone]   folic acid 1 MG tablet  midodrine 2.5 MG tablet  traMADol 50 MG tablet           INTERNAL MEDICINE INSTRUCTIONS:  Follow-up with primary care physician as directed in discharge paperwork.  Please review results of imaging studies with PCP.  Follow-up with consultants as directed by them.  If recurrence or worsening of symptoms go to the ED or call primary care physician.  Diet: ADULT DIET; Easy to Chew    FOLLOW-UP  No follow-up provider specified.    Preparing for this patient's discharge, including paperwork, orders, instructions, and meeting with patient did required 36 minutes.    Electronically signed by Nino Lindsey MD on 1/13/2024 at 8:19 AM

## 2024-01-15 NOTE — PLAN OF CARE
Problem: Discharge Planning  Goal: Discharge to home or other facility with appropriate resources  1/15/2024 0050 by Marie Rogers RN  Outcome: Progressing  1/14/2024 1435 by Alyx Heart RN  Outcome: Progressing     Problem: Pain  Goal: Verbalizes/displays adequate comfort level or baseline comfort level  1/15/2024 0050 by Marie Rogers RN  Outcome: Progressing  1/14/2024 1435 by Alyx Heart RN  Outcome: Progressing     Problem: Skin/Tissue Integrity  Goal: Absence of new skin breakdown  Description: 1.  Monitor for areas of redness and/or skin breakdown  2.  Assess vascular access sites hourly  3.  Every 4-6 hours minimum:  Change oxygen saturation probe site  4.  Every 4-6 hours:  If on nasal continuous positive airway pressure, respiratory therapy assess nares and determine need for appliance change or resting period.  1/15/2024 0050 by Marie Rogers RN  Outcome: Progressing  1/14/2024 1435 by Alyx Heart RN  Outcome: Progressing     Problem: Safety - Adult  Goal: Free from fall injury  1/15/2024 0050 by Marie Rogers RN  Outcome: Progressing  1/14/2024 1435 by Alyx Heart RN  Outcome: Progressing     Problem: ABCDS Injury Assessment  Goal: Absence of physical injury  1/15/2024 0050 by Marie Rogers RN  Outcome: Progressing  1/14/2024 1435 by Alyx Heart RN  Outcome: Progressing     Problem: Nutrition Deficit:  Goal: Optimize nutritional status  1/15/2024 0050 by Marie Rogers RN  Outcome: Progressing  1/14/2024 1435 by Alyx Heart RN  Outcome: Progressing

## 2024-01-16 LAB
MICROORGANISM SPEC CULT: NO GROWTH
MICROORGANISM/AGENT SPEC: NORMAL
NON-GYN CYTOLOGY REPORT: NORMAL
SPECIMEN DESCRIPTION: NORMAL

## 2024-01-16 NOTE — CARE COORDINATION
[facility] Support Note    Name: Johnathan Yanez III  Date: 1/16/2024    Chief Complaint   Patient presents with    Abdominal Pain     Cirrhosis x 2 years, ascites       Peer Support met with patient.  [] Support and education provided  [] Resources provided   [] Treatment referral:   [] Other:   [] Patient declined peer recovery services     Referred By:     Notes: Patient called peer asking about treatment. We discussed options for him and made sure he had all the correct information to the facilities. Patient will call peer back on where he was choosing to go.    Signed: Gauri Luque, 1/16/2024

## 2024-01-25 ENCOUNTER — HOSPITAL ENCOUNTER (INPATIENT)
Age: 44
LOS: 17 days | Discharge: NURSING, REHAB OR LONG-TERM CARE | DRG: 469 | End: 2024-02-11
Attending: EMERGENCY MEDICINE | Admitting: STUDENT IN AN ORGANIZED HEALTH CARE EDUCATION/TRAINING PROGRAM
Payer: COMMERCIAL

## 2024-01-25 ENCOUNTER — APPOINTMENT (OUTPATIENT)
Dept: CT IMAGING | Age: 44
DRG: 469 | End: 2024-01-25
Payer: COMMERCIAL

## 2024-01-25 DIAGNOSIS — E87.6 HYPOKALEMIA: ICD-10-CM

## 2024-01-25 DIAGNOSIS — N17.9 ACUTE KIDNEY INJURY (HCC): Primary | ICD-10-CM

## 2024-01-25 DIAGNOSIS — N49.2 SCROTAL ABSCESS: ICD-10-CM

## 2024-01-25 DIAGNOSIS — N39.0 URINARY TRACT INFECTION WITHOUT HEMATURIA, SITE UNSPECIFIED: ICD-10-CM

## 2024-01-25 DIAGNOSIS — R74.01 TRANSAMINITIS: ICD-10-CM

## 2024-01-25 DIAGNOSIS — F41.9 ANXIETY: ICD-10-CM

## 2024-01-25 DIAGNOSIS — K85.90 ACUTE PANCREATITIS, UNSPECIFIED COMPLICATION STATUS, UNSPECIFIED PANCREATITIS TYPE: ICD-10-CM

## 2024-01-25 DIAGNOSIS — E87.20 LACTIC ACIDOSIS: ICD-10-CM

## 2024-01-25 DIAGNOSIS — K52.9 COLITIS: ICD-10-CM

## 2024-01-25 DIAGNOSIS — K75.9 HEPATITIS: ICD-10-CM

## 2024-01-25 LAB
AFP SERPL-MCNC: <1.8 UG/L
ALBUMIN SERPL-MCNC: 3.6 G/DL (ref 3.5–5.2)
ALP SERPL-CCNC: 106 U/L (ref 40–129)
ALT SERPL-CCNC: 32 U/L (ref 0–40)
AMMONIA PLAS-SCNC: 55 UMOL/L (ref 16–60)
ANION GAP SERPL CALCULATED.3IONS-SCNC: 8 MMOL/L (ref 7–16)
AST SERPL-CCNC: 73 U/L (ref 0–39)
BACTERIA URNS QL MICRO: ABNORMAL
BASOPHILS # BLD: 0.04 K/UL (ref 0–0.2)
BASOPHILS NFR BLD: 1 % (ref 0–2)
BILIRUB DIRECT SERPL-MCNC: 2.9 MG/DL (ref 0–0.3)
BILIRUB INDIRECT SERPL-MCNC: 4.4 MG/DL (ref 0–1)
BILIRUB SERPL-MCNC: 7.3 MG/DL (ref 0–1.2)
BILIRUB UR QL STRIP: ABNORMAL
BUN SERPL-MCNC: 14 MG/DL (ref 6–20)
CALCIUM SERPL-MCNC: 9 MG/DL (ref 8.6–10.2)
CHLORIDE SERPL-SCNC: 91 MMOL/L (ref 98–107)
CLARITY UR: CLEAR
CO2 SERPL-SCNC: 30 MMOL/L (ref 22–29)
COLOR UR: YELLOW
CREAT SERPL-MCNC: 2.1 MG/DL (ref 0.7–1.2)
EOSINOPHIL # BLD: 0.25 K/UL (ref 0.05–0.5)
EOSINOPHILS RELATIVE PERCENT: 5 % (ref 0–6)
ERYTHROCYTE [DISTWIDTH] IN BLOOD BY AUTOMATED COUNT: 21.6 % (ref 11.5–15)
GFR SERPL CREATININE-BSD FRML MDRD: 40 ML/MIN/1.73M2
GLUCOSE SERPL-MCNC: 127 MG/DL (ref 74–99)
GLUCOSE UR STRIP-MCNC: NEGATIVE MG/DL
HCT VFR BLD AUTO: 27.5 % (ref 37–54)
HGB BLD-MCNC: 9.2 G/DL (ref 12.5–16.5)
HGB UR QL STRIP.AUTO: NEGATIVE
IMM GRANULOCYTES # BLD AUTO: 0.04 K/UL (ref 0–0.58)
IMM GRANULOCYTES NFR BLD: 1 % (ref 0–5)
KETONES UR STRIP-MCNC: NEGATIVE MG/DL
LACTATE BLDV-SCNC: 2.4 MMOL/L (ref 0.5–2.2)
LEUKOCYTE ESTERASE UR QL STRIP: NEGATIVE
LIPASE SERPL-CCNC: 79 U/L (ref 13–60)
LYMPHOCYTES NFR BLD: 0.5 K/UL (ref 1.5–4)
LYMPHOCYTES RELATIVE PERCENT: 10 % (ref 20–42)
MCH RBC QN AUTO: 30 PG (ref 26–35)
MCHC RBC AUTO-ENTMCNC: 33.5 G/DL (ref 32–34.5)
MCV RBC AUTO: 89.6 FL (ref 80–99.9)
MONOCYTES NFR BLD: 0.64 K/UL (ref 0.1–0.95)
MONOCYTES NFR BLD: 13 % (ref 2–12)
NEUTROPHILS NFR BLD: 70 % (ref 43–80)
NEUTS SEG NFR BLD: 3.48 K/UL (ref 1.8–7.3)
NITRITE UR QL STRIP: POSITIVE
PH UR STRIP: 6 [PH] (ref 5–9)
PLATELET # BLD AUTO: 37 K/UL (ref 130–450)
PLATELET CONFIRMATION: NORMAL
PMV BLD AUTO: 10.1 FL (ref 7–12)
POTASSIUM SERPL-SCNC: 3.2 MMOL/L (ref 3.5–5)
PROT SERPL-MCNC: 6.4 G/DL (ref 6.4–8.3)
PROT UR STRIP-MCNC: NEGATIVE MG/DL
RBC # BLD AUTO: 3.07 M/UL (ref 3.8–5.8)
RBC # BLD: ABNORMAL 10*6/UL
RBC #/AREA URNS HPF: ABNORMAL /HPF
SODIUM SERPL-SCNC: 129 MMOL/L (ref 132–146)
SP GR UR STRIP: 1.02 (ref 1–1.03)
UROBILINOGEN UR STRIP-ACNC: 0.2 EU/DL (ref 0–1)
WBC #/AREA URNS HPF: ABNORMAL /HPF
WBC OTHER # BLD: 5 K/UL (ref 4.5–11.5)

## 2024-01-25 PROCEDURE — 96375 TX/PRO/DX INJ NEW DRUG ADDON: CPT

## 2024-01-25 PROCEDURE — 6360000004 HC RX CONTRAST MEDICATION: Performed by: RADIOLOGY

## 2024-01-25 PROCEDURE — 2580000003 HC RX 258: Performed by: STUDENT IN AN ORGANIZED HEALTH CARE EDUCATION/TRAINING PROGRAM

## 2024-01-25 PROCEDURE — 82248 BILIRUBIN DIRECT: CPT

## 2024-01-25 PROCEDURE — 2580000003 HC RX 258

## 2024-01-25 PROCEDURE — 6360000002 HC RX W HCPCS

## 2024-01-25 PROCEDURE — 82140 ASSAY OF AMMONIA: CPT

## 2024-01-25 PROCEDURE — 83605 ASSAY OF LACTIC ACID: CPT

## 2024-01-25 PROCEDURE — 2060000000 HC ICU INTERMEDIATE R&B

## 2024-01-25 PROCEDURE — 74177 CT ABD & PELVIS W/CONTRAST: CPT

## 2024-01-25 PROCEDURE — 6370000000 HC RX 637 (ALT 250 FOR IP)

## 2024-01-25 PROCEDURE — 81001 URINALYSIS AUTO W/SCOPE: CPT

## 2024-01-25 PROCEDURE — 99285 EMERGENCY DEPT VISIT HI MDM: CPT

## 2024-01-25 PROCEDURE — 80053 COMPREHEN METABOLIC PANEL: CPT

## 2024-01-25 PROCEDURE — 83690 ASSAY OF LIPASE: CPT

## 2024-01-25 PROCEDURE — 85025 COMPLETE CBC W/AUTO DIFF WBC: CPT

## 2024-01-25 PROCEDURE — 87086 URINE CULTURE/COLONY COUNT: CPT

## 2024-01-25 PROCEDURE — 96365 THER/PROPH/DIAG IV INF INIT: CPT

## 2024-01-25 RX ORDER — SODIUM CHLORIDE 0.9 % (FLUSH) 0.9 %
5-40 SYRINGE (ML) INJECTION PRN
Status: DISCONTINUED | OUTPATIENT
Start: 2024-01-25 | End: 2024-02-11 | Stop reason: HOSPADM

## 2024-01-25 RX ORDER — LORAZEPAM 1 MG/1
4 TABLET ORAL
Status: DISCONTINUED | OUTPATIENT
Start: 2024-01-25 | End: 2024-01-30

## 2024-01-25 RX ORDER — ONDANSETRON 2 MG/ML
4 INJECTION INTRAMUSCULAR; INTRAVENOUS EVERY 6 HOURS PRN
Status: DISCONTINUED | OUTPATIENT
Start: 2024-01-25 | End: 2024-02-11 | Stop reason: HOSPADM

## 2024-01-25 RX ORDER — POTASSIUM CHLORIDE 20 MEQ/1
40 TABLET, EXTENDED RELEASE ORAL ONCE
Status: COMPLETED | OUTPATIENT
Start: 2024-01-25 | End: 2024-01-25

## 2024-01-25 RX ORDER — LORAZEPAM 2 MG/ML
1 INJECTION INTRAMUSCULAR
Status: DISCONTINUED | OUTPATIENT
Start: 2024-01-25 | End: 2024-01-30

## 2024-01-25 RX ORDER — LORAZEPAM 2 MG/ML
3 INJECTION INTRAMUSCULAR
Status: DISCONTINUED | OUTPATIENT
Start: 2024-01-25 | End: 2024-01-30

## 2024-01-25 RX ORDER — ACETAMINOPHEN 650 MG/1
650 SUPPOSITORY RECTAL EVERY 6 HOURS PRN
Status: DISCONTINUED | OUTPATIENT
Start: 2024-01-25 | End: 2024-01-28

## 2024-01-25 RX ORDER — LORAZEPAM 1 MG/1
3 TABLET ORAL
Status: DISCONTINUED | OUTPATIENT
Start: 2024-01-25 | End: 2024-01-30

## 2024-01-25 RX ORDER — MAGNESIUM SULFATE IN WATER 40 MG/ML
2000 INJECTION, SOLUTION INTRAVENOUS PRN
Status: DISCONTINUED | OUTPATIENT
Start: 2024-01-25 | End: 2024-02-11 | Stop reason: HOSPADM

## 2024-01-25 RX ORDER — LORAZEPAM 1 MG/1
1 TABLET ORAL
Status: DISCONTINUED | OUTPATIENT
Start: 2024-01-25 | End: 2024-01-30

## 2024-01-25 RX ORDER — LORAZEPAM 2 MG/ML
2 INJECTION INTRAMUSCULAR
Status: DISCONTINUED | OUTPATIENT
Start: 2024-01-25 | End: 2024-01-30

## 2024-01-25 RX ORDER — POTASSIUM CHLORIDE 20 MEQ/1
40 TABLET, EXTENDED RELEASE ORAL PRN
Status: DISCONTINUED | OUTPATIENT
Start: 2024-01-25 | End: 2024-02-11 | Stop reason: HOSPADM

## 2024-01-25 RX ORDER — ONDANSETRON 4 MG/1
4 TABLET, ORALLY DISINTEGRATING ORAL EVERY 8 HOURS PRN
Status: DISCONTINUED | OUTPATIENT
Start: 2024-01-25 | End: 2024-02-11 | Stop reason: HOSPADM

## 2024-01-25 RX ORDER — POLYETHYLENE GLYCOL 3350 17 G/17G
17 POWDER, FOR SOLUTION ORAL DAILY PRN
Status: DISCONTINUED | OUTPATIENT
Start: 2024-01-25 | End: 2024-02-11 | Stop reason: HOSPADM

## 2024-01-25 RX ORDER — SODIUM CHLORIDE 9 MG/ML
INJECTION, SOLUTION INTRAVENOUS PRN
Status: DISCONTINUED | OUTPATIENT
Start: 2024-01-25 | End: 2024-01-25 | Stop reason: SDUPTHER

## 2024-01-25 RX ORDER — PANTOPRAZOLE SODIUM 40 MG/1
40 TABLET, DELAYED RELEASE ORAL
Status: DISCONTINUED | OUTPATIENT
Start: 2024-01-26 | End: 2024-01-26

## 2024-01-25 RX ORDER — LACTULOSE 10 G/15ML
20 SOLUTION ORAL 3 TIMES DAILY
Status: DISCONTINUED | OUTPATIENT
Start: 2024-01-25 | End: 2024-02-11 | Stop reason: HOSPADM

## 2024-01-25 RX ORDER — MIDODRINE HYDROCHLORIDE 2.5 MG/1
2.5 TABLET ORAL
Status: DISCONTINUED | OUTPATIENT
Start: 2024-01-26 | End: 2024-01-26

## 2024-01-25 RX ORDER — ACETAMINOPHEN 325 MG/1
650 TABLET ORAL EVERY 6 HOURS PRN
Status: DISCONTINUED | OUTPATIENT
Start: 2024-01-25 | End: 2024-01-28

## 2024-01-25 RX ORDER — METRONIDAZOLE 500 MG/100ML
500 INJECTION, SOLUTION INTRAVENOUS EVERY 8 HOURS
Status: DISCONTINUED | OUTPATIENT
Start: 2024-01-25 | End: 2024-01-28

## 2024-01-25 RX ORDER — SODIUM CHLORIDE 0.9 % (FLUSH) 0.9 %
5-40 SYRINGE (ML) INJECTION PRN
Status: DISCONTINUED | OUTPATIENT
Start: 2024-01-25 | End: 2024-01-25 | Stop reason: SDUPTHER

## 2024-01-25 RX ORDER — LORAZEPAM 1 MG/1
2 TABLET ORAL
Status: DISCONTINUED | OUTPATIENT
Start: 2024-01-25 | End: 2024-01-30

## 2024-01-25 RX ORDER — LANOLIN ALCOHOL/MO/W.PET/CERES
100 CREAM (GRAM) TOPICAL DAILY
Status: DISCONTINUED | OUTPATIENT
Start: 2024-01-26 | End: 2024-01-25 | Stop reason: SDUPTHER

## 2024-01-25 RX ORDER — SODIUM CHLORIDE 0.9 % (FLUSH) 0.9 %
5-40 SYRINGE (ML) INJECTION EVERY 12 HOURS SCHEDULED
Status: DISCONTINUED | OUTPATIENT
Start: 2024-01-25 | End: 2024-02-11 | Stop reason: HOSPADM

## 2024-01-25 RX ORDER — LORAZEPAM 2 MG/ML
4 INJECTION INTRAMUSCULAR
Status: DISCONTINUED | OUTPATIENT
Start: 2024-01-25 | End: 2024-01-30

## 2024-01-25 RX ORDER — 0.9 % SODIUM CHLORIDE 0.9 %
1000 INTRAVENOUS SOLUTION INTRAVENOUS ONCE
Status: COMPLETED | OUTPATIENT
Start: 2024-01-25 | End: 2024-01-25

## 2024-01-25 RX ORDER — SODIUM CHLORIDE 9 MG/ML
INJECTION, SOLUTION INTRAVENOUS CONTINUOUS
Status: ACTIVE | OUTPATIENT
Start: 2024-01-25 | End: 2024-01-27

## 2024-01-25 RX ORDER — POTASSIUM CHLORIDE 7.45 MG/ML
10 INJECTION INTRAVENOUS PRN
Status: DISCONTINUED | OUTPATIENT
Start: 2024-01-25 | End: 2024-02-11 | Stop reason: HOSPADM

## 2024-01-25 RX ORDER — MIDODRINE HYDROCHLORIDE 5 MG/1
2.5 TABLET ORAL ONCE
Status: COMPLETED | OUTPATIENT
Start: 2024-01-25 | End: 2024-01-25

## 2024-01-25 RX ORDER — LANOLIN ALCOHOL/MO/W.PET/CERES
100 CREAM (GRAM) TOPICAL DAILY
Status: DISCONTINUED | OUTPATIENT
Start: 2024-01-26 | End: 2024-02-11 | Stop reason: HOSPADM

## 2024-01-25 RX ORDER — PENTOXIFYLLINE 400 MG/1
400 TABLET, EXTENDED RELEASE ORAL
Status: DISCONTINUED | OUTPATIENT
Start: 2024-01-26 | End: 2024-02-11 | Stop reason: HOSPADM

## 2024-01-25 RX ORDER — FAMOTIDINE 20 MG/1
20 TABLET, FILM COATED ORAL DAILY
Status: DISCONTINUED | OUTPATIENT
Start: 2024-01-26 | End: 2024-01-26

## 2024-01-25 RX ORDER — SODIUM CHLORIDE 9 MG/ML
INJECTION, SOLUTION INTRAVENOUS PRN
Status: DISCONTINUED | OUTPATIENT
Start: 2024-01-25 | End: 2024-02-11 | Stop reason: HOSPADM

## 2024-01-25 RX ORDER — SODIUM CHLORIDE 0.9 % (FLUSH) 0.9 %
5-40 SYRINGE (ML) INJECTION EVERY 12 HOURS SCHEDULED
Status: DISCONTINUED | OUTPATIENT
Start: 2024-01-25 | End: 2024-01-25 | Stop reason: SDUPTHER

## 2024-01-25 RX ORDER — SODIUM CHLORIDE 0.9 % (FLUSH) 0.9 %
SYRINGE (ML) INJECTION
Status: COMPLETED
Start: 2024-01-25 | End: 2024-01-25

## 2024-01-25 RX ORDER — FENTANYL CITRATE 50 UG/ML
50 INJECTION, SOLUTION INTRAMUSCULAR; INTRAVENOUS ONCE
Status: COMPLETED | OUTPATIENT
Start: 2024-01-25 | End: 2024-01-25

## 2024-01-25 RX ADMIN — SODIUM CHLORIDE: 9 INJECTION, SOLUTION INTRAVENOUS at 22:45

## 2024-01-25 RX ADMIN — SODIUM CHLORIDE 1000 ML: 9 INJECTION, SOLUTION INTRAVENOUS at 17:59

## 2024-01-25 RX ADMIN — SODIUM CHLORIDE 1000 ML: 9 INJECTION, SOLUTION INTRAVENOUS at 16:55

## 2024-01-25 RX ADMIN — IOPAMIDOL 75 ML: 755 INJECTION, SOLUTION INTRAVENOUS at 18:45

## 2024-01-25 RX ADMIN — SODIUM CHLORIDE, PRESERVATIVE FREE 10 ML: 5 INJECTION INTRAVENOUS at 22:44

## 2024-01-25 RX ADMIN — FENTANYL CITRATE 50 MCG: 50 INJECTION, SOLUTION INTRAMUSCULAR; INTRAVENOUS at 17:24

## 2024-01-25 RX ADMIN — POTASSIUM CHLORIDE 40 MEQ: 1500 TABLET, EXTENDED RELEASE ORAL at 17:58

## 2024-01-25 RX ADMIN — MIDODRINE HYDROCHLORIDE 2.5 MG: 5 TABLET ORAL at 22:59

## 2024-01-25 RX ADMIN — LORAZEPAM 1 MG: 2 INJECTION INTRAMUSCULAR; INTRAVENOUS at 23:02

## 2024-01-25 RX ADMIN — WATER 1000 MG: 1 INJECTION INTRAMUSCULAR; INTRAVENOUS; SUBCUTANEOUS at 19:44

## 2024-01-25 RX ADMIN — METRONIDAZOLE 500 MG: 500 INJECTION, SOLUTION INTRAVENOUS at 20:11

## 2024-01-25 RX ADMIN — Medication 10 ML: at 22:44

## 2024-01-25 NOTE — ED TRIAGE NOTES
Department of Emergency Medicine    FIRST PROVIDER TRIAGE NOTE             Independent MLP           1/25/24  3:49 PM EST    Date of Encounter: 1/25/24   MRN: [id number]    Vitals:    01/25/24 1546   BP: (!) 104/51   Pulse: 88   Resp: 16   Temp: 98.6 °F (37 °C)   TempSrc: Oral   SpO2: 99%   Weight: 68 kg (150 lb)   Height: 1.727 m (5' 8\")      HPI: Johnathan Yanez III is a 44 y.o. male who presents to the ED for Fatigue (Fatigue/loss of appetite x1 week. Hx alcohol abuse- last drink yesterday.), Jaundice, and Leg Swelling (Right leg pain.  2 masses under skin right calf)     ROS: Negative for cp, sob, or fever.    Physical Exam:   Gen Appearance/Constitutional: alert, jaundice, no distress, no tremors or diaphoresis   CV: regular rate  Right leg: Two large masses     Initial Plan of Care: All treatment areas with department are currently occupied.     Plan to order/Initiate the following while awaiting opening in ED: Triage evaluation .    Initial Plan of Care: Initiate Treatment-Testing, Proceed toTreatment Area When Bed Available for ED Attending/MLP to Continue Care    Electronically signed by Elizabeth Butt PA-C   DD: 1/25/24

## 2024-01-25 NOTE — ED NOTES
Radiology Procedure Waiver   Name: Johnathan Yanez III  : 1980  MRN: [id number]    Date:  24    Time: 6:24 PM EST    Benefits of immediately proceeding with Radiology exam(s) without pre-testing outweigh the risks or are not indicated as specified below and therefore the following is/are being waived:    [] Pregnancy test   [] Patients LMP on-time and regular.   [] Patient had Tubal Ligation or has other Contraception Device.   [] Patient  is Menopausal or Premenarcheal.    [] Patient had Full or Partial Hysterectomy.    [] Protocol for Iodine allergy    [] MRI Questionnaire     [x] BUN/Creatinine   [] Patient age w/no hx of renal dysfunction.   [] Patient on Dialysis.   [] Recent Normal Labs.  Electronically signed by Rajeev Patel MD on 24 at 6:24 PM EST

## 2024-01-25 NOTE — ED PROVIDER NOTES
Department of Emergency Medicine     Written by: Rajeev Patel MD  Patient Name: Johnathan Yanez III  Admit Date: 2024  4:08 PM  MRN: [id number]                   : 1980    HPI  Chief Complaint   Patient presents with    Fatigue     Fatigue/loss of appetite x1 week. Hx alcohol abuse- last drink yesterday.    Jaundice    Leg Swelling     Right leg pain.  2 masses under skin right calf       Johnathan Yanez III is a 44 y.o. male that presents to the ED with concerns for fatigue, jaundice, feeling unwell, right leg swelling and to localize regions.  The patient says that he was admitted to the hospital 3 weeks ago for duration of 2 weeks.  This was for ascites secondary to cirrhosis due to his history of IV drug use.  The patient reports that he was discharged a week ago, and has been feeling unwell.  He feels that he is not well enough to go home.  He is supposed to be in rehab, however they would not let him move from his bed, and therefore he feels like his right lower extremity swellings are worsening.  The patient denies fevers and diaphoresis.  He does report some chills.  He has no chest pain or shortness of breath.    Review of systems:  Pertinent positives and negatives mentioned in the HPI/MDM.    Physical Exam  Constitutional:       General: He is not in acute distress.     Appearance: Normal appearance.   Eyes:      General: No scleral icterus.     Extraocular Movements: Extraocular movements intact.      Pupils: Pupils are equal, round, and reactive to light.   Cardiovascular:      Rate and Rhythm: Normal rate and regular rhythm.      Pulses: Normal pulses.      Heart sounds: Normal heart sounds.   Pulmonary:      Effort: Pulmonary effort is normal. No respiratory distress.   Abdominal:      Palpations: Abdomen is soft.      Tenderness: There is no abdominal tenderness.      Hernia: A hernia (Umbilical, unchanged) is present.   Musculoskeletal:         General: Swelling (Right

## 2024-01-26 LAB
ALBUMIN SERPL-MCNC: 2.9 G/DL (ref 3.5–5.2)
ALP SERPL-CCNC: 83 U/L (ref 40–129)
ALT SERPL-CCNC: 24 U/L (ref 0–40)
ANION GAP SERPL CALCULATED.3IONS-SCNC: 9 MMOL/L (ref 7–16)
AST SERPL-CCNC: 59 U/L (ref 0–39)
BASOPHILS # BLD: 0.11 K/UL (ref 0–0.2)
BASOPHILS NFR BLD: 4 % (ref 0–2)
BILIRUB SERPL-MCNC: 4.7 MG/DL (ref 0–1.2)
BUN SERPL-MCNC: 11 MG/DL (ref 6–20)
CALCIUM SERPL-MCNC: 7.8 MG/DL (ref 8.6–10.2)
CHLORIDE SERPL-SCNC: 102 MMOL/L (ref 98–107)
CO2 SERPL-SCNC: 24 MMOL/L (ref 22–29)
CREAT SERPL-MCNC: 1.4 MG/DL (ref 0.7–1.2)
EOSINOPHIL # BLD: 0.18 K/UL (ref 0.05–0.5)
EOSINOPHILS RELATIVE PERCENT: 7 % (ref 0–6)
ERYTHROCYTE [DISTWIDTH] IN BLOOD BY AUTOMATED COUNT: 21.6 % (ref 11.5–15)
GFR SERPL CREATININE-BSD FRML MDRD: >60 ML/MIN/1.73M2
GLUCOSE SERPL-MCNC: 100 MG/DL (ref 74–99)
HCT VFR BLD AUTO: 23.6 % (ref 37–54)
HCT VFR BLD AUTO: 24.1 % (ref 37–54)
HGB BLD-MCNC: 7.9 G/DL (ref 12.5–16.5)
HGB BLD-MCNC: 8.1 G/DL (ref 12.5–16.5)
LYMPHOCYTES NFR BLD: 0.33 K/UL (ref 1.5–4)
LYMPHOCYTES RELATIVE PERCENT: 13 % (ref 20–42)
MAGNESIUM SERPL-MCNC: 1.3 MG/DL (ref 1.6–2.6)
MCH RBC QN AUTO: 29.9 PG (ref 26–35)
MCHC RBC AUTO-ENTMCNC: 33.5 G/DL (ref 32–34.5)
MCV RBC AUTO: 89.4 FL (ref 80–99.9)
MONOCYTES NFR BLD: 0.11 K/UL (ref 0.1–0.95)
MONOCYTES NFR BLD: 4 % (ref 2–12)
MYELOCYTES ABSOLUTE COUNT: 0.02 K/UL
MYELOCYTES: 1 %
NEUTROPHILS NFR BLD: 70 % (ref 43–80)
NEUTS SEG NFR BLD: 1.75 K/UL (ref 1.8–7.3)
PLATELET CONFIRMATION: NORMAL
PLATELET, FLUORESCENCE: 22 K/UL (ref 130–450)
PMV BLD AUTO: ABNORMAL FL (ref 7–12)
POTASSIUM SERPL-SCNC: 3.5 MMOL/L (ref 3.5–5)
PROT SERPL-MCNC: 5 G/DL (ref 6.4–8.3)
RBC # BLD AUTO: 2.64 M/UL (ref 3.8–5.8)
RBC # BLD: ABNORMAL 10*6/UL
SODIUM SERPL-SCNC: 135 MMOL/L (ref 132–146)
WBC OTHER # BLD: 2.5 K/UL (ref 4.5–11.5)

## 2024-01-26 PROCEDURE — 6370000000 HC RX 637 (ALT 250 FOR IP): Performed by: STUDENT IN AN ORGANIZED HEALTH CARE EDUCATION/TRAINING PROGRAM

## 2024-01-26 PROCEDURE — 6370000000 HC RX 637 (ALT 250 FOR IP): Performed by: INTERNAL MEDICINE

## 2024-01-26 PROCEDURE — 85018 HEMOGLOBIN: CPT

## 2024-01-26 PROCEDURE — 6370000000 HC RX 637 (ALT 250 FOR IP)

## 2024-01-26 PROCEDURE — 6360000002 HC RX W HCPCS: Performed by: STUDENT IN AN ORGANIZED HEALTH CARE EDUCATION/TRAINING PROGRAM

## 2024-01-26 PROCEDURE — 80053 COMPREHEN METABOLIC PANEL: CPT

## 2024-01-26 PROCEDURE — 6360000002 HC RX W HCPCS

## 2024-01-26 PROCEDURE — 2580000003 HC RX 258: Performed by: STUDENT IN AN ORGANIZED HEALTH CARE EDUCATION/TRAINING PROGRAM

## 2024-01-26 PROCEDURE — 83735 ASSAY OF MAGNESIUM: CPT

## 2024-01-26 PROCEDURE — 6370000000 HC RX 637 (ALT 250 FOR IP): Performed by: NURSE PRACTITIONER

## 2024-01-26 PROCEDURE — 85025 COMPLETE CBC W/AUTO DIFF WBC: CPT

## 2024-01-26 PROCEDURE — 99233 SBSQ HOSP IP/OBS HIGH 50: CPT | Performed by: INTERNAL MEDICINE

## 2024-01-26 PROCEDURE — 2060000000 HC ICU INTERMEDIATE R&B

## 2024-01-26 PROCEDURE — 36415 COLL VENOUS BLD VENIPUNCTURE: CPT

## 2024-01-26 PROCEDURE — 87040 BLOOD CULTURE FOR BACTERIA: CPT

## 2024-01-26 PROCEDURE — 85014 HEMATOCRIT: CPT

## 2024-01-26 PROCEDURE — 6360000002 HC RX W HCPCS: Performed by: INTERNAL MEDICINE

## 2024-01-26 PROCEDURE — 2580000003 HC RX 258

## 2024-01-26 RX ORDER — FOLIC ACID 1 MG/1
1 TABLET ORAL DAILY
Status: CANCELLED | OUTPATIENT
Start: 2024-01-26

## 2024-01-26 RX ORDER — HEPARIN SODIUM 10000 [USP'U]/ML
5000 INJECTION, SOLUTION INTRAVENOUS; SUBCUTANEOUS EVERY 8 HOURS SCHEDULED
Status: DISCONTINUED | OUTPATIENT
Start: 2024-01-26 | End: 2024-02-11 | Stop reason: HOSPADM

## 2024-01-26 RX ORDER — THIAMINE MONONITRATE (VIT B1) 100 MG
100 TABLET ORAL EVERY MORNING
Status: ON HOLD | COMMUNITY

## 2024-01-26 RX ORDER — PANTOPRAZOLE SODIUM 40 MG/1
40 TABLET, DELAYED RELEASE ORAL
Status: DISCONTINUED | OUTPATIENT
Start: 2024-01-26 | End: 2024-02-11 | Stop reason: HOSPADM

## 2024-01-26 RX ORDER — FUROSEMIDE 40 MG/1
40 TABLET ORAL EVERY MORNING
Status: ON HOLD | COMMUNITY

## 2024-01-26 RX ORDER — FOLIC ACID 1 MG/1
1 TABLET ORAL EVERY MORNING
Status: ON HOLD | COMMUNITY

## 2024-01-26 RX ORDER — M-VIT,TX,IRON,MINS/CALC/FOLIC 27MG-0.4MG
1 TABLET ORAL DAILY
Status: CANCELLED | OUTPATIENT
Start: 2024-01-26

## 2024-01-26 RX ORDER — MORPHINE SULFATE 2 MG/ML
1 INJECTION, SOLUTION INTRAMUSCULAR; INTRAVENOUS ONCE
Status: COMPLETED | OUTPATIENT
Start: 2024-01-26 | End: 2024-01-26

## 2024-01-26 RX ORDER — MIDODRINE HYDROCHLORIDE 5 MG/1
10 TABLET ORAL
Status: DISCONTINUED | OUTPATIENT
Start: 2024-01-26 | End: 2024-02-11 | Stop reason: HOSPADM

## 2024-01-26 RX ORDER — 0.9 % SODIUM CHLORIDE 0.9 %
1000 INTRAVENOUS SOLUTION INTRAVENOUS ONCE
Status: COMPLETED | OUTPATIENT
Start: 2024-01-26 | End: 2024-01-26

## 2024-01-26 RX ADMIN — PENTOXIFYLLINE 400 MG: 400 TABLET, EXTENDED RELEASE ORAL at 09:07

## 2024-01-26 RX ADMIN — LORAZEPAM 1 MG: 2 INJECTION INTRAMUSCULAR; INTRAVENOUS at 06:33

## 2024-01-26 RX ADMIN — MIDODRINE HYDROCHLORIDE 2.5 MG: 2.5 TABLET ORAL at 09:07

## 2024-01-26 RX ADMIN — METRONIDAZOLE 500 MG: 500 INJECTION, SOLUTION INTRAVENOUS at 04:47

## 2024-01-26 RX ADMIN — METRONIDAZOLE 500 MG: 500 INJECTION, SOLUTION INTRAVENOUS at 11:59

## 2024-01-26 RX ADMIN — Medication 10 ML: at 09:07

## 2024-01-26 RX ADMIN — RIFAXIMIN 400 MG: 200 TABLET ORAL at 16:00

## 2024-01-26 RX ADMIN — Medication 100 MG: at 09:06

## 2024-01-26 RX ADMIN — PENTOXIFYLLINE 400 MG: 400 TABLET, EXTENDED RELEASE ORAL at 13:07

## 2024-01-26 RX ADMIN — WATER 1000 MG: 1 INJECTION INTRAMUSCULAR; INTRAVENOUS; SUBCUTANEOUS at 16:48

## 2024-01-26 RX ADMIN — MIDODRINE HYDROCHLORIDE 2.5 MG: 2.5 TABLET ORAL at 13:11

## 2024-01-26 RX ADMIN — Medication 10 ML: at 20:56

## 2024-01-26 RX ADMIN — HEPARIN SODIUM 5000 UNITS: 10000 INJECTION INTRAVENOUS; SUBCUTANEOUS at 13:08

## 2024-01-26 RX ADMIN — FAMOTIDINE 20 MG: 20 TABLET ORAL at 09:07

## 2024-01-26 RX ADMIN — SODIUM CHLORIDE: 9 INJECTION, SOLUTION INTRAVENOUS at 11:58

## 2024-01-26 RX ADMIN — LORAZEPAM 2 MG: 1 TABLET ORAL at 12:01

## 2024-01-26 RX ADMIN — PANTOPRAZOLE SODIUM 40 MG: 40 TABLET, DELAYED RELEASE ORAL at 09:06

## 2024-01-26 RX ADMIN — PENTOXIFYLLINE 400 MG: 400 TABLET, EXTENDED RELEASE ORAL at 16:00

## 2024-01-26 RX ADMIN — METRONIDAZOLE 500 MG: 500 INJECTION, SOLUTION INTRAVENOUS at 20:55

## 2024-01-26 RX ADMIN — MIDODRINE HYDROCHLORIDE 10 MG: 5 TABLET ORAL at 16:00

## 2024-01-26 RX ADMIN — SODIUM CHLORIDE 1000 ML: 9 INJECTION, SOLUTION INTRAVENOUS at 04:50

## 2024-01-26 RX ADMIN — LORAZEPAM 2 MG: 1 TABLET ORAL at 16:49

## 2024-01-26 RX ADMIN — RIFAXIMIN 400 MG: 200 TABLET ORAL at 20:58

## 2024-01-26 RX ADMIN — MORPHINE SULFATE 1 MG: 2 INJECTION, SOLUTION INTRAMUSCULAR; INTRAVENOUS at 02:37

## 2024-01-26 RX ADMIN — LORAZEPAM 3 MG: 2 INJECTION INTRAMUSCULAR; INTRAVENOUS at 17:59

## 2024-01-26 RX ADMIN — PANTOPRAZOLE SODIUM 40 MG: 40 TABLET, DELAYED RELEASE ORAL at 16:00

## 2024-01-26 RX ADMIN — ONDANSETRON 4 MG: 2 INJECTION INTRAMUSCULAR; INTRAVENOUS at 17:33

## 2024-01-26 RX ADMIN — HEPARIN SODIUM 5000 UNITS: 10000 INJECTION INTRAVENOUS; SUBCUTANEOUS at 20:56

## 2024-01-26 NOTE — ED NOTES
ED to Inpatient Handoff Report    Notified Concepción that electronic handoff available and patient ready for transport to room 434.    Safety Risks: Risk of falls    Patient in Restraints: no    Constant Observer or Patient : no    Telemetry Monitoring Ordered :Yes           Order to transfer to unit without monitor:YES    Last MEWS: 1 Time completed: 1042    Deterioration Index Score:   Predictive Model Details          21 (Normal)  Factor Value    Calculated 1/26/2024 10:44 36% Age 44 years old    Deterioration Index Model 21% Respiratory rate 14     16% Systolic 100     13% Hematocrit abnormal (23.6 %)     4% Sodium 135 mmol/L     4% Potassium 3.5 mmol/L     3% WBC count abnormal (2.5 k/uL)     3% Platelet count abnormal (37 k/uL)     1% Pulse oximetry 97 %     0% Pulse 76     0% Temperature 98.1 °F (36.7 °C)        Vitals:    01/26/24 0515 01/26/24 0627 01/26/24 1026 01/26/24 1042   BP: 93/60 93/60  (!) 100/55   Pulse: 82 82  76   Resp: 16   14   Temp:    98.1 °F (36.7 °C)   TempSrc:   Oral Oral   SpO2: 97%   97%   Weight:       Height:             Opportunity for questions and clarification was provided.

## 2024-01-26 NOTE — H&P
finding showing acute pancreatitis, lipase 79, clear liquid diet, n.p.o. after midnight, will consult surgery for recommendations and follow-up.  8.  UTI-urine sent for culture and sensitivity, will follow, antibiotic started in ED, will continue.      45 minutes or more were spent in assessing patient, reviewing charts, discussing plan of care with the patient and documentation.    Code Status: Full code  DVT prophylaxis: SCDs  PUD prophylaxis-Protonix      45 minutes or more were spent in assessing patient, reviewing charts, discussing plan of care with the patient and documentation.      NOTE: This report was transcribed using voice recognition software. Every effort was made to ensure accuracy; however, inadvertent computerized transcription errors may be present.  Electronically signed by Maldonado Ornelas MD on 1/25/2024 at 9:03 PM

## 2024-01-26 NOTE — ED NOTES
Contacted hospitalist Johnson patient having pain but no pain medications  ordered, awaiting orders

## 2024-01-27 PROBLEM — E44.1 MILD PROTEIN-CALORIE MALNUTRITION (HCC): Chronic | Status: ACTIVE | Noted: 2024-01-27

## 2024-01-27 LAB
ALBUMIN SERPL-MCNC: 2.7 G/DL (ref 3.5–5.2)
ALP SERPL-CCNC: 71 U/L (ref 40–129)
ALT SERPL-CCNC: 22 U/L (ref 0–40)
ANION GAP SERPL CALCULATED.3IONS-SCNC: 8 MMOL/L (ref 7–16)
AST SERPL-CCNC: 54 U/L (ref 0–39)
BASOPHILS # BLD: 0.05 K/UL (ref 0–0.2)
BASOPHILS NFR BLD: 2 % (ref 0–2)
BILIRUB DIRECT SERPL-MCNC: 2.5 MG/DL (ref 0–0.3)
BILIRUB SERPL-MCNC: 5.7 MG/DL (ref 0–1.2)
BUN SERPL-MCNC: 8 MG/DL (ref 6–20)
CALCIUM SERPL-MCNC: 8 MG/DL (ref 8.6–10.2)
CHLORIDE SERPL-SCNC: 103 MMOL/L (ref 98–107)
CO2 SERPL-SCNC: 22 MMOL/L (ref 22–29)
CREAT SERPL-MCNC: 1.1 MG/DL (ref 0.7–1.2)
EOSINOPHIL # BLD: 0.09 K/UL (ref 0.05–0.5)
EOSINOPHILS RELATIVE PERCENT: 4 % (ref 0–6)
ERYTHROCYTE [DISTWIDTH] IN BLOOD BY AUTOMATED COUNT: 21.1 % (ref 11.5–15)
GFR SERPL CREATININE-BSD FRML MDRD: >60 ML/MIN/1.73M2
GLUCOSE SERPL-MCNC: 88 MG/DL (ref 74–99)
HCT VFR BLD AUTO: 23.8 % (ref 37–54)
HCT VFR BLD AUTO: 27.7 % (ref 37–54)
HGB BLD-MCNC: 8.1 G/DL (ref 12.5–16.5)
HGB BLD-MCNC: 9.3 G/DL (ref 12.5–16.5)
INR PPP: 3.2
LYMPHOCYTES NFR BLD: 0.16 K/UL (ref 1.5–4)
LYMPHOCYTES RELATIVE PERCENT: 6 % (ref 20–42)
MCH RBC QN AUTO: 30.6 PG (ref 26–35)
MCHC RBC AUTO-ENTMCNC: 34 G/DL (ref 32–34.5)
MCV RBC AUTO: 89.8 FL (ref 80–99.9)
MICROORGANISM SPEC CULT: NO GROWTH
MONOCYTES NFR BLD: 0.05 K/UL (ref 0.1–0.95)
MONOCYTES NFR BLD: 2 % (ref 2–12)
NEUTROPHILS NFR BLD: 87 % (ref 43–80)
NEUTS SEG NFR BLD: 2.35 K/UL (ref 1.8–7.3)
PLATELET CONFIRMATION: NORMAL
PLATELET, FLUORESCENCE: 25 K/UL (ref 130–450)
PMV BLD AUTO: 10.9 FL (ref 7–12)
POTASSIUM SERPL-SCNC: 3.7 MMOL/L (ref 3.5–5)
PROT SERPL-MCNC: 4.7 G/DL (ref 6.4–8.3)
PROTHROMBIN TIME: 36.4 SEC (ref 9.3–12.4)
RBC # BLD AUTO: 2.65 M/UL (ref 3.8–5.8)
RBC # BLD: ABNORMAL 10*6/UL
SODIUM SERPL-SCNC: 133 MMOL/L (ref 132–146)
SPECIMEN DESCRIPTION: NORMAL
WBC OTHER # BLD: 2.7 K/UL (ref 4.5–11.5)

## 2024-01-27 PROCEDURE — 85018 HEMOGLOBIN: CPT

## 2024-01-27 PROCEDURE — 6370000000 HC RX 637 (ALT 250 FOR IP)

## 2024-01-27 PROCEDURE — 2580000003 HC RX 258: Performed by: STUDENT IN AN ORGANIZED HEALTH CARE EDUCATION/TRAINING PROGRAM

## 2024-01-27 PROCEDURE — 6360000002 HC RX W HCPCS: Performed by: INTERNAL MEDICINE

## 2024-01-27 PROCEDURE — 99233 SBSQ HOSP IP/OBS HIGH 50: CPT | Performed by: INTERNAL MEDICINE

## 2024-01-27 PROCEDURE — 2060000000 HC ICU INTERMEDIATE R&B

## 2024-01-27 PROCEDURE — 6360000002 HC RX W HCPCS

## 2024-01-27 PROCEDURE — 6370000000 HC RX 637 (ALT 250 FOR IP): Performed by: INTERNAL MEDICINE

## 2024-01-27 PROCEDURE — 6360000002 HC RX W HCPCS: Performed by: STUDENT IN AN ORGANIZED HEALTH CARE EDUCATION/TRAINING PROGRAM

## 2024-01-27 PROCEDURE — 6370000000 HC RX 637 (ALT 250 FOR IP): Performed by: STUDENT IN AN ORGANIZED HEALTH CARE EDUCATION/TRAINING PROGRAM

## 2024-01-27 PROCEDURE — 85025 COMPLETE CBC W/AUTO DIFF WBC: CPT

## 2024-01-27 PROCEDURE — 80053 COMPREHEN METABOLIC PANEL: CPT

## 2024-01-27 PROCEDURE — 85014 HEMATOCRIT: CPT

## 2024-01-27 PROCEDURE — 82248 BILIRUBIN DIRECT: CPT

## 2024-01-27 PROCEDURE — 85610 PROTHROMBIN TIME: CPT

## 2024-01-27 PROCEDURE — 6370000000 HC RX 637 (ALT 250 FOR IP): Performed by: NURSE PRACTITIONER

## 2024-01-27 RX ORDER — MIDODRINE HYDROCHLORIDE 10 MG/1
10 TABLET ORAL
Qty: 90 TABLET | Refills: 3 | Status: ON HOLD | OUTPATIENT
Start: 2024-01-27

## 2024-01-27 RX ORDER — PANTOPRAZOLE SODIUM 40 MG/1
40 TABLET, DELAYED RELEASE ORAL
Qty: 30 TABLET | Refills: 3 | Status: ON HOLD | OUTPATIENT
Start: 2024-01-27

## 2024-01-27 RX ADMIN — METRONIDAZOLE 500 MG: 500 INJECTION, SOLUTION INTRAVENOUS at 20:40

## 2024-01-27 RX ADMIN — Medication 10 ML: at 08:46

## 2024-01-27 RX ADMIN — PENTOXIFYLLINE 400 MG: 400 TABLET, EXTENDED RELEASE ORAL at 16:06

## 2024-01-27 RX ADMIN — MIDODRINE HYDROCHLORIDE 10 MG: 5 TABLET ORAL at 12:54

## 2024-01-27 RX ADMIN — LORAZEPAM 3 MG: 1 TABLET ORAL at 03:45

## 2024-01-27 RX ADMIN — ACETAMINOPHEN 650 MG: 325 TABLET ORAL at 01:03

## 2024-01-27 RX ADMIN — METRONIDAZOLE 500 MG: 500 INJECTION, SOLUTION INTRAVENOUS at 03:59

## 2024-01-27 RX ADMIN — PENTOXIFYLLINE 400 MG: 400 TABLET, EXTENDED RELEASE ORAL at 12:54

## 2024-01-27 RX ADMIN — LORAZEPAM 2 MG: 1 TABLET ORAL at 10:33

## 2024-01-27 RX ADMIN — RIFAXIMIN 400 MG: 200 TABLET ORAL at 14:43

## 2024-01-27 RX ADMIN — RIFAXIMIN 400 MG: 200 TABLET ORAL at 20:35

## 2024-01-27 RX ADMIN — MIDODRINE HYDROCHLORIDE 10 MG: 5 TABLET ORAL at 16:09

## 2024-01-27 RX ADMIN — METRONIDAZOLE 500 MG: 500 INJECTION, SOLUTION INTRAVENOUS at 12:56

## 2024-01-27 RX ADMIN — HEPARIN SODIUM 5000 UNITS: 10000 INJECTION INTRAVENOUS; SUBCUTANEOUS at 14:42

## 2024-01-27 RX ADMIN — WATER 1000 MG: 1 INJECTION INTRAMUSCULAR; INTRAVENOUS; SUBCUTANEOUS at 18:51

## 2024-01-27 RX ADMIN — RIFAXIMIN 400 MG: 200 TABLET ORAL at 08:49

## 2024-01-27 RX ADMIN — HEPARIN SODIUM 5000 UNITS: 10000 INJECTION INTRAVENOUS; SUBCUTANEOUS at 22:00

## 2024-01-27 RX ADMIN — MIDODRINE HYDROCHLORIDE 10 MG: 5 TABLET ORAL at 03:45

## 2024-01-27 RX ADMIN — PANTOPRAZOLE SODIUM 40 MG: 40 TABLET, DELAYED RELEASE ORAL at 07:04

## 2024-01-27 RX ADMIN — Medication 100 MG: at 08:46

## 2024-01-27 RX ADMIN — ACETAMINOPHEN 650 MG: 325 TABLET ORAL at 16:09

## 2024-01-27 RX ADMIN — PENTOXIFYLLINE 400 MG: 400 TABLET, EXTENDED RELEASE ORAL at 08:46

## 2024-01-27 RX ADMIN — PANTOPRAZOLE SODIUM 40 MG: 40 TABLET, DELAYED RELEASE ORAL at 16:09

## 2024-01-27 RX ADMIN — HEPARIN SODIUM 5000 UNITS: 10000 INJECTION INTRAVENOUS; SUBCUTANEOUS at 07:04

## 2024-01-27 RX ADMIN — LACTULOSE 20 G: 20 SOLUTION ORAL at 20:35

## 2024-01-27 RX ADMIN — LORAZEPAM 4 MG: 1 TABLET ORAL at 11:27

## 2024-01-27 RX ADMIN — Medication 10 ML: at 20:41

## 2024-01-27 RX ADMIN — LORAZEPAM 3 MG: 1 TABLET ORAL at 12:54

## 2024-01-27 NOTE — CARE COORDINATION
Social Work/Discharge Planning:  Social Work consult noted.  Met with patient and attempted to complete assessment.  Explained Social Work role.  Patient took some time responding to questions.  Patient lives with his significant other in a two story house.  He states his PCP is Dr. Rodgers.  He denies alcohol abuse.  Patient on CIWA.  He denies any needs at this time.  Will continue to follow and assist if needed.  Electronically signed by JOHN Morrell on 1/27/2024 at 1:48 PM

## 2024-01-27 NOTE — ACP (ADVANCE CARE PLANNING)
Advance Care Planning   Healthcare Decision Maker:    Primary Decision Maker: Peggy Rodriguez - [facility] - [phone]    Click here to complete Healthcare Decision Makers including selection of the Healthcare Decision Maker Relationship (ie \"Primary\").

## 2024-01-28 ENCOUNTER — APPOINTMENT (OUTPATIENT)
Dept: ULTRASOUND IMAGING | Age: 44
DRG: 469 | End: 2024-01-28
Payer: COMMERCIAL

## 2024-01-28 LAB
ALBUMIN SERPL-MCNC: 2.6 G/DL (ref 3.5–5.2)
ALP SERPL-CCNC: 75 U/L (ref 40–129)
ALT SERPL-CCNC: 21 U/L (ref 0–40)
ANION GAP SERPL CALCULATED.3IONS-SCNC: 11 MMOL/L (ref 7–16)
AST SERPL-CCNC: 47 U/L (ref 0–39)
BASOPHILS # BLD: 0.03 K/UL (ref 0–0.2)
BASOPHILS NFR BLD: 0 % (ref 0–2)
BILIRUB SERPL-MCNC: 6.7 MG/DL (ref 0–1.2)
BUN SERPL-MCNC: 14 MG/DL (ref 6–20)
CALCIUM SERPL-MCNC: 7.9 MG/DL (ref 8.6–10.2)
CHLORIDE SERPL-SCNC: 106 MMOL/L (ref 98–107)
CO2 SERPL-SCNC: 19 MMOL/L (ref 22–29)
CREAT SERPL-MCNC: 2.2 MG/DL (ref 0.7–1.2)
EOSINOPHIL # BLD: 0 K/UL (ref 0.05–0.5)
EOSINOPHILS RELATIVE PERCENT: 0 % (ref 0–6)
ERYTHROCYTE [DISTWIDTH] IN BLOOD BY AUTOMATED COUNT: 21.8 % (ref 11.5–15)
GFR SERPL CREATININE-BSD FRML MDRD: 37 ML/MIN/1.73M2
GLUCOSE SERPL-MCNC: 99 MG/DL (ref 74–99)
HCT VFR BLD AUTO: 26.4 % (ref 37–54)
HGB BLD-MCNC: 9 G/DL (ref 12.5–16.5)
IMM GRANULOCYTES # BLD AUTO: 0.12 K/UL (ref 0–0.58)
IMM GRANULOCYTES NFR BLD: 1 % (ref 0–5)
LYMPHOCYTES NFR BLD: 0.32 K/UL (ref 1.5–4)
LYMPHOCYTES RELATIVE PERCENT: 2 % (ref 20–42)
MCH RBC QN AUTO: 29.9 PG (ref 26–35)
MCHC RBC AUTO-ENTMCNC: 34.1 G/DL (ref 32–34.5)
MCV RBC AUTO: 87.7 FL (ref 80–99.9)
MONOCYTES NFR BLD: 0.72 K/UL (ref 0.1–0.95)
MONOCYTES NFR BLD: 5 % (ref 2–12)
NEUTROPHILS NFR BLD: 92 % (ref 43–80)
NEUTS SEG NFR BLD: 13.95 K/UL (ref 1.8–7.3)
PLATELET # BLD AUTO: 45 K/UL (ref 130–450)
PLATELET CONFIRMATION: NORMAL
PMV BLD AUTO: 9.5 FL (ref 7–12)
POTASSIUM SERPL-SCNC: 3.9 MMOL/L (ref 3.5–5)
PROT SERPL-MCNC: 4.7 G/DL (ref 6.4–8.3)
RBC # BLD AUTO: 3.01 M/UL (ref 3.8–5.8)
RBC # BLD: ABNORMAL 10*6/UL
SODIUM SERPL-SCNC: 136 MMOL/L (ref 132–146)
WBC OTHER # BLD: 15.1 K/UL (ref 4.5–11.5)

## 2024-01-28 PROCEDURE — 80053 COMPREHEN METABOLIC PANEL: CPT

## 2024-01-28 PROCEDURE — 6370000000 HC RX 637 (ALT 250 FOR IP): Performed by: NURSE PRACTITIONER

## 2024-01-28 PROCEDURE — 85025 COMPLETE CBC W/AUTO DIFF WBC: CPT

## 2024-01-28 PROCEDURE — 99233 SBSQ HOSP IP/OBS HIGH 50: CPT | Performed by: INTERNAL MEDICINE

## 2024-01-28 PROCEDURE — 2580000003 HC RX 258: Performed by: STUDENT IN AN ORGANIZED HEALTH CARE EDUCATION/TRAINING PROGRAM

## 2024-01-28 PROCEDURE — 6360000002 HC RX W HCPCS

## 2024-01-28 PROCEDURE — 76999 ECHO EXAMINATION PROCEDURE: CPT

## 2024-01-28 PROCEDURE — 2580000003 HC RX 258: Performed by: INTERNAL MEDICINE

## 2024-01-28 PROCEDURE — 2060000000 HC ICU INTERMEDIATE R&B

## 2024-01-28 PROCEDURE — 6370000000 HC RX 637 (ALT 250 FOR IP): Performed by: STUDENT IN AN ORGANIZED HEALTH CARE EDUCATION/TRAINING PROGRAM

## 2024-01-28 PROCEDURE — 6370000000 HC RX 637 (ALT 250 FOR IP): Performed by: INTERNAL MEDICINE

## 2024-01-28 PROCEDURE — 6360000002 HC RX W HCPCS: Performed by: INTERNAL MEDICINE

## 2024-01-28 PROCEDURE — 6370000000 HC RX 637 (ALT 250 FOR IP)

## 2024-01-28 RX ORDER — METRONIDAZOLE 500 MG/100ML
500 INJECTION, SOLUTION INTRAVENOUS EVERY 8 HOURS
Status: DISCONTINUED | OUTPATIENT
Start: 2024-01-28 | End: 2024-01-29

## 2024-01-28 RX ORDER — SODIUM CHLORIDE 9 MG/ML
INJECTION, SOLUTION INTRAVENOUS CONTINUOUS
Status: DISCONTINUED | OUTPATIENT
Start: 2024-01-28 | End: 2024-02-06

## 2024-01-28 RX ADMIN — MIDODRINE HYDROCHLORIDE 10 MG: 5 TABLET ORAL at 17:12

## 2024-01-28 RX ADMIN — HEPARIN SODIUM 5000 UNITS: 10000 INJECTION INTRAVENOUS; SUBCUTANEOUS at 13:02

## 2024-01-28 RX ADMIN — RIFAXIMIN 400 MG: 200 TABLET ORAL at 20:07

## 2024-01-28 RX ADMIN — Medication 10 ML: at 08:17

## 2024-01-28 RX ADMIN — METRONIDAZOLE 500 MG: 500 INJECTION, SOLUTION INTRAVENOUS at 20:13

## 2024-01-28 RX ADMIN — LORAZEPAM 2 MG: 1 TABLET ORAL at 08:11

## 2024-01-28 RX ADMIN — SODIUM CHLORIDE: 9 INJECTION, SOLUTION INTRAVENOUS at 01:58

## 2024-01-28 RX ADMIN — PANTOPRAZOLE SODIUM 40 MG: 40 TABLET, DELAYED RELEASE ORAL at 17:12

## 2024-01-28 RX ADMIN — METRONIDAZOLE 500 MG: 500 INJECTION, SOLUTION INTRAVENOUS at 13:01

## 2024-01-28 RX ADMIN — MIDODRINE HYDROCHLORIDE 10 MG: 5 TABLET ORAL at 13:00

## 2024-01-28 RX ADMIN — SODIUM CHLORIDE: 9 INJECTION, SOLUTION INTRAVENOUS at 11:45

## 2024-01-28 RX ADMIN — LORAZEPAM 2 MG: 2 INJECTION INTRAMUSCULAR; INTRAVENOUS at 17:28

## 2024-01-28 RX ADMIN — PENTOXIFYLLINE 400 MG: 400 TABLET, EXTENDED RELEASE ORAL at 17:12

## 2024-01-28 RX ADMIN — PENTOXIFYLLINE 400 MG: 400 TABLET, EXTENDED RELEASE ORAL at 13:02

## 2024-01-28 RX ADMIN — METRONIDAZOLE 500 MG: 500 INJECTION, SOLUTION INTRAVENOUS at 03:45

## 2024-01-28 RX ADMIN — MIDODRINE HYDROCHLORIDE 10 MG: 5 TABLET ORAL at 08:11

## 2024-01-28 RX ADMIN — HEPARIN SODIUM 5000 UNITS: 10000 INJECTION INTRAVENOUS; SUBCUTANEOUS at 20:06

## 2024-01-28 RX ADMIN — HEPARIN SODIUM 5000 UNITS: 10000 INJECTION INTRAVENOUS; SUBCUTANEOUS at 05:58

## 2024-01-28 RX ADMIN — RIFAXIMIN 400 MG: 200 TABLET ORAL at 08:12

## 2024-01-28 RX ADMIN — RIFAXIMIN 400 MG: 200 TABLET ORAL at 13:00

## 2024-01-28 RX ADMIN — Medication 100 MG: at 08:12

## 2024-01-28 RX ADMIN — LORAZEPAM 2 MG: 1 TABLET ORAL at 20:06

## 2024-01-28 RX ADMIN — WATER 1000 MG: 1 INJECTION INTRAMUSCULAR; INTRAVENOUS; SUBCUTANEOUS at 17:12

## 2024-01-28 RX ADMIN — LORAZEPAM 2 MG: 2 INJECTION INTRAMUSCULAR; INTRAVENOUS at 21:17

## 2024-01-28 RX ADMIN — PENTOXIFYLLINE 400 MG: 400 TABLET, EXTENDED RELEASE ORAL at 08:11

## 2024-01-28 RX ADMIN — PANTOPRAZOLE SODIUM 40 MG: 40 TABLET, DELAYED RELEASE ORAL at 05:58

## 2024-01-28 RX ADMIN — LORAZEPAM 3 MG: 2 INJECTION INTRAMUSCULAR; INTRAVENOUS at 23:11

## 2024-01-28 RX ADMIN — SODIUM CHLORIDE: 9 INJECTION, SOLUTION INTRAVENOUS at 13:01

## 2024-01-29 LAB
ALBUMIN SERPL-MCNC: 2.4 G/DL (ref 3.5–5.2)
ALP SERPL-CCNC: 64 U/L (ref 40–129)
ALT SERPL-CCNC: 18 U/L (ref 0–40)
ANION GAP SERPL CALCULATED.3IONS-SCNC: 7 MMOL/L (ref 7–16)
AST SERPL-CCNC: 43 U/L (ref 0–39)
BASOPHILS # BLD: 0.06 K/UL (ref 0–0.2)
BASOPHILS NFR BLD: 2 % (ref 0–2)
BILIRUB SERPL-MCNC: 4.5 MG/DL (ref 0–1.2)
BUN SERPL-MCNC: 15 MG/DL (ref 6–20)
CALCIUM SERPL-MCNC: 7.8 MG/DL (ref 8.6–10.2)
CHLORIDE SERPL-SCNC: 112 MMOL/L (ref 98–107)
CO2 SERPL-SCNC: 20 MMOL/L (ref 22–29)
CREAT SERPL-MCNC: 1.3 MG/DL (ref 0.7–1.2)
EOSINOPHIL # BLD: 0.06 K/UL (ref 0.05–0.5)
EOSINOPHILS RELATIVE PERCENT: 2 % (ref 0–6)
ERYTHROCYTE [DISTWIDTH] IN BLOOD BY AUTOMATED COUNT: 21.8 % (ref 11.5–15)
GFR SERPL CREATININE-BSD FRML MDRD: >60 ML/MIN/1.73M2
GLUCOSE SERPL-MCNC: 96 MG/DL (ref 74–99)
HCT VFR BLD AUTO: 24.9 % (ref 37–54)
HGB BLD-MCNC: 8.3 G/DL (ref 12.5–16.5)
LYMPHOCYTES NFR BLD: 0.23 K/UL (ref 1.5–4)
LYMPHOCYTES RELATIVE PERCENT: 6 % (ref 20–42)
MCH RBC QN AUTO: 30 PG (ref 26–35)
MCHC RBC AUTO-ENTMCNC: 33.3 G/DL (ref 32–34.5)
MCV RBC AUTO: 89.9 FL (ref 80–99.9)
MONOCYTES NFR BLD: 0.06 K/UL (ref 0.1–0.95)
MONOCYTES NFR BLD: 2 % (ref 2–12)
NEUTROPHILS NFR BLD: 89 % (ref 43–80)
NEUTS SEG NFR BLD: 3.28 K/UL (ref 1.8–7.3)
PLATELET # BLD AUTO: 34 K/UL (ref 130–450)
PLATELET CONFIRMATION: NORMAL
PMV BLD AUTO: 12 FL (ref 7–12)
POTASSIUM SERPL-SCNC: 3.7 MMOL/L (ref 3.5–5)
PROT SERPL-MCNC: 4.5 G/DL (ref 6.4–8.3)
RBC # BLD AUTO: 2.77 M/UL (ref 3.8–5.8)
RBC # BLD: ABNORMAL 10*6/UL
SODIUM SERPL-SCNC: 139 MMOL/L (ref 132–146)
WBC # BLD: ABNORMAL 10*3/UL
WBC OTHER # BLD: 3.7 K/UL (ref 4.5–11.5)

## 2024-01-29 PROCEDURE — 80053 COMPREHEN METABOLIC PANEL: CPT

## 2024-01-29 PROCEDURE — 99233 SBSQ HOSP IP/OBS HIGH 50: CPT | Performed by: INTERNAL MEDICINE

## 2024-01-29 PROCEDURE — 2580000003 HC RX 258: Performed by: INTERNAL MEDICINE

## 2024-01-29 PROCEDURE — 6360000002 HC RX W HCPCS: Performed by: INTERNAL MEDICINE

## 2024-01-29 PROCEDURE — 36415 COLL VENOUS BLD VENIPUNCTURE: CPT

## 2024-01-29 PROCEDURE — 6370000000 HC RX 637 (ALT 250 FOR IP)

## 2024-01-29 PROCEDURE — 2580000003 HC RX 258: Performed by: STUDENT IN AN ORGANIZED HEALTH CARE EDUCATION/TRAINING PROGRAM

## 2024-01-29 PROCEDURE — 99222 1ST HOSP IP/OBS MODERATE 55: CPT | Performed by: SURGERY

## 2024-01-29 PROCEDURE — 6370000000 HC RX 637 (ALT 250 FOR IP): Performed by: STUDENT IN AN ORGANIZED HEALTH CARE EDUCATION/TRAINING PROGRAM

## 2024-01-29 PROCEDURE — 85025 COMPLETE CBC W/AUTO DIFF WBC: CPT

## 2024-01-29 PROCEDURE — 6360000002 HC RX W HCPCS

## 2024-01-29 PROCEDURE — 6370000000 HC RX 637 (ALT 250 FOR IP): Performed by: NURSE PRACTITIONER

## 2024-01-29 PROCEDURE — 2060000000 HC ICU INTERMEDIATE R&B

## 2024-01-29 PROCEDURE — 6370000000 HC RX 637 (ALT 250 FOR IP): Performed by: INTERNAL MEDICINE

## 2024-01-29 RX ORDER — METRONIDAZOLE 500 MG/1
500 TABLET ORAL EVERY 8 HOURS SCHEDULED
Status: DISCONTINUED | OUTPATIENT
Start: 2024-01-29 | End: 2024-01-30

## 2024-01-29 RX ORDER — CIPROFLOXACIN 500 MG/1
500 TABLET, FILM COATED ORAL 2 TIMES DAILY
Qty: 6 TABLET | Refills: 0 | Status: SHIPPED | OUTPATIENT
Start: 2024-01-29 | End: 2024-02-01

## 2024-01-29 RX ORDER — METRONIDAZOLE 500 MG/1
500 TABLET ORAL 3 TIMES DAILY
Qty: 9 TABLET | Refills: 0 | Status: SHIPPED | OUTPATIENT
Start: 2024-01-29 | End: 2024-02-01

## 2024-01-29 RX ADMIN — METRONIDAZOLE 500 MG: 500 TABLET ORAL at 20:23

## 2024-01-29 RX ADMIN — MIDODRINE HYDROCHLORIDE 10 MG: 5 TABLET ORAL at 12:11

## 2024-01-29 RX ADMIN — Medication 10 ML: at 20:23

## 2024-01-29 RX ADMIN — LORAZEPAM 4 MG: 2 INJECTION INTRAMUSCULAR; INTRAVENOUS at 18:11

## 2024-01-29 RX ADMIN — WATER 1000 MG: 1 INJECTION INTRAMUSCULAR; INTRAVENOUS; SUBCUTANEOUS at 18:15

## 2024-01-29 RX ADMIN — PANTOPRAZOLE SODIUM 40 MG: 40 TABLET, DELAYED RELEASE ORAL at 16:36

## 2024-01-29 RX ADMIN — PANTOPRAZOLE SODIUM 40 MG: 40 TABLET, DELAYED RELEASE ORAL at 05:50

## 2024-01-29 RX ADMIN — MIDODRINE HYDROCHLORIDE 10 MG: 5 TABLET ORAL at 16:36

## 2024-01-29 RX ADMIN — PENTOXIFYLLINE 400 MG: 400 TABLET, EXTENDED RELEASE ORAL at 12:11

## 2024-01-29 RX ADMIN — LORAZEPAM 3 MG: 1 TABLET ORAL at 12:11

## 2024-01-29 RX ADMIN — RIFAXIMIN 400 MG: 200 TABLET ORAL at 14:00

## 2024-01-29 RX ADMIN — MIDODRINE HYDROCHLORIDE 10 MG: 5 TABLET ORAL at 09:54

## 2024-01-29 RX ADMIN — METRONIDAZOLE 500 MG: 500 INJECTION, SOLUTION INTRAVENOUS at 03:39

## 2024-01-29 RX ADMIN — LORAZEPAM 3 MG: 2 INJECTION INTRAMUSCULAR; INTRAVENOUS at 04:50

## 2024-01-29 RX ADMIN — LORAZEPAM 4 MG: 1 TABLET ORAL at 10:09

## 2024-01-29 RX ADMIN — RIFAXIMIN 400 MG: 200 TABLET ORAL at 20:22

## 2024-01-29 RX ADMIN — LORAZEPAM 2 MG: 2 INJECTION INTRAMUSCULAR; INTRAVENOUS at 20:23

## 2024-01-29 RX ADMIN — SODIUM CHLORIDE, PRESERVATIVE FREE 10 ML: 5 INJECTION INTRAVENOUS at 09:07

## 2024-01-29 RX ADMIN — METRONIDAZOLE 500 MG: 500 TABLET ORAL at 14:00

## 2024-01-29 RX ADMIN — SODIUM CHLORIDE: 9 INJECTION, SOLUTION INTRAVENOUS at 01:51

## 2024-01-29 RX ADMIN — LORAZEPAM 4 MG: 1 TABLET ORAL at 16:36

## 2024-01-29 RX ADMIN — PENTOXIFYLLINE 400 MG: 400 TABLET, EXTENDED RELEASE ORAL at 09:54

## 2024-01-29 RX ADMIN — RIFAXIMIN 400 MG: 200 TABLET ORAL at 09:54

## 2024-01-29 RX ADMIN — Medication 10 ML: at 09:54

## 2024-01-29 RX ADMIN — HEPARIN SODIUM 5000 UNITS: 10000 INJECTION INTRAVENOUS; SUBCUTANEOUS at 14:00

## 2024-01-29 RX ADMIN — PENTOXIFYLLINE 400 MG: 400 TABLET, EXTENDED RELEASE ORAL at 16:36

## 2024-01-29 RX ADMIN — Medication 100 MG: at 09:54

## 2024-01-29 RX ADMIN — HEPARIN SODIUM 5000 UNITS: 10000 INJECTION INTRAVENOUS; SUBCUTANEOUS at 05:50

## 2024-01-29 NOTE — CARE COORDINATION
CASE MANAGEMENT.... Met with patient and wife at the bedside, along with Gauri from [facility]. Patient is lethargic, but able to answer questions appropriately. He is receiving po ativan per CIWA protocol. Has not been out of bed since admit. Is weak/fatigued. PT/OT ordered to see to assist with dc needs. We discussed dc plans. At this time, he is agreeable to inpatient etoh rehab. [facility] if appropriate. On previous admit 1/7/24, patient was + for cannabis/fentanyl.  Discussed dispo with Dr Navarrete. She is agreeable to the above. Nursing also updated, will increase activity and wean ativan as tolerated. Will follow.

## 2024-01-29 NOTE — CARE COORDINATION
[facility] Support Note    Name: Johnathan Yanez III  Date: 1/29/2024    Chief Complaint   Patient presents with    Fatigue     Fatigue/loss of appetite x1 week. Hx alcohol abuse- last drink yesterday.    Jaundice    Leg Swelling     Right leg pain.  2 masses under skin right calf       Peer Support met with patient.   Support and education provided  [] Resources provided   [] Treatment referral:   [] Other:   [] Patient declined peer recovery services     Referred By: JULIAN YOUNGER    Notes: Peer went to visit patient with his wife at bedside. Patient was trying to answer my question with his wife helping him. Peer asked patient if his wife and I could step out of the room to talk so he can rest. He was agreeable. Patient has changed from my interaction with him 2 weeks ago. Peer did ask JULIAN Mcneill to speak with his wife and that she had questions that the peer could not answer.  Peer will continue to follow.    Signed: Gauri Luque, 1/29/2024

## 2024-01-30 LAB
ALBUMIN SERPL-MCNC: 2.5 G/DL (ref 3.5–5.2)
ALP SERPL-CCNC: 63 U/L (ref 40–129)
ALT SERPL-CCNC: 17 U/L (ref 0–40)
AMMONIA PLAS-SCNC: 21 UMOL/L (ref 16–60)
ANION GAP SERPL CALCULATED.3IONS-SCNC: 6 MMOL/L (ref 7–16)
AST SERPL-CCNC: 42 U/L (ref 0–39)
B.E.: -3.6 MMOL/L (ref -3–3)
BASOPHILS # BLD: 0.02 K/UL (ref 0–0.2)
BASOPHILS NFR BLD: 1 % (ref 0–2)
BILIRUB SERPL-MCNC: 4 MG/DL (ref 0–1.2)
BUN SERPL-MCNC: 10 MG/DL (ref 6–20)
CALCIUM SERPL-MCNC: 7.9 MG/DL (ref 8.6–10.2)
CHLORIDE SERPL-SCNC: 113 MMOL/L (ref 98–107)
CO2 SERPL-SCNC: 19 MMOL/L (ref 22–29)
COHB: 0.9 % (ref 0–1.5)
CREAT SERPL-MCNC: 1 MG/DL (ref 0.7–1.2)
CRITICAL: ABNORMAL
DATE ANALYZED: ABNORMAL
DATE OF COLLECTION: ABNORMAL
EOSINOPHIL # BLD: 0.05 K/UL (ref 0.05–0.5)
EOSINOPHILS RELATIVE PERCENT: 2 % (ref 0–6)
ERYTHROCYTE [DISTWIDTH] IN BLOOD BY AUTOMATED COUNT: 21.7 % (ref 11.5–15)
GFR SERPL CREATININE-BSD FRML MDRD: >60 ML/MIN/1.73M2
GLUCOSE SERPL-MCNC: 89 MG/DL (ref 74–99)
HCO3: 18.6 MMOL/L (ref 22–26)
HGB BLD-MCNC: 8 G/DL (ref 12.5–16.5)
HHB: 3 % (ref 0–5)
LAB: ABNORMAL
LYMPHOCYTES NFR BLD: 0.15 K/UL (ref 1.5–4)
LYMPHOCYTES RELATIVE PERCENT: 5 % (ref 20–42)
Lab: 1333
MCH RBC QN AUTO: 30 PG (ref 26–35)
MCHC RBC AUTO-ENTMCNC: 32.4 G/DL (ref 32–34.5)
MCV RBC AUTO: 92.5 FL (ref 80–99.9)
METHB: 0.3 % (ref 0–1.5)
MODE: ABNORMAL
MONOCYTES NFR BLD: 0.1 K/UL (ref 0.1–0.95)
MONOCYTES NFR BLD: 4 % (ref 2–12)
MYELOCYTES ABSOLUTE COUNT: 0.02 K/UL
MYELOCYTES: 1 %
NEUTROPHILS NFR BLD: 87 % (ref 43–80)
NEUTS SEG NFR BLD: 2.43 K/UL (ref 1.8–7.3)
NUCLEATED RED BLOOD CELLS: 1 PER 100 WBC
O2 CONTENT: 12.9 ML/DL
O2 SATURATION: 97 % (ref 92–98.5)
O2HB: 95.8 % (ref 94–97)
OPERATOR ID: 5423
PATIENT TEMP: 37 C
PCO2: 24.9 MMHG (ref 35–45)
PH BLOOD GAS: 7.49 (ref 7.35–7.45)
PLATELET # BLD AUTO: 26 K/UL (ref 130–450)
PLATELET CONFIRMATION: NORMAL
PMV BLD AUTO: 11.3 FL (ref 7–12)
PO2: 90.9 MMHG (ref 75–100)
POTASSIUM SERPL-SCNC: 3.5 MMOL/L (ref 3.5–5)
PROMYELOCYTES ABSOLUTE COUNT: 0.02 K/UL
PROMYELOCYTES: 1 %
PROT SERPL-MCNC: 4.7 G/DL (ref 6.4–8.3)
RBC # BLD AUTO: 2.67 M/UL (ref 3.8–5.8)
RBC # BLD: ABNORMAL 10*6/UL
SODIUM SERPL-SCNC: 138 MMOL/L (ref 132–146)
SOURCE, BLOOD GAS: ABNORMAL
THB: 9.5 G/DL (ref 11.5–16.5)
TIME ANALYZED: 1338
WBC OTHER # BLD: 2.8 K/UL (ref 4.5–11.5)

## 2024-01-30 PROCEDURE — 6360000002 HC RX W HCPCS: Performed by: INTERNAL MEDICINE

## 2024-01-30 PROCEDURE — 51798 US URINE CAPACITY MEASURE: CPT

## 2024-01-30 PROCEDURE — 6370000000 HC RX 637 (ALT 250 FOR IP): Performed by: NURSE PRACTITIONER

## 2024-01-30 PROCEDURE — 6370000000 HC RX 637 (ALT 250 FOR IP): Performed by: INTERNAL MEDICINE

## 2024-01-30 PROCEDURE — 97165 OT EVAL LOW COMPLEX 30 MIN: CPT

## 2024-01-30 PROCEDURE — 36415 COLL VENOUS BLD VENIPUNCTURE: CPT

## 2024-01-30 PROCEDURE — 6370000000 HC RX 637 (ALT 250 FOR IP): Performed by: STUDENT IN AN ORGANIZED HEALTH CARE EDUCATION/TRAINING PROGRAM

## 2024-01-30 PROCEDURE — 2580000003 HC RX 258: Performed by: INTERNAL MEDICINE

## 2024-01-30 PROCEDURE — 2060000000 HC ICU INTERMEDIATE R&B

## 2024-01-30 PROCEDURE — 97161 PT EVAL LOW COMPLEX 20 MIN: CPT

## 2024-01-30 PROCEDURE — 2580000003 HC RX 258: Performed by: STUDENT IN AN ORGANIZED HEALTH CARE EDUCATION/TRAINING PROGRAM

## 2024-01-30 PROCEDURE — 6360000002 HC RX W HCPCS

## 2024-01-30 PROCEDURE — 99233 SBSQ HOSP IP/OBS HIGH 50: CPT | Performed by: INTERNAL MEDICINE

## 2024-01-30 PROCEDURE — 80053 COMPREHEN METABOLIC PANEL: CPT

## 2024-01-30 PROCEDURE — 82805 BLOOD GASES W/O2 SATURATION: CPT

## 2024-01-30 PROCEDURE — 85025 COMPLETE CBC W/AUTO DIFF WBC: CPT

## 2024-01-30 PROCEDURE — 82140 ASSAY OF AMMONIA: CPT

## 2024-01-30 PROCEDURE — 51702 INSERT TEMP BLADDER CATH: CPT

## 2024-01-30 RX ORDER — 0.9 % SODIUM CHLORIDE 0.9 %
1000 INTRAVENOUS SOLUTION INTRAVENOUS ONCE
Status: COMPLETED | OUTPATIENT
Start: 2024-01-30 | End: 2024-01-30

## 2024-01-30 RX ORDER — LORAZEPAM 2 MG/ML
1 INJECTION INTRAMUSCULAR EVERY 6 HOURS PRN
Status: DISCONTINUED | OUTPATIENT
Start: 2024-01-30 | End: 2024-02-01

## 2024-01-30 RX ADMIN — PENTOXIFYLLINE 400 MG: 400 TABLET, EXTENDED RELEASE ORAL at 11:26

## 2024-01-30 RX ADMIN — MIDODRINE HYDROCHLORIDE 10 MG: 5 TABLET ORAL at 11:26

## 2024-01-30 RX ADMIN — RIFAXIMIN 400 MG: 200 TABLET ORAL at 14:26

## 2024-01-30 RX ADMIN — LORAZEPAM 1 MG: 2 INJECTION INTRAMUSCULAR; INTRAVENOUS at 18:37

## 2024-01-30 RX ADMIN — PANTOPRAZOLE SODIUM 40 MG: 40 TABLET, DELAYED RELEASE ORAL at 06:07

## 2024-01-30 RX ADMIN — METRONIDAZOLE 500 MG: 500 TABLET ORAL at 06:07

## 2024-01-30 RX ADMIN — PENTOXIFYLLINE 400 MG: 400 TABLET, EXTENDED RELEASE ORAL at 08:10

## 2024-01-30 RX ADMIN — Medication 100 MG: at 08:10

## 2024-01-30 RX ADMIN — SODIUM CHLORIDE: 9 INJECTION, SOLUTION INTRAVENOUS at 23:23

## 2024-01-30 RX ADMIN — SODIUM CHLORIDE 1000 ML: 9 INJECTION, SOLUTION INTRAVENOUS at 18:47

## 2024-01-30 RX ADMIN — SODIUM CHLORIDE: 9 INJECTION, SOLUTION INTRAVENOUS at 11:00

## 2024-01-30 RX ADMIN — PENTOXIFYLLINE 400 MG: 400 TABLET, EXTENDED RELEASE ORAL at 17:11

## 2024-01-30 RX ADMIN — SODIUM CHLORIDE, PRESERVATIVE FREE 10 ML: 5 INJECTION INTRAVENOUS at 21:00

## 2024-01-30 RX ADMIN — MIDODRINE HYDROCHLORIDE 10 MG: 5 TABLET ORAL at 17:11

## 2024-01-30 RX ADMIN — LORAZEPAM 3 MG: 2 INJECTION INTRAMUSCULAR; INTRAVENOUS at 03:23

## 2024-01-30 RX ADMIN — RIFAXIMIN 400 MG: 200 TABLET ORAL at 08:10

## 2024-01-30 RX ADMIN — PANTOPRAZOLE SODIUM 40 MG: 40 TABLET, DELAYED RELEASE ORAL at 17:11

## 2024-01-30 RX ADMIN — MIDODRINE HYDROCHLORIDE 10 MG: 5 TABLET ORAL at 08:10

## 2024-01-30 RX ADMIN — RIFAXIMIN 400 MG: 200 TABLET ORAL at 21:00

## 2024-01-30 NOTE — PLAN OF CARE
Problem: Discharge Planning  Goal: Discharge to home or other facility with appropriate resources  Outcome: Progressing     Problem: Pain  Goal: Verbalizes/displays adequate comfort level or baseline comfort level  Outcome: Progressing     Problem: Safety - Adult  Goal: Free from fall injury  Outcome: Progressing     Problem: ABCDS Injury Assessment  Goal: Absence of physical injury  Outcome: Progressing     Problem: Nutrition Deficit:  Goal: Optimize nutritional status  Outcome: Progressing     Problem: Skin/Tissue Integrity  Goal: Absence of new skin breakdown  Description: 1.  Monitor for areas of redness and/or skin breakdown  2.  Assess vascular access sites hourly  3.  Every 4-6 hours minimum:  Change oxygen saturation probe site  4.  Every 4-6 hours:  If on nasal continuous positive airway pressure, respiratory therapy assess nares and determine need for appliance change or resting period.  Outcome: Progressing

## 2024-01-30 NOTE — CARE COORDINATION
[facility] Support Note    Name: Johnathan Yanez III  Date: 1/30/2024    Chief Complaint   Patient presents with    Fatigue     Fatigue/loss of appetite x1 week. Hx alcohol abuse- last drink yesterday.    Jaundice    Leg Swelling     Right leg pain.  2 masses under skin right calf       Peer Support met with patient.  [] Support and education provided  [] Resources provided   [] Treatment referral:   [] Other:   [] Patient declined peer recovery services     Referred By:     Notes: Peer went to check on patient but he would not wake up. When wife comes to visit please let peer know [phone]    2:50 Peer met with patient and his mother was at bedside. Peer asked if he was interested in going inpatient at [facility]. He said just get me out of here. Peer asked if he could do his assessment with admissions, he was willing to do it. During his assessment when asked a questions he wasn't making sense. He kept saying his last drink was yesterday. Patient came in on the 25th, Peer let the SW know that he needs to call back [facility] and do assessment again. Peer called admissions to ask for them to hold his paperwork and we'll see how he is doing tomorrow. This is bed to bed transfer with no going home also.    Signed: Gauri Luque, 1/30/2024        [id number] stated  Has to submit a prior authorization to [facility] for [address]. Thank you.

## 2024-01-30 NOTE — CARE COORDINATION
CASE MANAGEMENT... Per conversation with Dr Navarrete, patient is not ready for dc. Still lethargic. Will check abgs/ammonia level. PT/OT to see when appropriate. PRS following. Will follow for dc. Plans TBD pending patient progress.

## 2024-01-30 NOTE — PLAN OF CARE
Problem: Discharge Planning  Goal: Discharge to home or other facility with appropriate resources  1/30/2024 0824 by Raphael Romero RN  Outcome: Progressing  1/29/2024 2338 by Charlene Pearson RN  Outcome: Progressing     Problem: Pain  Goal: Verbalizes/displays adequate comfort level or baseline comfort level  1/30/2024 0824 by Raphael Romero RN  Outcome: Progressing  1/29/2024 2338 by Charlene Pearson RN  Outcome: Progressing     Problem: Safety - Adult  Goal: Free from fall injury  1/30/2024 0824 by Raphael Romero RN  Outcome: Progressing  1/29/2024 2338 by Charlene Pearson RN  Outcome: Progressing     Problem: ABCDS Injury Assessment  Goal: Absence of physical injury  1/30/2024 0824 by Raphael Romero RN  Outcome: Progressing  1/29/2024 2338 by Charlene Pearson RN  Outcome: Progressing     Problem: Nutrition Deficit:  Goal: Optimize nutritional status  1/30/2024 0824 by Raphael Romero RN  Outcome: Progressing  1/29/2024 2338 by Charlene Pearson RN  Outcome: Progressing     Problem: Skin/Tissue Integrity  Goal: Absence of new skin breakdown  Description: 1.  Monitor for areas of redness and/or skin breakdown  2.  Assess vascular access sites hourly  3.  Every 4-6 hours minimum:  Change oxygen saturation probe site  4.  Every 4-6 hours:  If on nasal continuous positive airway pressure, respiratory therapy assess nares and determine need for appliance change or resting period.  1/30/2024 0824 by Raphael Romero RN  Outcome: Progressing  1/29/2024 2338 by Charlene Pearson RN  Outcome: Progressing

## 2024-01-30 NOTE — PLAN OF CARE
Problem: Discharge Planning  Goal: Discharge to home or other facility with appropriate resources  1/30/2024 1854 by Raphael Romero RN  Outcome: Progressing  1/30/2024 0824 by Rapheal Romero RN  Outcome: Progressing     Problem: Pain  Goal: Verbalizes/displays adequate comfort level or baseline comfort level  1/30/2024 1854 by Raphael Romero RN  Outcome: Progressing  1/30/2024 0824 by Raphael Romero RN  Outcome: Progressing     Problem: Safety - Adult  Goal: Free from fall injury  1/30/2024 1854 by Raphael Romero RN  Outcome: Progressing  1/30/2024 0824 by Raphael Romero RN  Outcome: Progressing     Problem: ABCDS Injury Assessment  Goal: Absence of physical injury  1/30/2024 1854 by Raphael Romero RN  Outcome: Progressing  1/30/2024 0824 by Raphael Romero RN  Outcome: Progressing     Problem: Nutrition Deficit:  Goal: Optimize nutritional status  1/30/2024 1854 by Raphael Romero RN  Outcome: Progressing  1/30/2024 0824 by Raphael Romero RN  Outcome: Progressing     Problem: Skin/Tissue Integrity  Goal: Absence of new skin breakdown  Description: 1.  Monitor for areas of redness and/or skin breakdown  2.  Assess vascular access sites hourly  3.  Every 4-6 hours minimum:  Change oxygen saturation probe site  4.  Every 4-6 hours:  If on nasal continuous positive airway pressure, respiratory therapy assess nares and determine need for appliance change or resting period.  1/30/2024 1854 by Raphael Romero RN  Outcome: Progressing  1/30/2024 0824 by Raphael Romero RN  Outcome: Progressing

## 2024-01-31 ENCOUNTER — APPOINTMENT (OUTPATIENT)
Dept: CT IMAGING | Age: 44
DRG: 469 | End: 2024-01-31
Payer: COMMERCIAL

## 2024-01-31 LAB
ALBUMIN SERPL-MCNC: 2.6 G/DL (ref 3.5–5.2)
ALP SERPL-CCNC: 76 U/L (ref 40–129)
ALT SERPL-CCNC: 17 U/L (ref 0–40)
ANION GAP SERPL CALCULATED.3IONS-SCNC: 8 MMOL/L (ref 7–16)
AST SERPL-CCNC: 40 U/L (ref 0–39)
ATYPICAL LYMPHOCYTE ABSOLUTE COUNT: 0.04 K/UL (ref 0–0.46)
ATYPICAL LYMPHOCYTES: 1 % (ref 0–4)
BASOPHILS # BLD: 0 K/UL (ref 0–0.2)
BASOPHILS NFR BLD: 0 % (ref 0–2)
BILIRUB SERPL-MCNC: 4.1 MG/DL (ref 0–1.2)
BUN SERPL-MCNC: 7 MG/DL (ref 6–20)
CALCIUM SERPL-MCNC: 7.4 MG/DL (ref 8.6–10.2)
CHLORIDE SERPL-SCNC: 112 MMOL/L (ref 98–107)
CO2 SERPL-SCNC: 19 MMOL/L (ref 22–29)
CREAT SERPL-MCNC: 1.2 MG/DL (ref 0.7–1.2)
EOSINOPHIL # BLD: 0.18 K/UL (ref 0.05–0.5)
EOSINOPHILS RELATIVE PERCENT: 4 % (ref 0–6)
ERYTHROCYTE [DISTWIDTH] IN BLOOD BY AUTOMATED COUNT: 21.4 % (ref 11.5–15)
GFR SERPL CREATININE-BSD FRML MDRD: >60 ML/MIN/1.73M2
GLUCOSE SERPL-MCNC: 133 MG/DL (ref 74–99)
HCT VFR BLD AUTO: 23.9 % (ref 37–54)
HEMOCCULT STL QL: NORMAL
HGB BLD-MCNC: 7.8 G/DL (ref 12.5–16.5)
LYMPHOCYTES NFR BLD: 0.25 K/UL (ref 1.5–4)
LYMPHOCYTES RELATIVE PERCENT: 6 % (ref 20–42)
MCH RBC QN AUTO: 29.2 PG (ref 26–35)
MCV RBC AUTO: 89.5 FL (ref 80–99.9)
METAMYELOCYTES ABSOLUTE COUNT: 0.04 K/UL (ref 0–0.12)
METAMYELOCYTES: 1 % (ref 0–1)
MICROORGANISM SPEC CULT: NORMAL
MICROORGANISM SPEC CULT: NORMAL
MONOCYTES NFR BLD: 0.04 K/UL (ref 0.1–0.95)
MONOCYTES NFR BLD: 1 % (ref 2–12)
NEUTROPHILS NFR BLD: 87 % (ref 43–80)
NEUTS SEG NFR BLD: 3.57 K/UL (ref 1.8–7.3)
NUCLEATED RED BLOOD CELLS: 1 PER 100 WBC
PLATELET # BLD AUTO: 31 K/UL (ref 130–450)
PLATELET CONFIRMATION: NORMAL
PMV BLD AUTO: 10.7 FL (ref 7–12)
POTASSIUM SERPL-SCNC: 3.6 MMOL/L (ref 3.5–5)
PROT SERPL-MCNC: 4.8 G/DL (ref 6.4–8.3)
RBC # BLD AUTO: 2.67 M/UL (ref 3.8–5.8)
RBC # BLD: ABNORMAL 10*6/UL
SERVICE CMNT-IMP: NORMAL
SERVICE CMNT-IMP: NORMAL
SODIUM SERPL-SCNC: 139 MMOL/L (ref 132–146)
SPECIMEN DESCRIPTION: NORMAL
SPECIMEN DESCRIPTION: NORMAL
WBC # BLD: ABNORMAL 10*3/UL
WBC OTHER # BLD: 4.1 K/UL (ref 4.5–11.5)

## 2024-01-31 PROCEDURE — 6370000000 HC RX 637 (ALT 250 FOR IP): Performed by: INTERNAL MEDICINE

## 2024-01-31 PROCEDURE — 6360000002 HC RX W HCPCS: Performed by: INTERNAL MEDICINE

## 2024-01-31 PROCEDURE — 36415 COLL VENOUS BLD VENIPUNCTURE: CPT

## 2024-01-31 PROCEDURE — 6370000000 HC RX 637 (ALT 250 FOR IP): Performed by: STUDENT IN AN ORGANIZED HEALTH CARE EDUCATION/TRAINING PROGRAM

## 2024-01-31 PROCEDURE — 70450 CT HEAD/BRAIN W/O DYE: CPT

## 2024-01-31 PROCEDURE — 99233 SBSQ HOSP IP/OBS HIGH 50: CPT | Performed by: INTERNAL MEDICINE

## 2024-01-31 PROCEDURE — 2060000000 HC ICU INTERMEDIATE R&B

## 2024-01-31 PROCEDURE — 2580000003 HC RX 258: Performed by: STUDENT IN AN ORGANIZED HEALTH CARE EDUCATION/TRAINING PROGRAM

## 2024-01-31 PROCEDURE — 6370000000 HC RX 637 (ALT 250 FOR IP): Performed by: NURSE PRACTITIONER

## 2024-01-31 PROCEDURE — 2580000003 HC RX 258: Performed by: INTERNAL MEDICINE

## 2024-01-31 PROCEDURE — 85025 COMPLETE CBC W/AUTO DIFF WBC: CPT

## 2024-01-31 PROCEDURE — 80053 COMPREHEN METABOLIC PANEL: CPT

## 2024-01-31 RX ADMIN — SODIUM CHLORIDE: 9 INJECTION, SOLUTION INTRAVENOUS at 08:14

## 2024-01-31 RX ADMIN — PANTOPRAZOLE SODIUM 40 MG: 40 TABLET, DELAYED RELEASE ORAL at 06:15

## 2024-01-31 RX ADMIN — MIDODRINE HYDROCHLORIDE 10 MG: 5 TABLET ORAL at 08:11

## 2024-01-31 RX ADMIN — Medication 100 MG: at 08:11

## 2024-01-31 RX ADMIN — MIDODRINE HYDROCHLORIDE 10 MG: 5 TABLET ORAL at 16:28

## 2024-01-31 RX ADMIN — MIDODRINE HYDROCHLORIDE 10 MG: 5 TABLET ORAL at 11:46

## 2024-01-31 RX ADMIN — PENTOXIFYLLINE 400 MG: 400 TABLET, EXTENDED RELEASE ORAL at 16:28

## 2024-01-31 RX ADMIN — Medication 10 ML: at 08:11

## 2024-01-31 RX ADMIN — RIFAXIMIN 400 MG: 200 TABLET ORAL at 13:20

## 2024-01-31 RX ADMIN — RIFAXIMIN 400 MG: 200 TABLET ORAL at 20:23

## 2024-01-31 RX ADMIN — SODIUM CHLORIDE: 9 INJECTION, SOLUTION INTRAVENOUS at 18:00

## 2024-01-31 RX ADMIN — LACTULOSE 20 G: 20 SOLUTION ORAL at 20:24

## 2024-01-31 RX ADMIN — LACTULOSE 20 G: 20 SOLUTION ORAL at 08:11

## 2024-01-31 RX ADMIN — LORAZEPAM 1 MG: 2 INJECTION INTRAMUSCULAR; INTRAVENOUS at 20:28

## 2024-01-31 RX ADMIN — RIFAXIMIN 400 MG: 200 TABLET ORAL at 08:11

## 2024-01-31 RX ADMIN — Medication 10 ML: at 20:29

## 2024-01-31 RX ADMIN — PENTOXIFYLLINE 400 MG: 400 TABLET, EXTENDED RELEASE ORAL at 08:11

## 2024-01-31 RX ADMIN — PANTOPRAZOLE SODIUM 40 MG: 40 TABLET, DELAYED RELEASE ORAL at 14:31

## 2024-01-31 RX ADMIN — PENTOXIFYLLINE 400 MG: 400 TABLET, EXTENDED RELEASE ORAL at 11:46

## 2024-01-31 RX ADMIN — LORAZEPAM 1 MG: 2 INJECTION INTRAMUSCULAR; INTRAVENOUS at 00:35

## 2024-01-31 RX ADMIN — LORAZEPAM 1 MG: 2 INJECTION INTRAMUSCULAR; INTRAVENOUS at 14:31

## 2024-01-31 NOTE — ACP (ADVANCE CARE PLANNING)
Advance Care Planning   Healthcare Decision Maker:    Primary Decision Maker: Mikaela Russo - Parent - [phone]    Primary Decision Maker: BeauJohnathan Jr - Parent - [phone]    Secondary Decision Maker: Angeles Smallwood - Brother/Sister - [phone]    Supplemental (Other) Decision Maker: Peggy Rodriguez - Girlfriend - [phone]    Click here to complete Healthcare Decision Makers including selection of the Healthcare Decision Maker Relationship (ie \"Primary\").  Today we documented Decision Maker(s) consistent with Legal Next of Kin hierarchy.       Patient remains alert to self only today.     Spoke with sister, Angeles, and updated information provided.   He is not legally .  Parents are alive and competent to make decisions.    ACP information updated and POA/Living Will forms for Healthcare given to Angeles.

## 2024-01-31 NOTE — PLAN OF CARE
Problem: Discharge Planning  Goal: Discharge to home or other facility with appropriate resources  1/31/2024 0620 by Farnaz Lehman RN  Outcome: Progressing     Problem: Pain  Goal: Verbalizes/displays adequate comfort level or baseline comfort level  1/31/2024 0620 by Farnaz Lehman RN  Outcome: Progressing     Problem: Safety - Adult  Goal: Free from fall injury  1/31/2024 0620 by Farnaz Lehman, RN  Outcome: Progressing     Problem: ABCDS Injury Assessment  Goal: Absence of physical injury  1/31/2024 0620 by Farnaz Lehman RN  Outcome: Progressing     Problem: Nutrition Deficit:  Goal: Optimize nutritional status  1/31/2024 0620 by Farnaz Lehman RN  Outcome: Progressing     Problem: Skin/Tissue Integrity  Goal: Absence of new skin breakdown  Description: 1.  Monitor for areas of redness and/or skin breakdown  2.  Assess vascular access sites hourly  3.  Every 4-6 hours minimum:  Change oxygen saturation probe site  4.  Every 4-6 hours:  If on nasal continuous positive airway pressure, respiratory therapy assess nares and determine need for appliance change or resting period.  1/31/2024 0620 by Farnaz Lehman, RN  Outcome: Progressing

## 2024-01-31 NOTE — PLAN OF CARE
Problem: Discharge Planning  Goal: Discharge to home or other facility with appropriate resources  1/31/2024 1200 by Felisha Thompson RN  Outcome: Progressing     Problem: Pain  Goal: Verbalizes/displays adequate comfort level or baseline comfort level  1/31/2024 1200 by Felisha Thompson RN  Outcome: Progressing     Problem: Safety - Adult  Goal: Free from fall injury  1/31/2024 1200 by Felisha Thompson RN  Outcome: Progressing     Problem: ABCDS Injury Assessment  Goal: Absence of physical injury  1/31/2024 1200 by Felisha Thompson RN  Outcome: Progressing     Problem: Nutrition Deficit:  Goal: Optimize nutritional status  1/31/2024 1200 by Felisha Thompson RN  Outcome: Progressing     Problem: Skin/Tissue Integrity  Goal: Absence of new skin breakdown  Description: 1.  Monitor for areas of redness and/or skin breakdown  2.  Assess vascular access sites hourly  3.  Every 4-6 hours minimum:  Change oxygen saturation probe site  4.  Every 4-6 hours:  If on nasal continuous positive airway pressure, respiratory therapy assess nares and determine need for appliance change or resting period.  1/31/2024 1200 by Felisha Thompson RN  Outcome: Progressing

## 2024-01-31 NOTE — CARE COORDINATION
CASE MANAGEMENT.... Per nursing, patient remains confused and becomes agitated at times. Monitoring labs/scans/vitals closely. Had decreased urinary output. Aldana cath placed yesterday for retention. PT 16/OT 17. Discussed dc plans with sister, Angeles, at the bedside. If [facility], cannot accept Devin because he is not independent enough, then Naty is agreeable to inpatient physical/etoh/drug rehab. [facility] provides both. Referral called to [facility]. Patient is accepted. PRS continues to follow. Case discussed with Dr Navarrete. SS/CM will need to initiate 91225 before precert can be submitted. Need N 17 and transport forms. Will follow.

## 2024-01-31 NOTE — CARE COORDINATION
Peer Recovery Support Note    Name: Johnathan Yanez III  Date: 1/31/2024    Chief Complaint   Patient presents with    Fatigue     Fatigue/loss of appetite x1 week. Hx alcohol abuse- last drink yesterday.    Jaundice    Leg Swelling     Right leg pain.  2 masses under skin right calf       Peer Support met with patient.  [] Support and education provided  [] Resources provided   [] Treatment referral:   [] Other:   [] Patient declined peer recovery services     Referred By:     Notes: Peer went to see patient this morning. Sister was at bed side and patient not fully having a normal conversation with sister nor peer. Peer has seen patient 2 weeks prior during another admission here at [facility], then he was discharged home. Peer noticed change in patient behavior.   During conversations he becomes very agitated and not making sense with his answers. Peer did let CM aware of these concerns. Treatment facilities will not accept a patient that can not take care of themselves. He would need to be medically cleared by the doctor to be accepted. Peer will continue to follow.     Signed: Gauri Luque, 1/31/2024

## 2024-02-01 PROBLEM — R41.0 DELIRIUM: Status: ACTIVE | Noted: 2024-02-01

## 2024-02-01 LAB
ALBUMIN SERPL-MCNC: 2.4 G/DL (ref 3.5–5.2)
ALP SERPL-CCNC: 68 U/L (ref 40–129)
ALT SERPL-CCNC: 16 U/L (ref 0–40)
ANION GAP SERPL CALCULATED.3IONS-SCNC: 6 MMOL/L (ref 7–16)
AST SERPL-CCNC: 41 U/L (ref 0–39)
BASOPHILS # BLD: 0 K/UL (ref 0–0.2)
BASOPHILS NFR BLD: 0 % (ref 0–2)
BILIRUB SERPL-MCNC: 4.4 MG/DL (ref 0–1.2)
BILIRUB UR QL STRIP: ABNORMAL
BUN SERPL-MCNC: 4 MG/DL (ref 6–20)
CALCIUM SERPL-MCNC: 7.4 MG/DL (ref 8.6–10.2)
CHLORIDE SERPL-SCNC: 115 MMOL/L (ref 98–107)
CLARITY UR: CLEAR
CO2 SERPL-SCNC: 18 MMOL/L (ref 22–29)
COLOR UR: ABNORMAL
CREAT SERPL-MCNC: 0.9 MG/DL (ref 0.7–1.2)
EOSINOPHIL # BLD: 0.06 K/UL (ref 0.05–0.5)
EOSINOPHILS RELATIVE PERCENT: 2 % (ref 0–6)
EPI CELLS #/AREA URNS HPF: ABNORMAL /HPF
ERYTHROCYTE [DISTWIDTH] IN BLOOD BY AUTOMATED COUNT: 21.6 % (ref 11.5–15)
GFR SERPL CREATININE-BSD FRML MDRD: >60 ML/MIN/1.73M2
GLUCOSE SERPL-MCNC: 90 MG/DL (ref 74–99)
GLUCOSE UR STRIP-MCNC: NEGATIVE MG/DL
HCT VFR BLD AUTO: 23.4 % (ref 37–54)
HGB BLD-MCNC: 7.8 G/DL (ref 12.5–16.5)
HGB UR QL STRIP.AUTO: ABNORMAL
KETONES UR STRIP-MCNC: ABNORMAL MG/DL
LEUKOCYTE ESTERASE UR QL STRIP: ABNORMAL
LYMPHOCYTES NFR BLD: 0.36 K/UL (ref 1.5–4)
LYMPHOCYTES RELATIVE PERCENT: 10 % (ref 20–42)
MAGNESIUM SERPL-MCNC: 1.2 MG/DL (ref 1.6–2.6)
MCH RBC QN AUTO: 29.7 PG (ref 26–35)
MCHC RBC AUTO-ENTMCNC: 33.3 G/DL (ref 32–34.5)
MCV RBC AUTO: 89 FL (ref 80–99.9)
METAMYELOCYTES ABSOLUTE COUNT: 0.06 K/UL (ref 0–0.12)
METAMYELOCYTES: 2 % (ref 0–1)
MONOCYTES NFR BLD: 0.1 K/UL (ref 0.1–0.95)
MONOCYTES NFR BLD: 3 % (ref 2–12)
MYELOCYTES ABSOLUTE COUNT: 0.06 K/UL
MYELOCYTES: 2 %
NEUTROPHILS NFR BLD: 81 % (ref 43–80)
NEUTS SEG NFR BLD: 2.99 K/UL (ref 1.8–7.3)
NITRITE UR QL STRIP: POSITIVE
PH UR STRIP: 6 [PH] (ref 5–9)
PLATELET CONFIRMATION: NORMAL
PLATELET, FLUORESCENCE: 24 K/UL (ref 130–450)
PMV BLD AUTO: 12.1 FL (ref 7–12)
POTASSIUM SERPL-SCNC: 3.2 MMOL/L (ref 3.5–5)
PROMYELOCYTES ABSOLUTE COUNT: 0.06 K/UL
PROMYELOCYTES: 2 %
PROT SERPL-MCNC: 4.6 G/DL (ref 6.4–8.3)
PROT UR STRIP-MCNC: 30 MG/DL
RBC # BLD AUTO: 2.63 M/UL (ref 3.8–5.8)
RBC # BLD: ABNORMAL 10*6/UL
RBC #/AREA URNS HPF: ABNORMAL /HPF
SODIUM SERPL-SCNC: 139 MMOL/L (ref 132–146)
SP GR UR STRIP: 1.02 (ref 1–1.03)
UROBILINOGEN UR STRIP-ACNC: 1 EU/DL (ref 0–1)
WBC #/AREA URNS HPF: ABNORMAL /HPF
WBC OTHER # BLD: 3.7 K/UL (ref 4.5–11.5)

## 2024-02-01 PROCEDURE — 6370000000 HC RX 637 (ALT 250 FOR IP): Performed by: INTERNAL MEDICINE

## 2024-02-01 PROCEDURE — 2580000003 HC RX 258: Performed by: INTERNAL MEDICINE

## 2024-02-01 PROCEDURE — 1200000000 HC SEMI PRIVATE

## 2024-02-01 PROCEDURE — 6360000002 HC RX W HCPCS: Performed by: INTERNAL MEDICINE

## 2024-02-01 PROCEDURE — 6370000000 HC RX 637 (ALT 250 FOR IP): Performed by: NURSE PRACTITIONER

## 2024-02-01 PROCEDURE — 36415 COLL VENOUS BLD VENIPUNCTURE: CPT

## 2024-02-01 PROCEDURE — 6370000000 HC RX 637 (ALT 250 FOR IP)

## 2024-02-01 PROCEDURE — 83735 ASSAY OF MAGNESIUM: CPT

## 2024-02-01 PROCEDURE — 80053 COMPREHEN METABOLIC PANEL: CPT

## 2024-02-01 PROCEDURE — 97535 SELF CARE MNGMENT TRAINING: CPT

## 2024-02-01 PROCEDURE — 97530 THERAPEUTIC ACTIVITIES: CPT

## 2024-02-01 PROCEDURE — 81001 URINALYSIS AUTO W/SCOPE: CPT

## 2024-02-01 PROCEDURE — 6360000002 HC RX W HCPCS: Performed by: NURSE PRACTITIONER

## 2024-02-01 PROCEDURE — 6360000002 HC RX W HCPCS: Performed by: STUDENT IN AN ORGANIZED HEALTH CARE EDUCATION/TRAINING PROGRAM

## 2024-02-01 PROCEDURE — 85025 COMPLETE CBC W/AUTO DIFF WBC: CPT

## 2024-02-01 PROCEDURE — 99232 SBSQ HOSP IP/OBS MODERATE 35: CPT | Performed by: STUDENT IN AN ORGANIZED HEALTH CARE EDUCATION/TRAINING PROGRAM

## 2024-02-01 PROCEDURE — 6370000000 HC RX 637 (ALT 250 FOR IP): Performed by: STUDENT IN AN ORGANIZED HEALTH CARE EDUCATION/TRAINING PROGRAM

## 2024-02-01 RX ORDER — LORAZEPAM 2 MG/ML
1 INJECTION INTRAMUSCULAR ONCE
Status: COMPLETED | OUTPATIENT
Start: 2024-02-01 | End: 2024-02-01

## 2024-02-01 RX ORDER — BUSPIRONE HYDROCHLORIDE 5 MG/1
5 TABLET ORAL 2 TIMES DAILY
Status: DISCONTINUED | OUTPATIENT
Start: 2024-02-01 | End: 2024-02-03

## 2024-02-01 RX ORDER — MORPHINE SULFATE 2 MG/ML
2 INJECTION, SOLUTION INTRAMUSCULAR; INTRAVENOUS ONCE
Status: COMPLETED | OUTPATIENT
Start: 2024-02-01 | End: 2024-02-01

## 2024-02-01 RX ORDER — LORAZEPAM 0.5 MG/1
0.5 TABLET ORAL EVERY 6 HOURS PRN
Status: DISCONTINUED | OUTPATIENT
Start: 2024-02-01 | End: 2024-02-03

## 2024-02-01 RX ORDER — MAGNESIUM SULFATE IN WATER 40 MG/ML
2000 INJECTION, SOLUTION INTRAVENOUS
Status: COMPLETED | OUTPATIENT
Start: 2024-02-01 | End: 2024-02-01

## 2024-02-01 RX ADMIN — MAGNESIUM SULFATE HEPTAHYDRATE 2000 MG: 40 INJECTION, SOLUTION INTRAVENOUS at 18:10

## 2024-02-01 RX ADMIN — PENTOXIFYLLINE 400 MG: 400 TABLET, EXTENDED RELEASE ORAL at 18:11

## 2024-02-01 RX ADMIN — RIFAXIMIN 400 MG: 200 TABLET ORAL at 09:22

## 2024-02-01 RX ADMIN — LORAZEPAM 1 MG: 2 INJECTION INTRAMUSCULAR; INTRAVENOUS at 14:23

## 2024-02-01 RX ADMIN — BUSPIRONE HYDROCHLORIDE 5 MG: 5 TABLET ORAL at 20:26

## 2024-02-01 RX ADMIN — ONDANSETRON 4 MG: 2 INJECTION INTRAMUSCULAR; INTRAVENOUS at 18:06

## 2024-02-01 RX ADMIN — SODIUM CHLORIDE: 9 INJECTION, SOLUTION INTRAVENOUS at 02:56

## 2024-02-01 RX ADMIN — MIDODRINE HYDROCHLORIDE 10 MG: 5 TABLET ORAL at 09:22

## 2024-02-01 RX ADMIN — LORAZEPAM 1 MG: 2 INJECTION INTRAMUSCULAR; INTRAVENOUS at 00:43

## 2024-02-01 RX ADMIN — Medication 100 MG: at 09:22

## 2024-02-01 RX ADMIN — PENTOXIFYLLINE 400 MG: 400 TABLET, EXTENDED RELEASE ORAL at 14:24

## 2024-02-01 RX ADMIN — MAGNESIUM SULFATE HEPTAHYDRATE 2000 MG: 40 INJECTION, SOLUTION INTRAVENOUS at 14:29

## 2024-02-01 RX ADMIN — MIDODRINE HYDROCHLORIDE 10 MG: 5 TABLET ORAL at 14:24

## 2024-02-01 RX ADMIN — MORPHINE SULFATE 2 MG: 2 INJECTION, SOLUTION INTRAMUSCULAR; INTRAVENOUS at 00:43

## 2024-02-01 RX ADMIN — LORAZEPAM 0.5 MG: 0.5 TABLET ORAL at 20:26

## 2024-02-01 RX ADMIN — PANTOPRAZOLE SODIUM 40 MG: 40 TABLET, DELAYED RELEASE ORAL at 18:11

## 2024-02-01 RX ADMIN — PANTOPRAZOLE SODIUM 40 MG: 40 TABLET, DELAYED RELEASE ORAL at 06:41

## 2024-02-01 RX ADMIN — RIFAXIMIN 400 MG: 200 TABLET ORAL at 14:24

## 2024-02-01 RX ADMIN — RIFAXIMIN 400 MG: 200 TABLET ORAL at 20:26

## 2024-02-01 RX ADMIN — PENTOXIFYLLINE 400 MG: 400 TABLET, EXTENDED RELEASE ORAL at 09:22

## 2024-02-01 RX ADMIN — MIDODRINE HYDROCHLORIDE 10 MG: 5 TABLET ORAL at 18:11

## 2024-02-01 RX ADMIN — SODIUM CHLORIDE: 9 INJECTION, SOLUTION INTRAVENOUS at 20:30

## 2024-02-01 RX ADMIN — POTASSIUM CHLORIDE 40 MEQ: 1500 TABLET, EXTENDED RELEASE ORAL at 09:26

## 2024-02-01 RX ADMIN — SODIUM CHLORIDE: 9 INJECTION, SOLUTION INTRAVENOUS at 09:33

## 2024-02-01 RX ADMIN — LORAZEPAM 1 MG: 2 INJECTION INTRAMUSCULAR; INTRAVENOUS at 02:48

## 2024-02-01 NOTE — PLAN OF CARE
Problem: Discharge Planning  Goal: Discharge to home or other facility with appropriate resources  2/1/2024 1153 by Vicky Silva RN  Outcome: Progressing  2/1/2024 0314 by Janet Magaña  Outcome: Progressing     Problem: Pain  Goal: Verbalizes/displays adequate comfort level or baseline comfort level  2/1/2024 1153 by Vicky Silva RN  Outcome: Progressing  2/1/2024 0314 by Janet Magaña  Outcome: Progressing     Problem: Safety - Adult  Goal: Free from fall injury  2/1/2024 1153 by Vicky Silva RN  Outcome: Progressing  2/1/2024 0314 by Janet Magaña  Outcome: Progressing     Problem: ABCDS Injury Assessment  Goal: Absence of physical injury  2/1/2024 1153 by Vicky Silva RN  Outcome: Progressing  2/1/2024 0314 by Janet Magaña  Outcome: Progressing     Problem: Nutrition Deficit:  Goal: Optimize nutritional status  2/1/2024 1153 by Vicky Silva RN  Outcome: Progressing  2/1/2024 0314 by Janet Magaña  Outcome: Progressing     Problem: Skin/Tissue Integrity  Goal: Absence of new skin breakdown  Description: 1.  Monitor for areas of redness and/or skin breakdown  2.  Assess vascular access sites hourly  3.  Every 4-6 hours minimum:  Change oxygen saturation probe site  4.  Every 4-6 hours:  If on nasal continuous positive airway pressure, respiratory therapy assess nares and determine need for appliance change or resting period.  2/1/2024 1153 by Vicky Silva RN  Outcome: Progressing  2/1/2024 0314 by Janet Magaña  Outcome: Progressing

## 2024-02-01 NOTE — PATIENT CARE CONFERENCE
[facility] Quality Flow/Interdisciplinary Rounds Progress Note        Quality Flow Rounds held on February 1, 2024    Disciplines Attending:  Bedside Nurse, , , and Nursing Unit Leadership    Johnathan Yanez III was admitted on 1/25/2024  4:08 PM    Anticipated Discharge Date:       Disposition:    Dennis Score:  Dennis Scale Score: 18    Readmission Risk              Risk of Unplanned Readmission:  33           Discussed patient goal for the day, patient clinical progression, and barriers to discharge.  The following Goal(s) of the Day/Commitment(s) have been identified:  Diagnostics - Report Results      Antionette Thomas RN  February 1, 2024         Administrations This Visit     ALLERGEN EXTRACT 0.3 mL     Admin Date  09/09/2021  15:10 Action  Given Dose  0.3 mL Route  SubCUTAneous Site  Arm Left Administered By  Jimbo Garrett MA    Ordering Provider: Nunzio Homans, APRN    Patient Supplied?: Yes              Patient tolerated injection well. Patient advised to wait 20 minutes in the office following the injection. No signs/symptoms of reaction noted after 20 minutes.

## 2024-02-01 NOTE — DISCHARGE INSTR - COC
Continuity of Care Form    Patient Name: Johnathan Yanez III   :  1980  MRN:  [id number]    Admit date:  2024  Discharge date:  02/10/2024    Code Status Order: Full Code   Advance Directives:     Admitting Physician:  Maldonado Ornelas MD  PCP: No primary care provider on file.    Discharging Nurse: MARIBEL  Discharging Hospital Unit/Room#: 0516/0516-A  Discharging Unit Phone Number: [phone]    Emergency Contact:   Extended Emergency Contact Information  Primary Emergency Contact: Peggy Rodriguez  Home Phone: [phone]  Relation: Girlfriend   needed? No  Secondary Emergency Contact: Mikaela Russo  Mobile Phone: [phone]  Relation: Parent    Past Surgical History:  Past Surgical History:   Procedure Laterality Date    ENDOSCOPY, COLON, DIAGNOSTIC      HERNIA REPAIR      HERNIA REPAIR Left 2022    LAPAROSCOPIC LEFT INGUINAL HERNIA REPAIR WITH MESH POSSIBLE OPEN POSSIBLE BILATERAL performed by Shashank Candelario MD at [facility] OR    TONSILLECTOMY      UMBILICAL HERNIA REPAIR N/A 9/15/2021    LAPAROSCOPIC POSSIBLE OPEN UMBILICAL HERNIA REPAIR WITH MSH performed by Shashank Candelario MD at [facility] OR    UPPER GASTROINTESTINAL ENDOSCOPY N/A 2020    EGD BIOPSY [place]o [place] [facility] [place]    UPPER GASTROINTESTINAL ENDOSCOPY N/A 2021    EGD BAND LIGATION performed by JOIE Huizar MD at [facility] ENDOSCOPY    UPPER GASTROINTESTINAL ENDOSCOPY N/A 5/10/2021    EGD BAND LIGATION performed by JOIE Huizar MD at [facility] OR    UPPER GASTROINTESTINAL ENDOSCOPY N/A 2023    EGD CONTROL HEMORRHAGE performed by Atif Johnson MD at [facility] ENDOSCOPY    UPPER GASTROINTESTINAL ENDOSCOPY  2023    EGD ESOPHAGOGASTRODUODENOSCOPY SCLEROTHERAPY performed by Atif Johnson MD at [facility] ENDOSCOPY    UPPER GASTROINTESTINAL ENDOSCOPY N/A 6/3/2023    EGD CONTROL HEMORRHAGE performed by JOIE Huizar MD at [facility] ENDOSCOPY    UPPER GASTROINTESTINAL ENDOSCOPY N/A 2024    EGD CONTROL HEMORRHAGE

## 2024-02-01 NOTE — PLAN OF CARE
Problem: Discharge Planning  Goal: Discharge to home or other facility with appropriate resources  Outcome: Progressing     Problem: Pain  Goal: Verbalizes/displays adequate comfort level or baseline comfort level  Outcome: Progressing     Problem: Safety - Adult  Goal: Free from fall injury  Outcome: Progressing     Problem: ABCDS Injury Assessment  Goal: Absence of physical injury  Outcome: Progressing     Problem: Nutrition Deficit:  Goal: Optimize nutritional status  2/1/2024 0314 by Janet Magaña  Outcome: Progressing  1/31/2024 1343 by Faith Graham RD, CNSC, LD  Flowsheets (Taken 1/31/2024 1343)  Nutrient intake appropriate for improving, restoring, or maintaining nutritional needs:   Assess nutritional status and recommend course of action   Monitor oral intake, labs, and treatment plans   Recommend appropriate diets, oral nutritional supplements, and vitamin/mineral supplements     Problem: Skin/Tissue Integrity  Goal: Absence of new skin breakdown  Description: 1.  Monitor for areas of redness and/or skin breakdown  2.  Assess vascular access sites hourly  3.  Every 4-6 hours minimum:  Change oxygen saturation probe site  4.  Every 4-6 hours:  If on nasal continuous positive airway pressure, respiratory therapy assess nares and determine need for appliance change or resting period.  Outcome: Progressing

## 2024-02-01 NOTE — CARE COORDINATION
Updated plan of care. Patient is typically from home with significant other independently. Patient remains confused but is agreeable to go to rehab after discharge. [facility] unable to accept due to not being independent. Referral was made to [facility] for inpatient physical/etoh/drug rehab. [facility] accepted patient, precert to be started today. Await auth. SHABANA, demos, transport forms, [id number] completed and in soft chart. Await plan from primary.   Electronically signed by Farnaz Cerda RN on 2/1/2024 at 11:08 AM

## 2024-02-02 LAB
ALBUMIN SERPL-MCNC: 2.3 G/DL (ref 3.5–5.2)
ALBUMIN SERPL-MCNC: 2.7 G/DL (ref 3.5–5.2)
ALP SERPL-CCNC: 81 U/L (ref 40–129)
ALP SERPL-CCNC: 85 U/L (ref 40–129)
ALT SERPL-CCNC: 16 U/L (ref 0–40)
ALT SERPL-CCNC: 17 U/L (ref 0–40)
ANION GAP SERPL CALCULATED.3IONS-SCNC: 8 MMOL/L (ref 7–16)
ANION GAP SERPL CALCULATED.3IONS-SCNC: 9 MMOL/L (ref 7–16)
AST SERPL-CCNC: 46 U/L (ref 0–39)
AST SERPL-CCNC: 46 U/L (ref 0–39)
BASOPHILS # BLD: 0 K/UL (ref 0–0.2)
BASOPHILS NFR BLD: 0 % (ref 0–2)
BILIRUB SERPL-MCNC: 4.5 MG/DL (ref 0–1.2)
BILIRUB SERPL-MCNC: 4.9 MG/DL (ref 0–1.2)
BUN SERPL-MCNC: 3 MG/DL (ref 6–20)
BUN SERPL-MCNC: 4 MG/DL (ref 6–20)
CALCIUM SERPL-MCNC: 6.9 MG/DL (ref 8.6–10.2)
CALCIUM SERPL-MCNC: 7.2 MG/DL (ref 8.6–10.2)
CHLORIDE SERPL-SCNC: 114 MMOL/L (ref 98–107)
CHLORIDE SERPL-SCNC: 116 MMOL/L (ref 98–107)
CO2 SERPL-SCNC: 15 MMOL/L (ref 22–29)
CO2 SERPL-SCNC: 17 MMOL/L (ref 22–29)
CREAT SERPL-MCNC: 0.8 MG/DL (ref 0.7–1.2)
CREAT SERPL-MCNC: 0.9 MG/DL (ref 0.7–1.2)
EOSINOPHIL # BLD: 0.2 K/UL (ref 0.05–0.5)
EOSINOPHILS RELATIVE PERCENT: 3 % (ref 0–6)
ERYTHROCYTE [DISTWIDTH] IN BLOOD BY AUTOMATED COUNT: 22.1 % (ref 11.5–15)
GFR SERPL CREATININE-BSD FRML MDRD: >60 ML/MIN/1.73M2
GFR SERPL CREATININE-BSD FRML MDRD: >60 ML/MIN/1.73M2
GLUCOSE SERPL-MCNC: 118 MG/DL (ref 74–99)
GLUCOSE SERPL-MCNC: 134 MG/DL (ref 74–99)
HCT VFR BLD AUTO: 23.1 % (ref 37–54)
HGB BLD-MCNC: 7.8 G/DL (ref 12.5–16.5)
LYMPHOCYTES NFR BLD: 0.34 K/UL (ref 1.5–4)
LYMPHOCYTES RELATIVE PERCENT: 4 % (ref 20–42)
MAGNESIUM SERPL-MCNC: 1.7 MG/DL (ref 1.6–2.6)
MAGNESIUM SERPL-MCNC: 1.9 MG/DL (ref 1.6–2.6)
MCH RBC QN AUTO: 29.5 PG (ref 26–35)
MCHC RBC AUTO-ENTMCNC: 33.8 G/DL (ref 32–34.5)
MCV RBC AUTO: 87.5 FL (ref 80–99.9)
METAMYELOCYTES ABSOLUTE COUNT: 0.2 K/UL (ref 0–0.12)
METAMYELOCYTES: 3 % (ref 0–1)
MONOCYTES NFR BLD: 0.41 K/UL (ref 0.1–0.95)
MONOCYTES NFR BLD: 5 % (ref 2–12)
MYELOCYTES ABSOLUTE COUNT: 0.41 K/UL
MYELOCYTES: 5 %
NEUTROPHILS NFR BLD: 80 % (ref 43–80)
NEUTS SEG NFR BLD: 6.24 K/UL (ref 1.8–7.3)
PHOSPHATE SERPL-MCNC: 1.3 MG/DL (ref 2.5–4.5)
PHOSPHATE SERPL-MCNC: 1.6 MG/DL (ref 2.5–4.5)
PLATELET # BLD AUTO: 50 K/UL (ref 130–450)
PLATELET CONFIRMATION: NORMAL
PMV BLD AUTO: 11.4 FL (ref 7–12)
POTASSIUM SERPL-SCNC: 3.4 MMOL/L (ref 3.5–5)
POTASSIUM SERPL-SCNC: 3.5 MMOL/L (ref 3.5–5)
PROT SERPL-MCNC: 4.5 G/DL (ref 6.4–8.3)
PROT SERPL-MCNC: 5 G/DL (ref 6.4–8.3)
RBC # BLD AUTO: 2.64 M/UL (ref 3.8–5.8)
RBC # BLD: ABNORMAL 10*6/UL
SODIUM SERPL-SCNC: 139 MMOL/L (ref 132–146)
SODIUM SERPL-SCNC: 140 MMOL/L (ref 132–146)
WBC OTHER # BLD: 7.8 K/UL (ref 4.5–11.5)

## 2024-02-02 PROCEDURE — 99232 SBSQ HOSP IP/OBS MODERATE 35: CPT | Performed by: STUDENT IN AN ORGANIZED HEALTH CARE EDUCATION/TRAINING PROGRAM

## 2024-02-02 PROCEDURE — 6370000000 HC RX 637 (ALT 250 FOR IP): Performed by: STUDENT IN AN ORGANIZED HEALTH CARE EDUCATION/TRAINING PROGRAM

## 2024-02-02 PROCEDURE — 6370000000 HC RX 637 (ALT 250 FOR IP): Performed by: NURSE PRACTITIONER

## 2024-02-02 PROCEDURE — 2580000003 HC RX 258: Performed by: STUDENT IN AN ORGANIZED HEALTH CARE EDUCATION/TRAINING PROGRAM

## 2024-02-02 PROCEDURE — 6360000002 HC RX W HCPCS: Performed by: STUDENT IN AN ORGANIZED HEALTH CARE EDUCATION/TRAINING PROGRAM

## 2024-02-02 PROCEDURE — 84100 ASSAY OF PHOSPHORUS: CPT

## 2024-02-02 PROCEDURE — 6370000000 HC RX 637 (ALT 250 FOR IP): Performed by: INTERNAL MEDICINE

## 2024-02-02 PROCEDURE — 6370000000 HC RX 637 (ALT 250 FOR IP): Performed by: HOSPITALIST

## 2024-02-02 PROCEDURE — 2500000003 HC RX 250 WO HCPCS: Performed by: STUDENT IN AN ORGANIZED HEALTH CARE EDUCATION/TRAINING PROGRAM

## 2024-02-02 PROCEDURE — 83735 ASSAY OF MAGNESIUM: CPT

## 2024-02-02 PROCEDURE — 2580000003 HC RX 258: Performed by: INTERNAL MEDICINE

## 2024-02-02 PROCEDURE — 85025 COMPLETE CBC W/AUTO DIFF WBC: CPT

## 2024-02-02 PROCEDURE — 6370000000 HC RX 637 (ALT 250 FOR IP)

## 2024-02-02 PROCEDURE — 80053 COMPREHEN METABOLIC PANEL: CPT

## 2024-02-02 PROCEDURE — 1200000000 HC SEMI PRIVATE

## 2024-02-02 RX ORDER — LORAZEPAM 0.5 MG/1
0.5 TABLET ORAL EVERY 6 HOURS PRN
Status: SHIPPED | DISCHARGE
Start: 2024-02-02 | End: 2024-02-03 | Stop reason: HOSPADM

## 2024-02-02 RX ORDER — TRAMADOL HYDROCHLORIDE 50 MG/1
50 TABLET ORAL EVERY 6 HOURS PRN
Status: DISCONTINUED | OUTPATIENT
Start: 2024-02-02 | End: 2024-02-11 | Stop reason: HOSPADM

## 2024-02-02 RX ORDER — MAGNESIUM SULFATE 1 G/100ML
1000 INJECTION INTRAVENOUS ONCE
Status: COMPLETED | OUTPATIENT
Start: 2024-02-02 | End: 2024-02-02

## 2024-02-02 RX ORDER — BUSPIRONE HYDROCHLORIDE 5 MG/1
5 TABLET ORAL 2 TIMES DAILY
DISCHARGE
Start: 2024-02-02 | End: 2024-02-03 | Stop reason: HOSPADM

## 2024-02-02 RX ADMIN — MIDODRINE HYDROCHLORIDE 10 MG: 5 TABLET ORAL at 15:07

## 2024-02-02 RX ADMIN — LORAZEPAM 0.5 MG: 0.5 TABLET ORAL at 20:57

## 2024-02-02 RX ADMIN — TRAMADOL HYDROCHLORIDE 50 MG: 50 TABLET, COATED ORAL at 22:32

## 2024-02-02 RX ADMIN — PENTOXIFYLLINE 400 MG: 400 TABLET, EXTENDED RELEASE ORAL at 15:07

## 2024-02-02 RX ADMIN — TRAMADOL HYDROCHLORIDE 50 MG: 50 TABLET, COATED ORAL at 16:59

## 2024-02-02 RX ADMIN — POTASSIUM PHOSPHATE 30 MMOL: 236; 224 INJECTION, SOLUTION INTRAVENOUS at 11:44

## 2024-02-02 RX ADMIN — POTASSIUM CHLORIDE 40 MEQ: 1500 TABLET, EXTENDED RELEASE ORAL at 05:58

## 2024-02-02 RX ADMIN — MIDODRINE HYDROCHLORIDE 10 MG: 5 TABLET ORAL at 11:41

## 2024-02-02 RX ADMIN — LORAZEPAM 0.5 MG: 0.5 TABLET ORAL at 08:38

## 2024-02-02 RX ADMIN — SODIUM CHLORIDE: 9 INJECTION, SOLUTION INTRAVENOUS at 11:41

## 2024-02-02 RX ADMIN — LORAZEPAM 0.5 MG: 0.5 TABLET ORAL at 15:07

## 2024-02-02 RX ADMIN — PANTOPRAZOLE SODIUM 40 MG: 40 TABLET, DELAYED RELEASE ORAL at 15:07

## 2024-02-02 RX ADMIN — PENTOXIFYLLINE 400 MG: 400 TABLET, EXTENDED RELEASE ORAL at 11:41

## 2024-02-02 RX ADMIN — Medication 100 MG: at 08:38

## 2024-02-02 RX ADMIN — MAGNESIUM SULFATE HEPTAHYDRATE 1000 MG: 1 INJECTION, SOLUTION INTRAVENOUS at 08:38

## 2024-02-02 RX ADMIN — BUSPIRONE HYDROCHLORIDE 5 MG: 5 TABLET ORAL at 08:38

## 2024-02-02 RX ADMIN — RIFAXIMIN 400 MG: 200 TABLET ORAL at 08:38

## 2024-02-02 RX ADMIN — MIDODRINE HYDROCHLORIDE 10 MG: 5 TABLET ORAL at 08:38

## 2024-02-02 RX ADMIN — ONDANSETRON 4 MG: 2 INJECTION INTRAMUSCULAR; INTRAVENOUS at 00:14

## 2024-02-02 RX ADMIN — PANTOPRAZOLE SODIUM 40 MG: 40 TABLET, DELAYED RELEASE ORAL at 05:58

## 2024-02-02 RX ADMIN — BUSPIRONE HYDROCHLORIDE 5 MG: 5 TABLET ORAL at 20:56

## 2024-02-02 RX ADMIN — PENTOXIFYLLINE 400 MG: 400 TABLET, EXTENDED RELEASE ORAL at 08:38

## 2024-02-02 RX ADMIN — LORAZEPAM 0.5 MG: 0.5 TABLET ORAL at 02:31

## 2024-02-02 RX ADMIN — RIFAXIMIN 400 MG: 200 TABLET ORAL at 13:13

## 2024-02-02 RX ADMIN — RIFAXIMIN 400 MG: 200 TABLET ORAL at 20:56

## 2024-02-02 NOTE — PATIENT CARE CONFERENCE
[facility] Quality Flow/Interdisciplinary Rounds Progress Note        Quality Flow Rounds held on February 2, 2024    Disciplines Attending:  Bedside Nurse, , , and Nursing Unit Leadership    Johnathan Yanez III was admitted on 1/25/2024  4:08 PM    Anticipated Discharge Date:       Disposition:    Dennis Score:  Dennis Scale Score: 18    Readmission Risk              Risk of Unplanned Readmission:  36           Discussed patient goal for the day, patient clinical progression, and barriers to discharge.  The following Goal(s) of the Day/Commitment(s) have been identified:  Labs - Report Results      Charlene White RN  February 2, 2024

## 2024-02-02 NOTE — CARE COORDINATION
Updated plan of care. Patient is typically from home with significant other independently. Patient discharge plan is to go to [facility]. Precert started yesterday 2/1. rex DONALD, transport form, [id number] completed and in soft chart. Await auth.   Electronically signed by Farnaz Cerda RN on 2/2/2024 at 10:02 AM

## 2024-02-02 NOTE — PLAN OF CARE
Problem: Discharge Planning  Goal: Discharge to home or other facility with appropriate resources  2/2/2024 0019 by Janet Magaña  Outcome: Progressing  2/1/2024 1153 by Vicky Silva RN  Outcome: Progressing     Problem: Pain  Goal: Verbalizes/displays adequate comfort level or baseline comfort level  2/2/2024 0019 by Janet Magaña  Outcome: Progressing  2/1/2024 1153 by Vicky Silva RN  Outcome: Progressing     Problem: Safety - Adult  Goal: Free from fall injury  2/2/2024 0019 by Janet Magaña  Outcome: Progressing  2/1/2024 1153 by Vicky Silva RN  Outcome: Progressing     Problem: ABCDS Injury Assessment  Goal: Absence of physical injury  2/2/2024 0019 by Janet Magaña  Outcome: Progressing  2/1/2024 1153 by Vicky Silva RN  Outcome: Progressing     Problem: Nutrition Deficit:  Goal: Optimize nutritional status  2/2/2024 0019 by Janet Magaña  Outcome: Progressing  2/1/2024 1153 by Vicky Silva RN  Outcome: Progressing     Problem: Skin/Tissue Integrity  Goal: Absence of new skin breakdown  Description: 1.  Monitor for areas of redness and/or skin breakdown  2.  Assess vascular access sites hourly  3.  Every 4-6 hours minimum:  Change oxygen saturation probe site  4.  Every 4-6 hours:  If on nasal continuous positive airway pressure, respiratory therapy assess nares and determine need for appliance change or resting period.  2/2/2024 0019 by Janet Magaña  Outcome: Progressing  2/1/2024 1153 by Vicky Silva RN  Outcome: Progressing

## 2024-02-03 PROBLEM — E87.8 REFEEDING SYNDROME: Status: ACTIVE | Noted: 2024-02-03

## 2024-02-03 LAB
ALBUMIN SERPL-MCNC: 2.8 G/DL (ref 3.5–5.2)
ALBUMIN SERPL-MCNC: 2.9 G/DL (ref 3.5–5.2)
ALP SERPL-CCNC: 89 U/L (ref 40–129)
ALP SERPL-CCNC: 93 U/L (ref 40–129)
ALT SERPL-CCNC: 19 U/L (ref 0–40)
ALT SERPL-CCNC: 20 U/L (ref 0–40)
AMMONIA PLAS-SCNC: 17 UMOL/L (ref 16–60)
ANION GAP SERPL CALCULATED.3IONS-SCNC: 9 MMOL/L (ref 7–16)
ANION GAP SERPL CALCULATED.3IONS-SCNC: 9 MMOL/L (ref 7–16)
AST SERPL-CCNC: 52 U/L (ref 0–39)
AST SERPL-CCNC: 55 U/L (ref 0–39)
BASOPHILS # BLD: 0 K/UL (ref 0–0.2)
BASOPHILS NFR BLD: 0 % (ref 0–2)
BILIRUB SERPL-MCNC: 5.8 MG/DL (ref 0–1.2)
BILIRUB SERPL-MCNC: 6.4 MG/DL (ref 0–1.2)
BUN SERPL-MCNC: 2 MG/DL (ref 6–20)
BUN SERPL-MCNC: 3 MG/DL (ref 6–20)
CA-I BLD-SCNC: 1.13 MMOL/L (ref 1.15–1.33)
CALCIUM SERPL-MCNC: 7.7 MG/DL (ref 8.6–10.2)
CALCIUM SERPL-MCNC: 7.8 MG/DL (ref 8.6–10.2)
CHLORIDE SERPL-SCNC: 112 MMOL/L (ref 98–107)
CHLORIDE SERPL-SCNC: 112 MMOL/L (ref 98–107)
CO2 SERPL-SCNC: 16 MMOL/L (ref 22–29)
CO2 SERPL-SCNC: 17 MMOL/L (ref 22–29)
CREAT SERPL-MCNC: 0.8 MG/DL (ref 0.7–1.2)
CREAT SERPL-MCNC: 0.8 MG/DL (ref 0.7–1.2)
EOSINOPHIL # BLD: 0.14 K/UL (ref 0.05–0.5)
EOSINOPHILS RELATIVE PERCENT: 2 % (ref 0–6)
ERYTHROCYTE [DISTWIDTH] IN BLOOD BY AUTOMATED COUNT: 23.3 % (ref 11.5–15)
GFR SERPL CREATININE-BSD FRML MDRD: >60 ML/MIN/1.73M2
GFR SERPL CREATININE-BSD FRML MDRD: >60 ML/MIN/1.73M2
GLUCOSE SERPL-MCNC: 106 MG/DL (ref 74–99)
GLUCOSE SERPL-MCNC: 115 MG/DL (ref 74–99)
HCT VFR BLD AUTO: 25.9 % (ref 37–54)
HGB BLD-MCNC: 8.5 G/DL (ref 12.5–16.5)
LYMPHOCYTES NFR BLD: 0.42 K/UL (ref 1.5–4)
LYMPHOCYTES RELATIVE PERCENT: 5 % (ref 20–42)
MAGNESIUM SERPL-MCNC: 1.7 MG/DL (ref 1.6–2.6)
MAGNESIUM SERPL-MCNC: 2 MG/DL (ref 1.6–2.6)
MAGNESIUM SERPL-MCNC: 2 MG/DL (ref 1.6–2.6)
MCH RBC QN AUTO: 29.9 PG (ref 26–35)
MCHC RBC AUTO-ENTMCNC: 32.8 G/DL (ref 32–34.5)
MCV RBC AUTO: 91.2 FL (ref 80–99.9)
MONOCYTES NFR BLD: 0.49 K/UL (ref 0.1–0.95)
MONOCYTES NFR BLD: 6 % (ref 2–12)
NEUTROPHILS NFR BLD: 87 % (ref 43–80)
NEUTS SEG NFR BLD: 6.96 K/UL (ref 1.8–7.3)
PHOSPHATE SERPL-MCNC: 1.5 MG/DL (ref 2.5–4.5)
PHOSPHATE SERPL-MCNC: 2.2 MG/DL (ref 2.5–4.5)
PLATELET CONFIRMATION: NORMAL
PLATELET, FLUORESCENCE: 41 K/UL (ref 130–450)
PMV BLD AUTO: 11.1 FL (ref 7–12)
POTASSIUM SERPL-SCNC: 3.4 MMOL/L (ref 3.5–5)
POTASSIUM SERPL-SCNC: 3.5 MMOL/L (ref 3.5–5)
PROT SERPL-MCNC: 5.3 G/DL (ref 6.4–8.3)
PROT SERPL-MCNC: 5.4 G/DL (ref 6.4–8.3)
RBC # BLD AUTO: 2.84 M/UL (ref 3.8–5.8)
RBC # BLD: ABNORMAL 10*6/UL
SODIUM SERPL-SCNC: 137 MMOL/L (ref 132–146)
SODIUM SERPL-SCNC: 138 MMOL/L (ref 132–146)
WBC OTHER # BLD: 8 K/UL (ref 4.5–11.5)

## 2024-02-03 PROCEDURE — 6370000000 HC RX 637 (ALT 250 FOR IP): Performed by: INTERNAL MEDICINE

## 2024-02-03 PROCEDURE — 85025 COMPLETE CBC W/AUTO DIFF WBC: CPT

## 2024-02-03 PROCEDURE — 6370000000 HC RX 637 (ALT 250 FOR IP)

## 2024-02-03 PROCEDURE — 6370000000 HC RX 637 (ALT 250 FOR IP): Performed by: NURSE PRACTITIONER

## 2024-02-03 PROCEDURE — 6360000002 HC RX W HCPCS: Performed by: STUDENT IN AN ORGANIZED HEALTH CARE EDUCATION/TRAINING PROGRAM

## 2024-02-03 PROCEDURE — 84100 ASSAY OF PHOSPHORUS: CPT

## 2024-02-03 PROCEDURE — 6370000000 HC RX 637 (ALT 250 FOR IP): Performed by: STUDENT IN AN ORGANIZED HEALTH CARE EDUCATION/TRAINING PROGRAM

## 2024-02-03 PROCEDURE — 82140 ASSAY OF AMMONIA: CPT

## 2024-02-03 PROCEDURE — 1200000000 HC SEMI PRIVATE

## 2024-02-03 PROCEDURE — 36415 COLL VENOUS BLD VENIPUNCTURE: CPT

## 2024-02-03 PROCEDURE — 2580000003 HC RX 258: Performed by: INTERNAL MEDICINE

## 2024-02-03 PROCEDURE — 80053 COMPREHEN METABOLIC PANEL: CPT

## 2024-02-03 PROCEDURE — 2580000003 HC RX 258: Performed by: NURSE PRACTITIONER

## 2024-02-03 PROCEDURE — 2500000003 HC RX 250 WO HCPCS: Performed by: NURSE PRACTITIONER

## 2024-02-03 PROCEDURE — 82330 ASSAY OF CALCIUM: CPT

## 2024-02-03 PROCEDURE — 2500000003 HC RX 250 WO HCPCS: Performed by: STUDENT IN AN ORGANIZED HEALTH CARE EDUCATION/TRAINING PROGRAM

## 2024-02-03 PROCEDURE — 2580000003 HC RX 258: Performed by: STUDENT IN AN ORGANIZED HEALTH CARE EDUCATION/TRAINING PROGRAM

## 2024-02-03 PROCEDURE — 6370000000 HC RX 637 (ALT 250 FOR IP): Performed by: HOSPITALIST

## 2024-02-03 PROCEDURE — 99233 SBSQ HOSP IP/OBS HIGH 50: CPT | Performed by: STUDENT IN AN ORGANIZED HEALTH CARE EDUCATION/TRAINING PROGRAM

## 2024-02-03 PROCEDURE — 83735 ASSAY OF MAGNESIUM: CPT

## 2024-02-03 RX ORDER — CALCIUM GLUCONATE 20 MG/ML
1000 INJECTION, SOLUTION INTRAVENOUS ONCE
Status: COMPLETED | OUTPATIENT
Start: 2024-02-03 | End: 2024-02-03

## 2024-02-03 RX ORDER — LORAZEPAM 0.5 MG/1
0.25 TABLET ORAL EVERY 6 HOURS PRN
Status: DISCONTINUED | OUTPATIENT
Start: 2024-02-03 | End: 2024-02-05

## 2024-02-03 RX ORDER — CALCIUM GLUCONATE 94 MG/ML
1000 INJECTION, SOLUTION INTRAVENOUS ONCE
Status: DISCONTINUED | OUTPATIENT
Start: 2024-02-03 | End: 2024-02-03

## 2024-02-03 RX ORDER — BUSPIRONE HYDROCHLORIDE 5 MG/1
10 TABLET ORAL 2 TIMES DAILY
Status: DISCONTINUED | OUTPATIENT
Start: 2024-02-03 | End: 2024-02-11 | Stop reason: HOSPADM

## 2024-02-03 RX ORDER — LANOLIN ALCOHOL/MO/W.PET/CERES
9 CREAM (GRAM) TOPICAL NIGHTLY PRN
Status: DISCONTINUED | OUTPATIENT
Start: 2024-02-03 | End: 2024-02-11 | Stop reason: HOSPADM

## 2024-02-03 RX ORDER — BUSPIRONE HYDROCHLORIDE 10 MG/1
10 TABLET ORAL 2 TIMES DAILY
Qty: 60 TABLET | Refills: 2 | Status: ON HOLD | OUTPATIENT
Start: 2024-02-03

## 2024-02-03 RX ORDER — MAGNESIUM SULFATE IN WATER 40 MG/ML
2000 INJECTION, SOLUTION INTRAVENOUS ONCE
Status: COMPLETED | OUTPATIENT
Start: 2024-02-03 | End: 2024-02-03

## 2024-02-03 RX ADMIN — LORAZEPAM 0.25 MG: 0.5 TABLET ORAL at 09:26

## 2024-02-03 RX ADMIN — ONDANSETRON 4 MG: 2 INJECTION INTRAMUSCULAR; INTRAVENOUS at 23:50

## 2024-02-03 RX ADMIN — MIDODRINE HYDROCHLORIDE 10 MG: 5 TABLET ORAL at 16:11

## 2024-02-03 RX ADMIN — SODIUM CHLORIDE: 9 INJECTION, SOLUTION INTRAVENOUS at 09:42

## 2024-02-03 RX ADMIN — RIFAXIMIN 400 MG: 200 TABLET ORAL at 20:52

## 2024-02-03 RX ADMIN — TRAMADOL HYDROCHLORIDE 50 MG: 50 TABLET, COATED ORAL at 16:06

## 2024-02-03 RX ADMIN — PANTOPRAZOLE SODIUM 40 MG: 40 TABLET, DELAYED RELEASE ORAL at 16:02

## 2024-02-03 RX ADMIN — CALCIUM GLUCONATE 1000 MG: 20 INJECTION, SOLUTION INTRAVENOUS at 12:45

## 2024-02-03 RX ADMIN — MIDODRINE HYDROCHLORIDE 10 MG: 5 TABLET ORAL at 09:31

## 2024-02-03 RX ADMIN — TRAMADOL HYDROCHLORIDE 50 MG: 50 TABLET, COATED ORAL at 22:01

## 2024-02-03 RX ADMIN — Medication 100 MG: at 09:26

## 2024-02-03 RX ADMIN — PENTOXIFYLLINE 400 MG: 400 TABLET, EXTENDED RELEASE ORAL at 16:02

## 2024-02-03 RX ADMIN — RIFAXIMIN 400 MG: 200 TABLET ORAL at 09:25

## 2024-02-03 RX ADMIN — LACTULOSE 20 G: 20 SOLUTION ORAL at 09:27

## 2024-02-03 RX ADMIN — PANTOPRAZOLE SODIUM 40 MG: 40 TABLET, DELAYED RELEASE ORAL at 05:01

## 2024-02-03 RX ADMIN — POTASSIUM PHOSPHATE 20 MMOL: 236; 224 INJECTION, SOLUTION INTRAVENOUS at 16:01

## 2024-02-03 RX ADMIN — BUSPIRONE HYDROCHLORIDE 10 MG: 5 TABLET ORAL at 09:26

## 2024-02-03 RX ADMIN — PENTOXIFYLLINE 400 MG: 400 TABLET, EXTENDED RELEASE ORAL at 09:26

## 2024-02-03 RX ADMIN — MIDODRINE HYDROCHLORIDE 10 MG: 5 TABLET ORAL at 12:45

## 2024-02-03 RX ADMIN — TRAMADOL HYDROCHLORIDE 50 MG: 50 TABLET, COATED ORAL at 04:51

## 2024-02-03 RX ADMIN — LORAZEPAM 0.5 MG: 0.5 TABLET ORAL at 03:01

## 2024-02-03 RX ADMIN — POTASSIUM PHOSPHATE, MONOBASIC POTASSIUM PHOSPHATE, DIBASIC 20 MMOL: 224; 236 INJECTION, SOLUTION, CONCENTRATE INTRAVENOUS at 06:11

## 2024-02-03 RX ADMIN — LORAZEPAM 0.25 MG: 0.5 TABLET ORAL at 22:51

## 2024-02-03 RX ADMIN — BUSPIRONE HYDROCHLORIDE 10 MG: 5 TABLET ORAL at 20:52

## 2024-02-03 RX ADMIN — PENTOXIFYLLINE 400 MG: 400 TABLET, EXTENDED RELEASE ORAL at 12:45

## 2024-02-03 RX ADMIN — Medication 9 MG: at 22:01

## 2024-02-03 RX ADMIN — MAGNESIUM SULFATE HEPTAHYDRATE 2000 MG: 40 INJECTION, SOLUTION INTRAVENOUS at 09:34

## 2024-02-03 RX ADMIN — RIFAXIMIN 400 MG: 200 TABLET ORAL at 16:02

## 2024-02-04 ENCOUNTER — APPOINTMENT (OUTPATIENT)
Dept: MRI IMAGING | Age: 44
DRG: 469 | End: 2024-02-04
Payer: COMMERCIAL

## 2024-02-04 ENCOUNTER — APPOINTMENT (OUTPATIENT)
Dept: ULTRASOUND IMAGING | Age: 44
DRG: 469 | End: 2024-02-04
Payer: COMMERCIAL

## 2024-02-04 LAB
ALBUMIN SERPL-MCNC: 2.6 G/DL (ref 3.5–5.2)
ALBUMIN SERPL-MCNC: 2.9 G/DL (ref 3.5–5.2)
ALP SERPL-CCNC: 102 U/L (ref 40–129)
ALP SERPL-CCNC: 89 U/L (ref 40–129)
ALT SERPL-CCNC: 18 U/L (ref 0–40)
ALT SERPL-CCNC: 19 U/L (ref 0–40)
ANION GAP SERPL CALCULATED.3IONS-SCNC: 7 MMOL/L (ref 7–16)
ANION GAP SERPL CALCULATED.3IONS-SCNC: 8 MMOL/L (ref 7–16)
AST SERPL-CCNC: 49 U/L (ref 0–39)
AST SERPL-CCNC: 55 U/L (ref 0–39)
BASOPHILS # BLD: 0.02 K/UL (ref 0–0.2)
BASOPHILS NFR BLD: 0 % (ref 0–2)
BILIRUB SERPL-MCNC: 6.7 MG/DL (ref 0–1.2)
BILIRUB SERPL-MCNC: 6.8 MG/DL (ref 0–1.2)
BUN SERPL-MCNC: 3 MG/DL (ref 6–20)
BUN SERPL-MCNC: 3 MG/DL (ref 6–20)
CALCIUM SERPL-MCNC: 7.7 MG/DL (ref 8.6–10.2)
CALCIUM SERPL-MCNC: 8 MG/DL (ref 8.6–10.2)
CHLORIDE SERPL-SCNC: 113 MMOL/L (ref 98–107)
CHLORIDE SERPL-SCNC: 113 MMOL/L (ref 98–107)
CO2 SERPL-SCNC: 17 MMOL/L (ref 22–29)
CO2 SERPL-SCNC: 17 MMOL/L (ref 22–29)
CREAT SERPL-MCNC: 0.8 MG/DL (ref 0.7–1.2)
CREAT SERPL-MCNC: 0.8 MG/DL (ref 0.7–1.2)
EOSINOPHIL # BLD: 0.2 K/UL (ref 0.05–0.5)
EOSINOPHILS RELATIVE PERCENT: 3 % (ref 0–6)
ERYTHROCYTE [DISTWIDTH] IN BLOOD BY AUTOMATED COUNT: 24.4 % (ref 11.5–15)
GFR SERPL CREATININE-BSD FRML MDRD: >60 ML/MIN/1.73M2
GFR SERPL CREATININE-BSD FRML MDRD: >60 ML/MIN/1.73M2
GLUCOSE SERPL-MCNC: 106 MG/DL (ref 74–99)
GLUCOSE SERPL-MCNC: 111 MG/DL (ref 74–99)
HCT VFR BLD AUTO: 27.6 % (ref 37–54)
HGB BLD-MCNC: 9.1 G/DL (ref 12.5–16.5)
IMM GRANULOCYTES # BLD AUTO: 0.39 K/UL (ref 0–0.58)
IMM GRANULOCYTES NFR BLD: 5 % (ref 0–5)
LYMPHOCYTES NFR BLD: 0.68 K/UL (ref 1.5–4)
LYMPHOCYTES RELATIVE PERCENT: 9 % (ref 20–42)
MAGNESIUM SERPL-MCNC: 1.7 MG/DL (ref 1.6–2.6)
MAGNESIUM SERPL-MCNC: 1.7 MG/DL (ref 1.6–2.6)
MCH RBC QN AUTO: 30.6 PG (ref 26–35)
MCHC RBC AUTO-ENTMCNC: 33 G/DL (ref 32–34.5)
MCV RBC AUTO: 92.9 FL (ref 80–99.9)
MONOCYTES NFR BLD: 0.67 K/UL (ref 0.1–0.95)
MONOCYTES NFR BLD: 8 % (ref 2–12)
NEUTROPHILS NFR BLD: 75 % (ref 43–80)
NEUTS SEG NFR BLD: 6 K/UL (ref 1.8–7.3)
PHOSPHATE SERPL-MCNC: 2.1 MG/DL (ref 2.5–4.5)
PHOSPHATE SERPL-MCNC: 2.8 MG/DL (ref 2.5–4.5)
PLATELET # BLD AUTO: 48 K/UL (ref 130–450)
PLATELET CONFIRMATION: NORMAL
PMV BLD AUTO: 9.7 FL (ref 7–12)
POTASSIUM SERPL-SCNC: 3.6 MMOL/L (ref 3.5–5)
POTASSIUM SERPL-SCNC: 4 MMOL/L (ref 3.5–5)
PROT SERPL-MCNC: 5.1 G/DL (ref 6.4–8.3)
PROT SERPL-MCNC: 5.4 G/DL (ref 6.4–8.3)
RBC # BLD AUTO: 2.97 M/UL (ref 3.8–5.8)
RBC # BLD: ABNORMAL 10*6/UL
SODIUM SERPL-SCNC: 137 MMOL/L (ref 132–146)
SODIUM SERPL-SCNC: 138 MMOL/L (ref 132–146)
WBC OTHER # BLD: 8 K/UL (ref 4.5–11.5)

## 2024-02-04 PROCEDURE — 99233 SBSQ HOSP IP/OBS HIGH 50: CPT | Performed by: STUDENT IN AN ORGANIZED HEALTH CARE EDUCATION/TRAINING PROGRAM

## 2024-02-04 PROCEDURE — 76700 US EXAM ABDOM COMPLETE: CPT

## 2024-02-04 PROCEDURE — 1200000000 HC SEMI PRIVATE

## 2024-02-04 PROCEDURE — 84100 ASSAY OF PHOSPHORUS: CPT

## 2024-02-04 PROCEDURE — 76705 ECHO EXAM OF ABDOMEN: CPT

## 2024-02-04 PROCEDURE — 36415 COLL VENOUS BLD VENIPUNCTURE: CPT

## 2024-02-04 PROCEDURE — 6360000002 HC RX W HCPCS: Performed by: INTERNAL MEDICINE

## 2024-02-04 PROCEDURE — 83735 ASSAY OF MAGNESIUM: CPT

## 2024-02-04 PROCEDURE — 2580000003 HC RX 258: Performed by: STUDENT IN AN ORGANIZED HEALTH CARE EDUCATION/TRAINING PROGRAM

## 2024-02-04 PROCEDURE — 6370000000 HC RX 637 (ALT 250 FOR IP): Performed by: INTERNAL MEDICINE

## 2024-02-04 PROCEDURE — 6360000002 HC RX W HCPCS: Performed by: STUDENT IN AN ORGANIZED HEALTH CARE EDUCATION/TRAINING PROGRAM

## 2024-02-04 PROCEDURE — 6370000000 HC RX 637 (ALT 250 FOR IP): Performed by: NURSE PRACTITIONER

## 2024-02-04 PROCEDURE — 6370000000 HC RX 637 (ALT 250 FOR IP): Performed by: HOSPITALIST

## 2024-02-04 PROCEDURE — 93975 VASCULAR STUDY: CPT

## 2024-02-04 PROCEDURE — 6370000000 HC RX 637 (ALT 250 FOR IP): Performed by: STUDENT IN AN ORGANIZED HEALTH CARE EDUCATION/TRAINING PROGRAM

## 2024-02-04 PROCEDURE — 85025 COMPLETE CBC W/AUTO DIFF WBC: CPT

## 2024-02-04 PROCEDURE — 2580000003 HC RX 258: Performed by: INTERNAL MEDICINE

## 2024-02-04 PROCEDURE — 2500000003 HC RX 250 WO HCPCS: Performed by: STUDENT IN AN ORGANIZED HEALTH CARE EDUCATION/TRAINING PROGRAM

## 2024-02-04 PROCEDURE — 80053 COMPREHEN METABOLIC PANEL: CPT

## 2024-02-04 PROCEDURE — 6360000002 HC RX W HCPCS: Performed by: NURSE PRACTITIONER

## 2024-02-04 RX ORDER — LORAZEPAM 2 MG/ML
0.5 INJECTION INTRAMUSCULAR ONCE
Status: COMPLETED | OUTPATIENT
Start: 2024-02-04 | End: 2024-02-04

## 2024-02-04 RX ORDER — FOLIC ACID 1 MG/1
1 TABLET ORAL DAILY
Status: DISCONTINUED | OUTPATIENT
Start: 2024-02-04 | End: 2024-02-11 | Stop reason: HOSPADM

## 2024-02-04 RX ADMIN — TRAMADOL HYDROCHLORIDE 50 MG: 50 TABLET, COATED ORAL at 03:54

## 2024-02-04 RX ADMIN — PENTOXIFYLLINE 400 MG: 400 TABLET, EXTENDED RELEASE ORAL at 16:09

## 2024-02-04 RX ADMIN — HEPARIN SODIUM 5000 UNITS: 10000 INJECTION INTRAVENOUS; SUBCUTANEOUS at 13:59

## 2024-02-04 RX ADMIN — Medication 100 MG: at 07:41

## 2024-02-04 RX ADMIN — MIDODRINE HYDROCHLORIDE 10 MG: 5 TABLET ORAL at 11:52

## 2024-02-04 RX ADMIN — TRAMADOL HYDROCHLORIDE 50 MG: 50 TABLET, COATED ORAL at 16:09

## 2024-02-04 RX ADMIN — PENTOXIFYLLINE 400 MG: 400 TABLET, EXTENDED RELEASE ORAL at 11:52

## 2024-02-04 RX ADMIN — MIDODRINE HYDROCHLORIDE 10 MG: 5 TABLET ORAL at 07:41

## 2024-02-04 RX ADMIN — PANTOPRAZOLE SODIUM 40 MG: 40 TABLET, DELAYED RELEASE ORAL at 16:09

## 2024-02-04 RX ADMIN — PENTOXIFYLLINE 400 MG: 400 TABLET, EXTENDED RELEASE ORAL at 07:41

## 2024-02-04 RX ADMIN — RIFAXIMIN 400 MG: 200 TABLET ORAL at 13:59

## 2024-02-04 RX ADMIN — FOLIC ACID 1 MG: 1 TABLET ORAL at 11:52

## 2024-02-04 RX ADMIN — POTASSIUM PHOSPHATE, MONOBASIC POTASSIUM PHOSPHATE, DIBASIC 30 MMOL: 224; 236 INJECTION, SOLUTION, CONCENTRATE INTRAVENOUS at 11:50

## 2024-02-04 RX ADMIN — PANTOPRAZOLE SODIUM 40 MG: 40 TABLET, DELAYED RELEASE ORAL at 05:08

## 2024-02-04 RX ADMIN — LORAZEPAM 0.5 MG: 2 INJECTION INTRAMUSCULAR; INTRAVENOUS at 10:55

## 2024-02-04 RX ADMIN — BUSPIRONE HYDROCHLORIDE 10 MG: 5 TABLET ORAL at 07:41

## 2024-02-04 RX ADMIN — MIDODRINE HYDROCHLORIDE 10 MG: 5 TABLET ORAL at 16:09

## 2024-02-04 RX ADMIN — LORAZEPAM 0.25 MG: 0.5 TABLET ORAL at 04:56

## 2024-02-04 RX ADMIN — RIFAXIMIN 400 MG: 200 TABLET ORAL at 07:41

## 2024-02-04 RX ADMIN — ONDANSETRON 4 MG: 2 INJECTION INTRAMUSCULAR; INTRAVENOUS at 20:32

## 2024-02-04 RX ADMIN — TRAMADOL HYDROCHLORIDE 50 MG: 50 TABLET, COATED ORAL at 09:53

## 2024-02-04 RX ADMIN — SODIUM CHLORIDE: 9 INJECTION, SOLUTION INTRAVENOUS at 11:40

## 2024-02-05 ENCOUNTER — APPOINTMENT (OUTPATIENT)
Dept: MRI IMAGING | Age: 44
DRG: 469 | End: 2024-02-05
Payer: COMMERCIAL

## 2024-02-05 LAB
ALBUMIN SERPL-MCNC: 2.5 G/DL (ref 3.5–5.2)
ALP SERPL-CCNC: 93 U/L (ref 40–129)
ALT SERPL-CCNC: 17 U/L (ref 0–40)
ANION GAP SERPL CALCULATED.3IONS-SCNC: 6 MMOL/L (ref 7–16)
AST SERPL-CCNC: 47 U/L (ref 0–39)
BASOPHILS # BLD: 0.02 K/UL (ref 0–0.2)
BASOPHILS NFR BLD: 0 % (ref 0–2)
BILIRUB DIRECT SERPL-MCNC: 2.9 MG/DL (ref 0–0.3)
BILIRUB SERPL-MCNC: 6 MG/DL (ref 0–1.2)
BUN SERPL-MCNC: 3 MG/DL (ref 6–20)
CALCIUM SERPL-MCNC: 7.9 MG/DL (ref 8.6–10.2)
CHLORIDE SERPL-SCNC: 113 MMOL/L (ref 98–107)
CO2 SERPL-SCNC: 18 MMOL/L (ref 22–29)
CREAT SERPL-MCNC: 0.9 MG/DL (ref 0.7–1.2)
EOSINOPHIL # BLD: 0.24 K/UL (ref 0.05–0.5)
EOSINOPHILS RELATIVE PERCENT: 4 % (ref 0–6)
ERYTHROCYTE [DISTWIDTH] IN BLOOD BY AUTOMATED COUNT: 25.5 % (ref 11.5–15)
GFR SERPL CREATININE-BSD FRML MDRD: >60 ML/MIN/1.73M2
GLUCOSE SERPL-MCNC: 113 MG/DL (ref 74–99)
HCT VFR BLD AUTO: 24.3 % (ref 37–54)
HGB BLD-MCNC: 8 G/DL (ref 12.5–16.5)
IMM GRANULOCYTES # BLD AUTO: 0.14 K/UL (ref 0–0.58)
IMM GRANULOCYTES NFR BLD: 2 % (ref 0–5)
INR PPP: 2.9
LYMPHOCYTES NFR BLD: 0.71 K/UL (ref 1.5–4)
LYMPHOCYTES RELATIVE PERCENT: 10 % (ref 20–42)
MAGNESIUM SERPL-MCNC: 1.6 MG/DL (ref 1.6–2.6)
MCH RBC QN AUTO: 30.8 PG (ref 26–35)
MCHC RBC AUTO-ENTMCNC: 32.9 G/DL (ref 32–34.5)
MCV RBC AUTO: 93.5 FL (ref 80–99.9)
MONOCYTES NFR BLD: 0.64 K/UL (ref 0.1–0.95)
MONOCYTES NFR BLD: 9 % (ref 2–12)
NEUTROPHILS NFR BLD: 75 % (ref 43–80)
NEUTS SEG NFR BLD: 5.12 K/UL (ref 1.8–7.3)
PHOSPHATE SERPL-MCNC: 2.2 MG/DL (ref 2.5–4.5)
PLATELET # BLD AUTO: 54 K/UL (ref 130–450)
PLATELET CONFIRMATION: NORMAL
PMV BLD AUTO: 10.6 FL (ref 7–12)
POTASSIUM SERPL-SCNC: 3.7 MMOL/L (ref 3.5–5)
PROT SERPL-MCNC: 4.8 G/DL (ref 6.4–8.3)
PROTHROMBIN TIME: 32.3 SEC (ref 9.3–12.4)
RBC # BLD AUTO: 2.6 M/UL (ref 3.8–5.8)
RBC # BLD: ABNORMAL 10*6/UL
SODIUM SERPL-SCNC: 137 MMOL/L (ref 132–146)
WBC OTHER # BLD: 6.9 K/UL (ref 4.5–11.5)

## 2024-02-05 PROCEDURE — 6370000000 HC RX 637 (ALT 250 FOR IP): Performed by: NURSE PRACTITIONER

## 2024-02-05 PROCEDURE — 1200000000 HC SEMI PRIVATE

## 2024-02-05 PROCEDURE — 2580000003 HC RX 258: Performed by: INTERNAL MEDICINE

## 2024-02-05 PROCEDURE — 6370000000 HC RX 637 (ALT 250 FOR IP): Performed by: INTERNAL MEDICINE

## 2024-02-05 PROCEDURE — 6370000000 HC RX 637 (ALT 250 FOR IP): Performed by: SURGERY

## 2024-02-05 PROCEDURE — 74183 MRI ABD W/O CNTR FLWD CNTR: CPT

## 2024-02-05 PROCEDURE — 97530 THERAPEUTIC ACTIVITIES: CPT

## 2024-02-05 PROCEDURE — 6360000002 HC RX W HCPCS: Performed by: STUDENT IN AN ORGANIZED HEALTH CARE EDUCATION/TRAINING PROGRAM

## 2024-02-05 PROCEDURE — 80053 COMPREHEN METABOLIC PANEL: CPT

## 2024-02-05 PROCEDURE — 2580000003 HC RX 258: Performed by: STUDENT IN AN ORGANIZED HEALTH CARE EDUCATION/TRAINING PROGRAM

## 2024-02-05 PROCEDURE — 87075 CULTR BACTERIA EXCEPT BLOOD: CPT

## 2024-02-05 PROCEDURE — 82248 BILIRUBIN DIRECT: CPT

## 2024-02-05 PROCEDURE — 6370000000 HC RX 637 (ALT 250 FOR IP): Performed by: STUDENT IN AN ORGANIZED HEALTH CARE EDUCATION/TRAINING PROGRAM

## 2024-02-05 PROCEDURE — 99231 SBSQ HOSP IP/OBS SF/LOW 25: CPT | Performed by: SURGERY

## 2024-02-05 PROCEDURE — 84100 ASSAY OF PHOSPHORUS: CPT

## 2024-02-05 PROCEDURE — 87205 SMEAR GRAM STAIN: CPT

## 2024-02-05 PROCEDURE — 85025 COMPLETE CBC W/AUTO DIFF WBC: CPT

## 2024-02-05 PROCEDURE — 87070 CULTURE OTHR SPECIMN AEROBIC: CPT

## 2024-02-05 PROCEDURE — 2500000003 HC RX 250 WO HCPCS: Performed by: STUDENT IN AN ORGANIZED HEALTH CARE EDUCATION/TRAINING PROGRAM

## 2024-02-05 PROCEDURE — 6360000004 HC RX CONTRAST MEDICATION: Performed by: RADIOLOGY

## 2024-02-05 PROCEDURE — 99233 SBSQ HOSP IP/OBS HIGH 50: CPT | Performed by: STUDENT IN AN ORGANIZED HEALTH CARE EDUCATION/TRAINING PROGRAM

## 2024-02-05 PROCEDURE — 6370000000 HC RX 637 (ALT 250 FOR IP): Performed by: HOSPITALIST

## 2024-02-05 PROCEDURE — 6360000002 HC RX W HCPCS: Performed by: INTERNAL MEDICINE

## 2024-02-05 PROCEDURE — 85610 PROTHROMBIN TIME: CPT

## 2024-02-05 PROCEDURE — A9579 GAD-BASE MR CONTRAST NOS,1ML: HCPCS | Performed by: RADIOLOGY

## 2024-02-05 PROCEDURE — 83735 ASSAY OF MAGNESIUM: CPT

## 2024-02-05 RX ORDER — HYDROXYZINE HYDROCHLORIDE 50 MG/ML
50 INJECTION, SOLUTION INTRAMUSCULAR EVERY 6 HOURS PRN
Status: DISCONTINUED | OUTPATIENT
Start: 2024-02-05 | End: 2024-02-11 | Stop reason: HOSPADM

## 2024-02-05 RX ORDER — ALPRAZOLAM 1 MG/1
1 TABLET ORAL
Status: COMPLETED | OUTPATIENT
Start: 2024-02-05 | End: 2024-02-05

## 2024-02-05 RX ORDER — IBUPROFEN 200 MG
TABLET ORAL 2 TIMES DAILY
Status: DISCONTINUED | OUTPATIENT
Start: 2024-02-05 | End: 2024-02-11 | Stop reason: HOSPADM

## 2024-02-05 RX ORDER — MAGNESIUM SULFATE IN WATER 40 MG/ML
2000 INJECTION, SOLUTION INTRAVENOUS ONCE
Status: COMPLETED | OUTPATIENT
Start: 2024-02-05 | End: 2024-02-05

## 2024-02-05 RX ORDER — LORAZEPAM 0.5 MG/1
0.25 TABLET ORAL EVERY 12 HOURS PRN
Status: DISCONTINUED | OUTPATIENT
Start: 2024-02-05 | End: 2024-02-07

## 2024-02-05 RX ADMIN — SODIUM CHLORIDE: 9 INJECTION, SOLUTION INTRAVENOUS at 21:53

## 2024-02-05 RX ADMIN — MAGNESIUM SULFATE HEPTAHYDRATE 2000 MG: 40 INJECTION, SOLUTION INTRAVENOUS at 10:14

## 2024-02-05 RX ADMIN — POTASSIUM PHOSPHATE, MONOBASIC POTASSIUM PHOSPHATE, DIBASIC 30 MMOL: 224; 236 INJECTION, SOLUTION, CONCENTRATE INTRAVENOUS at 13:54

## 2024-02-05 RX ADMIN — PENTOXIFYLLINE 400 MG: 400 TABLET, EXTENDED RELEASE ORAL at 13:49

## 2024-02-05 RX ADMIN — BUSPIRONE HYDROCHLORIDE 10 MG: 5 TABLET ORAL at 21:27

## 2024-02-05 RX ADMIN — PANTOPRAZOLE SODIUM 40 MG: 40 TABLET, DELAYED RELEASE ORAL at 06:09

## 2024-02-05 RX ADMIN — RIFAXIMIN 400 MG: 200 TABLET ORAL at 13:49

## 2024-02-05 RX ADMIN — LORAZEPAM 0.25 MG: 0.5 TABLET ORAL at 01:23

## 2024-02-05 RX ADMIN — FOLIC ACID 1 MG: 1 TABLET ORAL at 09:03

## 2024-02-05 RX ADMIN — BUSPIRONE HYDROCHLORIDE 10 MG: 5 TABLET ORAL at 09:03

## 2024-02-05 RX ADMIN — ALPRAZOLAM 1 MG: 1 TABLET ORAL at 12:00

## 2024-02-05 RX ADMIN — MIDODRINE HYDROCHLORIDE 10 MG: 5 TABLET ORAL at 13:49

## 2024-02-05 RX ADMIN — GADOTERIDOL 13 ML: 279.3 INJECTION, SOLUTION INTRAVENOUS at 12:59

## 2024-02-05 RX ADMIN — HEPARIN SODIUM 5000 UNITS: 10000 INJECTION INTRAVENOUS; SUBCUTANEOUS at 21:30

## 2024-02-05 RX ADMIN — HYDROXYZINE HYDROCHLORIDE 50 MG: 50 INJECTION, SOLUTION INTRAMUSCULAR at 21:28

## 2024-02-05 RX ADMIN — LORAZEPAM 0.25 MG: 0.5 TABLET ORAL at 10:12

## 2024-02-05 RX ADMIN — TRAMADOL HYDROCHLORIDE 50 MG: 50 TABLET, COATED ORAL at 16:26

## 2024-02-05 RX ADMIN — HEPARIN SODIUM 5000 UNITS: 10000 INJECTION INTRAVENOUS; SUBCUTANEOUS at 06:09

## 2024-02-05 RX ADMIN — PANTOPRAZOLE SODIUM 40 MG: 40 TABLET, DELAYED RELEASE ORAL at 15:54

## 2024-02-05 RX ADMIN — HEPARIN SODIUM 5000 UNITS: 10000 INJECTION INTRAVENOUS; SUBCUTANEOUS at 13:49

## 2024-02-05 RX ADMIN — RIFAXIMIN 400 MG: 200 TABLET ORAL at 09:03

## 2024-02-05 RX ADMIN — SODIUM CHLORIDE: 9 INJECTION, SOLUTION INTRAVENOUS at 08:35

## 2024-02-05 RX ADMIN — RIFAXIMIN 400 MG: 200 TABLET ORAL at 21:27

## 2024-02-05 RX ADMIN — MIDODRINE HYDROCHLORIDE 10 MG: 5 TABLET ORAL at 09:03

## 2024-02-05 RX ADMIN — MIDODRINE HYDROCHLORIDE 10 MG: 5 TABLET ORAL at 15:54

## 2024-02-05 RX ADMIN — LACTULOSE 20 G: 20 SOLUTION ORAL at 09:04

## 2024-02-05 RX ADMIN — TRAMADOL HYDROCHLORIDE 50 MG: 50 TABLET, COATED ORAL at 02:47

## 2024-02-05 RX ADMIN — PENTOXIFYLLINE 400 MG: 400 TABLET, EXTENDED RELEASE ORAL at 15:54

## 2024-02-05 RX ADMIN — Medication 100 MG: at 09:03

## 2024-02-05 RX ADMIN — POLYMYXIN B SULFATE, BACITRACIN ZINC, NEOMYCIN SULFATE: 5000; 3.5; 4 OINTMENT TOPICAL at 21:27

## 2024-02-05 RX ADMIN — TRAMADOL HYDROCHLORIDE 50 MG: 50 TABLET, COATED ORAL at 09:02

## 2024-02-05 RX ADMIN — PENTOXIFYLLINE 400 MG: 400 TABLET, EXTENDED RELEASE ORAL at 09:03

## 2024-02-05 NOTE — PLAN OF CARE
Problem: Discharge Planning  Goal: Discharge to home or other facility with appropriate resources  2/5/2024 1139 by Hailey Higuera RN  Outcome: Progressing  2/5/2024 0652 by Carrie De La Rosa RN  Outcome: Progressing     Problem: Pain  Goal: Verbalizes/displays adequate comfort level or baseline comfort level  2/5/2024 1139 by Hailey Higuera RN  Outcome: Progressing  2/5/2024 0652 by Carrie De La Rosa RN  Outcome: Progressing     Problem: Safety - Adult  Goal: Free from fall injury  2/5/2024 1139 by Hailey Higuera RN  Outcome: Progressing  2/5/2024 0652 by Carrie De La Rosa RN  Outcome: Progressing     Problem: ABCDS Injury Assessment  Goal: Absence of physical injury  2/5/2024 1139 by Hailey Higuera RN  Outcome: Progressing  2/5/2024 0652 by Carrie De La Rosa RN  Outcome: Progressing     Problem: Nutrition Deficit:  Goal: Optimize nutritional status  2/5/2024 1139 by Hailey Higuera RN  Outcome: Progressing  2/5/2024 0652 by Carrie De La Rosa RN  Outcome: Progressing     Problem: Skin/Tissue Integrity  Goal: Absence of new skin breakdown  Description: 1.  Monitor for areas of redness and/or skin breakdown  2.  Assess vascular access sites hourly  3.  Every 4-6 hours minimum:  Change oxygen saturation probe site  4.  Every 4-6 hours:  If on nasal continuous positive airway pressure, respiratory therapy assess nares and determine need for appliance change or resting period.  Outcome: Progressing

## 2024-02-05 NOTE — CARE COORDINATION
Updated plan of care.  Patient is typically from home with significant other independently. Patient discharge plan is to go to [facility]. Precert started 2/1. rex DONALD, transport form, [id number] completed and in soft chart. GI following, Patient ordered MRI/MRCP today. Consider repete paracentesis.   Electronically signed by Farnaz Cerda RN on 2/5/2024 at 10:05 AM

## 2024-02-05 NOTE — PATIENT CARE CONFERENCE
[facility] Quality Flow/Interdisciplinary Rounds Progress Note        Quality Flow Rounds held on February 5, 2024    Disciplines Attending:  Bedside Nurse, , , and Nursing Unit Leadership    Johnathan Yanez III was admitted on 1/25/2024  4:08 PM    Anticipated Discharge Date:       Disposition:    Dennis Score:  Dennis Scale Score: 15    Readmission Risk              Risk of Unplanned Readmission:  37           Discussed patient goal for the day, patient clinical progression, and barriers to discharge.  The following Goal(s) of the Day/Commitment(s) have been identified:  Labs - Report Results      Charlene White RN  February 5, 2024

## 2024-02-06 ENCOUNTER — APPOINTMENT (OUTPATIENT)
Dept: CT IMAGING | Age: 44
DRG: 469 | End: 2024-02-06
Payer: COMMERCIAL

## 2024-02-06 PROBLEM — L02.415 ABSCESS OF RIGHT LEG: Status: ACTIVE | Noted: 2024-02-06

## 2024-02-06 LAB
ALBUMIN SERPL-MCNC: 2.6 G/DL (ref 3.5–5.2)
ALP SERPL-CCNC: 90 U/L (ref 40–129)
ALT SERPL-CCNC: 19 U/L (ref 0–40)
ANION GAP SERPL CALCULATED.3IONS-SCNC: 10 MMOL/L (ref 7–16)
AST SERPL-CCNC: 61 U/L (ref 0–39)
BASOPHILS # BLD: 0.03 K/UL (ref 0–0.2)
BASOPHILS NFR BLD: 1 % (ref 0–2)
BILIRUB SERPL-MCNC: 7.1 MG/DL (ref 0–1.2)
BUN SERPL-MCNC: 4 MG/DL (ref 6–20)
CALCIUM SERPL-MCNC: 7.7 MG/DL (ref 8.6–10.2)
CHLORIDE SERPL-SCNC: 111 MMOL/L (ref 98–107)
CO2 SERPL-SCNC: 17 MMOL/L (ref 22–29)
CREAT SERPL-MCNC: 0.9 MG/DL (ref 0.7–1.2)
EOSINOPHIL # BLD: 0.19 K/UL (ref 0.05–0.5)
EOSINOPHILS RELATIVE PERCENT: 4 % (ref 0–6)
ERYTHROCYTE [DISTWIDTH] IN BLOOD BY AUTOMATED COUNT: 26 % (ref 11.5–15)
GFR SERPL CREATININE-BSD FRML MDRD: >60 ML/MIN/1.73M2
GLUCOSE SERPL-MCNC: 109 MG/DL (ref 74–99)
HCT VFR BLD AUTO: 24.1 % (ref 37–54)
HGB BLD-MCNC: 7.7 G/DL (ref 12.5–16.5)
IMM GRANULOCYTES # BLD AUTO: 0.11 K/UL (ref 0–0.58)
IMM GRANULOCYTES NFR BLD: 2 % (ref 0–5)
LYMPHOCYTES NFR BLD: 0.61 K/UL (ref 1.5–4)
LYMPHOCYTES RELATIVE PERCENT: 11 % (ref 20–42)
MAGNESIUM SERPL-MCNC: 1.7 MG/DL (ref 1.6–2.6)
MCH RBC QN AUTO: 30.9 PG (ref 26–35)
MCHC RBC AUTO-ENTMCNC: 32 G/DL (ref 32–34.5)
MCV RBC AUTO: 96.8 FL (ref 80–99.9)
MONOCYTES NFR BLD: 0.42 K/UL (ref 0.1–0.95)
MONOCYTES NFR BLD: 8 % (ref 2–12)
NEUTROPHILS NFR BLD: 75 % (ref 43–80)
NEUTS SEG NFR BLD: 4.01 K/UL (ref 1.8–7.3)
PHOSPHATE SERPL-MCNC: 2 MG/DL (ref 2.5–4.5)
PLATELET CONFIRMATION: NORMAL
PLATELET, FLUORESCENCE: 32 K/UL (ref 130–450)
PMV BLD AUTO: 12.1 FL (ref 7–12)
POTASSIUM SERPL-SCNC: 3.9 MMOL/L (ref 3.5–5)
PROT SERPL-MCNC: 5.1 G/DL (ref 6.4–8.3)
RBC # BLD AUTO: 2.49 M/UL (ref 3.8–5.8)
RBC # BLD: ABNORMAL 10*6/UL
SODIUM SERPL-SCNC: 138 MMOL/L (ref 132–146)
WBC OTHER # BLD: 5.4 K/UL (ref 4.5–11.5)

## 2024-02-06 PROCEDURE — 6370000000 HC RX 637 (ALT 250 FOR IP): Performed by: HOSPITALIST

## 2024-02-06 PROCEDURE — 85025 COMPLETE CBC W/AUTO DIFF WBC: CPT

## 2024-02-06 PROCEDURE — 83735 ASSAY OF MAGNESIUM: CPT

## 2024-02-06 PROCEDURE — 2580000003 HC RX 258: Performed by: INTERNAL MEDICINE

## 2024-02-06 PROCEDURE — 6360000002 HC RX W HCPCS: Performed by: STUDENT IN AN ORGANIZED HEALTH CARE EDUCATION/TRAINING PROGRAM

## 2024-02-06 PROCEDURE — 6370000000 HC RX 637 (ALT 250 FOR IP): Performed by: NURSE PRACTITIONER

## 2024-02-06 PROCEDURE — 6370000000 HC RX 637 (ALT 250 FOR IP): Performed by: STUDENT IN AN ORGANIZED HEALTH CARE EDUCATION/TRAINING PROGRAM

## 2024-02-06 PROCEDURE — 6360000002 HC RX W HCPCS: Performed by: INTERNAL MEDICINE

## 2024-02-06 PROCEDURE — 80053 COMPREHEN METABOLIC PANEL: CPT

## 2024-02-06 PROCEDURE — 2580000003 HC RX 258: Performed by: STUDENT IN AN ORGANIZED HEALTH CARE EDUCATION/TRAINING PROGRAM

## 2024-02-06 PROCEDURE — 84100 ASSAY OF PHOSPHORUS: CPT

## 2024-02-06 PROCEDURE — 6370000000 HC RX 637 (ALT 250 FOR IP): Performed by: INTERNAL MEDICINE

## 2024-02-06 PROCEDURE — 72192 CT PELVIS W/O DYE: CPT

## 2024-02-06 PROCEDURE — 99233 SBSQ HOSP IP/OBS HIGH 50: CPT | Performed by: STUDENT IN AN ORGANIZED HEALTH CARE EDUCATION/TRAINING PROGRAM

## 2024-02-06 PROCEDURE — 2500000003 HC RX 250 WO HCPCS: Performed by: STUDENT IN AN ORGANIZED HEALTH CARE EDUCATION/TRAINING PROGRAM

## 2024-02-06 PROCEDURE — 1200000000 HC SEMI PRIVATE

## 2024-02-06 RX ORDER — SODIUM CHLORIDE, SODIUM LACTATE, POTASSIUM CHLORIDE, CALCIUM CHLORIDE 600; 310; 30; 20 MG/100ML; MG/100ML; MG/100ML; MG/100ML
INJECTION, SOLUTION INTRAVENOUS CONTINUOUS
Status: DISCONTINUED | OUTPATIENT
Start: 2024-02-06 | End: 2024-02-07

## 2024-02-06 RX ORDER — SULFAMETHOXAZOLE AND TRIMETHOPRIM 800; 160 MG/1; MG/1
2 TABLET ORAL EVERY 12 HOURS SCHEDULED
Status: DISCONTINUED | OUTPATIENT
Start: 2024-02-06 | End: 2024-02-07

## 2024-02-06 RX ORDER — MAGNESIUM SULFATE IN WATER 40 MG/ML
2000 INJECTION, SOLUTION INTRAVENOUS ONCE
Status: COMPLETED | OUTPATIENT
Start: 2024-02-06 | End: 2024-02-06

## 2024-02-06 RX ORDER — BACLOFEN 10 MG/1
10 TABLET ORAL 3 TIMES DAILY PRN
Status: DISCONTINUED | OUTPATIENT
Start: 2024-02-06 | End: 2024-02-11 | Stop reason: HOSPADM

## 2024-02-06 RX ORDER — OXYCODONE HYDROCHLORIDE 5 MG/1
10 TABLET ORAL EVERY 6 HOURS PRN
Status: DISCONTINUED | OUTPATIENT
Start: 2024-02-06 | End: 2024-02-11 | Stop reason: HOSPADM

## 2024-02-06 RX ADMIN — Medication 10 ML: at 21:08

## 2024-02-06 RX ADMIN — HEPARIN SODIUM 5000 UNITS: 10000 INJECTION INTRAVENOUS; SUBCUTANEOUS at 05:32

## 2024-02-06 RX ADMIN — RIFAXIMIN 400 MG: 200 TABLET ORAL at 08:58

## 2024-02-06 RX ADMIN — RIFAXIMIN 400 MG: 200 TABLET ORAL at 21:00

## 2024-02-06 RX ADMIN — POLYMYXIN B SULFATE, BACITRACIN ZINC, NEOMYCIN SULFATE: 5000; 3.5; 4 OINTMENT TOPICAL at 21:08

## 2024-02-06 RX ADMIN — PENTOXIFYLLINE 400 MG: 400 TABLET, EXTENDED RELEASE ORAL at 17:12

## 2024-02-06 RX ADMIN — Medication 9 MG: at 21:01

## 2024-02-06 RX ADMIN — TRAMADOL HYDROCHLORIDE 50 MG: 50 TABLET, COATED ORAL at 02:46

## 2024-02-06 RX ADMIN — BUSPIRONE HYDROCHLORIDE 10 MG: 5 TABLET ORAL at 21:01

## 2024-02-06 RX ADMIN — SULFAMETHOXAZOLE AND TRIMETHOPRIM 2 TABLET: 800; 160 TABLET ORAL at 23:30

## 2024-02-06 RX ADMIN — HYDROXYZINE HYDROCHLORIDE 50 MG: 50 INJECTION, SOLUTION INTRAMUSCULAR at 13:45

## 2024-02-06 RX ADMIN — Medication 100 MG: at 08:58

## 2024-02-06 RX ADMIN — ONDANSETRON 4 MG: 2 INJECTION INTRAMUSCULAR; INTRAVENOUS at 12:08

## 2024-02-06 RX ADMIN — SODIUM CHLORIDE, POTASSIUM CHLORIDE, SODIUM LACTATE AND CALCIUM CHLORIDE: 600; 310; 30; 20 INJECTION, SOLUTION INTRAVENOUS at 17:08

## 2024-02-06 RX ADMIN — PENTOXIFYLLINE 400 MG: 400 TABLET, EXTENDED RELEASE ORAL at 11:29

## 2024-02-06 RX ADMIN — RIFAXIMIN 400 MG: 200 TABLET ORAL at 13:43

## 2024-02-06 RX ADMIN — MIDODRINE HYDROCHLORIDE 10 MG: 5 TABLET ORAL at 11:29

## 2024-02-06 RX ADMIN — MAGNESIUM SULFATE HEPTAHYDRATE 2000 MG: 40 INJECTION, SOLUTION INTRAVENOUS at 17:10

## 2024-02-06 RX ADMIN — HYDROXYZINE HYDROCHLORIDE 50 MG: 50 INJECTION, SOLUTION INTRAMUSCULAR at 03:52

## 2024-02-06 RX ADMIN — MIDODRINE HYDROCHLORIDE 10 MG: 5 TABLET ORAL at 08:58

## 2024-02-06 RX ADMIN — FOLIC ACID 1 MG: 1 TABLET ORAL at 08:58

## 2024-02-06 RX ADMIN — SODIUM CHLORIDE: 9 INJECTION, SOLUTION INTRAVENOUS at 05:30

## 2024-02-06 RX ADMIN — SODIUM PHOSPHATE, MONOBASIC, MONOHYDRATE AND SODIUM PHOSPHATE, DIBASIC, ANHYDROUS 30 MMOL: 142; 276 INJECTION, SOLUTION INTRAVENOUS at 19:21

## 2024-02-06 RX ADMIN — SODIUM CHLORIDE: 9 INJECTION, SOLUTION INTRAVENOUS at 13:26

## 2024-02-06 RX ADMIN — POLYMYXIN B SULFATE, BACITRACIN ZINC, NEOMYCIN SULFATE: 5000; 3.5; 4 OINTMENT TOPICAL at 08:59

## 2024-02-06 RX ADMIN — SODIUM CHLORIDE, POTASSIUM CHLORIDE, SODIUM LACTATE AND CALCIUM CHLORIDE: 600; 310; 30; 20 INJECTION, SOLUTION INTRAVENOUS at 22:55

## 2024-02-06 RX ADMIN — TRAMADOL HYDROCHLORIDE 50 MG: 50 TABLET, COATED ORAL at 12:08

## 2024-02-06 RX ADMIN — PANTOPRAZOLE SODIUM 40 MG: 40 TABLET, DELAYED RELEASE ORAL at 05:33

## 2024-02-06 RX ADMIN — TRAMADOL HYDROCHLORIDE 50 MG: 50 TABLET, COATED ORAL at 21:01

## 2024-02-06 RX ADMIN — BACLOFEN 10 MG: 10 TABLET ORAL at 17:17

## 2024-02-06 RX ADMIN — BUSPIRONE HYDROCHLORIDE 10 MG: 5 TABLET ORAL at 08:58

## 2024-02-06 RX ADMIN — PANTOPRAZOLE SODIUM 40 MG: 40 TABLET, DELAYED RELEASE ORAL at 17:12

## 2024-02-06 RX ADMIN — SULFAMETHOXAZOLE AND TRIMETHOPRIM 2 TABLET: 800; 160 TABLET ORAL at 11:29

## 2024-02-06 RX ADMIN — OXYCODONE 10 MG: 5 TABLET ORAL at 18:13

## 2024-02-06 RX ADMIN — MIDODRINE HYDROCHLORIDE 10 MG: 5 TABLET ORAL at 17:12

## 2024-02-06 RX ADMIN — PENTOXIFYLLINE 400 MG: 400 TABLET, EXTENDED RELEASE ORAL at 08:58

## 2024-02-06 NOTE — PATIENT CARE CONFERENCE
[facility] Quality Flow/Interdisciplinary Rounds Progress Note        Quality Flow Rounds held on February 6, 2024    Disciplines Attending:  Bedside Nurse, , , and Nursing Unit Leadership    Johnathan Yanez III was admitted on 1/25/2024  4:08 PM    Anticipated Discharge Date:       Disposition:    Dennis Score:  Dennis Scale Score: 15    Readmission Risk              Risk of Unplanned Readmission:  38           Discussed patient goal for the day, patient clinical progression, and barriers to discharge.  The following Goal(s) of the Day/Commitment(s) have been identified:  Labs - Report Results      Charlene White RN  February 6, 2024

## 2024-02-06 NOTE — CARE COORDINATION
Updated plan of care.  Patient is typically from home with significant other independently. Patient discharge plan is to go to [facility]. Precert started 2/1. rex DONALD, transport form, [id number] completed and in soft chart. GI following, possible paracentesis.   Electronically signed by Farnaz Cerda RN on 2/6/2024 at 11:37 AM

## 2024-02-07 LAB
ALBUMIN SERPL-MCNC: 2.3 G/DL (ref 3.5–5.2)
ALBUMIN SERPL-MCNC: 2.4 G/DL (ref 3.5–5.2)
ALBUMIN SERPL-MCNC: 2.5 G/DL (ref 3.5–5.2)
ALP SERPL-CCNC: 88 U/L (ref 40–129)
ALP SERPL-CCNC: 91 U/L (ref 40–129)
ALP SERPL-CCNC: 94 U/L (ref 40–129)
ALT SERPL-CCNC: 16 U/L (ref 0–40)
ALT SERPL-CCNC: 18 U/L (ref 0–40)
ALT SERPL-CCNC: 18 U/L (ref 0–40)
ANION GAP SERPL CALCULATED.3IONS-SCNC: 10 MMOL/L (ref 7–16)
ANION GAP SERPL CALCULATED.3IONS-SCNC: 7 MMOL/L (ref 7–16)
ANION GAP SERPL CALCULATED.3IONS-SCNC: 8 MMOL/L (ref 7–16)
AST SERPL-CCNC: 59 U/L (ref 0–39)
AST SERPL-CCNC: 61 U/L (ref 0–39)
AST SERPL-CCNC: 78 U/L (ref 0–39)
BASOPHILS # BLD: 0.04 K/UL (ref 0–0.2)
BASOPHILS NFR BLD: 0 % (ref 0–2)
BILIRUB SERPL-MCNC: 5.8 MG/DL (ref 0–1.2)
BILIRUB SERPL-MCNC: 6 MG/DL (ref 0–1.2)
BILIRUB SERPL-MCNC: 6.2 MG/DL (ref 0–1.2)
BUN SERPL-MCNC: 4 MG/DL (ref 6–20)
BUN SERPL-MCNC: 5 MG/DL (ref 6–20)
BUN SERPL-MCNC: 6 MG/DL (ref 6–20)
CALCIUM SERPL-MCNC: 7.4 MG/DL (ref 8.6–10.2)
CALCIUM SERPL-MCNC: 7.5 MG/DL (ref 8.6–10.2)
CALCIUM SERPL-MCNC: 7.8 MG/DL (ref 8.6–10.2)
CHLORIDE SERPL-SCNC: 109 MMOL/L (ref 98–107)
CHLORIDE SERPL-SCNC: 112 MMOL/L (ref 98–107)
CHLORIDE SERPL-SCNC: 112 MMOL/L (ref 98–107)
CO2 SERPL-SCNC: 14 MMOL/L (ref 22–29)
CO2 SERPL-SCNC: 17 MMOL/L (ref 22–29)
CO2 SERPL-SCNC: 17 MMOL/L (ref 22–29)
CREAT SERPL-MCNC: 1 MG/DL (ref 0.7–1.2)
CREAT SERPL-MCNC: 1.1 MG/DL (ref 0.7–1.2)
EOSINOPHIL # BLD: 0.31 K/UL (ref 0.05–0.5)
EOSINOPHILS RELATIVE PERCENT: 3 % (ref 0–6)
ERYTHROCYTE [DISTWIDTH] IN BLOOD BY AUTOMATED COUNT: 26.7 % (ref 11.5–15)
GFR SERPL CREATININE-BSD FRML MDRD: >60 ML/MIN/1.73M2
GLUCOSE SERPL-MCNC: 106 MG/DL (ref 74–99)
GLUCOSE SERPL-MCNC: 114 MG/DL (ref 74–99)
GLUCOSE SERPL-MCNC: 92 MG/DL (ref 74–99)
HCT VFR BLD AUTO: 25.1 % (ref 37–54)
HGB BLD-MCNC: 8.1 G/DL (ref 12.5–16.5)
IMM GRANULOCYTES # BLD AUTO: 0.12 K/UL (ref 0–0.58)
IMM GRANULOCYTES NFR BLD: 1 % (ref 0–5)
LYMPHOCYTES NFR BLD: 0.85 K/UL (ref 1.5–4)
LYMPHOCYTES RELATIVE PERCENT: 9 % (ref 20–42)
MAGNESIUM SERPL-MCNC: 1.8 MG/DL (ref 1.6–2.6)
MAGNESIUM SERPL-MCNC: 1.8 MG/DL (ref 1.6–2.6)
MAGNESIUM SERPL-MCNC: 1.9 MG/DL (ref 1.6–2.6)
MCH RBC QN AUTO: 31.3 PG (ref 26–35)
MCHC RBC AUTO-ENTMCNC: 32.3 G/DL (ref 32–34.5)
MCV RBC AUTO: 96.9 FL (ref 80–99.9)
MICROORGANISM SPEC CULT: NO GROWTH
MICROORGANISM/AGENT SPEC: NORMAL
MONOCYTES NFR BLD: 0.66 K/UL (ref 0.1–0.95)
MONOCYTES NFR BLD: 7 % (ref 2–12)
NEUTROPHILS NFR BLD: 79 % (ref 43–80)
NEUTS SEG NFR BLD: 7.57 K/UL (ref 1.8–7.3)
PHOSPHATE SERPL-MCNC: 2.7 MG/DL (ref 2.5–4.5)
PHOSPHATE SERPL-MCNC: 3 MG/DL (ref 2.5–4.5)
PHOSPHATE SERPL-MCNC: 3.3 MG/DL (ref 2.5–4.5)
PLATELET # BLD AUTO: 55 K/UL (ref 130–450)
PLATELET CONFIRMATION: NORMAL
PMV BLD AUTO: 10.9 FL (ref 7–12)
POTASSIUM SERPL-SCNC: 4 MMOL/L (ref 3.5–5)
POTASSIUM SERPL-SCNC: 4.1 MMOL/L (ref 3.5–5)
POTASSIUM SERPL-SCNC: 4.9 MMOL/L (ref 3.5–5)
PROT SERPL-MCNC: 4.7 G/DL (ref 6.4–8.3)
PROT SERPL-MCNC: 4.9 G/DL (ref 6.4–8.3)
PROT SERPL-MCNC: 5 G/DL (ref 6.4–8.3)
RBC # BLD AUTO: 2.59 M/UL (ref 3.8–5.8)
RBC # BLD: ABNORMAL 10*6/UL
SODIUM SERPL-SCNC: 133 MMOL/L (ref 132–146)
SODIUM SERPL-SCNC: 136 MMOL/L (ref 132–146)
SODIUM SERPL-SCNC: 137 MMOL/L (ref 132–146)
SPECIMEN DESCRIPTION: NORMAL
WBC OTHER # BLD: 9.6 K/UL (ref 4.5–11.5)

## 2024-02-07 PROCEDURE — 97530 THERAPEUTIC ACTIVITIES: CPT

## 2024-02-07 PROCEDURE — 6370000000 HC RX 637 (ALT 250 FOR IP): Performed by: INTERNAL MEDICINE

## 2024-02-07 PROCEDURE — 85025 COMPLETE CBC W/AUTO DIFF WBC: CPT

## 2024-02-07 PROCEDURE — 6360000002 HC RX W HCPCS: Performed by: STUDENT IN AN ORGANIZED HEALTH CARE EDUCATION/TRAINING PROGRAM

## 2024-02-07 PROCEDURE — 6370000000 HC RX 637 (ALT 250 FOR IP): Performed by: STUDENT IN AN ORGANIZED HEALTH CARE EDUCATION/TRAINING PROGRAM

## 2024-02-07 PROCEDURE — 99233 SBSQ HOSP IP/OBS HIGH 50: CPT | Performed by: STUDENT IN AN ORGANIZED HEALTH CARE EDUCATION/TRAINING PROGRAM

## 2024-02-07 PROCEDURE — 2580000003 HC RX 258: Performed by: STUDENT IN AN ORGANIZED HEALTH CARE EDUCATION/TRAINING PROGRAM

## 2024-02-07 PROCEDURE — 87205 SMEAR GRAM STAIN: CPT

## 2024-02-07 PROCEDURE — 87070 CULTURE OTHR SPECIMN AEROBIC: CPT

## 2024-02-07 PROCEDURE — 80053 COMPREHEN METABOLIC PANEL: CPT

## 2024-02-07 PROCEDURE — 6370000000 HC RX 637 (ALT 250 FOR IP): Performed by: NURSE PRACTITIONER

## 2024-02-07 PROCEDURE — 83735 ASSAY OF MAGNESIUM: CPT

## 2024-02-07 PROCEDURE — 87077 CULTURE AEROBIC IDENTIFY: CPT

## 2024-02-07 PROCEDURE — 1200000000 HC SEMI PRIVATE

## 2024-02-07 PROCEDURE — 6370000000 HC RX 637 (ALT 250 FOR IP): Performed by: HOSPITALIST

## 2024-02-07 PROCEDURE — 84100 ASSAY OF PHOSPHORUS: CPT

## 2024-02-07 PROCEDURE — 87075 CULTR BACTERIA EXCEPT BLOOD: CPT

## 2024-02-07 PROCEDURE — 36415 COLL VENOUS BLD VENIPUNCTURE: CPT

## 2024-02-07 RX ORDER — ALBUMIN (HUMAN) 12.5 G/50ML
50 SOLUTION INTRAVENOUS
Status: COMPLETED | OUTPATIENT
Start: 2024-02-08 | End: 2024-02-08

## 2024-02-07 RX ORDER — SULFAMETHOXAZOLE AND TRIMETHOPRIM 800; 160 MG/1; MG/1
1 TABLET ORAL EVERY 12 HOURS SCHEDULED
Status: DISCONTINUED | OUTPATIENT
Start: 2024-02-07 | End: 2024-02-07

## 2024-02-07 RX ORDER — MAGNESIUM SULFATE IN WATER 40 MG/ML
2000 INJECTION, SOLUTION INTRAVENOUS ONCE
Status: COMPLETED | OUTPATIENT
Start: 2024-02-07 | End: 2024-02-07

## 2024-02-07 RX ORDER — METRONIDAZOLE 500 MG/1
500 TABLET ORAL EVERY 8 HOURS SCHEDULED
Status: DISCONTINUED | OUTPATIENT
Start: 2024-02-07 | End: 2024-02-11 | Stop reason: HOSPADM

## 2024-02-07 RX ORDER — FUROSEMIDE 10 MG/ML
40 INJECTION INTRAMUSCULAR; INTRAVENOUS ONCE
Status: COMPLETED | OUTPATIENT
Start: 2024-02-07 | End: 2024-02-07

## 2024-02-07 RX ADMIN — Medication 100 MG: at 09:18

## 2024-02-07 RX ADMIN — PANTOPRAZOLE SODIUM 40 MG: 40 TABLET, DELAYED RELEASE ORAL at 16:33

## 2024-02-07 RX ADMIN — MIDODRINE HYDROCHLORIDE 10 MG: 5 TABLET ORAL at 11:12

## 2024-02-07 RX ADMIN — METRONIDAZOLE 500 MG: 500 TABLET ORAL at 21:45

## 2024-02-07 RX ADMIN — PENTOXIFYLLINE 400 MG: 400 TABLET, EXTENDED RELEASE ORAL at 09:18

## 2024-02-07 RX ADMIN — POLYMYXIN B SULFATE, BACITRACIN ZINC, NEOMYCIN SULFATE: 5000; 3.5; 4 OINTMENT TOPICAL at 20:43

## 2024-02-07 RX ADMIN — PANTOPRAZOLE SODIUM 40 MG: 40 TABLET, DELAYED RELEASE ORAL at 05:25

## 2024-02-07 RX ADMIN — MAGNESIUM SULFATE HEPTAHYDRATE 2000 MG: 40 INJECTION, SOLUTION INTRAVENOUS at 09:23

## 2024-02-07 RX ADMIN — POLYMYXIN B SULFATE, BACITRACIN ZINC, NEOMYCIN SULFATE: 5000; 3.5; 4 OINTMENT TOPICAL at 09:19

## 2024-02-07 RX ADMIN — FUROSEMIDE 40 MG: 10 INJECTION, SOLUTION INTRAMUSCULAR; INTRAVENOUS at 16:33

## 2024-02-07 RX ADMIN — PENTOXIFYLLINE 400 MG: 400 TABLET, EXTENDED RELEASE ORAL at 16:33

## 2024-02-07 RX ADMIN — TRAMADOL HYDROCHLORIDE 50 MG: 50 TABLET, COATED ORAL at 04:50

## 2024-02-07 RX ADMIN — Medication 10 ML: at 20:44

## 2024-02-07 RX ADMIN — LACTULOSE 20 G: 20 SOLUTION ORAL at 09:18

## 2024-02-07 RX ADMIN — SULFAMETHOXAZOLE AND TRIMETHOPRIM 2 TABLET: 800; 160 TABLET ORAL at 11:12

## 2024-02-07 RX ADMIN — CEFEPIME 2000 MG: 2 INJECTION, POWDER, FOR SOLUTION INTRAVENOUS at 17:55

## 2024-02-07 RX ADMIN — MIDODRINE HYDROCHLORIDE 10 MG: 5 TABLET ORAL at 09:18

## 2024-02-07 RX ADMIN — RIFAXIMIN 400 MG: 200 TABLET ORAL at 09:18

## 2024-02-07 RX ADMIN — OXYCODONE 10 MG: 5 TABLET ORAL at 20:42

## 2024-02-07 RX ADMIN — OXYCODONE 10 MG: 5 TABLET ORAL at 13:50

## 2024-02-07 RX ADMIN — PENTOXIFYLLINE 400 MG: 400 TABLET, EXTENDED RELEASE ORAL at 11:12

## 2024-02-07 RX ADMIN — BUSPIRONE HYDROCHLORIDE 10 MG: 5 TABLET ORAL at 09:18

## 2024-02-07 RX ADMIN — FOLIC ACID 1 MG: 1 TABLET ORAL at 09:18

## 2024-02-07 RX ADMIN — OXYCODONE 10 MG: 5 TABLET ORAL at 01:15

## 2024-02-07 RX ADMIN — SODIUM CHLORIDE, POTASSIUM CHLORIDE, SODIUM LACTATE AND CALCIUM CHLORIDE: 600; 310; 30; 20 INJECTION, SOLUTION INTRAVENOUS at 09:23

## 2024-02-07 RX ADMIN — Medication 9 MG: at 20:42

## 2024-02-07 RX ADMIN — RIFAXIMIN 400 MG: 200 TABLET ORAL at 13:50

## 2024-02-07 RX ADMIN — BUSPIRONE HYDROCHLORIDE 10 MG: 5 TABLET ORAL at 20:42

## 2024-02-07 RX ADMIN — MIDODRINE HYDROCHLORIDE 10 MG: 5 TABLET ORAL at 16:33

## 2024-02-07 RX ADMIN — RIFAXIMIN 400 MG: 200 TABLET ORAL at 20:42

## 2024-02-07 NOTE — CONSULTS
2/6/2024 3:05 PM  Johnathan Yanez III  [id number]     Chief Complaint:    Scrotal swelling      History of Present Illness:      The patient is a 44 y.o. male patient who presented to the hospital.  He is known to our group for  scrotal edema and leg swelling. He was admitted for cirrhosis. Yesterday it was noted that there was redness and induration to his scrotal skin.        He had a left inguinal hernia and umbilical hernia repair in February 2022. He was then seen in our office in April 2022 by Dr. Rivas for scrotal swelling and hydrocele. At that time it was discussed that surgery would be difficult due to the cirrhosis and connection to his abdomen. He did have the hydrocele aspirated in the office for 1200cc of yellow fluid. However, the scrotal edema recurred prior to him returning home from the office.   He did go see [facility]. Dr. Nathaniel Magaña reviewed his chart and agreed that  surgery was not indicated due to his massive ascites and cirrhosis. She recommended he see their hepatology department for management of his ascites. He was again seen by [facility] on 12/21/23 with complaints of continued scrotal swelling and requesting orchiectomy. It was explained to him at that time that he would need ascites optimized before urology would consider any hydrocelectomy. It was also explained to him by [facility] that surgery would not be appropriate treatment for his condition.       Past Medical History:   Diagnosis Date    Cirrhosis (HCC)     Esophageal varices (HCC)     ETOH abuse     GAVE (gastric antral vascular ectasia)     Hepatitis C     Portal hypertension (HCC)          Past Surgical History:   Procedure Laterality Date    ENDOSCOPY, COLON, DIAGNOSTIC      HERNIA REPAIR      HERNIA REPAIR Left 2/7/2022    LAPAROSCOPIC LEFT INGUINAL HERNIA REPAIR WITH MESH POSSIBLE OPEN POSSIBLE BILATERAL performed by Shashank Candelario MD at [facility] OR    TONSILLECTOMY      UMBILICAL HERNIA REPAIR N/A 9/15/2021 
General Surgery Consult    Patient's Name/Date of Birth: Johnathan Yanez III / 1980    Date: January 29, 2024     Consulting Surgeon: Shashank Candelario M.D.    PCP: No primary care provider on file.     Chief Complaint: right leg bump    HPI:   Johnathan Yanez III is a 44 y.o. male who presents for  evaluation of right leg lesion that is soft and rubbery and tender, in for pancreatitis.       Past Medical History:   Diagnosis Date    Cirrhosis (HCC)     Esophageal varices (HCC)     ETOH abuse     GAVE (gastric antral vascular ectasia)     Hepatitis C     Portal hypertension (HCC)        Past Surgical History:   Procedure Laterality Date    ENDOSCOPY, COLON, DIAGNOSTIC      HERNIA REPAIR      HERNIA REPAIR Left 2/7/2022    LAPAROSCOPIC LEFT INGUINAL HERNIA REPAIR WITH MESH POSSIBLE OPEN POSSIBLE BILATERAL performed by Shashank Candelario MD at [facility] OR    TONSILLECTOMY      UMBILICAL HERNIA REPAIR N/A 9/15/2021    LAPAROSCOPIC POSSIBLE OPEN UMBILICAL HERNIA REPAIR WITH MSH performed by Shashank Candelario MD at [facility] OR    UPPER GASTROINTESTINAL ENDOSCOPY N/A 11/13/2020    EGD BIOPSY performed by Milo Carrasco MD at [facility] ENDOSCOPY    UPPER GASTROINTESTINAL ENDOSCOPY N/A 1/6/2021    EGD BAND LIGATION performed by JOIE Huizar MD at [facility] ENDOSCOPY    UPPER GASTROINTESTINAL ENDOSCOPY N/A 5/10/2021    EGD BAND LIGATION performed by JOIE Huizar MD at [facility] OR    UPPER GASTROINTESTINAL ENDOSCOPY N/A 5/14/2023    EGD CONTROL HEMORRHAGE performed by Atif Johnson MD at [facility] ENDOSCOPY    UPPER GASTROINTESTINAL ENDOSCOPY  5/14/2023    EGD ESOPHAGOGASTRODUODENOSCOPY SCLEROTHERAPY performed by Atif Johnson MD at [facility] ENDOSCOPY    UPPER GASTROINTESTINAL ENDOSCOPY N/A 6/3/2023    EGD CONTROL HEMORRHAGE performed by JOIE Huizar MD at [facility] ENDOSCOPY    UPPER GASTROINTESTINAL ENDOSCOPY N/A 1/11/2024    EGD CONTROL HEMORRHAGE performed by Tino Bal DO at [facility] ENDOSCOPY       Current Facility-Administered Medications 
PSYCHIATRY CONSULT    REASON FOR CONSULT:  \"AMS, history of ETOH and drug abuse.\"    REQUESTING PROVIDER:  Dr. Navarrete    CHIEF COMPLAINT: \"I'm feeling better.\"     HISTORY OF PRESENT ILLNESS:  Johnathan Yanez III  is a 44 y.o. male with a past medical history of cirrhosis of the liver, anemia, thrombocytopenia, hepatitis C, cellulitis, esophageal varices, GI bleed, hernia, ascites, and polysubstance abuse. Patient was admitted on 1/25 after presenting to the ED with complaints of fatigue and loss of appetite.  Patient's last drink was reported to have been the day prior to admission.  Chart reviewed. No home psychotropics noted. UDS positive for marijuana and fentanyl. EtOH level not obtained. EKG last performed on 1/7, QTc 487. Psychiatry consulted for delirium and history of substance abuse. Discussed case with nursing who reports that patient's mentation seems to be improving. However, he does have periods of intermittent confusion and mild agitation. He has reportedly been redirectable. Upon assessment, patient observed resting in bed with his eyes closed. He appears lethargic with delayed responses. Patient superficially cooperative. He is A&Ox4 during my assessment. Patient tells me that he is \"feeling a little better.\" He is guarded and not very forthcoming with his substance abuse and psychiatric history. He tells me that he has been abusing drugs and alcohol for \"a long time.\" However, then proceeds to tell me that he \"does not want to talk about it.\" Patient tells me that he is agreeable to go to rehab at discharge. He complains of feeling anxious.  Denies any symptoms of alcohol withdrawal currently. Last CIWA was documented on 1/30 with a score of 20. Reports sleep and appetite are \"okay.\" He has been seen by peer support. Patient is not suicidal, homicidal, psychotic, or manic.    OARRS: Reviewed.  01/13/2024 01/13/2024   1  Tramadol Hcl 50 Mg Tablet 10.00      PAST PSYCHIATRIC HISTORY: Denies 
[facility]  [facility]    Consultation Note     Admit Date: 1/25/2024  4:08 PM    Reason for Consult:   Right leg abscess    Attending Physician:  Amaris Navarrete DO     Chief Complaint: Right leg swelling.    HISTORY OF PRESENT ILLNESS:   The patient is a 44 y.o.  male known to the Infectious Diseases service. The patient has a history of alcohol liver cirrhosis s/p TIPS procedure was recently admitted here from 1/6 till 1/16.  He was in septic shock from E. coli bacteremia either from right leg cellulitis versus SBP.  Paracentesis was not done 5 days after patient visit and antibiotics and it was negative.  Patient was treated with IV ceftriaxone inpatient completed 10 days of antibiotics a day after discharge.  He presented back to the ER yesterday with right leg increased swellings.  He was actually feeling better for the past couple days.    Since admission patient is afebrile blood pressure is borderline saturating well on room air.  Labs showed white count of 5 on presentation today is 2.5 hemoglobin was 9.2 today 7.9 platelet count is 22 LFTs showed bilirubin of 7.3 improved to 4.7 today creatinine of 2.1 improved to 1.4 today there are no blood cultures UA looks clean urine culture is in process CT abdomen pelvis showed Redemonstration of findings related to chronic hepatocellular disease with  splenomegaly and portal venous hypertension.  2. New inflammatory changes along margins of the pancreas suggesting acute  pancreatitis.  3. Multiple thick walled segments of small bowel throughout the abdomen  suggesting enteritis.  4. Mild thickened wall of distal transverse colon, left colon, and sigmoid  colon suggesting infectious or inflammatory colitis.  Patient is on IV ceftriaxone and Flagyl and I got consult further recommendations.    Past Medical History:        Diagnosis Date    Cirrhosis (HCC)     Esophageal varices (HCC)     ETOH abuse     GAVE (gastric antral vascular 
POSSIBLE OPEN UMBILICAL HERNIA REPAIR WITH MSH performed by Shashank Candelario MD at [facility] OR    UPPER GASTROINTESTINAL ENDOSCOPY N/A 11/13/2020    EGD BIOPSY performed by Milo Carrasco MD at [facility]    UPPER GASTROINTESTINAL ENDOSCOPY N/A 1/6/2021    EGD BAND LIGATION performed by JOIE Huizar MD at [facility] ENDOSCOPY    UPPER GASTROINTESTINAL ENDOSCOPY N/A 5/10/2021    EGD BAND LIGATION performed by JOIE Huizar MD at [facility] OR    UPPER GASTROINTESTINAL ENDOSCOPY N/A 5/14/2023    EGD CONTROL HEMORRHAGE performed by Atif Johnson MD at [facility] ENDOSCOPY    UPPER GASTROINTESTINAL ENDOSCOPY  5/14/2023    EGD ESOPHAGOGASTRODUODENOSCOPY SCLEROTHERAPY performed by Atif Johnson MD at [facility] ENDOSCOPY    UPPER GASTROINTESTINAL ENDOSCOPY N/A 6/3/2023    EGD CONTROL HEMORRHAGE performed by JOIE Huizar MD at [facility] ENDOSCOPY    UPPER GASTROINTESTINAL ENDOSCOPY N/A 1/11/2024    EGD CONTROL HEMORRHAGE performed by Tino Bal DO at [facility] ENDOSCOPY     Family History   Problem Relation Age of Onset    Diabetes Father        Home Medications  Prior to Admission medications    Medication Sig Start Date End Date Taking? Authorizing Provider   furosemide (LASIX) 40 MG tablet Take 1 tablet by mouth every morning   Yes Robles Tavera MD   folic acid (FOLVITE) 1 MG tablet Take 1 tablet by mouth every morning   Yes Robles Tavera MD   vitamin B-1 (THIAMINE) 100 MG tablet Take 1 tablet by mouth every morning   Yes Robles Tavera MD   midodrine (PROAMATINE) 2.5 MG tablet Take 1 tablet by mouth 3 times daily (with meals) 1/13/24   Nino Lindsey MD   lactulose (CHRONULAC) 10 GM/15ML solution Take 30 mLs by mouth 3 times daily 8/29/23   Yessy Rodgers DO   pentoxifylline (TRENTAL) 400 MG extended release tablet Take 1 tablet by mouth 3 times daily (with meals) 8/29/23   Yessy Rodgers DO       Allergies  Pcn [penicillins]    Social Hx  Social History     Socioeconomic History    Marital status: Life 
the last 72 hours.  Recent Labs     01/27/24  0335   INR 3.2       RADIOLOGY  I have personally reviewed all relevant imaging      ASSESSMENT:      Patient Active Problem List   Diagnosis    Cellulitis    UGI bleed    Abdominal pain    Secondary esophageal varices with bleeding (HCC)    GIB (gastrointestinal bleeding)    Umbilical hernia without obstruction and without gangrene    Left inguinal hernia    Ascites    Decompensation of cirrhosis of liver (HCC)    Anemia    Thrombocytopenia (HCC)    Scrotal swelling    Hepatitis C virus infection without hepatic coma    GI bleed    Alcoholic cirrhosis of liver with ascites (HCC)    Liver cirrhosis (HCC)    Enterocolitis    Hematemesis    Left hydrocele    Hepatic failure, unspecified without coma (HCC)    Hyponatremia    Hematoma of right lower extremity    Acute kidney injury (HCC)    CANDY (acute kidney injury) (HCC)    Mild protein-calorie malnutrition (HCC)       44 y.o. male with alcoholic cirrhosis with concern for pancreatitis on CT imaging, in addition to concerns for RLE masses that have been present for atleast 2 weeks unchanged.     PLAN:  - do not feel patient has any clinical indication for pancreatitis; he is non-tender and tolerating a diet  - RLE masses are not concerning for abscesses; they have remained unchanged from prior evaluation, there are no overlying skin changes  - okay to continue with diet as tolerated  - no plans for incision and drainage of RLE masses; feel this would do more harm as these are likely not infectious in etiology and patient is thrombocytopenic.   - no surgical intervention indicated at this time, please call with any questions or concerns.     Will discuss with Dr. Zhang.     Arianna Gu DO  General Surgery Resident, PGY-1    Electronically signed by Arianna Gu DO on 1/29/24 at 11:41 AM EST    
patent.  Mid to distal tips shunt stenosis suspected with   velocity reaching 369 cm/second distally.   3. Gallbladder wall thickening could be related to ascites or liver disease.   4. Left hepatic vein is obscured.   5. Pancreas not well visualized.   6. Right kidney is grossly unremarkable.   7. Moderate to large amount of upper abdominal ascites.         US DUP ABD PEL RETRO SCROT COMPLETE   Final Result   1. Coarse echogenic appearance of the liver parenchyma could be related to   liver disease.   2. Tips shunt is patent.  Mid to distal tips shunt stenosis suspected with   velocity reaching 369 cm/second distally.   3. Gallbladder wall thickening could be related to ascites or liver disease.   4. Left hepatic vein is obscured.   5. Pancreas not well visualized.   6. Right kidney is grossly unremarkable.   7. Moderate to large amount of upper abdominal ascites.         US ABDOMEN COMPLETE   Final Result   Large volume 4 quadrant ascites.         CT HEAD WO CONTRAST   Final Result   No acute intracranial abnormality.         US SOFT TISSUE LIMITED AREA   Final Result   Slight increase in size of the complex, heterogeneous hypoechoic mass in the   right lateral calf, consistent with a hematoma or abscess.         CT ABDOMEN PELVIS W IV CONTRAST Additional Contrast? None   Final Result   1. Redemonstration of findings related to chronic hepatocellular disease with   splenomegaly and portal venous hypertension.   2. New inflammatory changes along margins of the pancreas suggesting acute   pancreatitis.   3. Multiple thick walled segments of small bowel throughout the abdomen   suggesting enteritis.   4. Mild thickened wall of distal transverse colon, left colon, and sigmoid   colon suggesting infectious or inflammatory colitis.   5. Redemonstration of ascites which has decreased.   6. Nonspecific generalized mild wall thickening involving the gallbladder.   7. Postsurgical changes related to ventral abdominal wall

## 2024-02-07 NOTE — CARE COORDINATION
Updated plan of care.  Patient is typically from home with significant other independently. Patient discharge plan is to go to [facility]. Precert started 2/1. rex DONALD, transport form, 769[address] completed and in soft chart. Urology consulted for scrotal abscess. CT completed, reconsulted ID for recommendations.   Electronically signed by Farnaz Cerda RN on 2/7/2024 at 9:54 AM\

## 2024-02-08 ENCOUNTER — APPOINTMENT (OUTPATIENT)
Dept: ULTRASOUND IMAGING | Age: 44
DRG: 469 | End: 2024-02-08
Payer: COMMERCIAL

## 2024-02-08 LAB
ALBUMIN SERPL-MCNC: 2.5 G/DL (ref 3.5–5.2)
ALBUMIN SERPL-MCNC: 2.8 G/DL (ref 3.5–5.2)
ALBUMIN SERPL-MCNC: 2.8 G/DL (ref 3.5–5.2)
ALP SERPL-CCNC: 88 U/L (ref 40–129)
ALP SERPL-CCNC: 89 U/L (ref 40–129)
ALP SERPL-CCNC: 91 U/L (ref 40–129)
ALT SERPL-CCNC: 17 U/L (ref 0–40)
ANION GAP SERPL CALCULATED.3IONS-SCNC: 7 MMOL/L (ref 7–16)
ANION GAP SERPL CALCULATED.3IONS-SCNC: 8 MMOL/L (ref 7–16)
ANION GAP SERPL CALCULATED.3IONS-SCNC: 9 MMOL/L (ref 7–16)
AST SERPL-CCNC: 59 U/L (ref 0–39)
AST SERPL-CCNC: 60 U/L (ref 0–39)
AST SERPL-CCNC: 61 U/L (ref 0–39)
BILIRUB SERPL-MCNC: 5.3 MG/DL (ref 0–1.2)
BILIRUB SERPL-MCNC: 6 MG/DL (ref 0–1.2)
BILIRUB SERPL-MCNC: 6.1 MG/DL (ref 0–1.2)
BUN SERPL-MCNC: 5 MG/DL (ref 6–20)
BUN SERPL-MCNC: 6 MG/DL (ref 6–20)
BUN SERPL-MCNC: 6 MG/DL (ref 6–20)
CALCIUM SERPL-MCNC: 7.4 MG/DL (ref 8.6–10.2)
CALCIUM SERPL-MCNC: 7.9 MG/DL (ref 8.6–10.2)
CALCIUM SERPL-MCNC: 8 MG/DL (ref 8.6–10.2)
CHLORIDE SERPL-SCNC: 106 MMOL/L (ref 98–107)
CHLORIDE SERPL-SCNC: 108 MMOL/L (ref 98–107)
CHLORIDE SERPL-SCNC: 109 MMOL/L (ref 98–107)
CO2 SERPL-SCNC: 19 MMOL/L (ref 22–29)
CO2 SERPL-SCNC: 20 MMOL/L (ref 22–29)
CO2 SERPL-SCNC: 20 MMOL/L (ref 22–29)
CREAT SERPL-MCNC: 1.1 MG/DL (ref 0.7–1.2)
CREAT SERPL-MCNC: 1.1 MG/DL (ref 0.7–1.2)
CREAT SERPL-MCNC: 1.2 MG/DL (ref 0.7–1.2)
GFR SERPL CREATININE-BSD FRML MDRD: >60 ML/MIN/1.73M2
GLUCOSE SERPL-MCNC: 105 MG/DL (ref 74–99)
GLUCOSE SERPL-MCNC: 106 MG/DL (ref 74–99)
GLUCOSE SERPL-MCNC: 118 MG/DL (ref 74–99)
INR PPP: 2.7
MAGNESIUM SERPL-MCNC: 1.7 MG/DL (ref 1.6–2.6)
MAGNESIUM SERPL-MCNC: 1.8 MG/DL (ref 1.6–2.6)
MAGNESIUM SERPL-MCNC: 1.8 MG/DL (ref 1.6–2.6)
PARTIAL THROMBOPLASTIN TIME: 48.3 SEC (ref 24.5–35.1)
PHOSPHATE SERPL-MCNC: 2.2 MG/DL (ref 2.5–4.5)
PHOSPHATE SERPL-MCNC: 2.6 MG/DL (ref 2.5–4.5)
PHOSPHATE SERPL-MCNC: 2.9 MG/DL (ref 2.5–4.5)
POTASSIUM SERPL-SCNC: 3.4 MMOL/L (ref 3.5–5)
POTASSIUM SERPL-SCNC: 3.6 MMOL/L (ref 3.5–5)
POTASSIUM SERPL-SCNC: 3.8 MMOL/L (ref 3.5–5)
PROT SERPL-MCNC: 4.8 G/DL (ref 6.4–8.3)
PROT SERPL-MCNC: 5.2 G/DL (ref 6.4–8.3)
PROT SERPL-MCNC: 5.2 G/DL (ref 6.4–8.3)
PROTHROMBIN TIME: 29.3 SEC (ref 9.3–12.4)
SODIUM SERPL-SCNC: 133 MMOL/L (ref 132–146)
SODIUM SERPL-SCNC: 136 MMOL/L (ref 132–146)
SODIUM SERPL-SCNC: 137 MMOL/L (ref 132–146)

## 2024-02-08 PROCEDURE — 87070 CULTURE OTHR SPECIMN AEROBIC: CPT

## 2024-02-08 PROCEDURE — 6370000000 HC RX 637 (ALT 250 FOR IP): Performed by: STUDENT IN AN ORGANIZED HEALTH CARE EDUCATION/TRAINING PROGRAM

## 2024-02-08 PROCEDURE — 6370000000 HC RX 637 (ALT 250 FOR IP): Performed by: INTERNAL MEDICINE

## 2024-02-08 PROCEDURE — 6370000000 HC RX 637 (ALT 250 FOR IP): Performed by: HOSPITALIST

## 2024-02-08 PROCEDURE — 6370000000 HC RX 637 (ALT 250 FOR IP): Performed by: NURSE PRACTITIONER

## 2024-02-08 PROCEDURE — P9047 ALBUMIN (HUMAN), 25%, 50ML: HCPCS | Performed by: PHYSICIAN ASSISTANT

## 2024-02-08 PROCEDURE — 83735 ASSAY OF MAGNESIUM: CPT

## 2024-02-08 PROCEDURE — 2580000003 HC RX 258: Performed by: STUDENT IN AN ORGANIZED HEALTH CARE EDUCATION/TRAINING PROGRAM

## 2024-02-08 PROCEDURE — 6360000002 HC RX W HCPCS: Performed by: HOSPITALIST

## 2024-02-08 PROCEDURE — 6360000002 HC RX W HCPCS: Performed by: PHYSICIAN ASSISTANT

## 2024-02-08 PROCEDURE — 2500000003 HC RX 250 WO HCPCS: Performed by: PHYSICIAN ASSISTANT

## 2024-02-08 PROCEDURE — 87205 SMEAR GRAM STAIN: CPT

## 2024-02-08 PROCEDURE — P9047 ALBUMIN (HUMAN), 25%, 50ML: HCPCS | Performed by: HOSPITALIST

## 2024-02-08 PROCEDURE — 6360000002 HC RX W HCPCS: Performed by: STUDENT IN AN ORGANIZED HEALTH CARE EDUCATION/TRAINING PROGRAM

## 2024-02-08 PROCEDURE — 1200000000 HC SEMI PRIVATE

## 2024-02-08 PROCEDURE — 85610 PROTHROMBIN TIME: CPT

## 2024-02-08 PROCEDURE — 85730 THROMBOPLASTIN TIME PARTIAL: CPT

## 2024-02-08 PROCEDURE — 99232 SBSQ HOSP IP/OBS MODERATE 35: CPT | Performed by: STUDENT IN AN ORGANIZED HEALTH CARE EDUCATION/TRAINING PROGRAM

## 2024-02-08 PROCEDURE — 0W9G3ZZ DRAINAGE OF PERITONEAL CAVITY, PERCUTANEOUS APPROACH: ICD-10-PCS | Performed by: RADIOLOGY

## 2024-02-08 PROCEDURE — 2709999900 US GUIDED PARACENTESIS

## 2024-02-08 PROCEDURE — 36415 COLL VENOUS BLD VENIPUNCTURE: CPT

## 2024-02-08 PROCEDURE — 84100 ASSAY OF PHOSPHORUS: CPT

## 2024-02-08 PROCEDURE — 80053 COMPREHEN METABOLIC PANEL: CPT

## 2024-02-08 RX ORDER — LIDOCAINE HYDROCHLORIDE 10 MG/ML
INJECTION, SOLUTION EPIDURAL; INFILTRATION; INTRACAUDAL; PERINEURAL PRN
Status: COMPLETED | OUTPATIENT
Start: 2024-02-08 | End: 2024-02-08

## 2024-02-08 RX ORDER — ALBUMIN (HUMAN) 12.5 G/50ML
SOLUTION INTRAVENOUS CONTINUOUS PRN
Status: COMPLETED | OUTPATIENT
Start: 2024-02-08 | End: 2024-02-08

## 2024-02-08 RX ADMIN — Medication 100 MG: at 12:17

## 2024-02-08 RX ADMIN — OXYCODONE 10 MG: 5 TABLET ORAL at 05:40

## 2024-02-08 RX ADMIN — Medication 10 ML: at 20:16

## 2024-02-08 RX ADMIN — FOLIC ACID 1 MG: 1 TABLET ORAL at 12:17

## 2024-02-08 RX ADMIN — ONDANSETRON 4 MG: 2 INJECTION INTRAMUSCULAR; INTRAVENOUS at 18:28

## 2024-02-08 RX ADMIN — MIDODRINE HYDROCHLORIDE 10 MG: 5 TABLET ORAL at 12:15

## 2024-02-08 RX ADMIN — PENTOXIFYLLINE 400 MG: 400 TABLET, EXTENDED RELEASE ORAL at 12:13

## 2024-02-08 RX ADMIN — METRONIDAZOLE 500 MG: 500 TABLET ORAL at 05:41

## 2024-02-08 RX ADMIN — ALBUMIN (HUMAN) 25 G: 25 SOLUTION INTRAVENOUS at 08:44

## 2024-02-08 RX ADMIN — POTASSIUM CHLORIDE 40 MEQ: 1500 TABLET, EXTENDED RELEASE ORAL at 16:59

## 2024-02-08 RX ADMIN — METRONIDAZOLE 500 MG: 500 TABLET ORAL at 13:39

## 2024-02-08 RX ADMIN — POLYMYXIN B SULFATE, BACITRACIN ZINC, NEOMYCIN SULFATE: 5000; 3.5; 4 OINTMENT TOPICAL at 08:15

## 2024-02-08 RX ADMIN — RIFAXIMIN 400 MG: 200 TABLET ORAL at 20:19

## 2024-02-08 RX ADMIN — PENTOXIFYLLINE 400 MG: 400 TABLET, EXTENDED RELEASE ORAL at 17:00

## 2024-02-08 RX ADMIN — BACLOFEN 10 MG: 10 TABLET ORAL at 22:47

## 2024-02-08 RX ADMIN — PANTOPRAZOLE SODIUM 40 MG: 40 TABLET, DELAYED RELEASE ORAL at 17:00

## 2024-02-08 RX ADMIN — CEFEPIME 2000 MG: 2 INJECTION, POWDER, FOR SOLUTION INTRAVENOUS at 17:08

## 2024-02-08 RX ADMIN — OXYCODONE 10 MG: 5 TABLET ORAL at 17:00

## 2024-02-08 RX ADMIN — Medication 10 ML: at 08:16

## 2024-02-08 RX ADMIN — LIDOCAINE HYDROCHLORIDE 10 ML: 10 INJECTION, SOLUTION EPIDURAL; INFILTRATION; INTRACAUDAL; PERINEURAL at 10:29

## 2024-02-08 RX ADMIN — BUSPIRONE HYDROCHLORIDE 10 MG: 5 TABLET ORAL at 20:19

## 2024-02-08 RX ADMIN — MIDODRINE HYDROCHLORIDE 10 MG: 5 TABLET ORAL at 16:59

## 2024-02-08 RX ADMIN — ALBUMIN (HUMAN) 50 G: 0.25 INJECTION, SOLUTION INTRAVENOUS at 13:42

## 2024-02-08 RX ADMIN — METRONIDAZOLE 500 MG: 500 TABLET ORAL at 20:20

## 2024-02-08 RX ADMIN — POLYMYXIN B SULFATE, BACITRACIN ZINC, NEOMYCIN SULFATE: 5000; 3.5; 4 OINTMENT TOPICAL at 20:15

## 2024-02-08 RX ADMIN — RIFAXIMIN 400 MG: 200 TABLET ORAL at 13:39

## 2024-02-08 RX ADMIN — CEFEPIME 2000 MG: 2 INJECTION, POWDER, FOR SOLUTION INTRAVENOUS at 06:06

## 2024-02-08 RX ADMIN — PANTOPRAZOLE SODIUM 40 MG: 40 TABLET, DELAYED RELEASE ORAL at 05:40

## 2024-02-08 NOTE — OR NURSING
Patient arrived from the floor to IR for ultrasound guided paracentesis. Vitals obtained, and consent signed per patient. Joan Parker PA-C in to speak with the patient about the procedure, all questions answered. Abdomen scanned and prepped. 1% Lidocaine administered to procedural site. 5 Czech centesis catheter inserted to LLQ with ultrasound guidance, catheter connected to suction. 25 G IV Albumin given (lost IV site after first bottle of albumin, IV team order placed for new peripheral IV site). Patient tolerated procedure well. 3,530 ml drained of clear yellow colored ascitic fluid. Centesis catheter removed post procedure. Specimens collected and sent to lab per order. Puncture site cleansed and dry dressing applied. No bleeding, swelling or complications noted. Post procedure vitals obtained. Nurse to nurse report called. Patient transported out of the dept and back to the floor.

## 2024-02-08 NOTE — CARE COORDINATION
Updated plan of care.  Patient is typically from home with significant other independently. Patient discharge plan is to go to [facility]. Auth Approved and good through 2/19. Patient currently in IR for paracentesis. ID re consulted for scrotal abscess, now on IV cefepime and oral flagyl. Await plan.   Electronically signed by Farnaz Cerda RN on 2/8/2024 at 9:59 AM

## 2024-02-08 NOTE — BRIEF OP NOTE
Diagnosis: Ascites    Procedure: Ultrasound Guided Paracentesis    Findings: Large volume ascites, successful catheter placement with clear yellow fluid return    Specimen: Approximately 50cc obtained and sent for analysis (orders from referring physician).  Total amount of fluid removed to be reported in PACS.    EBL: Minimal.    Complication: None immediately post procedure.    Plan:  Return to floor/room following paracentesis.

## 2024-02-09 LAB
ALBUMIN SERPL-MCNC: 2.6 G/DL (ref 3.5–5.2)
ALBUMIN SERPL-MCNC: 2.7 G/DL (ref 3.5–5.2)
ALP SERPL-CCNC: 76 U/L (ref 40–129)
ALP SERPL-CCNC: 81 U/L (ref 40–129)
ALT SERPL-CCNC: 14 U/L (ref 0–40)
ALT SERPL-CCNC: 16 U/L (ref 0–40)
ANION GAP SERPL CALCULATED.3IONS-SCNC: 7 MMOL/L (ref 7–16)
ANION GAP SERPL CALCULATED.3IONS-SCNC: 8 MMOL/L (ref 7–16)
AST SERPL-CCNC: 53 U/L (ref 0–39)
AST SERPL-CCNC: 53 U/L (ref 0–39)
BASOPHILS # BLD: 0 K/UL (ref 0–0.2)
BASOPHILS # BLD: 0.07 K/UL (ref 0–0.2)
BASOPHILS NFR BLD: 0 % (ref 0–2)
BASOPHILS NFR BLD: 2 % (ref 0–2)
BILIRUB SERPL-MCNC: 5.5 MG/DL (ref 0–1.2)
BILIRUB SERPL-MCNC: 6.2 MG/DL (ref 0–1.2)
BUN SERPL-MCNC: 6 MG/DL (ref 6–20)
BUN SERPL-MCNC: 7 MG/DL (ref 6–20)
CALCIUM SERPL-MCNC: 7.6 MG/DL (ref 8.6–10.2)
CALCIUM SERPL-MCNC: 7.8 MG/DL (ref 8.6–10.2)
CHLORIDE SERPL-SCNC: 106 MMOL/L (ref 98–107)
CHLORIDE SERPL-SCNC: 108 MMOL/L (ref 98–107)
CO2 SERPL-SCNC: 20 MMOL/L (ref 22–29)
CO2 SERPL-SCNC: 20 MMOL/L (ref 22–29)
CREAT SERPL-MCNC: 0.9 MG/DL (ref 0.7–1.2)
CREAT SERPL-MCNC: 1 MG/DL (ref 0.7–1.2)
EOSINOPHIL # BLD: 0.03 K/UL (ref 0.05–0.5)
EOSINOPHIL # BLD: 0.07 K/UL (ref 0.05–0.5)
EOSINOPHILS RELATIVE PERCENT: 1 % (ref 0–6)
EOSINOPHILS RELATIVE PERCENT: 2 % (ref 0–6)
ERYTHROCYTE [DISTWIDTH] IN BLOOD BY AUTOMATED COUNT: 24.9 % (ref 11.5–15)
ERYTHROCYTE [DISTWIDTH] IN BLOOD BY AUTOMATED COUNT: 26.5 % (ref 11.5–15)
ERYTHROCYTE [DISTWIDTH] IN BLOOD BY AUTOMATED COUNT: 26.5 % (ref 11.5–15)
GFR SERPL CREATININE-BSD FRML MDRD: >60 ML/MIN/1.73M2
GFR SERPL CREATININE-BSD FRML MDRD: >60 ML/MIN/1.73M2
GLUCOSE SERPL-MCNC: 118 MG/DL (ref 74–99)
GLUCOSE SERPL-MCNC: 90 MG/DL (ref 74–99)
HCT VFR BLD AUTO: 20.4 % (ref 37–54)
HCT VFR BLD AUTO: 20.7 % (ref 37–54)
HCT VFR BLD AUTO: 23.3 % (ref 37–54)
HGB BLD-MCNC: 6.7 G/DL (ref 12.5–16.5)
HGB BLD-MCNC: 6.9 G/DL (ref 12.5–16.5)
HGB BLD-MCNC: 7.7 G/DL (ref 12.5–16.5)
LYMPHOCYTES NFR BLD: 0.15 K/UL (ref 1.5–4)
LYMPHOCYTES NFR BLD: 0.17 K/UL (ref 1.5–4)
LYMPHOCYTES RELATIVE PERCENT: 4 % (ref 20–42)
LYMPHOCYTES RELATIVE PERCENT: 4 % (ref 20–42)
MAGNESIUM SERPL-MCNC: 1.6 MG/DL (ref 1.6–2.6)
MAGNESIUM SERPL-MCNC: 1.6 MG/DL (ref 1.6–2.6)
MCH RBC QN AUTO: 31.6 PG (ref 26–35)
MCH RBC QN AUTO: 31.8 PG (ref 26–35)
MCH RBC QN AUTO: 32.2 PG (ref 26–35)
MCHC RBC AUTO-ENTMCNC: 32.8 G/DL (ref 32–34.5)
MCHC RBC AUTO-ENTMCNC: 33 G/DL (ref 32–34.5)
MCV RBC AUTO: 96.2 FL (ref 80–99.9)
MCV RBC AUTO: 96.3 FL (ref 80–99.9)
MCV RBC AUTO: 96.7 FL (ref 80–99.9)
MONOCYTES NFR BLD: 0.07 K/UL (ref 0.1–0.95)
MONOCYTES NFR BLD: 0.12 K/UL (ref 0.1–0.95)
MONOCYTES NFR BLD: 2 % (ref 2–12)
MONOCYTES NFR BLD: 4 % (ref 2–12)
MYELOCYTES ABSOLUTE COUNT: 0.07 K/UL
MYELOCYTES: 2 %
NEUTROPHILS NFR BLD: 89 % (ref 43–80)
NEUTROPHILS NFR BLD: 91 % (ref 43–80)
NEUTS SEG NFR BLD: 3.19 K/UL (ref 1.8–7.3)
NEUTS SEG NFR BLD: 3.37 K/UL (ref 1.8–7.3)
PHOSPHATE SERPL-MCNC: 1.7 MG/DL (ref 2.5–4.5)
PHOSPHATE SERPL-MCNC: 2 MG/DL (ref 2.5–4.5)
PLATELET # BLD AUTO: 25 K/UL (ref 130–450)
PLATELET # BLD AUTO: 29 K/UL (ref 130–450)
PLATELET CONFIRMATION: NORMAL
PLATELET, FLUORESCENCE: 23 K/UL (ref 130–450)
PMV BLD AUTO: 10.9 FL (ref 7–12)
PMV BLD AUTO: 11.4 FL (ref 7–12)
PMV BLD AUTO: ABNORMAL FL (ref 7–12)
POTASSIUM SERPL-SCNC: 3.8 MMOL/L (ref 3.5–5)
POTASSIUM SERPL-SCNC: 3.9 MMOL/L (ref 3.5–5)
PROT SERPL-MCNC: 4.5 G/DL (ref 6.4–8.3)
PROT SERPL-MCNC: 4.6 G/DL (ref 6.4–8.3)
RBC # BLD AUTO: 2.12 M/UL (ref 3.8–5.8)
RBC # BLD AUTO: 2.14 M/UL (ref 3.8–5.8)
RBC # BLD AUTO: 2.42 M/UL (ref 3.8–5.8)
RBC # BLD: ABNORMAL 10*6/UL
SODIUM SERPL-SCNC: 134 MMOL/L (ref 132–146)
SODIUM SERPL-SCNC: 135 MMOL/L (ref 132–146)
WBC OTHER # BLD: 3.5 K/UL (ref 4.5–11.5)
WBC OTHER # BLD: 3.8 K/UL (ref 4.5–11.5)

## 2024-02-09 PROCEDURE — 86901 BLOOD TYPING SEROLOGIC RH(D): CPT

## 2024-02-09 PROCEDURE — 86850 RBC ANTIBODY SCREEN: CPT

## 2024-02-09 PROCEDURE — 36430 TRANSFUSION BLD/BLD COMPNT: CPT

## 2024-02-09 PROCEDURE — 85025 COMPLETE CBC W/AUTO DIFF WBC: CPT

## 2024-02-09 PROCEDURE — 6370000000 HC RX 637 (ALT 250 FOR IP): Performed by: STUDENT IN AN ORGANIZED HEALTH CARE EDUCATION/TRAINING PROGRAM

## 2024-02-09 PROCEDURE — 2580000003 HC RX 258: Performed by: STUDENT IN AN ORGANIZED HEALTH CARE EDUCATION/TRAINING PROGRAM

## 2024-02-09 PROCEDURE — 83735 ASSAY OF MAGNESIUM: CPT

## 2024-02-09 PROCEDURE — 84100 ASSAY OF PHOSPHORUS: CPT

## 2024-02-09 PROCEDURE — 97530 THERAPEUTIC ACTIVITIES: CPT

## 2024-02-09 PROCEDURE — 6370000000 HC RX 637 (ALT 250 FOR IP): Performed by: HOSPITALIST

## 2024-02-09 PROCEDURE — 6360000002 HC RX W HCPCS: Performed by: STUDENT IN AN ORGANIZED HEALTH CARE EDUCATION/TRAINING PROGRAM

## 2024-02-09 PROCEDURE — 1200000000 HC SEMI PRIVATE

## 2024-02-09 PROCEDURE — 97535 SELF CARE MNGMENT TRAINING: CPT

## 2024-02-09 PROCEDURE — 80053 COMPREHEN METABOLIC PANEL: CPT

## 2024-02-09 PROCEDURE — P9016 RBC LEUKOCYTES REDUCED: HCPCS

## 2024-02-09 PROCEDURE — 85027 COMPLETE CBC AUTOMATED: CPT

## 2024-02-09 PROCEDURE — 86923 COMPATIBILITY TEST ELECTRIC: CPT

## 2024-02-09 PROCEDURE — 99232 SBSQ HOSP IP/OBS MODERATE 35: CPT | Performed by: STUDENT IN AN ORGANIZED HEALTH CARE EDUCATION/TRAINING PROGRAM

## 2024-02-09 PROCEDURE — 6370000000 HC RX 637 (ALT 250 FOR IP): Performed by: NURSE PRACTITIONER

## 2024-02-09 PROCEDURE — 86900 BLOOD TYPING SEROLOGIC ABO: CPT

## 2024-02-09 PROCEDURE — 6370000000 HC RX 637 (ALT 250 FOR IP): Performed by: INTERNAL MEDICINE

## 2024-02-09 RX ORDER — SODIUM CHLORIDE 9 MG/ML
INJECTION, SOLUTION INTRAVENOUS PRN
Status: DISCONTINUED | OUTPATIENT
Start: 2024-02-09 | End: 2024-02-11 | Stop reason: HOSPADM

## 2024-02-09 RX ADMIN — Medication 10 ML: at 21:32

## 2024-02-09 RX ADMIN — BACLOFEN 10 MG: 10 TABLET ORAL at 21:28

## 2024-02-09 RX ADMIN — BUSPIRONE HYDROCHLORIDE 10 MG: 5 TABLET ORAL at 21:28

## 2024-02-09 RX ADMIN — RIFAXIMIN 400 MG: 200 TABLET ORAL at 14:15

## 2024-02-09 RX ADMIN — RIFAXIMIN 400 MG: 200 TABLET ORAL at 08:27

## 2024-02-09 RX ADMIN — METRONIDAZOLE 500 MG: 500 TABLET ORAL at 21:28

## 2024-02-09 RX ADMIN — MIDODRINE HYDROCHLORIDE 10 MG: 5 TABLET ORAL at 12:13

## 2024-02-09 RX ADMIN — Medication 100 MG: at 08:28

## 2024-02-09 RX ADMIN — TRAMADOL HYDROCHLORIDE 50 MG: 50 TABLET, COATED ORAL at 21:28

## 2024-02-09 RX ADMIN — PANTOPRAZOLE SODIUM 40 MG: 40 TABLET, DELAYED RELEASE ORAL at 17:21

## 2024-02-09 RX ADMIN — MIDODRINE HYDROCHLORIDE 10 MG: 5 TABLET ORAL at 17:21

## 2024-02-09 RX ADMIN — METRONIDAZOLE 500 MG: 500 TABLET ORAL at 14:15

## 2024-02-09 RX ADMIN — RIFAXIMIN 400 MG: 200 TABLET ORAL at 21:28

## 2024-02-09 RX ADMIN — Medication 9 MG: at 00:17

## 2024-02-09 RX ADMIN — OXYCODONE 10 MG: 5 TABLET ORAL at 11:49

## 2024-02-09 RX ADMIN — PANTOPRAZOLE SODIUM 40 MG: 40 TABLET, DELAYED RELEASE ORAL at 06:26

## 2024-02-09 RX ADMIN — CEFEPIME 2000 MG: 2 INJECTION, POWDER, FOR SOLUTION INTRAVENOUS at 17:26

## 2024-02-09 RX ADMIN — ONDANSETRON 4 MG: 2 INJECTION INTRAMUSCULAR; INTRAVENOUS at 22:20

## 2024-02-09 RX ADMIN — PENTOXIFYLLINE 400 MG: 400 TABLET, EXTENDED RELEASE ORAL at 12:14

## 2024-02-09 RX ADMIN — BACLOFEN 10 MG: 10 TABLET ORAL at 11:52

## 2024-02-09 RX ADMIN — PENTOXIFYLLINE 400 MG: 400 TABLET, EXTENDED RELEASE ORAL at 17:21

## 2024-02-09 RX ADMIN — METRONIDAZOLE 500 MG: 500 TABLET ORAL at 06:26

## 2024-02-09 RX ADMIN — MIDODRINE HYDROCHLORIDE 10 MG: 5 TABLET ORAL at 08:28

## 2024-02-09 RX ADMIN — Medication 9 MG: at 21:28

## 2024-02-09 RX ADMIN — OXYCODONE 10 MG: 5 TABLET ORAL at 00:17

## 2024-02-09 RX ADMIN — Medication 10 ML: at 08:28

## 2024-02-09 RX ADMIN — BUSPIRONE HYDROCHLORIDE 10 MG: 5 TABLET ORAL at 08:27

## 2024-02-09 RX ADMIN — FOLIC ACID 1 MG: 1 TABLET ORAL at 08:28

## 2024-02-09 RX ADMIN — POLYMYXIN B SULFATE, BACITRACIN ZINC, NEOMYCIN SULFATE: 5000; 3.5; 4 OINTMENT TOPICAL at 08:26

## 2024-02-09 RX ADMIN — POLYMYXIN B SULFATE, BACITRACIN ZINC, NEOMYCIN SULFATE: 5000; 3.5; 4 OINTMENT TOPICAL at 21:21

## 2024-02-09 RX ADMIN — PENTOXIFYLLINE 400 MG: 400 TABLET, EXTENDED RELEASE ORAL at 08:28

## 2024-02-09 NOTE — PATIENT CARE CONFERENCE
[facility] Quality Flow/Interdisciplinary Rounds Progress Note        Quality Flow Rounds held on February 9, 2024    Disciplines Attending:  Bedside Nurse, , , and Nursing Unit Leadership    Johnathan Yanez III was admitted on 1/25/2024  4:08 PM    Anticipated Discharge Date:       Disposition:    Dennis Score:  Dennis Scale Score: 16    Readmission Risk              Risk of Unplanned Readmission:  40           Discussed patient goal for the day, patient clinical progression, and barriers to discharge.  The following Goal(s) of the Day/Commitment(s) have been identified:  Diagnostics - Report Results and Labs - Report Results      Galina Bennett RN  February 9, 2024

## 2024-02-09 NOTE — PLAN OF CARE
Problem: Discharge Planning  Goal: Discharge to home or other facility with appropriate resources  2/8/2024 2200 by Yessy Patel RN  Outcome: Progressing  2/8/2024 1027 by Audrey Mccracken RN  Outcome: Progressing     Problem: Pain  Goal: Verbalizes/displays adequate comfort level or baseline comfort level  2/8/2024 2200 by Yessy Patel RN  Outcome: Progressing  2/8/2024 1027 by Audrey Mccracken RN  Outcome: Progressing     Problem: Safety - Adult  Goal: Free from fall injury  2/8/2024 2200 by Yessy Patel RN  Outcome: Progressing  2/8/2024 1027 by Audrey Mccracken RN  Outcome: Progressing     Problem: ABCDS Injury Assessment  Goal: Absence of physical injury  2/8/2024 2200 by Yessy Patel RN  Outcome: Progressing  2/8/2024 1027 by Audrey Mccracken RN  Outcome: Progressing     Problem: Nutrition Deficit:  Goal: Optimize nutritional status  2/8/2024 2200 by Yessy Patel RN  Outcome: Progressing  2/8/2024 1027 by Audrey Mccracken RN  Outcome: Progressing

## 2024-02-09 NOTE — CARE COORDINATION
Updated plan of care.   Patient is typically from home with significant other independently. Patient discharge plan is to go to [facility]. Auth Approved and good through 2/19. ID following, currently on IV cefepime and PO flagyl, await ID plan.  Electronically signed by Farnaz Cerda RN on 2/9/2024 at 10:06 AM

## 2024-02-10 VITALS
HEIGHT: 68 IN | DIASTOLIC BLOOD PRESSURE: 55 MMHG | BODY MASS INDEX: 30.87 KG/M2 | HEART RATE: 87 BPM | OXYGEN SATURATION: 96 % | TEMPERATURE: 98.7 F | WEIGHT: 203.71 LBS | SYSTOLIC BLOOD PRESSURE: 99 MMHG | RESPIRATION RATE: 16 BRPM

## 2024-02-10 PROBLEM — N49.2 SCROTAL ABSCESS: Status: ACTIVE | Noted: 2024-02-10

## 2024-02-10 LAB
ABO/RH: NORMAL
ALBUMIN SERPL-MCNC: 2.6 G/DL (ref 3.5–5.2)
ALP SERPL-CCNC: 83 U/L (ref 40–129)
ALT SERPL-CCNC: 14 U/L (ref 0–40)
ANION GAP SERPL CALCULATED.3IONS-SCNC: 7 MMOL/L (ref 7–16)
ANTIBODY SCREEN: NEGATIVE
ARM BAND NUMBER: NORMAL
AST SERPL-CCNC: 51 U/L (ref 0–39)
BASOPHILS # BLD: 0.02 K/UL (ref 0–0.2)
BASOPHILS NFR BLD: 1 % (ref 0–2)
BILIRUB SERPL-MCNC: 6.7 MG/DL (ref 0–1.2)
BLOOD BANK BLOOD PRODUCT EXPIRATION DATE: NORMAL
BLOOD BANK DISPENSE STATUS: NORMAL
BLOOD BANK ISBT PRODUCT BLOOD TYPE: 6200
BLOOD BANK PRODUCT CODE: NORMAL
BLOOD BANK SAMPLE EXPIRATION: NORMAL
BLOOD BANK UNIT TYPE AND RH: NORMAL
BPU ID: NORMAL
BUN SERPL-MCNC: 7 MG/DL (ref 6–20)
CALCIUM SERPL-MCNC: 7.9 MG/DL (ref 8.6–10.2)
CHLORIDE SERPL-SCNC: 107 MMOL/L (ref 98–107)
CO2 SERPL-SCNC: 20 MMOL/L (ref 22–29)
COMPONENT: NORMAL
CREAT SERPL-MCNC: 0.9 MG/DL (ref 0.7–1.2)
CROSSMATCH RESULT: NORMAL
EOSINOPHIL # BLD: 0.11 K/UL (ref 0.05–0.5)
EOSINOPHILS RELATIVE PERCENT: 3 % (ref 0–6)
ERYTHROCYTE [DISTWIDTH] IN BLOOD BY AUTOMATED COUNT: 25.2 % (ref 11.5–15)
GFR SERPL CREATININE-BSD FRML MDRD: >60 ML/MIN/1.73M2
GLUCOSE SERPL-MCNC: 87 MG/DL (ref 74–99)
HCT VFR BLD AUTO: 24.6 % (ref 37–54)
HGB BLD-MCNC: 8.4 G/DL (ref 12.5–16.5)
IMM GRANULOCYTES # BLD AUTO: 0.03 K/UL (ref 0–0.58)
IMM GRANULOCYTES NFR BLD: 1 % (ref 0–5)
LYMPHOCYTES NFR BLD: 0.41 K/UL (ref 1.5–4)
LYMPHOCYTES RELATIVE PERCENT: 10 % (ref 20–42)
MAGNESIUM SERPL-MCNC: 1.7 MG/DL (ref 1.6–2.6)
MCH RBC QN AUTO: 32.4 PG (ref 26–35)
MCHC RBC AUTO-ENTMCNC: 34.1 G/DL (ref 32–34.5)
MCV RBC AUTO: 95 FL (ref 80–99.9)
MICROORGANISM SPEC CULT: NO GROWTH
MICROORGANISM SPEC CULT: NO GROWTH
MICROORGANISM SPEC CULT: NORMAL
MICROORGANISM/AGENT SPEC: NORMAL
MONOCYTES NFR BLD: 0.35 K/UL (ref 0.1–0.95)
MONOCYTES NFR BLD: 8 % (ref 2–12)
NEUTROPHILS NFR BLD: 79 % (ref 43–80)
NEUTS SEG NFR BLD: 3.4 K/UL (ref 1.8–7.3)
PHOSPHATE SERPL-MCNC: 1.9 MG/DL (ref 2.5–4.5)
PLATELET # BLD AUTO: 29 K/UL (ref 130–450)
PLATELET CONFIRMATION: NORMAL
PMV BLD AUTO: 10.1 FL (ref 7–12)
POTASSIUM SERPL-SCNC: 3.9 MMOL/L (ref 3.5–5)
PROT SERPL-MCNC: 4.7 G/DL (ref 6.4–8.3)
RBC # BLD AUTO: 2.59 M/UL (ref 3.8–5.8)
RBC # BLD: ABNORMAL 10*6/UL
SODIUM SERPL-SCNC: 134 MMOL/L (ref 132–146)
SPECIMEN DESCRIPTION: NORMAL
TRANSFUSION STATUS: NORMAL
UNIT DIVISION: 0
UNIT ISSUE DATE/TIME: NORMAL
WBC OTHER # BLD: 4.3 K/UL (ref 4.5–11.5)

## 2024-02-10 PROCEDURE — 6370000000 HC RX 637 (ALT 250 FOR IP): Performed by: NURSE PRACTITIONER

## 2024-02-10 PROCEDURE — 6370000000 HC RX 637 (ALT 250 FOR IP): Performed by: HOSPITALIST

## 2024-02-10 PROCEDURE — 83735 ASSAY OF MAGNESIUM: CPT

## 2024-02-10 PROCEDURE — 2580000003 HC RX 258: Performed by: STUDENT IN AN ORGANIZED HEALTH CARE EDUCATION/TRAINING PROGRAM

## 2024-02-10 PROCEDURE — 1200000000 HC SEMI PRIVATE

## 2024-02-10 PROCEDURE — 84100 ASSAY OF PHOSPHORUS: CPT

## 2024-02-10 PROCEDURE — 6360000002 HC RX W HCPCS: Performed by: STUDENT IN AN ORGANIZED HEALTH CARE EDUCATION/TRAINING PROGRAM

## 2024-02-10 PROCEDURE — 80053 COMPREHEN METABOLIC PANEL: CPT

## 2024-02-10 PROCEDURE — 6370000000 HC RX 637 (ALT 250 FOR IP): Performed by: STUDENT IN AN ORGANIZED HEALTH CARE EDUCATION/TRAINING PROGRAM

## 2024-02-10 PROCEDURE — 99239 HOSP IP/OBS DSCHRG MGMT >30: CPT | Performed by: STUDENT IN AN ORGANIZED HEALTH CARE EDUCATION/TRAINING PROGRAM

## 2024-02-10 PROCEDURE — 6370000000 HC RX 637 (ALT 250 FOR IP): Performed by: CLINICAL NURSE SPECIALIST

## 2024-02-10 PROCEDURE — 6370000000 HC RX 637 (ALT 250 FOR IP): Performed by: INTERNAL MEDICINE

## 2024-02-10 PROCEDURE — 85025 COMPLETE CBC W/AUTO DIFF WBC: CPT

## 2024-02-10 RX ORDER — IBUPROFEN 200 MG
TABLET ORAL
Refills: 0 | Status: ON HOLD | DISCHARGE
Start: 2024-02-10

## 2024-02-10 RX ORDER — TRAMADOL HYDROCHLORIDE 50 MG/1
50 TABLET ORAL EVERY 8 HOURS PRN
Qty: 9 TABLET | Refills: 0 | Status: ON HOLD | OUTPATIENT
Start: 2024-02-10 | End: 2024-02-13

## 2024-02-10 RX ORDER — BACLOFEN 10 MG/1
10 TABLET ORAL 3 TIMES DAILY PRN
Refills: 0 | Status: ON HOLD | DISCHARGE
Start: 2024-02-10

## 2024-02-10 RX ORDER — LEVOFLOXACIN 500 MG/1
500 TABLET, FILM COATED ORAL DAILY
Status: DISCONTINUED | OUTPATIENT
Start: 2024-02-10 | End: 2024-02-11 | Stop reason: HOSPADM

## 2024-02-10 RX ORDER — LEVOFLOXACIN 500 MG/1
500 TABLET, FILM COATED ORAL DAILY
Qty: 10 TABLET | Refills: 0 | Status: ON HOLD | OUTPATIENT
Start: 2024-02-10 | End: 2024-02-20

## 2024-02-10 RX ORDER — METRONIDAZOLE 500 MG/1
500 TABLET ORAL EVERY 8 HOURS SCHEDULED
Qty: 13 TABLET | Refills: 0 | Status: ON HOLD | OUTPATIENT
Start: 2024-02-10 | End: 2024-02-15

## 2024-02-10 RX ADMIN — METRONIDAZOLE 500 MG: 500 TABLET ORAL at 13:55

## 2024-02-10 RX ADMIN — MIDODRINE HYDROCHLORIDE 10 MG: 5 TABLET ORAL at 11:46

## 2024-02-10 RX ADMIN — POLYMYXIN B SULFATE, BACITRACIN ZINC, NEOMYCIN SULFATE: 5000; 3.5; 4 OINTMENT TOPICAL at 21:10

## 2024-02-10 RX ADMIN — PENTOXIFYLLINE 400 MG: 400 TABLET, EXTENDED RELEASE ORAL at 11:46

## 2024-02-10 RX ADMIN — RIFAXIMIN 400 MG: 200 TABLET ORAL at 21:10

## 2024-02-10 RX ADMIN — ONDANSETRON 4 MG: 2 INJECTION INTRAMUSCULAR; INTRAVENOUS at 15:12

## 2024-02-10 RX ADMIN — POTASSIUM & SODIUM PHOSPHATES POWDER PACK 280-160-250 MG 500 MG: 280-160-250 PACK at 14:37

## 2024-02-10 RX ADMIN — Medication 10 ML: at 08:25

## 2024-02-10 RX ADMIN — RIFAXIMIN 400 MG: 200 TABLET ORAL at 08:24

## 2024-02-10 RX ADMIN — METRONIDAZOLE 500 MG: 500 TABLET ORAL at 21:10

## 2024-02-10 RX ADMIN — OXYCODONE 10 MG: 5 TABLET ORAL at 08:24

## 2024-02-10 RX ADMIN — CEFEPIME 2000 MG: 2 INJECTION, POWDER, FOR SOLUTION INTRAVENOUS at 05:04

## 2024-02-10 RX ADMIN — FOLIC ACID 1 MG: 1 TABLET ORAL at 08:24

## 2024-02-10 RX ADMIN — PETROLATUM: 420 OINTMENT TOPICAL at 08:25

## 2024-02-10 RX ADMIN — PANTOPRAZOLE SODIUM 40 MG: 40 TABLET, DELAYED RELEASE ORAL at 05:39

## 2024-02-10 RX ADMIN — TRAMADOL HYDROCHLORIDE 50 MG: 50 TABLET, COATED ORAL at 13:55

## 2024-02-10 RX ADMIN — Medication 10 ML: at 21:11

## 2024-02-10 RX ADMIN — OXYCODONE 10 MG: 5 TABLET ORAL at 00:38

## 2024-02-10 RX ADMIN — POLYMYXIN B SULFATE, BACITRACIN ZINC, NEOMYCIN SULFATE: 5000; 3.5; 4 OINTMENT TOPICAL at 08:25

## 2024-02-10 RX ADMIN — BACLOFEN 10 MG: 10 TABLET ORAL at 14:37

## 2024-02-10 RX ADMIN — RIFAXIMIN 400 MG: 200 TABLET ORAL at 13:55

## 2024-02-10 RX ADMIN — Medication 100 MG: at 08:24

## 2024-02-10 RX ADMIN — BUSPIRONE HYDROCHLORIDE 10 MG: 5 TABLET ORAL at 21:10

## 2024-02-10 RX ADMIN — METRONIDAZOLE 500 MG: 500 TABLET ORAL at 05:39

## 2024-02-10 RX ADMIN — BUSPIRONE HYDROCHLORIDE 10 MG: 5 TABLET ORAL at 08:24

## 2024-02-10 RX ADMIN — PENTOXIFYLLINE 400 MG: 400 TABLET, EXTENDED RELEASE ORAL at 08:24

## 2024-02-10 RX ADMIN — LEVOFLOXACIN 500 MG: 500 TABLET, FILM COATED ORAL at 13:55

## 2024-02-10 RX ADMIN — MIDODRINE HYDROCHLORIDE 10 MG: 5 TABLET ORAL at 08:24

## 2024-02-10 NOTE — PROGRESS NOTES
2/9/2024 11:05 AM  Service: Urology  Group: [facility] ([facility])    Johnathan Yanez III  [id number]    Subjective:  Awake and alert  He is feeling good today  He has no new  complaints      Review of Systems  Constitutional: No chills or sweats   Respiratory: negative for cough  Cardiovascular: negative for chest pain  Gastrointestinal: negative for abdominal pain, nausea, and vomiting  Dermatologic:  Hematologic/lymphatic: negative  Musculoskeletal:negative  Neurological: negative for seizures and tremors  Endocrine: negative  Psychiatric: negative   : see above    Scheduled Meds:   cefepime  2,000 mg IntraVENous Q12H    metroNIDAZOLE  500 mg Oral 3 times per day    neomycin-bacitracin-polymyxin   Topical BID    folic acid  1 mg Oral Daily    busPIRone  10 mg Oral BID    midodrine  10 mg Oral TID WC    [Held by provider] heparin (porcine)  5,000 Units SubCUTAneous 3 times per day    pantoprazole  40 mg Oral BID AC    rifAXIMin  400 mg Oral TID    sodium chloride flush  5-40 mL IntraVENous 2 times per day    lactulose  20 g Oral TID    pentoxifylline  400 mg Oral TID WC    thiamine  100 mg Oral Daily       Objective:  Vitals:    02/09/24 0830   BP: (!) 100/58   Pulse: 86   Resp: 16   Temp: 98.6 °F (37 °C)   SpO2: 97%         Allergies: Pcn [penicillins]    General Appearance: alert and oriented to person, place and time and in no acute distress  Skin: no rash or erythema  Head: normocephalic and atraumatic  Pulmonary/Chest: normal air movement, no respiratory distress Abdomen: soft, non-tender,   Genitalia: Aldana catheter is draining yellow urine. He does have firmness of the left scrotum with induration and an open area draining a small amount of serosanguinous fluid, no crepitus.  Extremities: + edema of lower extremities     Labs:     Recent Labs     02/09/24  0800      K 3.8   *   CO2 20*   BUN 7   CREATININE 0.9   GLUCOSE 118*   CALCIUM 7.8*       Lab Results 
       [facility] Hospitalist Progress Note    Admitting Date and Time: 1/25/2024  4:08 PM  Admit Dx: Hypokalemia [E87.6]  Lactic acidosis [E87.20]  Colitis [K52.9]  Abdominal pain [R10.9]  Transaminitis [R74.01]  CANDY (acute kidney injury) (HCC) [N17.9]  Acute kidney injury (HCC) [N17.9]  Hepatic trauma, initial encounter [S36.119A]  Urinary tract infection without hematuria, site unspecified [N39.0]  Acute pancreatitis, unspecified complication status, unspecified pancreatitis type [K85.90]    Subjective:  Patient is being followed for Hypokalemia [E87.6]  Lactic acidosis [E87.20]  Colitis [K52.9]  Abdominal pain [R10.9]  Transaminitis [R74.01]  CANDY (acute kidney injury) (HCC) [N17.9]  Acute kidney injury (HCC) [N17.9]  Hepatic trauma, initial encounter [S36.119A]  Urinary tract infection without hematuria, site unspecified [N39.0]  Acute pancreatitis, unspecified complication status, unspecified pancreatitis type [K85.90]     Patient has no complaints at this time. He is always sleeping when I see him. I did discuss with charge RN that we need to be careful in not giving him too much ativan, only if meets appropriate criteria    ROS: denies fever, chills, cp, sob, n/v, HA unless stated above.      midodrine  10 mg Oral TID WC    heparin (porcine)  5,000 Units SubCUTAneous 3 times per day    pantoprazole  40 mg Oral BID AC    rifAXIMin  400 mg Oral TID    metroNIDAZOLE  500 mg IntraVENous Q8H    cefTRIAXone (ROCEPHIN) IV  1,000 mg IntraVENous Q24H    sodium chloride flush  5-40 mL IntraVENous 2 times per day    lactulose  20 g Oral TID    pentoxifylline  400 mg Oral TID WC    thiamine  100 mg Oral Daily     potassium chloride, 40 mEq, PRN   Or  potassium alternative oral replacement, 40 mEq, PRN   Or  potassium chloride, 10 mEq, PRN  magnesium sulfate, 2,000 mg, PRN  ondansetron, 4 mg, Q8H PRN   Or  ondansetron, 4 mg, Q6H PRN  polyethylene glycol, 17 g, Daily PRN  acetaminophen, 650 mg, Q6H PRN   
       [facility] Hospitalist Progress Note    Admitting Date and Time: 1/25/2024  4:08 PM  Admit Dx: Hypokalemia [E87.6]  Lactic acidosis [E87.20]  Colitis [K52.9]  Abdominal pain [R10.9]  Transaminitis [R74.01]  CANDY (acute kidney injury) (HCC) [N17.9]  Acute kidney injury (HCC) [N17.9]  Hepatic trauma, initial encounter [S36.119A]  Urinary tract infection without hematuria, site unspecified [N39.0]  Acute pancreatitis, unspecified complication status, unspecified pancreatitis type [K85.90]    Subjective:  Patient is being followed for Hypokalemia [E87.6]  Lactic acidosis [E87.20]  Colitis [K52.9]  Abdominal pain [R10.9]  Transaminitis [R74.01]  CANDY (acute kidney injury) (HCC) [N17.9]  Acute kidney injury (HCC) [N17.9]  Hepatic trauma, initial encounter [S36.119A]  Urinary tract infection without hematuria, site unspecified [N39.0]  Acute pancreatitis, unspecified complication status, unspecified pancreatitis type [K85.90]     Spoke with patient's sister    ROS: denies fever, chills, cp, sob, n/v, HA unless stated above.      midodrine  10 mg Oral TID WC    [Held by provider] heparin (porcine)  5,000 Units SubCUTAneous 3 times per day    pantoprazole  40 mg Oral BID AC    rifAXIMin  400 mg Oral TID    sodium chloride flush  5-40 mL IntraVENous 2 times per day    lactulose  20 g Oral TID    pentoxifylline  400 mg Oral TID WC    thiamine  100 mg Oral Daily     LORazepam, 1 mg, Q6H PRN  potassium chloride, 40 mEq, PRN   Or  potassium alternative oral replacement, 40 mEq, PRN   Or  potassium chloride, 10 mEq, PRN  magnesium sulfate, 2,000 mg, PRN  ondansetron, 4 mg, Q8H PRN   Or  ondansetron, 4 mg, Q6H PRN  polyethylene glycol, 17 g, Daily PRN  sodium chloride flush, 5-40 mL, PRN  sodium chloride, , PRN         Objective:    /73   Pulse 70   Temp 98.2 °F (36.8 °C) (Oral)   Resp 20   Ht 1.727 m (5' 8\")   Wt 69.1 kg (152 lb 5.4 oz)   SpO2 100%   BMI 23.16 kg/m²     General Appearance: alert and oriented to 
       [facility] Hospitalist Progress Note    Admitting Date and Time: 1/25/2024  4:08 PM  Admit Dx: Hypokalemia [E87.6]  Lactic acidosis [E87.20]  Colitis [K52.9]  Abdominal pain [R10.9]  Transaminitis [R74.01]  CANDY (acute kidney injury) (HCC) [N17.9]  Acute kidney injury (HCC) [N17.9]  Hepatic trauma, initial encounter [S36.119A]  Urinary tract infection without hematuria, site unspecified [N39.0]  Acute pancreatitis, unspecified complication status, unspecified pancreatitis type [K85.90]    Subjective:  Patient is being followed for Hypokalemia [E87.6]  Lactic acidosis [E87.20]  Colitis [K52.9]  Abdominal pain [R10.9]  Transaminitis [R74.01]  CANDY (acute kidney injury) (HCC) [N17.9]  Acute kidney injury (HCC) [N17.9]  Hepatic trauma, initial encounter [S36.119A]  Urinary tract infection without hematuria, site unspecified [N39.0]  Acute pancreatitis, unspecified complication status, unspecified pancreatitis type [K85.90]    More awake and alert than last 2 days    ROS: denies fever, chills, cp, sob, n/v, HA unless stated above.      metroNIDAZOLE  500 mg Oral 3 times per day    cefTRIAXone (ROCEPHIN) IV  1,000 mg IntraVENous Q24H    midodrine  10 mg Oral TID WC    heparin (porcine)  5,000 Units SubCUTAneous 3 times per day    pantoprazole  40 mg Oral BID AC    rifAXIMin  400 mg Oral TID    sodium chloride flush  5-40 mL IntraVENous 2 times per day    lactulose  20 g Oral TID    pentoxifylline  400 mg Oral TID WC    thiamine  100 mg Oral Daily     potassium chloride, 40 mEq, PRN   Or  potassium alternative oral replacement, 40 mEq, PRN   Or  potassium chloride, 10 mEq, PRN  magnesium sulfate, 2,000 mg, PRN  ondansetron, 4 mg, Q8H PRN   Or  ondansetron, 4 mg, Q6H PRN  polyethylene glycol, 17 g, Daily PRN  sodium chloride flush, 5-40 mL, PRN  sodium chloride, , PRN  LORazepam, 1 mg, Q1H PRN   Or  LORazepam, 1 mg, Q1H PRN   Or  LORazepam, 2 mg, Q1H PRN   Or  LORazepam, 2 mg, Q1H PRN   Or  LORazepam, 3 mg, Q1H PRN   
       [facility] Hospitalist Progress Note    Admitting Date and Time: 1/25/2024  4:08 PM  Admit Dx: Hypokalemia [E87.6]  Lactic acidosis [E87.20]  Colitis [K52.9]  Abdominal pain [R10.9]  Transaminitis [R74.01]  CANDY (acute kidney injury) (HCC) [N17.9]  Acute kidney injury (HCC) [N17.9]  Hepatic trauma, initial encounter [S36.119A]  Urinary tract infection without hematuria, site unspecified [N39.0]  Acute pancreatitis, unspecified complication status, unspecified pancreatitis type [K85.90]    Subjective:  Patient is being followed for Hypokalemia [E87.6]  Lactic acidosis [E87.20]  Colitis [K52.9]  Abdominal pain [R10.9]  Transaminitis [R74.01]  CANDY (acute kidney injury) (HCC) [N17.9]  Acute kidney injury (HCC) [N17.9]  Hepatic trauma, initial encounter [S36.119A]  Urinary tract infection without hematuria, site unspecified [N39.0]  Acute pancreatitis, unspecified complication status, unspecified pancreatitis type [K85.90]   Pt seen and examined today. Discussed electrolytes stabilized today. Family at bedside, explained current clinical course and need for the patient to be evaluated by an infectious disease doctor.    ROS: denies fever, chills, cp, sob, n/v, HA unless stated above.      sulfamethoxazole-trimethoprim  2 tablet Oral 2 times per day    neomycin-bacitracin-polymyxin   Topical BID    folic acid  1 mg Oral Daily    busPIRone  10 mg Oral BID    midodrine  10 mg Oral TID WC    [Held by provider] heparin (porcine)  5,000 Units SubCUTAneous 3 times per day    pantoprazole  40 mg Oral BID AC    rifAXIMin  400 mg Oral TID    sodium chloride flush  5-40 mL IntraVENous 2 times per day    lactulose  20 g Oral TID    pentoxifylline  400 mg Oral TID WC    thiamine  100 mg Oral Daily     baclofen, 10 mg, TID PRN  oxyCODONE, 10 mg, Q6H PRN  white petrolatum, , PRN  hydrOXYzine, 50 mg, Q6H PRN  melatonin, 9 mg, Nightly PRN  traMADol, 50 mg, Q6H PRN  potassium chloride, 40 mEq, PRN   Or  potassium alternative oral 
       [facility] Hospitalist Progress Note    Admitting Date and Time: 1/25/2024  4:08 PM  Admit Dx: Hypokalemia [E87.6]  Lactic acidosis [E87.20]  Colitis [K52.9]  Abdominal pain [R10.9]  Transaminitis [R74.01]  CANDY (acute kidney injury) (HCC) [N17.9]  Acute kidney injury (HCC) [N17.9]  Hepatic trauma, initial encounter [S36.119A]  Urinary tract infection without hematuria, site unspecified [N39.0]  Acute pancreatitis, unspecified complication status, unspecified pancreatitis type [K85.90]    Subjective:  Patient is being followed for Hypokalemia [E87.6]  Lactic acidosis [E87.20]  Colitis [K52.9]  Abdominal pain [R10.9]  Transaminitis [R74.01]  CANDY (acute kidney injury) (HCC) [N17.9]  Acute kidney injury (HCC) [N17.9]  Hepatic trauma, initial encounter [S36.119A]  Urinary tract infection without hematuria, site unspecified [N39.0]  Acute pancreatitis, unspecified complication status, unspecified pancreatitis type [K85.90]   Pt seen and examined today. Sleepy but easy to wake. Denies any new issues.    ROS: denies fever, chills, cp, sob, n/v, HA unless stated above.      magnesium sulfate  2,000 mg IntraVENous Q2H    busPIRone  5 mg Oral BID    midodrine  10 mg Oral TID WC    [Held by provider] heparin (porcine)  5,000 Units SubCUTAneous 3 times per day    pantoprazole  40 mg Oral BID AC    rifAXIMin  400 mg Oral TID    sodium chloride flush  5-40 mL IntraVENous 2 times per day    lactulose  20 g Oral TID    pentoxifylline  400 mg Oral TID WC    thiamine  100 mg Oral Daily     LORazepam, 0.5 mg, Q6H PRN  potassium chloride, 40 mEq, PRN   Or  potassium alternative oral replacement, 40 mEq, PRN   Or  potassium chloride, 10 mEq, PRN  magnesium sulfate, 2,000 mg, PRN  ondansetron, 4 mg, Q8H PRN   Or  ondansetron, 4 mg, Q6H PRN  polyethylene glycol, 17 g, Daily PRN  sodium chloride flush, 5-40 mL, PRN  sodium chloride, , PRN         Objective:    BP (!) 89/52   Pulse 84   Temp 98.6 °F (37 °C) (Axillary)   Resp 16  
       [facility] Hospitalist Progress Note    Admitting Date and Time: 1/25/2024  4:08 PM  Admit Dx: Hypokalemia [E87.6]  Lactic acidosis [E87.20]  Colitis [K52.9]  Abdominal pain [R10.9]  Transaminitis [R74.01]  CANDY (acute kidney injury) (HCC) [N17.9]  Acute kidney injury (HCC) [N17.9]  Hepatic trauma, initial encounter [S36.119A]  Urinary tract infection without hematuria, site unspecified [N39.0]  Acute pancreatitis, unspecified complication status, unspecified pancreatitis type [K85.90]    Subjective:  Patient is being followed for Hypokalemia [E87.6]  Lactic acidosis [E87.20]  Colitis [K52.9]  Abdominal pain [R10.9]  Transaminitis [R74.01]  CANDY (acute kidney injury) (HCC) [N17.9]  Acute kidney injury (HCC) [N17.9]  Hepatic trauma, initial encounter [S36.119A]  Urinary tract infection without hematuria, site unspecified [N39.0]  Acute pancreatitis, unspecified complication status, unspecified pancreatitis type [K85.90]   Pt seen and examined today. Discussed electrolytes continue to drop and needs to stay here until that improves.     ROS: denies fever, chills, cp, sob, n/v, HA unless stated above.      potassium phosphate IVPB (PERIPHERAL LINE)  30 mmol IntraVENous Once    folic acid  1 mg Oral Daily    busPIRone  10 mg Oral BID    midodrine  10 mg Oral TID WC    heparin (porcine)  5,000 Units SubCUTAneous 3 times per day    pantoprazole  40 mg Oral BID AC    rifAXIMin  400 mg Oral TID    sodium chloride flush  5-40 mL IntraVENous 2 times per day    lactulose  20 g Oral TID    pentoxifylline  400 mg Oral TID WC    thiamine  100 mg Oral Daily     white petrolatum, , PRN  LORazepam, 0.25 mg, Q12H PRN  melatonin, 9 mg, Nightly PRN  traMADol, 50 mg, Q6H PRN  potassium chloride, 40 mEq, PRN   Or  potassium alternative oral replacement, 40 mEq, PRN   Or  potassium chloride, 10 mEq, PRN  magnesium sulfate, 2,000 mg, PRN  ondansetron, 4 mg, Q8H PRN   Or  ondansetron, 4 mg, Q6H PRN  polyethylene glycol, 17 g, Daily 
       [facility] Hospitalist Progress Note    Admitting Date and Time: 1/25/2024  4:08 PM  Admit Dx: Hypokalemia [E87.6]  Lactic acidosis [E87.20]  Colitis [K52.9]  Abdominal pain [R10.9]  Transaminitis [R74.01]  CANDY (acute kidney injury) (HCC) [N17.9]  Acute kidney injury (HCC) [N17.9]  Hepatic trauma, initial encounter [S36.119A]  Urinary tract infection without hematuria, site unspecified [N39.0]  Acute pancreatitis, unspecified complication status, unspecified pancreatitis type [K85.90]    Subjective:  Patient is being followed for Hypokalemia [E87.6]  Lactic acidosis [E87.20]  Colitis [K52.9]  Abdominal pain [R10.9]  Transaminitis [R74.01]  CANDY (acute kidney injury) (HCC) [N17.9]  Acute kidney injury (HCC) [N17.9]  Hepatic trauma, initial encounter [S36.119A]  Urinary tract infection without hematuria, site unspecified [N39.0]  Acute pancreatitis, unspecified complication status, unspecified pancreatitis type [K85.90]   Pt seen and examined today. Discussed electrolytes continue to drop and needs to stay here until that improves. Complaining of not being able to sleep in the hospital.    ROS: denies fever, chills, cp, sob, n/v, HA unless stated above.      potassium phosphate IVPB (PERIPHERAL LINE)  20 mmol IntraVENous Once    busPIRone  10 mg Oral BID    calcium gluconate 1,000 mg in sodium chloride 0.9 % 100 mL IVPB  1,000 mg IntraVENous Once    midodrine  10 mg Oral TID WC    [Held by provider] heparin (porcine)  5,000 Units SubCUTAneous 3 times per day    pantoprazole  40 mg Oral BID AC    rifAXIMin  400 mg Oral TID    sodium chloride flush  5-40 mL IntraVENous 2 times per day    lactulose  20 g Oral TID    pentoxifylline  400 mg Oral TID WC    thiamine  100 mg Oral Daily     LORazepam, 0.25 mg, Q6H PRN  melatonin, 9 mg, Nightly PRN  traMADol, 50 mg, Q6H PRN  potassium chloride, 40 mEq, PRN   Or  potassium alternative oral replacement, 40 mEq, PRN   Or  potassium chloride, 10 mEq, PRN  magnesium sulfate, 
       [facility] Hospitalist Progress Note    Admitting Date and Time: 1/25/2024  4:08 PM  Admit Dx: Hypokalemia [E87.6]  Lactic acidosis [E87.20]  Colitis [K52.9]  Abdominal pain [R10.9]  Transaminitis [R74.01]  CANDY (acute kidney injury) (HCC) [N17.9]  Acute kidney injury (HCC) [N17.9]  Hepatic trauma, initial encounter [S36.119A]  Urinary tract infection without hematuria, site unspecified [N39.0]  Acute pancreatitis, unspecified complication status, unspecified pancreatitis type [K85.90]    Subjective:  Patient is being followed for Hypokalemia [E87.6]  Lactic acidosis [E87.20]  Colitis [K52.9]  Abdominal pain [R10.9]  Transaminitis [R74.01]  CANDY (acute kidney injury) (HCC) [N17.9]  Acute kidney injury (HCC) [N17.9]  Hepatic trauma, initial encounter [S36.119A]  Urinary tract infection without hematuria, site unspecified [N39.0]  Acute pancreatitis, unspecified complication status, unspecified pancreatitis type [K85.90]     Patient wants to go home however he is not yet ready for discharge    ROS: denies fever, chills, cp, sob, n/v, HA unless stated above.      midodrine  10 mg Oral TID WC    [Held by provider] heparin (porcine)  5,000 Units SubCUTAneous 3 times per day    pantoprazole  40 mg Oral BID AC    rifAXIMin  400 mg Oral TID    sodium chloride flush  5-40 mL IntraVENous 2 times per day    lactulose  20 g Oral TID    pentoxifylline  400 mg Oral TID WC    thiamine  100 mg Oral Daily     LORazepam, 1 mg, Q6H PRN  potassium chloride, 40 mEq, PRN   Or  potassium alternative oral replacement, 40 mEq, PRN   Or  potassium chloride, 10 mEq, PRN  magnesium sulfate, 2,000 mg, PRN  ondansetron, 4 mg, Q8H PRN   Or  ondansetron, 4 mg, Q6H PRN  polyethylene glycol, 17 g, Daily PRN  sodium chloride flush, 5-40 mL, PRN  sodium chloride, , PRN         Objective:    BP (!) 89/43   Pulse 67   Temp 98.4 °F (36.9 °C) (Oral)   Resp 16   Ht 1.727 m (5' 8\")   Wt 69.1 kg (152 lb 5.4 oz)   SpO2 100%   BMI 23.16 kg/m² 
       [facility] Hospitalist Progress Note    Admitting Date and Time: 1/25/2024  4:08 PM  Admit Dx: Hypokalemia [E87.6]  Lactic acidosis [E87.20]  Colitis [K52.9]  Abdominal pain [R10.9]  Transaminitis [R74.01]  CANDY (acute kidney injury) (HCC) [N17.9]  Acute kidney injury (HCC) [N17.9]  Hepatic trauma, initial encounter [S36.119A]  Urinary tract infection without hematuria, site unspecified [N39.0]  Acute pancreatitis, unspecified complication status, unspecified pancreatitis type [K85.90]    Subjective:  Patient is being followed for Hypokalemia [E87.6]  Lactic acidosis [E87.20]  Colitis [K52.9]  Abdominal pain [R10.9]  Transaminitis [R74.01]  CANDY (acute kidney injury) (HCC) [N17.9]  Acute kidney injury (HCC) [N17.9]  Hepatic trauma, initial encounter [S36.119A]  Urinary tract infection without hematuria, site unspecified [N39.0]  Acute pancreatitis, unspecified complication status, unspecified pancreatitis type [K85.90]     Patient resting in bed with no complaints.     ROS: denies fever, chills, cp, sob, n/v, HA unless stated above.      midodrine  10 mg Oral TID WC    heparin (porcine)  5,000 Units SubCUTAneous 3 times per day    pantoprazole  40 mg Oral BID AC    rifAXIMin  400 mg Oral TID    metroNIDAZOLE  500 mg IntraVENous Q8H    cefTRIAXone (ROCEPHIN) IV  1,000 mg IntraVENous Q24H    sodium chloride flush  5-40 mL IntraVENous 2 times per day    lactulose  20 g Oral TID    pentoxifylline  400 mg Oral TID WC    thiamine  100 mg Oral Daily     potassium chloride, 40 mEq, PRN   Or  potassium alternative oral replacement, 40 mEq, PRN   Or  potassium chloride, 10 mEq, PRN  magnesium sulfate, 2,000 mg, PRN  ondansetron, 4 mg, Q8H PRN   Or  ondansetron, 4 mg, Q6H PRN  polyethylene glycol, 17 g, Daily PRN  acetaminophen, 650 mg, Q6H PRN   Or  acetaminophen, 650 mg, Q6H PRN  sodium chloride flush, 5-40 mL, PRN  sodium chloride, , PRN  LORazepam, 1 mg, Q1H PRN   Or  LORazepam, 1 mg, Q1H PRN   Or  LORazepam, 2 mg, 
       [facility] Hospitalist Progress Note    Admitting Date and Time: 1/25/2024  4:08 PM  Admit Dx: Hypokalemia [E87.6]  Lactic acidosis [E87.20]  Colitis [K52.9]  Abdominal pain [R10.9]  Transaminitis [R74.01]  CANDY (acute kidney injury) (HCC) [N17.9]  Acute kidney injury (HCC) [N17.9]  Hepatic trauma, initial encounter [S36.119A]  Urinary tract infection without hematuria, site unspecified [N39.0]  Acute pancreatitis, unspecified complication status, unspecified pancreatitis type [K85.90]    Subjective:  Patient is being followed for Hypokalemia [E87.6]  Lactic acidosis [E87.20]  Colitis [K52.9]  Abdominal pain [R10.9]  Transaminitis [R74.01]  CANDY (acute kidney injury) (HCC) [N17.9]  Acute kidney injury (HCC) [N17.9]  Hepatic trauma, initial encounter [S36.119A]  Urinary tract infection without hematuria, site unspecified [N39.0]  Acute pancreatitis, unspecified complication status, unspecified pancreatitis type [K85.90]   Pt seen and examined today. Discussed electrolytes continue to drop and needs to stay here until that improves. Refusing labs yesterday, but had them collected this AM.    ROS: denies fever, chills, cp, sob, n/v, HA unless stated above.      potassium phosphate IVPB (PERIPHERAL LINE)  30 mmol IntraVENous Once    folic acid  1 mg Oral Daily    busPIRone  10 mg Oral BID    midodrine  10 mg Oral TID WC    heparin (porcine)  5,000 Units SubCUTAneous 3 times per day    pantoprazole  40 mg Oral BID AC    rifAXIMin  400 mg Oral TID    sodium chloride flush  5-40 mL IntraVENous 2 times per day    lactulose  20 g Oral TID    pentoxifylline  400 mg Oral TID WC    thiamine  100 mg Oral Daily     LORazepam, 0.25 mg, Q6H PRN  melatonin, 9 mg, Nightly PRN  traMADol, 50 mg, Q6H PRN  potassium chloride, 40 mEq, PRN   Or  potassium alternative oral replacement, 40 mEq, PRN   Or  potassium chloride, 10 mEq, PRN  magnesium sulfate, 2,000 mg, PRN  ondansetron, 4 mg, Q8H PRN   Or  ondansetron, 4 mg, Q6H 
    2/7/2024 3:05 PM  Johnathan Yanez III  [id number]    Subjective:    Sleeping   Arouses to verbal stimuli  He states his scrotal swelling is better compared to when he first arrived  He has slight drainage from the left side of the scrotum  No crepitus      Review of Systems  Constitutional: No fever or chills   Respiratory: negative for cough and hemoptysis  Cardiovascular: negative for chest pain and dyspnea  Gastrointestinal: negative for abdominal pain, diarrhea, nausea and vomiting   : See above  Derm: negative for rash and skin lesion(s)  Neurological: negative for seizures and tremors  Musculoskeletal: Negative    Psychiatric: Negative   All other reviews are negative      Scheduled Meds:   [START ON 2/8/2024] albumin human 25%  50 g IntraVENous On Call    sulfamethoxazole-trimethoprim  2 tablet Oral 2 times per day    neomycin-bacitracin-polymyxin   Topical BID    folic acid  1 mg Oral Daily    busPIRone  10 mg Oral BID    midodrine  10 mg Oral TID WC    [Held by provider] heparin (porcine)  5,000 Units SubCUTAneous 3 times per day    pantoprazole  40 mg Oral BID AC    rifAXIMin  400 mg Oral TID    sodium chloride flush  5-40 mL IntraVENous 2 times per day    lactulose  20 g Oral TID    pentoxifylline  400 mg Oral TID WC    thiamine  100 mg Oral Daily       Objective:  Vitals:    02/07/24 1420   BP:    Pulse:    Resp: 16   Temp:    SpO2:          Allergies: Pcn [penicillins]    General Appearance: alert and oriented to person, place and time and in no acute distress  Skin: no rash or erythema  Head: normocephalic and atraumatic  Pulmonary/Chest: normal air movement, no respiratory distress  Abdomen: soft, non-tender, non-distended  Genitourinary: Penoscrotal edema with erythema.  There is mild amount of serous fluid draining from the right side of the scrotum without any crepitus or evidence of fourniers.  Aldana is draining dawood urine  Extremities: bilateral lower extremity edema         Labs: 
    2/8/2024 3:21 PM  Johnathan Yanez III  [id number]    Subjective:    Sleeping   Arouses to verbal stimuli  Had paracentesis today     Review of Systems  Constitutional: No fever or chills   Respiratory: negative for cough and hemoptysis  Cardiovascular: negative for chest pain and dyspnea  Gastrointestinal: negative for abdominal pain, diarrhea, nausea and vomiting   : See above  Derm: negative for rash and skin lesion(s)  Neurological: negative for seizures and tremors  Musculoskeletal: Negative    Psychiatric: Negative   All other reviews are negative      Scheduled Meds:   cefepime  2,000 mg IntraVENous Q12H    metroNIDAZOLE  500 mg Oral 3 times per day    neomycin-bacitracin-polymyxin   Topical BID    folic acid  1 mg Oral Daily    busPIRone  10 mg Oral BID    midodrine  10 mg Oral TID WC    [Held by provider] heparin (porcine)  5,000 Units SubCUTAneous 3 times per day    pantoprazole  40 mg Oral BID AC    rifAXIMin  400 mg Oral TID    sodium chloride flush  5-40 mL IntraVENous 2 times per day    lactulose  20 g Oral TID    pentoxifylline  400 mg Oral TID WC    thiamine  100 mg Oral Daily       Objective:  Vitals:    02/08/24 1153   BP: (!) 99/54   Pulse: 94   Resp: 16   Temp: 98 °F (36.7 °C)   SpO2: 99%         Allergies: Pcn [penicillins]    General Appearance: alert and oriented to person, place and time and in no acute distress  Skin: no rash or erythema  Head: normocephalic and atraumatic  Pulmonary/Chest: normal air movement, no respiratory distress  Abdomen: soft, non-tender, non-distended  Genitourinary: Penoscrotal edema. Erythema has much improved.  There is scant amount of serous fluid draining from the right side of the scrotum without any crepitus/fluctuance or evidence of fourniers.  Aldana is draining dawood urine  Extremities: bilateral lower extremity edema         Labs:     Recent Labs     02/08/24  1310      K 3.4*   *   CO2 20*   BUN 6   CREATININE 1.1   GLUCOSE 118* 
  [facility] Quality Flow/Interdisciplinary Rounds Progress Note        Quality Flow Rounds held on February 8, 2024    Disciplines Attending:  Bedside Nurse, , , and Nursing Unit Leadership    Johnathan Yanez III was admitted on 1/25/2024  4:08 PM    Anticipated Discharge Date:       Disposition:    Dennis Score:  Dennis Scale Score: 17    Readmission Risk              Risk of Unplanned Readmission:  40           Discussed patient goal for the day, patient clinical progression, and barriers to discharge.  The following Goal(s) of the Day/Commitment(s) have been identified:  Diagnostics - Report Results and Labs - Report Results      Nicole Smith RN  February 8, 2024        
  [facility]  [facility]  Progress Note    SUBJECTIVE:  Chief Complaint   Patient presents with    Fatigue     Fatigue/loss of appetite x1 week. Hx alcohol abuse- last drink yesterday.    Jaundice    Leg Swelling     Right leg pain.  2 masses under skin right calf     Patient is tolerating medications. No reported adverse drug reactions.  No nausea, vomiting, diarrhea.    Review of systems:  As stated above in the chief complaint, otherwise negative.    Medications:  Scheduled Meds:   midodrine  10 mg Oral TID WC    heparin (porcine)  5,000 Units SubCUTAneous 3 times per day    pantoprazole  40 mg Oral BID AC    rifAXIMin  400 mg Oral TID    metroNIDAZOLE  500 mg IntraVENous Q8H    cefTRIAXone (ROCEPHIN) IV  1,000 mg IntraVENous Q24H    sodium chloride flush  5-40 mL IntraVENous 2 times per day    lactulose  20 g Oral TID    pentoxifylline  400 mg Oral TID WC    thiamine  100 mg Oral Daily     Continuous Infusions:   sodium chloride 75 mL/hr at 24 0525    sodium chloride       PRN Meds:potassium chloride **OR** potassium alternative oral replacement **OR** potassium chloride, magnesium sulfate, ondansetron **OR** ondansetron, polyethylene glycol, acetaminophen **OR** acetaminophen, sodium chloride flush, sodium chloride, LORazepam **OR** LORazepam **OR** LORazepam **OR** LORazepam **OR** LORazepam **OR** LORazepam **OR** LORazepam **OR** LORazepam    OBJECTIVE:  BP (!) 108/54   Pulse 96   Temp 97.7 °F (36.5 °C) (Oral)   Resp 20   Ht 1.727 m (5' 8\")   Wt 69.1 kg (152 lb 5.4 oz)   SpO2 100%   BMI 23.16 kg/m²   Temp  Av.9 °F (36.6 °C)  Min: 97.5 °F (36.4 °C)  Max: 98.4 °F (36.9 °C)  Constitutional: The patient is awake, alert, tremors  Skin: Warm and dry. No rashes were noted.   HEENT: Round and reactive pupils.  Moist mucous membranes.  No ulcerations or thrush.  Neck: Supple to movements.   Chest: No use of accessory muscles to breathe. Symmetrical expansion.  No 
  [facility]  [facility]  Progress Note    SUBJECTIVE:  Chief Complaint   Patient presents with    Fatigue     Fatigue/loss of appetite x1 week. Hx alcohol abuse- last drink yesterday.    Jaundice    Leg Swelling     Right leg pain.  2 masses under skin right calf     Patient is lying in bed. No fever. Primary at bed side. Sister at bed side. Patient wants to gohome and family wants him to go to rehab. He is fighting with his sister.    Review of systems:  As stated above in the chief complaint, otherwise negative.    Medications:  Scheduled Meds:   midodrine  10 mg Oral TID WC    [Held by provider] heparin (porcine)  5,000 Units SubCUTAneous 3 times per day    pantoprazole  40 mg Oral BID AC    rifAXIMin  400 mg Oral TID    sodium chloride flush  5-40 mL IntraVENous 2 times per day    lactulose  20 g Oral TID    pentoxifylline  400 mg Oral TID WC    thiamine  100 mg Oral Daily     Continuous Infusions:   sodium chloride 125 mL/hr at 24 0814    sodium chloride 75 mL/hr at 24 0158     PRN Meds:LORazepam, potassium chloride **OR** potassium alternative oral replacement **OR** potassium chloride, magnesium sulfate, ondansetron **OR** ondansetron, polyethylene glycol, sodium chloride flush, sodium chloride    OBJECTIVE:  /66   Pulse 93   Temp 98.3 °F (36.8 °C) (Oral)   Resp 18   Ht 1.727 m (5' 8\")   Wt 69.1 kg (152 lb 5.4 oz)   SpO2 98%   BMI 23.16 kg/m²   Temp  Av.1 °F (36.7 °C)  Min: 97.8 °F (36.6 °C)  Max: 98.3 °F (36.8 °C)  Constitutional: The patient is awake alert.  Skin: Warm and dry. No rashes were noted.   HEENT: Round and reactive pupils.  Moist mucous membranes.  No ulcerations or thrush.  Neck: Supple to movements.   Chest: No use of accessory muscles to breathe. Symmetrical expansion.  No wheezing, crackles or rhonchi.  Cardiovascular: S1 and S2 are rhythmic and regular. No murmurs appreciated.   Abdomen: Positive bowel sounds to auscultation. 
  [facility]  [facility]  Progress Note    SUBJECTIVE:  Chief Complaint   Patient presents with    Fatigue     Fatigue/loss of appetite x1 week. Hx alcohol abuse- last drink yesterday.    Jaundice    Leg Swelling     Right leg pain.  2 masses under skin right calf     Patient is lying in bed. No fever.  No new complaints. Wants to get discharged. Abdomen feels fine.    Review of systems:  As stated above in the chief complaint, otherwise negative.    Medications:  Scheduled Meds:   potassium phosphate IVPB (PERIPHERAL LINE)  30 mmol IntraVENous Once    busPIRone  5 mg Oral BID    midodrine  10 mg Oral TID WC    [Held by provider] heparin (porcine)  5,000 Units SubCUTAneous 3 times per day    pantoprazole  40 mg Oral BID AC    rifAXIMin  400 mg Oral TID    sodium chloride flush  5-40 mL IntraVENous 2 times per day    lactulose  20 g Oral TID    pentoxifylline  400 mg Oral TID WC    thiamine  100 mg Oral Daily     Continuous Infusions:   sodium chloride 125 mL/hr at 24 1141    sodium chloride 75 mL/hr at 24 0158     PRN Meds:LORazepam, potassium chloride **OR** potassium alternative oral replacement **OR** potassium chloride, magnesium sulfate, ondansetron **OR** ondansetron, polyethylene glycol, sodium chloride flush, sodium chloride    OBJECTIVE:  BP (!) 97/53   Pulse 89   Temp 98.1 °F (36.7 °C) (Oral)   Resp 16   Ht 1.727 m (5' 8\")   Wt 66.7 kg (147 lb)   SpO2 95%   BMI 22.35 kg/m²   Temp  Av.1 °F (36.7 °C)  Min: 98.1 °F (36.7 °C)  Max: 98.1 °F (36.7 °C)  Constitutional: The patient is awake alert.sleepy  Skin: Warm and dry. No rashes were noted.   HEENT: Round and reactive pupils.  Moist mucous membranes.  No ulcerations or thrush.  Neck: Supple to movements.   Chest: No use of accessory muscles to breathe. Symmetrical expansion.  No wheezing, crackles or rhonchi.  Cardiovascular: S1 and S2 are rhythmic and regular. No murmurs appreciated.   Abdomen: soft. 
  [facility]  [facility]  Progress Note    SUBJECTIVE:  Chief Complaint   Patient presents with    Fatigue     Fatigue/loss of appetite x1 week. Hx alcohol abuse- last drink yesterday.    Jaundice    Leg Swelling     Right leg pain.  2 masses under skin right calf     Patient is being bed.  Spoke with nursing he was medicated with Ativan recently.  He wakes briefly briefly for exam and interview falls back to sleep.    Review of systems:  As stated above in the chief complaint, otherwise negative.    Medications:  Scheduled Meds:   midodrine  10 mg Oral TID WC    heparin (porcine)  5,000 Units SubCUTAneous 3 times per day    pantoprazole  40 mg Oral BID AC    rifAXIMin  400 mg Oral TID    metroNIDAZOLE  500 mg IntraVENous Q8H    cefTRIAXone (ROCEPHIN) IV  1,000 mg IntraVENous Q24H    sodium chloride flush  5-40 mL IntraVENous 2 times per day    lactulose  20 g Oral TID    pentoxifylline  400 mg Oral TID WC    thiamine  100 mg Oral Daily     Continuous Infusions:   sodium chloride 75 mL/hr at 24 0158     PRN Meds:potassium chloride **OR** potassium alternative oral replacement **OR** potassium chloride, magnesium sulfate, ondansetron **OR** ondansetron, polyethylene glycol, acetaminophen **OR** acetaminophen, sodium chloride flush, sodium chloride, LORazepam **OR** LORazepam **OR** LORazepam **OR** LORazepam **OR** LORazepam **OR** LORazepam **OR** LORazepam **OR** LORazepam    OBJECTIVE:  BP (!) 93/50   Pulse 72   Temp 98.2 °F (36.8 °C)   Resp 18   Ht 1.727 m (5' 8\")   Wt 69.1 kg (152 lb 5.4 oz)   SpO2 100%   BMI 23.16 kg/m²   Temp  Av.7 °F (36.5 °C)  Min: 97 °F (36.1 °C)  Max: 99.4 °F (37.4 °C)  Constitutional: The patient is sleepy in bed was recently medicated with Ativan.  Awakes briefly and falls back to sleep.  Skin: Warm and dry. No rashes were noted.   HEENT: Round and reactive pupils.  Moist mucous membranes.  No ulcerations or thrush.  Neck: Supple to 
  [facility]  [facility]  Progress Note    SUBJECTIVE:  Chief Complaint   Patient presents with    Fatigue     Fatigue/loss of appetite x1 week. Hx alcohol abuse- last drink yesterday.    Jaundice    Leg Swelling     Right leg pain.  2 masses under skin right calf     Patient is being bed.  He is sleepy.  He wants to go home.  Right leg feels the same on and off pain.    Review of systems:  As stated above in the chief complaint, otherwise negative.    Medications:  Scheduled Meds:   metroNIDAZOLE  500 mg Oral 3 times per day    cefTRIAXone (ROCEPHIN) IV  1,000 mg IntraVENous Q24H    midodrine  10 mg Oral TID WC    heparin (porcine)  5,000 Units SubCUTAneous 3 times per day    pantoprazole  40 mg Oral BID AC    rifAXIMin  400 mg Oral TID    sodium chloride flush  5-40 mL IntraVENous 2 times per day    lactulose  20 g Oral TID    pentoxifylline  400 mg Oral TID WC    thiamine  100 mg Oral Daily     Continuous Infusions:   sodium chloride 100 mL/hr at 24 0151    sodium chloride 75 mL/hr at 24 0158     PRN Meds:potassium chloride **OR** potassium alternative oral replacement **OR** potassium chloride, magnesium sulfate, ondansetron **OR** ondansetron, polyethylene glycol, sodium chloride flush, sodium chloride, LORazepam **OR** LORazepam **OR** LORazepam **OR** LORazepam **OR** LORazepam **OR** LORazepam **OR** LORazepam **OR** LORazepam    OBJECTIVE:  BP (!) 105/53   Pulse 61   Temp 98 °F (36.7 °C) (Oral)   Resp 16   Ht 1.727 m (5' 8\")   Wt 69.1 kg (152 lb 5.4 oz)   SpO2 100%   BMI 23.16 kg/m²   Temp  Av.5 °F (36.9 °C)  Min: 98 °F (36.7 °C)  Max: 98.8 °F (37.1 °C)  Constitutional: The patient is sleepy in bed was recently medicated with Ativan.  Awakes briefly and falls back to sleep.  Skin: Warm and dry. No rashes were noted.   HEENT: Round and reactive pupils.  Moist mucous membranes.  No ulcerations or thrush.  Neck: Supple to movements.   Chest: No use of 
  [facility] Quality Flow/Interdisciplinary Rounds Progress Note        Quality Flow Rounds held on February 7, 2024    Disciplines Attending:  Bedside Nurse, , , and Nursing Unit Leadership    Johnathan Yanez III was admitted on 1/25/2024  4:08 PM    Anticipated Discharge Date:       Disposition:    Dennis Score:  Dennis Scale Score: 19    Readmission Risk              Risk of Unplanned Readmission:  39           Discussed patient goal for the day, patient clinical progression, and barriers to discharge.  The following Goal(s) of the Day/Commitment(s) have been identified:  Diagnostics - Report Results and Labs - Report Results      Nicole Smith RN  February 7, 2024        
  [facility]  [facility]  Progress Note    SUBJECTIVE:  Chief Complaint   Patient presents with    Fatigue     Fatigue/loss of appetite x1 week. Hx alcohol abuse- last drink yesterday.    Jaundice    Leg Swelling     Right leg pain.  2 masses under skin right calf     Patient is being bed.  He is sleepy.  He wants to go home.  Right leg feels the same on and off pain. Mom at bed side. No abdominal pain or nausea/vomiting.    Review of systems:  As stated above in the chief complaint, otherwise negative.    Medications:  Scheduled Meds:   midodrine  10 mg Oral TID WC    [Held by provider] heparin (porcine)  5,000 Units SubCUTAneous 3 times per day    pantoprazole  40 mg Oral BID AC    rifAXIMin  400 mg Oral TID    sodium chloride flush  5-40 mL IntraVENous 2 times per day    lactulose  20 g Oral TID    pentoxifylline  400 mg Oral TID WC    thiamine  100 mg Oral Daily     Continuous Infusions:   sodium chloride 100 mL/hr at 24 1100    sodium chloride 75 mL/hr at 24 0158     PRN Meds:LORazepam, potassium chloride **OR** potassium alternative oral replacement **OR** potassium chloride, magnesium sulfate, ondansetron **OR** ondansetron, polyethylene glycol, sodium chloride flush, sodium chloride    OBJECTIVE:  BP (!) 89/43   Pulse 67   Temp 98.4 °F (36.9 °C) (Oral)   Resp 16   Ht 1.727 m (5' 8\")   Wt 69.1 kg (152 lb 5.4 oz)   SpO2 100%   BMI 23.16 kg/m²   Temp  Av.5 °F (36.9 °C)  Min: 98 °F (36.7 °C)  Max: 99 °F (37.2 °C)  Constitutional: The patient is sleepy in bed was recently medicated with Ativan.  Awakes briefly and falls back to sleep.  Skin: Warm and dry. No rashes were noted.   HEENT: Round and reactive pupils.  Moist mucous membranes.  No ulcerations or thrush.  Neck: Supple to movements.   Chest: No use of accessory muscles to breathe. Symmetrical expansion.  No wheezing, crackles or rhonchi.  Cardiovascular: S1 and S2 are rhythmic and regular. No murmurs 
 called and let us know that pt sister was trying to assist him, she said pt almost fell, but sister caught him, nurses aide walking into room just as I was hanging up with AMARILYS, bedside nurse notified    Dr Cesar notified of pt's fall from chair, (knee hitting floor, small cut on left knee)  
4 Eyes Skin Assessment     NAME:  Johnathan Yanez III  YOB: 1980  MEDICAL RECORD NUMBER:  [id number]    The patient is being assessed for  Transfer to New Unit    I agree that at least one RN has performed a thorough Head to Toe Skin Assessment on the patient. ALL assessment sites listed below have been assessed.      Areas assessed by both nurses:    Head, Face, Ears, Shoulders, Back, Chest, Arms, Elbows, Hands, Sacrum. Buttock, Coccyx, Ischium, Legs. Feet and Heels, Under Medical Devices , and Other          Does the Patient have a Wound? No noted wound(s)       Dennis Prevention initiated by RN: Yes  Wound Care Orders initiated by RN: No    Pressure Injury (Stage 3,4, Unstageable, DTI, NWPT, and Complex wounds) if present, place Wound referral order by RN under : No    New Ostomies, if present place, Ostomy referral order under : No     Nurse 1 eSignature: Electronically signed by Janet Magaña on 2/1/24 at 4:59 AM EST    **SHARE this note so that the co-signing nurse can place an eSignature**    Nurse 2 eSignature: Electronically signed by Garrick Mills RN on 2/1/24 at 5:33 AM EST   
Attempted communion visit, but unable to complete as doctor was addressing the patient as this  rounded.  
Brought pt to MRI. Patient was groaning/moaning and couldn't lay flat on his back. Complaints of abdominal and catheter pain. RN stated he was given ativan prior to MRI. Patient pain/motion will need to be addressed prior to another attempt for his MRI.   
Called Dr Candelario office, he is out for the week Dr García covering, perfect serve sent regarding right leg wound   
Called and spoke with Alba MEEKS, said ok to give the prn ativan.  
Comprehensive Nutrition Assessment    Type and Reason for Visit:  Reassess    Nutrition Recommendations/Plan:   Continue current diet and ONS    When PO good at meals, recommend add Na restriction to diet to promote improved fluid balance (ascites)    Continue inpatient monitoring     Malnutrition Assessment:  Malnutrition Status:  Mild malnutrition (01/27/24 1138)    Context:  Chronic Illness     Findings of the 6 clinical characteristics of malnutrition:  Energy Intake:  Mild decrease in energy intake (Comment) (unable to nelson PO x 1 wk PTA and on Clear liquids currently)  Weight Loss:  Unable to assess (large fluctuations with fluid status)     Body Fat Loss:  No significant body fat loss     Muscle Mass Loss:  Unable to assess (Edema and LE localized swellings may mask losses)    Fluid Accumulation:  Unable to assess (multiple factors)     Strength:  Not Performed    Nutrition Assessment:    D/C planning in progress to [facility]. Seen by GS re: cellulitis/swelling on LE and determined no surgical intervention planned at this time. Paracentesis prob needed as outpt. Recommend continue current PO diet and ONS, as tolerated. Recommend consider Na restriction added to diet to promote improved fluid balance.    Nutrition Related Findings:    A&Ox4, distended taut rotund abd +BS, +1 edema, +I/O 6.5L, Wound Type: Open Wounds (cellulitis RLE)       Current Nutrition Intake & Therapies:    Average Meal Intake: Unable to assess  Average Supplements Intake: Unable to assess  ADULT ORAL NUTRITION SUPPLEMENT; Lunch; Fortified Gelatin Oral Supplement  ADULT DIET; Regular  ADULT ORAL NUTRITION SUPPLEMENT; Breakfast, Dinner, Lunch; Standard High Calorie/High Protein Oral Supplement    Anthropometric Measures:  Height: 172.7 cm (5' 8\")  Ideal Body Weight (IBW): 154 lbs (70 kg)    Admission Body Weight: 69.1 kg (152 lb 5.4 oz) (1/27 bedscale)  Current Body Weight: 71.2 kg (156 lb 15.5 oz), 98.9 % IBW. Weight Source: Bed Scale 
Comprehensive Nutrition Assessment    Type and Reason for Visit:  Reassess    Nutrition Recommendations/Plan:   Continue current diet and ONS, as tolerated     Malnutrition Assessment:  Malnutrition Status:  Mild malnutrition (01/27/24 1138)    Context:  Chronic Illness     Findings of the 6 clinical characteristics of malnutrition:  Energy Intake:  Mild decrease in energy intake (Comment) (unable to nelson PO x 1 wk PTA and on Clear liquids currently)  Weight Loss:  Unable to assess (large fluctuations with fluid status)     Body Fat Loss:  No significant body fat loss     Muscle Mass Loss:  Unable to assess (Edema and LE localized swellings may mask losses)    Fluid Accumulation:  Unable to assess (multiple factors)     Strength:  Not Performed    Nutrition Assessment:    Pt w/no N/V or abdominal pain. Advanced now to Regular diet and D/C planning is in progress. Recommend continue the current regimen and ONS and will continue to monitor pt tolerance.    Nutrition Related Findings:    A&Ox3 with periods of agitation/confusion at times, distended bloated abd +BS, jaundice, +I/O 3L, trace edema Wound Type: None (scabs on toe)       Current Nutrition Intake & Therapies:    Average Meal Intake: 26-50%  Average Supplements Intake: Unable to assess  ADULT ORAL NUTRITION SUPPLEMENT; Breakfast, Dinner; Clear Liquid Oral Supplement  ADULT ORAL NUTRITION SUPPLEMENT; Lunch; Fortified Gelatin Oral Supplement  ADULT DIET; Regular    Anthropometric Measures:  Height: 172.7 cm (5' 8\")  Ideal Body Weight (IBW): 154 lbs (70 kg)    Admission Body Weight: 69.1 kg (152 lb 5.4 oz) (1/27 bedscale)  Current Body Weight: 69.1 kg (152 lb 5.4 oz), 98.9 % IBW. Weight Source: Bed Scale (1/27)  Current BMI (kg/m2): 23.2  Usual Body Weight: 76.5 kg (168 lb 10 oz) (8/27/23 bedscale)  % Weight Change (Calculated): -9.7                    BMI Categories: Normal Weight (BMI 18.5-24.9)        Nutrition Diagnosis:   Inadequate oral intake related 
Consult for psych sent to Dr jay   
Dr Munoz, CRISTHIAN, has seen patient in ER and charted.   Dr Couch notified of consult via secure text  Dr Kennedy notified of consult via the [place] office  [facility]  
Entered pt's room and asked pt and pt's sister what happened during his \"fall\". Pt's sister stated he did not fall, he just stood up quickly because he needed to use the restroom and could not wait, but when he stood his knee gave out and he dropped to his knee causing a small abrasion to the left knee. Educated pt on the importance of not getting up without staff. Informed pt that he is once again not to get up without a staff member. Electronically signed by Fiona Su RN on 2/2/2024 at 1:34 PM    
For some reason my team was called for admission of this patient. He never had a PCP that I cover.  I only cover Dr. Rodgers on weekends. He previously saw Dr. Rodgers, but per her last DC summary.    \"He is to follow up with GI, ID and will need to find new PCP as patient was previously discharged from my practice.\"    I will sign off and defer to whoever his new PCP is or their coverage.    Electronically signed by Ralph Chapa DO on 1/25/2024 at 8:35 PM     
General Surgery Progress Note  [facility]  Maury Granados MD, MS    Patient's Name/Date of Birth: Johnathan Yanez III / 1980    Date: February 5, 2024     Surgeon: MD Darwin    Chief Complaint: right leg wound    Patient Active Problem List   Diagnosis    Cellulitis    UGI bleed    Abdominal pain    Secondary esophageal varices with bleeding (HCC)    GIB (gastrointestinal bleeding)    Umbilical hernia without obstruction and without gangrene    Left inguinal hernia    Ascites    Decompensation of cirrhosis of liver (HCC)    Anemia    Thrombocytopenia (HCC)    Scrotal swelling    Hepatitis C virus infection without hepatic coma    GI bleed    Alcoholic cirrhosis of liver with ascites (HCC)    Liver cirrhosis (HCC)    Enterocolitis    Hematemesis    Left hydrocele    Hepatic failure, unspecified without coma (HCC)    Hyponatremia    Hematoma of right lower extremity    Acute kidney injury (HCC)    CANDY (acute kidney injury) (HCC)    Mild protein-calorie malnutrition (HCC)    Delirium    Refeeding syndrome       Subjective: seen and examined, wants to be discharged, no fever, no nausea, had MRI abd today    Objective:  /62   Pulse 92   Temp 98.1 °F (36.7 °C) (Oral)   Resp 18   Ht 1.727 m (5' 8\")   Wt 66.7 kg (147 lb)   SpO2 96%   BMI 22.35 kg/m²   Labs:  Recent Labs     02/03/24  0520 02/04/24  1000 02/05/24  0240   WBC 8.0 8.0 6.9   HGB 8.5* 9.1* 8.0*   HCT 25.9* 27.6* 24.3*     Lab Results   Component Value Date    CREATININE 0.9 02/05/2024    BUN 3 (L) 02/05/2024     02/05/2024    K 3.7 02/05/2024     (H) 02/05/2024    CO2 18 (L) 02/05/2024     No results for input(s): \"LIPASE\", \"AMYLASE\" in the last 72 hours.      General appearance:  NAD  Head: NCAT, PERRLA, EOMI, jaundice  Neck: supple, no masses  Lungs: CTAB, equal chest rise bilateral  Heart: Reg rate  Abdomen: soft, nondistended, nontender  Skin; no lesions  Gu: no cva tenderness  Extremities: RLE with 
Infectious Disease  Progress Note  [facility]    Chief Complaint: scrotal infection    Subjective:  he is ok. Wants to go home. No fever. Tolerating antibiotics well.    Scheduled Meds:   cefepime  2,000 mg IntraVENous Q12H    metroNIDAZOLE  500 mg Oral 3 times per day    neomycin-bacitracin-polymyxin   Topical BID    folic acid  1 mg Oral Daily    busPIRone  10 mg Oral BID    midodrine  10 mg Oral TID WC    [Held by provider] heparin (porcine)  5,000 Units SubCUTAneous 3 times per day    pantoprazole  40 mg Oral BID AC    rifAXIMin  400 mg Oral TID    sodium chloride flush  5-40 mL IntraVENous 2 times per day    lactulose  20 g Oral TID    pentoxifylline  400 mg Oral TID WC    thiamine  100 mg Oral Daily     Continuous Infusions:   sodium chloride      sodium chloride 75 mL/hr at 01/28/24 0158     PRN Meds:sodium chloride, baclofen, oxyCODONE, white petrolatum, hydrOXYzine, melatonin, traMADol, potassium chloride **OR** potassium alternative oral replacement **OR** potassium chloride, magnesium sulfate, ondansetron **OR** ondansetron, polyethylene glycol, sodium chloride flush, sodium chloride    Prior to Admission medications    Medication Sig Start Date End Date Taking? Authorizing Provider   busPIRone (BUSPAR) 10 MG tablet Take 1 tablet by mouth 2 times daily 2/3/24  Yes Andrey Cesar MD   midodrine (PROAMATINE) 10 MG tablet Take 1 tablet by mouth 3 times daily (with meals) 1/27/24  Yes Amaris Navarrete DO   pantoprazole (PROTONIX) 40 MG tablet Take 1 tablet by mouth 2 times daily (before meals) 1/27/24  Yes Amaris Navarrete DO   furosemide (LASIX) 40 MG tablet Take 1 tablet by mouth every morning   Yes Robles Tavera MD   folic acid (FOLVITE) 1 MG tablet Take 1 tablet by mouth every morning   Yes Robles Tavera MD   vitamin B-1 (THIAMINE) 100 MG tablet Take 1 tablet by mouth every morning   Yes Robles Tavera MD   lactulose (CHRONULAC) 10 GM/15ML solution Take 30 mLs by mouth 3 times daily 
Infectious Disease  Progress Note  [facility]    Chief Complaint: scrotal infection    Subjective: Patient is resting in bed no nausea vomiting or diarrhea.  Scrotum with minimal tenderness.  Seems to be tolerating antibiotics    Scheduled Meds:   cefepime  2,000 mg IntraVENous Q12H    metroNIDAZOLE  500 mg Oral 3 times per day    neomycin-bacitracin-polymyxin   Topical BID    folic acid  1 mg Oral Daily    busPIRone  10 mg Oral BID    midodrine  10 mg Oral TID WC    [Held by provider] heparin (porcine)  5,000 Units SubCUTAneous 3 times per day    pantoprazole  40 mg Oral BID AC    rifAXIMin  400 mg Oral TID    sodium chloride flush  5-40 mL IntraVENous 2 times per day    lactulose  20 g Oral TID    pentoxifylline  400 mg Oral TID WC    thiamine  100 mg Oral Daily     Continuous Infusions:   sodium chloride      sodium chloride 75 mL/hr at 01/28/24 0158     PRN Meds:sodium chloride, baclofen, oxyCODONE, white petrolatum, hydrOXYzine, melatonin, traMADol, potassium chloride **OR** potassium alternative oral replacement **OR** potassium chloride, magnesium sulfate, ondansetron **OR** ondansetron, polyethylene glycol, sodium chloride flush, sodium chloride    Prior to Admission medications    Medication Sig Start Date End Date Taking? Authorizing Provider   busPIRone (BUSPAR) 10 MG tablet Take 1 tablet by mouth 2 times daily 2/3/24  Yes Andrey Cesar MD   midodrine (PROAMATINE) 10 MG tablet Take 1 tablet by mouth 3 times daily (with meals) 1/27/24  Yes Amaris Navarrete DO   pantoprazole (PROTONIX) 40 MG tablet Take 1 tablet by mouth 2 times daily (before meals) 1/27/24  Yes Amaris Navarrete DO   furosemide (LASIX) 40 MG tablet Take 1 tablet by mouth every morning   Yes Robles Tavera MD   folic acid (FOLVITE) 1 MG tablet Take 1 tablet by mouth every morning   Yes Robles Tavera MD   vitamin B-1 (THIAMINE) 100 MG tablet Take 1 tablet by mouth every morning   Yes Robles Tavera MD   lactulose 
Interim notes reviewed. Psych consult completed on 2/1 for \"AMS, history of ETOH and drug abuse.\" Ativan tapered and Buspar started at that time. Dose of PRN Ativan decreased and Buspar dose optimized by primary team. Frequency of Ativan administration has decreased as well.     Patient's mentation noted to be returning to baseline. Discussed case with charge RN who reports no acute psychiatric concerns. Discharge plan is [facility]. No indication for psychiatric admission. Psychiatry to sign off.     Electronically signed by CHRIS Pearce CNP on 2/5/2024 at 11:30 AM     
Left message with Elieser Perez regarding US results.    Electronically signed by Galina Bennett RN on 2/4/2024 at 5:46 PM    
Notified Dr Candelario via perfect serve of reconsult for right leg wound, await message  
Notified Elieser Perez that patient was not able to tolerated MRI. New orders received.    Electronically signed by Galina Bennett RN on 2/4/2024 at 11:55 AM    
Notified NP re patients Phos 1.6.  
Notified Peggy that patient moved to room 516.  
Nurse-to-nurse report called to [facility]. Spoke with Rosette. Pt to be picked up by [facility] at 1630. Discharge paperwork faxed to [phone].       Electronically signed by Marie Christopher RN on 2/10/2024 at 1:26 PM    
Occupational Therapy  OCCUPATIONAL THERAPY INITIAL EVALUATION  [facility]  [address], [place]    Date: 2024     Patient Name: Johnathan Yanez III  MRN: [id number]  : 1980  Room: [address] [place] [address]    Evaluating OT: Valerie Mejia, OTR/L - [id number]    Referring Provider:     Amaris Navarrete DO     Specific Provider Orders/Date: \"OT eval and treat\" - 2024    Diagnosis: Hypokalemia [E87.6]  Lactic acidosis [E87.20]  Colitis [K52.9]  Abdominal pain [R10.9]  Transaminitis [R74.01]  CANDY (acute kidney injury) (HCC) [N17.9]  Acute kidney injury (HCC) [N17.9]  Hepatic trauma, initial encounter [S36.119A]  Urinary tract infection without hematuria, site unspecified [N39.0]  Acute pancreatitis, unspecified complication status, unspecified pancreatitis type [K85.90]      Pertinent Medical History: cirrhosis, esophageal varices, ETOH abuse, hepatitis C    Precautions: fall risk, seizure precautions    Assessment of Current Deficits:    [x] Functional mobility   [x]ADLs  [x] Strength               [x]Cognition   [x] Functional transfers   [x] IADLs         [x] Safety Awareness   [x]Endurance   [] Fine Coordination              [x] Balance      [] Vision/perception   []Sensation    []Gross Motor Coordination  [] ROM  [] Delirium                   [] Motor Control     OT PLAN OF CARE   OT POC is based on physician orders, patient diagnosis, and results of clinical assessment.  Frequency/Duration 2-5 days/week for 2 weeks PRN   Specific OT Treatment Interventions to Include:   * Instruction/training on adapted ADL techniques and AE recommendations to increase functional independence within precautions       * Training on energy conservation strategies, correct breathing pattern and techniques to improve independence/tolerance for self-care routine  * Functional transfer/mobility training/DME recommendations for increased independence, safety, and 
Occupational Therapy  OT BEDSIDE TREATMENT NOTE      Date:2024  Patient Name: Johnathan Yanez III  MRN: [id number]  : 1980  Room: [address] [place] [address]       Evaluating OT: Valerie Mejia OTR/L - [id number]     Referring Provider:     Amaris Navarrete DO      Specific Provider Orders/Date: \"OT eval and treat\" - 2024     Diagnosis: Hypokalemia [E87.6]  Lactic acidosis [E87.20]  Colitis [K52.9]  Abdominal pain [R10.9]  Transaminitis [R74.01]  CANDY (acute kidney injury) (HCC) [N17.9]  Acute kidney injury (HCC) [N17.9]  Hepatic trauma, initial encounter [S36.119A]  Urinary tract infection without hematuria, site unspecified [N39.0]  Acute pancreatitis, unspecified complication status, unspecified pancreatitis type [K85.90]       Pertinent Medical History: cirrhosis, esophageal varices, ETOH abuse, hepatitis C     Precautions: fall risk, seizure precautions     Assessment of Current Deficits:    [x] Functional mobility             [x]ADLs           [x] Strength                  [x]Cognition   [x] Functional transfers           [x] IADLs         [x] Safety Awareness   [x]Endurance   [] Fine Coordination              [x] Balance      [] Vision/perception   []Sensation     []Gross Motor Coordination  [] ROM           [] Delirium                   [] Motor Control      OT PLAN OF CARE   OT POC is based on physician orders, patient diagnosis, and results of clinical assessment.  Frequency/Duration 2-5 days/week for 2 weeks PRN   Specific OT Treatment Interventions to Include:   * Instruction/training on adapted ADL techniques and AE recommendations to increase functional independence within precautions       * Training on energy conservation strategies, correct breathing pattern and techniques to improve independence/tolerance for self-care routine  * Functional transfer/mobility training/DME recommendations for increased independence, safety, and fall prevention  * Patient/Family education to increase follow through 
Occupational Therapy  OT BEDSIDE TREATMENT NOTE      Date:2024  Patient Name: Johnathan Yanez III  MRN: [id number]  : 1980  Room: [address] [place] [address]     Evaluating OT: Valerie Mejia OTR/L - [id number]     Referring Provider:     Amaris Navarrete DO      Specific Provider Orders/Date: \"OT eval and treat\" - 2024     Diagnosis: Hypokalemia [E87.6]  Lactic acidosis [E87.20]  Colitis [K52.9]  Abdominal pain [R10.9]  Transaminitis [R74.01]  CANDY (acute kidney injury) (HCC) [N17.9]  Acute kidney injury (HCC) [N17.9]  Hepatic trauma, initial encounter [S36.119A]  Urinary tract infection without hematuria, site unspecified [N39.0]  Acute pancreatitis, unspecified complication status, unspecified pancreatitis type [K85.90]       Pertinent Medical History: cirrhosis, esophageal varices, ETOH abuse, hepatitis C     Precautions: fall risk, seizure precautions     Assessment of Current Deficits:    [x] Functional mobility             [x]ADLs           [x] Strength                  [x]Cognition   [x] Functional transfers           [x] IADLs         [x] Safety Awareness   [x]Endurance   [] Fine Coordination              [x] Balance      [] Vision/perception   []Sensation     []Gross Motor Coordination  [] ROM           [] Delirium                   [] Motor Control      OT PLAN OF CARE   OT POC is based on physician orders, patient diagnosis, and results of clinical assessment.  Frequency/Duration 2-5 days/week for 2 weeks PRN   Specific OT Treatment Interventions to Include:   * Instruction/training on adapted ADL techniques and AE recommendations to increase functional independence within precautions       * Training on energy conservation strategies, correct breathing pattern and techniques to improve independence/tolerance for self-care routine  * Functional transfer/mobility training/DME recommendations for increased independence, safety, and fall prevention  * Patient/Family education to increase follow through 
[facility] pickup via wheelchair [phone]  
PROGRESS NOTE  By Hardeep Kennedy M.D.    [facility]  Dr. Mellissa Cuellar M.D.,  Dr. Atif Johnson M.D.,   Dr. Tino Bal D.O.,  Dr. Scot Huizar M.D.,  KAI AstograO.,          Johnathan PABLO SanfordBeau III  44 y.o.  male    SUBJECTIVE:  Denies abdominal pain.  Denies nausea vomiting.  Wants to go home.  Family at bedside    OBJECTIVE:    BP (!) 89/43   Pulse 67   Temp 98.4 °F (36.9 °C) (Oral)   Resp 16   Ht 1.727 m (5' 8\")   Wt 69.1 kg (152 lb 5.4 oz)   SpO2 100%   BMI 23.16 kg/m²     General: NAD/somnolent  male  HEENT: Persistent scleral icterus/moist oral mucosa  Neck: Supple with trachea midline  Chest: Symmetric excursion/nonlabored respirations  Abd.: Soft and nontender  Extr.:  Persistent lower extremity edema 3+  Skin: Warm and dry      DATA:     Lab Results   Component Value Date/Time    WBC 2.8 01/30/2024 06:31 AM    RBC 2.67 01/30/2024 06:31 AM    HGB 8.0 01/30/2024 06:31 AM    HCT 24.7 01/30/2024 06:31 AM    MCV 92.5 01/30/2024 06:31 AM    MCH 30.0 01/30/2024 06:31 AM    MCHC 32.4 01/30/2024 06:31 AM    RDW 21.7 01/30/2024 06:31 AM    PLT 26 01/30/2024 06:31 AM    MPV 11.3 01/30/2024 06:31 AM     Lab Results   Component Value Date/Time     01/30/2024 06:31 AM    K 3.5 01/30/2024 06:31 AM    K 3.5 06/04/2023 05:43 AM     01/30/2024 06:31 AM    CO2 19 01/30/2024 06:31 AM    BUN 10 01/30/2024 06:31 AM    CREATININE 1.0 01/30/2024 06:31 AM    CALCIUM 7.9 01/30/2024 06:31 AM    PROT 4.7 01/30/2024 06:31 AM    LABALBU 2.5 01/30/2024 06:31 AM    LABALBU 3.4 02/18/2012 05:00 AM    BILITOT 4.0 01/30/2024 06:31 AM    ALKPHOS 63 01/30/2024 06:31 AM    AST 42 01/30/2024 06:31 AM    ALT 17 01/30/2024 06:31 AM     Lab Results   Component Value Date/Time    LIPASE 79 01/25/2024 04:38 PM     Lab Results   Component Value Date/Time    AMYLASE 49 02/22/2015 09:30 PM         ASSESSMENT/PLAN:  Patient Active Problem List   Diagnosis    Cellulitis    UGI bleed    Abdominal 
PROGRESS NOTE  By Hardeep Kennedy M.D.    [facility]  Dr. Mellissa Cuellar M.D.,  Dr. Atif Johnson M.D.,   Dr. Tino Bal D.O.,  Dr. Scot Huizar M.D.,  KAI AstorgaO.,          Johnathan Maherker III  44 y.o.  male    SUBJECTIVE:  Denies abdominal pain    OBJECTIVE:    BP (!) 101/57   Pulse 66   Temp 97.7 °F (36.5 °C) (Oral)   Resp 16   Ht 1.727 m (5' 8\")   Wt 69.1 kg (152 lb 5.4 oz)   SpO2 96%   BMI 23.16 kg/m²     General:  male  HEENT: Moist oral mucosa/no discharge nose or ears  Neck: Appears supple with trachea midline  Chest: Symmetric excursion/nonlabored respirations  Cor: Regular  Abd.: Soft and appears nontender.  Mildly distended  Extr.:  BLE edema  Skin: Warm and dry      DATA:    Monitor data monitor strips reviewed -sinus rhythm noted.       Lab Results   Component Value Date/Time    WBC 2.7 01/27/2024 03:35 AM    RBC 2.65 01/27/2024 03:35 AM    HGB 8.1 01/27/2024 03:35 AM    HCT 23.8 01/27/2024 03:35 AM    MCV 89.8 01/27/2024 03:35 AM    MCH 30.6 01/27/2024 03:35 AM    MCHC 34.0 01/27/2024 03:35 AM    RDW 21.1 01/27/2024 03:35 AM    PLT 37 01/25/2024 04:38 PM    MPV 10.9 01/27/2024 03:35 AM     Lab Results   Component Value Date/Time     01/27/2024 03:35 AM    K 3.7 01/27/2024 03:35 AM    K 3.5 06/04/2023 05:43 AM     01/27/2024 03:35 AM    CO2 22 01/27/2024 03:35 AM    BUN 8 01/27/2024 03:35 AM    CREATININE 1.1 01/27/2024 03:35 AM    CALCIUM 8.0 01/27/2024 03:35 AM    PROT 4.7 01/27/2024 03:35 AM    LABALBU 2.7 01/27/2024 03:35 AM    LABALBU 3.4 02/18/2012 05:00 AM    BILITOT 5.7 01/27/2024 03:35 AM    ALKPHOS 71 01/27/2024 03:35 AM    AST 54 01/27/2024 03:35 AM    ALT 22 01/27/2024 03:35 AM     Lab Results   Component Value Date/Time    LIPASE 79 01/25/2024 04:38 PM     Lab Results   Component Value Date/Time    AMYLASE 49 02/22/2015 09:30 PM         ASSESSMENT/PLAN:  Patient Active Problem List   Diagnosis    Cellulitis    UGI bleed    
PROGRESS NOTE  By Hardeep Kennedy M.D.    [facility]  Dr. Mellissa Cuellar M.D.,  Dr. Atif Johnson M.D.,   Dr. Tino Bal D.O.,  Dr. Scot Huizar M.D.,  KAI AstorgaO.,          Johnathan Maherker III  44 y.o.  male    SUBJECTIVE:  Patient feeling well.  Patient complains of persistent scrotal edema with area of drainage    OBJECTIVE:    BP (!) 100/58   Pulse 93   Temp 98.7 °F (37.1 °C) (Oral)   Resp 16   Ht 1.727 m (5' 8\")   Wt 71.2 kg (157 lb)   SpO2 94%   BMI 23.87 kg/m²     General: NAD/ male  HEENT: Persistent scleral icterus/moist oral mucosa  Neck: Appears supple with trachea midline  Chest: Symmetric excursion/nonlabored respirations  Abd.: Soft/obese/moderately distended  Extr.:  BLE 3+ edema  Skin: Warm and dry      DATA:     Lab Results   Component Value Date/Time    WBC 5.4 02/06/2024 04:00 AM    RBC 2.49 02/06/2024 04:00 AM    HGB 7.7 02/06/2024 04:00 AM    HCT 24.1 02/06/2024 04:00 AM    MCV 96.8 02/06/2024 04:00 AM    MCH 30.9 02/06/2024 04:00 AM    MCHC 32.0 02/06/2024 04:00 AM    RDW 26.0 02/06/2024 04:00 AM    PLT 54 02/05/2024 02:40 AM    MPV 12.1 02/06/2024 04:00 AM     Lab Results   Component Value Date/Time     02/05/2024 02:40 AM    K 3.7 02/05/2024 02:40 AM    K 3.5 06/04/2023 05:43 AM     02/05/2024 02:40 AM    CO2 18 02/05/2024 02:40 AM    BUN 3 02/05/2024 02:40 AM    CREATININE 0.9 02/05/2024 02:40 AM    CALCIUM 7.9 02/05/2024 02:40 AM    PROT 4.8 02/05/2024 02:40 AM    LABALBU 2.5 02/05/2024 02:40 AM    LABALBU 3.4 02/18/2012 05:00 AM    BILITOT 6.0 02/05/2024 02:40 AM    ALKPHOS 93 02/05/2024 02:40 AM    AST 47 02/05/2024 02:40 AM    ALT 17 02/05/2024 02:40 AM     Lab Results   Component Value Date/Time    LIPASE 79 01/25/2024 04:38 PM     Lab Results   Component Value Date/Time    AMYLASE 49 02/22/2015 09:30 PM         ASSESSMENT/PLAN:  Patient Active Problem List   Diagnosis    Cellulitis    UGI bleed    Abdominal pain    Secondary 
PROGRESS NOTE  By Hardeep Kennedy M.D.    [facility]  Dr. Mellissa Cuellar M.D.,  Dr. Atif Johnson M.D.,   Dr. Tino Bal D.O.,  Dr. Scot Huizar M.D.,  KAI AstorgaO.,          Johnathan Yanez III  44 y.o.  male    SUBJECTIVE:  No new complaints    OBJECTIVE:    BP (!) 93/50   Pulse 72   Temp 98.2 °F (36.8 °C)   Resp 18   Ht 1.727 m (5' 8\")   Wt 69.1 kg (152 lb 5.4 oz)   SpO2 100%   BMI 23.16 kg/m²     General: NAD/ male.  No family at bedside.  Appears somnolent  HEENT: Persistent scleral icterus/moist oral mucosa  Neck: Supple with trachea midline  Chest: Symmetric excursion/nonlabored respirations  Cor: Regular/S1-S2  Abd.: Soft.  Appears mildly distended but nontender  Extr.:  BLE edema.  Decreased muscle tone and bulk throughout  Skin: Warm and dry      DATA:    Monitor data reviewed -sinus rhythm noted.       Lab Results   Component Value Date/Time    WBC 15.1 01/28/2024 02:10 AM    RBC 3.01 01/28/2024 02:10 AM    HGB 9.0 01/28/2024 02:10 AM    HCT 26.4 01/28/2024 02:10 AM    MCV 87.7 01/28/2024 02:10 AM    MCH 29.9 01/28/2024 02:10 AM    MCHC 34.1 01/28/2024 02:10 AM    RDW 21.8 01/28/2024 02:10 AM    PLT 45 01/28/2024 02:10 AM    MPV 9.5 01/28/2024 02:10 AM     Lab Results   Component Value Date/Time     01/28/2024 02:10 AM    K 3.9 01/28/2024 02:10 AM    K 3.5 06/04/2023 05:43 AM     01/28/2024 02:10 AM    CO2 19 01/28/2024 02:10 AM    BUN 14 01/28/2024 02:10 AM    CREATININE 2.2 01/28/2024 02:10 AM    CALCIUM 7.9 01/28/2024 02:10 AM    PROT 4.7 01/28/2024 02:10 AM    LABALBU 2.6 01/28/2024 02:10 AM    LABALBU 3.4 02/18/2012 05:00 AM    BILITOT 6.7 01/28/2024 02:10 AM    ALKPHOS 75 01/28/2024 02:10 AM    AST 47 01/28/2024 02:10 AM    ALT 21 01/28/2024 02:10 AM     Lab Results   Component Value Date/Time    LIPASE 79 01/25/2024 04:38 PM     Lab Results   Component Value Date/Time    AMYLASE 49 02/22/2015 09:30 PM         ASSESSMENT/PLAN:  Patient 
PROGRESS NOTE  By Hardeep Kennedy M.D.    [facility]  Dr. Mellissa Cuellar M.D.,  Dr. Atif Johnson M.D.,   KAI CrouchO.,  Dr. Scot Huizar M.D.,  Dr. Jonnie Cai D.O.,          Johnathan Sanfordamaker III  44 y.o.  male    SUBJECTIVE:  No new complaints. .  Somnolent    OBJECTIVE:    BP (!) 98/57   Pulse 78   Temp 98.5 °F (36.9 °C) (Axillary)   Resp 16   Ht 1.727 m (5' 8\")   Wt 69.1 kg (152 lb 5.4 oz)   SpO2 98%   BMI 23.16 kg/m²     General: NAD/somnolent  male  HEENT: No discharge from nose or ears  Neck: Trachea midline  Chest: Symmetric excursion/CTAB  Cor: Regular  Abd.: Soft and appears nontender  Extr.:  Bilateral lower extremity edema  Skin: Warm and dry      DATA:    Monitor data reviewed -sinus rhythm noted.       Lab Results   Component Value Date/Time    WBC 3.7 01/29/2024 04:17 AM    RBC 2.77 01/29/2024 04:17 AM    HGB 8.3 01/29/2024 04:17 AM    HCT 24.9 01/29/2024 04:17 AM    MCV 89.9 01/29/2024 04:17 AM    MCH 30.0 01/29/2024 04:17 AM    MCHC 33.3 01/29/2024 04:17 AM    RDW 21.8 01/29/2024 04:17 AM    PLT 34 01/29/2024 04:17 AM    MPV 12.0 01/29/2024 04:17 AM     Lab Results   Component Value Date/Time     01/29/2024 04:17 AM    K 3.7 01/29/2024 04:17 AM    K 3.5 06/04/2023 05:43 AM     01/29/2024 04:17 AM    CO2 20 01/29/2024 04:17 AM    BUN 15 01/29/2024 04:17 AM    CREATININE 1.3 01/29/2024 04:17 AM    CALCIUM 7.8 01/29/2024 04:17 AM    PROT 4.5 01/29/2024 04:17 AM    LABALBU 2.4 01/29/2024 04:17 AM    LABALBU 3.4 02/18/2012 05:00 AM    BILITOT 4.5 01/29/2024 04:17 AM    ALKPHOS 64 01/29/2024 04:17 AM    AST 43 01/29/2024 04:17 AM    ALT 18 01/29/2024 04:17 AM     Lab Results   Component Value Date/Time    LIPASE 79 01/25/2024 04:38 PM     Lab Results   Component Value Date/Time    AMYLASE 49 02/22/2015 09:30 PM         ASSESSMENT/PLAN:  Patient Active Problem List   Diagnosis    Cellulitis    UGI bleed    Abdominal pain    Secondary esophageal 
PROGRESS NOTE  By Hardeep Kennedy M.D.    [facility]  Dr. Mellissa Cuellar M.D.,  Dr. Atif Johnson M.D.,   KAI CrouchO.,  Dr. Scot Huizar M.D.,  Dr. Jonnie Cai D.O.,          Johnathan Yanez III  44 y.o.  male    SUBJECTIVE:  Complains of pain especially in his scrotal area    OBJECTIVE:    BP (!) 99/54   Pulse 94   Temp 98 °F (36.7 °C) (Oral)   Resp 16   Ht 1.727 m (5' 8\")   Wt 91.8 kg (202 lb 4.8 oz)   SpO2 99%   BMI 30.76 kg/m²     General: NAD/ male  HEENT: Moist oral mucosa/no discharge no seizures  Neck: Supple with trachea midline  Chest: Symmetric excursion/nonlabored respirations  Abd.: Soft/obese/distended  Extr.:  BLE edema/scrotal edema  Skin: Warm and dry        DATA:         Lab Results   Component Value Date/Time    WBC 9.6 02/07/2024 12:10 AM    RBC 2.59 02/07/2024 12:10 AM    HGB 8.1 02/07/2024 12:10 AM    HCT 25.1 02/07/2024 12:10 AM    MCV 96.9 02/07/2024 12:10 AM    MCH 31.3 02/07/2024 12:10 AM    MCHC 32.3 02/07/2024 12:10 AM    RDW 26.7 02/07/2024 12:10 AM    PLT 55 02/07/2024 12:10 AM    MPV 10.9 02/07/2024 12:10 AM     Lab Results   Component Value Date/Time     02/08/2024 01:10 PM    K 3.4 02/08/2024 01:10 PM    K 3.5 06/04/2023 05:43 AM     02/08/2024 01:10 PM    CO2 20 02/08/2024 01:10 PM    BUN 6 02/08/2024 01:10 PM    CREATININE 1.1 02/08/2024 01:10 PM    CALCIUM 7.9 02/08/2024 01:10 PM    PROT 5.2 02/08/2024 01:10 PM    LABALBU 2.8 02/08/2024 01:10 PM    LABALBU 3.4 02/18/2012 05:00 AM    BILITOT 6.0 02/08/2024 01:10 PM    ALKPHOS 88 02/08/2024 01:10 PM    AST 60 02/08/2024 01:10 PM    ALT 17 02/08/2024 01:10 PM     Lab Results   Component Value Date/Time    LIPASE 79 01/25/2024 04:38 PM     Lab Results   Component Value Date/Time    AMYLASE 49 02/22/2015 09:30 PM         ASSESSMENT/PLAN:  Patient Active Problem List   Diagnosis    Cellulitis    UGI bleed    Abdominal pain    Secondary esophageal varices with bleeding (HCC)    
PROGRESS NOTE  By Hardeep Kennedy M.D.    [facility]  Dr. Mellissa Cuellar M.D.,  Dr. Atif Johnson M.D.,   KAI CrouchO.,  Dr. Scot Huizar M.D.,  KAI AstorgaO.,          Johnathan Maherker III  44 y.o.  male    SUBJECTIVE:  Complains of pain mostly in the lower abdomen and secondary to scrotal edema    OBJECTIVE:    BP (!) 97/53   Pulse 89   Temp 98.1 °F (36.7 °C) (Oral)   Resp 16   Ht 1.727 m (5' 8\")   Wt 66.7 kg (147 lb)   SpO2 95%   BMI 22.35 kg/m²     General: NAD/ male  HEENT: Persistent scleral icterus/moist oral mucosa  Neck: Supple with trachea midline  Chest: Symmetric excursion/nonlabored respirations  Abd.: Soft/obese/distended  Extr.:  Bilateral LE 3+ edema  Skin: Warm and dry  Other: Scrotal edema; Aldana catheter in place      DATA:       Lab Results   Component Value Date/Time    WBC 7.8 02/02/2024 02:50 AM    RBC 2.64 02/02/2024 02:50 AM    HGB 7.8 02/02/2024 02:50 AM    HCT 23.1 02/02/2024 02:50 AM    MCV 87.5 02/02/2024 02:50 AM    MCH 29.5 02/02/2024 02:50 AM    MCHC 33.8 02/02/2024 02:50 AM    RDW 22.1 02/02/2024 02:50 AM    PLT 50 02/02/2024 02:50 AM    MPV 11.4 02/02/2024 02:50 AM     Lab Results   Component Value Date/Time     02/02/2024 02:50 AM    K 3.4 02/02/2024 02:50 AM    K 3.5 06/04/2023 05:43 AM     02/02/2024 02:50 AM    CO2 17 02/02/2024 02:50 AM    BUN 4 02/02/2024 02:50 AM    CREATININE 0.9 02/02/2024 02:50 AM    CALCIUM 7.2 02/02/2024 02:50 AM    PROT 5.0 02/02/2024 02:50 AM    LABALBU 2.7 02/02/2024 02:50 AM    LABALBU 3.4 02/18/2012 05:00 AM    BILITOT 4.9 02/02/2024 02:50 AM    ALKPHOS 85 02/02/2024 02:50 AM    AST 46 02/02/2024 02:50 AM    ALT 17 02/02/2024 02:50 AM     Lab Results   Component Value Date/Time    LIPASE 79 01/25/2024 04:38 PM     Lab Results   Component Value Date/Time    AMYLASE 49 02/22/2015 09:30 PM         ASSESSMENT/PLAN:  Patient Active Problem List   Diagnosis    Cellulitis    UGI bleed    
PROGRESS NOTE  By Hardeep Kennedy M.D.    [facility]  Dr. Mellissa Cuellar M.D.,  Dr. Atif Johnson M.D.,   KAI CrouchO.,  Dr. Scot Huizar M.D.,  KAI AstorgaO.,          Johnathan Maherker III  44 y.o.  male    SUBJECTIVE:  No new complaints.  Patient's somnolent    OBJECTIVE:    BP (!) 102/52   Pulse 84   Temp 98.6 °F (37 °C) (Axillary)   Resp 16   Ht 1.727 m (5' 8\")   Wt 69.1 kg (152 lb 5.4 oz)   SpO2 94%   BMI 23.16 kg/m²     General: Somnolent  male  HEENT: No discharge nose or ears  Neck: Trachea midline  Chest: Symmetric excursion/nonlabored respirations  Abd.: Soft/distended  Extr.:  BLE 3 edema  Skin: Warm and dry.  Persistent jaundice      DATA:       Lab Results   Component Value Date/Time    WBC 3.7 02/01/2024 06:38 AM    RBC 2.63 02/01/2024 06:38 AM    HGB 7.8 02/01/2024 06:38 AM    HCT 23.4 02/01/2024 06:38 AM    MCV 89.0 02/01/2024 06:38 AM    MCH 29.7 02/01/2024 06:38 AM    MCHC 33.3 02/01/2024 06:38 AM    RDW 21.6 02/01/2024 06:38 AM    PLT 31 01/31/2024 03:41 AM    MPV 12.1 02/01/2024 06:38 AM     Lab Results   Component Value Date/Time     02/01/2024 06:38 AM    K 3.2 02/01/2024 06:38 AM    K 3.5 06/04/2023 05:43 AM     02/01/2024 06:38 AM    CO2 18 02/01/2024 06:38 AM    BUN 4 02/01/2024 06:38 AM    CREATININE 0.9 02/01/2024 06:38 AM    CALCIUM 7.4 02/01/2024 06:38 AM    PROT 4.6 02/01/2024 06:38 AM    LABALBU 2.4 02/01/2024 06:38 AM    LABALBU 3.4 02/18/2012 05:00 AM    BILITOT 4.4 02/01/2024 06:38 AM    ALKPHOS 68 02/01/2024 06:38 AM    AST 41 02/01/2024 06:38 AM    ALT 16 02/01/2024 06:38 AM     Lab Results   Component Value Date/Time    LIPASE 79 01/25/2024 04:38 PM     Lab Results   Component Value Date/Time    AMYLASE 49 02/22/2015 09:30 PM         ASSESSMENT/PLAN:  Patient Active Problem List   Diagnosis    Cellulitis    UGI bleed    Abdominal pain    Secondary esophageal varices with bleeding (HCC)    GIB (gastrointestinal 
PROGRESS NOTE  By Hardeep Kennedy M.D.    [facility]  Dr. Mellissa Cuellar M.D.,  Dr. Atif Johnson M.D.,   SERGIO Crouch.NAYANA.,  Dr. Scot Huizar M.D.,  Dr. Jonnie Cai D.O.,          Johnathan Yanez III  44 y.o.  male    SUBJECTIVE:  Various pain complaints including diffuse and back.  Repeatedly states that he wants to go home.  Patient states that he does not think he will be able to undergo MRI unless sedated    OBJECTIVE:    /62   Pulse 92   Temp 98.1 °F (36.7 °C) (Oral)   Resp 18   Ht 1.727 m (5' 8\")   Wt 66.7 kg (147 lb)   SpO2 96%   BMI 22.35 kg/m²     General: NAD/adult  male.  AAOx3  HEENT: Persistent scleral icterus/moist oral mucosa  Neck: Supple with trachea midline  Chest: Symmetric excursion/nonlabored respirations  Cor: Regular  Abd.: Soft.  Obese/distended  Extr.:  BLE edema/including scrotal  Skin: Warm and dry        DATA:     Lab Results   Component Value Date/Time    WBC 6.9 02/05/2024 02:40 AM    RBC 2.60 02/05/2024 02:40 AM    HGB 8.0 02/05/2024 02:40 AM    HCT 24.3 02/05/2024 02:40 AM    MCV 93.5 02/05/2024 02:40 AM    MCH 30.8 02/05/2024 02:40 AM    MCHC 32.9 02/05/2024 02:40 AM    RDW 25.5 02/05/2024 02:40 AM    PLT 54 02/05/2024 02:40 AM    MPV 10.6 02/05/2024 02:40 AM     Lab Results   Component Value Date/Time     02/05/2024 02:40 AM    K 3.7 02/05/2024 02:40 AM    K 3.5 06/04/2023 05:43 AM     02/05/2024 02:40 AM    CO2 18 02/05/2024 02:40 AM    BUN 3 02/05/2024 02:40 AM    CREATININE 0.9 02/05/2024 02:40 AM    CALCIUM 7.9 02/05/2024 02:40 AM    PROT 4.8 02/05/2024 02:40 AM    LABALBU 2.5 02/05/2024 02:40 AM    LABALBU 3.4 02/18/2012 05:00 AM    BILITOT 6.0 02/05/2024 02:40 AM    ALKPHOS 93 02/05/2024 02:40 AM    AST 47 02/05/2024 02:40 AM    ALT 17 02/05/2024 02:40 AM     Lab Results   Component Value Date/Time    LIPASE 79 01/25/2024 04:38 PM     Lab Results   Component Value Date/Time    AMYLASE 49 02/22/2015 09:30 PM 
Patient declined  services at this time.  
Patient declined  services at this time.  
Patient has a large area on right lower calf area. Looks to be an abscess, purple color and painful to touch. Charge RN notifed. Electronically signed by Hailey Higuera RN on 2/5/2024 at 9:51 AM    
Patient refused 10pm lab draw of CMP/Phos.     Brenna Salas RN 9:24 PM    
Patient refused vitals  
Patient refusing afternoon vitals   
Patient was asleep and SO declined  services at this time.  
Physical Therapy  Facility/Department: [address] MED SURG  Daily Treatment Note  NAME: Johnathan Yanez III  : 1980  MRN: [id number]    Date of Service: 2024    Patient Diagnosis(es): The primary encounter diagnosis was Acute kidney injury (HCC). Diagnoses of Hypokalemia, Lactic acidosis, Transaminitis, Urinary tract infection without hematuria, site unspecified, Acute pancreatitis, unspecified complication status, unspecified pancreatitis type, Colitis, and Hepatitis were also pertinent to this visit.      Evaluating Therapist: Bernadette Woods PT     Room #:  0434/0434-A  Diagnosis:  Hypokalemia [E87.6]  Lactic acidosis [E87.20]  Colitis [K52.9]  Abdominal pain [R10.9]  Transaminitis [R74.01]  CNADY (acute kidney injury) (HCC) [N17.9]  Acute kidney injury (HCC) [N17.9]  Hepatic trauma, initial encounter [S36.119A]  Urinary tract infection without hematuria, site unspecified [N39.0]  Acute pancreatitis, unspecified complication status, unspecified pancreatitis type [K85.90]  PMHx/PSHx:  Hepatitis C, substance abuse, alcohol abuse  Precautions:  falls, TSM, seizure precautions        Social:  Pt lives with S.O. in a 2 floor plan.  Prior to admission Pt independent without device, state he has a ww       Initial Evaluation  Date: 24 Treatment        Short Term/ Long Term   Goals   Was pt agreeable to Eval/treatment? yes  yes     Does pt have pain? No c/o pain  B flank pain     Bed Mobility  Rolling: SBA  Supine to sit: SBA  Sit to supine: SBA  Scooting: SBA  rolling:  NT  Supine to sit:  Min A  Sit to supine:  SBA  Scooting:  Min A to sitting EOB independent   Transfers Sit to stand: min assist  Stand to sit: min assist  Stand pivot: NT  sit to stand:  Min A  Stand to sit:  Min A  Stand pivot:  NT supervision   Ambulation    120 feet with no device with hand held/min assist  20 feet x 2 Mod A with HHA.  200 feet with device as needed supervision    Stair Negotiation  Ascended and descended  NT  NT  4-12 
Physical Therapy  Facility/Department: [address] MED SURG  Daily Treatment Note  NAME: Johnathan Yanez III  : 1980  MRN: [id number]    Date of Service: 2024    Patient Diagnosis(es): The primary encounter diagnosis was Acute kidney injury (HCC). Diagnoses of Hypokalemia, Lactic acidosis, Transaminitis, Urinary tract infection without hematuria, site unspecified, Acute pancreatitis, unspecified complication status, unspecified pancreatitis type, Colitis, Hepatitis, and Anxiety were also pertinent to this visit.      Evaluating Therapist: Bernadette Woods PT     Room #:  516  Diagnosis:  Hypokalemia [E87.6]  Lactic acidosis [E87.20]  Colitis [K52.9]  Abdominal pain [R10.9]  Transaminitis [R74.01]  CANDY (acute kidney injury) (HCC) [N17.9]  Acute kidney injury (HCC) [N17.9]  Hepatic trauma, initial encounter [S36.119A]  Urinary tract infection without hematuria, site unspecified [N39.0]  Acute pancreatitis, unspecified complication status, unspecified pancreatitis type [K85.90]  PMHx/PSHx:  Hepatitis C, substance abuse, alcohol abuse  Precautions:  falls, TSM, seizure precautions        Social:  Pt lives with S.O. in a 2 floor plan.  Prior to admission Pt independent without device, state he has a ww       Initial Evaluation  Date: 24 Treatment  2024      Short Term/ Long Term   Goals   Was pt agreeable to Eval/treatment? yes  yes     Does pt have pain? No c/o pain  B scrotal pain/catheter pain due to increased edema, R abdominal pain     Bed Mobility  Rolling: SBA  Supine to sit: SBA  Sit to supine: SBA  Scooting: SBA Rolling:  Mod A  Supine to sit:  Mod A  Sit to supine:  Mod A  Scooting:  Mod A to sitting EOB independent   Transfers Sit to stand: min assist  Stand to sit: min assist  Stand pivot: NT Sit to stand:  Min A  Stand to sit:  Min A  Stand pivot: Min A with WW supervision   Ambulation    120 feet with no device with hand held/min assist 270 + 25 + 15 feet with WW Min A. See comments 200 feet 
Physical Therapy  Facility/Department: [address] 1  Physical Therapy Initial Assessment    Name: Johnathan Yanez III  : 1980  MRN: [id number]  Date of Service: 2024      Patient Diagnosis(es): The primary encounter diagnosis was Acute kidney injury (HCC). Diagnoses of Hypokalemia, Lactic acidosis, Transaminitis, Urinary tract infection without hematuria, site unspecified, Acute pancreatitis, unspecified complication status, unspecified pancreatitis type, Colitis, and Hepatitis were also pertinent to this visit.  Past Medical History:  has a past medical history of Cirrhosis (HCC), Esophageal varices (HCC), ETOH abuse, GAVE (gastric antral vascular ectasia), Hepatitis C, and Portal hypertension (HCC).  Past Surgical History:  has a past surgical history that includes Upper gastrointestinal endoscopy (N/A, 2020); Tonsillectomy; Upper gastrointestinal endoscopy (N/A, 2021); Upper gastrointestinal endoscopy (N/A, 5/10/2021); Endoscopy, colon, diagnostic; Umbilical hernia repair (N/A, 9/15/2021); hernia repair; hernia repair (Left, 2022); Upper gastrointestinal endoscopy (N/A, 2023); Upper gastrointestinal endoscopy (2023); Upper gastrointestinal endoscopy (N/A, 6/3/2023); and Upper gastrointestinal endoscopy (N/A, 2024).      Evaluating Therapist: Bernadette Woods PT    Room #:  0434/0434-A  Diagnosis:  Hypokalemia [E87.6]  Lactic acidosis [E87.20]  Colitis [K52.9]  Abdominal pain [R10.9]  Transaminitis [R74.01]  CANDY (acute kidney injury) (HCC) [N17.9]  Acute kidney injury (HCC) [N17.9]  Hepatic trauma, initial encounter [S36.119A]  Urinary tract infection without hematuria, site unspecified [N39.0]  Acute pancreatitis, unspecified complication status, unspecified pancreatitis type [K85.90]  PMHx/PSHx:  Hepatitis C, substance abuse, alcohol abuse  Precautions:  falls, TSM, seizure precautions      Social:  Pt lives with S.O. in a 2 floor plan.  Prior to admission Pt 
Physical Therapy  Facility/Department: [address] MED SURG  Daily Treatment Note  NAME: Johnathan Yanez III  : 1980  MRN: [id number]    Date of Service: 2024    Patient Diagnosis(es): The primary encounter diagnosis was Acute kidney injury (HCC). Diagnoses of Hypokalemia, Lactic acidosis, Transaminitis, Urinary tract infection without hematuria, site unspecified, Acute pancreatitis, unspecified complication status, unspecified pancreatitis type, Colitis, Hepatitis, and Anxiety were also pertinent to this visit.      Evaluating Therapist: Bernadette Woods PT     Room #:  516  Diagnosis:  Hypokalemia [E87.6]  Lactic acidosis [E87.20]  Colitis [K52.9]  Abdominal pain [R10.9]  Transaminitis [R74.01]  CANDY (acute kidney injury) (HCC) [N17.9]  Acute kidney injury (HCC) [N17.9]  Hepatic trauma, initial encounter [S36.119A]  Urinary tract infection without hematuria, site unspecified [N39.0]  Acute pancreatitis, unspecified complication status, unspecified pancreatitis type [K85.90]  PMHx/PSHx:  Hepatitis C, substance abuse, alcohol abuse  Precautions:  falls, TSM, seizure precautions        Social:  Pt lives with S.O. in a 2 floor plan.  Prior to admission Pt independent without device, state he has a ww       Initial Evaluation  Date: 24 Treatment  2024      Short Term/ Long Term   Goals   Was pt agreeable to Eval/treatment? yes  yes     Does pt have pain? No c/o pain  B scrotal pain/catheter pain due to increased edema     Bed Mobility  Rolling: SBA  Supine to sit: SBA  Sit to supine: SBA  Scooting: SBA Rolling:  Mod A  Supine to sit:  Mod A  Sit to supine:  Mod A  Scooting:  Mod A to sitting EOB independent   Transfers Sit to stand: min assist  Stand to sit: min assist  Stand pivot: NT Sit to stand:  Min A  Stand to sit:  Min A  Stand pivot:  Min A pivot without and with WW for support supervision   Ambulation    120 feet with no device with hand held/min assist NT  200 feet with device as needed 
Physical Therapy  Facility/Department: [address] MED SURG  Daily Treatment Note  NAME: Johnathan Yanez III  : 1980  MRN: [id number]    Date of Service: 2024    Patient Diagnosis(es): The primary encounter diagnosis was Acute kidney injury (HCC). Diagnoses of Hypokalemia, Lactic acidosis, Transaminitis, Urinary tract infection without hematuria, site unspecified, Acute pancreatitis, unspecified complication status, unspecified pancreatitis type, Colitis, Hepatitis, and Anxiety were also pertinent to this visit.      Evaluating Therapist: Bernadette Woods PT     Room #:  516  Diagnosis:  Hypokalemia [E87.6]  Lactic acidosis [E87.20]  Colitis [K52.9]  Abdominal pain [R10.9]  Transaminitis [R74.01]  CANDY (acute kidney injury) (HCC) [N17.9]  Acute kidney injury (HCC) [N17.9]  Hepatic trauma, initial encounter [S36.119A]  Urinary tract infection without hematuria, site unspecified [N39.0]  Acute pancreatitis, unspecified complication status, unspecified pancreatitis type [K85.90]  PMHx/PSHx:  Hepatitis C, substance abuse, alcohol abuse  Precautions:  falls, TSM, seizure precautions        Social:  Pt lives with S.O. in a 2 floor plan.  Prior to admission Pt independent without device, state he has a ww       Initial Evaluation  Date: 24 Treatment  2024      Short Term/ Long Term   Goals   Was pt agreeable to Eval/treatment? yes  yes     Does pt have pain? No c/o pain  B scrotal pain/catheter pain due to increased edema     Bed Mobility  Rolling: SBA  Supine to sit: SBA  Sit to supine: SBA  Scooting: SBA Rolling:  Mod A  Supine to sit:  Mod A  Sit to supine:  Mod A  Scooting:  Mod A to sitting EOB independent   Transfers Sit to stand: min assist  Stand to sit: min assist  Stand pivot: NT Sit to stand:  Min A  Stand to sit:  Min A  Stand pivot: NT supervision   Ambulation    120 feet with no device with hand held/min assist 35 feet with WW Min A. See comments 200 feet with device as needed supervision  
Pt had not urinated since 12pm today. Bladder scan not working properly. Pt stated he was trying to urinate but couldn't. Order received for mcclellan cath per Dr. Navarrete. Only 150ml urine returned. Dr. Navarrete notified. Orders received.  
Pt voided 400ml. PVR bladder scan revealed 269ml. Pt refusing indwelling or straight cath. Encouraged pt to continue to try and void.  
Pt's wife Peggy updated on pt condition and plan of care. Requested to speak to Dr. Navarrete. Dr. Navarrete informed of family request for update. Dr. Navarrete also informed of bladder scan results. No new orders received.  
Spoke to Rosita MEEKS for [facility]. Notified of critical Hgb levels 6.7 and repeat draw of 6.9  Instructed to re-check cbc again with 8am labs.    Received call from Rosita that after reviewing pt's chart she has decided to order 1 unit PRBCs for pt's Hgb 6.9    
Spoke to Summer in the ED to notify that the room is not clean as of yet  
Spoke with Norah NP covering for Dr. Navarrete about patient's increased agitation and pain. New orders put in.   
Spoke with patients spouse Peggy and updated her on the plan of care   
Spoke with pt's Peggy figueredo, and updated on plan of care at this time.   
Surgery consult placed  
    Objective:    BP (!) 97/53   Pulse 89   Temp 98.1 °F (36.7 °C) (Oral)   Resp 16   Ht 1.727 m (5' 8\")   Wt 66.7 kg (147 lb)   SpO2 95%   BMI 22.35 kg/m²     General Appearance: alert and oriented to person, place and time and in no acute distress  Skin: warm and dry  Head: normocephalic and atraumatic  Eyes: pupils equal, round, and reactive to light, extraocular eye movements intact, conjunctivae normal  Pulmonary/Chest: clear to auscultation bilaterally- no wheezes, rales or rhonchi, normal air movement, no respiratory distress  Cardiovascular: normal rate, normal S1 and S2  Abdomen: soft, non-tender, non-distended, normal bowel sounds  Extremities: no cyanosis, no clubbing and no edema  Neurologic: no cranial nerve deficit and speech normal        Recent Labs     01/31/24  0341 02/01/24  0638 02/02/24  0250    139 140   K 3.6 3.2* 3.4*   * 115* 114*   CO2 19* 18* 17*   BUN 7 4* 4*   CREATININE 1.2 0.9 0.9   GLUCOSE 133* 90 118*   CALCIUM 7.4* 7.4* 7.2*         Recent Labs     01/31/24  0341 02/01/24  0638 02/02/24  0250   WBC 4.1* 3.7* 7.8   RBC 2.67* 2.63* 2.64*   HGB 7.8* 7.8* 7.8*   HCT 23.9* 23.4* 23.1*   MCV 89.5 89.0 87.5   MCH 29.2 29.7 29.5   MCHC 32.6 33.3 33.8   RDW 21.4* 21.6* 22.1*   PLT 31*  --  50*   MPV 10.7 12.1* 11.4         Radiology:   CT Head 1/31/2024:  IMPRESSION:  No acute intracranial abnormality.    Assessment:    Principal Problem:    Acute kidney injury (HCC)  Active Problems:    Abdominal pain    CANDY (acute kidney injury) (HCC)    Mild protein-calorie malnutrition (HCC)    Delirium  Resolved Problems:    * No resolved hospital problems. *      Plan:  Generalized weakness and malaise - CT head unremarkable, but has multiple electrolyte abnormalities and has been receiving ativan which are the more likely reasons for this, now improving with decreased ativan dosing and electrolyte replacement  CANDY - resolved  Hypokalemia - replace prn  Hypomagnesemia - aggressive 
GM/15ML solution Take 30 mLs by mouth 3 times daily 8/29/23   Yessy Rodgers DO   pentoxifylline (TRENTAL) 400 MG extended release tablet Take 1 tablet by mouth 3 times daily (with meals) 8/29/23   Yessy Rodgers DO        ROS:  As mentioned in subjective, all other systems negative      BP (!) 100/59   Pulse 88   Temp 98.1 °F (36.7 °C) (Oral)   Resp 16   Ht 1.727 m (5' 8\")   Wt 91.8 kg (202 lb 4.8 oz)   SpO2 99%   BMI 30.76 kg/m²     Physical Exam  Constitutional: The patient is awake, alert, and oriented.   Skin: Warm and dry. No rashes were noted. No jaundice.  HEENT: Eyes show round, and reactive pupils. Moist mucous membranes, no ulcerations, no thrush.   Neck: Supple to movements. No lymphadenopathy.   Chest: No use of accessory muscles to breathe. Symmetrical expansion. Auscultation reveals no wheezing, crackles, or rhonchi.   Cardiovascular: S1 and S2 are rhythmic and regular. No murmurs appreciated.   Abdomen: Tense, ascites with umbilical hernia  Genitourinary: Extremely swollen scrotum with a small area there is induration and minimal serosanguineous/bloody drainage.- looks better today.  Extremities: No clubbing, no cyanosis,  Musculoskeletal: Extremely swollen bilateral lower extremities  Neurological: AWake alert oriented  Lines: peripheral     Labs, Cultures reviewed  Radiology and other consultants notes reviewed    Microbiology:  Wound cx right leg: no growth  Scrotal cx: in process    Assessment:  Scrotal hematoma with infection versus scrotal abscess:  Alcoholic liver cirrhosis decompensated: s/p paracentesis 2/7.  Right leg hematoma with some bloody drainage:     Plan:    Continue IV cefepime/oral Flagyl  Scrotal elevation.  Monitor labs  Will follow with you      Electronically signed by ZACK STACK MD on 2/8/2024 at 11:39 AM  
Or  LORazepam, 3 mg, Q1H PRN   Or  LORazepam, 3 mg, Q1H PRN   Or  LORazepam, 4 mg, Q1H PRN   Or  LORazepam, 4 mg, Q1H PRN         Objective:    BP (!) 92/53   Pulse 78   Temp 97.5 °F (36.4 °C) (Oral)   Resp 14   Ht 1.727 m (5' 8\")   Wt 68 kg (150 lb)   SpO2 98%   BMI 22.81 kg/m²     General Appearance: alert and oriented to person, place and time and in no acute distress  Skin: warm and dry  Head: normocephalic and atraumatic  Eyes: pupils equal, round, extraocular eye movements intact, conjunctivae normal  Pulmonary/Chest: clear to auscultation bilaterally- no wheezes, rales or rhonchi, normal air movement, no respiratory distress  Cardiovascular: normal rate, normal S1 and S2   Abdomen: soft, non-tender, non-distended, normal bowel sounds,   Extremities: no cyanosis, no clubbing and no edema  Neurologic: no cranial nerve deficit and speech normal      Recent Labs     01/25/24  1638 01/26/24  0631   * 135   K 3.2* 3.5   CL 91* 102   CO2 30* 24   BUN 14 11   CREATININE 2.1* 1.4*   GLUCOSE 127* 100*   CALCIUM 9.0 7.8*       Recent Labs     01/25/24  1638 01/26/24  0631   WBC 5.0 2.5*   RBC 3.07* 2.64*   HGB 9.2* 7.9*   HCT 27.5* 23.6*   MCV 89.6 89.4   MCH 30.0 29.9   MCHC 33.5 33.5   RDW 21.6* 21.6*   PLT 37*  --    MPV 10.1 Can not be calculated       Assessment:    Principal Problem:    Acute kidney injury (HCC)  Active Problems:    Abdominal pain    CANDY (acute kidney injury) (HCC)  Resolved Problems:    * No resolved hospital problems. *      Plan:    Generalized weakness and malaise  Hypotension - Continue IVF and increased midodrine 10 mg po tid  Acute pancreatitis - Continue clear liquid diet, continue IVF. Lipase levels are not 5 times ULN.   Acute enteritis - Continue rocephin and flagyl  S/P Ventral abdominal wall hernia repair  Liver cirrhosis with ascites S/P TIPS procedure - Follows up with Dr Huizar's group. Continue lactulose  Hypomagnesemia - Receive magnesium sulfate 2 g IVP times 
Right leg hematomas stable  Lines: Peripheral.    Laboratory and Tests:  Lab Results   Component Value Date    CRP <3.0 09/01/2023    CRP 0.3 02/11/2022     Lab Results   Component Value Date    SEDRATE 9 02/11/2022         Radiology:  CT ABDOMEN PELVIS W IV CONTRAST Additional Contrast? None   Final Result   1. Redemonstration of findings related to chronic hepatocellular disease with   splenomegaly and portal venous hypertension.   2. New inflammatory changes along margins of the pancreas suggesting acute   pancreatitis.   3. Multiple thick walled segments of small bowel throughout the abdomen   suggesting enteritis.   4. Mild thickened wall of distal transverse colon, left colon, and sigmoid   colon suggesting infectious or inflammatory colitis.   5. Redemonstration of ascites which has decreased.   6. Nonspecific generalized mild wall thickening involving the gallbladder.   7. Postsurgical changes related to ventral abdominal wall hernia repair.   8. Redemonstration of hernia along inferior margin of the surgical mesh at   ventral abdominal wall containing a segment of bowel.              Microbiology:  1/25/2024 urine culture negative so far  Blood culture -  negative so far    Assessment:  Right leg hematoma: No redness on the right leg.  Alcoholic liver cirrhosis: inflammation in the gut likely from low volume state. No GI symptoms  Leukopenia/thrombocytopenia  : from liver cirrhosis     Plan:    No antibiotics at this time.  Monitor labs  Will follow with you    ZACK STACK MD  1:15 PM  2/1/2024  
Umbilical hernia without obstruction and without gangrene    Left inguinal hernia    Ascites    Decompensation of cirrhosis of liver (HCC)    Anemia    Thrombocytopenia (HCC)    Scrotal swelling    Hepatitis C virus infection without hepatic coma    GI bleed    Alcoholic cirrhosis of liver with ascites (HCC)    Liver cirrhosis (HCC)    Enterocolitis    Hematemesis    Left hydrocele    Hepatic failure, unspecified without coma (HCC)    Hyponatremia    Hematoma of right lower extremity    Acute kidney injury (HCC)    CANDY (acute kidney injury) (HCC)    Mild protein-calorie malnutrition (HCC)    Delirium    Refeeding syndrome    Abscess of right leg     1.  Cirrhosis / alcoholic hepatitis  -Suspect secondary to alcohol and hepatitis C  -History of hepatitis C, see associated section  -Ultrasound 1/8/2024 with findings of cirrhosis, nonspecific gallbladder wall thickening, no evidence of stones, normal caliber CBD 0.6 cm, ascites, patent vasculature, no remark of hepatic mass  -Nonelevated AFP <1.8 (1/7/2024)  -EGD 1/11/2024 by Dr. Bal showing 2 very small esophageal varices with no stigmata of recent bleeding not amendable to banding, GAVE treated with APC, moderate portal hypertensive gastropathy, no gastric varices, unremarkable examined proximal small bowel  -History of esophageal varices, see associated section  -Hepatic encephalopathy, see associated section  -Ascites, see associated section     2.  Hepatitis C  -Treated  -Viral load undetectable (2/24/2022)     3.  Hepatic encephalopathy  -Ammonia level currently in normal range  -Continue lactulose and Xifaxan  -Mental status at baseline     4.  Ascites  -History of TIPS shunt  -Paracentesis 1/12/2024 with 60 mL of fluid removed  -Borderline protein 1.0, possible with increased risk for SBP  -Antibiotics per ID  -Consider prophylactic antibiotics as outpatient  -Ultrasound 1/8/2024 with findings of cirrhosis, nonspecific gallbladder wall thickening, no 
Weight: 76.5 kg (168 lb 10 oz) (8/27/23 [facility])  % Weight Change (Calculated): -9.7                    BMI Categories: Normal Weight (BMI 18.5-24.9)      Nutrition Diagnosis:   Inadequate oral intake related to altered GI function as evidenced by poor intake prior to admission, lab values    Nutrition Interventions:   Food and/or Nutrient Delivery: Continue Current Diet, Start Oral Nutrition Supplement (Advance diet as tolerated when medically feasible. Recommend low Na diet)  Nutrition Education/Counseling: No recommendation at this time  Coordination of Nutrition Care: Continue to monitor while inpatient       Goals:     Goals: PO intake 50% or greater, by next RD assessment       Nutrition Monitoring and Evaluation:   Behavioral-Environmental Outcomes: None Identified  Food/Nutrient Intake Outcomes: Diet Advancement/Tolerance  Physical Signs/Symptoms Outcomes: Biochemical Data, GI Status, Fluid Status or Edema, Nutrition Focused Physical Findings, Skin, Weight    Discharge Planning:    Too soon to determine     Faith Graham RD, CNSC, LD  Contact: x [phone]    
and oriented to person, place and time and in no acute distress  Skin: warm and dry  Head: normocephalic and atraumatic  Eyes: pupils equal, round, and reactive to light, extraocular eye movements intact, conjunctivae normal  Pulmonary/Chest: clear to auscultation bilaterally- no wheezes, rales or rhonchi, normal air movement, no respiratory distress  Cardiovascular: normal rate, normal S1 and S2  Abdomen: soft, non-tender, non-distended, normal bowel sounds  Extremities: no cyanosis, no clubbing and no edema. RLE noted wound draining serosanguinous fluid  Neurologic: no cranial nerve deficit and speech normal  : Significant scrotal edema noted, left scrotal wound minimal drainage of serous-sanguinous fluid        Recent Labs     02/08/24  0001 02/08/24  0915 02/08/24  1310    136 137   K 3.6 3.8 3.4*    109* 108*   CO2 19* 20* 20*   BUN 5* 6 6   CREATININE 1.2 1.1 1.1   GLUCOSE 105* 106* 118*   CALCIUM 7.4* 8.0* 7.9*         Recent Labs     02/06/24  0400 02/07/24  0010   WBC 5.4 9.6   RBC 2.49* 2.59*   HGB 7.7* 8.1*   HCT 24.1* 25.1*   MCV 96.8 96.9   MCH 30.9 31.3   MCHC 32.0 32.3   RDW 26.0* 26.7*   PLT  --  55*   MPV 12.1* 10.9         Radiology:   US GUIDED PARACENTESIS   Final Result   Post ultrasound-guided paracentesis with a total of 3530 mL of clear yellow   ascitic fluid removed.         CT PELVIS WO CONTRAST Additional Contrast? None   Final Result   1. Massive left hydrocele, cannot exclude abscess.   2. Irregular fluid collection located in the left hemiscrotum inferior to the   hydrocele, possibly an abscess. This measures 8.9 x 4.3 x 2.8 cm.   3. Massive ascites, prominently increased compared to the mild ascites seen   previously.   4. New prominent diffuse anasarca.   5. Moderate-sized umbilical hernia now contains ascites, previously had a   loop of bowel.         MRI ABDOMEN W WO CONTRAST MRCP   Final Result   1. Cirrhotic hepatic morphology without evidence of suspicious liver 
with safety techniques and functional independence  * Recommendation of environmental modifications for increased safety with functional transfers/mobility and ADLs  * Therapeutic exercise to improve motor endurance, ROM, and functional strength for ADLs/functional transfers  * Therapeutic activities to facilitate/challenge dynamic balance, stand tolerance for increased safety and independence with ADLs  * Therapeutic activities to facilitate gross/fine motor skills for increased independence with ADLs  * Neuro-muscular re-education: facilitation of righting/equilibrium reactions, midline orientation, scapular stability/mobility, normalization of muscle tone, and facilitation of volitional active controled movement     Recommended Adaptive Equipment: TBD      Home Living: Patient lives wit his wife in a 2 floor home with a few steps to enter; patient's bedroom and bathroom are on the main floor.  Bathroom Setup: tub/shower   Equipment Owned: FWW and cane     Prior Level of Function (PLOF): Patient reports patient was independent with ADLs, independent with IADLs, and modified independent with functional mobility (occasional cane or FWW use) prior to this hospitalization.     Pain Level: Pt reports scrotal pain and abdominal pain, physician notified  Cognition: Pt fatigued but alert and grossly oriented     Functional Assessment:                  AM-PAC Daily Activity Raw Score: 15/24    Initial Eval Status  Date: 1/30/2024 Treatment Status  Date: 2/7/24 Short Term Goals = Long Term Goals   Feeding setup      independent   Grooming SBA      independent   UB Dressing MIN A Min A to don gown.   independent    LB Dressing MOD A to don socks  max A to don/doff socks and brief independent - with use of AE, as needed/appropriate   Bathing MIN A   independent - with use of AE/DME, as needed/appropriate   Toileting MIN A Max A on BSC. Aldana, limited by scrotal swelling    independent   Bed Mobility  Supine-to-Sit: 
(acute kidney injury) (HCC)    Mild protein-calorie malnutrition (HCC)    Delirium    Refeeding syndrome    Abscess of right leg  Resolved Problems:    * No resolved hospital problems. *      Plan:  Generalized weakness and malaise - CT head unremarkable, but has multiple electrolyte abnormalities and has been receiving ativan which are the more likely reasons for this, now improving with decreased ativan dosing and electrolyte replacement  CANDY - resolved  Hypokalemia - replace prn  Hypomagnesemia - aggressive replacement, likely contributing to hypokalemia  Refeeding syndrome - cmp, mg, phos Q8H, improving  Acute enteritis - s/p Rocephin and Flagyl, now to monitor off antibiotics per ID  S/p ventral abdominal wall hernia repair  Liver cirrhosis with ascites s/p TIPS - follows with Dr. Huizar's group, continue lactulose  RLE wound - initially was mass like, evaluated by general surgery who said this was not an abscess and would not warrant intervention. Now area has become an open wound with purulent/sanguinous discharge, concern for abscess, general surgery re-consulted, but patient too high risk for surgical intervention. Wound culture collected but likely low yield. Plan for Bactrim x 14 days along with local care with Neosporin.  Scrotal Wound concern for abscess - Urology consulted, appreciate recommendations, for CT, but likely not a surgical candidate   Alcohol Use Disorder - CIWA protocol stopped, taper ativan per Psychiatry appreciate recommendations  Abnormal Pancreas on CT - GI following, had MRCP, showed no pancreatic abnormalities  Anxiety - Buspar per psychiatry, titrating dose up while titrating down ativan  Insomnia - Melatonin as needed    Code status: Full code  DVT/GI ppx: Heparin/Protonix  Dispo: Pending placement at inpatient facility with [facility] rehab capability, needs to improve from refeeding syndrome prior to discharge    Time spent reviewing chart, clinical exam, discussing case and answering 
antibiotics as outpatient  -Ultrasound 1/8/2024 with findings of cirrhosis, nonspecific gallbladder wall thickening, no evidence of stones, normal caliber CBD 0.6 cm, ascites, patent vasculature, no remark of hepatic mass     5.  Esophageal varices  -EGD 5/10/2021 with findings of 3 columns varices, banded x6, normal stomach, no gastric varices, normal examined proximal small bowel  -EGD 6/3/2023 showing normal examined esophagus, no varices, GAVE treated with APC, no gastric varices, normal examined proximal small bowel  -EGD 1/11/2024 by Dr. Bal showing 2 very small esophageal varices with no stigmata of recent bleeding not amendable to banding, GAVE treated with APC, moderate portal hypertensive gastropathy, no gastric varices, unremarkable examined proximal small bowel  -Repeat EGD 4 to 6 weeks for repeat treatment of GAVE     6.  Anemia  -EGD 1/11/2024 by Dr. Bal showing 2 very small esophageal varices with no stigmata of recent bleeding not amendable to banding, GAVE treated with APC, moderate portal hypertensive gastropathy, no gastric varices, unremarkable examined proximal small bowel  -Patient will require repeat EGD in 4 to 6 weeks for repeat treatment of GAVE  -PPI twice daily while inpatient  -Hemoglobin currently near baseline  -Keep PRBCs on hold  -Monitor for overt bleeding  -Transfusion per admitting  -Can consider repeat EGD and/or colonoscopy if overt bleeding or significant decrease in hemoglobin  -Consider patient for colonoscopy, inpatient versus outpatient - previously refused     7.  Abnormal CT pancreas  -CT abdomen pelvis 1/25/2024 showing hazy areas of attenuation along the margins of the pancreas  -Mildly elevated lipase 79  -Abdomen nontender on exam  -Suspect secondary to low protein state/ascites  -Repeat lipid panel  -Calcium nonelevated  -Hydration per admitting  -Patient does not appear to have acute pancreatitis currently  -Okay to advance diet as tolerated from GI 
evidence of stones, normal caliber CBD 0.6 cm, ascites, patent vasculature, no remark of hepatic mass     5.  Esophageal varices  -EGD 5/10/2021 with findings of 3 columns varices, banded x6, normal stomach, no gastric varices, normal examined proximal small bowel  -EGD 6/3/2023 showing normal examined esophagus, no varices, GAVE treated with APC, no gastric varices, normal examined proximal small bowel  -EGD 1/11/2024 by Dr. Bal showing 2 very small esophageal varices with no stigmata of recent bleeding not amendable to banding, GAVE treated with APC, moderate portal hypertensive gastropathy, no gastric varices, unremarkable examined proximal small bowel  -Repeat EGD 4 to 6 weeks for repeat treatment of GAVE     6.  Anemia  -EGD 1/11/2024 by Dr. Bal showing 2 very small esophageal varices with no stigmata of recent bleeding not amendable to banding, GAVE treated with APC, moderate portal hypertensive gastropathy, no gastric varices, unremarkable examined proximal small bowel  -Patient will require repeat EGD in 4 to 6 weeks for repeat treatment of GAVE  -PPI twice daily while inpatient  -Hemoglobin currently near baseline  -Keep PRBCs on hold  -Monitor for overt bleeding  -Transfusion per admitting  -Can consider repeat EGD and/or colonoscopy if overt bleeding or significant decrease in hemoglobin  -Consider patient for colonoscopy, inpatient versus outpatient - previously refused     7.  Abnormal CT pancreas  -CT abdomen pelvis 1/25/2024 showing hazy areas of attenuation along the margins of the pancreas  -Mildly elevated lipase 79  -Abdomen nontender on exam  -Suspect secondary to low protein state/ascites  -Repeat lipid panel  -Calcium nonelevated  -Hydration per admitting  -Patient does not appear to have acute pancreatitis currently  -Okay to advance diet as tolerated from GI POV  -Renal function improved - MRI / MRCP now     8.  Abnormal CT colon  -CT abdomen pelvis 1/25/2024 showing wall thickening 
with use of AE/DME, as needed/appropriate   Toileting MIN A Min A toilet transfer.  Poor safety.   Urgency for bowel movement. Pt completed sal hygiene when encouraged to do so for himself.  Cues to be thorough.     independent   Bed Mobility  Supine-to-Sit: SBA  Sit-to-Supine: SBA  Min A supine to sit   independent in order to maximize patient's independence/participation with ADLs and functional tasks.   Functional Transfers Sit-to-Stand: MIN A   from EOB Min A     independent   Functional Mobility MIN A (hand held assistance) within room and hallway Mod A mobility to and from bathroom.  Poor safety noted during mobility.  reported dizziness after using the bathroom.  Mod I with functional mobility (with device, as needed/appropriate) in order to maximize independence with ADLs and other functional tasks.   Balance Sitting: SBA  Standing: MIN A) Min -mod stand balance during ADL.   independent dynamic standing balance during completion of ADLs and other functional tasks.   Activity Tolerance fair Poor   Patient will demonstrate Good understanding and consistent implementation of energy conservation techniques and work simplification techniques into ADLs and functional tasks   Visual/  Perceptual WFL       N/A   B UE Strength 4-/5 Limited movement due to pt complaint of pain.   Patient will demonstrate 4/5 B UE strength in order to maximize independence with ADLs and functional tasks.     Comments:  Pt sleeping however did agree to therapy with encouragement.  Slow to respond and frequent prompting to facilitate movement.  Pain with all activity.  Pt impulsive and urgency to get into the bathroom.  Pt leaning forward while seated due to limited tolerance for upright sitting.  ADL activity completed. Unable to stand to wash hands at the sink.  Returned to bed at the end of the session.        Education/treatment:  ADL retraining with facilitation of movement to increase self care skills. Therapeutic activity to 
currently  -Okay to advance diet as tolerated from GI POV  -Renal function improved - MRI / MRCP now     8.  Abnormal CT colon  -CT abdomen pelvis 1/25/2024 showing wall thickening in the small bowel and colon  -Possibly secondary to ascites/low protein state  -Continue Rocephin  -Consider adding Flagyl, defer to ID  -Consider patient for colonoscopy, inpatient versus outpatient - previously refusing colonoscopy     9.  Alcohol/polysubstance abuse  -Persistent alcohol use  -Advised again on need for cessation  -Withdrawal management per admitting     10.  Comorbidities  -Per admitting/consultants        Increasing bilirubin.  Obtain MRI/MRCP.  Monitor mental status.  Monitor LFTs and INR.  GI will follow.     Jorge Perez, APRN - CNP  2/3/2024  8:58 AM    NOTE:  This report was transcribed using voice recognition software.  Every effort was made to ensure accuracy; however, inadvertent computerized transcription errors may be present.  
hepatocellular disease with   splenomegaly and portal venous hypertension.   2. New inflammatory changes along margins of the pancreas suggesting acute   pancreatitis.   3. Multiple thick walled segments of small bowel throughout the abdomen   suggesting enteritis.   4. Mild thickened wall of distal transverse colon, left colon, and sigmoid   colon suggesting infectious or inflammatory colitis.   5. Redemonstration of ascites which has decreased.   6. Nonspecific generalized mild wall thickening involving the gallbladder.   7. Postsurgical changes related to ventral abdominal wall hernia repair.   8. Redemonstration of hernia along inferior margin of the surgical mesh at   ventral abdominal wall containing a segment of bowel.               Assessment:    Principal Problem:    Acute kidney injury (HCC)  Active Problems:    Abdominal pain    CANDY (acute kidney injury) (HCC)    Mild protein-calorie malnutrition (HCC)    Delirium    Refeeding syndrome    Abscess of right leg  Resolved Problems:    * No resolved hospital problems. *      Plan:  Generalized weakness and malaise - CT head unremarkable, but has multiple electrolyte abnormalities and has been receiving ativan which are the more likely reasons for this, now improving with decreased ativan dosing and electrolyte replacement  CANDY - resolved  Hypokalemia - replace prn  Hypomagnesemia - aggressive replacement, likely contributing to hypokalemia  Refeeding syndrome - improving significantly   Acute enteritis -  improved  S/p ventral abdominal wall hernia repair  Liver cirrhosis with ascites s/p TIPS - follows with Dr. Huizar's group, continue lactulose, had paracentesis yesterday with 3530cc drained  RLE wound - initially was mass like, evaluated by general surgery who said this was not an abscess and would not warrant intervention. Now area has become an open wound with purulent/sanguinous discharge, concern for abscess, general surgery re-consulted, but patient

## 2024-02-10 NOTE — DISCHARGE SUMMARY
1. Coarse echogenic appearance of the liver parenchyma could be related to   liver disease.   2. Tips shunt is patent.  Mid to distal tips shunt stenosis suspected with   velocity reaching 369 cm/second distally.   3. Gallbladder wall thickening could be related to ascites or liver disease.   4. Left hepatic vein is obscured.   5. Pancreas not well visualized.   6. Right kidney is grossly unremarkable.   7. Moderate to large amount of upper abdominal ascites.         US ABDOMEN COMPLETE   Final Result   Large volume 4 quadrant ascites.         CT HEAD WO CONTRAST   Final Result   No acute intracranial abnormality.         US SOFT TISSUE LIMITED AREA   Final Result   Slight increase in size of the complex, heterogeneous hypoechoic mass in the   right lateral calf, consistent with a hematoma or abscess.         CT ABDOMEN PELVIS W IV CONTRAST Additional Contrast? None   Final Result   1. Redemonstration of findings related to chronic hepatocellular disease with   splenomegaly and portal venous hypertension.   2. New inflammatory changes along margins of the pancreas suggesting acute   pancreatitis.   3. Multiple thick walled segments of small bowel throughout the abdomen   suggesting enteritis.   4. Mild thickened wall of distal transverse colon, left colon, and sigmoid   colon suggesting infectious or inflammatory colitis.   5. Redemonstration of ascites which has decreased.   6. Nonspecific generalized mild wall thickening involving the gallbladder.   7. Postsurgical changes related to ventral abdominal wall hernia repair.   8. Redemonstration of hernia along inferior margin of the surgical mesh at   ventral abdominal wall containing a segment of bowel.             Patient Instructions:      Medication List        START taking these medications      baclofen 10 MG tablet  Commonly known as: LIORESAL  Take 1 tablet by mouth 3 times daily as needed (Hiccups)     busPIRone 10 MG tablet  Commonly known as:

## 2024-02-12 ENCOUNTER — APPOINTMENT (OUTPATIENT)
Dept: CT IMAGING | Age: 44
DRG: 280 | End: 2024-02-12
Payer: COMMERCIAL

## 2024-02-12 ENCOUNTER — APPOINTMENT (OUTPATIENT)
Dept: ULTRASOUND IMAGING | Age: 44
DRG: 280 | End: 2024-02-12
Payer: COMMERCIAL

## 2024-02-12 ENCOUNTER — HOSPITAL ENCOUNTER (INPATIENT)
Age: 44
LOS: 8 days | Discharge: NURSING, REHAB OR LONG-TERM CARE | DRG: 280 | End: 2024-02-20
Attending: EMERGENCY MEDICINE | Admitting: INTERNAL MEDICINE
Payer: COMMERCIAL

## 2024-02-12 DIAGNOSIS — R60.1 ANASARCA: Primary | ICD-10-CM

## 2024-02-12 DIAGNOSIS — N50.89 SCROTAL SWELLING: ICD-10-CM

## 2024-02-12 DIAGNOSIS — E80.6 HYPERBILIRUBINEMIA: ICD-10-CM

## 2024-02-12 DIAGNOSIS — N50.82 SCROTAL PAIN: ICD-10-CM

## 2024-02-12 DIAGNOSIS — N43.3 BILATERAL HYDROCELE: ICD-10-CM

## 2024-02-12 DIAGNOSIS — R79.89 ELEVATED LFTS: ICD-10-CM

## 2024-02-12 DIAGNOSIS — K70.31 ALCOHOLIC CIRRHOSIS OF LIVER WITH ASCITES (HCC): ICD-10-CM

## 2024-02-12 DIAGNOSIS — E87.6 HYPOKALEMIA: ICD-10-CM

## 2024-02-12 DIAGNOSIS — D64.9 CHRONIC ANEMIA: ICD-10-CM

## 2024-02-12 PROBLEM — S81.801A LEG WOUND, RIGHT: Status: ACTIVE | Noted: 2024-02-12

## 2024-02-12 LAB
ALBUMIN SERPL-MCNC: 3 G/DL (ref 3.5–5.2)
ALP SERPL-CCNC: 97 U/L (ref 40–129)
ALT SERPL-CCNC: 15 U/L (ref 0–40)
ANION GAP SERPL CALCULATED.3IONS-SCNC: 8 MMOL/L (ref 7–16)
AST SERPL-CCNC: 55 U/L (ref 0–39)
BASOPHILS # BLD: 0 K/UL (ref 0–0.2)
BASOPHILS NFR BLD: 0 % (ref 0–2)
BILIRUB DIRECT SERPL-MCNC: 2.9 MG/DL (ref 0–0.3)
BILIRUB INDIRECT SERPL-MCNC: 3.9 MG/DL (ref 0–1)
BILIRUB SERPL-MCNC: 6.8 MG/DL (ref 0–1.2)
BILIRUB UR QL STRIP: NEGATIVE
BNP SERPL-MCNC: 193 PG/ML (ref 0–125)
BUN SERPL-MCNC: 8 MG/DL (ref 6–20)
CALCIUM SERPL-MCNC: 8.3 MG/DL (ref 8.6–10.2)
CHLORIDE SERPL-SCNC: 101 MMOL/L (ref 98–107)
CLARITY UR: CLEAR
CO2 SERPL-SCNC: 23 MMOL/L (ref 22–29)
COLOR UR: YELLOW
CREAT SERPL-MCNC: 0.9 MG/DL (ref 0.7–1.2)
EOSINOPHIL # BLD: 0.08 K/UL (ref 0.05–0.5)
EOSINOPHILS RELATIVE PERCENT: 2 % (ref 0–6)
ERYTHROCYTE [DISTWIDTH] IN BLOOD BY AUTOMATED COUNT: 24.1 % (ref 11.5–15)
GFR SERPL CREATININE-BSD FRML MDRD: >60 ML/MIN/1.73M2
GLUCOSE SERPL-MCNC: 95 MG/DL (ref 74–99)
GLUCOSE UR STRIP-MCNC: NEGATIVE MG/DL
HCT VFR BLD AUTO: 28.8 % (ref 37–54)
HGB BLD-MCNC: 9.8 G/DL (ref 12.5–16.5)
HGB UR QL STRIP.AUTO: NEGATIVE
INR PPP: 2.6
KETONES UR STRIP-MCNC: NEGATIVE MG/DL
LACTATE BLDV-SCNC: 1.8 MMOL/L (ref 0.5–1.9)
LEUKOCYTE ESTERASE UR QL STRIP: NEGATIVE
LYMPHOCYTES NFR BLD: 0.28 K/UL (ref 1.5–4)
LYMPHOCYTES RELATIVE PERCENT: 6 % (ref 20–42)
MCH RBC QN AUTO: 32.6 PG (ref 26–35)
MCHC RBC AUTO-ENTMCNC: 34 G/DL (ref 32–34.5)
MCV RBC AUTO: 95.7 FL (ref 80–99.9)
MICROORGANISM SPEC CULT: ABNORMAL
MICROORGANISM SPEC CULT: ABNORMAL
MONOCYTES NFR BLD: 0.16 K/UL (ref 0.1–0.95)
MONOCYTES NFR BLD: 4 % (ref 2–12)
NEUTROPHILS NFR BLD: 89 % (ref 43–80)
NEUTS SEG NFR BLD: 4.08 K/UL (ref 1.8–7.3)
NITRITE UR QL STRIP: NEGATIVE
PARTIAL THROMBOPLASTIN TIME: 41.4 SEC (ref 24.5–35.1)
PH UR STRIP: 5.5 [PH] (ref 5–9)
PLATELET # BLD AUTO: 32 K/UL (ref 130–450)
PLATELET CONFIRMATION: NORMAL
PMV BLD AUTO: 10.5 FL (ref 7–12)
POTASSIUM SERPL-SCNC: 3 MMOL/L (ref 3.5–5)
PROT SERPL-MCNC: 5.5 G/DL (ref 6.4–8.3)
PROT UR STRIP-MCNC: NEGATIVE MG/DL
PROTHROMBIN TIME: 29.7 SEC (ref 9.3–12.4)
RBC # BLD AUTO: 3.01 M/UL (ref 3.8–5.8)
RBC # BLD: ABNORMAL 10*6/UL
RBC #/AREA URNS HPF: NORMAL /HPF
SODIUM SERPL-SCNC: 132 MMOL/L (ref 132–146)
SP GR UR STRIP: 1.01 (ref 1–1.03)
SPECIMEN DESCRIPTION: ABNORMAL
TROPONIN I SERPL HS-MCNC: 12 NG/L (ref 0–11)
TROPONIN I SERPL HS-MCNC: 12 NG/L (ref 0–11)
UROBILINOGEN UR STRIP-ACNC: 0.2 EU/DL (ref 0–1)
WBC # BLD: ABNORMAL 10*3/UL
WBC #/AREA URNS HPF: NORMAL /HPF
WBC OTHER # BLD: 4.6 K/UL (ref 4.5–11.5)

## 2024-02-12 PROCEDURE — 96365 THER/PROPH/DIAG IV INF INIT: CPT

## 2024-02-12 PROCEDURE — 87086 URINE CULTURE/COLONY COUNT: CPT

## 2024-02-12 PROCEDURE — 81001 URINALYSIS AUTO W/SCOPE: CPT

## 2024-02-12 PROCEDURE — 83880 ASSAY OF NATRIURETIC PEPTIDE: CPT

## 2024-02-12 PROCEDURE — 85610 PROTHROMBIN TIME: CPT

## 2024-02-12 PROCEDURE — 85730 THROMBOPLASTIN TIME PARTIAL: CPT

## 2024-02-12 PROCEDURE — 99285 EMERGENCY DEPT VISIT HI MDM: CPT

## 2024-02-12 PROCEDURE — 82248 BILIRUBIN DIRECT: CPT

## 2024-02-12 PROCEDURE — 6360000002 HC RX W HCPCS: Performed by: EMERGENCY MEDICINE

## 2024-02-12 PROCEDURE — 85025 COMPLETE CBC W/AUTO DIFF WBC: CPT

## 2024-02-12 PROCEDURE — 6370000000 HC RX 637 (ALT 250 FOR IP): Performed by: STUDENT IN AN ORGANIZED HEALTH CARE EDUCATION/TRAINING PROGRAM

## 2024-02-12 PROCEDURE — 96376 TX/PRO/DX INJ SAME DRUG ADON: CPT

## 2024-02-12 PROCEDURE — 76870 US EXAM SCROTUM: CPT

## 2024-02-12 PROCEDURE — 96366 THER/PROPH/DIAG IV INF ADDON: CPT

## 2024-02-12 PROCEDURE — 84484 ASSAY OF TROPONIN QUANT: CPT

## 2024-02-12 PROCEDURE — 87040 BLOOD CULTURE FOR BACTERIA: CPT

## 2024-02-12 PROCEDURE — 83605 ASSAY OF LACTIC ACID: CPT

## 2024-02-12 PROCEDURE — 99222 1ST HOSP IP/OBS MODERATE 55: CPT | Performed by: INTERNAL MEDICINE

## 2024-02-12 PROCEDURE — 6360000002 HC RX W HCPCS: Performed by: STUDENT IN AN ORGANIZED HEALTH CARE EDUCATION/TRAINING PROGRAM

## 2024-02-12 PROCEDURE — 93975 VASCULAR STUDY: CPT

## 2024-02-12 PROCEDURE — 93005 ELECTROCARDIOGRAM TRACING: CPT | Performed by: STUDENT IN AN ORGANIZED HEALTH CARE EDUCATION/TRAINING PROGRAM

## 2024-02-12 PROCEDURE — 74177 CT ABD & PELVIS W/CONTRAST: CPT

## 2024-02-12 PROCEDURE — 2580000003 HC RX 258: Performed by: NURSE PRACTITIONER

## 2024-02-12 PROCEDURE — 6360000004 HC RX CONTRAST MEDICATION: Performed by: RADIOLOGY

## 2024-02-12 PROCEDURE — 80053 COMPREHEN METABOLIC PANEL: CPT

## 2024-02-12 PROCEDURE — 96375 TX/PRO/DX INJ NEW DRUG ADDON: CPT

## 2024-02-12 PROCEDURE — 2060000000 HC ICU INTERMEDIATE R&B

## 2024-02-12 PROCEDURE — 6370000000 HC RX 637 (ALT 250 FOR IP): Performed by: NURSE PRACTITIONER

## 2024-02-12 PROCEDURE — APPSS60 APP SPLIT SHARED TIME 46-60 MINUTES: Performed by: NURSE PRACTITIONER

## 2024-02-12 RX ORDER — FENTANYL CITRATE 50 UG/ML
25 INJECTION, SOLUTION INTRAMUSCULAR; INTRAVENOUS ONCE
Status: COMPLETED | OUTPATIENT
Start: 2024-02-12 | End: 2024-02-12

## 2024-02-12 RX ORDER — FENTANYL CITRATE 50 UG/ML
50 INJECTION, SOLUTION INTRAMUSCULAR; INTRAVENOUS ONCE
Status: COMPLETED | OUTPATIENT
Start: 2024-02-12 | End: 2024-02-12

## 2024-02-12 RX ORDER — BACLOFEN 10 MG/1
10 TABLET ORAL 3 TIMES DAILY PRN
Status: DISCONTINUED | OUTPATIENT
Start: 2024-02-12 | End: 2024-02-20 | Stop reason: HOSPADM

## 2024-02-12 RX ORDER — MIDODRINE HYDROCHLORIDE 5 MG/1
10 TABLET ORAL
Status: DISCONTINUED | OUTPATIENT
Start: 2024-02-13 | End: 2024-02-20 | Stop reason: HOSPADM

## 2024-02-12 RX ORDER — PANTOPRAZOLE SODIUM 40 MG/1
40 TABLET, DELAYED RELEASE ORAL
Status: DISCONTINUED | OUTPATIENT
Start: 2024-02-13 | End: 2024-02-18

## 2024-02-12 RX ORDER — POLYETHYLENE GLYCOL 3350 17 G/17G
17 POWDER, FOR SOLUTION ORAL DAILY PRN
Status: DISCONTINUED | OUTPATIENT
Start: 2024-02-12 | End: 2024-02-20 | Stop reason: HOSPADM

## 2024-02-12 RX ORDER — PENTOXIFYLLINE 400 MG/1
400 TABLET, EXTENDED RELEASE ORAL
Status: DISCONTINUED | OUTPATIENT
Start: 2024-02-13 | End: 2024-02-20 | Stop reason: HOSPADM

## 2024-02-12 RX ORDER — POTASSIUM CHLORIDE 20 MEQ/1
40 TABLET, EXTENDED RELEASE ORAL ONCE
Status: COMPLETED | OUTPATIENT
Start: 2024-02-12 | End: 2024-02-12

## 2024-02-12 RX ORDER — SODIUM CHLORIDE 0.9 % (FLUSH) 0.9 %
5-40 SYRINGE (ML) INJECTION PRN
Status: DISCONTINUED | OUTPATIENT
Start: 2024-02-12 | End: 2024-02-20 | Stop reason: HOSPADM

## 2024-02-12 RX ORDER — LACTULOSE 10 G/15ML
20 SOLUTION ORAL 3 TIMES DAILY
Status: DISCONTINUED | OUTPATIENT
Start: 2024-02-12 | End: 2024-02-20 | Stop reason: HOSPADM

## 2024-02-12 RX ORDER — LANOLIN ALCOHOL/MO/W.PET/CERES
100 CREAM (GRAM) TOPICAL EVERY MORNING
Status: DISCONTINUED | OUTPATIENT
Start: 2024-02-13 | End: 2024-02-20 | Stop reason: HOSPADM

## 2024-02-12 RX ORDER — BUSPIRONE HYDROCHLORIDE 10 MG/1
10 TABLET ORAL 2 TIMES DAILY
Status: DISCONTINUED | OUTPATIENT
Start: 2024-02-12 | End: 2024-02-20 | Stop reason: HOSPADM

## 2024-02-12 RX ORDER — POTASSIUM CHLORIDE 20 MEQ/1
40 TABLET, EXTENDED RELEASE ORAL PRN
Status: DISCONTINUED | OUTPATIENT
Start: 2024-02-12 | End: 2024-02-20 | Stop reason: HOSPADM

## 2024-02-12 RX ORDER — FUROSEMIDE 10 MG/ML
40 INJECTION INTRAMUSCULAR; INTRAVENOUS ONCE
Status: COMPLETED | OUTPATIENT
Start: 2024-02-12 | End: 2024-02-12

## 2024-02-12 RX ORDER — POTASSIUM CHLORIDE 7.45 MG/ML
10 INJECTION INTRAVENOUS
Status: COMPLETED | OUTPATIENT
Start: 2024-02-12 | End: 2024-02-12

## 2024-02-12 RX ORDER — LORAZEPAM 1 MG/1
1 TABLET ORAL ONCE
Status: COMPLETED | OUTPATIENT
Start: 2024-02-12 | End: 2024-02-12

## 2024-02-12 RX ORDER — PROCHLORPERAZINE EDISYLATE 5 MG/ML
10 INJECTION INTRAMUSCULAR; INTRAVENOUS EVERY 6 HOURS PRN
Status: DISCONTINUED | OUTPATIENT
Start: 2024-02-12 | End: 2024-02-20 | Stop reason: HOSPADM

## 2024-02-12 RX ORDER — ACETAMINOPHEN 325 MG/1
650 TABLET ORAL EVERY 6 HOURS PRN
Status: DISCONTINUED | OUTPATIENT
Start: 2024-02-12 | End: 2024-02-20 | Stop reason: HOSPADM

## 2024-02-12 RX ORDER — FOLIC ACID 1 MG/1
1 TABLET ORAL EVERY MORNING
Status: DISCONTINUED | OUTPATIENT
Start: 2024-02-13 | End: 2024-02-20 | Stop reason: HOSPADM

## 2024-02-12 RX ORDER — FUROSEMIDE 10 MG/ML
40 INJECTION INTRAMUSCULAR; INTRAVENOUS 2 TIMES DAILY
Status: DISCONTINUED | OUTPATIENT
Start: 2024-02-13 | End: 2024-02-18

## 2024-02-12 RX ORDER — POTASSIUM CHLORIDE 7.45 MG/ML
10 INJECTION INTRAVENOUS PRN
Status: DISCONTINUED | OUTPATIENT
Start: 2024-02-12 | End: 2024-02-20 | Stop reason: HOSPADM

## 2024-02-12 RX ORDER — MAGNESIUM SULFATE IN WATER 40 MG/ML
2000 INJECTION, SOLUTION INTRAVENOUS PRN
Status: DISCONTINUED | OUTPATIENT
Start: 2024-02-12 | End: 2024-02-20 | Stop reason: HOSPADM

## 2024-02-12 RX ORDER — TRAMADOL HYDROCHLORIDE 50 MG/1
50 TABLET ORAL EVERY 8 HOURS PRN
Status: DISCONTINUED | OUTPATIENT
Start: 2024-02-12 | End: 2024-02-20 | Stop reason: HOSPADM

## 2024-02-12 RX ORDER — PROCHLORPERAZINE MALEATE 10 MG
10 TABLET ORAL EVERY 8 HOURS PRN
Status: DISCONTINUED | OUTPATIENT
Start: 2024-02-12 | End: 2024-02-20 | Stop reason: HOSPADM

## 2024-02-12 RX ORDER — ONDANSETRON 4 MG/1
4 TABLET, ORALLY DISINTEGRATING ORAL EVERY 8 HOURS PRN
Status: DISCONTINUED | OUTPATIENT
Start: 2024-02-12 | End: 2024-02-12 | Stop reason: CLARIF

## 2024-02-12 RX ORDER — SODIUM CHLORIDE 9 MG/ML
INJECTION, SOLUTION INTRAVENOUS PRN
Status: DISCONTINUED | OUTPATIENT
Start: 2024-02-12 | End: 2024-02-20 | Stop reason: HOSPADM

## 2024-02-12 RX ORDER — ACETAMINOPHEN 650 MG/1
650 SUPPOSITORY RECTAL EVERY 6 HOURS PRN
Status: DISCONTINUED | OUTPATIENT
Start: 2024-02-12 | End: 2024-02-20 | Stop reason: HOSPADM

## 2024-02-12 RX ORDER — SODIUM CHLORIDE 0.9 % (FLUSH) 0.9 %
5-40 SYRINGE (ML) INJECTION EVERY 12 HOURS SCHEDULED
Status: DISCONTINUED | OUTPATIENT
Start: 2024-02-12 | End: 2024-02-20 | Stop reason: HOSPADM

## 2024-02-12 RX ORDER — ONDANSETRON 2 MG/ML
4 INJECTION INTRAMUSCULAR; INTRAVENOUS EVERY 6 HOURS PRN
Status: DISCONTINUED | OUTPATIENT
Start: 2024-02-12 | End: 2024-02-12 | Stop reason: CLARIF

## 2024-02-12 RX ADMIN — TRAMADOL HYDROCHLORIDE 50 MG: 50 TABLET, COATED ORAL at 23:45

## 2024-02-12 RX ADMIN — IOPAMIDOL 75 ML: 755 INJECTION, SOLUTION INTRAVENOUS at 16:54

## 2024-02-12 RX ADMIN — SODIUM CHLORIDE, PRESERVATIVE FREE 10 ML: 5 INJECTION INTRAVENOUS at 23:47

## 2024-02-12 RX ADMIN — RIFAXIMIN 200 MG: 200 TABLET ORAL at 23:45

## 2024-02-12 RX ADMIN — POTASSIUM CHLORIDE 10 MEQ: 7.46 INJECTION, SOLUTION INTRAVENOUS at 19:42

## 2024-02-12 RX ADMIN — FENTANYL CITRATE 25 MCG: 50 INJECTION, SOLUTION INTRAMUSCULAR; INTRAVENOUS at 17:37

## 2024-02-12 RX ADMIN — POTASSIUM CHLORIDE 10 MEQ: 7.46 INJECTION, SOLUTION INTRAVENOUS at 18:30

## 2024-02-12 RX ADMIN — FUROSEMIDE 40 MG: 10 INJECTION, SOLUTION INTRAMUSCULAR; INTRAVENOUS at 20:44

## 2024-02-12 RX ADMIN — BUSPIRONE HYDROCHLORIDE 10 MG: 10 TABLET ORAL at 23:45

## 2024-02-12 RX ADMIN — LORAZEPAM 1 MG: 1 TABLET ORAL at 20:45

## 2024-02-12 RX ADMIN — FENTANYL CITRATE 50 MCG: 50 INJECTION, SOLUTION INTRAMUSCULAR; INTRAVENOUS at 15:26

## 2024-02-12 RX ADMIN — POTASSIUM CHLORIDE 40 MEQ: 1500 TABLET, EXTENDED RELEASE ORAL at 18:28

## 2024-02-12 ASSESSMENT — LIFESTYLE VARIABLES
HOW MANY STANDARD DRINKS CONTAINING ALCOHOL DO YOU HAVE ON A TYPICAL DAY: 3 OR 4
HOW OFTEN DO YOU HAVE A DRINK CONTAINING ALCOHOL: 2-4 TIMES A MONTH

## 2024-02-12 ASSESSMENT — PAIN SCALES - GENERAL: PAINLEVEL_OUTOF10: 0

## 2024-02-12 ASSESSMENT — PAIN - FUNCTIONAL ASSESSMENT: PAIN_FUNCTIONAL_ASSESSMENT: 0-10

## 2024-02-12 ASSESSMENT — PAIN DESCRIPTION - LOCATION: LOCATION: GENERALIZED

## 2024-02-12 NOTE — ED PROVIDER NOTES
[facility]  EMERGENCY DEPARTMENT ENCOUNTER        Pt Name: Johnathan Yanez III  MRN: [id number]  Birthdate 1980  Date of evaluation: 2/12/2024  Provider: Yareli Myers DO  PCP: No primary care provider on file.  Note Started: 6:33 PM EST 2/12/24    CHIEF COMPLAINT       Chief Complaint   Patient presents with    Groin Swelling     Swelling of testicles. Swelling has been going on for awhile but never this bad.        HISTORY OF PRESENT ILLNESS: 1 or more Elements   History From: Patient    Limitations to history : None    Johnathan Yanez III is a 44 y.o. male with past medical history of alcohol abuse, cirrhosis status post TIPS procedure, portal hypertension, hepatitis C and esophageal varices who presents to the emergency department due to groin swelling and pain.  Patient states that he has no significant swelling of his lower extremities extending into his groin.  He had patient states that this has been going on for some time but is never gotten this bad.  Patient has been a nursing facility for care but he does not feel like they are caring for him appropriately.  He does have significant pain in his groin region.  Pain is mostly sharp, intermittent and nonradiating.  Nothing particular makes it better but palpation makes it worse.  He has no other symptoms otherwise including nausea, vomiting, fever, chills, headache, lightheadedness, dizziness, syncope, abdominal pain, urinary symptoms, constipation or diarrhea.  On initial assessment, patient is chronically ill-appearing but nontoxic-appearing.  He is in no acute distress.  He denies any recent sick contacts.    Nursing Notes were all reviewed and agreed with or any disagreements were addressed in the HPI.    ROS:   Pertinent positives and negatives are stated within HPI, all other systems reviewed and are negative.    --------------------------------------------- PAST HISTORY  Take Tylenol as needed for pain, 650mg every 4-6 hours.    May resume daily activities as tolerated.    Follow up with your primary care physician in 1-2 weeks.

## 2024-02-13 LAB
ALBUMIN SERPL-MCNC: 2.8 G/DL (ref 3.5–5.2)
ALP SERPL-CCNC: 88 U/L (ref 40–129)
ALT SERPL-CCNC: 13 U/L (ref 0–40)
ANION GAP SERPL CALCULATED.3IONS-SCNC: 9 MMOL/L (ref 7–16)
AST SERPL-CCNC: 45 U/L (ref 0–39)
BASOPHILS # BLD: 0.02 K/UL (ref 0–0.2)
BASOPHILS NFR BLD: 1 % (ref 0–2)
BILIRUB SERPL-MCNC: 6.4 MG/DL (ref 0–1.2)
BUN SERPL-MCNC: 7 MG/DL (ref 6–20)
CALCIUM SERPL-MCNC: 7.9 MG/DL (ref 8.6–10.2)
CHLORIDE SERPL-SCNC: 102 MMOL/L (ref 98–107)
CO2 SERPL-SCNC: 23 MMOL/L (ref 22–29)
CREAT SERPL-MCNC: 0.9 MG/DL (ref 0.7–1.2)
EKG ATRIAL RATE: 88 BPM
EKG P AXIS: 19 DEGREES
EKG P-R INTERVAL: 120 MS
EKG Q-T INTERVAL: 442 MS
EKG QRS DURATION: 84 MS
EKG QTC CALCULATION (BAZETT): 534 MS
EKG R AXIS: 16 DEGREES
EKG T AXIS: 13 DEGREES
EKG VENTRICULAR RATE: 88 BPM
EOSINOPHIL # BLD: 0.24 K/UL (ref 0.05–0.5)
EOSINOPHILS RELATIVE PERCENT: 7 % (ref 0–6)
ERYTHROCYTE [DISTWIDTH] IN BLOOD BY AUTOMATED COUNT: 23.5 % (ref 11.5–15)
GFR SERPL CREATININE-BSD FRML MDRD: >60 ML/MIN/1.73M2
GLUCOSE SERPL-MCNC: 79 MG/DL (ref 74–99)
HCT VFR BLD AUTO: 27.1 % (ref 37–54)
HGB BLD-MCNC: 9 G/DL (ref 12.5–16.5)
IMM GRANULOCYTES # BLD AUTO: <0.03 K/UL (ref 0–0.58)
IMM GRANULOCYTES NFR BLD: 1 % (ref 0–5)
LYMPHOCYTES NFR BLD: 0.51 K/UL (ref 1.5–4)
LYMPHOCYTES RELATIVE PERCENT: 14 % (ref 20–42)
MAGNESIUM SERPL-MCNC: 1.4 MG/DL (ref 1.6–2.6)
MAGNESIUM SERPL-MCNC: 1.5 MG/DL (ref 1.6–2.6)
MCH RBC QN AUTO: 32.1 PG (ref 26–35)
MCHC RBC AUTO-ENTMCNC: 33.2 G/DL (ref 32–34.5)
MCV RBC AUTO: 96.8 FL (ref 80–99.9)
MONOCYTES NFR BLD: 0.46 K/UL (ref 0.1–0.95)
MONOCYTES NFR BLD: 13 % (ref 2–12)
NEUTROPHILS NFR BLD: 65 % (ref 43–80)
NEUTS SEG NFR BLD: 2.32 K/UL (ref 1.8–7.3)
PLATELET # BLD AUTO: 29 K/UL (ref 130–450)
PLATELET CONFIRMATION: NORMAL
PMV BLD AUTO: 9.5 FL (ref 7–12)
POTASSIUM SERPL-SCNC: 3 MMOL/L (ref 3.5–5)
POTASSIUM SERPL-SCNC: 3 MMOL/L (ref 3.5–5)
PROT SERPL-MCNC: 5.2 G/DL (ref 6.4–8.3)
RBC # BLD AUTO: 2.8 M/UL (ref 3.8–5.8)
RBC # BLD: ABNORMAL 10*6/UL
SODIUM SERPL-SCNC: 134 MMOL/L (ref 132–146)
WBC OTHER # BLD: 3.6 K/UL (ref 4.5–11.5)

## 2024-02-13 PROCEDURE — 6360000002 HC RX W HCPCS: Performed by: NURSE PRACTITIONER

## 2024-02-13 PROCEDURE — 85025 COMPLETE CBC W/AUTO DIFF WBC: CPT

## 2024-02-13 PROCEDURE — 2580000003 HC RX 258: Performed by: NURSE PRACTITIONER

## 2024-02-13 PROCEDURE — 36415 COLL VENOUS BLD VENIPUNCTURE: CPT

## 2024-02-13 PROCEDURE — 2060000000 HC ICU INTERMEDIATE R&B

## 2024-02-13 PROCEDURE — 93010 ELECTROCARDIOGRAM REPORT: CPT | Performed by: INTERNAL MEDICINE

## 2024-02-13 PROCEDURE — 80053 COMPREHEN METABOLIC PANEL: CPT

## 2024-02-13 PROCEDURE — 6370000000 HC RX 637 (ALT 250 FOR IP): Performed by: NURSE PRACTITIONER

## 2024-02-13 PROCEDURE — 99232 SBSQ HOSP IP/OBS MODERATE 35: CPT | Performed by: STUDENT IN AN ORGANIZED HEALTH CARE EDUCATION/TRAINING PROGRAM

## 2024-02-13 PROCEDURE — 83735 ASSAY OF MAGNESIUM: CPT

## 2024-02-13 PROCEDURE — 51702 INSERT TEMP BLADDER CATH: CPT

## 2024-02-13 PROCEDURE — 84132 ASSAY OF SERUM POTASSIUM: CPT

## 2024-02-13 RX ADMIN — PENTOXIFYLLINE 400 MG: 400 TABLET, EXTENDED RELEASE ORAL at 08:31

## 2024-02-13 RX ADMIN — POTASSIUM CHLORIDE 10 MEQ: 7.46 INJECTION, SOLUTION INTRAVENOUS at 21:38

## 2024-02-13 RX ADMIN — Medication 100 MG: at 08:31

## 2024-02-13 RX ADMIN — FUROSEMIDE 40 MG: 10 INJECTION, SOLUTION INTRAMUSCULAR; INTRAVENOUS at 08:31

## 2024-02-13 RX ADMIN — POTASSIUM CHLORIDE 10 MEQ: 7.46 INJECTION, SOLUTION INTRAVENOUS at 22:53

## 2024-02-13 RX ADMIN — MIDODRINE HYDROCHLORIDE 10 MG: 5 TABLET ORAL at 08:31

## 2024-02-13 RX ADMIN — BUSPIRONE HYDROCHLORIDE 10 MG: 10 TABLET ORAL at 19:56

## 2024-02-13 RX ADMIN — MAGNESIUM SULFATE HEPTAHYDRATE 2000 MG: 40 INJECTION, SOLUTION INTRAVENOUS at 19:54

## 2024-02-13 RX ADMIN — BACLOFEN 10 MG: 10 TABLET ORAL at 10:53

## 2024-02-13 RX ADMIN — SODIUM CHLORIDE, PRESERVATIVE FREE 10 ML: 5 INJECTION INTRAVENOUS at 08:32

## 2024-02-13 RX ADMIN — FUROSEMIDE 40 MG: 10 INJECTION, SOLUTION INTRAMUSCULAR; INTRAVENOUS at 17:29

## 2024-02-13 RX ADMIN — RIFAXIMIN 200 MG: 200 TABLET ORAL at 08:31

## 2024-02-13 RX ADMIN — MIDODRINE HYDROCHLORIDE 10 MG: 5 TABLET ORAL at 15:49

## 2024-02-13 RX ADMIN — POTASSIUM CHLORIDE 10 MEQ: 7.46 INJECTION, SOLUTION INTRAVENOUS at 11:41

## 2024-02-13 RX ADMIN — TRAMADOL HYDROCHLORIDE 50 MG: 50 TABLET, COATED ORAL at 17:24

## 2024-02-13 RX ADMIN — POTASSIUM CHLORIDE 10 MEQ: 7.46 INJECTION, SOLUTION INTRAVENOUS at 14:32

## 2024-02-13 RX ADMIN — FOLIC ACID 1 MG: 1 TABLET ORAL at 08:31

## 2024-02-13 RX ADMIN — PANTOPRAZOLE SODIUM 40 MG: 40 TABLET, DELAYED RELEASE ORAL at 15:49

## 2024-02-13 RX ADMIN — RIFAXIMIN 200 MG: 200 TABLET ORAL at 19:55

## 2024-02-13 RX ADMIN — SODIUM CHLORIDE, PRESERVATIVE FREE 10 ML: 5 INJECTION INTRAVENOUS at 19:55

## 2024-02-13 RX ADMIN — BUSPIRONE HYDROCHLORIDE 10 MG: 10 TABLET ORAL at 08:31

## 2024-02-13 RX ADMIN — PENTOXIFYLLINE 400 MG: 400 TABLET, EXTENDED RELEASE ORAL at 11:42

## 2024-02-13 RX ADMIN — PANTOPRAZOLE SODIUM 40 MG: 40 TABLET, DELAYED RELEASE ORAL at 06:33

## 2024-02-13 RX ADMIN — PROCHLORPERAZINE EDISYLATE 10 MG: 5 INJECTION INTRAMUSCULAR; INTRAVENOUS at 18:19

## 2024-02-13 RX ADMIN — POTASSIUM CHLORIDE 10 MEQ: 7.46 INJECTION, SOLUTION INTRAVENOUS at 15:48

## 2024-02-13 RX ADMIN — MAGNESIUM SULFATE HEPTAHYDRATE 2000 MG: 40 INJECTION, SOLUTION INTRAVENOUS at 10:08

## 2024-02-13 RX ADMIN — PENTOXIFYLLINE 400 MG: 400 TABLET, EXTENDED RELEASE ORAL at 15:49

## 2024-02-13 RX ADMIN — MIDODRINE HYDROCHLORIDE 10 MG: 5 TABLET ORAL at 11:42

## 2024-02-13 RX ADMIN — TRAMADOL HYDROCHLORIDE 50 MG: 50 TABLET, COATED ORAL at 09:13

## 2024-02-13 RX ADMIN — BACLOFEN 10 MG: 10 TABLET ORAL at 19:55

## 2024-02-13 RX ADMIN — POTASSIUM CHLORIDE 10 MEQ: 7.46 INJECTION, SOLUTION INTRAVENOUS at 10:16

## 2024-02-13 RX ADMIN — POTASSIUM CHLORIDE 10 MEQ: 7.46 INJECTION, SOLUTION INTRAVENOUS at 09:13

## 2024-02-13 RX ADMIN — POTASSIUM CHLORIDE 10 MEQ: 7.46 INJECTION, SOLUTION INTRAVENOUS at 13:05

## 2024-02-13 RX ADMIN — RIFAXIMIN 200 MG: 200 TABLET ORAL at 13:58

## 2024-02-13 ASSESSMENT — PAIN - FUNCTIONAL ASSESSMENT
PAIN_FUNCTIONAL_ASSESSMENT: PREVENTS OR INTERFERES SOME ACTIVE ACTIVITIES AND ADLS

## 2024-02-13 ASSESSMENT — PAIN DESCRIPTION - DESCRIPTORS: DESCRIPTORS: ACHING;DISCOMFORT

## 2024-02-13 ASSESSMENT — PAIN SCALES - GENERAL
PAINLEVEL_OUTOF10: 8
PAINLEVEL_OUTOF10: 4
PAINLEVEL_OUTOF10: 8
PAINLEVEL_OUTOF10: 8

## 2024-02-13 ASSESSMENT — PAIN DESCRIPTION - LOCATION
LOCATION: ABDOMEN;SCROTUM
LOCATION: ABDOMEN;SCROTUM
LOCATION: GENERALIZED

## 2024-02-13 ASSESSMENT — PAIN DESCRIPTION - PAIN TYPE: TYPE: CHRONIC PAIN

## 2024-02-13 ASSESSMENT — PAIN DESCRIPTION - FREQUENCY: FREQUENCY: CONTINUOUS

## 2024-02-13 ASSESSMENT — PAIN DESCRIPTION - ONSET: ONSET: ON-GOING

## 2024-02-13 ASSESSMENT — PAIN DESCRIPTION - ORIENTATION: ORIENTATION: RIGHT;LEFT

## 2024-02-13 NOTE — CARE COORDINATION
Social Work/Discharge Planning:  Met with patient and completed initial assessment.  Explained Social Work role and discussed transition of care/discharge planning.  Patient a readmit, discharged 2/10 to [facility].  He states he does not want to return to [facility] and prefers to [facility] at discharge.  Referral made to Tammy with [facility] and she states facility can accept patient.  He will need a pre-cert.  Electronic N-17 in epic.  Will continue to follow and assist with discharge planning.  Electronically signed by JOHN Morrell on 2/13/2024 at 3:20 PM

## 2024-02-13 NOTE — DISCHARGE INSTR - COC
lb 1.5 oz)     Mental Status:  oriented    IV Access:  - None    Nursing Mobility/ADLs:  Walking   Dependent  Transfer  Dependent  Bathing  Dependent  Dressing  Dependent  Toileting  Dependent  Feeding  Dependent  Med Admin  Dependent  Med Delivery   whole    Wound Care Documentation and Therapy:  Wound 02/05/24 Pretibial Right area on inner calf, reddish purple and painful to touch (Active)   Dressing Status Clean;Dry;Intact 02/13/24 0916   Wound Cleansed Wound cleanser;Cleansed with saline 02/12/24 2130   Dressing/Treatment Roll gauze;ABD 02/13/24 0916   Wound Assessment Pink/red;Purple/maroon 02/12/24 2130   Drainage Amount Small (< 25%) 02/12/24 2130   Drainage Description Thin;Serosanguinous 02/12/24 2130   Odor None 02/13/24 0916   Number of days: 8        Elimination:  Continence:   Bowel: Occasional Incont. Of bowel  Bladder: Aldana, urology follow on 2/27 with [facility]  Urinary Catheter: Indication for Use of Catheter: Urology/Urologist seeing this patient or inserted indwelling catheter   Colostomy/Ileostomy/Ileal Conduit: Yes       Date of Last BM: 2/17/24    Intake/Output Summary (Last 24 hours) at 2/13/2024 1512  Last data filed at 2/13/2024 1415  Gross per 24 hour   Intake 250 ml   Output 1500 ml   Net -1250 ml     I/O last 3 completed shifts:  In: 200 [IV Piggyback:200]  Out: -     Safety Concerns:     None    Impairments/Disabilities:      None    Nutrition Therapy:  Current Nutrition Therapy:   - Oral Diet:  General and Low Sodium (2gm) regular, low na    Routes of Feeding: Oral  Liquids: Thin Liquids  Daily Fluid Restriction: no  Last Modified Barium Swallow with Video (Video Swallowing Test): not done    Treatments at the Time of Hospital Discharge:   Respiratory Treatments:   Oxygen Therapy:  is not on home oxygen therapy.  Ventilator:    - No ventilator support    Rehab Therapies: Physical Therapy and Occupational Therapy  Weight Bearing Status/Restrictions: No weight bearing restrictions  Other

## 2024-02-13 NOTE — H&P
[facility] Hospitalist Group History and Physical      CHIEF COMPLAINT:  Abdominal and scrotal pain/swelling     History of Present Illness:  This is a 44 year old male with PMH significant for hepatitis C, polysubstance abuse, alcohol abuse, alcoholic cirrhosis with ascites, portal hypertension, esophageal varices, and GAVE.  Recently admitted 01/25/2024 - 02/10/2024 for CANDY, abscess of right leg and scrotal abscess.  Groin and scrotal abscess treated with cefepime and Flagyl during admission.  Patient discharged on Levaquin and Flagyl per ID.  Not surgical candidate per urology and general surgery.  Did have paracentesis during admission with removal of 3500 cc.  Patient discharged to [facility].    Patient to ED due to increased pain and swelling in his scrotum.  Also reports increased abdominal swelling.  States, \"I feel like I am being crushed by fluid.\"  Associated symptoms include poor appetite.  Also reports new scrotal wound, hematuria, hematochezia and melena.  Upset regarding care received at [facility].  Unsure if he was receiving correct medication.  Denies fever, chills, shortness of breath, and chest pain.     Labs remarkable for potassium 3, serum calcium 8.3, total protein 5.5, , troponin 12 and then 12, albumin 3.0, AST 55, total bili 6.8, direct bili 2.9, indirect bili 3.9, H&H 9.9/28.8, and platelet count 32.  PT 29.7 and PTT 41.4.  Blood culture and urine culture done in ED.  Urine negative for infection.  Ultrasound of scrotum done showing no acute testicular abnormality, severe soft tissue swelling, large left and small right hydroceles with complex lobular area of fluid in the left.  Similar to 2/6/2024.  CT of the abdomen showing swollen scrotum with stable bilateral hydroceles likely secondary to accumulation of ascitic fluid.  Cirrhosis status post TIPS with worsening portal hypertension manifesting as collateral vessels, splenomegaly, increased large

## 2024-02-13 NOTE — ED NOTES
ED to Inpatient Handoff Report    Notified Nerissa that electronic handoff available and patient ready for transport to room 436.    Safety Risks: None identified    Patient in Restraints: no    Constant Observer or Patient : no    Telemetry Monitoring Ordered: Yes          Order to transfer to unit without monitor: NA    Last MEWS: 1 Time completed: 2046    Deterioration Index: 16.82    Vitals:    02/12/24 1509 02/12/24 2044 02/12/24 2046   BP: 111/72 108/65 108/65   Pulse: 83  83   Resp: 16  16   Temp: 98.2 °F (36.8 °C)  98.2 °F (36.8 °C)   TempSrc: Oral     SpO2: 99%  99%   Weight: 92.1 kg (203 lb)     Height: 1.727 m (5' 8\")         Opportunity for questions and clarification was provided.

## 2024-02-13 NOTE — ACP (ADVANCE CARE PLANNING)
Advance Care Planning   Healthcare Decision Maker:    Primary Decision Maker: Mikaela Russo - Parent - [phone]    Primary Decision Maker: Johnathan Yanez Jr - Parent - [phone]    Secondary Decision Maker: Angeles Smallwood - Brother/Sister - [phone]    Supplemental (Other) Decision Maker: Peggy Rodriguez - Girlfriend - [phone]    Click here to complete Healthcare Decision Makers including selection of the Healthcare Decision Maker Relationship (ie \"Primary\").

## 2024-02-13 NOTE — CONSULTS
bilateral LE edema   Neurological: awake alert oriented  x 3  Lines: peripheral      CBC+dif:  Recent Labs     02/12/24  1520 02/13/24  0606   WBC 4.6 3.6*   HGB 9.8* 9.0*   HCT 28.8* 27.1*   MCV 95.7 96.8   PLT 32* 29*   NEUTROABS 4.08 2.32     Lab Results   Component Value Date    CRP <3.0 09/01/2023    CRP 0.3 02/11/2022     No results found for: \"CRPHS\"  Lab Results   Component Value Date    SEDRATE 9 02/11/2022     Lab Results   Component Value Date    ALT 13 02/13/2024    AST 45 (H) 02/13/2024    ALKPHOS 88 02/13/2024    BILITOT 6.4 (H) 02/13/2024     Lab Results   Component Value Date/Time     02/13/2024 06:06 AM    K 3.0 02/13/2024 06:06 AM    K 3.5 06/04/2023 05:43 AM     02/13/2024 06:06 AM    CO2 23 02/13/2024 06:06 AM    BUN 7 02/13/2024 06:06 AM    CREATININE 0.9 02/13/2024 06:06 AM    GFRAA >60 07/01/2022 06:35 AM    LABGLOM >60 02/13/2024 06:06 AM    GLUCOSE 79 02/13/2024 06:06 AM    GLUCOSE 89 02/20/2012 04:40 AM    PROT 5.2 02/13/2024 06:06 AM    LABALBU 2.8 02/13/2024 06:06 AM    LABALBU 3.4 02/18/2012 05:00 AM    CALCIUM 7.9 02/13/2024 06:06 AM    BILITOT 6.4 02/13/2024 06:06 AM    ALKPHOS 88 02/13/2024 06:06 AM    AST 45 02/13/2024 06:06 AM    ALT 13 02/13/2024 06:06 AM       Lab Results   Component Value Date/Time    PROTIME 29.7 02/12/2024 03:20 PM    PROTIME 12.3 02/17/2012 10:17 PM    INR 2.6 02/12/2024 03:20 PM       Lab Results   Component Value Date/Time    TSH 0.980 05/15/2023 03:21 AM       Lab Results   Component Value Date/Time    COLORU Yellow 02/12/2024 03:20 PM    PHUR 5.5 02/12/2024 03:20 PM    WBCUA 0 TO 5 02/12/2024 03:20 PM    RBCUA 0 TO 2 02/12/2024 03:20 PM    RBCUA 0-1 02/25/2013 04:45 PM    MUCUS Present 08/14/2020 04:10 PM    BACTERIA 1+ 01/25/2024 04:38 PM    CLARITYU Clear 06/01/2023 12:30 PM    SPECGRAV 1.010 02/12/2024 03:20 PM    LEUKOCYTESUR NEGATIVE 02/12/2024 03:20 PM    UROBILINOGEN 0.2 02/12/2024 03:20 PM    BILIRUBINUR NEGATIVE 02/12/2024 03:20 PM    
ascites/low protein state  -Antibiotics per ID  -Consider patient for colonoscopy, inpatient versus outpatient - previously refusing colonoscopy     9.  Alcohol/polysubstance abuse  -Persistent alcohol use  -Advised again on need for cessation  -Withdrawal management per admitting     10.  Comorbidities  -Per admitting/consultants        MRI/MRCP consistent with cirrhosis without mass.  During previous admission  Paracentesis performed 2/9/2024  Consider EGD later this admission  Consider diuretics and monitor weight and edema    Thank you for the opportunity to see this patient in consultation.    Hardeep Kennedy MD  2/13/2024  2:57 PM    NOTE:  This report was transcribed using voice recognition software.  Every effort was made to ensure accuracy; however, inadvertent computerized transcription errors may be present.

## 2024-02-14 ENCOUNTER — APPOINTMENT (OUTPATIENT)
Dept: ULTRASOUND IMAGING | Age: 44
DRG: 280 | End: 2024-02-14
Payer: COMMERCIAL

## 2024-02-14 LAB
ALBUMIN FLD-MCNC: 0.6 G/DL
ALBUMIN SERPL-MCNC: 2.7 G/DL (ref 3.5–5.2)
ALP SERPL-CCNC: 88 U/L (ref 40–129)
ALT SERPL-CCNC: 13 U/L (ref 0–40)
AMYLASE FLD-CCNC: 16 U/L
ANION GAP SERPL CALCULATED.3IONS-SCNC: 9 MMOL/L (ref 7–16)
APPEARANCE FLD: CLEAR
AST SERPL-CCNC: 50 U/L (ref 0–39)
BASOPHILS # BLD: 0.02 K/UL (ref 0–0.2)
BASOPHILS NFR BLD: 1 % (ref 0–2)
BILIRUB SERPL-MCNC: 6.7 MG/DL (ref 0–1.2)
BODY FLD TYPE: NORMAL
BUN SERPL-MCNC: 6 MG/DL (ref 6–20)
CALCIUM SERPL-MCNC: 7.9 MG/DL (ref 8.6–10.2)
CHLORIDE SERPL-SCNC: 98 MMOL/L (ref 98–107)
CLOT CHECK: NORMAL
CO2 SERPL-SCNC: 24 MMOL/L (ref 22–29)
COLOR FLD: YELLOW
CREAT SERPL-MCNC: 0.9 MG/DL (ref 0.7–1.2)
EOSINOPHIL # BLD: 0.2 K/UL (ref 0.05–0.5)
EOSINOPHILS RELATIVE PERCENT: 5 % (ref 0–6)
ERYTHROCYTE [DISTWIDTH] IN BLOOD BY AUTOMATED COUNT: 22.6 % (ref 11.5–15)
GFR SERPL CREATININE-BSD FRML MDRD: >60 ML/MIN/1.73M2
GLUCOSE SERPL-MCNC: 131 MG/DL (ref 74–99)
HCT VFR BLD AUTO: 27.8 % (ref 37–54)
HGB BLD-MCNC: 9.5 G/DL (ref 12.5–16.5)
IMM GRANULOCYTES # BLD AUTO: <0.03 K/UL (ref 0–0.58)
IMM GRANULOCYTES NFR BLD: 1 % (ref 0–5)
LYMPHOCYTES NFR BLD: 0.55 K/UL (ref 1.5–4)
LYMPHOCYTES RELATIVE PERCENT: 14 % (ref 20–42)
MAGNESIUM SERPL-MCNC: 1.7 MG/DL (ref 1.6–2.6)
MAGNESIUM SERPL-MCNC: 1.7 MG/DL (ref 1.6–2.6)
MCH RBC QN AUTO: 32.6 PG (ref 26–35)
MCHC RBC AUTO-ENTMCNC: 34.2 G/DL (ref 32–34.5)
MCV RBC AUTO: 95.5 FL (ref 80–99.9)
MICROORGANISM SPEC CULT: NO GROWTH
MONOCYTES NFR BLD: 0.54 K/UL (ref 0.1–0.95)
MONOCYTES NFR BLD: 14 % (ref 2–12)
MONOCYTES NFR FLD: 87 %
NEUTROPHILS NFR BLD: 67 % (ref 43–80)
NEUTROPHILS NFR FLD: 13 %
NEUTS SEG NFR BLD: 2.68 K/UL (ref 1.8–7.3)
PLATELET # BLD AUTO: 40 K/UL (ref 130–450)
PLATELET CONFIRMATION: NORMAL
PMV BLD AUTO: 11.3 FL (ref 7–12)
POTASSIUM SERPL-SCNC: 3 MMOL/L (ref 3.5–5)
PROT FLD-MCNC: 0.7 G/DL
PROT SERPL-MCNC: 5 G/DL (ref 6.4–8.3)
RBC # BLD AUTO: 2.91 M/UL (ref 3.8–5.8)
RBC # BLD: ABNORMAL 10*6/UL
RBC # FLD: <2000 CELLS/UL
SODIUM SERPL-SCNC: 131 MMOL/L (ref 132–146)
SPECIMEN DESCRIPTION: NORMAL
SPECIMEN TYPE: NORMAL
WBC # FLD: 46 CELLS/UL
WBC OTHER # BLD: 4 K/UL (ref 4.5–11.5)

## 2024-02-14 PROCEDURE — 82150 ASSAY OF AMYLASE: CPT

## 2024-02-14 PROCEDURE — 97161 PT EVAL LOW COMPLEX 20 MIN: CPT

## 2024-02-14 PROCEDURE — 2060000000 HC ICU INTERMEDIATE R&B

## 2024-02-14 PROCEDURE — 6370000000 HC RX 637 (ALT 250 FOR IP): Performed by: STUDENT IN AN ORGANIZED HEALTH CARE EDUCATION/TRAINING PROGRAM

## 2024-02-14 PROCEDURE — 84157 ASSAY OF PROTEIN OTHER: CPT

## 2024-02-14 PROCEDURE — 6370000000 HC RX 637 (ALT 250 FOR IP): Performed by: NURSE PRACTITIONER

## 2024-02-14 PROCEDURE — 83735 ASSAY OF MAGNESIUM: CPT

## 2024-02-14 PROCEDURE — 87070 CULTURE OTHR SPECIMN AEROBIC: CPT

## 2024-02-14 PROCEDURE — 6360000002 HC RX W HCPCS: Performed by: NURSE PRACTITIONER

## 2024-02-14 PROCEDURE — 82042 OTHER SOURCE ALBUMIN QUAN EA: CPT

## 2024-02-14 PROCEDURE — 80053 COMPREHEN METABOLIC PANEL: CPT

## 2024-02-14 PROCEDURE — P9047 ALBUMIN (HUMAN), 25%, 50ML: HCPCS | Performed by: HOSPITALIST

## 2024-02-14 PROCEDURE — 2580000003 HC RX 258: Performed by: NURSE PRACTITIONER

## 2024-02-14 PROCEDURE — 6360000002 HC RX W HCPCS: Performed by: STUDENT IN AN ORGANIZED HEALTH CARE EDUCATION/TRAINING PROGRAM

## 2024-02-14 PROCEDURE — 85025 COMPLETE CBC W/AUTO DIFF WBC: CPT

## 2024-02-14 PROCEDURE — 2709999900 US GUIDED PARACENTESIS

## 2024-02-14 PROCEDURE — 0W9G3ZZ DRAINAGE OF PERITONEAL CAVITY, PERCUTANEOUS APPROACH: ICD-10-PCS | Performed by: HOSPITALIST

## 2024-02-14 PROCEDURE — 87205 SMEAR GRAM STAIN: CPT

## 2024-02-14 PROCEDURE — 97165 OT EVAL LOW COMPLEX 30 MIN: CPT

## 2024-02-14 PROCEDURE — 99232 SBSQ HOSP IP/OBS MODERATE 35: CPT | Performed by: STUDENT IN AN ORGANIZED HEALTH CARE EDUCATION/TRAINING PROGRAM

## 2024-02-14 PROCEDURE — 2500000003 HC RX 250 WO HCPCS: Performed by: RADIOLOGY

## 2024-02-14 PROCEDURE — 6360000002 HC RX W HCPCS: Performed by: HOSPITALIST

## 2024-02-14 PROCEDURE — 89051 BODY FLUID CELL COUNT: CPT

## 2024-02-14 PROCEDURE — 88305 TISSUE EXAM BY PATHOLOGIST: CPT

## 2024-02-14 PROCEDURE — 88112 CYTOPATH CELL ENHANCE TECH: CPT

## 2024-02-14 RX ORDER — ALBUMIN (HUMAN) 12.5 G/50ML
SOLUTION INTRAVENOUS CONTINUOUS PRN
Status: COMPLETED | OUTPATIENT
Start: 2024-02-14 | End: 2024-02-14

## 2024-02-14 RX ORDER — POTASSIUM CHLORIDE 20 MEQ/1
40 TABLET, EXTENDED RELEASE ORAL ONCE
Status: COMPLETED | OUTPATIENT
Start: 2024-02-14 | End: 2024-02-14

## 2024-02-14 RX ORDER — LIDOCAINE HYDROCHLORIDE 10 MG/ML
INJECTION, SOLUTION INFILTRATION; PERINEURAL PRN
Status: COMPLETED | OUTPATIENT
Start: 2024-02-14 | End: 2024-02-14

## 2024-02-14 RX ORDER — ALBUMIN (HUMAN) 12.5 G/50ML
50 SOLUTION INTRAVENOUS
Status: ACTIVE | OUTPATIENT
Start: 2024-02-15 | End: 2024-02-16

## 2024-02-14 RX ADMIN — PENTOXIFYLLINE 400 MG: 400 TABLET, EXTENDED RELEASE ORAL at 16:06

## 2024-02-14 RX ADMIN — Medication 100 MG: at 08:02

## 2024-02-14 RX ADMIN — FOLIC ACID 1 MG: 1 TABLET ORAL at 08:02

## 2024-02-14 RX ADMIN — PENTOXIFYLLINE 400 MG: 400 TABLET, EXTENDED RELEASE ORAL at 11:17

## 2024-02-14 RX ADMIN — MAGNESIUM SULFATE HEPTAHYDRATE 2000 MG: 40 INJECTION, SOLUTION INTRAVENOUS at 11:46

## 2024-02-14 RX ADMIN — BUSPIRONE HYDROCHLORIDE 10 MG: 10 TABLET ORAL at 08:02

## 2024-02-14 RX ADMIN — BACLOFEN 10 MG: 10 TABLET ORAL at 10:30

## 2024-02-14 RX ADMIN — HYDROMORPHONE HYDROCHLORIDE 0.5 MG: 1 INJECTION, SOLUTION INTRAMUSCULAR; INTRAVENOUS; SUBCUTANEOUS at 11:44

## 2024-02-14 RX ADMIN — SODIUM CHLORIDE, PRESERVATIVE FREE 10 ML: 5 INJECTION INTRAVENOUS at 08:02

## 2024-02-14 RX ADMIN — TRAMADOL HYDROCHLORIDE 50 MG: 50 TABLET, COATED ORAL at 10:30

## 2024-02-14 RX ADMIN — POTASSIUM CHLORIDE 10 MEQ: 7.46 INJECTION, SOLUTION INTRAVENOUS at 04:36

## 2024-02-14 RX ADMIN — SODIUM CHLORIDE, PRESERVATIVE FREE 10 ML: 5 INJECTION INTRAVENOUS at 19:50

## 2024-02-14 RX ADMIN — TRAMADOL HYDROCHLORIDE 50 MG: 50 TABLET, COATED ORAL at 01:16

## 2024-02-14 RX ADMIN — MIDODRINE HYDROCHLORIDE 10 MG: 5 TABLET ORAL at 16:05

## 2024-02-14 RX ADMIN — LIDOCAINE HYDROCHLORIDE 10 ML: 10 INJECTION, SOLUTION INFILTRATION; PERINEURAL at 14:58

## 2024-02-14 RX ADMIN — PANTOPRAZOLE SODIUM 40 MG: 40 TABLET, DELAYED RELEASE ORAL at 16:05

## 2024-02-14 RX ADMIN — POTASSIUM CHLORIDE 10 MEQ: 7.46 INJECTION, SOLUTION INTRAVENOUS at 10:36

## 2024-02-14 RX ADMIN — PENTOXIFYLLINE 400 MG: 400 TABLET, EXTENDED RELEASE ORAL at 08:02

## 2024-02-14 RX ADMIN — FUROSEMIDE 40 MG: 10 INJECTION, SOLUTION INTRAMUSCULAR; INTRAVENOUS at 08:02

## 2024-02-14 RX ADMIN — RIFAXIMIN 550 MG: 550 TABLET ORAL at 19:50

## 2024-02-14 RX ADMIN — RIFAXIMIN 550 MG: 550 TABLET ORAL at 08:07

## 2024-02-14 RX ADMIN — POTASSIUM CHLORIDE 10 MEQ: 7.46 INJECTION, SOLUTION INTRAVENOUS at 01:46

## 2024-02-14 RX ADMIN — MIDODRINE HYDROCHLORIDE 10 MG: 5 TABLET ORAL at 11:17

## 2024-02-14 RX ADMIN — POTASSIUM CHLORIDE 40 MEQ: 1500 TABLET, EXTENDED RELEASE ORAL at 11:17

## 2024-02-14 RX ADMIN — BUSPIRONE HYDROCHLORIDE 10 MG: 10 TABLET ORAL at 19:50

## 2024-02-14 RX ADMIN — MIDODRINE HYDROCHLORIDE 10 MG: 5 TABLET ORAL at 08:02

## 2024-02-14 RX ADMIN — ALBUMIN (HUMAN) 50 G: 25 SOLUTION INTRAVENOUS at 14:54

## 2024-02-14 RX ADMIN — PANTOPRAZOLE SODIUM 40 MG: 40 TABLET, DELAYED RELEASE ORAL at 04:37

## 2024-02-14 RX ADMIN — BACLOFEN 10 MG: 10 TABLET ORAL at 04:37

## 2024-02-14 RX ADMIN — FUROSEMIDE 40 MG: 10 INJECTION, SOLUTION INTRAMUSCULAR; INTRAVENOUS at 16:06

## 2024-02-14 RX ADMIN — POTASSIUM CHLORIDE 10 MEQ: 7.46 INJECTION, SOLUTION INTRAVENOUS at 03:09

## 2024-02-14 RX ADMIN — POTASSIUM CHLORIDE 10 MEQ: 7.46 INJECTION, SOLUTION INTRAVENOUS at 00:10

## 2024-02-14 ASSESSMENT — PAIN DESCRIPTION - LOCATION
LOCATION: ABDOMEN
LOCATION: ABDOMEN
LOCATION: GENERALIZED
LOCATION: GENERALIZED
LOCATION: ABDOMEN

## 2024-02-14 ASSESSMENT — PAIN SCALES - GENERAL
PAINLEVEL_OUTOF10: 7
PAINLEVEL_OUTOF10: 8
PAINLEVEL_OUTOF10: 6
PAINLEVEL_OUTOF10: 0
PAINLEVEL_OUTOF10: 8
PAINLEVEL_OUTOF10: 10
PAINLEVEL_OUTOF10: 0
PAINLEVEL_OUTOF10: 7
PAINLEVEL_OUTOF10: 8

## 2024-02-14 ASSESSMENT — PAIN DESCRIPTION - DESCRIPTORS
DESCRIPTORS: SHOOTING;SHARP;STABBING
DESCRIPTORS: ACHING;SORE;PRESSURE;DISCOMFORT
DESCRIPTORS: ACHING;DISCOMFORT;SORE;PRESSURE
DESCRIPTORS: SORE;PRESSURE
DESCRIPTORS: SHARP;SHOOTING;STABBING

## 2024-02-14 ASSESSMENT — PAIN - FUNCTIONAL ASSESSMENT
PAIN_FUNCTIONAL_ASSESSMENT: PREVENTS OR INTERFERES SOME ACTIVE ACTIVITIES AND ADLS
PAIN_FUNCTIONAL_ASSESSMENT: PREVENTS OR INTERFERES SOME ACTIVE ACTIVITIES AND ADLS
PAIN_FUNCTIONAL_ASSESSMENT: 0-10
PAIN_FUNCTIONAL_ASSESSMENT: PREVENTS OR INTERFERES SOME ACTIVE ACTIVITIES AND ADLS

## 2024-02-14 ASSESSMENT — PAIN DESCRIPTION - ORIENTATION
ORIENTATION: MID
ORIENTATION: MID

## 2024-02-14 NOTE — PLAN OF CARE
Problem: Discharge Planning  Goal: Discharge to home or other facility with appropriate resources  2/14/2024 1207 by Felisha Thompson, RN  Outcome: Progressing     Problem: Pain  Goal: Verbalizes/displays adequate comfort level or baseline comfort level  2/14/2024 1207 by Felisha Thompson, RN  Outcome: Progressing     Problem: Skin/Tissue Integrity  Goal: Absence of new skin breakdown  Description: 1.  Monitor for areas of redness and/or skin breakdown  2.  Assess vascular access sites hourly  3.  Every 4-6 hours minimum:  Change oxygen saturation probe site  4.  Every 4-6 hours:  If on nasal continuous positive airway pressure, respiratory therapy assess nares and determine need for appliance change or resting period.  2/14/2024 1207 by Felisha Thompson RN  Outcome: Progressing     Problem: Safety - Adult  Goal: Free from fall injury  2/14/2024 1207 by Felisha Thompson, RN  Outcome: Progressing

## 2024-02-14 NOTE — PLAN OF CARE
Problem: Discharge Planning  Goal: Discharge to home or other facility with appropriate resources  2/13/2024 2243 by Piper Peterson, RN  Outcome: Progressing     Problem: Pain  Goal: Verbalizes/displays adequate comfort level or baseline comfort level  2/13/2024 2243 by Piper Peterson, RN  Outcome: Progressing     Problem: Skin/Tissue Integrity  Goal: Absence of new skin breakdown  Description: 1.  Monitor for areas of redness and/or skin breakdown  2.  Assess vascular access sites hourly  3.  Every 4-6 hours minimum:  Change oxygen saturation probe site  4.  Every 4-6 hours:  If on nasal continuous positive airway pressure, respiratory therapy assess nares and determine need for appliance change or resting period.  2/13/2024 2243 by Piper Peterson, RN  Outcome: Progressing     Problem: Safety - Adult  Goal: Free from fall injury  2/13/2024 2243 by Piper Peterson, RN  Outcome: Progressing

## 2024-02-14 NOTE — OR NURSING
Pt transported to IR room for paracentesis. Pt is alert and oriented, states abdominal pain prior to procedure. Ultrasound images taken prior to procedure. Procedure is explained to patient, including possible risks Per Dr Marsh.   Pt verbalizes understanding and consent form signed.  Procedure done under sterile technique and guidance of ultrasound imaging. 1% Lidocaine is used during procedure for comfort measures. Used one step 5 [place] x 10 cm needle.   A total of ml's of 6350 clear yellow colored ascitic fluid drained during procedure. Catheter removed and op-site dressing applied to site with no bleeding noted. Specimen collected and sent to lab for ordered testing.   Albumin infusion given during procedure. Pt tolerated procedure well and  states pain after improving.     Pt transported  out of department with no difficulties and back to room report called to floor DEVAN Becker.

## 2024-02-15 LAB
ALBUMIN SERPL-MCNC: 3 G/DL (ref 3.5–5.2)
ALP SERPL-CCNC: 83 U/L (ref 40–129)
ALT SERPL-CCNC: 13 U/L (ref 0–40)
ANION GAP SERPL CALCULATED.3IONS-SCNC: 10 MMOL/L (ref 7–16)
AST SERPL-CCNC: 46 U/L (ref 0–39)
BASOPHILS # BLD: 0 K/UL (ref 0–0.2)
BASOPHILS NFR BLD: 0 % (ref 0–2)
BILIRUB SERPL-MCNC: 7.3 MG/DL (ref 0–1.2)
BUN SERPL-MCNC: 5 MG/DL (ref 6–20)
CALCIUM SERPL-MCNC: 7.9 MG/DL (ref 8.6–10.2)
CHLORIDE SERPL-SCNC: 97 MMOL/L (ref 98–107)
CO2 SERPL-SCNC: 25 MMOL/L (ref 22–29)
CREAT SERPL-MCNC: 0.8 MG/DL (ref 0.7–1.2)
EOSINOPHIL # BLD: 0.04 K/UL (ref 0.05–0.5)
EOSINOPHILS RELATIVE PERCENT: 2 % (ref 0–6)
ERYTHROCYTE [DISTWIDTH] IN BLOOD BY AUTOMATED COUNT: 22.3 % (ref 11.5–15)
GFR SERPL CREATININE-BSD FRML MDRD: >60 ML/MIN/1.73M2
GLUCOSE SERPL-MCNC: 95 MG/DL (ref 74–99)
HCT VFR BLD AUTO: 26.6 % (ref 37–54)
HGB BLD-MCNC: 9 G/DL (ref 12.5–16.5)
LYMPHOCYTES NFR BLD: 0.29 K/UL (ref 1.5–4)
LYMPHOCYTES RELATIVE PERCENT: 16 % (ref 20–42)
MAGNESIUM SERPL-MCNC: 1.6 MG/DL (ref 1.6–2.6)
MCH RBC QN AUTO: 33 PG (ref 26–35)
MCHC RBC AUTO-ENTMCNC: 33.8 G/DL (ref 32–34.5)
MCV RBC AUTO: 97.4 FL (ref 80–99.9)
MONOCYTES NFR BLD: 0.07 K/UL (ref 0.1–0.95)
MONOCYTES NFR BLD: 4 % (ref 2–12)
NEUTROPHILS NFR BLD: 78 % (ref 43–80)
NEUTS SEG NFR BLD: 1.4 K/UL (ref 1.8–7.3)
NUCLEATED RED BLOOD CELLS: 1 PER 100 WBC
PLATELET # BLD AUTO: 26 K/UL (ref 130–450)
PLATELET CONFIRMATION: NORMAL
PMV BLD AUTO: 11.1 FL (ref 7–12)
POTASSIUM SERPL-SCNC: 2.9 MMOL/L (ref 3.5–5)
PROT SERPL-MCNC: 5.1 G/DL (ref 6.4–8.3)
RBC # BLD AUTO: 2.73 M/UL (ref 3.8–5.8)
RBC # BLD: ABNORMAL 10*6/UL
SODIUM SERPL-SCNC: 132 MMOL/L (ref 132–146)
WBC OTHER # BLD: 1.8 K/UL (ref 4.5–11.5)

## 2024-02-15 PROCEDURE — 80053 COMPREHEN METABOLIC PANEL: CPT

## 2024-02-15 PROCEDURE — 2580000003 HC RX 258: Performed by: NURSE PRACTITIONER

## 2024-02-15 PROCEDURE — 6370000000 HC RX 637 (ALT 250 FOR IP): Performed by: NURSE PRACTITIONER

## 2024-02-15 PROCEDURE — P9047 ALBUMIN (HUMAN), 25%, 50ML: HCPCS | Performed by: NURSE PRACTITIONER

## 2024-02-15 PROCEDURE — 36415 COLL VENOUS BLD VENIPUNCTURE: CPT

## 2024-02-15 PROCEDURE — 99233 SBSQ HOSP IP/OBS HIGH 50: CPT | Performed by: STUDENT IN AN ORGANIZED HEALTH CARE EDUCATION/TRAINING PROGRAM

## 2024-02-15 PROCEDURE — 6370000000 HC RX 637 (ALT 250 FOR IP): Performed by: STUDENT IN AN ORGANIZED HEALTH CARE EDUCATION/TRAINING PROGRAM

## 2024-02-15 PROCEDURE — 6360000002 HC RX W HCPCS: Performed by: HOSPITALIST

## 2024-02-15 PROCEDURE — 2060000000 HC ICU INTERMEDIATE R&B

## 2024-02-15 PROCEDURE — 6360000002 HC RX W HCPCS: Performed by: NURSE PRACTITIONER

## 2024-02-15 PROCEDURE — 83735 ASSAY OF MAGNESIUM: CPT

## 2024-02-15 PROCEDURE — 85025 COMPLETE CBC W/AUTO DIFF WBC: CPT

## 2024-02-15 RX ORDER — MIDODRINE HYDROCHLORIDE 5 MG/1
10 TABLET ORAL ONCE
Status: COMPLETED | OUTPATIENT
Start: 2024-02-15 | End: 2024-02-15

## 2024-02-15 RX ORDER — NADOLOL 20 MG/1
20 TABLET ORAL DAILY
Status: DISCONTINUED | OUTPATIENT
Start: 2024-02-15 | End: 2024-02-20 | Stop reason: HOSPADM

## 2024-02-15 RX ORDER — ALBUMIN (HUMAN) 12.5 G/50ML
25 SOLUTION INTRAVENOUS ONCE
Status: COMPLETED | OUTPATIENT
Start: 2024-02-15 | End: 2024-02-15

## 2024-02-15 RX ADMIN — POTASSIUM CHLORIDE 10 MEQ: 7.46 INJECTION, SOLUTION INTRAVENOUS at 17:30

## 2024-02-15 RX ADMIN — POTASSIUM CHLORIDE 10 MEQ: 7.46 INJECTION, SOLUTION INTRAVENOUS at 14:34

## 2024-02-15 RX ADMIN — Medication 100 MG: at 07:51

## 2024-02-15 RX ADMIN — SODIUM CHLORIDE, PRESERVATIVE FREE 10 ML: 5 INJECTION INTRAVENOUS at 07:52

## 2024-02-15 RX ADMIN — TRAMADOL HYDROCHLORIDE 50 MG: 50 TABLET, COATED ORAL at 00:09

## 2024-02-15 RX ADMIN — PANTOPRAZOLE SODIUM 40 MG: 40 TABLET, DELAYED RELEASE ORAL at 06:43

## 2024-02-15 RX ADMIN — MIDODRINE HYDROCHLORIDE 10 MG: 5 TABLET ORAL at 21:50

## 2024-02-15 RX ADMIN — MIDODRINE HYDROCHLORIDE 10 MG: 5 TABLET ORAL at 16:09

## 2024-02-15 RX ADMIN — MIDODRINE HYDROCHLORIDE 10 MG: 5 TABLET ORAL at 07:52

## 2024-02-15 RX ADMIN — PENTOXIFYLLINE 400 MG: 400 TABLET, EXTENDED RELEASE ORAL at 07:52

## 2024-02-15 RX ADMIN — RIFAXIMIN 550 MG: 550 TABLET ORAL at 21:50

## 2024-02-15 RX ADMIN — POTASSIUM CHLORIDE 10 MEQ: 7.46 INJECTION, SOLUTION INTRAVENOUS at 10:56

## 2024-02-15 RX ADMIN — MIDODRINE HYDROCHLORIDE 10 MG: 5 TABLET ORAL at 11:39

## 2024-02-15 RX ADMIN — RIFAXIMIN 550 MG: 550 TABLET ORAL at 07:52

## 2024-02-15 RX ADMIN — ALBUMIN (HUMAN) 25 G: 0.25 INJECTION, SOLUTION INTRAVENOUS at 21:50

## 2024-02-15 RX ADMIN — POTASSIUM CHLORIDE 10 MEQ: 7.46 INJECTION, SOLUTION INTRAVENOUS at 13:21

## 2024-02-15 RX ADMIN — POTASSIUM CHLORIDE 10 MEQ: 7.46 INJECTION, SOLUTION INTRAVENOUS at 12:12

## 2024-02-15 RX ADMIN — HYDROMORPHONE HYDROCHLORIDE 0.5 MG: 1 INJECTION, SOLUTION INTRAMUSCULAR; INTRAVENOUS; SUBCUTANEOUS at 12:12

## 2024-02-15 RX ADMIN — PROCHLORPERAZINE EDISYLATE 10 MG: 5 INJECTION INTRAMUSCULAR; INTRAVENOUS at 08:35

## 2024-02-15 RX ADMIN — MAGNESIUM SULFATE HEPTAHYDRATE 2000 MG: 40 INJECTION, SOLUTION INTRAVENOUS at 12:10

## 2024-02-15 RX ADMIN — SODIUM CHLORIDE, PRESERVATIVE FREE 10 ML: 5 INJECTION INTRAVENOUS at 21:50

## 2024-02-15 RX ADMIN — FOLIC ACID 1 MG: 1 TABLET ORAL at 07:52

## 2024-02-15 RX ADMIN — PENTOXIFYLLINE 400 MG: 400 TABLET, EXTENDED RELEASE ORAL at 16:09

## 2024-02-15 RX ADMIN — PANTOPRAZOLE SODIUM 40 MG: 40 TABLET, DELAYED RELEASE ORAL at 16:09

## 2024-02-15 RX ADMIN — NADOLOL 20 MG: 20 TABLET ORAL at 10:56

## 2024-02-15 RX ADMIN — PROCHLORPERAZINE EDISYLATE 10 MG: 5 INJECTION INTRAMUSCULAR; INTRAVENOUS at 02:01

## 2024-02-15 RX ADMIN — POTASSIUM CHLORIDE 10 MEQ: 7.46 INJECTION, SOLUTION INTRAVENOUS at 15:56

## 2024-02-15 RX ADMIN — MIDODRINE HYDROCHLORIDE 10 MG: 5 TABLET ORAL at 18:37

## 2024-02-15 RX ADMIN — FUROSEMIDE 40 MG: 10 INJECTION, SOLUTION INTRAMUSCULAR; INTRAVENOUS at 07:51

## 2024-02-15 RX ADMIN — BUSPIRONE HYDROCHLORIDE 10 MG: 10 TABLET ORAL at 21:50

## 2024-02-15 RX ADMIN — BUSPIRONE HYDROCHLORIDE 10 MG: 10 TABLET ORAL at 07:52

## 2024-02-15 RX ADMIN — PENTOXIFYLLINE 400 MG: 400 TABLET, EXTENDED RELEASE ORAL at 11:39

## 2024-02-15 RX ADMIN — TRAMADOL HYDROCHLORIDE 50 MG: 50 TABLET, COATED ORAL at 08:32

## 2024-02-15 ASSESSMENT — PAIN DESCRIPTION - DESCRIPTORS
DESCRIPTORS: ACHING
DESCRIPTORS: ACHING;SORE
DESCRIPTORS: ACHING;STABBING;SHOOTING

## 2024-02-15 ASSESSMENT — PAIN DESCRIPTION - FREQUENCY: FREQUENCY: CONTINUOUS

## 2024-02-15 ASSESSMENT — PAIN - FUNCTIONAL ASSESSMENT
PAIN_FUNCTIONAL_ASSESSMENT: PREVENTS OR INTERFERES SOME ACTIVE ACTIVITIES AND ADLS
PAIN_FUNCTIONAL_ASSESSMENT: PREVENTS OR INTERFERES SOME ACTIVE ACTIVITIES AND ADLS

## 2024-02-15 ASSESSMENT — PAIN DESCRIPTION - LOCATION
LOCATION: ABDOMEN;ARM
LOCATION: ABDOMEN
LOCATION: SCROTUM;ABDOMEN

## 2024-02-15 ASSESSMENT — PAIN SCALES - GENERAL
PAINLEVEL_OUTOF10: 8
PAINLEVEL_OUTOF10: 8
PAINLEVEL_OUTOF10: 0
PAINLEVEL_OUTOF10: 7
PAINLEVEL_OUTOF10: 0

## 2024-02-15 ASSESSMENT — PAIN DESCRIPTION - ONSET: ONSET: ON-GOING

## 2024-02-15 ASSESSMENT — PAIN DESCRIPTION - PAIN TYPE: TYPE: CHRONIC PAIN

## 2024-02-15 NOTE — ADT AUTH CERT
medical history of Cirrhosis (HCC), Esophageal varices (HCC), ETOH abuse, GAVE (gastric antral vascular ectasia), Hepatitis C, and Portal hypertension (HCC).      PHYSICAL EXAM:  Constitutional/General: Alert and oriented x3, chronically ill appearing, non toxic in NAD  Head: Normocephalic and atraumatic  Neck: Supple, full ROM  Pulmonary: Lungs clear to auscultation bilaterally, no wheezes, rales, or rhonchi. Not in respiratory distress  Cardiovascular:  Regular rate. Regular rhythm.   Chest: no chest wall tenderness  Abdomen: Moderate abdominal distention.  Otherwise soft and nontender.  : Chaperone present.  Patient had significant swelling and tense edema to bilateral scrotum.  Tenderness palpation noted.  Small scabbed region.  No necrotic lesion or crepitus on palpation in the scrotum or perineum.  Musculoskeletal: Moves all extremities x 4.  Bilateral lower extremity swelling with 3-4+ pitting edema.  Skin: warm and dry. No rashes.   Neurologic: GCS 15, no gross focal neurologic deficits  Psych: Normal Affect     MD CONSULTS/ASSESSMENTS & PLANS:    ASSESSMENT:       Principal Problem:    Anasarca  Active Problems:    Scrotal swelling    Alcoholic cirrhosis of liver with ascites (HCC)    Scrotal abscess    Leg wound, right  Resolved Problems:    * No resolved hospital problems. *        PLAN:     1.  Anasarca-given 40 mg IV Lasix in ED.  Start Lasix 40 mg IV twice daily tomorrow.  Normally takes Lasix 40 mg p.o. daily.  2.  Scrotal swelling-elevate scrotum.  Wound care for scrotal wounds.  Keep Aldana.  3.  Scrotal abscess-consult ID for input on antibiotics.  If needed.  4.  Leg wound, right-wound care consulted for treatment.  Antibiotics per ID if needed.  Blood cultures done in ED.  5.  Alcoholic cirrhosis of liver with ascites-worsening of ascites.  Continue lactulose and rifaximin.  Change Lasix to IV and increase to twice daily.  Consult GI for input.  May need paracentesis.  6.  Hypokalemia-

## 2024-02-15 NOTE — PLAN OF CARE
Problem: Discharge Planning  Goal: Discharge to home or other facility with appropriate resources  Outcome: Progressing     Problem: Pain  Goal: Verbalizes/displays adequate comfort level or baseline comfort level  Outcome: Progressing     Problem: Skin/Tissue Integrity  Goal: Absence of new skin breakdown  Description: 1.  Monitor for areas of redness and/or skin breakdown  2.  Assess vascular access sites hourly  3.  Every 4-6 hours minimum:  Change oxygen saturation probe site  4.  Every 4-6 hours:  If on nasal continuous positive airway pressure, respiratory therapy assess nares and determine need for appliance change or resting period.  Outcome: Progressing     Problem: Safety - Adult  Goal: Free from fall injury  Outcome: Progressing     Problem: Nutrition Deficit:  Goal: Optimize nutritional status  Outcome: Progressing

## 2024-02-15 NOTE — PLAN OF CARE
Problem: Discharge Planning  Goal: Discharge to home or other facility with appropriate resources  2/15/2024 1203 by Felisha Thompson RN  Outcome: Progressing     Problem: Pain  Goal: Verbalizes/displays adequate comfort level or baseline comfort level  2/15/2024 1203 by Felisha Thompson RN  Outcome: Progressing     Problem: Skin/Tissue Integrity  Goal: Absence of new skin breakdown  Description: 1.  Monitor for areas of redness and/or skin breakdown  2.  Assess vascular access sites hourly  3.  Every 4-6 hours minimum:  Change oxygen saturation probe site  4.  Every 4-6 hours:  If on nasal continuous positive airway pressure, respiratory therapy assess nares and determine need for appliance change or resting period.  2/15/2024 1203 by Felisha Thompson RN  Outcome: Progressing     Problem: Safety - Adult  Goal: Free from fall injury  2/15/2024 1203 by Felisha Thompson RN  Outcome: Progressing     Problem: Nutrition Deficit:  Goal: Optimize nutritional status  2/15/2024 1203 by Felisha Thompson RN  Outcome: Progressing

## 2024-02-16 LAB
ALBUMIN SERPL-MCNC: 2.8 G/DL (ref 3.5–5.2)
ALP SERPL-CCNC: 72 U/L (ref 40–129)
ALT SERPL-CCNC: 12 U/L (ref 0–40)
ANION GAP SERPL CALCULATED.3IONS-SCNC: 5 MMOL/L (ref 7–16)
AST SERPL-CCNC: 40 U/L (ref 0–39)
BASOPHILS # BLD: 0.04 K/UL (ref 0–0.2)
BASOPHILS NFR BLD: 1 % (ref 0–2)
BILIRUB SERPL-MCNC: 7.3 MG/DL (ref 0–1.2)
BUN SERPL-MCNC: 6 MG/DL (ref 6–20)
CALCIUM SERPL-MCNC: 8.2 MG/DL (ref 8.6–10.2)
CHLORIDE SERPL-SCNC: 101 MMOL/L (ref 98–107)
CO2 SERPL-SCNC: 26 MMOL/L (ref 22–29)
CREAT SERPL-MCNC: 0.9 MG/DL (ref 0.7–1.2)
EOSINOPHIL # BLD: 0.26 K/UL (ref 0.05–0.5)
EOSINOPHILS RELATIVE PERCENT: 7 % (ref 0–6)
ERYTHROCYTE [DISTWIDTH] IN BLOOD BY AUTOMATED COUNT: 21.9 % (ref 11.5–15)
FERRITIN SERPL-MCNC: 211 NG/ML
GFR SERPL CREATININE-BSD FRML MDRD: >60 ML/MIN/1.73M2
GLUCOSE SERPL-MCNC: 87 MG/DL (ref 74–99)
HAPTOGLOB SERPL-MCNC: <10 MG/DL (ref 30–200)
HCT VFR BLD AUTO: 27.4 % (ref 37–54)
HGB BLD-MCNC: 9 G/DL (ref 12.5–16.5)
IMM GRANULOCYTES # BLD AUTO: 0.03 K/UL (ref 0–0.58)
IMM GRANULOCYTES NFR BLD: 1 % (ref 0–5)
INR PPP: 3.2
IRON SERPL-MCNC: 79 UG/DL (ref 59–158)
LYMPHOCYTES NFR BLD: 0.6 K/UL (ref 1.5–4)
LYMPHOCYTES RELATIVE PERCENT: 16 % (ref 20–42)
MAGNESIUM SERPL-MCNC: 1.9 MG/DL (ref 1.6–2.6)
MCH RBC QN AUTO: 32.7 PG (ref 26–35)
MCHC RBC AUTO-ENTMCNC: 32.8 G/DL (ref 32–34.5)
MCV RBC AUTO: 99.6 FL (ref 80–99.9)
MONOCYTES NFR BLD: 0.49 K/UL (ref 0.1–0.95)
MONOCYTES NFR BLD: 13 % (ref 2–12)
NEUTROPHILS NFR BLD: 62 % (ref 43–80)
NEUTS SEG NFR BLD: 2.32 K/UL (ref 1.8–7.3)
NON-GYN CYTOLOGY REPORT: NORMAL
PHOSPHATE SERPL-MCNC: 2.1 MG/DL (ref 2.5–4.5)
PLATELET # BLD AUTO: 38 K/UL (ref 130–450)
PLATELET CONFIRMATION: NORMAL
PMV BLD AUTO: 11.3 FL (ref 7–12)
POTASSIUM SERPL-SCNC: 3.5 MMOL/L (ref 3.5–5)
PROT SERPL-MCNC: 4.6 G/DL (ref 6.4–8.3)
PROTHROMBIN TIME: 34.7 SEC (ref 9.3–12.4)
RBC # BLD AUTO: 2.75 M/UL (ref 3.8–5.8)
RBC # BLD: ABNORMAL 10*6/UL
SODIUM SERPL-SCNC: 132 MMOL/L (ref 132–146)
WBC OTHER # BLD: 3.7 K/UL (ref 4.5–11.5)

## 2024-02-16 PROCEDURE — 85025 COMPLETE CBC W/AUTO DIFF WBC: CPT

## 2024-02-16 PROCEDURE — 6360000002 HC RX W HCPCS: Performed by: NURSE PRACTITIONER

## 2024-02-16 PROCEDURE — 2580000003 HC RX 258: Performed by: NURSE PRACTITIONER

## 2024-02-16 PROCEDURE — 99233 SBSQ HOSP IP/OBS HIGH 50: CPT | Performed by: STUDENT IN AN ORGANIZED HEALTH CARE EDUCATION/TRAINING PROGRAM

## 2024-02-16 PROCEDURE — 84165 PROTEIN E-PHORESIS SERUM: CPT

## 2024-02-16 PROCEDURE — 36415 COLL VENOUS BLD VENIPUNCTURE: CPT

## 2024-02-16 PROCEDURE — 97535 SELF CARE MNGMENT TRAINING: CPT

## 2024-02-16 PROCEDURE — 6370000000 HC RX 637 (ALT 250 FOR IP): Performed by: NURSE PRACTITIONER

## 2024-02-16 PROCEDURE — 97530 THERAPEUTIC ACTIVITIES: CPT

## 2024-02-16 PROCEDURE — 83010 ASSAY OF HAPTOGLOBIN QUANT: CPT

## 2024-02-16 PROCEDURE — 84155 ASSAY OF PROTEIN SERUM: CPT

## 2024-02-16 PROCEDURE — 6360000002 HC RX W HCPCS: Performed by: STUDENT IN AN ORGANIZED HEALTH CARE EDUCATION/TRAINING PROGRAM

## 2024-02-16 PROCEDURE — P9047 ALBUMIN (HUMAN), 25%, 50ML: HCPCS | Performed by: STUDENT IN AN ORGANIZED HEALTH CARE EDUCATION/TRAINING PROGRAM

## 2024-02-16 PROCEDURE — 83735 ASSAY OF MAGNESIUM: CPT

## 2024-02-16 PROCEDURE — 83540 ASSAY OF IRON: CPT

## 2024-02-16 PROCEDURE — 82728 ASSAY OF FERRITIN: CPT

## 2024-02-16 PROCEDURE — 84100 ASSAY OF PHOSPHORUS: CPT

## 2024-02-16 PROCEDURE — 80053 COMPREHEN METABOLIC PANEL: CPT

## 2024-02-16 PROCEDURE — 85610 PROTHROMBIN TIME: CPT

## 2024-02-16 PROCEDURE — 2060000000 HC ICU INTERMEDIATE R&B

## 2024-02-16 RX ORDER — ALBUMIN (HUMAN) 12.5 G/50ML
25 SOLUTION INTRAVENOUS ONCE
Status: COMPLETED | OUTPATIENT
Start: 2024-02-16 | End: 2024-02-16

## 2024-02-16 RX ADMIN — PANTOPRAZOLE SODIUM 40 MG: 40 TABLET, DELAYED RELEASE ORAL at 17:15

## 2024-02-16 RX ADMIN — MIDODRINE HYDROCHLORIDE 10 MG: 5 TABLET ORAL at 17:15

## 2024-02-16 RX ADMIN — TRAMADOL HYDROCHLORIDE 50 MG: 50 TABLET, COATED ORAL at 23:43

## 2024-02-16 RX ADMIN — TRAMADOL HYDROCHLORIDE 50 MG: 50 TABLET, COATED ORAL at 14:59

## 2024-02-16 RX ADMIN — FOLIC ACID 1 MG: 1 TABLET ORAL at 09:27

## 2024-02-16 RX ADMIN — PENTOXIFYLLINE 400 MG: 400 TABLET, EXTENDED RELEASE ORAL at 12:24

## 2024-02-16 RX ADMIN — PENTOXIFYLLINE 400 MG: 400 TABLET, EXTENDED RELEASE ORAL at 17:15

## 2024-02-16 RX ADMIN — RIFAXIMIN 550 MG: 550 TABLET ORAL at 23:43

## 2024-02-16 RX ADMIN — PROCHLORPERAZINE EDISYLATE 10 MG: 5 INJECTION INTRAMUSCULAR; INTRAVENOUS at 21:14

## 2024-02-16 RX ADMIN — LACTULOSE 20 G: 20 SOLUTION ORAL at 09:27

## 2024-02-16 RX ADMIN — LACTULOSE 20 G: 20 SOLUTION ORAL at 14:51

## 2024-02-16 RX ADMIN — BUSPIRONE HYDROCHLORIDE 10 MG: 10 TABLET ORAL at 23:43

## 2024-02-16 RX ADMIN — SODIUM CHLORIDE, PRESERVATIVE FREE 10 ML: 5 INJECTION INTRAVENOUS at 23:43

## 2024-02-16 RX ADMIN — BUSPIRONE HYDROCHLORIDE 10 MG: 10 TABLET ORAL at 09:27

## 2024-02-16 RX ADMIN — PANTOPRAZOLE SODIUM 40 MG: 40 TABLET, DELAYED RELEASE ORAL at 06:00

## 2024-02-16 RX ADMIN — RIFAXIMIN 550 MG: 550 TABLET ORAL at 09:29

## 2024-02-16 RX ADMIN — MIDODRINE HYDROCHLORIDE 10 MG: 5 TABLET ORAL at 09:26

## 2024-02-16 RX ADMIN — ALBUMIN (HUMAN) 25 G: 0.25 INJECTION, SOLUTION INTRAVENOUS at 11:17

## 2024-02-16 RX ADMIN — PROCHLORPERAZINE EDISYLATE 10 MG: 5 INJECTION INTRAMUSCULAR; INTRAVENOUS at 10:13

## 2024-02-16 RX ADMIN — TRAMADOL HYDROCHLORIDE 50 MG: 50 TABLET, COATED ORAL at 09:27

## 2024-02-16 RX ADMIN — Medication 100 MG: at 09:27

## 2024-02-16 RX ADMIN — PENTOXIFYLLINE 400 MG: 400 TABLET, EXTENDED RELEASE ORAL at 09:27

## 2024-02-16 RX ADMIN — LACTULOSE 20 G: 20 SOLUTION ORAL at 23:43

## 2024-02-16 RX ADMIN — SODIUM CHLORIDE, PRESERVATIVE FREE 10 ML: 5 INJECTION INTRAVENOUS at 09:29

## 2024-02-16 RX ADMIN — MIDODRINE HYDROCHLORIDE 10 MG: 5 TABLET ORAL at 12:24

## 2024-02-16 ASSESSMENT — PAIN DESCRIPTION - LOCATION
LOCATION: ABDOMEN;LEG
LOCATION: ABDOMEN

## 2024-02-16 ASSESSMENT — PAIN DESCRIPTION - ORIENTATION
ORIENTATION: RIGHT;LEFT
ORIENTATION: MID

## 2024-02-16 ASSESSMENT — PAIN SCALES - GENERAL
PAINLEVEL_OUTOF10: 8
PAINLEVEL_OUTOF10: 8

## 2024-02-16 ASSESSMENT — PAIN DESCRIPTION - DESCRIPTORS
DESCRIPTORS: ACHING;SHARP;SORE
DESCRIPTORS: ACHING;SORE;TENDER;DISCOMFORT

## 2024-02-16 NOTE — CARE COORDINATION
Social Work/Discharge Planning:  Patient admitted from [facility], but plan is to [facility] at discharge.  Therapy evaluated patient on 2/14, and he will need updated therapy notes when medically ready for [facility] to initiate for pre-cert.  He had a paracentesis on 2/14.  He possible will need a repeat paracentesis per Gastroenterology note.  Will continue to follow.  Electronically signed by JOHN Morrell on 2/16/2024 at 11:01 AM

## 2024-02-16 NOTE — PLAN OF CARE
Problem: Discharge Planning  Goal: Discharge to home or other facility with appropriate resources  2/16/2024 1052 by Dipti Figueroa RN  Outcome: Progressing  Flowsheets (Taken 2/16/2024 0800)  Discharge to home or other facility with appropriate resources: Identify barriers to discharge with patient and caregiver  2/16/2024 0536 by Farnaz Lehman RN  Outcome: Progressing     Problem: Pain  Goal: Verbalizes/displays adequate comfort level or baseline comfort level  2/16/2024 1052 by Dipti Figueroa RN  Outcome: Progressing  Flowsheets (Taken 2/16/2024 1043)  Verbalizes/displays adequate comfort level or baseline comfort level:   Encourage patient to monitor pain and request assistance   Assess pain using appropriate pain scale   Administer analgesics based on type and severity of pain and evaluate response   Implement non-pharmacological measures as appropriate and evaluate response   Consider cultural and social influences on pain and pain management   Notify Licensed Independent Practitioner if interventions unsuccessful or patient reports new pain  2/16/2024 0536 by Farnaz Lehman RN  Outcome: Progressing     Problem: Skin/Tissue Integrity  Goal: Absence of new skin breakdown  Description: 1.  Monitor for areas of redness and/or skin breakdown  2.  Assess vascular access sites hourly  3.  Every 4-6 hours minimum:  Change oxygen saturation probe site  4.  Every 4-6 hours:  If on nasal continuous positive airway pressure, respiratory therapy assess nares and determine need for appliance change or resting period.  2/16/2024 1052 by Dipti Figueroa RN  Outcome: Progressing  2/16/2024 0536 by Farnaz Lehman RN  Outcome: Progressing     Problem: Safety - Adult  Goal: Free from fall injury  2/16/2024 1052 by Dipti Figueroa RN  Outcome: Progressing  Flowsheets (Taken 2/16/2024 0800)  Free From Fall Injury: Instruct family/caregiver on patient safety  2/16/2024 0536 by Farnaz Lehman RN  Outcome: Progressing

## 2024-02-17 ENCOUNTER — ANESTHESIA EVENT (OUTPATIENT)
Dept: OPERATING ROOM | Age: 44
End: 2024-02-17
Payer: COMMERCIAL

## 2024-02-17 LAB
ABO + RH BLD: NORMAL
ALBUMIN SERPL-MCNC: 2.9 G/DL (ref 3.5–5.2)
ALP SERPL-CCNC: 69 U/L (ref 40–129)
ALT SERPL-CCNC: 10 U/L (ref 0–40)
ANION GAP SERPL CALCULATED.3IONS-SCNC: 8 MMOL/L (ref 7–16)
ARM BAND NUMBER: NORMAL
AST SERPL-CCNC: 38 U/L (ref 0–39)
ATYPICAL LYMPHOCYTE ABSOLUTE COUNT: 0.04 K/UL (ref 0–0.46)
ATYPICAL LYMPHOCYTES: 1 % (ref 0–4)
BASOPHILS # BLD: 0.07 K/UL (ref 0–0.2)
BASOPHILS NFR BLD: 2 % (ref 0–2)
BILIRUB SERPL-MCNC: 7.2 MG/DL (ref 0–1.2)
BLOOD BANK SAMPLE EXPIRATION: NORMAL
BLOOD GROUP ANTIBODIES SERPL: NEGATIVE
BUN SERPL-MCNC: 9 MG/DL (ref 6–20)
CALCIUM SERPL-MCNC: 8.3 MG/DL (ref 8.6–10.2)
CHLORIDE SERPL-SCNC: 101 MMOL/L (ref 98–107)
CO2 SERPL-SCNC: 25 MMOL/L (ref 22–29)
CREAT SERPL-MCNC: 0.9 MG/DL (ref 0.7–1.2)
EOSINOPHIL # BLD: 0.18 K/UL (ref 0.05–0.5)
EOSINOPHILS RELATIVE PERCENT: 4 % (ref 0–6)
ERYTHROCYTE [DISTWIDTH] IN BLOOD BY AUTOMATED COUNT: 21.2 % (ref 11.5–15)
GFR SERPL CREATININE-BSD FRML MDRD: >60 ML/MIN/1.73M2
GLUCOSE SERPL-MCNC: 131 MG/DL (ref 74–99)
HCT VFR BLD AUTO: 28.5 % (ref 37–54)
HGB BLD-MCNC: 9.7 G/DL (ref 12.5–16.5)
LYMPHOCYTES NFR BLD: 0.64 K/UL (ref 1.5–4)
LYMPHOCYTES RELATIVE PERCENT: 16 % (ref 20–42)
MAGNESIUM SERPL-MCNC: 1.8 MG/DL (ref 1.6–2.6)
MCH RBC QN AUTO: 33.7 PG (ref 26–35)
MCHC RBC AUTO-ENTMCNC: 34 G/DL (ref 32–34.5)
MCV RBC AUTO: 99 FL (ref 80–99.9)
METAMYELOCYTES ABSOLUTE COUNT: 0.04 K/UL (ref 0–0.12)
METAMYELOCYTES: 1 % (ref 0–1)
MICROORGANISM SPEC CULT: NO GROWTH
MICROORGANISM SPEC CULT: NORMAL
MICROORGANISM SPEC CULT: NORMAL
MICROORGANISM/AGENT SPEC: NORMAL
MONOCYTES NFR BLD: 0.29 K/UL (ref 0.1–0.95)
MONOCYTES NFR BLD: 7 % (ref 2–12)
MYELOCYTES ABSOLUTE COUNT: 0.04 K/UL
MYELOCYTES: 1 %
NEUTROPHILS NFR BLD: 69 % (ref 43–80)
NEUTS SEG NFR BLD: 2.82 K/UL (ref 1.8–7.3)
PHOSPHATE SERPL-MCNC: 2.5 MG/DL (ref 2.5–4.5)
PLATELET # BLD AUTO: 34 K/UL (ref 130–450)
PLATELET CONFIRMATION: NORMAL
PMV BLD AUTO: 9.5 FL (ref 7–12)
POTASSIUM SERPL-SCNC: 3.2 MMOL/L (ref 3.5–5)
PROT SERPL-MCNC: 4.6 G/DL (ref 6.4–8.3)
RBC # BLD AUTO: 2.88 M/UL (ref 3.8–5.8)
RBC # BLD: ABNORMAL 10*6/UL
SERVICE CMNT-IMP: NORMAL
SERVICE CMNT-IMP: NORMAL
SODIUM SERPL-SCNC: 134 MMOL/L (ref 132–146)
SPECIMEN DESCRIPTION: NORMAL
WBC OTHER # BLD: 4.1 K/UL (ref 4.5–11.5)

## 2024-02-17 PROCEDURE — 6370000000 HC RX 637 (ALT 250 FOR IP): Performed by: NURSE PRACTITIONER

## 2024-02-17 PROCEDURE — 6370000000 HC RX 637 (ALT 250 FOR IP)

## 2024-02-17 PROCEDURE — 6370000000 HC RX 637 (ALT 250 FOR IP): Performed by: STUDENT IN AN ORGANIZED HEALTH CARE EDUCATION/TRAINING PROGRAM

## 2024-02-17 PROCEDURE — 2060000000 HC ICU INTERMEDIATE R&B

## 2024-02-17 PROCEDURE — 86850 RBC ANTIBODY SCREEN: CPT

## 2024-02-17 PROCEDURE — 99233 SBSQ HOSP IP/OBS HIGH 50: CPT | Performed by: STUDENT IN AN ORGANIZED HEALTH CARE EDUCATION/TRAINING PROGRAM

## 2024-02-17 PROCEDURE — 83735 ASSAY OF MAGNESIUM: CPT

## 2024-02-17 PROCEDURE — 86900 BLOOD TYPING SEROLOGIC ABO: CPT

## 2024-02-17 PROCEDURE — 86901 BLOOD TYPING SEROLOGIC RH(D): CPT

## 2024-02-17 PROCEDURE — 2580000003 HC RX 258: Performed by: NURSE PRACTITIONER

## 2024-02-17 PROCEDURE — 80053 COMPREHEN METABOLIC PANEL: CPT

## 2024-02-17 PROCEDURE — 84100 ASSAY OF PHOSPHORUS: CPT

## 2024-02-17 PROCEDURE — 6360000002 HC RX W HCPCS: Performed by: HOSPITALIST

## 2024-02-17 PROCEDURE — 85025 COMPLETE CBC W/AUTO DIFF WBC: CPT

## 2024-02-17 RX ORDER — TRAZODONE HYDROCHLORIDE 50 MG/1
50 TABLET ORAL NIGHTLY
Status: DISCONTINUED | OUTPATIENT
Start: 2024-02-17 | End: 2024-02-20 | Stop reason: HOSPADM

## 2024-02-17 RX ORDER — LANOLIN ALCOHOL/MO/W.PET/CERES
CREAM (GRAM) TOPICAL
Status: COMPLETED
Start: 2024-02-17 | End: 2024-02-17

## 2024-02-17 RX ORDER — SODIUM CHLORIDE 9 MG/ML
INJECTION, SOLUTION INTRAVENOUS PRN
Status: DISCONTINUED | OUTPATIENT
Start: 2024-02-17 | End: 2024-02-20 | Stop reason: HOSPADM

## 2024-02-17 RX ORDER — FUROSEMIDE 20 MG/1
20 TABLET ORAL ONCE
Status: COMPLETED | OUTPATIENT
Start: 2024-02-17 | End: 2024-02-17

## 2024-02-17 RX ADMIN — HYDROMORPHONE HYDROCHLORIDE 0.5 MG: 1 INJECTION, SOLUTION INTRAMUSCULAR; INTRAVENOUS; SUBCUTANEOUS at 03:12

## 2024-02-17 RX ADMIN — Medication 100 MG: at 09:06

## 2024-02-17 RX ADMIN — LACTULOSE 20 G: 20 SOLUTION ORAL at 09:06

## 2024-02-17 RX ADMIN — PANTOPRAZOLE SODIUM 40 MG: 40 TABLET, DELAYED RELEASE ORAL at 06:46

## 2024-02-17 RX ADMIN — PANTOPRAZOLE SODIUM 40 MG: 40 TABLET, DELAYED RELEASE ORAL at 18:00

## 2024-02-17 RX ADMIN — Medication 3 MG: at 01:38

## 2024-02-17 RX ADMIN — FOLIC ACID 1 MG: 1 TABLET ORAL at 09:06

## 2024-02-17 RX ADMIN — FUROSEMIDE 20 MG: 20 TABLET ORAL at 18:00

## 2024-02-17 RX ADMIN — LACTULOSE 20 G: 20 SOLUTION ORAL at 20:47

## 2024-02-17 RX ADMIN — MIDODRINE HYDROCHLORIDE 10 MG: 5 TABLET ORAL at 06:46

## 2024-02-17 RX ADMIN — BUSPIRONE HYDROCHLORIDE 10 MG: 10 TABLET ORAL at 20:47

## 2024-02-17 RX ADMIN — SODIUM CHLORIDE, PRESERVATIVE FREE 10 ML: 5 INJECTION INTRAVENOUS at 10:37

## 2024-02-17 RX ADMIN — TRAMADOL HYDROCHLORIDE 50 MG: 50 TABLET, COATED ORAL at 11:35

## 2024-02-17 RX ADMIN — HYDROMORPHONE HYDROCHLORIDE 0.5 MG: 1 INJECTION, SOLUTION INTRAMUSCULAR; INTRAVENOUS; SUBCUTANEOUS at 13:49

## 2024-02-17 RX ADMIN — RIFAXIMIN 550 MG: 550 TABLET ORAL at 09:06

## 2024-02-17 RX ADMIN — LACTULOSE 20 G: 20 SOLUTION ORAL at 13:49

## 2024-02-17 RX ADMIN — TRAZODONE HYDROCHLORIDE 50 MG: 50 TABLET ORAL at 20:47

## 2024-02-17 RX ADMIN — MIDODRINE HYDROCHLORIDE 10 MG: 5 TABLET ORAL at 11:35

## 2024-02-17 RX ADMIN — POTASSIUM CHLORIDE 40 MEQ: 1500 TABLET, EXTENDED RELEASE ORAL at 12:36

## 2024-02-17 RX ADMIN — RIFAXIMIN 550 MG: 550 TABLET ORAL at 20:47

## 2024-02-17 RX ADMIN — PENTOXIFYLLINE 400 MG: 400 TABLET, EXTENDED RELEASE ORAL at 18:00

## 2024-02-17 RX ADMIN — BUSPIRONE HYDROCHLORIDE 10 MG: 10 TABLET ORAL at 09:06

## 2024-02-17 RX ADMIN — MIDODRINE HYDROCHLORIDE 10 MG: 5 TABLET ORAL at 18:00

## 2024-02-17 RX ADMIN — PENTOXIFYLLINE 400 MG: 400 TABLET, EXTENDED RELEASE ORAL at 09:06

## 2024-02-17 RX ADMIN — PENTOXIFYLLINE 400 MG: 400 TABLET, EXTENDED RELEASE ORAL at 11:35

## 2024-02-17 RX ADMIN — SODIUM CHLORIDE, PRESERVATIVE FREE 10 ML: 5 INJECTION INTRAVENOUS at 20:47

## 2024-02-17 RX ADMIN — HYDROMORPHONE HYDROCHLORIDE 0.5 MG: 1 INJECTION, SOLUTION INTRAMUSCULAR; INTRAVENOUS; SUBCUTANEOUS at 20:55

## 2024-02-17 ASSESSMENT — PAIN DESCRIPTION - DESCRIPTORS: DESCRIPTORS: DISCOMFORT;PRESSURE;THROBBING

## 2024-02-17 ASSESSMENT — PAIN DESCRIPTION - LOCATION
LOCATION: ABDOMEN;LEG;SCROTUM
LOCATION: ABDOMEN;SCROTUM;LEG

## 2024-02-17 ASSESSMENT — PAIN SCALES - GENERAL
PAINLEVEL_OUTOF10: 0
PAINLEVEL_OUTOF10: 9
PAINLEVEL_OUTOF10: 9

## 2024-02-17 ASSESSMENT — PAIN DESCRIPTION - ORIENTATION
ORIENTATION: RIGHT;LEFT
ORIENTATION: RIGHT;LEFT;LOWER

## 2024-02-17 NOTE — ANESTHESIA PRE PROCEDURE
Department of Anesthesiology  Preprocedure Note       Name:  Johnathan Yanez III   Age:  44 y.o.  :  1980                                          MRN:  [id number]         Date:  2024      Surgeon: Surgeon(s):  Tino Bal DO    Procedure: Procedure(s):  EGD DIAGNOSTIC ONLY    Medications prior to admission:   Prior to Admission medications    Medication Sig Start Date End Date Taking? Authorizing Provider   levoFLOXacin (LEVAQUIN) 500 MG tablet Take 1 tablet by mouth daily for 10 days 2/10/24 2/20/24  Nathan Riley APRN - CNS   neomycin-bacitracin-polymyxin (NEOSPORIN) 5-400-5000 ointment Apply topically 4 times daily. 2/10/24   Katherine Mandel MD   baclofen (LIORESAL) 10 MG tablet Take 1 tablet by mouth 3 times daily as needed (Hiccups) 2/10/24   Katherine Mandel MD   potassium & sodium phosphates (PHOS-NAK) 280-160-250 MG PACK Take 1 packet by mouth 4 times daily for 2 doses 2/10/24 2/11/24  Katherine Mandel MD   busPIRone (BUSPAR) 10 MG tablet Take 1 tablet by mouth 2 times daily 2/3/24   Andrey Cesar MD   midodrine (PROAMATINE) 10 MG tablet Take 1 tablet by mouth 3 times daily (with meals) 24   Amaris Navarrete DO   pantoprazole (PROTONIX) 40 MG tablet Take 1 tablet by mouth 2 times daily (before meals) 24   Amaris Navarrete DO   furosemide (LASIX) 40 MG tablet Take 1 tablet by mouth every morning    Robles Tavera MD   folic acid (FOLVITE) 1 MG tablet Take 1 tablet by mouth every morning    Robles Tavera MD   vitamin B-1 (THIAMINE) 100 MG tablet Take 1 tablet by mouth every morning    Robles Tavera MD   lactulose (CHRONULAC) 10 GM/15ML solution Take 30 mLs by mouth 3 times daily 23   Yessy Rodgers DO   pentoxifylline (TRENTAL) 400 MG extended release tablet Take 1 tablet by mouth 3 times daily (with meals) 23   Yessy Rodgers DO       Current medications:    Current Facility-Administered Medications   Medication Dose Route

## 2024-02-18 ENCOUNTER — ANESTHESIA (OUTPATIENT)
Dept: OPERATING ROOM | Age: 44
End: 2024-02-18
Payer: COMMERCIAL

## 2024-02-18 LAB
ALBUMIN SERPL-MCNC: 3 G/DL (ref 3.5–5.2)
ALP SERPL-CCNC: 79 U/L (ref 40–129)
ALT SERPL-CCNC: 13 U/L (ref 0–40)
ANION GAP SERPL CALCULATED.3IONS-SCNC: 7 MMOL/L (ref 7–16)
AST SERPL-CCNC: 43 U/L (ref 0–39)
BASOPHILS # BLD: 0.05 K/UL (ref 0–0.2)
BASOPHILS NFR BLD: 3 % (ref 0–2)
BILIRUB SERPL-MCNC: 6.2 MG/DL (ref 0–1.2)
BUN SERPL-MCNC: 8 MG/DL (ref 6–20)
CALCIUM SERPL-MCNC: 8.4 MG/DL (ref 8.6–10.2)
CHLORIDE SERPL-SCNC: 103 MMOL/L (ref 98–107)
CO2 SERPL-SCNC: 26 MMOL/L (ref 22–29)
CREAT SERPL-MCNC: 0.9 MG/DL (ref 0.7–1.2)
EOSINOPHIL # BLD: 0.16 K/UL (ref 0.05–0.5)
EOSINOPHILS RELATIVE PERCENT: 8 % (ref 0–6)
ERYTHROCYTE [DISTWIDTH] IN BLOOD BY AUTOMATED COUNT: 20.4 % (ref 11.5–15)
ERYTHROCYTE [DISTWIDTH] IN BLOOD BY AUTOMATED COUNT: 20.6 % (ref 11.5–15)
GFR SERPL CREATININE-BSD FRML MDRD: >60 ML/MIN/1.73M2
GLUCOSE SERPL-MCNC: 93 MG/DL (ref 74–99)
HCT VFR BLD AUTO: 26.3 % (ref 37–54)
HCT VFR BLD AUTO: 28.2 % (ref 37–54)
HGB BLD-MCNC: 9 G/DL (ref 12.5–16.5)
HGB BLD-MCNC: 9.5 G/DL (ref 12.5–16.5)
LYMPHOCYTES NFR BLD: 0.25 K/UL (ref 1.5–4)
LYMPHOCYTES RELATIVE PERCENT: 13 % (ref 20–42)
MAGNESIUM SERPL-MCNC: 1.5 MG/DL (ref 1.6–2.6)
MCH RBC QN AUTO: 33.3 PG (ref 26–35)
MCH RBC QN AUTO: 33.5 PG (ref 26–35)
MCHC RBC AUTO-ENTMCNC: 33.7 G/DL (ref 32–34.5)
MCHC RBC AUTO-ENTMCNC: 34.2 G/DL (ref 32–34.5)
MCV RBC AUTO: 97.8 FL (ref 80–99.9)
MCV RBC AUTO: 98.9 FL (ref 80–99.9)
METAMYELOCYTES ABSOLUTE COUNT: 0.02 K/UL (ref 0–0.12)
METAMYELOCYTES: 1 % (ref 0–1)
MONOCYTES NFR BLD: 0.04 K/UL (ref 0.1–0.95)
MONOCYTES NFR BLD: 2 % (ref 2–12)
NEUTROPHILS NFR BLD: 74 % (ref 43–80)
NEUTS SEG NFR BLD: 1.47 K/UL (ref 1.8–7.3)
PHOSPHATE SERPL-MCNC: 3 MG/DL (ref 2.5–4.5)
PLATELET # BLD AUTO: 26 K/UL (ref 130–450)
PLATELET # BLD AUTO: 50 K/UL (ref 130–450)
PLATELET CONFIRMATION: NORMAL
PLATELET CONFIRMATION: NORMAL
PMV BLD AUTO: 9.6 FL (ref 7–12)
PMV BLD AUTO: 9.7 FL (ref 7–12)
POTASSIUM SERPL-SCNC: 3.3 MMOL/L (ref 3.5–5)
PROT SERPL-MCNC: 4.9 G/DL (ref 6.4–8.3)
RBC # BLD AUTO: 2.69 M/UL (ref 3.8–5.8)
RBC # BLD AUTO: 2.85 M/UL (ref 3.8–5.8)
RBC # BLD: ABNORMAL 10*6/UL
SODIUM SERPL-SCNC: 136 MMOL/L (ref 132–146)
WBC OTHER # BLD: 2 K/UL (ref 4.5–11.5)
WBC OTHER # BLD: 4.3 K/UL (ref 4.5–11.5)

## 2024-02-18 PROCEDURE — 6370000000 HC RX 637 (ALT 250 FOR IP): Performed by: NURSE PRACTITIONER

## 2024-02-18 PROCEDURE — 2580000003 HC RX 258: Performed by: NURSE ANESTHETIST, CERTIFIED REGISTERED

## 2024-02-18 PROCEDURE — 2580000003 HC RX 258: Performed by: STUDENT IN AN ORGANIZED HEALTH CARE EDUCATION/TRAINING PROGRAM

## 2024-02-18 PROCEDURE — 7100000010 HC PHASE II RECOVERY - FIRST 15 MIN: Performed by: INTERNAL MEDICINE

## 2024-02-18 PROCEDURE — 85025 COMPLETE CBC W/AUTO DIFF WBC: CPT

## 2024-02-18 PROCEDURE — 6370000000 HC RX 637 (ALT 250 FOR IP): Performed by: STUDENT IN AN ORGANIZED HEALTH CARE EDUCATION/TRAINING PROGRAM

## 2024-02-18 PROCEDURE — 85027 COMPLETE CBC AUTOMATED: CPT

## 2024-02-18 PROCEDURE — 6360000002 HC RX W HCPCS: Performed by: STUDENT IN AN ORGANIZED HEALTH CARE EDUCATION/TRAINING PROGRAM

## 2024-02-18 PROCEDURE — 7100000011 HC PHASE II RECOVERY - ADDTL 15 MIN: Performed by: INTERNAL MEDICINE

## 2024-02-18 PROCEDURE — P9073 PLATELETS PHERESIS PATH REDU: HCPCS

## 2024-02-18 PROCEDURE — 6360000002 HC RX W HCPCS: Performed by: NURSE PRACTITIONER

## 2024-02-18 PROCEDURE — 6360000002 HC RX W HCPCS: Performed by: NURSE ANESTHETIST, CERTIFIED REGISTERED

## 2024-02-18 PROCEDURE — 0DJ08ZZ INSPECTION OF UPPER INTESTINAL TRACT, VIA NATURAL OR ARTIFICIAL OPENING ENDOSCOPIC: ICD-10-PCS | Performed by: INTERNAL MEDICINE

## 2024-02-18 PROCEDURE — 3600007503: Performed by: INTERNAL MEDICINE

## 2024-02-18 PROCEDURE — 3600007513: Performed by: INTERNAL MEDICINE

## 2024-02-18 PROCEDURE — 3700000000 HC ANESTHESIA ATTENDED CARE: Performed by: INTERNAL MEDICINE

## 2024-02-18 PROCEDURE — 2720000010 HC SURG SUPPLY STERILE: Performed by: INTERNAL MEDICINE

## 2024-02-18 PROCEDURE — 83735 ASSAY OF MAGNESIUM: CPT

## 2024-02-18 PROCEDURE — 2060000000 HC ICU INTERMEDIATE R&B

## 2024-02-18 PROCEDURE — 36430 TRANSFUSION BLD/BLD COMPNT: CPT

## 2024-02-18 PROCEDURE — 2580000003 HC RX 258: Performed by: NURSE PRACTITIONER

## 2024-02-18 PROCEDURE — 30233R1 TRANSFUSION OF NONAUTOLOGOUS PLATELETS INTO PERIPHERAL VEIN, PERCUTANEOUS APPROACH: ICD-10-PCS | Performed by: STUDENT IN AN ORGANIZED HEALTH CARE EDUCATION/TRAINING PROGRAM

## 2024-02-18 PROCEDURE — 80053 COMPREHEN METABOLIC PANEL: CPT

## 2024-02-18 PROCEDURE — 6360000002 HC RX W HCPCS: Performed by: HOSPITALIST

## 2024-02-18 PROCEDURE — 36415 COLL VENOUS BLD VENIPUNCTURE: CPT

## 2024-02-18 PROCEDURE — 84100 ASSAY OF PHOSPHORUS: CPT

## 2024-02-18 PROCEDURE — 2709999900 HC NON-CHARGEABLE SUPPLY: Performed by: INTERNAL MEDICINE

## 2024-02-18 PROCEDURE — 3700000001 HC ADD 15 MINUTES (ANESTHESIA): Performed by: INTERNAL MEDICINE

## 2024-02-18 PROCEDURE — 99233 SBSQ HOSP IP/OBS HIGH 50: CPT | Performed by: STUDENT IN AN ORGANIZED HEALTH CARE EDUCATION/TRAINING PROGRAM

## 2024-02-18 PROCEDURE — 2500000003 HC RX 250 WO HCPCS: Performed by: NURSE ANESTHETIST, CERTIFIED REGISTERED

## 2024-02-18 RX ORDER — METOCLOPRAMIDE HYDROCHLORIDE 5 MG/ML
10 INJECTION INTRAMUSCULAR; INTRAVENOUS
Status: DISCONTINUED | OUTPATIENT
Start: 2024-02-18 | End: 2024-02-18 | Stop reason: HOSPADM

## 2024-02-18 RX ORDER — SODIUM CHLORIDE 9 MG/ML
INJECTION, SOLUTION INTRAVENOUS PRN
Status: DISCONTINUED | OUTPATIENT
Start: 2024-02-18 | End: 2024-02-18 | Stop reason: HOSPADM

## 2024-02-18 RX ORDER — SODIUM CHLORIDE 0.9 % (FLUSH) 0.9 %
5-40 SYRINGE (ML) INJECTION PRN
Status: DISCONTINUED | OUTPATIENT
Start: 2024-02-18 | End: 2024-02-18 | Stop reason: HOSPADM

## 2024-02-18 RX ORDER — ONDANSETRON 2 MG/ML
4 INJECTION INTRAMUSCULAR; INTRAVENOUS
Status: DISCONTINUED | OUTPATIENT
Start: 2024-02-18 | End: 2024-02-18 | Stop reason: HOSPADM

## 2024-02-18 RX ORDER — MAGNESIUM SULFATE IN WATER 40 MG/ML
2000 INJECTION, SOLUTION INTRAVENOUS ONCE
Status: DISCONTINUED | OUTPATIENT
Start: 2024-02-18 | End: 2024-02-18 | Stop reason: SDUPTHER

## 2024-02-18 RX ORDER — SODIUM CHLORIDE 0.9 % (FLUSH) 0.9 %
5-40 SYRINGE (ML) INJECTION EVERY 12 HOURS SCHEDULED
Status: DISCONTINUED | OUTPATIENT
Start: 2024-02-18 | End: 2024-02-18 | Stop reason: HOSPADM

## 2024-02-18 RX ORDER — FENTANYL CITRATE 50 UG/ML
25 INJECTION, SOLUTION INTRAMUSCULAR; INTRAVENOUS EVERY 5 MIN PRN
Status: DISCONTINUED | OUTPATIENT
Start: 2024-02-18 | End: 2024-02-18 | Stop reason: HOSPADM

## 2024-02-18 RX ORDER — PANTOPRAZOLE SODIUM 40 MG/1
40 TABLET, DELAYED RELEASE ORAL
Status: DISCONTINUED | OUTPATIENT
Start: 2024-02-19 | End: 2024-02-20 | Stop reason: HOSPADM

## 2024-02-18 RX ORDER — FUROSEMIDE 40 MG/1
40 TABLET ORAL DAILY
Status: DISCONTINUED | OUTPATIENT
Start: 2024-02-18 | End: 2024-02-20 | Stop reason: HOSPADM

## 2024-02-18 RX ORDER — SPIRONOLACTONE 25 MG/1
25 TABLET ORAL DAILY
Status: DISCONTINUED | OUTPATIENT
Start: 2024-02-18 | End: 2024-02-20 | Stop reason: HOSPADM

## 2024-02-18 RX ORDER — LIDOCAINE HYDROCHLORIDE 20 MG/ML
INJECTION, SOLUTION INFILTRATION; PERINEURAL PRN
Status: DISCONTINUED | OUTPATIENT
Start: 2024-02-18 | End: 2024-02-18 | Stop reason: SDUPTHER

## 2024-02-18 RX ORDER — PROPOFOL 10 MG/ML
INJECTION, EMULSION INTRAVENOUS PRN
Status: DISCONTINUED | OUTPATIENT
Start: 2024-02-18 | End: 2024-02-18 | Stop reason: SDUPTHER

## 2024-02-18 RX ORDER — SODIUM CHLORIDE 9 MG/ML
INJECTION, SOLUTION INTRAVENOUS PRN
Status: DISCONTINUED | OUTPATIENT
Start: 2024-02-18 | End: 2024-02-20 | Stop reason: HOSPADM

## 2024-02-18 RX ORDER — SODIUM CHLORIDE, SODIUM LACTATE, POTASSIUM CHLORIDE, CALCIUM CHLORIDE 600; 310; 30; 20 MG/100ML; MG/100ML; MG/100ML; MG/100ML
INJECTION, SOLUTION INTRAVENOUS CONTINUOUS PRN
Status: DISCONTINUED | OUTPATIENT
Start: 2024-02-18 | End: 2024-02-18 | Stop reason: SDUPTHER

## 2024-02-18 RX ADMIN — RIFAXIMIN 550 MG: 550 TABLET ORAL at 08:10

## 2024-02-18 RX ADMIN — LIDOCAINE HYDROCHLORIDE 40 MG: 20 INJECTION, SOLUTION INFILTRATION; PERINEURAL at 09:44

## 2024-02-18 RX ADMIN — SODIUM CHLORIDE, PRESERVATIVE FREE 10 ML: 5 INJECTION INTRAVENOUS at 08:50

## 2024-02-18 RX ADMIN — SODIUM CHLORIDE, PRESERVATIVE FREE 10 ML: 5 INJECTION INTRAVENOUS at 21:14

## 2024-02-18 RX ADMIN — POTASSIUM CHLORIDE 40 MEQ: 1500 TABLET, EXTENDED RELEASE ORAL at 08:10

## 2024-02-18 RX ADMIN — MAGNESIUM SULFATE HEPTAHYDRATE 2000 MG: 40 INJECTION, SOLUTION INTRAVENOUS at 08:09

## 2024-02-18 RX ADMIN — MIDODRINE HYDROCHLORIDE 10 MG: 5 TABLET ORAL at 08:10

## 2024-02-18 RX ADMIN — HYDROMORPHONE HYDROCHLORIDE 0.5 MG: 1 INJECTION, SOLUTION INTRAMUSCULAR; INTRAVENOUS; SUBCUTANEOUS at 02:40

## 2024-02-18 RX ADMIN — SODIUM CHLORIDE, POTASSIUM CHLORIDE, SODIUM LACTATE AND CALCIUM CHLORIDE: 600; 310; 30; 20 INJECTION, SOLUTION INTRAVENOUS at 09:30

## 2024-02-18 RX ADMIN — LACTULOSE 20 G: 20 SOLUTION ORAL at 13:24

## 2024-02-18 RX ADMIN — PROCHLORPERAZINE EDISYLATE 10 MG: 5 INJECTION INTRAMUSCULAR; INTRAVENOUS at 12:30

## 2024-02-18 RX ADMIN — PENTOXIFYLLINE 400 MG: 400 TABLET, EXTENDED RELEASE ORAL at 13:22

## 2024-02-18 RX ADMIN — BUSPIRONE HYDROCHLORIDE 10 MG: 10 TABLET ORAL at 08:10

## 2024-02-18 RX ADMIN — PROPOFOL 180 MG: 10 INJECTION, EMULSION INTRAVENOUS at 09:44

## 2024-02-18 RX ADMIN — Medication 100 MG: at 08:10

## 2024-02-18 RX ADMIN — BUSPIRONE HYDROCHLORIDE 10 MG: 10 TABLET ORAL at 21:14

## 2024-02-18 RX ADMIN — LACTULOSE 20 G: 20 SOLUTION ORAL at 21:13

## 2024-02-18 RX ADMIN — SPIRONOLACTONE 25 MG: 25 TABLET ORAL at 15:41

## 2024-02-18 RX ADMIN — SODIUM CHLORIDE 100 MG: 9 INJECTION, SOLUTION INTRAVENOUS at 17:20

## 2024-02-18 RX ADMIN — PANTOPRAZOLE SODIUM 40 MG: 40 TABLET, DELAYED RELEASE ORAL at 05:47

## 2024-02-18 RX ADMIN — HYDROMORPHONE HYDROCHLORIDE 0.5 MG: 1 INJECTION, SOLUTION INTRAMUSCULAR; INTRAVENOUS; SUBCUTANEOUS at 13:23

## 2024-02-18 RX ADMIN — SODIUM CHLORIDE, PRESERVATIVE FREE 10 ML: 5 INJECTION INTRAVENOUS at 08:10

## 2024-02-18 RX ADMIN — MIDODRINE HYDROCHLORIDE 10 MG: 5 TABLET ORAL at 13:23

## 2024-02-18 RX ADMIN — PENTOXIFYLLINE 400 MG: 400 TABLET, EXTENDED RELEASE ORAL at 08:10

## 2024-02-18 RX ADMIN — FOLIC ACID 1 MG: 1 TABLET ORAL at 08:10

## 2024-02-18 RX ADMIN — HYDROMORPHONE HYDROCHLORIDE 0.5 MG: 1 INJECTION, SOLUTION INTRAMUSCULAR; INTRAVENOUS; SUBCUTANEOUS at 20:00

## 2024-02-18 RX ADMIN — FUROSEMIDE 40 MG: 40 TABLET ORAL at 15:41

## 2024-02-18 RX ADMIN — TRAZODONE HYDROCHLORIDE 50 MG: 50 TABLET ORAL at 21:13

## 2024-02-18 RX ADMIN — PENTOXIFYLLINE 400 MG: 400 TABLET, EXTENDED RELEASE ORAL at 15:41

## 2024-02-18 RX ADMIN — MIDODRINE HYDROCHLORIDE 10 MG: 5 TABLET ORAL at 15:41

## 2024-02-18 RX ADMIN — RIFAXIMIN 550 MG: 550 TABLET ORAL at 21:14

## 2024-02-18 RX ADMIN — SODIUM CHLORIDE 25 MG: 9 INJECTION, SOLUTION INTRAVENOUS at 15:45

## 2024-02-18 RX ADMIN — LACTULOSE 20 G: 20 SOLUTION ORAL at 08:10

## 2024-02-18 RX ADMIN — BACLOFEN 10 MG: 10 TABLET ORAL at 13:23

## 2024-02-18 RX ADMIN — TRAMADOL HYDROCHLORIDE 50 MG: 50 TABLET, COATED ORAL at 18:26

## 2024-02-18 ASSESSMENT — PAIN SCALES - GENERAL
PAINLEVEL_OUTOF10: 0
PAINLEVEL_OUTOF10: 6
PAINLEVEL_OUTOF10: 8
PAINLEVEL_OUTOF10: 8

## 2024-02-18 ASSESSMENT — PAIN - FUNCTIONAL ASSESSMENT
PAIN_FUNCTIONAL_ASSESSMENT: PREVENTS OR INTERFERES SOME ACTIVE ACTIVITIES AND ADLS
PAIN_FUNCTIONAL_ASSESSMENT: 0-10
PAIN_FUNCTIONAL_ASSESSMENT: 0-10
PAIN_FUNCTIONAL_ASSESSMENT: PREVENTS OR INTERFERES SOME ACTIVE ACTIVITIES AND ADLS

## 2024-02-18 ASSESSMENT — PAIN DESCRIPTION - FREQUENCY
FREQUENCY: INTERMITTENT
FREQUENCY: INTERMITTENT

## 2024-02-18 ASSESSMENT — PAIN DESCRIPTION - LOCATION
LOCATION: ABDOMEN;LEG;SCROTUM
LOCATION: ABDOMEN;SCROTUM
LOCATION: ABDOMEN;SCROTUM

## 2024-02-18 ASSESSMENT — PAIN DESCRIPTION - PAIN TYPE
TYPE: ACUTE PAIN
TYPE: ACUTE PAIN

## 2024-02-18 ASSESSMENT — PAIN DESCRIPTION - ORIENTATION: ORIENTATION: RIGHT;LEFT

## 2024-02-18 ASSESSMENT — PAIN DESCRIPTION - DESCRIPTORS: DESCRIPTORS: DISCOMFORT;PRESSURE;THROBBING

## 2024-02-18 NOTE — OP NOTE
Operative Note      Patient: Johnathan Yanez III  YOB: 1980  MRN: [id number]    Date of Procedure: 2/18/2024    Pre-Op Diagnosis Codes:     * Anemia, unspecified type [D64.9], Decompensated Cirrhosis, GAVE, H/O Small Esophageal Varices, Coagulopathy    Post-Op Diagnosis: SAME       Procedure(s):  EGD DIAGNOSTIC ONLY    Surgeon(s):  Tino Bal DO    Assistant:   * No surgical staff found *    Anesthesia: General    Estimated Blood Loss (mL): < 5cc    Complications: None    Specimens:   * No specimens in log *    Implants:  * No implants in log *      Drains:   Urinary Catheter 02/13/24 Ald[facility] (Active)   $ Urethral catheter insertion $ Not inserted for procedure 02/13/24 1113   Catheter Indications Need for fluid volume management of the critically ill patient in a critical care setting 02/18/24 0745   Site Assessment Pink;Swelling 02/18/24 0745   Urine Color Joan 02/18/24 0745   Urine Appearance Clear 02/18/24 0745   Collection Container Standard 02/18/24 0745   Securement Method Securing device (Describe) 02/18/24 0745   Catheter Care  Perineal wipes 02/17/24 0810   Catheter Best Practices  Drainage tube clipped to bed;Catheter secured to thigh;Tamper seal intact;Bag below bladder;Bag not on floor;Lack of dependent loop in tubing;Drainage bag less than half full 02/18/24 0745   Status Draining;Patent 02/18/24 0745   Output (mL) 300 mL 02/17/24 1804       [REMOVED] Urinary Catheter 01/07/24 Aldana (Removed)   $ Urethral catheter insertion $ Not inserted for procedure 01/07/24 1321   Catheter Indications Need for fluid volume management of the critically ill patient in a critical care setting 01/11/24 2000   Site Assessment Swelling;Edema 01/11/24 2000   Urine Color Joan 01/11/24 2000   Urine Appearance Clear 01/11/24 2000   Collection Container Standard 01/11/24 2000   Securement Method Securing device (Describe) 01/11/24 2000   Catheter Care  Soap and water 01/09/24 2000   Catheter Best

## 2024-02-19 LAB
ALBUMIN SERPL-MCNC: 2.4 G/DL (ref 3.5–4.7)
ALBUMIN SERPL-MCNC: 3 G/DL (ref 3.5–5.2)
ALP SERPL-CCNC: 81 U/L (ref 40–129)
ALPHA1 GLOB SERPL ELPH-MCNC: 0.1 G/DL (ref 0.2–0.4)
ALPHA2 GLOB SERPL ELPH-MCNC: 0.2 G/DL (ref 0.5–1)
ALT SERPL-CCNC: 14 U/L (ref 0–40)
ANION GAP SERPL CALCULATED.3IONS-SCNC: 9 MMOL/L (ref 7–16)
ARM BAND NUMBER: NORMAL
ARM BAND NUMBER: NORMAL
AST SERPL-CCNC: 43 U/L (ref 0–39)
ATYPICAL LYMPHOCYTE ABSOLUTE COUNT: 0.03 K/UL (ref 0–0.46)
ATYPICAL LYMPHOCYTES: 1 % (ref 0–4)
B-GLOBULIN SERPL ELPH-MCNC: 0.3 G/DL (ref 0.8–1.3)
BASOPHILS # BLD: 0.05 K/UL (ref 0–0.2)
BASOPHILS NFR BLD: 2 % (ref 0–2)
BILIRUB SERPL-MCNC: 6 MG/DL (ref 0–1.2)
BLOOD BANK BLOOD PRODUCT EXPIRATION DATE: NORMAL
BLOOD BANK DISPENSE STATUS: NORMAL
BLOOD BANK ISBT PRODUCT BLOOD TYPE: 6200
BLOOD BANK ISBT PRODUCT BLOOD TYPE: 8400
BLOOD BANK PRODUCT CODE: NORMAL
BLOOD BANK UNIT TYPE AND RH: NORMAL
BPU ID: NORMAL
BUN SERPL-MCNC: 9 MG/DL (ref 6–20)
CALCIUM SERPL-MCNC: 8.6 MG/DL (ref 8.6–10.2)
CHLORIDE SERPL-SCNC: 101 MMOL/L (ref 98–107)
CO2 SERPL-SCNC: 26 MMOL/L (ref 22–29)
COMPONENT: NORMAL
CREAT SERPL-MCNC: 1.1 MG/DL (ref 0.7–1.2)
EOSINOPHIL # BLD: 0.11 K/UL (ref 0.05–0.5)
EOSINOPHILS RELATIVE PERCENT: 4 % (ref 0–6)
ERYTHROCYTE [DISTWIDTH] IN BLOOD BY AUTOMATED COUNT: 20.3 % (ref 11.5–15)
GAMMA GLOB SERPL ELPH-MCNC: 1.4 G/DL (ref 0.7–1.6)
GFR SERPL CREATININE-BSD FRML MDRD: >60 ML/MIN/1.73M2
GLUCOSE SERPL-MCNC: 100 MG/DL (ref 74–99)
HCT VFR BLD AUTO: 27.6 % (ref 37–54)
HGB BLD-MCNC: 9.2 G/DL (ref 12.5–16.5)
LYMPHOCYTES NFR BLD: 0.36 K/UL (ref 1.5–4)
LYMPHOCYTES RELATIVE PERCENT: 12 % (ref 20–42)
MAGNESIUM SERPL-MCNC: 1.6 MG/DL (ref 1.6–2.6)
MCH RBC QN AUTO: 33.8 PG (ref 26–35)
MCHC RBC AUTO-ENTMCNC: 33.3 G/DL (ref 32–34.5)
MCV RBC AUTO: 101.5 FL (ref 80–99.9)
MONOCYTES NFR BLD: 0.16 K/UL (ref 0.1–0.95)
MONOCYTES NFR BLD: 5 % (ref 2–12)
NEUTROPHILS NFR BLD: 77 % (ref 43–80)
NEUTS SEG NFR BLD: 2.39 K/UL (ref 1.8–7.3)
NUCLEATED RED BLOOD CELLS: 1 PER 100 WBC
PATHOLOGIST: ABNORMAL
PHOSPHATE SERPL-MCNC: 3.3 MG/DL (ref 2.5–4.5)
PLATELET CONFIRMATION: NORMAL
PLATELET, FLUORESCENCE: 36 K/UL (ref 130–450)
PMV BLD AUTO: 12.1 FL (ref 7–12)
POTASSIUM SERPL-SCNC: 3.3 MMOL/L (ref 3.5–5)
PROT PATTERN SERPL ELPH-IMP: ABNORMAL
PROT SERPL-MCNC: 4.5 G/DL (ref 6.4–8.3)
PROT SERPL-MCNC: 5.1 G/DL (ref 6.4–8.3)
RBC # BLD AUTO: 2.72 M/UL (ref 3.8–5.8)
RBC # BLD: ABNORMAL 10*6/UL
SODIUM SERPL-SCNC: 136 MMOL/L (ref 132–146)
TRANSFUSION STATUS: NORMAL
UNIT DIVISION: 0
UNIT ISSUE DATE/TIME: NORMAL
WBC OTHER # BLD: 3.1 K/UL (ref 4.5–11.5)

## 2024-02-19 PROCEDURE — 2580000003 HC RX 258: Performed by: STUDENT IN AN ORGANIZED HEALTH CARE EDUCATION/TRAINING PROGRAM

## 2024-02-19 PROCEDURE — 80053 COMPREHEN METABOLIC PANEL: CPT

## 2024-02-19 PROCEDURE — 2060000000 HC ICU INTERMEDIATE R&B

## 2024-02-19 PROCEDURE — 6370000000 HC RX 637 (ALT 250 FOR IP): Performed by: STUDENT IN AN ORGANIZED HEALTH CARE EDUCATION/TRAINING PROGRAM

## 2024-02-19 PROCEDURE — 84100 ASSAY OF PHOSPHORUS: CPT

## 2024-02-19 PROCEDURE — 6360000002 HC RX W HCPCS: Performed by: STUDENT IN AN ORGANIZED HEALTH CARE EDUCATION/TRAINING PROGRAM

## 2024-02-19 PROCEDURE — 97530 THERAPEUTIC ACTIVITIES: CPT

## 2024-02-19 PROCEDURE — 36415 COLL VENOUS BLD VENIPUNCTURE: CPT

## 2024-02-19 PROCEDURE — 6370000000 HC RX 637 (ALT 250 FOR IP): Performed by: INTERNAL MEDICINE

## 2024-02-19 PROCEDURE — 6360000002 HC RX W HCPCS: Performed by: HOSPITALIST

## 2024-02-19 PROCEDURE — 99233 SBSQ HOSP IP/OBS HIGH 50: CPT | Performed by: STUDENT IN AN ORGANIZED HEALTH CARE EDUCATION/TRAINING PROGRAM

## 2024-02-19 PROCEDURE — 2580000003 HC RX 258: Performed by: NURSE PRACTITIONER

## 2024-02-19 PROCEDURE — 85025 COMPLETE CBC W/AUTO DIFF WBC: CPT

## 2024-02-19 PROCEDURE — 6370000000 HC RX 637 (ALT 250 FOR IP): Performed by: NURSE PRACTITIONER

## 2024-02-19 PROCEDURE — 83735 ASSAY OF MAGNESIUM: CPT

## 2024-02-19 RX ADMIN — SODIUM CHLORIDE 125 MG: 900 INJECTION INTRAVENOUS at 14:42

## 2024-02-19 RX ADMIN — PANTOPRAZOLE SODIUM 40 MG: 40 TABLET, DELAYED RELEASE ORAL at 06:03

## 2024-02-19 RX ADMIN — RIFAXIMIN 550 MG: 550 TABLET ORAL at 20:23

## 2024-02-19 RX ADMIN — MIDODRINE HYDROCHLORIDE 10 MG: 5 TABLET ORAL at 16:18

## 2024-02-19 RX ADMIN — PENTOXIFYLLINE 400 MG: 400 TABLET, EXTENDED RELEASE ORAL at 11:05

## 2024-02-19 RX ADMIN — RIFAXIMIN 550 MG: 550 TABLET ORAL at 08:38

## 2024-02-19 RX ADMIN — MIDODRINE HYDROCHLORIDE 10 MG: 5 TABLET ORAL at 06:29

## 2024-02-19 RX ADMIN — HYDROMORPHONE HYDROCHLORIDE 0.5 MG: 1 INJECTION, SOLUTION INTRAMUSCULAR; INTRAVENOUS; SUBCUTANEOUS at 20:23

## 2024-02-19 RX ADMIN — HYDROMORPHONE HYDROCHLORIDE 0.5 MG: 1 INJECTION, SOLUTION INTRAMUSCULAR; INTRAVENOUS; SUBCUTANEOUS at 02:27

## 2024-02-19 RX ADMIN — HYDROMORPHONE HYDROCHLORIDE 0.5 MG: 1 INJECTION, SOLUTION INTRAMUSCULAR; INTRAVENOUS; SUBCUTANEOUS at 15:29

## 2024-02-19 RX ADMIN — POTASSIUM CHLORIDE 40 MEQ: 1500 TABLET, EXTENDED RELEASE ORAL at 08:38

## 2024-02-19 RX ADMIN — FOLIC ACID 1 MG: 1 TABLET ORAL at 08:38

## 2024-02-19 RX ADMIN — TRAZODONE HYDROCHLORIDE 50 MG: 50 TABLET ORAL at 20:23

## 2024-02-19 RX ADMIN — Medication 100 MG: at 08:38

## 2024-02-19 RX ADMIN — PENTOXIFYLLINE 400 MG: 400 TABLET, EXTENDED RELEASE ORAL at 16:18

## 2024-02-19 RX ADMIN — MIDODRINE HYDROCHLORIDE 10 MG: 5 TABLET ORAL at 11:05

## 2024-02-19 RX ADMIN — LACTULOSE 20 G: 20 SOLUTION ORAL at 08:38

## 2024-02-19 RX ADMIN — SODIUM CHLORIDE, PRESERVATIVE FREE 10 ML: 5 INJECTION INTRAVENOUS at 08:37

## 2024-02-19 RX ADMIN — BUSPIRONE HYDROCHLORIDE 10 MG: 10 TABLET ORAL at 08:40

## 2024-02-19 RX ADMIN — BUSPIRONE HYDROCHLORIDE 10 MG: 10 TABLET ORAL at 20:23

## 2024-02-19 RX ADMIN — TRAMADOL HYDROCHLORIDE 50 MG: 50 TABLET, COATED ORAL at 11:05

## 2024-02-19 RX ADMIN — SODIUM CHLORIDE, PRESERVATIVE FREE 10 ML: 5 INJECTION INTRAVENOUS at 20:23

## 2024-02-19 ASSESSMENT — PAIN - FUNCTIONAL ASSESSMENT
PAIN_FUNCTIONAL_ASSESSMENT: ACTIVITIES ARE NOT PREVENTED
PAIN_FUNCTIONAL_ASSESSMENT: PREVENTS OR INTERFERES SOME ACTIVE ACTIVITIES AND ADLS
PAIN_FUNCTIONAL_ASSESSMENT: ACTIVITIES ARE NOT PREVENTED

## 2024-02-19 ASSESSMENT — PAIN DESCRIPTION - ONSET
ONSET: ON-GOING
ONSET: ON-GOING
ONSET: PROGRESSIVE
ONSET: ON-GOING
ONSET: ON-GOING

## 2024-02-19 ASSESSMENT — PAIN DESCRIPTION - DESCRIPTORS
DESCRIPTORS: DISCOMFORT
DESCRIPTORS: DISCOMFORT;PRESSURE
DESCRIPTORS: DISCOMFORT;SORE

## 2024-02-19 ASSESSMENT — PAIN DESCRIPTION - LOCATION
LOCATION: SCROTUM
LOCATION: ABDOMEN;SCROTUM
LOCATION: ABDOMEN;SCROTUM
LOCATION: SCROTUM
LOCATION: ABDOMEN;SCROTUM

## 2024-02-19 ASSESSMENT — PAIN DESCRIPTION - PAIN TYPE
TYPE: ACUTE PAIN

## 2024-02-19 ASSESSMENT — PAIN SCALES - GENERAL
PAINLEVEL_OUTOF10: 8
PAINLEVEL_OUTOF10: 7
PAINLEVEL_OUTOF10: 8
PAINLEVEL_OUTOF10: 8
PAINLEVEL_OUTOF10: 7
PAINLEVEL_OUTOF10: 6

## 2024-02-19 ASSESSMENT — PAIN DESCRIPTION - FREQUENCY
FREQUENCY: CONTINUOUS
FREQUENCY: INTERMITTENT

## 2024-02-19 NOTE — CARE COORDINATION
CASE MANAGEMENT..... Notified by nursing that patient may want to discharge home instead of going to [facility]. Met with Devin at the bedside. He remains agreeable to [facility] at [place]. PT 16/OT pending. Notified Tammy with the facility to start precert once OT notes completed. GI s/o. Bps running low. Pcp aware and continues to monitor. N17 in epic. Ambulette forms in chart. No need for 0700. Patient from [facility]. Will cont to follow.    Precert for [facility] obtained. Auth good through 2/25/24.

## 2024-02-20 VITALS
HEIGHT: 68 IN | DIASTOLIC BLOOD PRESSURE: 61 MMHG | TEMPERATURE: 98.2 F | OXYGEN SATURATION: 93 % | SYSTOLIC BLOOD PRESSURE: 103 MMHG | RESPIRATION RATE: 18 BRPM | BODY MASS INDEX: 25.86 KG/M2 | HEART RATE: 98 BPM | WEIGHT: 170.64 LBS

## 2024-02-20 LAB
ALBUMIN SERPL-MCNC: 3 G/DL (ref 3.5–5.2)
ALP SERPL-CCNC: 88 U/L (ref 40–129)
ALT SERPL-CCNC: 14 U/L (ref 0–40)
ANION GAP SERPL CALCULATED.3IONS-SCNC: 8 MMOL/L (ref 7–16)
AST SERPL-CCNC: 45 U/L (ref 0–39)
BASOPHILS # BLD: 0.03 K/UL (ref 0–0.2)
BASOPHILS NFR BLD: 1 % (ref 0–2)
BILIRUB SERPL-MCNC: 5.7 MG/DL (ref 0–1.2)
BUN SERPL-MCNC: 9 MG/DL (ref 6–20)
CALCIUM SERPL-MCNC: 8.6 MG/DL (ref 8.6–10.2)
CHLORIDE SERPL-SCNC: 100 MMOL/L (ref 98–107)
CO2 SERPL-SCNC: 26 MMOL/L (ref 22–29)
CREAT SERPL-MCNC: 1.1 MG/DL (ref 0.7–1.2)
EOSINOPHIL # BLD: 0.08 K/UL (ref 0.05–0.5)
EOSINOPHILS RELATIVE PERCENT: 3 % (ref 0–6)
ERYTHROCYTE [DISTWIDTH] IN BLOOD BY AUTOMATED COUNT: 20.3 % (ref 11.5–15)
GFR SERPL CREATININE-BSD FRML MDRD: >60 ML/MIN/1.73M2
GLUCOSE SERPL-MCNC: 97 MG/DL (ref 74–99)
HCT VFR BLD AUTO: 26.8 % (ref 37–54)
HGB BLD-MCNC: 9.1 G/DL (ref 12.5–16.5)
LYMPHOCYTES NFR BLD: 0.3 K/UL (ref 1.5–4)
LYMPHOCYTES RELATIVE PERCENT: 10 % (ref 20–42)
MAGNESIUM SERPL-MCNC: 1.6 MG/DL (ref 1.6–2.6)
MCH RBC QN AUTO: 34.1 PG (ref 26–35)
MCHC RBC AUTO-ENTMCNC: 34 G/DL (ref 32–34.5)
MCV RBC AUTO: 100.4 FL (ref 80–99.9)
MONOCYTES NFR BLD: 0.16 K/UL (ref 0.1–0.95)
MONOCYTES NFR BLD: 5 % (ref 2–12)
MYELOCYTES ABSOLUTE COUNT: 0.03 K/UL
MYELOCYTES: 1 %
NEUTROPHILS NFR BLD: 81 % (ref 43–80)
NEUTS SEG NFR BLD: 2.5 K/UL (ref 1.8–7.3)
PHOSPHATE SERPL-MCNC: 3 MG/DL (ref 2.5–4.5)
PLATELET CONFIRMATION: NORMAL
PLATELET, FLUORESCENCE: 36 K/UL (ref 130–450)
PMV BLD AUTO: 9.2 FL (ref 7–12)
POTASSIUM SERPL-SCNC: 3.8 MMOL/L (ref 3.5–5)
PROT SERPL-MCNC: 5.2 G/DL (ref 6.4–8.3)
RBC # BLD AUTO: 2.67 M/UL (ref 3.8–5.8)
RBC # BLD: ABNORMAL 10*6/UL
SODIUM SERPL-SCNC: 134 MMOL/L (ref 132–146)
WBC OTHER # BLD: 3.1 K/UL (ref 4.5–11.5)

## 2024-02-20 PROCEDURE — 6360000002 HC RX W HCPCS: Performed by: HOSPITALIST

## 2024-02-20 PROCEDURE — 99239 HOSP IP/OBS DSCHRG MGMT >30: CPT | Performed by: STUDENT IN AN ORGANIZED HEALTH CARE EDUCATION/TRAINING PROGRAM

## 2024-02-20 PROCEDURE — 6370000000 HC RX 637 (ALT 250 FOR IP): Performed by: NURSE PRACTITIONER

## 2024-02-20 PROCEDURE — 84100 ASSAY OF PHOSPHORUS: CPT

## 2024-02-20 PROCEDURE — 83735 ASSAY OF MAGNESIUM: CPT

## 2024-02-20 PROCEDURE — 80053 COMPREHEN METABOLIC PANEL: CPT

## 2024-02-20 PROCEDURE — 6370000000 HC RX 637 (ALT 250 FOR IP): Performed by: STUDENT IN AN ORGANIZED HEALTH CARE EDUCATION/TRAINING PROGRAM

## 2024-02-20 PROCEDURE — 6370000000 HC RX 637 (ALT 250 FOR IP): Performed by: INTERNAL MEDICINE

## 2024-02-20 PROCEDURE — 85025 COMPLETE CBC W/AUTO DIFF WBC: CPT

## 2024-02-20 PROCEDURE — 36415 COLL VENOUS BLD VENIPUNCTURE: CPT

## 2024-02-20 RX ORDER — FERROUS SULFATE 325(65) MG
325 TABLET ORAL
Qty: 180 TABLET | Refills: 1
Start: 2024-02-20 | End: 2024-02-22 | Stop reason: ALTCHOICE

## 2024-02-20 RX ORDER — SPIRONOLACTONE 25 MG/1
25 TABLET ORAL DAILY
Qty: 30 TABLET | Refills: 3 | Status: ON HOLD
Start: 2024-02-21

## 2024-02-20 RX ADMIN — Medication 100 MG: at 08:37

## 2024-02-20 RX ADMIN — PANTOPRAZOLE SODIUM 40 MG: 40 TABLET, DELAYED RELEASE ORAL at 05:38

## 2024-02-20 RX ADMIN — MIDODRINE HYDROCHLORIDE 10 MG: 5 TABLET ORAL at 08:37

## 2024-02-20 RX ADMIN — BUSPIRONE HYDROCHLORIDE 10 MG: 10 TABLET ORAL at 08:38

## 2024-02-20 RX ADMIN — HYDROMORPHONE HYDROCHLORIDE 0.5 MG: 1 INJECTION, SOLUTION INTRAMUSCULAR; INTRAVENOUS; SUBCUTANEOUS at 09:44

## 2024-02-20 RX ADMIN — HYDROMORPHONE HYDROCHLORIDE 0.5 MG: 1 INJECTION, SOLUTION INTRAMUSCULAR; INTRAVENOUS; SUBCUTANEOUS at 00:43

## 2024-02-20 RX ADMIN — FOLIC ACID 1 MG: 1 TABLET ORAL at 08:37

## 2024-02-20 RX ADMIN — RIFAXIMIN 550 MG: 550 TABLET ORAL at 08:38

## 2024-02-20 RX ADMIN — PENTOXIFYLLINE 400 MG: 400 TABLET, EXTENDED RELEASE ORAL at 08:38

## 2024-02-20 RX ADMIN — FUROSEMIDE 40 MG: 40 TABLET ORAL at 08:40

## 2024-02-20 RX ADMIN — SPIRONOLACTONE 25 MG: 25 TABLET ORAL at 08:40

## 2024-02-20 RX ADMIN — BACLOFEN 10 MG: 10 TABLET ORAL at 08:37

## 2024-02-20 RX ADMIN — HYDROMORPHONE HYDROCHLORIDE 0.5 MG: 1 INJECTION, SOLUTION INTRAMUSCULAR; INTRAVENOUS; SUBCUTANEOUS at 06:40

## 2024-02-20 ASSESSMENT — PAIN DESCRIPTION - DESCRIPTORS
DESCRIPTORS: ACHING;JABBING
DESCRIPTORS: ACHING;NAGGING

## 2024-02-20 ASSESSMENT — PAIN SCALES - GENERAL
PAINLEVEL_OUTOF10: 9
PAINLEVEL_OUTOF10: 8
PAINLEVEL_OUTOF10: 8

## 2024-02-20 ASSESSMENT — PAIN DESCRIPTION - ORIENTATION
ORIENTATION: RIGHT;LEFT;MID
ORIENTATION: OTHER (COMMENT)

## 2024-02-20 ASSESSMENT — PAIN DESCRIPTION - LOCATION
LOCATION: OTHER (COMMENT)
LOCATION: ABDOMEN;SCROTUM;LEG

## 2024-02-20 NOTE — PROGRESS NOTES
[facility] Hospitalist Progress Note    Admitting Date and Time: 2/12/2024  2:55 PM  Admit Dx: Hortensia [R60.1]    Subjective:    Patient was seen and examined.     ROS: denies fever, chills, cp, sob, n/v, HA unless stated above.      traZODone  50 mg Oral Nightly    [Held by provider] nadolol  20 mg Oral Daily    rifAXIMin  550 mg Oral BID    sodium chloride flush  5-40 mL IntraVENous 2 times per day    [Held by provider] furosemide  40 mg IntraVENous BID    thiamine  100 mg Oral QAM    pentoxifylline  400 mg Oral TID WC    pantoprazole  40 mg Oral BID AC    midodrine  10 mg Oral TID WC    lactulose  20 g Oral TID    folic acid  1 mg Oral QAM    busPIRone  10 mg Oral BID     sodium chloride, , PRN  HYDROmorphone, 0.5 mg, Q3H PRN  sodium chloride flush, 5-40 mL, PRN  sodium chloride, , PRN  potassium chloride, 40 mEq, PRN   Or  potassium alternative oral replacement, 40 mEq, PRN   Or  potassium chloride, 10 mEq, PRN  magnesium sulfate, 2,000 mg, PRN  polyethylene glycol, 17 g, Daily PRN  acetaminophen, 650 mg, Q6H PRN   Or  acetaminophen, 650 mg, Q6H PRN  prochlorperazine, 10 mg, Q8H PRN   Or  prochlorperazine, 10 mg, Q6H PRN  traMADol, 50 mg, Q8H PRN  baclofen, 10 mg, TID PRN         Objective:    BP (!) 97/59   Pulse 83   Temp 97.8 °F (36.6 °C) (Oral)   Resp 18   Ht 1.727 m (5' 8\")   Wt 74.5 kg (164 lb 3.9 oz)   SpO2 97%   BMI 24.97 kg/m²     General Appearance: alert and oriented to person, place and time and in no acute distress  Skin: warm and dry  Head: normocephalic and atraumatic  Eyes: pupils equal, round, and reactive to light, extraocular eye movements intact, conjunctivae normal  Neck: neck supple and non tender without mass   Pulmonary/Chest: clear to auscultation bilaterally- no wheezes, rales or rhonchi, normal air movement, no respiratory distress  Cardiovascular: normal rate, normal S1 and S2 and no carotid bruits  Abdomen: soft, non-tender, non-distended, normal bowel sounds, no 
       [facility] Hospitalist Progress Note    Admitting Date and Time: 2/12/2024  2:55 PM  Admit Dx: Hortensia [R60.1]    Subjective:    Patient was seen and examined    ROS: denies fever, chills, cp, sob, n/v, HA unless stated above.      [START ON 2/19/2024] pantoprazole  40 mg Oral QAM AC    ferric gluconate (FERRLECIT) 25 mg in sodium chloride 0.9 % 50 mL (test dose) IVPB  25 mg IntraVENous Once    Followed by    ferric gluconate (FERRLECIT) 100 mg in sodium chloride 0.9 % 100 mL IVPB  100 mg IntraVENous Once    Followed by    [START ON 2/19/2024] ferric gluconate (FERRLECIT) 125 mg in sodium chloride 0.9 % 100 mL IVPB  125 mg IntraVENous Once    traZODone  50 mg Oral Nightly    [Held by provider] nadolol  20 mg Oral Daily    rifAXIMin  550 mg Oral BID    sodium chloride flush  5-40 mL IntraVENous 2 times per day    [Held by provider] furosemide  40 mg IntraVENous BID    thiamine  100 mg Oral QAM    pentoxifylline  400 mg Oral TID WC    midodrine  10 mg Oral TID WC    lactulose  20 g Oral TID    folic acid  1 mg Oral QAM    busPIRone  10 mg Oral BID     sodium chloride, , PRN  sodium chloride, , PRN  HYDROmorphone, 0.5 mg, Q3H PRN  sodium chloride flush, 5-40 mL, PRN  sodium chloride, , PRN  potassium chloride, 40 mEq, PRN   Or  potassium alternative oral replacement, 40 mEq, PRN   Or  potassium chloride, 10 mEq, PRN  magnesium sulfate, 2,000 mg, PRN  polyethylene glycol, 17 g, Daily PRN  acetaminophen, 650 mg, Q6H PRN   Or  acetaminophen, 650 mg, Q6H PRN  prochlorperazine, 10 mg, Q8H PRN   Or  prochlorperazine, 10 mg, Q6H PRN  traMADol, 50 mg, Q8H PRN  baclofen, 10 mg, TID PRN         Objective:    BP (!) 103/59   Pulse 72   Temp 98.6 °F (37 °C) (Oral)   Resp 16   Ht 1.727 m (5' 8\")   Wt 74.5 kg (164 lb 3.9 oz)   SpO2 99%   BMI 24.97 kg/m²     General Appearance: alert and oriented to person, place and time and in no acute distress  Skin: warm and dry  Head: normocephalic and atraumatic  Eyes: pupils 
       [facility] Hospitalist Progress Note    Admitting Date and Time: 2/12/2024  2:55 PM  Admit Dx: Hortensia [R60.1]    Subjective:    Patient was seen and examined.     ROS: denies fever, chills, cp, sob, n/v, HA unless stated above.      nadolol  20 mg Oral Daily    rifAXIMin  550 mg Oral BID    albumin human 25%  50 g IntraVENous On Call    sodium chloride flush  5-40 mL IntraVENous 2 times per day    furosemide  40 mg IntraVENous BID    thiamine  100 mg Oral QAM    pentoxifylline  400 mg Oral TID WC    pantoprazole  40 mg Oral BID AC    midodrine  10 mg Oral TID WC    lactulose  20 g Oral TID    folic acid  1 mg Oral QAM    busPIRone  10 mg Oral BID     HYDROmorphone, 0.5 mg, Q3H PRN  sodium chloride flush, 5-40 mL, PRN  sodium chloride, , PRN  potassium chloride, 40 mEq, PRN   Or  potassium alternative oral replacement, 40 mEq, PRN   Or  potassium chloride, 10 mEq, PRN  magnesium sulfate, 2,000 mg, PRN  polyethylene glycol, 17 g, Daily PRN  acetaminophen, 650 mg, Q6H PRN   Or  acetaminophen, 650 mg, Q6H PRN  prochlorperazine, 10 mg, Q8H PRN   Or  prochlorperazine, 10 mg, Q6H PRN  traMADol, 50 mg, Q8H PRN  baclofen, 10 mg, TID PRN         Objective:    BP (!) 103/59   Pulse 69   Temp 98 °F (36.7 °C) (Oral)   Resp 18   Ht 1.727 m (5' 8\")   Wt 74.5 kg (164 lb 3.9 oz)   SpO2 99%   BMI 24.97 kg/m²     General Appearance: alert and oriented to person, place and time and in no acute distress  Skin: warm and dry  Head: normocephalic and atraumatic  Eyes: pupils equal, round, and reactive to light, extraocular eye movements intact, conjunctivae normal  Neck: neck supple and non tender without mass   Pulmonary/Chest: clear to auscultation bilaterally- no wheezes, rales or rhonchi, normal air movement, no respiratory distress  Cardiovascular: normal rate, normal S1 and S2 and no carotid bruits  Abdomen: soft, non-tender, non-distended, normal bowel sounds, no masses or organomegaly  Extremities: no cyanosis, no 
       [facility] - Hospitalist   Progress Note    Admitting Date and Time: 2/12/2024  2:55 PM  Admit Dx: Hortensia [R60.1]    Subjective:    Pt feels much more swollen today, having pain in abdomen.       ROS: denies fever, chills, cp, sob, n/v, HA unless stated above.     rifAXIMin  550 mg Oral BID    sodium chloride flush  5-40 mL IntraVENous 2 times per day    furosemide  40 mg IntraVENous BID    thiamine  100 mg Oral QAM    pentoxifylline  400 mg Oral TID WC    pantoprazole  40 mg Oral BID AC    midodrine  10 mg Oral TID WC    lactulose  20 g Oral TID    folic acid  1 mg Oral QAM    busPIRone  10 mg Oral BID     sodium chloride flush, 5-40 mL, PRN  sodium chloride, , PRN  potassium chloride, 40 mEq, PRN   Or  potassium alternative oral replacement, 40 mEq, PRN   Or  potassium chloride, 10 mEq, PRN  magnesium sulfate, 2,000 mg, PRN  polyethylene glycol, 17 g, Daily PRN  acetaminophen, 650 mg, Q6H PRN   Or  acetaminophen, 650 mg, Q6H PRN  prochlorperazine, 10 mg, Q8H PRN   Or  prochlorperazine, 10 mg, Q6H PRN  traMADol, 50 mg, Q8H PRN  baclofen, 10 mg, TID PRN         Objective:    /66   Pulse 72   Temp 98.2 °F (36.8 °C) (Oral)   Resp 18   Ht 1.727 m (5' 8\")   Wt 90.2 kg (198 lb 13.7 oz)   SpO2 97%   BMI 30.24 kg/m²     General Appearance: alert and oriented to person, place and time and in no acute distress  Skin: warm and dry, wound on RLE and scrotum  Head: normocephalic and atraumatic  Eyes: pupils equal, round, and reactive to light, extraocular eye movements intact, conjunctivae normal  Pulmonary/Chest: clear to auscultation bilaterally- no wheezes, rales or rhonchi, normal air movement, no respiratory distress  Cardiovascular: normal rate, normal S1 and S2  Abdomen: soft, mildly tender, distended, normal bowel sounds  Extremities: no cyanosis, no clubbing and no edema. RLE noted wound draining serosanguinous fluid  Neurologic: no cranial nerve deficit and speech normal  : Significant 
       [facility] Hospitalist Progress Note    Admitting Date and Time: 2/12/2024  2:55 PM  Admit Dx: Hortensia [R60.1]    Subjective:    Patient was seen and examined.     ROS: denies fever, chills, cp, sob, n/v, HA unless stated above.      pantoprazole  40 mg Oral QAM AC    ferric gluconate (FERRLECIT) 125 mg in sodium chloride 0.9 % 100 mL IVPB  125 mg IntraVENous Once    furosemide  40 mg Oral Daily    spironolactone  25 mg Oral Daily    traZODone  50 mg Oral Nightly    [Held by provider] nadolol  20 mg Oral Daily    rifAXIMin  550 mg Oral BID    sodium chloride flush  5-40 mL IntraVENous 2 times per day    thiamine  100 mg Oral QAM    pentoxifylline  400 mg Oral TID WC    midodrine  10 mg Oral TID WC    lactulose  20 g Oral TID    folic acid  1 mg Oral QAM    busPIRone  10 mg Oral BID     sodium chloride, , PRN  sodium chloride, , PRN  HYDROmorphone, 0.5 mg, Q3H PRN  sodium chloride flush, 5-40 mL, PRN  sodium chloride, , PRN  potassium chloride, 40 mEq, PRN   Or  potassium alternative oral replacement, 40 mEq, PRN   Or  potassium chloride, 10 mEq, PRN  magnesium sulfate, 2,000 mg, PRN  polyethylene glycol, 17 g, Daily PRN  acetaminophen, 650 mg, Q6H PRN   Or  acetaminophen, 650 mg, Q6H PRN  prochlorperazine, 10 mg, Q8H PRN   Or  prochlorperazine, 10 mg, Q6H PRN  traMADol, 50 mg, Q8H PRN  baclofen, 10 mg, TID PRN         Objective:    BP (!) 98/56   Pulse 80   Temp 98.2 °F (36.8 °C) (Oral)   Resp 18   Ht 1.727 m (5' 8\")   Wt 77.5 kg (170 lb 13.7 oz)   SpO2 97%   BMI 25.98 kg/m²     General Appearance: alert and oriented to person, place and time and in no acute distress  Skin: warm and dry  Head: normocephalic and atraumatic  Eyes: pupils equal, round, and reactive to light, extraocular eye movements intact, conjunctivae normal  Neck: neck supple and non tender without mass   Pulmonary/Chest: clear to auscultation bilaterally- no wheezes, rales or rhonchi, normal air movement, no respiratory 
       [facility] Hospitalist Progress Note    Admitting Date and Time: 2/12/2024  2:55 PM  Admit Dx: Hortensia [R60.1]    Subjective:    Patient was seen and examined    ROS: denies fever, chills, cp, sob, n/v, HA unless stated above.      [Held by provider] nadolol  20 mg Oral Daily    rifAXIMin  550 mg Oral BID    sodium chloride flush  5-40 mL IntraVENous 2 times per day    [Held by provider] furosemide  40 mg IntraVENous BID    thiamine  100 mg Oral QAM    pentoxifylline  400 mg Oral TID WC    pantoprazole  40 mg Oral BID AC    midodrine  10 mg Oral TID WC    lactulose  20 g Oral TID    folic acid  1 mg Oral QAM    busPIRone  10 mg Oral BID     HYDROmorphone, 0.5 mg, Q3H PRN  sodium chloride flush, 5-40 mL, PRN  sodium chloride, , PRN  potassium chloride, 40 mEq, PRN   Or  potassium alternative oral replacement, 40 mEq, PRN   Or  potassium chloride, 10 mEq, PRN  magnesium sulfate, 2,000 mg, PRN  polyethylene glycol, 17 g, Daily PRN  acetaminophen, 650 mg, Q6H PRN   Or  acetaminophen, 650 mg, Q6H PRN  prochlorperazine, 10 mg, Q8H PRN   Or  prochlorperazine, 10 mg, Q6H PRN  traMADol, 50 mg, Q8H PRN  baclofen, 10 mg, TID PRN         Objective:    BP (!) 93/53   Pulse 71   Temp 98.1 °F (36.7 °C) (Oral)   Resp 16   Ht 1.727 m (5' 8\")   Wt 74.4 kg (164 lb 0.4 oz)   SpO2 99%   BMI 24.94 kg/m²     General Appearance: alert and oriented to person, place and time and in no acute distress  Skin: warm and dry  Head: normocephalic and atraumatic  Eyes: pupils equal, round, and reactive to light, extraocular eye movements intact, conjunctivae normal  Neck: neck supple and non tender without mass   Pulmonary/Chest: clear to auscultation bilaterally- no wheezes, rales or rhonchi, normal air movement, no respiratory distress  Cardiovascular: normal rate, normal S1 and S2 and no carotid bruits  Abdomen: soft, non-tender, non-distended, normal bowel sounds, no masses or organomegaly  Extremities: no cyanosis, no clubbing 
  Subjective:    The patient had paracentesis with removal of 6L this morning.      Objective:    BP (!) 103/59   Pulse 69   Temp 98 °F (36.7 °C) (Oral)   Resp 18   Ht 1.727 m (5' 8\")   Wt 74.5 kg (164 lb 3.9 oz)   SpO2 99%   BMI 24.97 kg/m²       CBC with Differential:    Lab Results   Component Value Date/Time    WBC 1.8 02/15/2024 08:53 AM    RBC 2.73 02/15/2024 08:53 AM    HGB 9.0 02/15/2024 08:53 AM    HCT 26.6 02/15/2024 08:53 AM    PLT 26 02/15/2024 08:53 AM    MCV 97.4 02/15/2024 08:53 AM    MCH 33.0 02/15/2024 08:53 AM    MCHC 33.8 02/15/2024 08:53 AM    RDW 22.3 02/15/2024 08:53 AM    NRBC 1 02/15/2024 08:53 AM    SEGSPCT 61 02/20/2012 04:40 AM    METASPCT 3 02/02/2024 02:50 AM    LYMPHOPCT 16 02/15/2024 08:53 AM    PROMYELOPCT 2 02/01/2024 06:38 AM    MONOPCT 4 02/15/2024 08:53 AM    MYELOPCT 2 02/09/2024 12:15 AM    MYELOPCT 1.7 01/11/2021 06:40 AM    BASOPCT 0 02/15/2024 08:53 AM    MONOSABS 0.07 02/15/2024 08:53 AM    LYMPHSABS 0.29 02/15/2024 08:53 AM    EOSABS 0.04 02/15/2024 08:53 AM    BASOSABS 0.00 02/15/2024 08:53 AM     CMP:    Lab Results   Component Value Date/Time     02/15/2024 08:53 AM    K 2.9 02/15/2024 08:53 AM    K 3.5 06/04/2023 05:43 AM    CL 97 02/15/2024 08:53 AM    CO2 25 02/15/2024 08:53 AM    BUN 5 02/15/2024 08:53 AM    CREATININE 0.8 02/15/2024 08:53 AM    GFRAA >60 07/01/2022 06:35 AM    LABGLOM >60 02/15/2024 08:53 AM    GLUCOSE 95 02/15/2024 08:53 AM    GLUCOSE 89 02/20/2012 04:40 AM    PROT 5.1 02/15/2024 08:53 AM    LABALBU 3.0 02/15/2024 08:53 AM    LABALBU 3.4 02/18/2012 05:00 AM    CALCIUM 7.9 02/15/2024 08:53 AM    BILITOT 7.3 02/15/2024 08:53 AM    ALKPHOS 83 02/15/2024 08:53 AM    AST 46 02/15/2024 08:53 AM    ALT 13 02/15/2024 08:53 AM         Current Facility-Administered Medications:     nadolol (CORGARD) tablet 20 mg, 20 mg, Oral, Daily, Mayte Lynne, , 20 mg at 02/15/24 1056    HYDROmorphone (DILAUDID) injection 0.5 mg, 0.5 mg, IntraVENous, Q3H 
Attempted to change patient's scrotum wound dressing at this time, patient stated \"It doesn't need to be changed at 4 in the morning.\" He stated he would rather have dressing changed later today.   
Attempted to give patient IV dilaudid for pain but BP 76/44. Dilaudid syringe cap was off, syringe still full, but omnicel not allowing waste documentation. Open syringe returned to narc bin in omnicel with Annamarie HARTMAN as witness   
Discharge transport w/ PAS set up for [facility] at 8pm. Pt states he will update his sister.  
Dr. Lynne notified of low BP but that patient is due for his scheduled midodrine at 1700, he states to give that dose now   
Dr. Lynne notified of low bp. Orders received.  
Dr. Lynne notified of repeat BP after midodrine, orders received   
Dr. Mandel notified of low potassium and magnesium, she states to give one bag of 10mEq IV potassium from PRN scale and one bag of 2g IV Magnesium sulfate from PRN scale, plus 40mEq PO potassium one time. Orders placed   
During hourly rounding, patient was found to have draining scrotal abscess. Care provided. Dressing applied.  
[facility] notified of consult via the office  Nati bright  
Immediately prior to the procedure the patient's History and Physical was reviewed- there are no changes with the current vitals.  BP 98/63   Pulse 80   Temp 97.8 °F (36.6 °C)   Resp 18   Ht 1.727 m (5' 8\")   Wt 74.5 kg (164 lb 3.9 oz)   SpO2 96%   BMI 24.97 kg/m²     No CP/SOB.  Risks/benefits d/w pt.  All questions answered.  Proceed with EGD.    Pt with low platelets and has gotten platelet infusion.  PT with  H/O Cirrhosis and GAVE with APC.  Pt knows increased risk of bleeding with coagulopathy associated from cirrhosis and low platelets and agrees to proceed.    COLEEN DELGADO,   2/18/2024  9:08 AM          
Infectious Disease  Progress Note  [facility]    Chief Complaint: scrotal discharge    Subjective:  he is doing ok. No new complaints today. No fever overnight.    Scheduled Meds:   rifAXIMin  550 mg Oral BID    [START ON 2/15/2024] albumin human 25%  50 g IntraVENous On Call    sodium chloride flush  5-40 mL IntraVENous 2 times per day    furosemide  40 mg IntraVENous BID    thiamine  100 mg Oral QAM    pentoxifylline  400 mg Oral TID WC    pantoprazole  40 mg Oral BID AC    midodrine  10 mg Oral TID WC    lactulose  20 g Oral TID    folic acid  1 mg Oral QAM    busPIRone  10 mg Oral BID     Continuous Infusions:   sodium chloride       PRN Meds:sodium chloride flush, sodium chloride, potassium chloride **OR** potassium alternative oral replacement **OR** potassium chloride, magnesium sulfate, polyethylene glycol, acetaminophen **OR** acetaminophen, prochlorperazine **OR** prochlorperazine, traMADol, baclofen    Prior to Admission medications    Medication Sig Start Date End Date Taking? Authorizing Provider   levoFLOXacin (LEVAQUIN) 500 MG tablet Take 1 tablet by mouth daily for 10 days 2/10/24 2/20/24  Nathan Riley APRN - CNS   metroNIDAZOLE (FLAGYL) 500 MG tablet Take 1 tablet by mouth every 8 hours for 13 doses 2/10/24 2/15/24  Nathan Riley APRN - CNS   neomycin-bacitracin-polymyxin (NEOSPORIN) 5-400-5000 ointment Apply topically 4 times daily. 2/10/24   Katherine Mandel MD   baclofen (LIORESAL) 10 MG tablet Take 1 tablet by mouth 3 times daily as needed (Hiccups) 2/10/24   Katherine Mandel MD   potassium & sodium phosphates (PHOS-NAK) 280-160-250 MG PACK Take 1 packet by mouth 4 times daily for 2 doses 2/10/24 2/11/24  Katherine Mandel MD   busPIRone (BUSPAR) 10 MG tablet Take 1 tablet by mouth 2 times daily 2/3/24   Andrey Cesar MD   midodrine (PROAMATINE) 10 MG tablet Take 1 tablet by mouth 3 times daily (with meals) 1/27/24   Amaris Navarrete DO   pantoprazole (PROTONIX) 40 MG tablet Take 1 
Initial Inpatient Wound Care    Admit Date: 2/12/2024  2:55 PM    Reason for consult:  crotal wound and right LE wound    Significant history:  abd and scrotal pain/swelling  Hepatitis C, polysubstance abuse, alcohol abuse, alcoholic cirrhosis with ascites, portal hypertension esophageal variced adm with abscess of rt leg and scrotal abscess  Wound history:  POA    Findings:  awake, verbally appropriate. Parents present  Rt leg wound-posterior, elevated, dry, darkened. No drainage. Some purplish red- dressing removed      Right lower leg lateral - discolored area with elevation    Scrotum anterior dried area of darkened density. Small dried scabs, no drainage or erythema          Plan:no dressing needed  ID, urology followed      Melody Martinez RN 2/15/2024 2:32 PM      
[facility] notified of consult via the office  Nati [facility]  
New order for nadolol acknowledged, spoke with Dr. Lynne regarding HR steady in the 70s-80s and SBPs steady 90s-100s. He states to still give nadolol, no parameters given at this time.   
Notified 's answering service of new consult  
Notified CONSTANTINO Willis of low BP. New orders received.   
Notified Dr Lynne that pt is scheduled for discharge at 11am, and that the discharge med req and the Courtney needs completed  
Occupational Therapy  OCCUPATIONAL THERAPY INITIAL EVALUATION  [facility] [place]  [address], [place]    Date: 2024     Patient Name: Johnathan Yanez III  MRN: [id number]  : 1980  Room: [facility]    Evaluating OT: Farnaz Delcid, OTR/L - [id number]    Referring Provider: Alba Alvarez APRN - CNP  Specific Provider Orders/Date: \"OT eval and treat\" - 2024    Diagnosis: Anasarca [R60.1]      Pertinent Medical History: ETOH abuse, hepatitis C, cirrhosis     Precautions: fall risk, skin integrity, bed/chair alarms    Assessment of Current Deficits:    [x] Functional mobility   [x] ADLs  [x] Strength               [] Cognition   [x] Functional transfers   [x] IADLs         [x] Safety Awareness   [x] Endurance   [] Fine Motor Coordination  [x] Balance      [] Vision/Perception   [x] Sensation    [] Gross Motor Coordination [] ROM          [] Delirium                  [] Motor Control     OT PLAN OF CARE   OT POC is based on physician orders, patient diagnosis, and results of clinical assessment.  Frequency/Duration 2-5 days/week for 2 weeks PRN   Specific OT Treatment Interventions to Include:   * Instruction/training on adapted ADL techniques and AE recommendations to increase functional independence within precautions       * Training on energy conservation strategies, correct breathing pattern and techniques to improve independence/tolerance for self-care routine  * Functional transfer/mobility training/DME recommendations for increased independence, safety, and fall prevention  * Patient/Family education to increase follow through with safety techniques and functional independence  * Recommendation of environmental modifications for increased safety with functional transfers/mobility and ADLs  * Therapeutic exercise to improve motor endurance, ROM, and functional strength for ADLs/functional transfers  * Therapeutic activities 
Occupational Therapy  OT BEDSIDE TREATMENT NOTE      Date:2024  Patient Name: Johnathan Yanez III  MRN: [id number]  : 1980  Room: [address] [place], [place] [address]        Evaluating OT: Farnaz Delcid, OTR/L - [id number]     Referring Provider: Alab Alvarez APRN - CNP  Specific Provider Orders/Date: \"OT eval and treat\" - 2024     Diagnosis: Anasarca [R60.1]       Pertinent Medical History: ETOH abuse, hepatitis C, cirrhosis      Precautions: fall risk, skin integrity, bed/chair alarms     Assessment of Current Deficits:    [x] Functional mobility             [x] ADLs          [x] Strength                  [] Cognition   [x] Functional transfers           [x] IADLs         [x] Safety Awareness   [x] Endurance   [] Fine Motor Coordination    [x] Balance      [] Vision/Perception   [x] Sensation    [] Gross Motor Coordination [] ROM          [] Delirium                  [] Motor Control      OT PLAN OF CARE   OT POC is based on physician orders, patient diagnosis, and results of clinical assessment.  Frequency/Duration 2-5 days/week for 2 weeks PRN   Specific OT Treatment Interventions to Include:   * Instruction/training on adapted ADL techniques and AE recommendations to increase functional independence within precautions       * Training on energy conservation strategies, correct breathing pattern and techniques to improve independence/tolerance for self-care routine  * Functional transfer/mobility training/DME recommendations for increased independence, safety, and fall prevention  * Patient/Family education to increase follow through with safety techniques and functional independence  * Recommendation of environmental modifications for increased safety with functional transfers/mobility and ADLs  * Therapeutic exercise to improve motor endurance, ROM, and functional strength for ADLs/functional transfers  * Therapeutic activities to facilitate/challenge dynamic balance, stand tolerance for increased safety 
PROGRESS NOTE  By Hardeep Kennedy M.D.    [facility]  Dr. Mellissa Cuellar M.D.,  Dr. Atif Johnson M.D.,   Dr. Tino Bal D.O.,  Dr. Scot Huizar M.D.,  Dr. Jonnie Cai D.O.,          Johnathan Maherker III  44 y.o.  male    SUBJECTIVE:  No new complaints but reported feels tired    OBJECTIVE:    BP (!) 98/56   Pulse 80   Temp 98.2 °F (36.8 °C) (Oral)   Resp 18   Ht 1.727 m (5' 8\")   Wt 77.5 kg (170 lb 13.7 oz)   SpO2 97%   BMI 25.98 kg/m²     General: NAD/ male.  AAOx3  HEENT: Persistent scleral icterus/moist oral mucosa  Neck: Supple/trachea midline  Chest: Symmetric excursion/nonlabored respiration  Abd.: Soft and nontender  Extr.:  Minimal to no lower extremity edema  Skin: Warm and dry      DATA:         Lab Results   Component Value Date/Time    WBC 3.1 02/19/2024 05:27 AM    RBC 2.72 02/19/2024 05:27 AM    HGB 9.2 02/19/2024 05:27 AM    HCT 27.6 02/19/2024 05:27 AM    .5 02/19/2024 05:27 AM    MCH 33.8 02/19/2024 05:27 AM    MCHC 33.3 02/19/2024 05:27 AM    RDW 20.3 02/19/2024 05:27 AM    PLT 50 02/18/2024 08:30 PM    MPV 12.1 02/19/2024 05:27 AM     Lab Results   Component Value Date/Time     02/19/2024 05:27 AM    K 3.3 02/19/2024 05:27 AM    K 3.5 06/04/2023 05:43 AM     02/19/2024 05:27 AM    CO2 26 02/19/2024 05:27 AM    BUN 9 02/19/2024 05:27 AM    CREATININE 1.1 02/19/2024 05:27 AM    CALCIUM 8.6 02/19/2024 05:27 AM    PROT 5.1 02/19/2024 05:27 AM    LABALBU 3.0 02/19/2024 05:27 AM    LABALBU 3.4 02/18/2012 05:00 AM    BILITOT 6.0 02/19/2024 05:27 AM    ALKPHOS 81 02/19/2024 05:27 AM    AST 43 02/19/2024 05:27 AM    ALT 14 02/19/2024 05:27 AM     Lab Results   Component Value Date/Time    LIPASE 79 01/25/2024 04:38 PM     Lab Results   Component Value Date/Time    AMYLASE 49 02/22/2015 09:30 PM         ASSESSMENT/PLAN:  Patient Active Problem List   Diagnosis    Cellulitis    UGI bleed    Abdominal pain    Secondary esophageal varices with 
PROGRESS NOTE  By Hardeep Kennedy M.D.    [facility]  Dr. Mellissa Cuellar M.D.,  Dr. Atif Johnson M.D.,   Dr. Tino Bal D.O.,  Dr. Scot Huizar M.D.,  KAI AstorgaO.,          Johnathan Maherker III  44 y.o.  male    SUBJECTIVE:      OBJECTIVE:    BP (!) 98/58   Pulse 71   Temp 98.1 °F (36.7 °C) (Oral)   Resp 16   Ht 1.727 m (5' 8\")   Wt 74.4 kg (164 lb 0.4 oz)   SpO2 99%   BMI 24.94 kg/m²     General: NAD/adult  male  HEENT: Persistent scleral icterus/moist oral mucosa  Neck: Supple with trachea midline  Chest: Symmetric excursion/nonlabored respirations  Cor: Regular  Abd.: Soft/obese and distended  Extr.:  Bilateral lower extremity edema persistent but significantly improved to 1-2+  Skin: Warm and dry      DATA:     Lab Results   Component Value Date/Time    WBC 3.7 02/16/2024 05:21 AM    RBC 2.75 02/16/2024 05:21 AM    HGB 9.0 02/16/2024 05:21 AM    HCT 27.4 02/16/2024 05:21 AM    MCV 99.6 02/16/2024 05:21 AM    MCH 32.7 02/16/2024 05:21 AM    MCHC 32.8 02/16/2024 05:21 AM    RDW 21.9 02/16/2024 05:21 AM    PLT 38 02/16/2024 05:21 AM    MPV 11.3 02/16/2024 05:21 AM     Lab Results   Component Value Date/Time     02/16/2024 05:21 AM    K 3.5 02/16/2024 05:21 AM    K 3.5 06/04/2023 05:43 AM     02/16/2024 05:21 AM    CO2 26 02/16/2024 05:21 AM    BUN 6 02/16/2024 05:21 AM    CREATININE 0.9 02/16/2024 05:21 AM    CALCIUM 8.2 02/16/2024 05:21 AM    PROT 4.6 02/16/2024 05:21 AM    LABALBU 2.8 02/16/2024 05:21 AM    LABALBU 3.4 02/18/2012 05:00 AM    BILITOT 7.3 02/16/2024 05:21 AM    ALKPHOS 72 02/16/2024 05:21 AM    AST 40 02/16/2024 05:21 AM    ALT 12 02/16/2024 05:21 AM     Lab Results   Component Value Date/Time    LIPASE 79 01/25/2024 04:38 PM     Lab Results   Component Value Date/Time    AMYLASE 49 02/22/2015 09:30 PM         ASSESSMENT/PLAN:  Patient Active Problem List   Diagnosis    Cellulitis    UGI bleed    Abdominal pain    Secondary esophageal 
PROGRESS NOTE  By Hardeep Kennedy M.D.    [facility]  Dr. Mellissa Cuellar M.D.,  Dr. Atif Johnson M.D.,   Dr. Tino Bal D.O.,  Dr. Scot Huizar M.D.,  KAI AstorgaO.,          Johnathan Yanez III  44 y.o.  male    SUBJECTIVE:  Complains of burning at the site of IV potassium infusion.  Paracentesis earlier today with removal of over 6 L of ascitic fluid.  Family at bedside    OBJECTIVE:    BP (!) 103/59   Pulse 69   Temp 98 °F (36.7 °C) (Oral)   Resp 18   Ht 1.727 m (5' 8\")   Wt 74.5 kg (164 lb 3.9 oz)   SpO2 99%   BMI 24.97 kg/m²     General: NAD/ male.  AAOx3  HEENT: Persistent scleral icterus/moist oral mucosa/poor dentition  Neck: Supple/trachea is midline  Chest: Symmetric excursion/nonlabored respirations  Cor: Regular  Abd.: Soft/moderately distended  Extr.:  BLE edema  Skin: Warm and dry/persistent icterus      DATA:    Monitor data reviewed -sinus rhythm noted.       Lab Results   Component Value Date/Time    WBC 1.8 02/15/2024 08:53 AM    RBC 2.73 02/15/2024 08:53 AM    HGB 9.0 02/15/2024 08:53 AM    HCT 26.6 02/15/2024 08:53 AM    MCV 97.4 02/15/2024 08:53 AM    MCH 33.0 02/15/2024 08:53 AM    MCHC 33.8 02/15/2024 08:53 AM    RDW 22.3 02/15/2024 08:53 AM    PLT 26 02/15/2024 08:53 AM    MPV 11.1 02/15/2024 08:53 AM     Lab Results   Component Value Date/Time     02/15/2024 08:53 AM    K 2.9 02/15/2024 08:53 AM    K 3.5 06/04/2023 05:43 AM    CL 97 02/15/2024 08:53 AM    CO2 25 02/15/2024 08:53 AM    BUN 5 02/15/2024 08:53 AM    CREATININE 0.8 02/15/2024 08:53 AM    CALCIUM 7.9 02/15/2024 08:53 AM    PROT 5.1 02/15/2024 08:53 AM    LABALBU 3.0 02/15/2024 08:53 AM    LABALBU 3.4 02/18/2012 05:00 AM    BILITOT 7.3 02/15/2024 08:53 AM    ALKPHOS 83 02/15/2024 08:53 AM    AST 46 02/15/2024 08:53 AM    ALT 13 02/15/2024 08:53 AM     Lab Results   Component Value Date/Time    LIPASE 79 01/25/2024 04:38 PM     Lab Results   Component Value Date/Time    AMYLASE 
PROGRESS NOTE  By Hardeep Kennedy M.D.    [facility]  Dr. Mellissa Cuellar M.D.,  Dr. Atif Johnson M.D.,   SERGIO Crouch.O.,  Dr. Scot Huizar M.D.,  Dr. Jonnie Cai D.O.,          Johnathan Sanfordamaker III  44 y.o.  male    SUBJECTIVE:  Somewhat increased distention.  Denies nausea vomiting    OBJECTIVE:    /66   Pulse 72   Temp 98.2 °F (36.8 °C) (Oral)   Resp 18   Ht 1.727 m (5' 8\")   Wt 90.2 kg (198 lb 13.7 oz)   SpO2 97%   BMI 30.24 kg/m²     General: NAD/ male.  AAOx3  HEENT: Persistent scleral icterus/moist oral mucosa  Neck: Supple with trachea midline  Chest: Symmetric excursion/nonlabored respirations  Abd.: Soft/obese/distended.  Some umbilical hernia  Extr.:  BLE 3+ edema  Skin: Warm and dry      DATA:       Lab Results   Component Value Date/Time    WBC 4.0 02/14/2024 09:01 AM    RBC 2.91 02/14/2024 09:01 AM    HGB 9.5 02/14/2024 09:01 AM    HCT 27.8 02/14/2024 09:01 AM    MCV 95.5 02/14/2024 09:01 AM    MCH 32.6 02/14/2024 09:01 AM    MCHC 34.2 02/14/2024 09:01 AM    RDW 22.6 02/14/2024 09:01 AM    PLT 40 02/14/2024 09:01 AM    MPV 11.3 02/14/2024 09:01 AM     Lab Results   Component Value Date/Time     02/14/2024 09:01 AM    K 3.0 02/14/2024 09:01 AM    K 3.5 06/04/2023 05:43 AM    CL 98 02/14/2024 09:01 AM    CO2 24 02/14/2024 09:01 AM    BUN 6 02/14/2024 09:01 AM    CREATININE 0.9 02/14/2024 09:01 AM    CALCIUM 7.9 02/14/2024 09:01 AM    PROT 5.0 02/14/2024 09:01 AM    LABALBU 2.7 02/14/2024 09:01 AM    LABALBU 3.4 02/18/2012 05:00 AM    BILITOT 6.7 02/14/2024 09:01 AM    ALKPHOS 88 02/14/2024 09:01 AM    AST 50 02/14/2024 09:01 AM    ALT 13 02/14/2024 09:01 AM     Lab Results   Component Value Date/Time    LIPASE 79 01/25/2024 04:38 PM     Lab Results   Component Value Date/Time    AMYLASE 49 02/22/2015 09:30 PM         ASSESSMENT/PLAN:  Patient Active Problem List   Diagnosis    Cellulitis    UGI bleed    Abdominal pain    Secondary esophageal 
PROGRESS NOTE  By Hardeep Kennedy M.D.    [facility]  Dr. Mellsisa Cuellar M.D.,  Dr. Atif Johnson M.D.,   Dr. Tino Bal D.O.,  Dr. Scot Huizar M.D.,  KAI AstorgaO.,          Johnathan Maherker III  44 y.o.  male    SUBJECTIVE:  Complains of abdominal discomfort ongoing diarrhea and scrotal pain    OBJECTIVE:    BP (!) 97/59   Pulse 83   Temp 97.8 °F (36.6 °C) (Oral)   Resp 18   Ht 1.727 m (5' 8\")   Wt 74.5 kg (164 lb 3.9 oz)   SpO2 97%   BMI 24.97 kg/m²     General: NAD/ male.  AAOx3  HEENT: Persistent scleral icterus/moist oral mucosa  Neck: Supple with trachea midline  Chest: CTAB/symmetrical excursions  Abd.: Soft/appears nontender  Extr.:  BLE 1-2+ edema  Skin: Warm and dry    DATA:     Lab Results   Component Value Date/Time    WBC 4.1 02/17/2024 09:00 AM    RBC 2.88 02/17/2024 09:00 AM    HGB 9.7 02/17/2024 09:00 AM    HCT 28.5 02/17/2024 09:00 AM    MCV 99.0 02/17/2024 09:00 AM    MCH 33.7 02/17/2024 09:00 AM    MCHC 34.0 02/17/2024 09:00 AM    RDW 21.2 02/17/2024 09:00 AM    PLT 34 02/17/2024 09:00 AM    MPV 9.5 02/17/2024 09:00 AM     Lab Results   Component Value Date/Time     02/17/2024 09:00 AM    K 3.2 02/17/2024 09:00 AM    K 3.5 06/04/2023 05:43 AM     02/17/2024 09:00 AM    CO2 25 02/17/2024 09:00 AM    BUN 9 02/17/2024 09:00 AM    CREATININE 0.9 02/17/2024 09:00 AM    CALCIUM 8.3 02/17/2024 09:00 AM    PROT 4.6 02/17/2024 09:00 AM    LABALBU 2.9 02/17/2024 09:00 AM    LABALBU 3.4 02/18/2012 05:00 AM    BILITOT 7.2 02/17/2024 09:00 AM    ALKPHOS 69 02/17/2024 09:00 AM    AST 38 02/17/2024 09:00 AM    ALT 10 02/17/2024 09:00 AM     Lab Results   Component Value Date/Time    LIPASE 79 01/25/2024 04:38 PM     Lab Results   Component Value Date/Time    AMYLASE 49 02/22/2015 09:30 PM         ASSESSMENT/PLAN:  Patient Active Problem List   Diagnosis    Cellulitis    UGI bleed    Abdominal pain    Secondary esophageal varices with bleeding (HCC)    
Patient had an IV in his right AC this morning that would not flush, it was removed. Now patient is complaining of pain, whole elbow is swollen and site has a black scab with redness and warmth around it. Dr. Lynne notified of the above, no new orders   
Physical Therapy    Pt on hold today per Nurse. Will follow on another date/time.  Zacarias Ruiz PTA [id number]    
Physical Therapy  Facility/Department: [address] INTERMEDIATE 1  Physical Therapy Treatment Note    Name: Johnathan Yanez III  : 1980  MRN: [id number]  Date of Service: 2024      Patient Diagnosis(es): The primary encounter diagnosis was Anasarca. Diagnoses of Alcoholic cirrhosis of liver with ascites (HCC), Scrotal swelling, Scrotal pain, Bilateral hydrocele, Hyperbilirubinemia, Elevated LFTs, Hypokalemia, and Chronic anemia were also pertinent to this visit.  Past Medical History:  has a past medical history of Cirrhosis (HCC), Esophageal varices (HCC), ETOH abuse, GAVE (gastric antral vascular ectasia), Hepatitis C, and Portal hypertension (HCC).  Past Surgical History:  has a past surgical history that includes Upper gastrointestinal endoscopy (N/A, 2020); Tonsillectomy; Upper gastrointestinal endoscopy (N/A, 2021); Upper gastrointestinal endoscopy (N/A, 5/10/2021); Endoscopy, colon, diagnostic; Umbilical hernia repair (N/A, 9/15/2021); hernia repair; hernia repair (Left, 2022); Upper gastrointestinal endoscopy (N/A, 2023); Upper gastrointestinal endoscopy (2023); Upper gastrointestinal endoscopy (N/A, 6/3/2023); Upper gastrointestinal endoscopy (N/A, 2024); and Esophagoscopy (N/A, 2024).      Evaluating Therapist: Bernadette Woods PT    Equipment Recommendation [facility]    Room #:  0445/0445-A  Diagnosis:  Anasarca [R60.1]  PMHx/PSHx:  acholic cirrhosis, ETOH abuse   Precautions:  falls, alarm      Social:  Pt most recently at [facility]. Pt lives with S/O in a 2 floor plan and was independent without device     Initial Evaluation  Date: 24 Treatment  2024    Short Term/ Long Term   Goals   Was pt agreeable to Eval/treatment? yes yes    Does pt have pain? Pain from scrotal edema Abdominal pain    Bed Mobility  Rolling: NT  Supine to sit: SBA  Sit to supine: NT  Scooting: SBA Supine to sit: Min A  Sit to supine: Min A  Scooting: SBA seated to EOB independent 
Physical Therapy  Facility/Department: [address] INTERMEDIATE 1  Physical Therapy Initial Assessment    Name: Johnathan Yanez III  : 1980  MRN: [id number]  Date of Service: 2024      Patient Diagnosis(es): The primary encounter diagnosis was Anasarca. Diagnoses of Alcoholic cirrhosis of liver with ascites (HCC), Scrotal swelling, Scrotal pain, Bilateral hydrocele, Hyperbilirubinemia, Elevated LFTs, Hypokalemia, and Chronic anemia were also pertinent to this visit.  Past Medical History:  has a past medical history of Cirrhosis (HCC), Esophageal varices (HCC), ETOH abuse, GAVE (gastric antral vascular ectasia), Hepatitis C, and Portal hypertension (HCC).  Past Surgical History:  has a past surgical history that includes Upper gastrointestinal endoscopy (N/A, 2020); Tonsillectomy; Upper gastrointestinal endoscopy (N/A, 2021); Upper gastrointestinal endoscopy (N/A, 5/10/2021); Endoscopy, colon, diagnostic; Umbilical hernia repair (N/A, 9/15/2021); hernia repair; hernia repair (Left, 2022); Upper gastrointestinal endoscopy (N/A, 2023); Upper gastrointestinal endoscopy (2023); Upper gastrointestinal endoscopy (N/A, 6/3/2023); and Upper gastrointestinal endoscopy (N/A, 2024).      Evaluating Therapist: Bernadette Woods PT    Equipment Recommendation wheeled walker    Room #:  0445/0445-A  Diagnosis:  Anasarca [R60.1]  PMHx/PSHx:  acholic cirrhosis, ETOH abuse   Precautions:  falls, alarm      Social:  Pt most recently at [facility]. Pt lives with S/O in a 2 floor plan and was independent without device     Initial Evaluation  Date: 24 Treatment      Short Term/ Long Term   Goals   Was pt agreeable to Eval/treatment? yes     Does pt have pain? Pain from scrotal edema     Bed Mobility  Rolling: NT  Supine to sit: SBA  Sit to supine: NT  Scooting: SBA  independent   Transfers Sit to stand: min assist  Stand to sit: min assist  Stand pivot: min assist  SBA   Ambulation    3 feet with ww with 
Pt states he does not want to leave tonight. Rescheduled discharge transport for 11am tomorrow. Dr. Lynne and pt updated.  
SPIRITUAL HEALTH SERVICES - [facility] Encounter    Name: Johnathan Yanez III                  Referral: Routine Visit    Sacraments  Anointed (Last Rites): No  Apostolic [place]: No  Confession: No  Communion: No     Assessment:  Patient unavailable for visit.      Intervention:  None.     Outcome:  None.    Plan:  Chaplains will remain available to offer spiritual and emotional support as needed.      Electronically signed by Chaplain Shreyas, on 2/13/2024 at 7:13 PM.  Spiritual Care Department  [facility]  [phone]   
SPIRITUAL HEALTH SERVICES - [facility] Encounter    Name: Johnathan Yanez III                  Referral: Routine Visit    Sacraments  Anointed (Last Rites): No  Apostolic [place]: No  Confession: No  Communion: No     Assessment:  Patient unavailable for visit.      Intervention:  None.     Outcome:  None.    Plan:  Patient will send for  when ready      Electronically signed by Chaplain Shreyas, on 2/16/2024 at 1:04 PM.  Spiritual Care Department  [facility]  [phone]   
Spoke with DR. Godwin regarding only being able to get one unit of platelets before pt's EGD  
Spoke with Dr. Lynne regarding mcclellan catheter indications, he states it needs to stay in for accurate I&Os  
Spoke with Dr. Mandel regarding mcclellan indication, she states she will look through old notes from urology and get back to us with whether to remove it or keep it in and consult them for management.   
Spoke with Dr. Mandel regarding patients c/o 10/10 pain even after receiving PRN ultram, orders received   
Wound care: attempted to see patient for wound care consult, patient off the floor in IR  
   CO2 26 02/16/2024 05:21 AM    BUN 6 02/16/2024 05:21 AM    CREATININE 0.9 02/16/2024 05:21 AM    GFRAA >60 07/01/2022 06:35 AM    LABGLOM >60 02/16/2024 05:21 AM    GLUCOSE 87 02/16/2024 05:21 AM    GLUCOSE 89 02/20/2012 04:40 AM    PROT 4.6 02/16/2024 05:21 AM    LABALBU 2.8 02/16/2024 05:21 AM    LABALBU 3.4 02/18/2012 05:00 AM    CALCIUM 8.2 02/16/2024 05:21 AM    BILITOT 7.3 02/16/2024 05:21 AM    ALKPHOS 72 02/16/2024 05:21 AM    AST 40 02/16/2024 05:21 AM    ALT 12 02/16/2024 05:21 AM         Current Facility-Administered Medications:     albumin human 25% IV solution 25 g, 25 g, IntraVENous, Once, Mayte Lynne DO    nadolol (CORGARD) tablet 20 mg, 20 mg, Oral, Daily, Mayte Lynne DO, 20 mg at 02/15/24 1056    HYDROmorphone (DILAUDID) injection 0.5 mg, 0.5 mg, IntraVENous, Q3H PRN, Hardeep Kennedy MD, 0.5 mg at 02/15/24 1212    rifAXIMin (XIFAXAN) tablet 550 mg, 550 mg, Oral, BID, Alba Alvarez APRN - CNP, 550 mg at 02/16/24 0929    sodium chloride flush 0.9 % injection 5-40 mL, 5-40 mL, IntraVENous, 2 times per day, Alba Alvarez APRN - CNP, 10 mL at 02/16/24 0929    sodium chloride flush 0.9 % injection 5-40 mL, 5-40 mL, IntraVENous, PRN, Alba Alvarez APRN - CNP    0.9 % sodium chloride infusion, , IntraVENous, PRN, Alba Alvarez APRN - CNP    potassium chloride (KLOR-CON M) extended release tablet 40 mEq, 40 mEq, Oral, PRN **OR** potassium bicarb-citric acid (EFFER-K) effervescent tablet 40 mEq, 40 mEq, Oral, PRN **OR** potassium chloride 10 mEq/100 mL IVPB (Peripheral Line), 10 mEq, IntraVENous, PRN, Alba Alvarez APRN - CNP, Last Rate: 100 mL/hr at 02/15/24 1730, 10 mEq at 02/15/24 1730    magnesium sulfate 2000 mg in 50 mL IVPB premix, 2,000 mg, IntraVENous, PRN, Alba Alvarez APRN - CNP, Stopped at 02/15/24 1410    polyethylene glycol (GLYCOLAX) packet 17 g, 17 g, Oral, Daily PRN, Alba Alvarez APRN - CNP    acetaminophen (TYLENOL) tablet 650 mg, 650 mg, 
(8/27/2023 [facility])  % Weight Change (Calculated): 17.9                    BMI Categories: Obese Class 1 (BMI 30.0-34.9)    Estimated Daily Nutrient Needs:  Energy Requirements Based On: Formula ([place] [employer] Jeor)  Weight Used for Energy Requirements: Usual  Energy (kcal/day):   Weight Used for Protein Requirements: Ideal  Protein (g/day): 75-85 (1.1-1.3 g/kg)  Method Used for Fluid Requirements: 1 ml/kcal  Fluid (ml/day):     Nutrition Diagnosis:   Inadequate oral intake related to early satiety as evidenced by poor intake prior to admission, wounds, intake 0-25%    Nutrition Interventions:   Food and/or Nutrient Delivery: Continue Current Diet, Start Oral Nutrition Supplement  Nutrition Education/Counseling: No recommendation at this time  Coordination of Nutrition Care: Continue to monitor while inpatient       Goals:     Goals: PO intake 50% or greater, by next RD assessment       Nutrition Monitoring and Evaluation:   Behavioral-Environmental Outcomes: None Identified  Food/Nutrient Intake Outcomes: Food and Nutrient Intake, Supplement Intake  Physical Signs/Symptoms Outcomes: Biochemical Data, GI Status, Fluid Status or Edema, Nutrition Focused Physical Findings, Skin, Weight    Discharge Planning:    Continue Oral Nutrition Supplement     Faith Graham, RD, CNSC, LD  Contact: x [phone]    
26 02/19/2024 05:27 AM    BUN 9 02/19/2024 05:27 AM    CREATININE 1.1 02/19/2024 05:27 AM    GFRAA >60 07/01/2022 06:35 AM    LABGLOM >60 02/19/2024 05:27 AM    GLUCOSE 100 02/19/2024 05:27 AM    GLUCOSE 89 02/20/2012 04:40 AM    PROT 5.1 02/19/2024 05:27 AM    LABALBU 3.0 02/19/2024 05:27 AM    LABALBU 3.4 02/18/2012 05:00 AM    CALCIUM 8.6 02/19/2024 05:27 AM    BILITOT 6.0 02/19/2024 05:27 AM    ALKPHOS 81 02/19/2024 05:27 AM    AST 43 02/19/2024 05:27 AM    ALT 14 02/19/2024 05:27 AM               Current Facility-Administered Medications:     0.9 % sodium chloride infusion, , IntraVENous, PRN, Hardeep Kennedy MD    pantoprazole (PROTONIX) tablet 40 mg, 40 mg, Oral, QASoutheast Missouri HospitalValeriano John, DO, 40 mg at 02/19/24 0603    [COMPLETED] ferric gluconate (FERRLECIT) 25 mg in sodium chloride 0.9 % 50 mL (test dose) IVPB, 25 mg, IntraVENous, Once, Stopped at 02/18/24 1645 **FOLLOWED BY** [COMPLETED] ferric gluconate (FERRLECIT) 100 mg in sodium chloride 0.9 % 100 mL IVPB, 100 mg, IntraVENous, Once, Stopped at 02/18/24 1830 **FOLLOWED BY** ferric gluconate (FERRLECIT) 125 mg in sodium chloride 0.9 % 100 mL IVPB, 125 mg, IntraVENous, Once, Bunny Lynneu T, DO    furosemide (LASIX) tablet 40 mg, 40 mg, Oral, Daily, Mayte Lynne T, DO, 40 mg at 02/18/24 1541    spironolactone (ALDACTONE) tablet 25 mg, 25 mg, Oral, Daily, Bunny Lynneu T, DO, 25 mg at 02/18/24 1541    0.9 % sodium chloride infusion, , IntraVENous, PRN, Hardeep Kennedy MD    traZODone (DESYREL) tablet 50 mg, 50 mg, Oral, Nightly, Mayte Lynne T, DO, 50 mg at 02/18/24 2113    [Held by provider] nadolol (CORGARD) tablet 20 mg, 20 mg, Oral, Daily, Mayte Lynne T, DO, 20 mg at 02/15/24 1056    HYDROmorphone (DILAUDID) injection 0.5 mg, 0.5 mg, IntraVENous, Q3H PRN, Hardeep Kennedy MD, 0.5 mg at 02/19/24 0227    rifAXIMin (XIFAXAN) tablet 550 mg, 550 mg, Oral, BID, Alba Alvarez, APRN - CNP, 550 mg at 02/19/24 0838    sodium chloride flush 0.9 % 
 * No resolved hospital problems. *      Plan:  Generalized weakness and malaise - CT head unremarkable, but has multiple electrolyte abnormalities and has been receiving ativan which are the more likely reasons for this, now improving with decreased ativan dosing and electrolyte replacement  Hypokalemia - replace prn  Hypomagnesemia - aggressive replacement, likely contributing to hypokalemia  Liver cirrhosis with ascites s/p TIPS - follows with Dr. Huizar's group, continue lactulose, had paracentesis last admission with 3530cc drained. Consult GI  RLE wound - initially was mass like, evaluated by general surgery who said this was not an abscess and would not warrant intervention. Area has become an open wound with purulent/sanguinous discharge, concern for abscess, general surgery re-consulted, but patient too high risk for surgical intervention. Wound culture collected but likely low yield. ID treated with cefepime and flagyl last admission, were re-consulted this admission   Scrotal Abscess - Urology consulted, appreciate recommendations, CT demonstrated irregular fluid collection concerning for abscess, but he is not a surgical candidate per Urology, then consulted ID to reevaluate. Treated with cefepime and flagyl last admission. ID re-consulted this admission.  Alcohol Use Disorder - CIWA protocol stopped, taper ativan per Psychiatry appreciate recommendations  Abnormal Pancreas on CT - GI following, had MRCP, showed no pancreatic abnormalities  Anxiety - Buspar per psychiatry, titrating dose up while titrating down ativan  Insomnia - Melatonin as needed    NOTE: This report was transcribed using voice recognition software. Every effort was made to ensure accuracy; however, inadvertent computerized transcription errors may be present.     Electronically signed by Katherine Mandel MD on 2/13/2024 at 1:25 PM

## 2024-02-20 NOTE — CARE COORDINATION
Social Work/Discharge Planning:  Received notification transport to [facility] arranged for 11:00am.  Notified Tammy with [facility] of anticipated discharge at 11:00am.  Will continue to follow.  Electronically signed by JOHN Morrell on 2/20/2024 at 8:21 AM

## 2024-02-20 NOTE — DISCHARGE SUMMARY
[facility] Hospitalist Physician Discharge Summary       [facility]  [address] [address]  [phone]        Hardeep Kennedy MD  [address] [facility] [address]  [phone]    Schedule an appointment as soon as possible for a visit        Activity level: As tolerated     Dispo: SNF       Condition on discharge: Stable     Patient ID:  Johnathan Yanez III  [id number]  44 y.o.  1980    Admit date: 2/12/2024    Discharge date and time:  2/20/2024  9:04 AM    Admission Diagnoses: Principal Problem:    Anasarca  Active Problems:    Scrotal swelling    Alcoholic cirrhosis of liver with ascites (HCC)    Scrotal abscess    Leg wound, right  Resolved Problems:    * No resolved hospital problems. *      Discharge Diagnoses: Principal Problem:    Anasarca  Active Problems:    Scrotal swelling    Alcoholic cirrhosis of liver with ascites (HCC)    Scrotal abscess    Leg wound, right  Resolved Problems:    * No resolved hospital problems. *      Consults:  IP CONSULT TO INFECTIOUS DISEASES  IP CONSULT TO GI  IP CONSULT TO ONCOLOGY  IP CONSULT TO IV TEAM  IP CONSULT TO IV TEAM    Procedures: paracentesis.     Hospital Course:       44-year-old male with history of cirrhosis secondary to hepatitis C alcohol abuse polysubstance abuse ascites portal hypertension esophageal varices, anasarca presented to ER for pain and swelling abdomen and scrotum.  Patient was diuresis aggressively with IV Lasix paracentesis was done about 60 cc fluid removed.  And then patient has a repeat paracentesis 3.5 L removed liver ultrasound shows cirrhosis.  Normal caliber common bile duct.  GI consulted patient started with rifaximin and lactulose EGD shows 2 very small varices.  This patient will likely need a follow-up EGD next Monday.  Due to history of cirrhosis, patient's blood pressure always runs on the low side.  He is not able to tolerate any beta-blocker for variceal bleed

## 2024-02-20 NOTE — PLAN OF CARE
Problem: Discharge Planning  Goal: Discharge to home or other facility with appropriate resources  2/19/2024 2307 by Charlene Pearson, RN  Outcome: Progressing     Problem: Pain  Goal: Verbalizes/displays adequate comfort level or baseline comfort level  2/19/2024 2307 by Charlene Pearson, RN  Outcome: Progressing     Problem: Skin/Tissue Integrity  Goal: Absence of new skin breakdown  Description: 1.  Monitor for areas of redness and/or skin breakdown  2.  Assess vascular access sites hourly  3.  Every 4-6 hours minimum:  Change oxygen saturation probe site  4.  Every 4-6 hours:  If on nasal continuous positive airway pressure, respiratory therapy assess nares and determine need for appliance change or resting period.  2/19/2024 2307 by Charlene Pearson, RN  Outcome: Progressing     Problem: Safety - Adult  Goal: Free from fall injury  2/19/2024 2307 by Charlene Pearson, RN  Outcome: Progressing     Problem: Nutrition Deficit:  Goal: Optimize nutritional status  2/19/2024 2307 by Charlene Pearson, RN  Outcome: Progressing

## 2024-02-22 ENCOUNTER — APPOINTMENT (OUTPATIENT)
Dept: CT IMAGING | Age: 44
DRG: 280 | End: 2024-02-22
Payer: COMMERCIAL

## 2024-02-22 ENCOUNTER — APPOINTMENT (OUTPATIENT)
Dept: ULTRASOUND IMAGING | Age: 44
DRG: 280 | End: 2024-02-22
Payer: COMMERCIAL

## 2024-02-22 ENCOUNTER — HOSPITAL ENCOUNTER (INPATIENT)
Age: 44
LOS: 5 days | Discharge: HOME | DRG: 280 | End: 2024-02-27
Attending: EMERGENCY MEDICINE | Admitting: INTERNAL MEDICINE
Payer: COMMERCIAL

## 2024-02-22 DIAGNOSIS — R10.9 ABDOMINAL PAIN, UNSPECIFIED ABDOMINAL LOCATION: Primary | ICD-10-CM

## 2024-02-22 DIAGNOSIS — R18.8 OTHER ASCITES: ICD-10-CM

## 2024-02-22 DIAGNOSIS — N43.3 HYDROCELE, UNSPECIFIED HYDROCELE TYPE: ICD-10-CM

## 2024-02-22 DIAGNOSIS — D61.818 PANCYTOPENIA (HCC): ICD-10-CM

## 2024-02-22 LAB
ALBUMIN SERPL-MCNC: 3.1 G/DL (ref 3.5–5.2)
ALP SERPL-CCNC: 89 U/L (ref 40–129)
ALT SERPL-CCNC: 16 U/L (ref 0–40)
AMMONIA PLAS-SCNC: 41 UMOL/L (ref 16–60)
ANION GAP SERPL CALCULATED.3IONS-SCNC: 7 MMOL/L (ref 7–16)
APAP SERPL-MCNC: <5 UG/ML (ref 10–30)
AST SERPL-CCNC: 46 U/L (ref 0–39)
BASOPHILS # BLD: 0.01 K/UL (ref 0–0.2)
BASOPHILS NFR BLD: 0 % (ref 0–2)
BILIRUB DIRECT SERPL-MCNC: 1.9 MG/DL (ref 0–0.3)
BILIRUB INDIRECT SERPL-MCNC: 4.1 MG/DL (ref 0–1)
BILIRUB SERPL-MCNC: 6 MG/DL (ref 0–1.2)
BUN SERPL-MCNC: 11 MG/DL (ref 6–20)
CALCIUM SERPL-MCNC: 8.6 MG/DL (ref 8.6–10.2)
CHLORIDE SERPL-SCNC: 97 MMOL/L (ref 98–107)
CO2 SERPL-SCNC: 26 MMOL/L (ref 22–29)
CREAT SERPL-MCNC: 1.1 MG/DL (ref 0.7–1.2)
EOSINOPHIL # BLD: 0.16 K/UL (ref 0.05–0.5)
EOSINOPHILS RELATIVE PERCENT: 5 % (ref 0–6)
ERYTHROCYTE [DISTWIDTH] IN BLOOD BY AUTOMATED COUNT: 19.4 % (ref 11.5–15)
ETHANOLAMINE SERPL-MCNC: <10 MG/DL
GFR SERPL CREATININE-BSD FRML MDRD: >60 ML/MIN/1.73M2
GLUCOSE SERPL-MCNC: 106 MG/DL (ref 74–99)
HCT VFR BLD AUTO: 26 % (ref 37–54)
HGB BLD-MCNC: 8.9 G/DL (ref 12.5–16.5)
IMM GRANULOCYTES # BLD AUTO: <0.03 K/UL (ref 0–0.58)
IMM GRANULOCYTES NFR BLD: 1 % (ref 0–5)
INR PPP: 2.7
LACTATE BLDV-SCNC: 2 MMOL/L (ref 0.5–2.2)
LIPASE SERPL-CCNC: 67 U/L (ref 13–60)
LYMPHOCYTES NFR BLD: 0.43 K/UL (ref 1.5–4)
LYMPHOCYTES RELATIVE PERCENT: 14 % (ref 20–42)
MAGNESIUM SERPL-MCNC: 1.7 MG/DL (ref 1.6–2.6)
MCH RBC QN AUTO: 33.8 PG (ref 26–35)
MCHC RBC AUTO-ENTMCNC: 34.2 G/DL (ref 32–34.5)
MCV RBC AUTO: 98.9 FL (ref 80–99.9)
MONOCYTES NFR BLD: 0.41 K/UL (ref 0.1–0.95)
MONOCYTES NFR BLD: 13 % (ref 2–12)
NEUTROPHILS NFR BLD: 66 % (ref 43–80)
NEUTS SEG NFR BLD: 2.03 K/UL (ref 1.8–7.3)
PLATELET # BLD AUTO: 34 K/UL (ref 130–450)
PLATELET CONFIRMATION: NORMAL
PMV BLD AUTO: 10.8 FL (ref 7–12)
POTASSIUM SERPL-SCNC: 3 MMOL/L (ref 3.5–5)
PROT SERPL-MCNC: 5.5 G/DL (ref 6.4–8.3)
PROTHROMBIN TIME: 30.1 SEC (ref 9.3–12.4)
RBC # BLD AUTO: 2.63 M/UL (ref 3.8–5.8)
RBC # BLD: ABNORMAL 10*6/UL
SALICYLATES SERPL-MCNC: <0.3 MG/DL (ref 0–30)
SODIUM SERPL-SCNC: 130 MMOL/L (ref 132–146)
TOXIC TRICYCLIC SC,BLOOD: NEGATIVE
WBC OTHER # BLD: 3.1 K/UL (ref 4.5–11.5)

## 2024-02-22 PROCEDURE — 96376 TX/PRO/DX INJ SAME DRUG ADON: CPT

## 2024-02-22 PROCEDURE — 85610 PROTHROMBIN TIME: CPT

## 2024-02-22 PROCEDURE — 82248 BILIRUBIN DIRECT: CPT

## 2024-02-22 PROCEDURE — 76770 US EXAM ABDO BACK WALL COMP: CPT

## 2024-02-22 PROCEDURE — 80307 DRUG TEST PRSMV CHEM ANLYZR: CPT

## 2024-02-22 PROCEDURE — 80143 DRUG ASSAY ACETAMINOPHEN: CPT

## 2024-02-22 PROCEDURE — 6370000000 HC RX 637 (ALT 250 FOR IP): Performed by: STUDENT IN AN ORGANIZED HEALTH CARE EDUCATION/TRAINING PROGRAM

## 2024-02-22 PROCEDURE — 99223 1ST HOSP IP/OBS HIGH 75: CPT | Performed by: STUDENT IN AN ORGANIZED HEALTH CARE EDUCATION/TRAINING PROGRAM

## 2024-02-22 PROCEDURE — 6370000000 HC RX 637 (ALT 250 FOR IP)

## 2024-02-22 PROCEDURE — 80053 COMPREHEN METABOLIC PANEL: CPT

## 2024-02-22 PROCEDURE — 36415 COLL VENOUS BLD VENIPUNCTURE: CPT

## 2024-02-22 PROCEDURE — 83690 ASSAY OF LIPASE: CPT

## 2024-02-22 PROCEDURE — G0480 DRUG TEST DEF 1-7 CLASSES: HCPCS

## 2024-02-22 PROCEDURE — 2060000000 HC ICU INTERMEDIATE R&B

## 2024-02-22 PROCEDURE — 80179 DRUG ASSAY SALICYLATE: CPT

## 2024-02-22 PROCEDURE — 6360000002 HC RX W HCPCS: Performed by: INTERNAL MEDICINE

## 2024-02-22 PROCEDURE — APPSS45 APP SPLIT SHARED TIME 31-45 MINUTES: Performed by: INTERNAL MEDICINE

## 2024-02-22 PROCEDURE — 76870 US EXAM SCROTUM: CPT

## 2024-02-22 PROCEDURE — 82140 ASSAY OF AMMONIA: CPT

## 2024-02-22 PROCEDURE — 6370000000 HC RX 637 (ALT 250 FOR IP): Performed by: INTERNAL MEDICINE

## 2024-02-22 PROCEDURE — 2580000003 HC RX 258: Performed by: INTERNAL MEDICINE

## 2024-02-22 PROCEDURE — 6360000004 HC RX CONTRAST MEDICATION: Performed by: RADIOLOGY

## 2024-02-22 PROCEDURE — 6360000002 HC RX W HCPCS: Performed by: EMERGENCY MEDICINE

## 2024-02-22 PROCEDURE — 74177 CT ABD & PELVIS W/CONTRAST: CPT

## 2024-02-22 PROCEDURE — 83735 ASSAY OF MAGNESIUM: CPT

## 2024-02-22 PROCEDURE — P9047 ALBUMIN (HUMAN), 25%, 50ML: HCPCS | Performed by: STUDENT IN AN ORGANIZED HEALTH CARE EDUCATION/TRAINING PROGRAM

## 2024-02-22 PROCEDURE — 0W9G3ZZ DRAINAGE OF PERITONEAL CAVITY, PERCUTANEOUS APPROACH: ICD-10-PCS | Performed by: INTERNAL MEDICINE

## 2024-02-22 PROCEDURE — 6370000000 HC RX 637 (ALT 250 FOR IP): Performed by: EMERGENCY MEDICINE

## 2024-02-22 PROCEDURE — 6360000002 HC RX W HCPCS: Performed by: STUDENT IN AN ORGANIZED HEALTH CARE EDUCATION/TRAINING PROGRAM

## 2024-02-22 PROCEDURE — 96374 THER/PROPH/DIAG INJ IV PUSH: CPT

## 2024-02-22 PROCEDURE — 85025 COMPLETE CBC W/AUTO DIFF WBC: CPT

## 2024-02-22 PROCEDURE — 2709999900 US GUIDED PARACENTESIS

## 2024-02-22 PROCEDURE — 83605 ASSAY OF LACTIC ACID: CPT

## 2024-02-22 PROCEDURE — 99285 EMERGENCY DEPT VISIT HI MDM: CPT

## 2024-02-22 PROCEDURE — 2500000003 HC RX 250 WO HCPCS: Performed by: PHYSICIAN ASSISTANT

## 2024-02-22 RX ORDER — LORAZEPAM 2 MG/ML
4 INJECTION INTRAMUSCULAR
Status: DISCONTINUED | OUTPATIENT
Start: 2024-02-22 | End: 2024-02-27 | Stop reason: HOSPADM

## 2024-02-22 RX ORDER — LACTULOSE 10 G/15ML
20 SOLUTION ORAL 3 TIMES DAILY
Status: DISCONTINUED | OUTPATIENT
Start: 2024-02-22 | End: 2024-02-27 | Stop reason: HOSPADM

## 2024-02-22 RX ORDER — ONDANSETRON 2 MG/ML
4 INJECTION INTRAMUSCULAR; INTRAVENOUS EVERY 6 HOURS PRN
Status: DISCONTINUED | OUTPATIENT
Start: 2024-02-22 | End: 2024-02-27 | Stop reason: HOSPADM

## 2024-02-22 RX ORDER — THIAMINE MONONITRATE (VIT B1) 100 MG
100 TABLET ORAL EVERY MORNING
Status: DISCONTINUED | OUTPATIENT
Start: 2024-02-22 | End: 2024-02-22 | Stop reason: SDUPTHER

## 2024-02-22 RX ORDER — MAGNESIUM SULFATE IN WATER 40 MG/ML
2000 INJECTION, SOLUTION INTRAVENOUS PRN
Status: DISCONTINUED | OUTPATIENT
Start: 2024-02-22 | End: 2024-02-27 | Stop reason: HOSPADM

## 2024-02-22 RX ORDER — FENTANYL CITRATE 50 UG/ML
50 INJECTION, SOLUTION INTRAMUSCULAR; INTRAVENOUS ONCE
Status: COMPLETED | OUTPATIENT
Start: 2024-02-22 | End: 2024-02-22

## 2024-02-22 RX ORDER — ACETAMINOPHEN 650 MG/1
650 SUPPOSITORY RECTAL EVERY 6 HOURS PRN
Status: DISCONTINUED | OUTPATIENT
Start: 2024-02-22 | End: 2024-02-27 | Stop reason: HOSPADM

## 2024-02-22 RX ORDER — ACETAMINOPHEN 325 MG/1
650 TABLET ORAL EVERY 6 HOURS PRN
Status: DISCONTINUED | OUTPATIENT
Start: 2024-02-22 | End: 2024-02-27 | Stop reason: HOSPADM

## 2024-02-22 RX ORDER — LORAZEPAM 1 MG/1
2 TABLET ORAL
Status: DISCONTINUED | OUTPATIENT
Start: 2024-02-22 | End: 2024-02-27 | Stop reason: HOSPADM

## 2024-02-22 RX ORDER — FERROUS SULFATE 325(65) MG
325 TABLET ORAL
Status: DISCONTINUED | OUTPATIENT
Start: 2024-02-22 | End: 2024-02-27 | Stop reason: HOSPADM

## 2024-02-22 RX ORDER — LIDOCAINE HYDROCHLORIDE 10 MG/ML
INJECTION, SOLUTION EPIDURAL; INFILTRATION; INTRACAUDAL; PERINEURAL PRN
Status: COMPLETED | OUTPATIENT
Start: 2024-02-22 | End: 2024-02-22

## 2024-02-22 RX ORDER — SODIUM CHLORIDE 0.9 % (FLUSH) 0.9 %
5-40 SYRINGE (ML) INJECTION EVERY 12 HOURS SCHEDULED
Status: DISCONTINUED | OUTPATIENT
Start: 2024-02-22 | End: 2024-02-27 | Stop reason: HOSPADM

## 2024-02-22 RX ORDER — NICOTINE 21 MG/24HR
1 PATCH, TRANSDERMAL 24 HOURS TRANSDERMAL DAILY
Status: DISCONTINUED | OUTPATIENT
Start: 2024-02-22 | End: 2024-02-27 | Stop reason: HOSPADM

## 2024-02-22 RX ORDER — ONDANSETRON 4 MG/1
4 TABLET, ORALLY DISINTEGRATING ORAL EVERY 8 HOURS PRN
Status: DISCONTINUED | OUTPATIENT
Start: 2024-02-22 | End: 2024-02-27 | Stop reason: HOSPADM

## 2024-02-22 RX ORDER — FOLIC ACID 1 MG/1
1 TABLET ORAL EVERY MORNING
Status: DISCONTINUED | OUTPATIENT
Start: 2024-02-22 | End: 2024-02-27 | Stop reason: HOSPADM

## 2024-02-22 RX ORDER — FERROUS SULFATE 325(65) MG
325 TABLET ORAL DAILY
COMMUNITY

## 2024-02-22 RX ORDER — SODIUM CHLORIDE 0.9 % (FLUSH) 0.9 %
5-40 SYRINGE (ML) INJECTION PRN
Status: DISCONTINUED | OUTPATIENT
Start: 2024-02-22 | End: 2024-02-27 | Stop reason: HOSPADM

## 2024-02-22 RX ORDER — ALBUMIN (HUMAN) 12.5 G/50ML
25 SOLUTION INTRAVENOUS ONCE
Status: DISCONTINUED | OUTPATIENT
Start: 2024-02-22 | End: 2024-02-22

## 2024-02-22 RX ORDER — TRAZODONE HYDROCHLORIDE 50 MG/1
50 TABLET ORAL NIGHTLY
Status: DISCONTINUED | OUTPATIENT
Start: 2024-02-22 | End: 2024-02-27 | Stop reason: HOSPADM

## 2024-02-22 RX ORDER — OXYCODONE HYDROCHLORIDE 5 MG/1
5 TABLET ORAL EVERY 4 HOURS PRN
Status: DISCONTINUED | OUTPATIENT
Start: 2024-02-22 | End: 2024-02-27 | Stop reason: HOSPADM

## 2024-02-22 RX ORDER — BUSPIRONE HYDROCHLORIDE 10 MG/1
10 TABLET ORAL 2 TIMES DAILY
Status: DISCONTINUED | OUTPATIENT
Start: 2024-02-22 | End: 2024-02-27 | Stop reason: HOSPADM

## 2024-02-22 RX ORDER — POTASSIUM CHLORIDE 20 MEQ/1
40 TABLET, EXTENDED RELEASE ORAL PRN
Status: DISCONTINUED | OUTPATIENT
Start: 2024-02-22 | End: 2024-02-27 | Stop reason: HOSPADM

## 2024-02-22 RX ORDER — LANOLIN ALCOHOL/MO/W.PET/CERES
6 CREAM (GRAM) TOPICAL NIGHTLY PRN
Status: DISCONTINUED | OUTPATIENT
Start: 2024-02-22 | End: 2024-02-27 | Stop reason: HOSPADM

## 2024-02-22 RX ORDER — FENTANYL CITRATE 50 UG/ML
25 INJECTION, SOLUTION INTRAMUSCULAR; INTRAVENOUS
Status: DISCONTINUED | OUTPATIENT
Start: 2024-02-22 | End: 2024-02-23 | Stop reason: SDUPTHER

## 2024-02-22 RX ORDER — MIDODRINE HYDROCHLORIDE 5 MG/1
10 TABLET ORAL
Status: DISCONTINUED | OUTPATIENT
Start: 2024-02-22 | End: 2024-02-27 | Stop reason: HOSPADM

## 2024-02-22 RX ORDER — LORAZEPAM 2 MG/ML
3 INJECTION INTRAMUSCULAR
Status: DISCONTINUED | OUTPATIENT
Start: 2024-02-22 | End: 2024-02-27 | Stop reason: HOSPADM

## 2024-02-22 RX ORDER — SODIUM CHLORIDE 9 MG/ML
INJECTION, SOLUTION INTRAVENOUS PRN
Status: DISCONTINUED | OUTPATIENT
Start: 2024-02-22 | End: 2024-02-27 | Stop reason: HOSPADM

## 2024-02-22 RX ORDER — LORAZEPAM 1 MG/1
1 TABLET ORAL
Status: DISCONTINUED | OUTPATIENT
Start: 2024-02-22 | End: 2024-02-27 | Stop reason: HOSPADM

## 2024-02-22 RX ORDER — PANTOPRAZOLE SODIUM 40 MG/1
40 TABLET, DELAYED RELEASE ORAL
Status: DISCONTINUED | OUTPATIENT
Start: 2024-02-22 | End: 2024-02-27 | Stop reason: HOSPADM

## 2024-02-22 RX ORDER — LORAZEPAM 2 MG/ML
1 INJECTION INTRAMUSCULAR
Status: DISCONTINUED | OUTPATIENT
Start: 2024-02-22 | End: 2024-02-27 | Stop reason: HOSPADM

## 2024-02-22 RX ORDER — POTASSIUM CHLORIDE 7.45 MG/ML
10 INJECTION INTRAVENOUS PRN
Status: DISCONTINUED | OUTPATIENT
Start: 2024-02-22 | End: 2024-02-27 | Stop reason: HOSPADM

## 2024-02-22 RX ORDER — SPIRONOLACTONE 25 MG/1
25 TABLET ORAL DAILY
Status: DISCONTINUED | OUTPATIENT
Start: 2024-02-22 | End: 2024-02-22

## 2024-02-22 RX ORDER — LANOLIN ALCOHOL/MO/W.PET/CERES
6 CREAM (GRAM) TOPICAL EVERY EVENING
Status: DISCONTINUED | OUTPATIENT
Start: 2024-02-22 | End: 2024-02-27 | Stop reason: HOSPADM

## 2024-02-22 RX ORDER — LANOLIN ALCOHOL/MO/W.PET/CERES
100 CREAM (GRAM) TOPICAL DAILY
Status: DISCONTINUED | OUTPATIENT
Start: 2024-02-22 | End: 2024-02-27 | Stop reason: HOSPADM

## 2024-02-22 RX ORDER — FENTANYL CITRATE 50 UG/ML
25 INJECTION, SOLUTION INTRAMUSCULAR; INTRAVENOUS
Status: DISCONTINUED | OUTPATIENT
Start: 2024-02-22 | End: 2024-02-24

## 2024-02-22 RX ORDER — LORAZEPAM 1 MG/1
4 TABLET ORAL
Status: DISCONTINUED | OUTPATIENT
Start: 2024-02-22 | End: 2024-02-27 | Stop reason: HOSPADM

## 2024-02-22 RX ORDER — LORAZEPAM 2 MG/ML
2 INJECTION INTRAMUSCULAR
Status: DISCONTINUED | OUTPATIENT
Start: 2024-02-22 | End: 2024-02-27 | Stop reason: HOSPADM

## 2024-02-22 RX ORDER — POTASSIUM CHLORIDE 20 MEQ/1
40 TABLET, EXTENDED RELEASE ORAL ONCE
Status: COMPLETED | OUTPATIENT
Start: 2024-02-22 | End: 2024-02-22

## 2024-02-22 RX ORDER — LORAZEPAM 1 MG/1
3 TABLET ORAL
Status: DISCONTINUED | OUTPATIENT
Start: 2024-02-22 | End: 2024-02-27 | Stop reason: HOSPADM

## 2024-02-22 RX ORDER — ENOXAPARIN SODIUM 100 MG/ML
40 INJECTION SUBCUTANEOUS DAILY
Status: DISCONTINUED | OUTPATIENT
Start: 2024-02-22 | End: 2024-02-22

## 2024-02-22 RX ORDER — FUROSEMIDE 40 MG/1
40 TABLET ORAL EVERY MORNING
Status: DISCONTINUED | OUTPATIENT
Start: 2024-02-22 | End: 2024-02-22

## 2024-02-22 RX ORDER — ALBUMIN (HUMAN) 12.5 G/50ML
25 SOLUTION INTRAVENOUS ONCE
Status: COMPLETED | OUTPATIENT
Start: 2024-02-22 | End: 2024-02-22

## 2024-02-22 RX ORDER — POLYETHYLENE GLYCOL 3350 17 G/17G
17 POWDER, FOR SOLUTION ORAL DAILY PRN
Status: DISCONTINUED | OUTPATIENT
Start: 2024-02-22 | End: 2024-02-27 | Stop reason: HOSPADM

## 2024-02-22 RX ORDER — FUROSEMIDE 40 MG/1
80 TABLET ORAL EVERY MORNING
Status: DISCONTINUED | OUTPATIENT
Start: 2024-02-23 | End: 2024-02-27 | Stop reason: HOSPADM

## 2024-02-22 RX ORDER — SPIRONOLACTONE 25 MG/1
50 TABLET ORAL DAILY
Status: DISCONTINUED | OUTPATIENT
Start: 2024-02-23 | End: 2024-02-27 | Stop reason: HOSPADM

## 2024-02-22 RX ORDER — PENTOXIFYLLINE 400 MG/1
400 TABLET, EXTENDED RELEASE ORAL
Status: DISCONTINUED | OUTPATIENT
Start: 2024-02-22 | End: 2024-02-27 | Stop reason: HOSPADM

## 2024-02-22 RX ADMIN — OXYCODONE 5 MG: 5 TABLET ORAL at 15:54

## 2024-02-22 RX ADMIN — FENTANYL CITRATE 50 MCG: 50 INJECTION, SOLUTION INTRAMUSCULAR; INTRAVENOUS at 05:49

## 2024-02-22 RX ADMIN — LIDOCAINE HYDROCHLORIDE 10 ML: 10 INJECTION, SOLUTION EPIDURAL; INFILTRATION; INTRACAUDAL; PERINEURAL at 12:34

## 2024-02-22 RX ADMIN — Medication 6 MG: at 21:44

## 2024-02-22 RX ADMIN — PENTOXIFYLLINE 400 MG: 400 TABLET, EXTENDED RELEASE ORAL at 16:19

## 2024-02-22 RX ADMIN — PANTOPRAZOLE SODIUM 40 MG: 40 TABLET, DELAYED RELEASE ORAL at 11:55

## 2024-02-22 RX ADMIN — POTASSIUM CHLORIDE 40 MEQ: 1500 TABLET, EXTENDED RELEASE ORAL at 05:37

## 2024-02-22 RX ADMIN — TRAZODONE HYDROCHLORIDE 50 MG: 50 TABLET ORAL at 23:22

## 2024-02-22 RX ADMIN — IOPAMIDOL 75 ML: 755 INJECTION, SOLUTION INTRAVENOUS at 04:51

## 2024-02-22 RX ADMIN — FENTANYL CITRATE 25 MCG: 50 INJECTION, SOLUTION INTRAMUSCULAR; INTRAVENOUS at 23:22

## 2024-02-22 RX ADMIN — SODIUM CHLORIDE, PRESERVATIVE FREE 10 ML: 5 INJECTION INTRAVENOUS at 20:16

## 2024-02-22 RX ADMIN — MIDODRINE HYDROCHLORIDE 10 MG: 5 TABLET ORAL at 16:15

## 2024-02-22 RX ADMIN — Medication 100 MG: at 11:50

## 2024-02-22 RX ADMIN — FOLIC ACID 1 MG: 1 TABLET ORAL at 11:49

## 2024-02-22 RX ADMIN — SPIRONOLACTONE 25 MG: 25 TABLET ORAL at 11:50

## 2024-02-22 RX ADMIN — BUSPIRONE HYDROCHLORIDE 10 MG: 10 TABLET ORAL at 20:08

## 2024-02-22 RX ADMIN — FUROSEMIDE 40 MG: 40 TABLET ORAL at 11:56

## 2024-02-22 RX ADMIN — PENTOXIFYLLINE 400 MG: 400 TABLET, EXTENDED RELEASE ORAL at 11:51

## 2024-02-22 RX ADMIN — BUSPIRONE HYDROCHLORIDE 10 MG: 10 TABLET ORAL at 11:56

## 2024-02-22 RX ADMIN — LACTULOSE 20 G: 10 SOLUTION ORAL at 20:08

## 2024-02-22 RX ADMIN — FENTANYL CITRATE 25 MCG: 50 INJECTION, SOLUTION INTRAMUSCULAR; INTRAVENOUS at 20:11

## 2024-02-22 RX ADMIN — LACTULOSE 20 G: 10 SOLUTION ORAL at 16:16

## 2024-02-22 RX ADMIN — FERROUS SULFATE TAB 325 MG (65 MG ELEMENTAL FE) 325 MG: 325 (65 FE) TAB at 11:55

## 2024-02-22 RX ADMIN — ONDANSETRON 4 MG: 2 INJECTION INTRAMUSCULAR; INTRAVENOUS at 13:33

## 2024-02-22 RX ADMIN — RIFAXIMIN 550 MG: 550 TABLET ORAL at 20:08

## 2024-02-22 RX ADMIN — RIFAXIMIN 550 MG: 550 TABLET ORAL at 11:53

## 2024-02-22 RX ADMIN — PANTOPRAZOLE SODIUM 40 MG: 40 TABLET, DELAYED RELEASE ORAL at 16:19

## 2024-02-22 RX ADMIN — ALBUMIN (HUMAN) 25 G: 0.25 INJECTION, SOLUTION INTRAVENOUS at 16:14

## 2024-02-22 RX ADMIN — FENTANYL CITRATE 50 MCG: 50 INJECTION INTRAMUSCULAR; INTRAVENOUS at 02:34

## 2024-02-22 RX ADMIN — FENTANYL CITRATE 50 MCG: 50 INJECTION, SOLUTION INTRAMUSCULAR; INTRAVENOUS at 11:56

## 2024-02-22 RX ADMIN — FENTANYL CITRATE 50 MCG: 50 INJECTION INTRAMUSCULAR; INTRAVENOUS at 04:16

## 2024-02-22 RX ADMIN — MIDODRINE HYDROCHLORIDE 10 MG: 5 TABLET ORAL at 11:51

## 2024-02-22 ASSESSMENT — PAIN DESCRIPTION - DESCRIPTORS
DESCRIPTORS: ACHING;DISCOMFORT;SORE
DESCRIPTORS: ACHING;DISCOMFORT;SORE
DESCRIPTORS: SHARP;THROBBING

## 2024-02-22 ASSESSMENT — PAIN - FUNCTIONAL ASSESSMENT
PAIN_FUNCTIONAL_ASSESSMENT: 0-10
PAIN_FUNCTIONAL_ASSESSMENT: PREVENTS OR INTERFERES WITH ALL ACTIVE AND SOME PASSIVE ACTIVITIES
PAIN_FUNCTIONAL_ASSESSMENT: 0-10

## 2024-02-22 ASSESSMENT — PAIN SCALES - GENERAL
PAINLEVEL_OUTOF10: 9
PAINLEVEL_OUTOF10: 8

## 2024-02-22 ASSESSMENT — LIFESTYLE VARIABLES
HOW MANY STANDARD DRINKS CONTAINING ALCOHOL DO YOU HAVE ON A TYPICAL DAY: PATIENT DOES NOT DRINK
HOW OFTEN DO YOU HAVE A DRINK CONTAINING ALCOHOL: NEVER

## 2024-02-22 ASSESSMENT — PAIN DESCRIPTION - ONSET: ONSET: ON-GOING

## 2024-02-22 ASSESSMENT — PAIN DESCRIPTION - FREQUENCY
FREQUENCY: CONTINUOUS
FREQUENCY: CONTINUOUS

## 2024-02-22 ASSESSMENT — PAIN DESCRIPTION - PAIN TYPE: TYPE: CHRONIC PAIN

## 2024-02-22 ASSESSMENT — PAIN DESCRIPTION - LOCATION
LOCATION: SCROTUM;ABDOMEN
LOCATION: ABDOMEN;PENIS
LOCATION: ABDOMEN;SCROTUM
LOCATION: ABDOMEN;SCROTUM
LOCATION: BACK
LOCATION: ABDOMEN
LOCATION: ABDOMEN

## 2024-02-22 ASSESSMENT — PAIN DESCRIPTION - ORIENTATION: ORIENTATION: OTHER (COMMENT)

## 2024-02-22 NOTE — ED NOTES
ED to Inpatient Handoff Report    Notified willem that electronic handoff available and patient ready for transport to room  624  Safety Risks: None identified    Patient in Restraints: yes - no       Constant Observer or Patient : yes -  no      Telemetry Monitoring Ordered :Yes           Order to transfer to unit without monitor:NO    Last MEWS: Time completed:     Deterioration Index Score:   Predictive Model Details          25 (Normal)  Factor Value    Calculated 2/22/2024 13:00 38% Neurological exam X    Deterioration Index Model 27% Age 44 years old     10% Respiratory rate 18     7% Hematocrit abnormal (26.0 %)     6% Sodium abnormal (130 mmol/L)     6% Potassium abnormal (3.0 mmol/L)     2% Platelet count abnormal (34 k/uL)     2% WBC count abnormal (3.1 k/uL)     1% Systolic 108     0% Pulse oximetry 97 %     0% Pulse 76     0% Temperature 98.2 °F (36.8 °C)        Vitals:    02/22/24 1254 02/22/24 1254 02/22/24 1254 02/22/24 1300   BP: 108/61 108/61  108/61   Pulse:  74 73 76   Resp:  17 16 18   Temp:       TempSrc:       SpO2:  95% 96% 97%   Weight:       Height:             Opportunity for questions and clarification was provided.

## 2024-02-22 NOTE — ED PROVIDER NOTES
[facility] MED SURG  EMERGENCY DEPARTMENT ENCOUNTER        Pt Name: Johnathan Yanez III  MRN: [id number]  Birthdate 1980  Date of evaluation: 2/22/2024  Provider: Kelly Osorio DO  PCP: No primary care provider on file.  Note Started: 2:05 AM EST 2/22/24    CHIEF COMPLAINT       Chief Complaint   Patient presents with    Abdominal Pain     Sharp stabbing pain, began around 8pm. 1.5 gallons ascites fluid drained 2/14. Upper GI scope on Sunday.      Scrotal Pain     Sharp stabbing pain    Dizziness    Numbness     Bilateral toes numbness       HISTORY OF PRESENT ILLNESS: 1 or more Elements        Limitations to history : None    Johnathan Yanez III is a 44 y.o. male brought in by EMS from nursing home for evaluation of abdominal pain, scrotal pain and lightheadedness.  He states around 8 PM tonight he started having diffuse stabbing abdominal pain.  He also notes that the pain radiates into his groin.  He recently had a large amount of ascites drained on 2/14/2024.  He also underwent an upper GI 4 days ago.  He notes lightheadedness but denies vertiginous symptoms.  Denies headache or vision changes.  Denies nausea or vomiting.  Denies fever or chills.      Nursing Notes were all reviewed and agreed with or any disagreements were addressed in the HPI.      REVIEW OF EXTERNAL NOTE :       Reviewed EGD note from recent admission that shows 2 very small varices, stomach with mild friable GAVE, noted to be improved from previous, treated with APC, no gastric varices      Chart Review/External Note Review    Last Echo reviewed by Me:  No results found for: \"LVEF\", \"LVEFMODE\"          Controlled Substance Monitoring:    Acute and Chronic Pain Monitoring:        No data to display                    REVIEW OF SYSTEMS :      Positives and Pertinent negatives as per HPI.     SURGICAL HISTORY     Past Surgical History:   Procedure Laterality Date    ENDOSCOPY, COLON, DIAGNOSTIC      ESOPHAGOSCOPY N/A 2/18/2024

## 2024-02-22 NOTE — BRIEF OP NOTE
Diagnosis: Ascites    Procedure: Ultrasound Guided Paracentesis    Findings: Large volume ascites, successful catheter placement with clear yellow ascitic fluid return    Specimen: None at this time.  Total amount of fluid removed to be reported in PACS.    EBL: Minimal.    Complication: None immediately post procedure.    Plan:  Return to emergency room following paracentesis.

## 2024-02-22 NOTE — OR NURSING
Patient arrived from the ER to IR for ultrasound guided paracentesis. Vitals obtained and consent signed per patient. Joan Parker PA-C in to speak with the patient about the procedure, all questions answered. Abdomen scanned and prepped. 1% Lidocaine administered to procedural site. 5 Slovenian centesis catheter inserted to RLQ with ultrasound guidance, catheter connected to suction. Patient tolerated procedure well. 4800 ml drained of clear yellow colored ascitic fluid. Centesis catheter removed post procedure. Puncture site cleansed and dry dressing applied. No bleeding, swelling or complications noted. Post procedure vitals obtained. Nurse to nurse report called to the ER. Patient transported back to room 03 in the ED.

## 2024-02-22 NOTE — ED NOTES
Transport came to take pt to IR, Pt refused stating he wasn't going to IR until medicated.Pt given fentanyl and IR called to be made aware pt is now willing to go to IR after being medicated

## 2024-02-22 NOTE — PLAN OF CARE
Problem: Discharge Planning  Goal: Discharge to home or other facility with appropriate resources  Outcome: Progressing  Flowsheets (Taken 2/22/2024 8345 by Dipti Adams, RN)  Discharge to home or other facility with appropriate resources: Refer to discharge planning if patient needs post-hospital services based on physician order or complex needs related to functional status, cognitive ability or social support system

## 2024-02-22 NOTE — H&P
[facility] Hospitalist Group History and Physical    CHIEF COMPLAINT:  abdominal pain, scrotal pain     History of Present Illness:      This is a 44 year old male with PMH significant for hepatitis C, polysubstance abuse, alcohol abuse, alcoholic cirrhosis with ascites, portal hypertension, esophageal varices, and GAVE.  Recently admitted 01/25/2024 - 02/10/2024 for CANDY, abscess of right leg and scrotal abscess. Groin and scrotal abscess treated with cefepime and Flagyl during admission.  Patient discharged on Levaquin and Flagyl per ID.  Not surgical candidate per urology and general surgery.  Did have paracentesis during admission with removal of 3500 cc. He then was admitted 2/12-2/20 for abdominal pain and scrotal swelling, had Lasix paracentesis done with 3.5 L removed on 2/14. EGD from recent admission that shows 2 very small varices, stomach with mild friable GAVE, noted to be improved from previous, treated with APC, no gastric varices.     He presents again to ER with abdominal pain and scrotal edema. He is hypotensive on arrival. Labs reveal Na 130, K 3.0, CL 97, Albumin 3.1, Protein 5.5, AST 46, bilirubin 6.0, Lipase 67, WBC 3.1, Hgb 8.9, PLT 34. US showing large left hydrocele, small right hydrocele. CT abdomen cirrhosis with splenomegaly s/p TIPS, colopathy, left hydrocele. Patient given fentanyl x 3, potassium in ER. Decision to admit.     Informant(s) for H&P: patient     REVIEW OF SYSTEMS:  A comprehensive review of systems was negative except for: what is in the HPI    PMH:  Past Medical History:   Diagnosis Date    Cirrhosis (HCC)     Esophageal varices (HCC)     ETOH abuse     GAVE (gastric antral vascular ectasia)     Hepatitis C     Portal hypertension (HCC)        Surgical History:  Past Surgical History:   Procedure Laterality Date    ENDOSCOPY, COLON, DIAGNOSTIC      ESOPHAGOSCOPY N/A 2/18/2024    ESOPHAGOSCOPY CONTROL HEMORRHAGE performed by Tino Bal DO at [facility] OR    HERNIA

## 2024-02-23 LAB
ALBUMIN SERPL-MCNC: 3.4 G/DL (ref 3.5–5.2)
ALP SERPL-CCNC: 82 U/L (ref 40–129)
ALT SERPL-CCNC: 15 U/L (ref 0–40)
ANION GAP SERPL CALCULATED.3IONS-SCNC: 9 MMOL/L (ref 7–16)
AST SERPL-CCNC: 40 U/L (ref 0–39)
BASOPHILS # BLD: 0.04 K/UL (ref 0–0.2)
BASOPHILS NFR BLD: 2 % (ref 0–2)
BILIRUB SERPL-MCNC: 6 MG/DL (ref 0–1.2)
BUN SERPL-MCNC: 10 MG/DL (ref 6–20)
CALCIUM SERPL-MCNC: 8.6 MG/DL (ref 8.6–10.2)
CHLORIDE SERPL-SCNC: 101 MMOL/L (ref 98–107)
CO2 SERPL-SCNC: 27 MMOL/L (ref 22–29)
CREAT SERPL-MCNC: 1.2 MG/DL (ref 0.7–1.2)
EOSINOPHIL # BLD: 0.07 K/UL (ref 0.05–0.5)
EOSINOPHILS RELATIVE PERCENT: 3 % (ref 0–6)
ERYTHROCYTE [DISTWIDTH] IN BLOOD BY AUTOMATED COUNT: 18.9 % (ref 11.5–15)
GFR SERPL CREATININE-BSD FRML MDRD: >60 ML/MIN/1.73M2
GLUCOSE SERPL-MCNC: 95 MG/DL (ref 74–99)
HCT VFR BLD AUTO: 25.4 % (ref 37–54)
HGB BLD-MCNC: 8.6 G/DL (ref 12.5–16.5)
LYMPHOCYTES NFR BLD: 0.2 K/UL (ref 1.5–4)
LYMPHOCYTES RELATIVE PERCENT: 8 % (ref 20–42)
MAGNESIUM SERPL-MCNC: 1.6 MG/DL (ref 1.6–2.6)
MCH RBC QN AUTO: 34.5 PG (ref 26–35)
MCHC RBC AUTO-ENTMCNC: 33.9 G/DL (ref 32–34.5)
MCV RBC AUTO: 102 FL (ref 80–99.9)
MONOCYTES NFR BLD: 0.13 K/UL (ref 0.1–0.95)
MONOCYTES NFR BLD: 5 % (ref 2–12)
NEUTROPHILS NFR BLD: 82 % (ref 43–80)
NEUTS SEG NFR BLD: 2.06 K/UL (ref 1.8–7.3)
PLATELET # BLD AUTO: 26 K/UL (ref 130–450)
PLATELET CONFIRMATION: NORMAL
PMV BLD AUTO: 10.8 FL (ref 7–12)
POTASSIUM SERPL-SCNC: 3.4 MMOL/L (ref 3.5–5)
PROT SERPL-MCNC: 5.5 G/DL (ref 6.4–8.3)
RBC # BLD AUTO: 2.49 M/UL (ref 3.8–5.8)
RBC # BLD: ABNORMAL 10*6/UL
SODIUM SERPL-SCNC: 137 MMOL/L (ref 132–146)
WBC OTHER # BLD: 2.5 K/UL (ref 4.5–11.5)

## 2024-02-23 PROCEDURE — 6370000000 HC RX 637 (ALT 250 FOR IP): Performed by: EMERGENCY MEDICINE

## 2024-02-23 PROCEDURE — 99233 SBSQ HOSP IP/OBS HIGH 50: CPT | Performed by: STUDENT IN AN ORGANIZED HEALTH CARE EDUCATION/TRAINING PROGRAM

## 2024-02-23 PROCEDURE — 6360000002 HC RX W HCPCS: Performed by: STUDENT IN AN ORGANIZED HEALTH CARE EDUCATION/TRAINING PROGRAM

## 2024-02-23 PROCEDURE — 83735 ASSAY OF MAGNESIUM: CPT

## 2024-02-23 PROCEDURE — 85025 COMPLETE CBC W/AUTO DIFF WBC: CPT

## 2024-02-23 PROCEDURE — 6360000002 HC RX W HCPCS

## 2024-02-23 PROCEDURE — 6370000000 HC RX 637 (ALT 250 FOR IP): Performed by: STUDENT IN AN ORGANIZED HEALTH CARE EDUCATION/TRAINING PROGRAM

## 2024-02-23 PROCEDURE — 36415 COLL VENOUS BLD VENIPUNCTURE: CPT

## 2024-02-23 PROCEDURE — 2580000003 HC RX 258: Performed by: EMERGENCY MEDICINE

## 2024-02-23 PROCEDURE — 2580000003 HC RX 258: Performed by: INTERNAL MEDICINE

## 2024-02-23 PROCEDURE — 80053 COMPREHEN METABOLIC PANEL: CPT

## 2024-02-23 PROCEDURE — 97530 THERAPEUTIC ACTIVITIES: CPT

## 2024-02-23 PROCEDURE — 2060000000 HC ICU INTERMEDIATE R&B

## 2024-02-23 PROCEDURE — 6370000000 HC RX 637 (ALT 250 FOR IP): Performed by: INTERNAL MEDICINE

## 2024-02-23 PROCEDURE — 97165 OT EVAL LOW COMPLEX 30 MIN: CPT

## 2024-02-23 RX ORDER — PROCHLORPERAZINE EDISYLATE 5 MG/ML
10 INJECTION INTRAMUSCULAR; INTRAVENOUS ONCE
Status: COMPLETED | OUTPATIENT
Start: 2024-02-23 | End: 2024-02-23

## 2024-02-23 RX ORDER — DRONABINOL 2.5 MG/1
5 CAPSULE ORAL 2 TIMES DAILY
Status: DISCONTINUED | OUTPATIENT
Start: 2024-02-23 | End: 2024-02-27 | Stop reason: HOSPADM

## 2024-02-23 RX ADMIN — MIDODRINE HYDROCHLORIDE 10 MG: 5 TABLET ORAL at 10:16

## 2024-02-23 RX ADMIN — RIFAXIMIN 550 MG: 550 TABLET ORAL at 10:14

## 2024-02-23 RX ADMIN — PANTOPRAZOLE SODIUM 40 MG: 40 TABLET, DELAYED RELEASE ORAL at 06:11

## 2024-02-23 RX ADMIN — SODIUM CHLORIDE, PRESERVATIVE FREE 10 ML: 5 INJECTION INTRAVENOUS at 11:21

## 2024-02-23 RX ADMIN — LACTULOSE 20 G: 10 SOLUTION ORAL at 13:00

## 2024-02-23 RX ADMIN — LACTULOSE 20 G: 10 SOLUTION ORAL at 20:52

## 2024-02-23 RX ADMIN — FENTANYL CITRATE 25 MCG: 50 INJECTION, SOLUTION INTRAMUSCULAR; INTRAVENOUS at 06:18

## 2024-02-23 RX ADMIN — FENTANYL CITRATE 25 MCG: 50 INJECTION, SOLUTION INTRAMUSCULAR; INTRAVENOUS at 13:03

## 2024-02-23 RX ADMIN — FENTANYL CITRATE 25 MCG: 50 INJECTION, SOLUTION INTRAMUSCULAR; INTRAVENOUS at 10:18

## 2024-02-23 RX ADMIN — MIDODRINE HYDROCHLORIDE 10 MG: 5 TABLET ORAL at 13:00

## 2024-02-23 RX ADMIN — SODIUM CHLORIDE, PRESERVATIVE FREE 10 ML: 5 INJECTION INTRAVENOUS at 23:03

## 2024-02-23 RX ADMIN — FOLIC ACID 1 MG: 1 TABLET ORAL at 10:21

## 2024-02-23 RX ADMIN — PROCHLORPERAZINE EDISYLATE 10 MG: 5 INJECTION INTRAMUSCULAR; INTRAVENOUS at 20:52

## 2024-02-23 RX ADMIN — MIDODRINE HYDROCHLORIDE 10 MG: 5 TABLET ORAL at 16:10

## 2024-02-23 RX ADMIN — LACTULOSE 20 G: 10 SOLUTION ORAL at 10:20

## 2024-02-23 RX ADMIN — POTASSIUM CHLORIDE 40 MEQ: 1500 TABLET, EXTENDED RELEASE ORAL at 14:35

## 2024-02-23 RX ADMIN — PENTOXIFYLLINE 400 MG: 400 TABLET, EXTENDED RELEASE ORAL at 12:59

## 2024-02-23 RX ADMIN — TRAZODONE HYDROCHLORIDE 50 MG: 50 TABLET ORAL at 20:52

## 2024-02-23 RX ADMIN — FENTANYL CITRATE 25 MCG: 50 INJECTION, SOLUTION INTRAMUSCULAR; INTRAVENOUS at 03:18

## 2024-02-23 RX ADMIN — SPIRONOLACTONE 50 MG: 25 TABLET ORAL at 10:13

## 2024-02-23 RX ADMIN — BUSPIRONE HYDROCHLORIDE 10 MG: 10 TABLET ORAL at 10:17

## 2024-02-23 RX ADMIN — SODIUM CHLORIDE, PRESERVATIVE FREE 10 ML: 5 INJECTION INTRAVENOUS at 10:20

## 2024-02-23 RX ADMIN — FUROSEMIDE 80 MG: 40 TABLET ORAL at 10:17

## 2024-02-23 RX ADMIN — DRONABINOL 5 MG: 2.5 CAPSULE ORAL at 22:27

## 2024-02-23 RX ADMIN — Medication 100 MG: at 10:17

## 2024-02-23 RX ADMIN — FENTANYL CITRATE 25 MCG: 50 INJECTION, SOLUTION INTRAMUSCULAR; INTRAVENOUS at 16:11

## 2024-02-23 RX ADMIN — FERROUS SULFATE TAB 325 MG (65 MG ELEMENTAL FE) 325 MG: 325 (65 FE) TAB at 10:17

## 2024-02-23 RX ADMIN — FENTANYL CITRATE 25 MCG: 50 INJECTION, SOLUTION INTRAMUSCULAR; INTRAVENOUS at 22:28

## 2024-02-23 RX ADMIN — PENTOXIFYLLINE 400 MG: 400 TABLET, EXTENDED RELEASE ORAL at 16:11

## 2024-02-23 RX ADMIN — RIFAXIMIN 550 MG: 550 TABLET ORAL at 22:27

## 2024-02-23 RX ADMIN — BUSPIRONE HYDROCHLORIDE 10 MG: 10 TABLET ORAL at 20:52

## 2024-02-23 RX ADMIN — FENTANYL CITRATE 25 MCG: 50 INJECTION, SOLUTION INTRAMUSCULAR; INTRAVENOUS at 18:57

## 2024-02-23 RX ADMIN — PENTOXIFYLLINE 400 MG: 400 TABLET, EXTENDED RELEASE ORAL at 10:14

## 2024-02-23 RX ADMIN — PANTOPRAZOLE SODIUM 40 MG: 40 TABLET, DELAYED RELEASE ORAL at 16:10

## 2024-02-23 ASSESSMENT — PAIN - FUNCTIONAL ASSESSMENT
PAIN_FUNCTIONAL_ASSESSMENT: PREVENTS OR INTERFERES WITH ALL ACTIVE AND SOME PASSIVE ACTIVITIES
PAIN_FUNCTIONAL_ASSESSMENT: PREVENTS OR INTERFERES WITH MANY ACTIVE NOT PASSIVE ACTIVITIES
PAIN_FUNCTIONAL_ASSESSMENT: PREVENTS OR INTERFERES WITH ALL ACTIVE AND SOME PASSIVE ACTIVITIES

## 2024-02-23 ASSESSMENT — PAIN DESCRIPTION - DESCRIPTORS
DESCRIPTORS: ACHING;DISCOMFORT;SORE
DESCRIPTORS: ACHING;DULL;DISCOMFORT
DESCRIPTORS: ACHING;DISCOMFORT;SORE
DESCRIPTORS: ACHING;DULL;DISCOMFORT
DESCRIPTORS: ACHING;DISCOMFORT;DULL
DESCRIPTORS: ACHING;DISCOMFORT;DULL
DESCRIPTORS: ACHING;DISCOMFORT;SORE

## 2024-02-23 ASSESSMENT — PAIN DESCRIPTION - FREQUENCY
FREQUENCY: CONTINUOUS

## 2024-02-23 ASSESSMENT — PAIN DESCRIPTION - ONSET
ONSET: ON-GOING

## 2024-02-23 ASSESSMENT — PAIN SCALES - GENERAL
PAINLEVEL_OUTOF10: 8
PAINLEVEL_OUTOF10: 7
PAINLEVEL_OUTOF10: 8
PAINLEVEL_OUTOF10: 5
PAINLEVEL_OUTOF10: 7

## 2024-02-23 ASSESSMENT — PAIN DESCRIPTION - LOCATION
LOCATION: ABDOMEN
LOCATION: BACK
LOCATION: ABDOMEN
LOCATION: ABDOMEN
LOCATION: GROIN
LOCATION: ABDOMEN

## 2024-02-23 ASSESSMENT — PAIN DESCRIPTION - PAIN TYPE
TYPE: CHRONIC PAIN
TYPE: CHRONIC PAIN

## 2024-02-23 ASSESSMENT — PAIN DESCRIPTION - ORIENTATION: ORIENTATION: LOWER;MID

## 2024-02-23 NOTE — DISCHARGE INSTR - COC
Continuity of Care Form    Patient Name: Johnathan Yanez III   :  1980  MRN:  [id number]    Admit date:  2024  Discharge date:  ***    Code Status Order: Full Code   Advance Directives:     Admitting Physician:  John Herrera DO  PCP: No primary care provider on file.    Discharging Nurse: ***  Discharging Hospital Unit/Room#: 0624/0624-A  Discharging Unit Phone Number: ***    Emergency Contact:   Extended Emergency Contact Information  Primary Emergency Contact: JenniferPeggy  Address: [address] [place] [address]  Home Phone: [phone]  Mobile Phone: [phone]  Relation: Girlfriend  Preferred language: English   needed? No  Secondary Emergency Contact: Mikaela Russo  Address: [address] [place] [address]  Home Phone: [phone]  Mobile Phone: [phone]  Relation: Parent  Preferred language: English   needed? No    Past Surgical History:  Past Surgical History:   Procedure Laterality Date    ENDOSCOPY, COLON, DIAGNOSTIC      ESOPHAGOSCOPY N/A 2024    ESOPHAGOSCOPY CONTROL HEMORRHAGE performed by Tino Bal DO at [facility] OR    HERNIA REPAIR      HERNIA REPAIR Left 2022    LAPAROSCOPIC LEFT INGUINAL HERNIA REPAIR WITH MESH POSSIBLE OPEN POSSIBLE BILATERAL performed by Shashank Candelario MD at [facility] OR    TONSILLECTOMY      UMBILICAL HERNIA REPAIR N/A 9/15/2021    LAPAROSCOPIC POSSIBLE OPEN UMBILICAL HERNIA REPAIR WITH MSH performed by Shashank Candelario MD at [facility] OR    UPPER GASTROINTESTINAL ENDOSCOPY N/A 2020    EGD BIOPSY performed by Milo Carrasco MD at [facility]    UPPER GASTROINTESTINAL ENDOSCOPY N/A 2021    EGD BAND LIGATION performed by JOIE Huizar MD at [facility] ENDOSCOPY    UPPER GASTROINTESTINAL ENDOSCOPY N/A 5/10/2021    EGD BAND LIGATION performed by JOIE Huizar MD at [facility] OR    UPPER GASTROINTESTINAL ENDOSCOPY N/A 2023    EGD

## 2024-02-23 NOTE — CARE COORDINATION
Social work / Discharge Planning:        Patient is a readmission from [facility].    Will need new precert to return.   PT/OT evaluations ordered.    SHABANA and transport form completed.    Electronically signed by JOHN Velasco on 2/23/2024 at 8:39 AM

## 2024-02-23 NOTE — ACP (ADVANCE CARE PLANNING)
Advance Care Planning   Healthcare Decision Maker:    Primary Decision Maker: Mikaela Russo - Parent - [phone]    Primary Decision Maker: Johnathan Yanez - Parent - [phone]    Secondary Decision Maker: Angeles Smallwood \"Naty\" - Brother/Sister - [phone]    Supplemental (Other) Decision Maker: Peggy Rodriguez - Girlfriend - [phone]    Click here to complete Healthcare Decision Makers including selection of the Healthcare Decision Maker Relationship (ie \"Primary\").

## 2024-02-23 NOTE — CONSULTS
2/23/2024  10:45 AM  Service: Urology  Group: [facility] ([facility]ip[facility]/Buffy/Rob)    Johnathan Yanez III  [id number]     Chief Complaint/reason for consultation:     Hydrocele, scrotal abscess     History of Present Illness:      The patient is a 44 y.o. male patient with a hx of hepatitis C, poly substance abuse, alcoholic cirrhosis with ascites, portal hypertension, esophageal varies, scrotal edema, hydrocele.  He did have hydrocele aspirated in the past. Scrotal fluid returned.   Surgery was not recommended d/t cirrhosis and connection to his abdomen.   He did see Dr. Nathaniel Magaña at [facility] who agreed. Pt did ask for [place]chiectomy in [place] however they would not consider thisto be appropriate for his condition.       Past Medical History:   Diagnosis Date    Cirrhosis (HCC)     Esophageal varices (HCC)     ETOH abuse     GAVE (gastric antral vascular ectasia)     Hepatitis C     Portal hypertension (HCC)          Past Surgical History:   Procedure Laterality Date    ENDOSCOPY, COLON, DIAGNOSTIC      ESOPHAGOSCOPY N/A 2/18/2024    ESOPHAGOSCOPY CONTROL HEMORRHAGE performed by Tino Bal DO at [facility] OR    HERNIA REPAIR      HERNIA REPAIR Left 2/7/2022    LAPAROSCOPIC LEFT INGUINAL HERNIA REPAIR WITH MESH POSSIBLE OPEN POSSIBLE BILATERAL performed by Shashank Candelario MD at [facility] OR    TONSILLECTOMY      UMBILICAL HERNIA REPAIR N/A 9/15/2021    LAPAROSCOPIC POSSIBLE OPEN UMBILICAL HERNIA REPAIR WITH MSH performed by Shashank Candelario MD at [facility] OR    UPPER GASTROINTESTINAL ENDOSCOPY N/A 11/13/2020    EGD BIOPSY performed by Milo Carrasco MD at [facility]OSCOPY    UPPER GASTROINTESTINAL ENDOSCOPY N/A 1/6/2021    EGD BAND LIGATION performed by JOIE Huizar MD at [facility] ENDOSCOPY    UPPER GASTROINTESTINAL ENDOSCOPY N/A 5/10/2021    EGD BAND LIGATION performed by JOIE Huizar MD at [facility] OR    UPPER GASTROINTESTINAL ENDOSCOPY N/A 5/14/2023    EGD CONTROL HEMORRHAGE performed by Atif Johnson MD at

## 2024-02-24 LAB
ALBUMIN SERPL-MCNC: 3.1 G/DL (ref 3.5–5.2)
ALP SERPL-CCNC: 88 U/L (ref 40–129)
ALT SERPL-CCNC: 20 U/L (ref 0–40)
ANION GAP SERPL CALCULATED.3IONS-SCNC: 8 MMOL/L (ref 7–16)
AST SERPL-CCNC: 53 U/L (ref 0–39)
BASOPHILS # BLD: 0 K/UL (ref 0–0.2)
BASOPHILS NFR BLD: 0 % (ref 0–2)
BILIRUB SERPL-MCNC: 5.1 MG/DL (ref 0–1.2)
BUN SERPL-MCNC: 8 MG/DL (ref 6–20)
CALCIUM SERPL-MCNC: 8.2 MG/DL (ref 8.6–10.2)
CHLORIDE SERPL-SCNC: 101 MMOL/L (ref 98–107)
CO2 SERPL-SCNC: 24 MMOL/L (ref 22–29)
CREAT SERPL-MCNC: 1 MG/DL (ref 0.7–1.2)
EOSINOPHIL # BLD: 0.09 K/UL (ref 0.05–0.5)
EOSINOPHILS RELATIVE PERCENT: 3 % (ref 0–6)
ERYTHROCYTE [DISTWIDTH] IN BLOOD BY AUTOMATED COUNT: 18.6 % (ref 11.5–15)
GFR SERPL CREATININE-BSD FRML MDRD: >60 ML/MIN/1.73M2
GLUCOSE SERPL-MCNC: 96 MG/DL (ref 74–99)
HCT VFR BLD AUTO: 24.5 % (ref 37–54)
HGB BLD-MCNC: 8.1 G/DL (ref 12.5–16.5)
LYMPHOCYTES NFR BLD: 0.25 K/UL (ref 1.5–4)
LYMPHOCYTES RELATIVE PERCENT: 10 % (ref 20–42)
MAGNESIUM SERPL-MCNC: 1.6 MG/DL (ref 1.6–2.6)
MCH RBC QN AUTO: 34.6 PG (ref 26–35)
MCHC RBC AUTO-ENTMCNC: 33.1 G/DL (ref 32–34.5)
MCV RBC AUTO: 104.7 FL (ref 80–99.9)
METAMYELOCYTES ABSOLUTE COUNT: 0.02 K/UL (ref 0–0.12)
METAMYELOCYTES: 1 % (ref 0–1)
MONOCYTES NFR BLD: 0.22 K/UL (ref 0.1–0.95)
MONOCYTES NFR BLD: 9 % (ref 2–12)
MYELOCYTES ABSOLUTE COUNT: 0.02 K/UL
MYELOCYTES: 1 %
NEUTROPHILS NFR BLD: 77 % (ref 43–80)
NEUTS SEG NFR BLD: 1.99 K/UL (ref 1.8–7.3)
PLATELET CONFIRMATION: NORMAL
PLATELET, FLUORESCENCE: 23 K/UL (ref 130–450)
PMV BLD AUTO: 10.2 FL (ref 7–12)
POTASSIUM SERPL-SCNC: 3.9 MMOL/L (ref 3.5–5)
PROT SERPL-MCNC: 5.2 G/DL (ref 6.4–8.3)
RBC # BLD AUTO: 2.34 M/UL (ref 3.8–5.8)
RBC # BLD: ABNORMAL 10*6/UL
SODIUM SERPL-SCNC: 133 MMOL/L (ref 132–146)
WBC OTHER # BLD: 2.6 K/UL (ref 4.5–11.5)

## 2024-02-24 PROCEDURE — 6360000002 HC RX W HCPCS

## 2024-02-24 PROCEDURE — 6370000000 HC RX 637 (ALT 250 FOR IP): Performed by: STUDENT IN AN ORGANIZED HEALTH CARE EDUCATION/TRAINING PROGRAM

## 2024-02-24 PROCEDURE — 6370000000 HC RX 637 (ALT 250 FOR IP)

## 2024-02-24 PROCEDURE — 6370000000 HC RX 637 (ALT 250 FOR IP): Performed by: INTERNAL MEDICINE

## 2024-02-24 PROCEDURE — 99233 SBSQ HOSP IP/OBS HIGH 50: CPT | Performed by: INTERNAL MEDICINE

## 2024-02-24 PROCEDURE — 83735 ASSAY OF MAGNESIUM: CPT

## 2024-02-24 PROCEDURE — 80053 COMPREHEN METABOLIC PANEL: CPT

## 2024-02-24 PROCEDURE — 2580000003 HC RX 258: Performed by: EMERGENCY MEDICINE

## 2024-02-24 PROCEDURE — 85025 COMPLETE CBC W/AUTO DIFF WBC: CPT

## 2024-02-24 PROCEDURE — 36415 COLL VENOUS BLD VENIPUNCTURE: CPT

## 2024-02-24 PROCEDURE — 6370000000 HC RX 637 (ALT 250 FOR IP): Performed by: EMERGENCY MEDICINE

## 2024-02-24 PROCEDURE — 2060000000 HC ICU INTERMEDIATE R&B

## 2024-02-24 PROCEDURE — 2580000003 HC RX 258: Performed by: INTERNAL MEDICINE

## 2024-02-24 RX ORDER — PROCHLORPERAZINE EDISYLATE 5 MG/ML
10 INJECTION INTRAMUSCULAR; INTRAVENOUS EVERY 6 HOURS PRN
Status: DISCONTINUED | OUTPATIENT
Start: 2024-02-24 | End: 2024-02-27 | Stop reason: HOSPADM

## 2024-02-24 RX ADMIN — SODIUM CHLORIDE, PRESERVATIVE FREE 10 ML: 5 INJECTION INTRAVENOUS at 07:35

## 2024-02-24 RX ADMIN — HYDROMORPHONE HYDROCHLORIDE 0.5 MG: 1 INJECTION, SOLUTION INTRAMUSCULAR; INTRAVENOUS; SUBCUTANEOUS at 09:54

## 2024-02-24 RX ADMIN — MIDODRINE HYDROCHLORIDE 10 MG: 5 TABLET ORAL at 16:42

## 2024-02-24 RX ADMIN — HYDROMORPHONE HYDROCHLORIDE 0.5 MG: 1 INJECTION, SOLUTION INTRAMUSCULAR; INTRAVENOUS; SUBCUTANEOUS at 05:41

## 2024-02-24 RX ADMIN — Medication 100 MG: at 07:34

## 2024-02-24 RX ADMIN — MIDODRINE HYDROCHLORIDE 10 MG: 5 TABLET ORAL at 07:33

## 2024-02-24 RX ADMIN — TRAZODONE HYDROCHLORIDE 50 MG: 50 TABLET ORAL at 20:23

## 2024-02-24 RX ADMIN — SPIRONOLACTONE 50 MG: 25 TABLET ORAL at 07:34

## 2024-02-24 RX ADMIN — HYDROMORPHONE HYDROCHLORIDE 0.5 MG: 1 INJECTION, SOLUTION INTRAMUSCULAR; INTRAVENOUS; SUBCUTANEOUS at 23:31

## 2024-02-24 RX ADMIN — FUROSEMIDE 80 MG: 40 TABLET ORAL at 07:34

## 2024-02-24 RX ADMIN — RIFAXIMIN 550 MG: 550 TABLET ORAL at 07:34

## 2024-02-24 RX ADMIN — PANTOPRAZOLE SODIUM 40 MG: 40 TABLET, DELAYED RELEASE ORAL at 16:42

## 2024-02-24 RX ADMIN — HYDROMORPHONE HYDROCHLORIDE 0.5 MG: 1 INJECTION, SOLUTION INTRAMUSCULAR; INTRAVENOUS; SUBCUTANEOUS at 01:01

## 2024-02-24 RX ADMIN — SODIUM CHLORIDE, PRESERVATIVE FREE 10 ML: 5 INJECTION INTRAVENOUS at 20:26

## 2024-02-24 RX ADMIN — FOLIC ACID 1 MG: 1 TABLET ORAL at 07:34

## 2024-02-24 RX ADMIN — LACTULOSE 20 G: 10 SOLUTION ORAL at 14:38

## 2024-02-24 RX ADMIN — HYDROMORPHONE HYDROCHLORIDE 0.5 MG: 1 INJECTION, SOLUTION INTRAMUSCULAR; INTRAVENOUS; SUBCUTANEOUS at 14:37

## 2024-02-24 RX ADMIN — DRONABINOL 5 MG: 2.5 CAPSULE ORAL at 07:34

## 2024-02-24 RX ADMIN — PANTOPRAZOLE SODIUM 40 MG: 40 TABLET, DELAYED RELEASE ORAL at 07:33

## 2024-02-24 RX ADMIN — BUSPIRONE HYDROCHLORIDE 10 MG: 10 TABLET ORAL at 07:34

## 2024-02-24 RX ADMIN — LACTULOSE 20 G: 10 SOLUTION ORAL at 20:23

## 2024-02-24 RX ADMIN — Medication 6 MG: at 20:23

## 2024-02-24 RX ADMIN — FERROUS SULFATE TAB 325 MG (65 MG ELEMENTAL FE) 325 MG: 325 (65 FE) TAB at 07:34

## 2024-02-24 RX ADMIN — PENTOXIFYLLINE 400 MG: 400 TABLET, EXTENDED RELEASE ORAL at 11:20

## 2024-02-24 RX ADMIN — PROCHLORPERAZINE EDISYLATE 10 MG: 5 INJECTION INTRAMUSCULAR; INTRAVENOUS at 01:01

## 2024-02-24 RX ADMIN — PENTOXIFYLLINE 400 MG: 400 TABLET, EXTENDED RELEASE ORAL at 07:35

## 2024-02-24 RX ADMIN — MIDODRINE HYDROCHLORIDE 10 MG: 5 TABLET ORAL at 11:20

## 2024-02-24 RX ADMIN — RIFAXIMIN 550 MG: 550 TABLET ORAL at 20:23

## 2024-02-24 RX ADMIN — BUSPIRONE HYDROCHLORIDE 10 MG: 10 TABLET ORAL at 20:23

## 2024-02-24 RX ADMIN — Medication 6 MG: at 01:01

## 2024-02-24 RX ADMIN — HYDROMORPHONE HYDROCHLORIDE 0.5 MG: 1 INJECTION, SOLUTION INTRAMUSCULAR; INTRAVENOUS; SUBCUTANEOUS at 18:44

## 2024-02-24 RX ADMIN — PENTOXIFYLLINE 400 MG: 400 TABLET, EXTENDED RELEASE ORAL at 16:42

## 2024-02-24 ASSESSMENT — PAIN SCALES - GENERAL
PAINLEVEL_OUTOF10: 8

## 2024-02-24 ASSESSMENT — PAIN DESCRIPTION - LOCATION
LOCATION: ABDOMEN

## 2024-02-24 ASSESSMENT — PAIN DESCRIPTION - DESCRIPTORS
DESCRIPTORS: ACHING;DISCOMFORT;SORE
DESCRIPTORS: ACHING;DISCOMFORT;SORE

## 2024-02-25 LAB
ALBUMIN SERPL-MCNC: 3 G/DL (ref 3.5–5.2)
ALP SERPL-CCNC: 91 U/L (ref 40–129)
ALT SERPL-CCNC: 23 U/L (ref 0–40)
ANION GAP SERPL CALCULATED.3IONS-SCNC: 5 MMOL/L (ref 7–16)
AST SERPL-CCNC: 54 U/L (ref 0–39)
BASOPHILS # BLD: 0 K/UL (ref 0–0.2)
BASOPHILS NFR BLD: 0 % (ref 0–2)
BILIRUB SERPL-MCNC: 4.6 MG/DL (ref 0–1.2)
BUN SERPL-MCNC: 8 MG/DL (ref 6–20)
CALCIUM SERPL-MCNC: 8.2 MG/DL (ref 8.6–10.2)
CHLORIDE SERPL-SCNC: 101 MMOL/L (ref 98–107)
CO2 SERPL-SCNC: 28 MMOL/L (ref 22–29)
CREAT SERPL-MCNC: 1.1 MG/DL (ref 0.7–1.2)
EOSINOPHIL # BLD: 0.14 K/UL (ref 0.05–0.5)
EOSINOPHILS RELATIVE PERCENT: 4 % (ref 0–6)
ERYTHROCYTE [DISTWIDTH] IN BLOOD BY AUTOMATED COUNT: 18 % (ref 11.5–15)
GFR SERPL CREATININE-BSD FRML MDRD: >60 ML/MIN/1.73M2
GLUCOSE SERPL-MCNC: 86 MG/DL (ref 74–99)
HCT VFR BLD AUTO: 22.9 % (ref 37–54)
HGB BLD-MCNC: 7.7 G/DL (ref 12.5–16.5)
LYMPHOCYTES NFR BLD: 0.28 K/UL (ref 1.5–4)
LYMPHOCYTES RELATIVE PERCENT: 9 % (ref 20–42)
MAGNESIUM SERPL-MCNC: 1.5 MG/DL (ref 1.6–2.6)
MCH RBC QN AUTO: 33.8 PG (ref 26–35)
MCHC RBC AUTO-ENTMCNC: 33.6 G/DL (ref 32–34.5)
MCV RBC AUTO: 100.4 FL (ref 80–99.9)
MONOCYTES NFR BLD: 0.22 K/UL (ref 0.1–0.95)
MONOCYTES NFR BLD: 7 % (ref 2–12)
NEUTROPHILS NFR BLD: 80 % (ref 43–80)
NEUTS SEG NFR BLD: 2.55 K/UL (ref 1.8–7.3)
PLATELET CONFIRMATION: NORMAL
PLATELET ESTIMATE: ABNORMAL
PLATELET, FLUORESCENCE: 25 K/UL (ref 130–450)
PMV BLD AUTO: 10.7 FL (ref 7–12)
POTASSIUM SERPL-SCNC: 3.6 MMOL/L (ref 3.5–5)
PROT SERPL-MCNC: 5 G/DL (ref 6.4–8.3)
RBC # BLD AUTO: 2.28 M/UL (ref 3.8–5.8)
RBC # BLD: ABNORMAL 10*6/UL
SODIUM SERPL-SCNC: 134 MMOL/L (ref 132–146)
WBC # BLD: ABNORMAL 10*3/UL
WBC OTHER # BLD: 3.2 K/UL (ref 4.5–11.5)

## 2024-02-25 PROCEDURE — 97161 PT EVAL LOW COMPLEX 20 MIN: CPT

## 2024-02-25 PROCEDURE — 6370000000 HC RX 637 (ALT 250 FOR IP): Performed by: EMERGENCY MEDICINE

## 2024-02-25 PROCEDURE — 2580000003 HC RX 258: Performed by: INTERNAL MEDICINE

## 2024-02-25 PROCEDURE — 6370000000 HC RX 637 (ALT 250 FOR IP): Performed by: STUDENT IN AN ORGANIZED HEALTH CARE EDUCATION/TRAINING PROGRAM

## 2024-02-25 PROCEDURE — 85025 COMPLETE CBC W/AUTO DIFF WBC: CPT

## 2024-02-25 PROCEDURE — 6370000000 HC RX 637 (ALT 250 FOR IP): Performed by: INTERNAL MEDICINE

## 2024-02-25 PROCEDURE — 6360000002 HC RX W HCPCS

## 2024-02-25 PROCEDURE — 99233 SBSQ HOSP IP/OBS HIGH 50: CPT | Performed by: INTERNAL MEDICINE

## 2024-02-25 PROCEDURE — 2580000003 HC RX 258: Performed by: EMERGENCY MEDICINE

## 2024-02-25 PROCEDURE — 6360000002 HC RX W HCPCS: Performed by: INTERNAL MEDICINE

## 2024-02-25 PROCEDURE — 2060000000 HC ICU INTERMEDIATE R&B

## 2024-02-25 PROCEDURE — 97530 THERAPEUTIC ACTIVITIES: CPT

## 2024-02-25 PROCEDURE — 83735 ASSAY OF MAGNESIUM: CPT

## 2024-02-25 PROCEDURE — 80053 COMPREHEN METABOLIC PANEL: CPT

## 2024-02-25 RX ORDER — BACLOFEN 10 MG/1
10 TABLET ORAL 3 TIMES DAILY
Status: DISCONTINUED | OUTPATIENT
Start: 2024-02-25 | End: 2024-02-27 | Stop reason: HOSPADM

## 2024-02-25 RX ADMIN — BUSPIRONE HYDROCHLORIDE 10 MG: 10 TABLET ORAL at 08:36

## 2024-02-25 RX ADMIN — BACLOFEN 10 MG: 10 TABLET ORAL at 12:49

## 2024-02-25 RX ADMIN — PANTOPRAZOLE SODIUM 40 MG: 40 TABLET, DELAYED RELEASE ORAL at 06:24

## 2024-02-25 RX ADMIN — BUSPIRONE HYDROCHLORIDE 10 MG: 10 TABLET ORAL at 21:07

## 2024-02-25 RX ADMIN — MAGNESIUM SULFATE HEPTAHYDRATE 2000 MG: 40 INJECTION, SOLUTION INTRAVENOUS at 06:27

## 2024-02-25 RX ADMIN — FUROSEMIDE 80 MG: 40 TABLET ORAL at 08:37

## 2024-02-25 RX ADMIN — PENTOXIFYLLINE 400 MG: 400 TABLET, EXTENDED RELEASE ORAL at 16:55

## 2024-02-25 RX ADMIN — OXYCODONE 5 MG: 5 TABLET ORAL at 12:49

## 2024-02-25 RX ADMIN — RIFAXIMIN 550 MG: 550 TABLET ORAL at 08:36

## 2024-02-25 RX ADMIN — OXYCODONE 5 MG: 5 TABLET ORAL at 16:55

## 2024-02-25 RX ADMIN — FOLIC ACID 1 MG: 1 TABLET ORAL at 08:37

## 2024-02-25 RX ADMIN — MIDODRINE HYDROCHLORIDE 10 MG: 5 TABLET ORAL at 16:55

## 2024-02-25 RX ADMIN — PANTOPRAZOLE SODIUM 40 MG: 40 TABLET, DELAYED RELEASE ORAL at 16:55

## 2024-02-25 RX ADMIN — PENTOXIFYLLINE 400 MG: 400 TABLET, EXTENDED RELEASE ORAL at 11:17

## 2024-02-25 RX ADMIN — SPIRONOLACTONE 50 MG: 25 TABLET ORAL at 08:36

## 2024-02-25 RX ADMIN — HYDROMORPHONE HYDROCHLORIDE 0.5 MG: 1 INJECTION, SOLUTION INTRAMUSCULAR; INTRAVENOUS; SUBCUTANEOUS at 03:30

## 2024-02-25 RX ADMIN — FERROUS SULFATE TAB 325 MG (65 MG ELEMENTAL FE) 325 MG: 325 (65 FE) TAB at 08:36

## 2024-02-25 RX ADMIN — MIDODRINE HYDROCHLORIDE 10 MG: 5 TABLET ORAL at 11:17

## 2024-02-25 RX ADMIN — ONDANSETRON 4 MG: 2 INJECTION INTRAMUSCULAR; INTRAVENOUS at 16:55

## 2024-02-25 RX ADMIN — DRONABINOL 5 MG: 2.5 CAPSULE ORAL at 21:06

## 2024-02-25 RX ADMIN — SODIUM CHLORIDE, PRESERVATIVE FREE 10 ML: 5 INJECTION INTRAVENOUS at 08:37

## 2024-02-25 RX ADMIN — TRAZODONE HYDROCHLORIDE 50 MG: 50 TABLET ORAL at 21:08

## 2024-02-25 RX ADMIN — BACLOFEN 10 MG: 10 TABLET ORAL at 21:07

## 2024-02-25 RX ADMIN — PENTOXIFYLLINE 400 MG: 400 TABLET, EXTENDED RELEASE ORAL at 08:36

## 2024-02-25 RX ADMIN — BACLOFEN 10 MG: 10 TABLET ORAL at 11:17

## 2024-02-25 RX ADMIN — DRONABINOL 5 MG: 2.5 CAPSULE ORAL at 08:36

## 2024-02-25 RX ADMIN — LACTULOSE 20 G: 10 SOLUTION ORAL at 08:36

## 2024-02-25 RX ADMIN — Medication 100 MG: at 08:37

## 2024-02-25 RX ADMIN — LACTULOSE 20 G: 10 SOLUTION ORAL at 12:49

## 2024-02-25 RX ADMIN — HYDROMORPHONE HYDROCHLORIDE 0.5 MG: 1 INJECTION, SOLUTION INTRAMUSCULAR; INTRAVENOUS; SUBCUTANEOUS at 08:41

## 2024-02-25 RX ADMIN — RIFAXIMIN 550 MG: 550 TABLET ORAL at 21:07

## 2024-02-25 RX ADMIN — Medication 6 MG: at 21:06

## 2024-02-25 RX ADMIN — OXYCODONE 5 MG: 5 TABLET ORAL at 21:06

## 2024-02-25 RX ADMIN — LACTULOSE 20 G: 10 SOLUTION ORAL at 21:08

## 2024-02-25 RX ADMIN — MIDODRINE HYDROCHLORIDE 10 MG: 5 TABLET ORAL at 08:37

## 2024-02-25 ASSESSMENT — PAIN DESCRIPTION - ORIENTATION
ORIENTATION: MID
ORIENTATION: MID

## 2024-02-25 ASSESSMENT — PAIN SCALES - GENERAL
PAINLEVEL_OUTOF10: 8
PAINLEVEL_OUTOF10: 5
PAINLEVEL_OUTOF10: 8
PAINLEVEL_OUTOF10: 4

## 2024-02-25 ASSESSMENT — PAIN DESCRIPTION - LOCATION
LOCATION: ABDOMEN

## 2024-02-25 ASSESSMENT — PAIN DESCRIPTION - DESCRIPTORS: DESCRIPTORS: ACHING;DISCOMFORT

## 2024-02-25 ASSESSMENT — PAIN - FUNCTIONAL ASSESSMENT: PAIN_FUNCTIONAL_ASSESSMENT: ACTIVITIES ARE NOT PREVENTED

## 2024-02-26 LAB
ALBUMIN SERPL-MCNC: 3 G/DL (ref 3.5–5.2)
ALP SERPL-CCNC: 100 U/L (ref 40–129)
ALT SERPL-CCNC: 23 U/L (ref 0–40)
ANION GAP SERPL CALCULATED.3IONS-SCNC: 4 MMOL/L (ref 7–16)
AST SERPL-CCNC: 54 U/L (ref 0–39)
BASOPHILS # BLD: 0.02 K/UL (ref 0–0.2)
BASOPHILS NFR BLD: 1 % (ref 0–2)
BILIRUB SERPL-MCNC: 4.5 MG/DL (ref 0–1.2)
BUN SERPL-MCNC: 9 MG/DL (ref 6–20)
CALCIUM SERPL-MCNC: 8.7 MG/DL (ref 8.6–10.2)
CHLORIDE SERPL-SCNC: 98 MMOL/L (ref 98–107)
CO2 SERPL-SCNC: 29 MMOL/L (ref 22–29)
CREAT SERPL-MCNC: 1 MG/DL (ref 0.7–1.2)
EOSINOPHIL # BLD: 0.26 K/UL (ref 0.05–0.5)
EOSINOPHILS RELATIVE PERCENT: 6 % (ref 0–6)
ERYTHROCYTE [DISTWIDTH] IN BLOOD BY AUTOMATED COUNT: 17.7 % (ref 11.5–15)
GFR SERPL CREATININE-BSD FRML MDRD: >60 ML/MIN/1.73M2
GLUCOSE SERPL-MCNC: 96 MG/DL (ref 74–99)
HCT VFR BLD AUTO: 22.8 % (ref 37–54)
HGB BLD-MCNC: 7.9 G/DL (ref 12.5–16.5)
IMM GRANULOCYTES # BLD AUTO: 0.04 K/UL (ref 0–0.58)
IMM GRANULOCYTES NFR BLD: 1 % (ref 0–5)
LYMPHOCYTES NFR BLD: 0.5 K/UL (ref 1.5–4)
LYMPHOCYTES RELATIVE PERCENT: 12 % (ref 20–42)
MAGNESIUM SERPL-MCNC: 1.9 MG/DL (ref 1.6–2.6)
MCH RBC QN AUTO: 35.3 PG (ref 26–35)
MCHC RBC AUTO-ENTMCNC: 34.6 G/DL (ref 32–34.5)
MCV RBC AUTO: 101.8 FL (ref 80–99.9)
MONOCYTES NFR BLD: 0.47 K/UL (ref 0.1–0.95)
MONOCYTES NFR BLD: 12 % (ref 2–12)
NEUTROPHILS NFR BLD: 68 % (ref 43–80)
NEUTS SEG NFR BLD: 2.8 K/UL (ref 1.8–7.3)
PLATELET # BLD AUTO: 31 K/UL (ref 130–450)
PLATELET CONFIRMATION: NORMAL
PMV BLD AUTO: 11.2 FL (ref 7–12)
POTASSIUM SERPL-SCNC: 4.1 MMOL/L (ref 3.5–5)
PROT SERPL-MCNC: 5.2 G/DL (ref 6.4–8.3)
RBC # BLD AUTO: 2.24 M/UL (ref 3.8–5.8)
RBC # BLD: ABNORMAL 10*6/UL
SODIUM SERPL-SCNC: 131 MMOL/L (ref 132–146)
WBC OTHER # BLD: 4.1 K/UL (ref 4.5–11.5)

## 2024-02-26 PROCEDURE — 83735 ASSAY OF MAGNESIUM: CPT

## 2024-02-26 PROCEDURE — 80053 COMPREHEN METABOLIC PANEL: CPT

## 2024-02-26 PROCEDURE — 2060000000 HC ICU INTERMEDIATE R&B

## 2024-02-26 PROCEDURE — 6370000000 HC RX 637 (ALT 250 FOR IP): Performed by: INTERNAL MEDICINE

## 2024-02-26 PROCEDURE — 6370000000 HC RX 637 (ALT 250 FOR IP)

## 2024-02-26 PROCEDURE — 85025 COMPLETE CBC W/AUTO DIFF WBC: CPT

## 2024-02-26 PROCEDURE — 97530 THERAPEUTIC ACTIVITIES: CPT

## 2024-02-26 PROCEDURE — 2580000003 HC RX 258: Performed by: EMERGENCY MEDICINE

## 2024-02-26 PROCEDURE — 6370000000 HC RX 637 (ALT 250 FOR IP): Performed by: EMERGENCY MEDICINE

## 2024-02-26 PROCEDURE — 6370000000 HC RX 637 (ALT 250 FOR IP): Performed by: STUDENT IN AN ORGANIZED HEALTH CARE EDUCATION/TRAINING PROGRAM

## 2024-02-26 PROCEDURE — 99233 SBSQ HOSP IP/OBS HIGH 50: CPT | Performed by: INTERNAL MEDICINE

## 2024-02-26 PROCEDURE — 2580000003 HC RX 258: Performed by: INTERNAL MEDICINE

## 2024-02-26 RX ADMIN — PENTOXIFYLLINE 400 MG: 400 TABLET, EXTENDED RELEASE ORAL at 18:04

## 2024-02-26 RX ADMIN — MIDODRINE HYDROCHLORIDE 10 MG: 5 TABLET ORAL at 08:13

## 2024-02-26 RX ADMIN — PENTOXIFYLLINE 400 MG: 400 TABLET, EXTENDED RELEASE ORAL at 13:47

## 2024-02-26 RX ADMIN — LACTULOSE 20 G: 10 SOLUTION ORAL at 14:44

## 2024-02-26 RX ADMIN — PANTOPRAZOLE SODIUM 40 MG: 40 TABLET, DELAYED RELEASE ORAL at 15:41

## 2024-02-26 RX ADMIN — BACLOFEN 10 MG: 10 TABLET ORAL at 20:03

## 2024-02-26 RX ADMIN — SODIUM CHLORIDE, PRESERVATIVE FREE 10 ML: 5 INJECTION INTRAVENOUS at 08:14

## 2024-02-26 RX ADMIN — SODIUM CHLORIDE, PRESERVATIVE FREE 5 ML: 5 INJECTION INTRAVENOUS at 08:18

## 2024-02-26 RX ADMIN — SODIUM CHLORIDE, PRESERVATIVE FREE 10 ML: 5 INJECTION INTRAVENOUS at 20:02

## 2024-02-26 RX ADMIN — BACLOFEN 10 MG: 10 TABLET ORAL at 08:09

## 2024-02-26 RX ADMIN — SODIUM CHLORIDE, PRESERVATIVE FREE 10 ML: 5 INJECTION INTRAVENOUS at 20:05

## 2024-02-26 RX ADMIN — FERROUS SULFATE TAB 325 MG (65 MG ELEMENTAL FE) 325 MG: 325 (65 FE) TAB at 08:11

## 2024-02-26 RX ADMIN — FOLIC ACID 1 MG: 1 TABLET ORAL at 08:10

## 2024-02-26 RX ADMIN — PANTOPRAZOLE SODIUM 40 MG: 40 TABLET, DELAYED RELEASE ORAL at 06:27

## 2024-02-26 RX ADMIN — RIFAXIMIN 550 MG: 550 TABLET ORAL at 20:03

## 2024-02-26 RX ADMIN — BUSPIRONE HYDROCHLORIDE 10 MG: 10 TABLET ORAL at 20:03

## 2024-02-26 RX ADMIN — OXYCODONE 5 MG: 5 TABLET ORAL at 11:43

## 2024-02-26 RX ADMIN — LACTULOSE 20 G: 10 SOLUTION ORAL at 20:04

## 2024-02-26 RX ADMIN — Medication 6 MG: at 20:04

## 2024-02-26 RX ADMIN — SODIUM CHLORIDE, PRESERVATIVE FREE 10 ML: 5 INJECTION INTRAVENOUS at 20:04

## 2024-02-26 RX ADMIN — LACTULOSE 20 G: 10 SOLUTION ORAL at 08:11

## 2024-02-26 RX ADMIN — DRONABINOL 5 MG: 2.5 CAPSULE ORAL at 08:10

## 2024-02-26 RX ADMIN — OXYCODONE 5 MG: 5 TABLET ORAL at 20:03

## 2024-02-26 RX ADMIN — RIFAXIMIN 550 MG: 550 TABLET ORAL at 08:11

## 2024-02-26 RX ADMIN — Medication 6 MG: at 20:02

## 2024-02-26 RX ADMIN — OXYCODONE 5 MG: 5 TABLET ORAL at 15:46

## 2024-02-26 RX ADMIN — PENTOXIFYLLINE 400 MG: 400 TABLET, EXTENDED RELEASE ORAL at 08:11

## 2024-02-26 RX ADMIN — MIDODRINE HYDROCHLORIDE 10 MG: 5 TABLET ORAL at 18:04

## 2024-02-26 RX ADMIN — MIDODRINE HYDROCHLORIDE 10 MG: 5 TABLET ORAL at 13:46

## 2024-02-26 RX ADMIN — TRAZODONE HYDROCHLORIDE 50 MG: 50 TABLET ORAL at 20:03

## 2024-02-26 RX ADMIN — SODIUM CHLORIDE, PRESERVATIVE FREE 10 ML: 5 INJECTION INTRAVENOUS at 08:13

## 2024-02-26 RX ADMIN — Medication 100 MG: at 08:10

## 2024-02-26 RX ADMIN — OXYCODONE 5 MG: 5 TABLET ORAL at 07:40

## 2024-02-26 RX ADMIN — BACLOFEN 10 MG: 10 TABLET ORAL at 14:43

## 2024-02-26 RX ADMIN — BUSPIRONE HYDROCHLORIDE 10 MG: 10 TABLET ORAL at 08:11

## 2024-02-26 RX ADMIN — SPIRONOLACTONE 50 MG: 25 TABLET ORAL at 08:13

## 2024-02-26 RX ADMIN — FUROSEMIDE 80 MG: 40 TABLET ORAL at 08:13

## 2024-02-26 ASSESSMENT — PAIN DESCRIPTION - LOCATION
LOCATION: ABDOMEN
LOCATION: ABDOMEN

## 2024-02-26 ASSESSMENT — PAIN DESCRIPTION - DESCRIPTORS
DESCRIPTORS: DISCOMFORT;ACHING
DESCRIPTORS: CRAMPING;CRUSHING;DISCOMFORT

## 2024-02-26 ASSESSMENT — PAIN SCALES - GENERAL
PAINLEVEL_OUTOF10: 8
PAINLEVEL_OUTOF10: 7
PAINLEVEL_OUTOF10: 9
PAINLEVEL_OUTOF10: 8

## 2024-02-26 NOTE — CARE COORDINATION
Social work / Discharge Planning:         Social work spoke to liaison for [facility] and was updated that precert has been submitted and awaiting authorization for discharge.    Electronically signed by JOHN Velasco on 2/26/2024 at 9:22 AM

## 2024-02-27 ENCOUNTER — APPOINTMENT (OUTPATIENT)
Dept: ULTRASOUND IMAGING | Age: 44
DRG: 280 | End: 2024-02-27
Payer: COMMERCIAL

## 2024-02-27 VITALS
RESPIRATION RATE: 20 BRPM | DIASTOLIC BLOOD PRESSURE: 53 MMHG | BODY MASS INDEX: 25.87 KG/M2 | HEART RATE: 79 BPM | HEIGHT: 68 IN | OXYGEN SATURATION: 100 % | SYSTOLIC BLOOD PRESSURE: 100 MMHG | TEMPERATURE: 98.4 F | WEIGHT: 170.7 LBS

## 2024-02-27 LAB
ALBUMIN SERPL-MCNC: 3.1 G/DL (ref 3.5–5.2)
ALP SERPL-CCNC: 96 U/L (ref 40–129)
ALT SERPL-CCNC: 24 U/L (ref 0–40)
ANION GAP SERPL CALCULATED.3IONS-SCNC: 9 MMOL/L (ref 7–16)
AST SERPL-CCNC: 54 U/L (ref 0–39)
BASOPHILS # BLD: 0 K/UL (ref 0–0.2)
BASOPHILS NFR BLD: 1 % (ref 0–2)
BILIRUB SERPL-MCNC: 5.5 MG/DL (ref 0–1.2)
BUN SERPL-MCNC: 11 MG/DL (ref 6–20)
CALCIUM SERPL-MCNC: 8.6 MG/DL (ref 8.6–10.2)
CHLORIDE SERPL-SCNC: 98 MMOL/L (ref 98–107)
CO2 SERPL-SCNC: 26 MMOL/L (ref 22–29)
CREAT SERPL-MCNC: 1.1 MG/DL (ref 0.7–1.2)
EOSINOPHIL # BLD: 0 K/UL (ref 0.05–0.5)
EOSINOPHILS RELATIVE PERCENT: 4 % (ref 0–6)
ERYTHROCYTE [DISTWIDTH] IN BLOOD BY AUTOMATED COUNT: 17.4 % (ref 11.5–15)
GFR SERPL CREATININE-BSD FRML MDRD: >60 ML/MIN/1.73M2
GLUCOSE SERPL-MCNC: 132 MG/DL (ref 74–99)
HCT VFR BLD AUTO: 24.4 % (ref 37–54)
HGB BLD-MCNC: 8.4 G/DL (ref 12.5–16.5)
LYMPHOCYTES NFR BLD: 0 K/UL (ref 1.5–4)
LYMPHOCYTES RELATIVE PERCENT: 6 % (ref 20–42)
MAGNESIUM SERPL-MCNC: 1.5 MG/DL (ref 1.6–2.6)
MCH RBC QN AUTO: 35 PG (ref 26–35)
MCHC RBC AUTO-ENTMCNC: 34.4 G/DL (ref 32–34.5)
MCV RBC AUTO: 101.7 FL (ref 80–99.9)
MONOCYTES NFR BLD: 0 K/UL (ref 0.1–0.95)
MONOCYTES NFR BLD: 3 % (ref 2–12)
NEUTROPHILS NFR BLD: 86 % (ref 43–80)
NEUTS SEG NFR BLD: 0 K/UL (ref 1.8–7.3)
PLATELET CONFIRMATION: NORMAL
PLATELET, FLUORESCENCE: 26 K/UL (ref 130–450)
PMV BLD AUTO: 11 FL (ref 7–12)
POTASSIUM SERPL-SCNC: 3.7 MMOL/L (ref 3.5–5)
PROT SERPL-MCNC: 5.5 G/DL (ref 6.4–8.3)
RBC # BLD AUTO: 2.4 M/UL (ref 3.8–5.8)
RBC # BLD: ABNORMAL 10*6/UL
SODIUM SERPL-SCNC: 133 MMOL/L (ref 132–146)
WBC OTHER # BLD: 3.5 K/UL (ref 4.5–11.5)

## 2024-02-27 PROCEDURE — 2580000003 HC RX 258: Performed by: EMERGENCY MEDICINE

## 2024-02-27 PROCEDURE — 97530 THERAPEUTIC ACTIVITIES: CPT

## 2024-02-27 PROCEDURE — 85025 COMPLETE CBC W/AUTO DIFF WBC: CPT

## 2024-02-27 PROCEDURE — 2500000003 HC RX 250 WO HCPCS: Performed by: PHYSICIAN ASSISTANT

## 2024-02-27 PROCEDURE — 2580000003 HC RX 258: Performed by: INTERNAL MEDICINE

## 2024-02-27 PROCEDURE — 83735 ASSAY OF MAGNESIUM: CPT

## 2024-02-27 PROCEDURE — 6370000000 HC RX 637 (ALT 250 FOR IP): Performed by: EMERGENCY MEDICINE

## 2024-02-27 PROCEDURE — 80053 COMPREHEN METABOLIC PANEL: CPT

## 2024-02-27 PROCEDURE — 49083 ABD PARACENTESIS W/IMAGING: CPT

## 2024-02-27 PROCEDURE — 6370000000 HC RX 637 (ALT 250 FOR IP): Performed by: INTERNAL MEDICINE

## 2024-02-27 PROCEDURE — 99239 HOSP IP/OBS DSCHRG MGMT >30: CPT | Performed by: INTERNAL MEDICINE

## 2024-02-27 PROCEDURE — 6370000000 HC RX 637 (ALT 250 FOR IP): Performed by: STUDENT IN AN ORGANIZED HEALTH CARE EDUCATION/TRAINING PROGRAM

## 2024-02-27 PROCEDURE — 76705 ECHO EXAM OF ABDOMEN: CPT

## 2024-02-27 RX ORDER — NICOTINE 21 MG/24HR
1 PATCH, TRANSDERMAL 24 HOURS TRANSDERMAL DAILY
Qty: 30 PATCH | Refills: 3 | Status: SHIPPED | OUTPATIENT
Start: 2024-02-28

## 2024-02-27 RX ORDER — LIDOCAINE HYDROCHLORIDE 10 MG/ML
INJECTION, SOLUTION INFILTRATION; PERINEURAL PRN
Status: COMPLETED | OUTPATIENT
Start: 2024-02-27 | End: 2024-02-27

## 2024-02-27 RX ORDER — LANOLIN ALCOHOL/MO/W.PET/CERES
400 CREAM (GRAM) TOPICAL 2 TIMES DAILY
Status: DISCONTINUED | OUTPATIENT
Start: 2024-02-27 | End: 2024-02-27 | Stop reason: HOSPADM

## 2024-02-27 RX ORDER — BACLOFEN 10 MG/1
10 TABLET ORAL 3 TIMES DAILY
Qty: 90 TABLET | Refills: 0 | Status: SHIPPED | OUTPATIENT
Start: 2024-02-27 | End: 2024-03-28

## 2024-02-27 RX ORDER — TRAZODONE HYDROCHLORIDE 50 MG/1
50 TABLET ORAL NIGHTLY
Qty: 30 TABLET | Refills: 0 | Status: SHIPPED | OUTPATIENT
Start: 2024-02-27 | End: 2024-03-28

## 2024-02-27 RX ORDER — SPIRONOLACTONE 50 MG/1
50 TABLET, FILM COATED ORAL DAILY
Qty: 30 TABLET | Refills: 3 | Status: SHIPPED | OUTPATIENT
Start: 2024-02-28

## 2024-02-27 RX ORDER — FUROSEMIDE 80 MG
80 TABLET ORAL EVERY MORNING
Qty: 60 TABLET | Refills: 3 | Status: SHIPPED | OUTPATIENT
Start: 2024-02-28

## 2024-02-27 RX ADMIN — PENTOXIFYLLINE 400 MG: 400 TABLET, EXTENDED RELEASE ORAL at 14:18

## 2024-02-27 RX ADMIN — MAGNESIUM OXIDE 400 MG (241.3 MG MAGNESIUM) TABLET 400 MG: TABLET at 09:30

## 2024-02-27 RX ADMIN — BACLOFEN 10 MG: 10 TABLET ORAL at 07:47

## 2024-02-27 RX ADMIN — OXYCODONE 5 MG: 5 TABLET ORAL at 14:18

## 2024-02-27 RX ADMIN — SPIRONOLACTONE 50 MG: 25 TABLET ORAL at 07:52

## 2024-02-27 RX ADMIN — FERROUS SULFATE TAB 325 MG (65 MG ELEMENTAL FE) 325 MG: 325 (65 FE) TAB at 07:52

## 2024-02-27 RX ADMIN — SODIUM CHLORIDE, PRESERVATIVE FREE 5 ML: 5 INJECTION INTRAVENOUS at 13:22

## 2024-02-27 RX ADMIN — RIFAXIMIN 550 MG: 550 TABLET ORAL at 07:51

## 2024-02-27 RX ADMIN — Medication 100 MG: at 07:51

## 2024-02-27 RX ADMIN — LIDOCAINE HYDROCHLORIDE 10 ML: 10 INJECTION, SOLUTION INFILTRATION; PERINEURAL at 15:13

## 2024-02-27 RX ADMIN — FOLIC ACID 1 MG: 1 TABLET ORAL at 07:51

## 2024-02-27 RX ADMIN — OXYCODONE 5 MG: 5 TABLET ORAL at 09:30

## 2024-02-27 RX ADMIN — FUROSEMIDE 80 MG: 40 TABLET ORAL at 07:51

## 2024-02-27 RX ADMIN — BUSPIRONE HYDROCHLORIDE 10 MG: 10 TABLET ORAL at 07:52

## 2024-02-27 RX ADMIN — PENTOXIFYLLINE 400 MG: 400 TABLET, EXTENDED RELEASE ORAL at 07:52

## 2024-02-27 RX ADMIN — OXYCODONE 5 MG: 5 TABLET ORAL at 01:30

## 2024-02-27 RX ADMIN — OXYCODONE 5 MG: 5 TABLET ORAL at 05:47

## 2024-02-27 RX ADMIN — PANTOPRAZOLE SODIUM 40 MG: 40 TABLET, DELAYED RELEASE ORAL at 05:47

## 2024-02-27 RX ADMIN — MIDODRINE HYDROCHLORIDE 10 MG: 5 TABLET ORAL at 07:51

## 2024-02-27 RX ADMIN — MIDODRINE HYDROCHLORIDE 10 MG: 5 TABLET ORAL at 14:18

## 2024-02-27 ASSESSMENT — PAIN SCALES - GENERAL
PAINLEVEL_OUTOF10: 8

## 2024-02-27 ASSESSMENT — PAIN DESCRIPTION - LOCATION
LOCATION: ABDOMEN

## 2024-02-27 ASSESSMENT — PAIN DESCRIPTION - DESCRIPTORS
DESCRIPTORS: DISCOMFORT
DESCRIPTORS: DISCOMFORT
DESCRIPTORS: ACHING;DISCOMFORT

## 2024-02-27 ASSESSMENT — PAIN - FUNCTIONAL ASSESSMENT: PAIN_FUNCTIONAL_ASSESSMENT: NONE - DENIES PAIN

## 2024-02-27 NOTE — OR NURSING
Pt transported to IR room for paracentesis. Pt is alert and oriented, denies any pain prior to procedure. Ultrasound images taken prior to procedure. Procedure is explained to patient, including possible risks. Pt verbalizes understanding and consent form signed. Procedure done under sterile technique and guidance of ultrasound imaging. 1% Lidocaine is used during procedure for comfort measures.  Using 5 Ivorian x 10 cm needle. A total of 2620 ml's of clear yellow colored ascitic fluid drained during procedure. Catheter removed and op-site dressing applied to site with no bleeding noted.    Pt tolerated procedure well and denies any pain after.  Pt transported out of department with no difficulties, report called to floor given to Bronwyn Le RN.

## 2024-02-27 NOTE — DISCHARGE INSTRUCTIONS
Discharge Instructions for Abdominal Paracentesis     An abdominal paracentesis , also known as an ascites fluid tap,occurs when a needle is inserted into the abdominal area to remove fluid. Usually, there is very little fluid in the abdominal cavity. When fluid does accumulate, a paracentesis may be done to remove the excess fluid or to take samples for testing.   There are several reasons to perform this procedure including internal bleeding, infection, or liver, pancreatic and peritoneal disorders. Extra fluid may also be removed when it leads to breathing problems and/or pain.   Depending on the reason for removing the fluid, anywhere from a few milliliters to several liters may be removed in one procedure. You may need intravenous fluids to prevent your blood pressure from getting too low and to prevent shock. The fluid may be sent to the lab for testing. This procedure can take from 10-15 minutes.   No hospital stay is needed, if the procedure is done for diagnosis. If you have a lot of fluid or are having trouble breathing, you may need to stay in the hospital.   Steps to Take   Home Care    Rest as needed.    Keep the insertion area clean and dry.    Change the bandage as needed.    Diet    Maintain your normal diet.    Physical Activity    After the procedure, return to your normal activities. If you are feeling tired, rest as needed.    Do not drive unless your doctor has given you permission to do so.    Medications    When taking medications, it's important to:   Take your medication as directednot more, not less, not at a different time.   Do not stop taking them without consulting your healthcare provider.   Don't share them with anyone else.   Know what effects and side effects to expect, and report them to your healthcare provider.   If you are taking more than one drug, even if it is an over-the-counter medication, herb, or dietary supplement, be sure to check with a physician or pharmacist about

## 2024-02-27 NOTE — CARE COORDINATION
Social work / Discharge Planning:       Charge nurse updated social work that patient is going to discharge today.    [facility] will transport at 4:30pm.    Electronically signed by JOHN Velasco on 2/27/2024 at 2:50 PM

## 2024-02-27 NOTE — DISCHARGE SUMMARY
[facility] Hospitalist Physician Discharge Summary       [facility]  [address] [facility] [address]  [phone]          Activity level: As tolerated     Dispo: ECF      Condition on discharge: Gaurded    Patient ID:  Johnathan Yanez III  [id number]  44 y.o.  1980    Admit date: 2/22/2024    Discharge date and time:  2/27/2024  2:46 PM    Admission Diagnoses: Principal Problem:    Anasarca  Resolved Problems:    * No resolved hospital problems. *      Discharge Diagnoses: Principal Problem:    Anasarca  Resolved Problems:    * No resolved hospital problems. *      Consults:  IP CONSULT TO IV TEAM  IP CONSULT TO SOCIAL WORK  IP CONSULT TO UROLOGY    Procedures: Therapeutic paracentesis times 2 scrotal abscess    Hospital Course:   Patient Johnathan Yanez III is a 44 y.o. presented with Anasarca [R60.1]  Other ascites [R18.8]  Abdominal pain, unspecified abdominal location [R10.9]  Hydrocele, unspecified hydrocele type [N43.3]    44 year old male with alcoholic cirrhosis presented with scrotal abscess and abdominal pain. Urology was consulted and stated that there was minimal drainage and surgery would be extremely risky with poor wound healing. Patient was also seen by GI for decompensated cirrhosis. Aldactone and lasix were increased. Patient did have therapeutic paracentesis times 2. Currently he is at baseline.     Discharge Exam:    General Appearance: alert and oriented to person, place and time and in no acute distress  Skin: warm and dry  Head: normocephalic and atraumatic  Eyes: pupils equal, round, extraocular eye movements intact, conjunctivae normal  Pulmonary/Chest: clear to auscultation bilaterally- no wheezes, rales or rhonchi, normal air movement, no respiratory distress  Cardiovascular: normal rate, normal S1 and S2   Abdomen: soft, non-tender, non-distended, normal bowel sounds,   Extremities: no cyanosis, no clubbing and no edema  Neurologic: no

## 2024-02-27 NOTE — CARE COORDINATION
Social work / Discharge planning:         Precert approved for discharge to [facility].   Krystle lopez will transport at 3:30pm.    Social work updated RN, facility liaison and left a message for his mother requesting a return call for update.    Electronically signed by JOHN Velasco on 2/27/2024 at 1:43 PM             Transport cancelled for discharge today.   Electronically signed by JOHN Velasco on 2/27/2024 at 2:29 PM

## 2024-02-27 NOTE — CARE COORDINATION
Social work / Discharge Planning:         Awaiting insurance authorization for return to [facility].    Electronically signed by JOHN Velasco on 2/27/2024 at 10:00 AM

## 2024-02-27 NOTE — PLAN OF CARE
Problem: Discharge Planning  Goal: Discharge to home or other facility with appropriate resources  Outcome: Progressing     Problem: ABCDS Injury Assessment  Goal: Absence of physical injury  Outcome: Progressing     Problem: Skin/Tissue Integrity  Goal: Absence of new skin breakdown  Description: 1.  Monitor for areas of redness and/or skin breakdown  2.  Assess vascular access sites hourly  3.  Every 4-6 hours minimum:  Change oxygen saturation probe site  4.  Every 4-6 hours:  If on nasal continuous positive airway pressure, respiratory therapy assess nares and determine need for appliance change or resting period.  Outcome: Progressing     Problem: Safety - Adult  Goal: Free from fall injury  Outcome: Progressing     Problem: Pain  Goal: Verbalizes/displays adequate comfort level or baseline comfort level  Outcome: Progressing     Problem: Nutrition Deficit:  Goal: Optimize nutritional status  Outcome: Progressing

## 2024-02-27 NOTE — PROCEDURES
Diagnosis: Ascites    Procedure: Ultrasound Guided Paracentesis    Findings: Large volume ascites, successful catheter placement with clear yellow ascitic fluid return    Specimen: None at this time.  Total amount of fluid removed to be reported in PACS.    EBL: Minimal.    Complication: None immediately post procedure.    Plan:  Return to floor/room following paracentesis.

## 2024-02-29 ENCOUNTER — APPOINTMENT (OUTPATIENT)
Dept: CT IMAGING | Age: 44
DRG: 280 | End: 2024-02-29
Payer: COMMERCIAL

## 2024-02-29 ENCOUNTER — APPOINTMENT (OUTPATIENT)
Dept: GENERAL RADIOLOGY | Age: 44
DRG: 280 | End: 2024-02-29
Payer: COMMERCIAL

## 2024-02-29 ENCOUNTER — APPOINTMENT (OUTPATIENT)
Dept: ULTRASOUND IMAGING | Age: 44
DRG: 280 | End: 2024-02-29
Payer: COMMERCIAL

## 2024-02-29 ENCOUNTER — HOSPITAL ENCOUNTER (INPATIENT)
Age: 44
LOS: 3 days | Discharge: HOME | DRG: 280 | End: 2024-03-03
Attending: STUDENT IN AN ORGANIZED HEALTH CARE EDUCATION/TRAINING PROGRAM | Admitting: STUDENT IN AN ORGANIZED HEALTH CARE EDUCATION/TRAINING PROGRAM
Payer: COMMERCIAL

## 2024-02-29 DIAGNOSIS — K74.60 CIRRHOSIS OF LIVER WITH ASCITES, UNSPECIFIED HEPATIC CIRRHOSIS TYPE (HCC): ICD-10-CM

## 2024-02-29 DIAGNOSIS — K70.31 ASCITES DUE TO ALCOHOLIC CIRRHOSIS (HCC): Primary | ICD-10-CM

## 2024-02-29 DIAGNOSIS — R18.8 CIRRHOSIS OF LIVER WITH ASCITES, UNSPECIFIED HEPATIC CIRRHOSIS TYPE (HCC): ICD-10-CM

## 2024-02-29 DIAGNOSIS — D69.6 THROMBOCYTOPENIA (HCC): ICD-10-CM

## 2024-02-29 DIAGNOSIS — E83.42 HYPOMAGNESEMIA: ICD-10-CM

## 2024-02-29 DIAGNOSIS — E80.6 HYPERBILIRUBINEMIA: ICD-10-CM

## 2024-02-29 DIAGNOSIS — R74.01 TRANSAMINITIS: ICD-10-CM

## 2024-02-29 DIAGNOSIS — J90 PLEURAL EFFUSION: ICD-10-CM

## 2024-02-29 DIAGNOSIS — N17.9 AKI (ACUTE KIDNEY INJURY) (HCC): ICD-10-CM

## 2024-02-29 DIAGNOSIS — D64.9 ANEMIA, UNSPECIFIED TYPE: ICD-10-CM

## 2024-02-29 DIAGNOSIS — N43.3 HYDROCELE, UNSPECIFIED HYDROCELE TYPE: ICD-10-CM

## 2024-02-29 DIAGNOSIS — R16.1 SPLENOMEGALY: ICD-10-CM

## 2024-02-29 DIAGNOSIS — E88.09 HYPOALBUMINEMIA: ICD-10-CM

## 2024-02-29 LAB
ALBUMIN FLD-MCNC: 0.7 G/DL
ALBUMIN SERPL-MCNC: 3 G/DL (ref 3.5–5.2)
ALBUMIN SERPL-MCNC: 3.8 G/DL (ref 3.5–5.2)
ALP SERPL-CCNC: 79 U/L (ref 40–129)
ALP SERPL-CCNC: 99 U/L (ref 40–129)
ALT SERPL-CCNC: 17 U/L (ref 0–40)
ALT SERPL-CCNC: 20 U/L (ref 0–40)
AMYLASE FLD-CCNC: 23 U/L
ANION GAP SERPL CALCULATED.3IONS-SCNC: 7 MMOL/L (ref 7–16)
ANION GAP SERPL CALCULATED.3IONS-SCNC: 9 MMOL/L (ref 7–16)
APPEARANCE FLD: CLEAR
AST SERPL-CCNC: 37 U/L (ref 0–39)
AST SERPL-CCNC: 49 U/L (ref 0–39)
BACTERIA URNS QL MICRO: ABNORMAL
BASOPHILS # BLD: 0 K/UL (ref 0–0.2)
BASOPHILS # BLD: 0.11 K/UL (ref 0–0.2)
BASOPHILS NFR BLD: 0 % (ref 0–2)
BASOPHILS NFR BLD: 4 % (ref 0–2)
BILIRUB DIRECT SERPL-MCNC: 1.9 MG/DL (ref 0–0.3)
BILIRUB INDIRECT SERPL-MCNC: 3.5 MG/DL (ref 0–1)
BILIRUB SERPL-MCNC: 4.3 MG/DL (ref 0–1.2)
BILIRUB SERPL-MCNC: 5.4 MG/DL (ref 0–1.2)
BILIRUB UR QL STRIP: NEGATIVE
BNP SERPL-MCNC: 283 PG/ML (ref 0–125)
BODY FLD TYPE: NORMAL
BUN SERPL-MCNC: 14 MG/DL (ref 6–20)
BUN SERPL-MCNC: 15 MG/DL (ref 6–20)
CALCIUM SERPL-MCNC: 8.6 MG/DL (ref 8.6–10.2)
CALCIUM SERPL-MCNC: 8.9 MG/DL (ref 8.6–10.2)
CHLORIDE SERPL-SCNC: 94 MMOL/L (ref 98–107)
CHLORIDE SERPL-SCNC: 95 MMOL/L (ref 98–107)
CLARITY UR: CLEAR
CLOT CHECK: NORMAL
CO2 SERPL-SCNC: 26 MMOL/L (ref 22–29)
CO2 SERPL-SCNC: 27 MMOL/L (ref 22–29)
COLOR FLD: YELLOW
COLOR UR: YELLOW
CREAT SERPL-MCNC: 1.3 MG/DL (ref 0.7–1.2)
CREAT SERPL-MCNC: 1.4 MG/DL (ref 0.7–1.2)
EKG ATRIAL RATE: 77 BPM
EKG P AXIS: 20 DEGREES
EKG P-R INTERVAL: 148 MS
EKG Q-T INTERVAL: 460 MS
EKG QRS DURATION: 84 MS
EKG QTC CALCULATION (BAZETT): 520 MS
EKG R AXIS: 33 DEGREES
EKG T AXIS: 31 DEGREES
EKG VENTRICULAR RATE: 77 BPM
EOSINOPHIL # BLD: 0.07 K/UL (ref 0.05–0.5)
EOSINOPHIL # BLD: 0.07 K/UL (ref 0.05–0.5)
EOSINOPHILS RELATIVE PERCENT: 2 % (ref 0–6)
EOSINOPHILS RELATIVE PERCENT: 3 % (ref 0–6)
ERYTHROCYTE [DISTWIDTH] IN BLOOD BY AUTOMATED COUNT: 16.7 % (ref 11.5–15)
ERYTHROCYTE [DISTWIDTH] IN BLOOD BY AUTOMATED COUNT: 16.7 % (ref 11.5–15)
GFR SERPL CREATININE-BSD FRML MDRD: >60 ML/MIN/1.73M2
GFR SERPL CREATININE-BSD FRML MDRD: >60 ML/MIN/1.73M2
GLUCOSE SERPL-MCNC: 114 MG/DL (ref 74–99)
GLUCOSE SERPL-MCNC: 98 MG/DL (ref 74–99)
GLUCOSE UR STRIP-MCNC: NEGATIVE MG/DL
HCT VFR BLD AUTO: 20.7 % (ref 37–54)
HCT VFR BLD AUTO: 23.6 % (ref 37–54)
HGB BLD-MCNC: 7.1 G/DL (ref 12.5–16.5)
HGB BLD-MCNC: 8.3 G/DL (ref 12.5–16.5)
HGB UR QL STRIP.AUTO: ABNORMAL
INR PPP: 2.2
INR PPP: 2.7
KETONES UR STRIP-MCNC: NEGATIVE MG/DL
LACTATE BLDV-SCNC: 1.3 MMOL/L (ref 0.5–2.2)
LACTATE BLDV-SCNC: 3 MMOL/L (ref 0.5–2.2)
LEUKOCYTE ESTERASE UR QL STRIP: NEGATIVE
LIPASE SERPL-CCNC: 57 U/L (ref 13–60)
LYMPHOCYTES NFR BLD: 0.32 K/UL (ref 1.5–4)
LYMPHOCYTES NFR BLD: 0.51 K/UL (ref 1.5–4)
LYMPHOCYTES RELATIVE PERCENT: 12 % (ref 20–42)
LYMPHOCYTES RELATIVE PERCENT: 13 % (ref 20–42)
MAGNESIUM SERPL-MCNC: 1.5 MG/DL (ref 1.6–2.6)
MCH RBC QN AUTO: 34.6 PG (ref 26–35)
MCH RBC QN AUTO: 35.2 PG (ref 26–35)
MCHC RBC AUTO-ENTMCNC: 34.3 G/DL (ref 32–34.5)
MCHC RBC AUTO-ENTMCNC: 35.2 G/DL (ref 32–34.5)
MCV RBC AUTO: 100 FL (ref 80–99.9)
MCV RBC AUTO: 101 FL (ref 80–99.9)
MONOCYTES NFR BLD: 0.16 K/UL (ref 0.1–0.95)
MONOCYTES NFR BLD: 0.24 K/UL (ref 0.1–0.95)
MONOCYTES NFR BLD: 6 % (ref 2–12)
MONOCYTES NFR BLD: 6 % (ref 2–12)
MONOCYTES NFR FLD: 89 %
NEUTROPHILS NFR BLD: 75 % (ref 43–80)
NEUTROPHILS NFR BLD: 77 % (ref 43–80)
NEUTROPHILS NFR FLD: 11 %
NEUTS SEG NFR BLD: 1.94 K/UL (ref 1.8–7.3)
NEUTS SEG NFR BLD: 3.02 K/UL (ref 1.8–7.3)
NITRITE UR QL STRIP: NEGATIVE
PH UR STRIP: 8 [PH] (ref 5–9)
PLATELET # BLD AUTO: 23 K/UL (ref 130–450)
PLATELET # BLD AUTO: 29 K/UL (ref 130–450)
PLATELET CONFIRMATION: NORMAL
PLATELET CONFIRMATION: NORMAL
PMV BLD AUTO: 11.1 FL (ref 7–12)
PMV BLD AUTO: 9.2 FL (ref 7–12)
POTASSIUM SERPL-SCNC: 3.2 MMOL/L (ref 3.5–5)
POTASSIUM SERPL-SCNC: 3.5 MMOL/L (ref 3.5–5)
PROT FLD-MCNC: 0.9 G/DL
PROT SERPL-MCNC: 5.5 G/DL (ref 6.4–8.3)
PROT SERPL-MCNC: 5.7 G/DL (ref 6.4–8.3)
PROT UR STRIP-MCNC: NEGATIVE MG/DL
PROTHROMBIN TIME: 24.1 SEC (ref 9.3–12.4)
PROTHROMBIN TIME: 29.9 SEC (ref 9.3–12.4)
RBC # BLD AUTO: 2.05 M/UL (ref 3.8–5.8)
RBC # BLD AUTO: 2.36 M/UL (ref 3.8–5.8)
RBC # BLD: ABNORMAL 10*6/UL
RBC # FLD: <2000 CELLS/UL
RBC #/AREA URNS HPF: ABNORMAL /HPF
SODIUM SERPL-SCNC: 129 MMOL/L (ref 132–146)
SODIUM SERPL-SCNC: 129 MMOL/L (ref 132–146)
SP GR UR STRIP: 1.01 (ref 1–1.03)
SPECIMEN TYPE: NORMAL
TROPONIN I SERPL HS-MCNC: 17 NG/L (ref 0–11)
UROBILINOGEN UR STRIP-ACNC: 0.2 EU/DL (ref 0–1)
WBC # FLD: 85 CELLS/UL
WBC #/AREA URNS HPF: ABNORMAL /HPF
WBC OTHER # BLD: 2.6 K/UL (ref 4.5–11.5)
WBC OTHER # BLD: 3.9 K/UL (ref 4.5–11.5)

## 2024-02-29 PROCEDURE — 6370000000 HC RX 637 (ALT 250 FOR IP): Performed by: INTERNAL MEDICINE

## 2024-02-29 PROCEDURE — P9047 ALBUMIN (HUMAN), 25%, 50ML: HCPCS | Performed by: HOSPITALIST

## 2024-02-29 PROCEDURE — 36415 COLL VENOUS BLD VENIPUNCTURE: CPT

## 2024-02-29 PROCEDURE — C9113 INJ PANTOPRAZOLE SODIUM, VIA: HCPCS | Performed by: INTERNAL MEDICINE

## 2024-02-29 PROCEDURE — 2060000000 HC ICU INTERMEDIATE R&B

## 2024-02-29 PROCEDURE — P9045 ALBUMIN (HUMAN), 5%, 250 ML: HCPCS | Performed by: STUDENT IN AN ORGANIZED HEALTH CARE EDUCATION/TRAINING PROGRAM

## 2024-02-29 PROCEDURE — 84484 ASSAY OF TROPONIN QUANT: CPT

## 2024-02-29 PROCEDURE — 6360000002 HC RX W HCPCS: Performed by: INTERNAL MEDICINE

## 2024-02-29 PROCEDURE — 83690 ASSAY OF LIPASE: CPT

## 2024-02-29 PROCEDURE — 86900 BLOOD TYPING SEROLOGIC ABO: CPT

## 2024-02-29 PROCEDURE — 71045 X-RAY EXAM CHEST 1 VIEW: CPT

## 2024-02-29 PROCEDURE — 83605 ASSAY OF LACTIC ACID: CPT

## 2024-02-29 PROCEDURE — 88305 TISSUE EXAM BY PATHOLOGIST: CPT

## 2024-02-29 PROCEDURE — 93975 VASCULAR STUDY: CPT

## 2024-02-29 PROCEDURE — 30233N1 TRANSFUSION OF NONAUTOLOGOUS RED BLOOD CELLS INTO PERIPHERAL VEIN, PERCUTANEOUS APPROACH: ICD-10-PCS | Performed by: STUDENT IN AN ORGANIZED HEALTH CARE EDUCATION/TRAINING PROGRAM

## 2024-02-29 PROCEDURE — P9016 RBC LEUKOCYTES REDUCED: HCPCS

## 2024-02-29 PROCEDURE — 85610 PROTHROMBIN TIME: CPT

## 2024-02-29 PROCEDURE — 6360000004 HC RX CONTRAST MEDICATION: Performed by: RADIOLOGY

## 2024-02-29 PROCEDURE — 36430 TRANSFUSION BLD/BLD COMPNT: CPT

## 2024-02-29 PROCEDURE — P9073 PLATELETS PHERESIS PATH REDU: HCPCS

## 2024-02-29 PROCEDURE — 87086 URINE CULTURE/COLONY COUNT: CPT

## 2024-02-29 PROCEDURE — 82150 ASSAY OF AMYLASE: CPT

## 2024-02-29 PROCEDURE — 2580000003 HC RX 258

## 2024-02-29 PROCEDURE — 80053 COMPREHEN METABOLIC PANEL: CPT

## 2024-02-29 PROCEDURE — 81001 URINALYSIS AUTO W/SCOPE: CPT

## 2024-02-29 PROCEDURE — 2580000003 HC RX 258: Performed by: STUDENT IN AN ORGANIZED HEALTH CARE EDUCATION/TRAINING PROGRAM

## 2024-02-29 PROCEDURE — 87205 SMEAR GRAM STAIN: CPT

## 2024-02-29 PROCEDURE — 97161 PT EVAL LOW COMPLEX 20 MIN: CPT

## 2024-02-29 PROCEDURE — 74177 CT ABD & PELVIS W/CONTRAST: CPT

## 2024-02-29 PROCEDURE — 88112 CYTOPATH CELL ENHANCE TECH: CPT

## 2024-02-29 PROCEDURE — 96374 THER/PROPH/DIAG INJ IV PUSH: CPT

## 2024-02-29 PROCEDURE — 82248 BILIRUBIN DIRECT: CPT

## 2024-02-29 PROCEDURE — 99223 1ST HOSP IP/OBS HIGH 75: CPT | Performed by: INTERNAL MEDICINE

## 2024-02-29 PROCEDURE — 2580000003 HC RX 258: Performed by: INTERNAL MEDICINE

## 2024-02-29 PROCEDURE — 0W9G3ZZ DRAINAGE OF PERITONEAL CAVITY, PERCUTANEOUS APPROACH: ICD-10-PCS | Performed by: STUDENT IN AN ORGANIZED HEALTH CARE EDUCATION/TRAINING PROGRAM

## 2024-02-29 PROCEDURE — 84157 ASSAY OF PROTEIN OTHER: CPT

## 2024-02-29 PROCEDURE — 99291 CRITICAL CARE FIRST HOUR: CPT | Performed by: INTERNAL MEDICINE

## 2024-02-29 PROCEDURE — 83880 ASSAY OF NATRIURETIC PEPTIDE: CPT

## 2024-02-29 PROCEDURE — 97165 OT EVAL LOW COMPLEX 30 MIN: CPT

## 2024-02-29 PROCEDURE — 93005 ELECTROCARDIOGRAM TRACING: CPT | Performed by: STUDENT IN AN ORGANIZED HEALTH CARE EDUCATION/TRAINING PROGRAM

## 2024-02-29 PROCEDURE — 6360000002 HC RX W HCPCS: Performed by: HOSPITALIST

## 2024-02-29 PROCEDURE — 99285 EMERGENCY DEPT VISIT HI MDM: CPT

## 2024-02-29 PROCEDURE — 6360000002 HC RX W HCPCS: Performed by: STUDENT IN AN ORGANIZED HEALTH CARE EDUCATION/TRAINING PROGRAM

## 2024-02-29 PROCEDURE — 82570 ASSAY OF URINE CREATININE: CPT

## 2024-02-29 PROCEDURE — 86901 BLOOD TYPING SEROLOGIC RH(D): CPT

## 2024-02-29 PROCEDURE — 87070 CULTURE OTHR SPECIMN AEROBIC: CPT

## 2024-02-29 PROCEDURE — 86923 COMPATIBILITY TEST ELECTRIC: CPT

## 2024-02-29 PROCEDURE — 84300 ASSAY OF URINE SODIUM: CPT

## 2024-02-29 PROCEDURE — 83735 ASSAY OF MAGNESIUM: CPT

## 2024-02-29 PROCEDURE — 89051 BODY FLUID CELL COUNT: CPT

## 2024-02-29 PROCEDURE — 96375 TX/PRO/DX INJ NEW DRUG ADDON: CPT

## 2024-02-29 PROCEDURE — 84156 ASSAY OF PROTEIN URINE: CPT

## 2024-02-29 PROCEDURE — 49083 ABD PARACENTESIS W/IMAGING: CPT

## 2024-02-29 PROCEDURE — 86850 RBC ANTIBODY SCREEN: CPT

## 2024-02-29 PROCEDURE — 82436 ASSAY OF URINE CHLORIDE: CPT

## 2024-02-29 PROCEDURE — 93010 ELECTROCARDIOGRAM REPORT: CPT | Performed by: INTERNAL MEDICINE

## 2024-02-29 PROCEDURE — 85025 COMPLETE CBC W/AUTO DIFF WBC: CPT

## 2024-02-29 PROCEDURE — 82042 OTHER SOURCE ALBUMIN QUAN EA: CPT

## 2024-02-29 RX ORDER — ALBUMIN (HUMAN) 12.5 G/50ML
50 SOLUTION INTRAVENOUS
Status: COMPLETED | OUTPATIENT
Start: 2024-03-01 | End: 2024-02-29

## 2024-02-29 RX ORDER — 0.9 % SODIUM CHLORIDE 0.9 %
250 INTRAVENOUS SOLUTION INTRAVENOUS ONCE
Status: COMPLETED | OUTPATIENT
Start: 2024-02-29 | End: 2024-02-29

## 2024-02-29 RX ORDER — CIPROFLOXACIN 2 MG/ML
400 INJECTION, SOLUTION INTRAVENOUS EVERY 12 HOURS
Status: DISCONTINUED | OUTPATIENT
Start: 2024-02-29 | End: 2024-03-03 | Stop reason: HOSPADM

## 2024-02-29 RX ORDER — ONDANSETRON 4 MG/1
4 TABLET, ORALLY DISINTEGRATING ORAL EVERY 8 HOURS PRN
Status: DISCONTINUED | OUTPATIENT
Start: 2024-02-29 | End: 2024-03-03 | Stop reason: HOSPADM

## 2024-02-29 RX ORDER — FUROSEMIDE 10 MG/ML
40 INJECTION INTRAMUSCULAR; INTRAVENOUS ONCE
Status: COMPLETED | OUTPATIENT
Start: 2024-02-29 | End: 2024-03-01

## 2024-02-29 RX ORDER — SODIUM CHLORIDE 9 MG/ML
INJECTION, SOLUTION INTRAVENOUS PRN
Status: DISCONTINUED | OUTPATIENT
Start: 2024-02-29 | End: 2024-02-29 | Stop reason: SDUPTHER

## 2024-02-29 RX ORDER — PANTOPRAZOLE SODIUM 40 MG/1
40 TABLET, DELAYED RELEASE ORAL
Status: DISCONTINUED | OUTPATIENT
Start: 2024-02-29 | End: 2024-02-29

## 2024-02-29 RX ORDER — LACTULOSE 10 G/15ML
20 SOLUTION ORAL 3 TIMES DAILY
Status: DISCONTINUED | OUTPATIENT
Start: 2024-02-29 | End: 2024-03-03 | Stop reason: HOSPADM

## 2024-02-29 RX ORDER — OCTREOTIDE ACETATE 50 UG/ML
50 INJECTION, SOLUTION INTRAVENOUS; SUBCUTANEOUS ONCE
Status: COMPLETED | OUTPATIENT
Start: 2024-02-29 | End: 2024-02-29

## 2024-02-29 RX ORDER — ACETAMINOPHEN 325 MG/1
650 TABLET ORAL EVERY 6 HOURS PRN
Status: DISCONTINUED | OUTPATIENT
Start: 2024-02-29 | End: 2024-02-29

## 2024-02-29 RX ORDER — FUROSEMIDE 10 MG/ML
20 INJECTION INTRAMUSCULAR; INTRAVENOUS ONCE
Status: DISCONTINUED | OUTPATIENT
Start: 2024-02-29 | End: 2024-02-29

## 2024-02-29 RX ORDER — BUSPIRONE HYDROCHLORIDE 5 MG/1
10 TABLET ORAL 2 TIMES DAILY
Status: DISCONTINUED | OUTPATIENT
Start: 2024-02-29 | End: 2024-03-03 | Stop reason: HOSPADM

## 2024-02-29 RX ORDER — BACLOFEN 10 MG/1
10 TABLET ORAL 3 TIMES DAILY
Status: DISCONTINUED | OUTPATIENT
Start: 2024-02-29 | End: 2024-03-03 | Stop reason: HOSPADM

## 2024-02-29 RX ORDER — SODIUM CHLORIDE 0.9 % (FLUSH) 0.9 %
5-40 SYRINGE (ML) INJECTION EVERY 12 HOURS SCHEDULED
Status: DISCONTINUED | OUTPATIENT
Start: 2024-02-29 | End: 2024-03-03 | Stop reason: HOSPADM

## 2024-02-29 RX ORDER — TRAZODONE HYDROCHLORIDE 50 MG/1
50 TABLET ORAL NIGHTLY
Status: DISCONTINUED | OUTPATIENT
Start: 2024-02-29 | End: 2024-03-03 | Stop reason: HOSPADM

## 2024-02-29 RX ORDER — FENTANYL CITRATE 50 UG/ML
25 INJECTION, SOLUTION INTRAMUSCULAR; INTRAVENOUS ONCE
Status: COMPLETED | OUTPATIENT
Start: 2024-02-29 | End: 2024-02-29

## 2024-02-29 RX ORDER — HYDROCODONE BITARTRATE AND ACETAMINOPHEN 7.5; 325 MG/1; MG/1
1 TABLET ORAL EVERY 6 HOURS PRN
Status: DISCONTINUED | OUTPATIENT
Start: 2024-02-29 | End: 2024-03-03 | Stop reason: HOSPADM

## 2024-02-29 RX ORDER — PANTOPRAZOLE SODIUM 40 MG/10ML
40 INJECTION, POWDER, LYOPHILIZED, FOR SOLUTION INTRAVENOUS 2 TIMES DAILY
Status: DISCONTINUED | OUTPATIENT
Start: 2024-02-29 | End: 2024-03-01

## 2024-02-29 RX ORDER — FOLIC ACID 1 MG/1
1 TABLET ORAL DAILY
Status: DISCONTINUED | OUTPATIENT
Start: 2024-02-29 | End: 2024-03-03 | Stop reason: HOSPADM

## 2024-02-29 RX ORDER — POLYETHYLENE GLYCOL 3350 17 G/17G
17 POWDER, FOR SOLUTION ORAL DAILY PRN
Status: DISCONTINUED | OUTPATIENT
Start: 2024-02-29 | End: 2024-03-03 | Stop reason: HOSPADM

## 2024-02-29 RX ORDER — NICOTINE 21 MG/24HR
1 PATCH, TRANSDERMAL 24 HOURS TRANSDERMAL DAILY
Status: DISCONTINUED | OUTPATIENT
Start: 2024-02-29 | End: 2024-03-03 | Stop reason: HOSPADM

## 2024-02-29 RX ORDER — SODIUM CHLORIDE 9 MG/ML
INJECTION, SOLUTION INTRAVENOUS CONTINUOUS
Status: DISCONTINUED | OUTPATIENT
Start: 2024-02-29 | End: 2024-03-02

## 2024-02-29 RX ORDER — LANOLIN ALCOHOL/MO/W.PET/CERES
100 CREAM (GRAM) TOPICAL DAILY
Status: DISCONTINUED | OUTPATIENT
Start: 2024-02-29 | End: 2024-03-03 | Stop reason: HOSPADM

## 2024-02-29 RX ORDER — FUROSEMIDE 40 MG/1
80 TABLET ORAL EVERY MORNING
Status: DISCONTINUED | OUTPATIENT
Start: 2024-02-29 | End: 2024-03-03 | Stop reason: HOSPADM

## 2024-02-29 RX ORDER — SODIUM CHLORIDE 9 MG/ML
INJECTION, SOLUTION INTRAVENOUS PRN
Status: DISCONTINUED | OUTPATIENT
Start: 2024-02-29 | End: 2024-03-03 | Stop reason: HOSPADM

## 2024-02-29 RX ORDER — ONDANSETRON 2 MG/ML
4 INJECTION INTRAMUSCULAR; INTRAVENOUS EVERY 6 HOURS PRN
Status: DISCONTINUED | OUTPATIENT
Start: 2024-02-29 | End: 2024-03-03 | Stop reason: HOSPADM

## 2024-02-29 RX ORDER — SPIRONOLACTONE 25 MG/1
50 TABLET ORAL DAILY
Status: DISCONTINUED | OUTPATIENT
Start: 2024-02-29 | End: 2024-03-03 | Stop reason: HOSPADM

## 2024-02-29 RX ORDER — PENTOXIFYLLINE 400 MG/1
400 TABLET, EXTENDED RELEASE ORAL
Status: DISCONTINUED | OUTPATIENT
Start: 2024-02-29 | End: 2024-03-03 | Stop reason: HOSPADM

## 2024-02-29 RX ORDER — 0.9 % SODIUM CHLORIDE 0.9 %
INTRAVENOUS SOLUTION INTRAVENOUS CONTINUOUS PRN
Status: COMPLETED | OUTPATIENT
Start: 2024-02-29 | End: 2024-02-29

## 2024-02-29 RX ORDER — ACETAMINOPHEN 650 MG/1
650 SUPPOSITORY RECTAL EVERY 6 HOURS PRN
Status: DISCONTINUED | OUTPATIENT
Start: 2024-02-29 | End: 2024-02-29

## 2024-02-29 RX ORDER — MAGNESIUM SULFATE IN WATER 40 MG/ML
2000 INJECTION, SOLUTION INTRAVENOUS ONCE
Status: COMPLETED | OUTPATIENT
Start: 2024-02-29 | End: 2024-02-29

## 2024-02-29 RX ORDER — WATER 10 ML/10ML
INJECTION INTRAMUSCULAR; INTRAVENOUS; SUBCUTANEOUS
Status: COMPLETED
Start: 2024-02-29 | End: 2024-02-29

## 2024-02-29 RX ORDER — ALBUMIN, HUMAN INJ 5% 5 %
25 SOLUTION INTRAVENOUS ONCE
Status: COMPLETED | OUTPATIENT
Start: 2024-02-29 | End: 2024-02-29

## 2024-02-29 RX ORDER — ONDANSETRON 2 MG/ML
4 INJECTION INTRAMUSCULAR; INTRAVENOUS ONCE
Status: COMPLETED | OUTPATIENT
Start: 2024-02-29 | End: 2024-02-29

## 2024-02-29 RX ORDER — POTASSIUM CHLORIDE 7.45 MG/ML
10 INJECTION INTRAVENOUS PRN
Status: DISCONTINUED | OUTPATIENT
Start: 2024-02-29 | End: 2024-03-03 | Stop reason: HOSPADM

## 2024-02-29 RX ORDER — POTASSIUM CHLORIDE 20 MEQ/1
40 TABLET, EXTENDED RELEASE ORAL PRN
Status: DISCONTINUED | OUTPATIENT
Start: 2024-02-29 | End: 2024-03-03 | Stop reason: HOSPADM

## 2024-02-29 RX ORDER — SODIUM CHLORIDE 0.9 % (FLUSH) 0.9 %
5-40 SYRINGE (ML) INJECTION PRN
Status: DISCONTINUED | OUTPATIENT
Start: 2024-02-29 | End: 2024-03-03 | Stop reason: HOSPADM

## 2024-02-29 RX ORDER — FERROUS SULFATE 325(65) MG
325 TABLET ORAL DAILY
Status: DISCONTINUED | OUTPATIENT
Start: 2024-02-29 | End: 2024-03-03 | Stop reason: HOSPADM

## 2024-02-29 RX ORDER — MIDODRINE HYDROCHLORIDE 5 MG/1
10 TABLET ORAL
Status: DISCONTINUED | OUTPATIENT
Start: 2024-02-29 | End: 2024-03-02

## 2024-02-29 RX ORDER — ENOXAPARIN SODIUM 100 MG/ML
40 INJECTION SUBCUTANEOUS DAILY
Status: DISCONTINUED | OUTPATIENT
Start: 2024-02-29 | End: 2024-03-03 | Stop reason: HOSPADM

## 2024-02-29 RX ORDER — MORPHINE SULFATE 2 MG/ML
2 INJECTION, SOLUTION INTRAMUSCULAR; INTRAVENOUS EVERY 8 HOURS PRN
Status: DISCONTINUED | OUTPATIENT
Start: 2024-02-29 | End: 2024-03-01

## 2024-02-29 RX ORDER — TRAMADOL HYDROCHLORIDE 50 MG/1
50 TABLET ORAL EVERY 8 HOURS PRN
Status: DISCONTINUED | OUTPATIENT
Start: 2024-02-29 | End: 2024-03-01 | Stop reason: ALTCHOICE

## 2024-02-29 RX ORDER — MAGNESIUM SULFATE IN WATER 40 MG/ML
2000 INJECTION, SOLUTION INTRAVENOUS PRN
Status: DISCONTINUED | OUTPATIENT
Start: 2024-02-29 | End: 2024-03-03 | Stop reason: HOSPADM

## 2024-02-29 RX ADMIN — MAGNESIUM SULFATE HEPTAHYDRATE 2000 MG: 40 INJECTION, SOLUTION INTRAVENOUS at 03:30

## 2024-02-29 RX ADMIN — ONDANSETRON 4 MG: 2 INJECTION INTRAMUSCULAR; INTRAVENOUS at 16:12

## 2024-02-29 RX ADMIN — MIDODRINE HYDROCHLORIDE 10 MG: 5 TABLET ORAL at 09:17

## 2024-02-29 RX ADMIN — ONDANSETRON 4 MG: 2 INJECTION INTRAMUSCULAR; INTRAVENOUS at 23:09

## 2024-02-29 RX ADMIN — BACLOFEN 10 MG: 10 TABLET ORAL at 09:18

## 2024-02-29 RX ADMIN — SODIUM CHLORIDE, PRESERVATIVE FREE 10 ML: 5 INJECTION INTRAVENOUS at 09:23

## 2024-02-29 RX ADMIN — LACTULOSE 20 G: 20 SOLUTION ORAL at 15:49

## 2024-02-29 RX ADMIN — PANTOPRAZOLE SODIUM 40 MG: 40 TABLET, DELAYED RELEASE ORAL at 15:49

## 2024-02-29 RX ADMIN — TRAMADOL HYDROCHLORIDE 50 MG: 50 TABLET, COATED ORAL at 13:46

## 2024-02-29 RX ADMIN — ALBUMIN (HUMAN) 25 G: 12.5 INJECTION, SOLUTION INTRAVENOUS at 06:55

## 2024-02-29 RX ADMIN — MORPHINE SULFATE 2 MG: 2 INJECTION, SOLUTION INTRAMUSCULAR; INTRAVENOUS at 20:52

## 2024-02-29 RX ADMIN — Medication 100 MG: at 09:18

## 2024-02-29 RX ADMIN — PANTOPRAZOLE SODIUM 40 MG: 40 TABLET, DELAYED RELEASE ORAL at 09:18

## 2024-02-29 RX ADMIN — PENTOXIFYLLINE 400 MG: 400 TABLET, EXTENDED RELEASE ORAL at 09:17

## 2024-02-29 RX ADMIN — SODIUM CHLORIDE 250 ML: 9 INJECTION, SOLUTION INTRAVENOUS at 03:29

## 2024-02-29 RX ADMIN — PENTOXIFYLLINE 400 MG: 400 TABLET, EXTENDED RELEASE ORAL at 11:28

## 2024-02-29 RX ADMIN — IOPAMIDOL 75 ML: 755 INJECTION, SOLUTION INTRAVENOUS at 04:53

## 2024-02-29 RX ADMIN — FOLIC ACID 1 MG: 1 TABLET ORAL at 09:18

## 2024-02-29 RX ADMIN — RIFAXIMIN 550 MG: 550 TABLET ORAL at 09:18

## 2024-02-29 RX ADMIN — MIDODRINE HYDROCHLORIDE 10 MG: 5 TABLET ORAL at 11:28

## 2024-02-29 RX ADMIN — ONDANSETRON 4 MG: 2 INJECTION INTRAMUSCULAR; INTRAVENOUS at 23:04

## 2024-02-29 RX ADMIN — CIPROFLOXACIN 400 MG: 400 INJECTION, SOLUTION INTRAVENOUS at 15:47

## 2024-02-29 RX ADMIN — ONDANSETRON 4 MG: 2 INJECTION INTRAMUSCULAR; INTRAVENOUS at 03:26

## 2024-02-29 RX ADMIN — OCTREOTIDE ACETATE 50 MCG: 50 INJECTION, SOLUTION INTRAVENOUS; SUBCUTANEOUS at 19:58

## 2024-02-29 RX ADMIN — ALBUMIN (HUMAN) 50 G: 25 SOLUTION INTRAVENOUS at 14:40

## 2024-02-29 RX ADMIN — BACLOFEN 10 MG: 10 TABLET ORAL at 15:49

## 2024-02-29 RX ADMIN — SODIUM CHLORIDE 1000 ML: 9 INJECTION, SOLUTION INTRAVENOUS at 16:57

## 2024-02-29 RX ADMIN — FERROUS SULFATE TAB 325 MG (65 MG ELEMENTAL FE) 325 MG: 325 (65 FE) TAB at 09:18

## 2024-02-29 RX ADMIN — SODIUM CHLORIDE: 9 INJECTION, SOLUTION INTRAVENOUS at 18:27

## 2024-02-29 RX ADMIN — BUSPIRONE HYDROCHLORIDE 10 MG: 10 TABLET ORAL at 09:17

## 2024-02-29 RX ADMIN — LACTULOSE 20 G: 20 SOLUTION ORAL at 09:22

## 2024-02-29 RX ADMIN — WATER 10 ML: 1 INJECTION INTRAMUSCULAR; INTRAVENOUS; SUBCUTANEOUS at 20:27

## 2024-02-29 RX ADMIN — PANTOPRAZOLE SODIUM 40 MG: 40 INJECTION, POWDER, FOR SOLUTION INTRAVENOUS at 20:26

## 2024-02-29 RX ADMIN — OCTREOTIDE ACETATE 50 MCG/HR: 500 INJECTION, SOLUTION INTRAVENOUS; SUBCUTANEOUS at 20:19

## 2024-02-29 RX ADMIN — FENTANYL CITRATE 25 MCG: 50 INJECTION INTRAMUSCULAR; INTRAVENOUS at 03:27

## 2024-02-29 ASSESSMENT — PAIN DESCRIPTION - LOCATION
LOCATION: ABDOMEN

## 2024-02-29 ASSESSMENT — PAIN DESCRIPTION - DESCRIPTORS
DESCRIPTORS: DISCOMFORT
DESCRIPTORS: DISCOMFORT
DESCRIPTORS: ACHING;DISCOMFORT
DESCRIPTORS: ACHING

## 2024-02-29 ASSESSMENT — PAIN DESCRIPTION - PAIN TYPE: TYPE: CHRONIC PAIN

## 2024-02-29 ASSESSMENT — PAIN SCALES - GENERAL
PAINLEVEL_OUTOF10: 8
PAINLEVEL_OUTOF10: 9
PAINLEVEL_OUTOF10: 9
PAINLEVEL_OUTOF10: 8
PAINLEVEL_OUTOF10: 8

## 2024-02-29 NOTE — PROGRESS NOTES
4 Eyes Skin Assessment     NAME:  Johnathan Yanez III  YOB: 1980  MEDICAL RECORD NUMBER:  [id number]    The patient is being assessed for  Admission    I agree that at least one RN has performed a thorough Head to Toe Skin Assessment on the patient. ALL assessment sites listed below have been assessed.      Areas assessed by both nurses:    Head, Face, Ears, Shoulders, Back, Chest, Arms, Elbows, Hands, Sacrum. Buttock, Coccyx, Ischium, Legs. Feet and Heels, and Under Medical Devices         Does the Patient have a Wound? Yes wound(s) were present on assessment. LDA wound assessment was Initiated and completed by RN       Dennis Prevention initiated by RN: Yes  Wound Care Orders initiated by RN: Yes    Pressure Injury (Stage 3,4, Unstageable, DTI, NWPT, and Complex wounds) if present, place Wound referral order by RN under : Yes    New Ostomies, if present place, Ostomy referral order under : No     Nurse 1 eSignature: Electronically signed by Valentina Mai RN on 2/29/24 at 10:42 AM EST    **SHARE this note so that the co-signing nurse can place an eSignature**    Nurse 2 eSignature: Electronically signed by Shelli Rodríguez RN on 2/29/24 at 11:04 AM EST

## 2024-02-29 NOTE — ACP (ADVANCE CARE PLANNING)
Advance Care Planning   Healthcare Decision Maker:    Primary Decision Maker: Mikaela Russo - Parent - [phone]    Primary Decision Maker: BeauJohnathan - Parent - [phone]    Secondary Decision Maker: Angeles Smallwood \"Naty\" - Brother/Sister - [phone]    Supplemental (Other) Decision Maker: Peggy Rodriguez - Girlfriend - [phone]    Click here to complete Healthcare Decision Makers including selection of the Healthcare Decision Maker Relationship (ie \"Primary\").  Today we documented Decision Maker(s) consistent with Legal Next of Kin hierarchy.

## 2024-02-29 NOTE — ED PROVIDER NOTES
[facility] EMERGENCY DEPARTMENT  EMERGENCY DEPARTMENT ENCOUNTER        Pt Name: Johnathan Yanez III  MRN: [id number]  Birthdate 1980  Date of evaluation: 2/29/2024  Provider: Cindy Neri DO  PCP: No primary care provider on file.  Note Started: 1:14 AM EST 2/29/24    CHIEF COMPLAINT       Chief Complaint   Patient presents with    Abdominal Pain     Had paracentesis yesterday (2/28), C/O of abdominal pressure from ascites.        HISTORY OF PRESENT ILLNESS: 1 or more Elements   History From: patient    Limitations to history : None    Johnathan Yanez III is a 44 y.o. male with a history of cirrhosis, hepatitis C, GI bleed, scrotal abscess, alcohol abuse in the past, portal hypertension presenting to the emergency department via EMS from [facility] for abdominal pain.  Patient had paracentesis performed yesterday and is now complaining of increased abdominal pressure from his ascites.  He states that he was just recently admitted here due to the same issue and feels like he has already reaccumulated with fluid.  He states that his abdomen is tender to palpation diffusely due to increased fluid he also has increased swelling in his bilateral lower legs.  Denies any fevers, chills, lightheadedness, dizziness, syncope, chest pain, shortness of breath, numbness, tingling unilateral weakness, nausea, vomiting, diarrhea, or other acute symptoms or concerns.  Patient was just admitted to the hospital on 2- and readmitted on 2-, was discharged on the 27th.  He states that his last time that he had any fluid drained for a paracentesis. He feels like he needs another one today and is not feeling well in general.     Nursing Notes were all reviewed and agreed with or any disagreements were addressed in the HPI.    REVIEW OF SYSTEMS :      Review of Systems    Positives and Pertinent negatives as per HPI.     SURGICAL HISTORY     Past  ill in appearance, sitting up in no acute distress  Head: Normocephalic and atraumatic  Eyes:  EOMI, conjunctiva normal, sclera non icteric  ENT:  Oropharynx clear, handling secretions, no trismus, no asymmetry of the posterior oropharynx or uvular edema  Neck: Supple, full ROM, no stridor, no meningeal signs  Respiratory: Lungs diminished to auscultation bilaterally, no wheezes, rales, or rhonchi. Not in respiratory distress  Cardiovascular:  Regular rate. Regular rhythm. No murmurs, no gallops, no rubs. 2+ distal pulses. Equal extremity pulses.   Chest: No chest wall tenderness  GI: Abdomen soft with umbilical hernia present and mild distention and ascites also present.  Nonperitoneal  : There is some skin thickening and swelling over the left testicle.  No erythema or warmth to touch.  No signs of abscess or infection present.  Musculoskeletal: Moves all extremities x 4. Warm and well perfused, no clubbing, no cyanosis, +2 edema to the bilateral lower extremities capillary refill <3 seconds  Integument: skin warm and dry. No rashes.   Neurologic: GCS 15, no focal deficits, symmetric strength 5/5 in the upper and lower extremities bilaterally  Psychiatric: Normal Affect        DIAGNOSTIC RESULTS   LABS:    Labs Reviewed   CBC WITH AUTO DIFFERENTIAL - Abnormal; Notable for the following components:       Result Value    WBC 3.9 (*)     RBC 2.36 (*)     Hemoglobin 8.3 (*)     Hematocrit 23.6 (*)     .0 (*)     MCH 35.2 (*)     MCHC 35.2 (*)     RDW 16.7 (*)     Platelets 29 (*)     Lymphocytes % 13 (*)     Lymphocytes Absolute 0.51 (*)     All other components within normal limits   BASIC METABOLIC PANEL W/ REFLEX TO MG FOR LOW K - Abnormal; Notable for the following components:    Sodium 129 (*)     Chloride 95 (*)     Creatinine 1.4 (*)     All other components within normal limits   HEPATIC FUNCTION PANEL - Abnormal; Notable for the following components:    Albumin 3.0 (*)     AST 49 (*)     Total

## 2024-02-29 NOTE — SIGNIFICANT EVENT
RRT note     RRT was called at around 16:50 due to hematemesis.  Dr. Herrera responded initially then I arrived @ 1700.     Upon arrival, noted large pool of bright red blood with clots on the floor.  Patient alert and awake, answering questions appropriately.  Stated that he felt nauseous and then vomited all of a sudden.  He is hemodynamically stable at this time.  Fluids started.  Labs including CBC, CMP, LA and INR.     Also updated Dr Huizar with GI. Recommended octreotide bolus and drip, to transfusing 1 unit of blood slowly, 6 units of platelets followed by Lasix.  Will monitor overnight and possibly do an EGD tomorrow if platelets are better.  Patient's platelets are 29 this morning.     Orders placed.  Keep patient NPO.  Protonix IV twice daily.  Trend H&H    CCT 35 mins

## 2024-02-29 NOTE — H&P
[facility] Hospitalist Group   History and Physical      CHIEF COMPLAINT:  Abdominal Pain    History of Present Illness:  44 y.o. male with a history of cirrhosis, hepatitis C, GIB, scrotal abscess, alcohol abuse presents with abdominal pain.  Patient presenting via EMS from [facility] with complaints of abdominal pain.  Notes did have paracentesis 2/28/2024.  States he is having increased abdominal pressure.  Patient states he feels like fluid has already reaccumulated.  Also notes tenderness to the abdomen and increased swelling to BLE.  He denies any fevers, chills, N/V/D, CP, SOB.  Patient received    2/22 - 2/27 patient admitted with scrotal abscess and abdominal pain.  GS, GI, urology were all consulted on the case.  Patient received paracentesis x 2.  Aldactone and furosemide were increased.  Patient was discharged to [facility].    2/12 - 2/20 patient presented to ED with complaints of swelling to abdomen and scrotum.  Patient received diuresis.  Underwent paracentesis with 3.5L removed.  EGD performed noting very small varices.  Hematology/oncology consulted for pancytopenia.  Received IV iron supplementation.  GS and ID consulted for RLE wound.  [place] to high risk for surgical intervention.  ID manage scrotal swelling with abscess.  Received IV antibiotics.  Patient was discharged with follow-up to GI in 1 week.    1/25 - 2/10 presented with CANDY, weakness, scrotal abscess, RLE wound.  Urology, GS and ID consulted.  CT revealing irregular fluid collection.  Not a surgical candidate.  Initially receiving cefepime/Flagyl.  Discharged on Levaquin/Flagyl.  Discharged to [facility] for PT/OT and alcohol rehab.    1/7 - 1/15 presented to the ED with abdominal pain generalized body swelling and generalized body pain.  Worsening swelling to scrotum and abdomen.  Octreotide infusion and albumin drip initiated in ED.  Hypotensive systolics in the 80s upon presentation.  Hgb  Edema in the flanks/thighs.  Moderate to large ascites.     Cirrhotic liver with splenomegaly and prominent collaterals. TIPS stent, limited evaluation for patency.  Moderate to large volume of ascites. Thickened appearance of the stomach and small bowel loops.  This could be secondary to portal hypertension or reflective of gastroenteritis, peptic ulcer disease.  Also, moderate colonic stool burden which can be seen with constipation. Large left hydrocele. Scrotal skin thickening. Cannot exclude a scrotal fluid collection.  Correlate with ultrasound if clinically indicated. Urinary bladder with mild wall thickening.  Correlate with urinalysis to exclude cystitis. 2.2 x 1.8 cm collection within the subcutaneous tissues lateral proximal left thigh. Miniscule pleural effusions.  Streaky somewhat masslike opacity in the left lower lobe most likely reflects atelectasis/scarring, advise attention on a follow-up.     XR CHEST PORTABLE    Result Date: 2/29/2024  EXAMINATION: ONE XRAY VIEW OF THE CHEST 2/29/2024 1:51 am COMPARISON: Chest x-ray 7 January 2024 HISTORY: ORDERING SYSTEM PROVIDED HISTORY: fluid overload TECHNOLOGIST PROVIDED HISTORY: Reason for exam:->fluid overload FINDINGS: Left lower lung opacity.  No pleural effusion.  No pneumothorax. Cardiomediastinal silhouette is unremarkable.  Osseous structures are unchanged.     Left lower lung opacity not seen on prior imaging may represent pneumonia, atelectasis, or aspiration.       EKG:     ASSESSMENT:      Principal Problem:    Ascites of liver  Resolved Problems:    * No resolved hospital problems. *      PLAN:    Decompensated Liver Cirrhosis/Ascites- CT A/P  Cirrhotic liver with splenomegaly and prominent collaterals. TIPS stent, limited evaluation for patency.  Moderate to large volume of ascites.  IR for paracentesis. Lactulose, Xifaxin,  GI input appreciated.   Pancytopenia- 2/2 Chronic Liver Disease/Iron Deficiency. Follows with Hematology. Monitor closely

## 2024-02-29 NOTE — PROGRESS NOTES
[facility] Quality Flow/Interdisciplinary Rounds Progress Note        Quality Flow Rounds held on February 29, 2024    Disciplines Attending:  Bedside Nurse, , , and Nursing Unit Leadership    Johnathan Yanez III was admitted on 2/29/2024 12:47 AM    Anticipated Discharge Date:  Expected Discharge Date: 03/02/24    Disposition:    Dennis Score:  Dennis Scale Score: 16    Readmission Risk              Risk of Unplanned Readmission:  45           Discussed patient goal for the day, patient clinical progression, and barriers to discharge.  The following Goal(s) of the Day/Commitment(s) have been identified:   GI eval today      Alicia Rodas RN  February 29, 2024

## 2024-02-29 NOTE — PROGRESS NOTES
Physical Therapy  Facility/Department: [address] INTERMEDIATE  Physical Therapy Initial Assessment    Name: Johnathan Yanez III  : 1980  MRN: [id number]  Date of Service: 2024        Patient Diagnosis(es): The primary encounter diagnosis was Ascites due to alcoholic cirrhosis (HCC). Diagnoses of Cirrhosis of liver with ascites, unspecified hepatic cirrhosis type (HCC), Splenomegaly, Hydrocele, unspecified hydrocele type, Pleural effusion, Hypomagnesemia, Transaminitis, Hyperbilirubinemia, Hypoalbuminemia, CANDY (acute kidney injury) (HCC), Thrombocytopenia (HCC), and Anemia, unspecified type were also pertinent to this visit.  Past Medical History:  has a past medical history of Cirrhosis (HCC), Esophageal varices (HCC), ETOH abuse, GAVE (gastric antral vascular ectasia), Hepatitis C, and Portal hypertension (HCC).  Past Surgical History:  has a past surgical history that includes Upper gastrointestinal endoscopy (N/A, 2020); Tonsillectomy; Upper gastrointestinal endoscopy (N/A, 2021); Upper gastrointestinal endoscopy (N/A, 5/10/2021); Endoscopy, colon, diagnostic; Umbilical hernia repair (N/A, 9/15/2021); hernia repair; hernia repair (Left, 2022); Upper gastrointestinal endoscopy (N/A, 2023); Upper gastrointestinal endoscopy (2023); Upper gastrointestinal endoscopy (N/A, 6/3/2023); Upper gastrointestinal endoscopy (N/A, 2024); and Esophagoscopy (N/A, 2024).          Referring provider:  Davion Kruger MD    PT Order:  PT eval and treat     Evaluating PT:  Fiona Childress, PT, DPT PT [id number]    Room #:  0630/0630-A  Diagnosis:  Splenomegaly [R16.1]  Hypomagnesemia [E83.42]  Hyperbilirubinemia [E80.6]  Hypoalbuminemia [E88.09]  Pleural effusion [J90]  Thrombocytopenia (HCC) [D69.6]  Transaminitis [R74.01]  CANDY (acute kidney injury) (HCC) [N17.9]  Cirrhosis of liver with ascites, unspecified hepatic cirrhosis type (HCC) [K74.60, R18.8]  Hydrocele, unspecified hydrocele type  [N43.3]  Ascites of liver [R18.8]  Ascites due to alcoholic cirrhosis (HCC) [K70.31]  Anemia, unspecified type [D64.9]  Precautions:  fall risk, LE simon  Equipment Needs:  none    SUBJECTIVE:    Pt admitted from nursing facility.  Pt reported he was using a walker with assistance for short distance ambulation.     OBJECTIVE:   Initial Evaluation  Date: 2/29 Treatment Short Term/ Long Term   Goals   Was pt agreeable to Eval/treatment? yes     Does pt have pain? Abdominal and B thigh pain.     Bed Mobility  Rolling: SBA  Supine to sit: SBA  Sit to supine: SBA  Scooting: SBA to sitting in bed.  independent   Transfers Sit to stand: Mod A  Stand to sit: Mod A  Stand pivot: NT  Min A   Ambulation    Pt unable to take steps due to weakness.   20 feet with w/w Min A    Stair negotiation: ascended and descended  NT      ROM BLE:  WFL     Strength BLE:  grossly 3/5  Increase LE strength by 1/2 mm grade   Balance Sitting EOB:  supervision  Static standing:  Mod A.  Left knee simon.   Sitting EOB:  independent  Dynamic Standing:  Min A with w/w   AM-PAC 6 Clicks 11/24       Pt is A & O x 3  Sensation:  Pt reported his toes are numb.    Patient education  Pt educated on PT objectives during eval and while in the hospital, hand placement during transfers, safety during mobility.    Patient response to education:   Pt verbalized understanding Pt demonstrated skill Pt requires further education in this area   yes With cueing yes     ASSESSMENT:    Conditions Requiring Skilled Therapeutic Intervention:    [x]Decreased strength     []Decreased ROM  [x]Decreased functional mobility  [x]Decreased balance   [x]Decreased endurance   [x]Decreased posture  [x]Decreased sensation  []Decreased coordination   []Decreased vision  []Decreased safety awareness   [x]Increased pain       Comments:  Pt found in bed.  No report of dizziness during functional mobility.  Cueing required for hand placement during transfers.  Pt's left knee

## 2024-02-29 NOTE — PROGRESS NOTES
Occupational Therapy    OCCUPATIONAL THERAPY INITIAL EVALUATION    [facility]   [address], [place]         Date:2024                                                  Patient Name: Johnathan Yanez III    MRN: [id number]    : 1980    Room: [address] [place] [address]      Evaluating OT: Amanda Charles OTR/L   [id number]      Referring Provider:Davion Kruger MD     Specific Provider Orders/Date:OT eval and treat 2024      Diagnosis:  Splenomegaly [R16.1]  Hypomagnesemia [E83.42]  Hyperbilirubinemia [E80.6]  Hypoalbuminemia [E88.09]  Pleural effusion [J90]  Thrombocytopenia (HCC) [D69.6]  Transaminitis [R74.01]  CANDY (acute kidney injury) (HCC) [N17.9]  Cirrhosis of liver with ascites, unspecified hepatic cirrhosis type (HCC) [K74.60, R18.8]  Hydrocele, unspecified hydrocele type [N43.3]  Ascites of liver [R18.8]  Ascites due to alcoholic cirrhosis (HCC) [K70.31]  Anemia, unspecified type [D64.9]     Pertinent Medical History: ETOH abuse , Hep C, hernia repair 2022     Precautions:  Fall Risk,      Assessment of current deficits    [x] Functional mobility  [x]ADLs  [x] Strength               [x]Cognition    [x] Functional transfers   [x] IADLs         [x] Safety Awareness   [x]Endurance    [] Fine Coordination              [x] Balance      [] Vision/perception   []Sensation     []Gross Motor Coordination  [] ROM  [] Delirium                   [] Motor Control     OT PLAN OF CARE   OT POC based on physician orders, patient diagnosis and results of clinical assessment    Frequency/Duration  2-4 days/wk for 2 weeks PRN   Specific OT Treatment Interventions to include:   ADL retraining/adapted techniques and AE recommendations to increase functional independence within precautions                    Energy conservation techniques to improve tolerance for selfcare routine   Functional transfer/mobility training/DME recommendations for increased

## 2024-02-29 NOTE — OR NURSING
Pt transported to IR room for paracentesis. Pt is alert and oriented, denies any pain prior to procedure. Ultrasound images taken prior to procedure. Procedure is explained to patient, including possible risks. Pt verbalizes understanding and consent form signed. Procedure done under sterile technique and guidance of ultrasound imaging. 1% Lidocaine is used during procedure for comfort measures. A total of 2200 ml's of clear yellow colored ascitic fluid drained during procedure. Catheter removed and op-site dressing applied to site with no bleeding noted. Specimen collected and sent to lab for ordered testing. Albumin infusion given during procedure. Pt tolerated procedure well and denies any pain after. Pt given discharge instructions and pt verbalizes understanding of post procedure care/follow up. Pt transported out of department with no difficulties. Report given to floor RN

## 2024-02-29 NOTE — ED NOTES
ED to Inpatient Handoff Report    Notified T[facility] that electronic handoff available and patient ready for transport to room 630.    Safety Risks: Risk of falls    Patient in Restraints: no    Constant Observer or Patient : no    Telemetry Monitoring Ordered: Yes          Order to transfer to unit without monitor: NO    Last MEWS: 1 Time completed: 0644    Deterioration Index: 23.1    Vitals:    02/29/24 0057 02/29/24 0333 02/29/24 0644   BP: (!) 91/48 (!) 103/56 (!) 113/59   Pulse: 79 79 79   Resp: 14 12 16   Temp: 98 °F (36.7 °C)  98.1 °F (36.7 °C)   TempSrc: Oral  Oral   SpO2: 98% 97% 97%   Weight: 77.1 kg (170 lb)     Height: 1.727 m (5' 8\")         Opportunity for questions and clarification was provided.

## 2024-02-29 NOTE — CONSULTS
[facility]  MD Mario Tom, MD Tesha Pierre, CHARISSE García  Consultation  Patient's Name: Johnathan Yanez III  2:48 PM  2/29/2024    Nephrologist: Mario Velazquez MD    Reason for Consult:  CANDY  Requesting Physician:  Carlton Torres MD    Chief Complaint:  Abdominal pain    History Obtained From: Patient, chart    History of Present Ilness:         Mr. Yanez is a pleasant 44 year old gentleman who presented to the hospital secondary to abdominal pain on 2/29. He has suffered many hospitalizations over the past two months. He was hospitalized in January through February 10th for acute kidney injury and a scrotal abscess. He was admitted again shortly after that hospitalization from February 12th through the 20th for abdominal pain and scrotal edema. Unfortunately, he returned to the hospital two days later for the same complaints and was discharged to a nursing facility on the 27th of February. His symptoms persisted and he presented to the emergency room yesterday due to ongoing abdominal pain. He was hypotensive on arrival to the emergency room and continues to have hypotension. His history is significant for decompensated liver cirrhosis secondary to alcohol abuse. He has a history of TIPS procedure. He has been getting frequent paracentesis for abdominal ascites. His most recent being on 2/28. CT of the abdomen in the emergency room revealed large volume ascites. Subsequently, he is scheduled for another paracentesis again today. Gastroenterology has been consulted and ordered and an ultrasound to ensure TIPS patency as he continues to have large volume ascites. Lab work in the emergency room is consistent with Na 129, creatinine 1.4, magnesium 1.5, AST 49, Hgb 8,3, platelets 29, and WBC of 3.9.         We were consulted for acute kidney injury. Mr. Yanez is known to our service from previous hospitalizations. His

## 2024-02-29 NOTE — PROGRESS NOTES
Database initiated. Patient is A&O comes in from [facility]. He requires a wheelchair but can stand with a walker and 2 assist. He he has a wound on his testicles he will need a wound care consult. Medications verified using facility paperwork.

## 2024-02-29 NOTE — PLAN OF CARE
Problem: ABCDS Injury Assessment  Goal: Absence of physical injury  Outcome: Progressing     Problem: Skin/Tissue Integrity  Goal: Absence of new skin breakdown  Description: 1.  Monitor for areas of redness and/or skin breakdown  2.  Assess vascular access sites hourly  3.  Every 4-6 hours minimum:  Change oxygen saturation probe site  4.  Every 4-6 hours:  If on nasal continuous positive airway pressure, respiratory therapy assess nares and determine need for appliance change or resting period.  Outcome: Progressing     Problem: Pain  Goal: Verbalizes/displays adequate comfort level or baseline comfort level  Outcome: Progressing     Problem: Safety - Adult  Goal: Free from fall injury  Outcome: Progressing

## 2024-02-29 NOTE — CARE COORDINATION
Social Work / Discharge Planning: Patient admitted from [facility]. Tammy from [facility] verified patient can return and will need pre-cert. N 17 generated and transport forms completed. GI consulted. Await plan. Hx of ETOH abuse . SW to follow. Electronically signed by JOHN Mireles on 2/29/24 at 11:53 AM EST

## 2024-02-29 NOTE — CONSULTS
Anasarca    Leg wound, right    Ascites of liver     1.  Cirrhosis / alcoholic hepatitis  -Suspect secondary to alcohol and hepatitis C  -History of hepatitis C, see associated section  -Ultrasound 1/8/2024 with findings of cirrhosis, nonspecific gallbladder wall thickening, no evidence of stones, normal caliber CBD 0.6 cm, ascites, patent vasculature, no remark of hepatic mass  -Nonelevated AFP <1.8 (1/7/2024)  -EGD 1/11/2024 by Dr. Bal showing 2 very small esophageal varices with no stigmata of recent bleeding not amendable to banding, GAVE treated with APC, moderate portal hypertensive gastropathy, no gastric varices, unremarkable examined proximal small bowel  -EGD 2/18/2024 -2 small varices, GAVE and portal hypertensive gastropathy  -History of esophageal varices, see associated section  -Hepatic encephalopathy, see associated section  -Ascites, see associated section     2.  Hepatitis C  -Treated  -Viral load undetectable (2/24/2022)     3.  Hepatic encephalopathy  -Mental status appears at baseline without significant confusion but somewhat somnolent  -Continue lactulose and Xifaxan  -Mental status at baseline     4.  Ascites  -History of TIPS shunt  -Paracentesis most recently 2/28 with removal of 2.6 L  -Rapidly reaccumulating ascites  -Evaluated shunt  -Previous MRI consistent with cirrhosis but no mass  -Start empiric antibiotic and consider prophylactic antibiotics as outpatient       5.  Esophageal varices  -EGD 5/10/2021 with findings of 3 columns varices, banded x6, normal stomach, no gastric varices, normal examined proximal small bowel  -EGD 6/3/2023 showing normal examined esophagus, no varices, GAVE treated with APC, no gastric varices, normal examined proximal small bowel  -EGD 1/11/2024 by Dr. Bal showing 2 very small esophageal varices with no stigmata of recent bleeding not amendable to banding, GAVE treated with APC, moderate portal hypertensive gastropathy, no gastric varices,  unremarkable examined proximal small bowel  -EGD 2/18/2024 with 2 small varices and repeat treatment for GAVE with APC     6.  Anemia  -Acute on chronic with H&H at baseline  -No evidence of overt bleed  -EGD as above  -PPI twice daily while inpatient  -Monitor H&H and for signs of overt bleeding  -Can consider repeat EGD in case of precipitous drop in H&H or overt bleed   -Patient previously has refused colonoscopy      7.  Abnormal CT pancreas  -CT abdomen pelvis 1/25/2024 showing hazy areas of attenuation along the margins of the pancreas  -MRI/MRCP 2/5/2024 poorly visualized pancreas but no gross dilatation of bile ducts.  -Consider repeat MRI versus endoscopic ultrasound as outpatient     8.  Abnormal CT stomach/bowel/urinary bladder  -Reported mild wall thickening likely related to ascites/laboratory state  -Endoscopies as above     9.  Alcohol/polysubstance abuse  -Persistent alcohol use until recently, currently resides at nursing home     10.  Comorbidities  -Per admitting/consultants    11.  Renal failure  -Acute on likely related to frequent paracentesis  -Albumin with paracentesis as above  -Recommend evaluation by nephrology        Concern for nonfunctioning tubes given rapid regulation of ascites  Obtain Doppler evaluation as above and paracentesis with fluid analysis of ascitic fluid  Provide albumin with paracentesis.  Above discussed with the patient and all questions answered to his satisfaction    Thank you for the opportunity to see this patient in consultation     Hardeep Kennedy MD  2/29/2024  1:10 PM    NOTE:  This report was transcribed using voice recognition software.  Every effort was made to ensure accuracy; however, inadvertent computerized transcription errors may be present.    Agree with above.  PT known to our service.  Recent EGD per above.  Pt with H/O TIPS and having recurrent ascites.  Will check duplex doppler to ensure patency as not typical to get large volume ascites with

## 2024-02-29 NOTE — PROCEDURES
Procedure: Ultrasound Guided Paracentesis    Diagnosis: Ascites    Findings: Moderate volume ascites, successful catheter placement with clear yellow ascitic fluid return    Specimen: Approximately 60cc obtained and sent for analysis (orders from referring physician).  Total amount of fluid removed to be reported in PACS.    Anesthesia: Local infiltration of 10cc 1% Lidocaine without epi    EBL: Minimal.    Complication: None immediately post procedure.    Plan: Return to floor/room following paracentesis.    Comments:     See radiology dictation in PACs for image review and additional procedural information.

## 2024-03-01 ENCOUNTER — ANESTHESIA EVENT (OUTPATIENT)
Dept: OPERATING ROOM | Age: 44
End: 2024-03-01
Payer: COMMERCIAL

## 2024-03-01 ENCOUNTER — ANESTHESIA (OUTPATIENT)
Dept: OPERATING ROOM | Age: 44
End: 2024-03-01
Payer: COMMERCIAL

## 2024-03-01 PROBLEM — E44.0 MODERATE PROTEIN-CALORIE MALNUTRITION (HCC): Status: ACTIVE | Noted: 2024-03-01

## 2024-03-01 PROBLEM — E44.0 MODERATE PROTEIN-CALORIE MALNUTRITION (HCC): Status: ACTIVE | Noted: 2024-01-27

## 2024-03-01 LAB
ALBUMIN SERPL-MCNC: 3.4 G/DL (ref 3.5–5.2)
ALP SERPL-CCNC: 64 U/L (ref 40–129)
ALT SERPL-CCNC: 18 U/L (ref 0–40)
AMMONIA PLAS-SCNC: 53 UMOL/L (ref 16–60)
ANION GAP SERPL CALCULATED.3IONS-SCNC: 9 MMOL/L (ref 7–16)
ARM BAND NUMBER: NORMAL
AST SERPL-CCNC: 44 U/L (ref 0–39)
BASOPHILS # BLD: 0.01 K/UL (ref 0–0.2)
BASOPHILS # BLD: 0.02 K/UL (ref 0–0.2)
BASOPHILS NFR BLD: 0 % (ref 0–2)
BASOPHILS NFR BLD: 1 % (ref 0–2)
BILIRUB DIRECT SERPL-MCNC: 1.4 MG/DL (ref 0–0.3)
BILIRUB SERPL-MCNC: 4.9 MG/DL (ref 0–1.2)
BLOOD BANK BLOOD PRODUCT EXPIRATION DATE: NORMAL
BLOOD BANK BLOOD PRODUCT EXPIRATION DATE: NORMAL
BLOOD BANK DISPENSE STATUS: NORMAL
BLOOD BANK DISPENSE STATUS: NORMAL
BLOOD BANK ISBT PRODUCT BLOOD TYPE: 6200
BLOOD BANK ISBT PRODUCT BLOOD TYPE: 6200
BLOOD BANK PRODUCT CODE: NORMAL
BLOOD BANK PRODUCT CODE: NORMAL
BLOOD BANK UNIT TYPE AND RH: NORMAL
BLOOD BANK UNIT TYPE AND RH: NORMAL
BPU ID: NORMAL
BPU ID: NORMAL
BUN SERPL-MCNC: 17 MG/DL (ref 6–20)
CALCIUM SERPL-MCNC: 8 MG/DL (ref 8.6–10.2)
CHLORIDE SERPL-SCNC: 100 MMOL/L (ref 98–107)
CHLORIDE UR-SCNC: 69 MMOL/L
CO2 SERPL-SCNC: 25 MMOL/L (ref 22–29)
COMPONENT: NORMAL
COMPONENT: NORMAL
CREAT SERPL-MCNC: 1.2 MG/DL (ref 0.7–1.2)
CREAT UR-MCNC: 74.3 MG/DL (ref 40–278)
CREAT UR-MCNC: 75.3 MG/DL (ref 40–278)
EOSINOPHIL # BLD: 0.04 K/UL (ref 0.05–0.5)
EOSINOPHIL # BLD: 0.05 K/UL (ref 0.05–0.5)
EOSINOPHILS RELATIVE PERCENT: 1 % (ref 0–6)
EOSINOPHILS RELATIVE PERCENT: 2 % (ref 0–6)
ERYTHROCYTE [DISTWIDTH] IN BLOOD BY AUTOMATED COUNT: 18 % (ref 11.5–15)
ERYTHROCYTE [DISTWIDTH] IN BLOOD BY AUTOMATED COUNT: 18.5 % (ref 11.5–15)
GFR SERPL CREATININE-BSD FRML MDRD: >60 ML/MIN/1.73M2
GLUCOSE SERPL-MCNC: 108 MG/DL (ref 74–99)
HCT VFR BLD AUTO: 19.2 % (ref 37–54)
HCT VFR BLD AUTO: 20.7 % (ref 37–54)
HGB BLD-MCNC: 6.7 G/DL (ref 12.5–16.5)
HGB BLD-MCNC: 7.2 G/DL (ref 12.5–16.5)
IMM GRANULOCYTES # BLD AUTO: <0.03 K/UL (ref 0–0.58)
IMM GRANULOCYTES NFR BLD: 1 % (ref 0–5)
INR PPP: 2.6
LACTATE BLDV-SCNC: 1.5 MMOL/L (ref 0.5–2.2)
LYMPHOCYTES NFR BLD: 0.31 K/UL (ref 1.5–4)
LYMPHOCYTES NFR BLD: 0.32 K/UL (ref 1.5–4)
LYMPHOCYTES RELATIVE PERCENT: 11 % (ref 20–42)
LYMPHOCYTES RELATIVE PERCENT: 14 % (ref 20–42)
MCH RBC QN AUTO: 33.8 PG (ref 26–35)
MCH RBC QN AUTO: 34.1 PG (ref 26–35)
MCHC RBC AUTO-ENTMCNC: 34.8 G/DL (ref 32–34.5)
MCHC RBC AUTO-ENTMCNC: 34.9 G/DL (ref 32–34.5)
MCV RBC AUTO: 97 FL (ref 80–99.9)
MCV RBC AUTO: 98.1 FL (ref 80–99.9)
MICROORGANISM SPEC CULT: NO GROWTH
MONOCYTES NFR BLD: 0.18 K/UL (ref 0.1–0.95)
MONOCYTES NFR BLD: 0.32 K/UL (ref 0.1–0.95)
MONOCYTES NFR BLD: 11 % (ref 2–12)
MONOCYTES NFR BLD: 8 % (ref 2–12)
NEUTROPHILS NFR BLD: 75 % (ref 43–80)
NEUTROPHILS NFR BLD: 76 % (ref 43–80)
NEUTS SEG NFR BLD: 1.73 K/UL (ref 1.8–7.3)
NEUTS SEG NFR BLD: 2.21 K/UL (ref 1.8–7.3)
PLATELET # BLD AUTO: 43 K/UL (ref 130–450)
PLATELET # BLD AUTO: 50 K/UL (ref 130–450)
PLATELET CONFIRMATION: NORMAL
PLATELET CONFIRMATION: NORMAL
PMV BLD AUTO: 11.7 FL (ref 7–12)
PMV BLD AUTO: 11.8 FL (ref 7–12)
POTASSIUM SERPL-SCNC: 3.9 MMOL/L (ref 3.5–5)
PROT SERPL-MCNC: 5.2 G/DL (ref 6.4–8.3)
PROTHROMBIN TIME: 29 SEC (ref 9.3–12.4)
RBC # BLD AUTO: 1.98 M/UL (ref 3.8–5.8)
RBC # BLD AUTO: 2.11 M/UL (ref 3.8–5.8)
RBC # BLD: ABNORMAL 10*6/UL
SODIUM SERPL-SCNC: 134 MMOL/L (ref 132–146)
SODIUM UR-SCNC: 84 MMOL/L
SPECIMEN DESCRIPTION: NORMAL
TOTAL PROTEIN, URINE: 15 MG/DL (ref 0–12)
TOTAL PROTEIN, URINE: 15 MG/DL (ref 0–12)
TRANSFUSION STATUS: NORMAL
TRANSFUSION STATUS: NORMAL
UNIT DIVISION: 0
UNIT DIVISION: 0
UNIT ISSUE DATE/TIME: NORMAL
UNIT ISSUE DATE/TIME: NORMAL
URINE TOTAL PROTEIN CREATININE RATIO: 0.2 (ref 0–0.2)
WBC OTHER # BLD: 2.3 K/UL (ref 4.5–11.5)
WBC OTHER # BLD: 2.9 K/UL (ref 4.5–11.5)

## 2024-03-01 PROCEDURE — 6370000000 HC RX 637 (ALT 250 FOR IP): Performed by: INTERNAL MEDICINE

## 2024-03-01 PROCEDURE — 85025 COMPLETE CBC W/AUTO DIFF WBC: CPT

## 2024-03-01 PROCEDURE — 7100000010 HC PHASE II RECOVERY - FIRST 15 MIN: Performed by: INTERNAL MEDICINE

## 2024-03-01 PROCEDURE — 6360000002 HC RX W HCPCS: Performed by: NURSE ANESTHETIST, CERTIFIED REGISTERED

## 2024-03-01 PROCEDURE — 6360000002 HC RX W HCPCS: Performed by: INTERNAL MEDICINE

## 2024-03-01 PROCEDURE — 2060000000 HC ICU INTERMEDIATE R&B

## 2024-03-01 PROCEDURE — 2709999900 HC NON-CHARGEABLE SUPPLY: Performed by: INTERNAL MEDICINE

## 2024-03-01 PROCEDURE — 3600007511: Performed by: INTERNAL MEDICINE

## 2024-03-01 PROCEDURE — 7100000011 HC PHASE II RECOVERY - ADDTL 15 MIN: Performed by: INTERNAL MEDICINE

## 2024-03-01 PROCEDURE — P9073 PLATELETS PHERESIS PATH REDU: HCPCS

## 2024-03-01 PROCEDURE — 2580000003 HC RX 258: Performed by: INTERNAL MEDICINE

## 2024-03-01 PROCEDURE — 3600007501: Performed by: INTERNAL MEDICINE

## 2024-03-01 PROCEDURE — 85610 PROTHROMBIN TIME: CPT

## 2024-03-01 PROCEDURE — P9016 RBC LEUKOCYTES REDUCED: HCPCS

## 2024-03-01 PROCEDURE — 82248 BILIRUBIN DIRECT: CPT

## 2024-03-01 PROCEDURE — 6360000002 HC RX W HCPCS: Performed by: HOSPITALIST

## 2024-03-01 PROCEDURE — 3700000000 HC ANESTHESIA ATTENDED CARE: Performed by: INTERNAL MEDICINE

## 2024-03-01 PROCEDURE — 30233R1 TRANSFUSION OF NONAUTOLOGOUS PLATELETS INTO PERIPHERAL VEIN, PERCUTANEOUS APPROACH: ICD-10-PCS | Performed by: STUDENT IN AN ORGANIZED HEALTH CARE EDUCATION/TRAINING PROGRAM

## 2024-03-01 PROCEDURE — 82140 ASSAY OF AMMONIA: CPT

## 2024-03-01 PROCEDURE — C9113 INJ PANTOPRAZOLE SODIUM, VIA: HCPCS | Performed by: HOSPITALIST

## 2024-03-01 PROCEDURE — 3700000001 HC ADD 15 MINUTES (ANESTHESIA): Performed by: INTERNAL MEDICINE

## 2024-03-01 PROCEDURE — C9113 INJ PANTOPRAZOLE SODIUM, VIA: HCPCS | Performed by: INTERNAL MEDICINE

## 2024-03-01 PROCEDURE — 83605 ASSAY OF LACTIC ACID: CPT

## 2024-03-01 PROCEDURE — 99232 SBSQ HOSP IP/OBS MODERATE 35: CPT | Performed by: INTERNAL MEDICINE

## 2024-03-01 PROCEDURE — 80053 COMPREHEN METABOLIC PANEL: CPT

## 2024-03-01 PROCEDURE — 0DJ08ZZ INSPECTION OF UPPER INTESTINAL TRACT, VIA NATURAL OR ARTIFICIAL OPENING ENDOSCOPIC: ICD-10-PCS | Performed by: STUDENT IN AN ORGANIZED HEALTH CARE EDUCATION/TRAINING PROGRAM

## 2024-03-01 PROCEDURE — 36430 TRANSFUSION BLD/BLD COMPNT: CPT

## 2024-03-01 RX ORDER — PROPOFOL 10 MG/ML
INJECTION, EMULSION INTRAVENOUS PRN
Status: DISCONTINUED | OUTPATIENT
Start: 2024-03-01 | End: 2024-03-01 | Stop reason: SDUPTHER

## 2024-03-01 RX ORDER — FUROSEMIDE 10 MG/ML
20 INJECTION INTRAMUSCULAR; INTRAVENOUS ONCE
Status: COMPLETED | OUTPATIENT
Start: 2024-03-01 | End: 2024-03-01

## 2024-03-01 RX ORDER — MORPHINE SULFATE 2 MG/ML
2 INJECTION, SOLUTION INTRAMUSCULAR; INTRAVENOUS EVERY 6 HOURS PRN
Status: DISCONTINUED | OUTPATIENT
Start: 2024-03-01 | End: 2024-03-03 | Stop reason: HOSPADM

## 2024-03-01 RX ORDER — PANTOPRAZOLE SODIUM 40 MG/10ML
40 INJECTION, POWDER, LYOPHILIZED, FOR SOLUTION INTRAVENOUS EVERY 6 HOURS
Status: DISCONTINUED | OUTPATIENT
Start: 2024-03-01 | End: 2024-03-03 | Stop reason: HOSPADM

## 2024-03-01 RX ORDER — MIDAZOLAM HYDROCHLORIDE 1 MG/ML
INJECTION INTRAMUSCULAR; INTRAVENOUS PRN
Status: DISCONTINUED | OUTPATIENT
Start: 2024-03-01 | End: 2024-03-01 | Stop reason: SDUPTHER

## 2024-03-01 RX ADMIN — MIDAZOLAM 2 MG: 1 INJECTION INTRAMUSCULAR; INTRAVENOUS at 16:07

## 2024-03-01 RX ADMIN — SODIUM CHLORIDE: 9 INJECTION, SOLUTION INTRAVENOUS at 17:12

## 2024-03-01 RX ADMIN — MORPHINE SULFATE 2 MG: 2 INJECTION, SOLUTION INTRAMUSCULAR; INTRAVENOUS at 11:18

## 2024-03-01 RX ADMIN — PANTOPRAZOLE SODIUM 40 MG: 40 INJECTION, POWDER, FOR SOLUTION INTRAVENOUS at 17:20

## 2024-03-01 RX ADMIN — RIFAXIMIN 550 MG: 550 TABLET ORAL at 20:38

## 2024-03-01 RX ADMIN — ONDANSETRON 4 MG: 2 INJECTION INTRAMUSCULAR; INTRAVENOUS at 20:36

## 2024-03-01 RX ADMIN — LACTULOSE 20 G: 20 SOLUTION ORAL at 20:35

## 2024-03-01 RX ADMIN — OCTREOTIDE ACETATE 50 MCG/HR: 500 INJECTION, SOLUTION INTRAVENOUS; SUBCUTANEOUS at 07:33

## 2024-03-01 RX ADMIN — BUSPIRONE HYDROCHLORIDE 10 MG: 10 TABLET ORAL at 20:36

## 2024-03-01 RX ADMIN — HYDROCODONE BITARTRATE AND ACETAMINOPHEN 1 TABLET: 7.5; 325 TABLET ORAL at 20:35

## 2024-03-01 RX ADMIN — FUROSEMIDE 40 MG: 10 INJECTION, SOLUTION INTRAMUSCULAR; INTRAVENOUS at 00:45

## 2024-03-01 RX ADMIN — CIPROFLOXACIN 400 MG: 400 INJECTION, SOLUTION INTRAVENOUS at 03:17

## 2024-03-01 RX ADMIN — PANTOPRAZOLE SODIUM 40 MG: 40 INJECTION, POWDER, FOR SOLUTION INTRAVENOUS at 08:06

## 2024-03-01 RX ADMIN — FUROSEMIDE 20 MG: 10 INJECTION, SOLUTION INTRAMUSCULAR; INTRAVENOUS at 10:37

## 2024-03-01 RX ADMIN — CIPROFLOXACIN 400 MG: 400 INJECTION, SOLUTION INTRAVENOUS at 17:15

## 2024-03-01 RX ADMIN — SODIUM CHLORIDE, PRESERVATIVE FREE 10 ML: 5 INJECTION INTRAVENOUS at 08:06

## 2024-03-01 RX ADMIN — PROPOFOL 200 MG: 10 INJECTION, EMULSION INTRAVENOUS at 16:10

## 2024-03-01 RX ADMIN — MORPHINE SULFATE 2 MG: 2 INJECTION, SOLUTION INTRAMUSCULAR; INTRAVENOUS at 18:34

## 2024-03-01 RX ADMIN — TRAZODONE HYDROCHLORIDE 50 MG: 50 TABLET ORAL at 20:35

## 2024-03-01 RX ADMIN — PANTOPRAZOLE SODIUM 40 MG: 40 INJECTION, POWDER, FOR SOLUTION INTRAVENOUS at 20:36

## 2024-03-01 RX ADMIN — BACLOFEN 10 MG: 10 TABLET ORAL at 20:35

## 2024-03-01 RX ADMIN — OCTREOTIDE ACETATE 50 MCG/HR: 500 INJECTION, SOLUTION INTRAVENOUS; SUBCUTANEOUS at 17:40

## 2024-03-01 RX ADMIN — MORPHINE SULFATE 2 MG: 2 INJECTION, SOLUTION INTRAMUSCULAR; INTRAVENOUS at 05:11

## 2024-03-01 RX ADMIN — MIDODRINE HYDROCHLORIDE 10 MG: 5 TABLET ORAL at 17:19

## 2024-03-01 ASSESSMENT — PAIN DESCRIPTION - ORIENTATION
ORIENTATION: RIGHT
ORIENTATION: RIGHT;LEFT

## 2024-03-01 ASSESSMENT — PAIN SCALES - GENERAL
PAINLEVEL_OUTOF10: 10
PAINLEVEL_OUTOF10: 8
PAINLEVEL_OUTOF10: 5
PAINLEVEL_OUTOF10: 8
PAINLEVEL_OUTOF10: 10
PAINLEVEL_OUTOF10: 8
PAINLEVEL_OUTOF10: 10
PAINLEVEL_OUTOF10: 6

## 2024-03-01 ASSESSMENT — PAIN DESCRIPTION - LOCATION
LOCATION: ABDOMEN

## 2024-03-01 ASSESSMENT — PAIN SCALES - WONG BAKER
WONGBAKER_NUMERICALRESPONSE: 0
WONGBAKER_NUMERICALRESPONSE: 0
WONGBAKER_NUMERICALRESPONSE: NO HURT
WONGBAKER_NUMERICALRESPONSE: NO HURT

## 2024-03-01 ASSESSMENT — PAIN DESCRIPTION - DESCRIPTORS
DESCRIPTORS: ACHING
DESCRIPTORS: DISCOMFORT;ACHING;STABBING;THROBBING
DESCRIPTORS: ACHING;STABBING;DISCOMFORT

## 2024-03-01 ASSESSMENT — PAIN - FUNCTIONAL ASSESSMENT
PAIN_FUNCTIONAL_ASSESSMENT: ACTIVITIES ARE NOT PREVENTED
PAIN_FUNCTIONAL_ASSESSMENT: ACTIVITIES ARE NOT PREVENTED
PAIN_FUNCTIONAL_ASSESSMENT: NONE - DENIES PAIN

## 2024-03-01 ASSESSMENT — LIFESTYLE VARIABLES: SMOKING_STATUS: 0

## 2024-03-01 NOTE — PROGRESS NOTES
Attempted to draw patients post transfusion hemoglobin, was unsuccessful. Per Dr. Torres, OK to wait until morning for IV team to draw labs unless patients begins vomiting blood again.

## 2024-03-01 NOTE — ADT AUTH CERT
Subscriber Details  Hospital Account #[id number]  [facility] Subscriber Name/Sex/Relation Subscriber  Subscriber Address/Phone Subscriber Emp/Emp Phone   [facility]   [id number] JOHNATHAN KWOK III - Male   (Self) 1980 [address] [place] [address]   [phone](H) NOT EMPLOYED      Utilization Reviews       Physician advisor inpt letter by Lulú Varner RN  Last Updated by Lulú Varner RN on 3/1/2024 1203     Review Status Created By   In Primary Lulú Varner RN       Review Type   --      Criteria Review   slr keep in  We recommend that the following pt's current hospitalization under INPATIENT status is APPROPRIATE .      Name: Johnathan Kwok III  : 1980  CSN: [id number]  INSURANCE: [facility]        Clinical summary Cirrhosis / alcoholic hepatitis  -Suspect secondary to alcohol and hepatitis C  -Ultrasound 2024 with findings of cirrhosis,  -Nonelevated AFP <1.8 (2024)  Paracentesis most recently  with removal of 2.6 L  -Rapidly reaccumulating ascites  -Evaluated shunt  -Previous MRI consistent with cirrhosis but no mass  -Start empiric antibiotic and consider prophylactic antibiotics as outpatient  Vitals noted  Labs and Imaging  MCG criteria applies yes  Comments              Initial by Lulú Varner RN  Last Updated by Lulú Varner RN on 3/1/2024 0858     Review Status Created By   In Primary Lulú Varner RN       Review Type   --      Criteria Review   DATE:      TYPE OF BED: [facility]        CHIEF COMPLAINT/ PRESENTATION:  Abd pain     HPI:  Johnathan Kwok III is a 44 y.o. male with a history of cirrhosis, hepatitis C, GI bleed, scrotal abscess, alcohol abuse in the past, portal hypertension presenting to the emergency department via EMS from [facility] for abdominal pain.  Patient had paracentesis performed yesterday and is now complaining of increased abdominal pressure from his

## 2024-03-01 NOTE — ANESTHESIA POSTPROCEDURE EVALUATION
Department of Anesthesiology  Postprocedure Note    Patient: Johnathan Yanez III  MRN: [id number]  YOB: 1980  Date of evaluation: 3/1/2024    Procedure Summary       Date: 03/01/24 Room / Location: [address]    Anesthesia Start: 1607 Anesthesia Stop: 1620    Procedure: ESOPHAGOGASTRODUODENOSCOPY Diagnosis:       Abdominal pain, unspecified abdominal location      (Abdominal pain, unspecified abdominal location [R10.9])    Surgeons: Jonnie Cai DO Responsible Provider: Ileana Duran DO    Anesthesia Type: MAC ASA Status: 4            Anesthesia Type: MAC    Edmundo Phase I: Edmundo Score: 10    Edmundo Phase II: Edmundo Score: 8    Anesthesia Post Evaluation    Patient location during evaluation: PACU  Patient participation: complete - patient participated  Level of consciousness: awake and alert  Nausea & Vomiting: no vomiting and no nausea  Cardiovascular status: hemodynamically stable  Respiratory status: acceptable and spontaneous ventilation  Hydration status: stable  Pain management: adequate    No notable events documented.

## 2024-03-01 NOTE — PROGRESS NOTES
[facility] Hospitalist Progress Note    Admitting Date and Time: 2/29/2024 12:47 AM  Admit Dx: Splenomegaly [R16.1]  Hypomagnesemia [E83.42]  Hyperbilirubinemia [E80.6]  Hypoalbuminemia [E88.09]  Pleural effusion [J90]  Thrombocytopenia (HCC) [D69.6]  Transaminitis [R74.01]  CANDY (acute kidney injury) (HCC) [N17.9]  Cirrhosis of liver with ascites, unspecified hepatic cirrhosis type (HCC) [K74.60, R18.8]  Hydrocele, unspecified hydrocele type [N43.3]  Ascites of liver [R18.8]  Ascites due to alcoholic cirrhosis (HCC) [K70.31]  Anemia, unspecified type [D64.9]    Subjective:  Patient is being followed for Splenomegaly [R16.1]  Hypomagnesemia [E83.42]  Hyperbilirubinemia [E80.6]  Hypoalbuminemia [E88.09]  Pleural effusion [J90]  Thrombocytopenia (HCC) [D69.6]  Transaminitis [R74.01]  CANDY (acute kidney injury) (HCC) [N17.9]  Cirrhosis of liver with ascites, unspecified hepatic cirrhosis type (HCC) [K74.60, R18.8]  Hydrocele, unspecified hydrocele type [N43.3]  Ascites of liver [R18.8]  Ascites due to alcoholic cirrhosis (HCC) [K70.31]  Anemia, unspecified type [D64.9]   Pt seen and examined this morning  Overnight events noted  Sitting up in bed, no acute distress this morning.  Denies any acute complaints.  For EGD later    ROS: denies fever, chills, cp, sob, n/v, HA unless stated above.      white petrolatum   Topical BID    pantoprazole  40 mg IntraVENous Q6H    baclofen  10 mg Oral TID    busPIRone  10 mg Oral BID    ferrous sulfate  325 mg Oral Daily    folic acid  1 mg Oral Daily    [Held by provider] furosemide  80 mg Oral QAM    lactulose  20 g Oral TID    midodrine  10 mg Oral TID WC    nicotine  1 patch TransDERmal Daily    pentoxifylline  400 mg Oral TID WC    [Held by provider] spironolactone  50 mg Oral Daily    traZODone  50 mg Oral Nightly    thiamine  100 mg Oral Daily    sodium chloride flush  5-40 mL IntraVENous 2 times per day    [Held by provider] enoxaparin  40 mg SubCUTAneous Daily

## 2024-03-01 NOTE — CARE COORDINATION
Pertinent notes/labs reviewed.  Recurrent episodes of hematemesis overnight.  Inappropriate increase of H&H with transfusion.  Platelets resuscitation and plan for EGD later today.  Please, see orders for plan of care    Hardeep Kennedy MD

## 2024-03-01 NOTE — PROGRESS NOTES
[facility]  MD Mario Tom, MD Rajeev Patel, MD Tesha Correa, CNP   Teresa Enriquez, ANP  Carrie Arvizu, AL  Progress Note    3/1/2024    SUBJECTIVE:   3/1: Ongoing nausea, no emesis since late last night.  Abdomen feels better status post 2.2 L paracentesis.  No peripheral swelling.  No dyspnea at rest.  No chest pain.  Overall feeling better still feeling quite poorly    PROBLEM LIST:    Principal Problem:    Ascites of liver  Resolved Problems:    * No resolved hospital problems. *         DIET:    ADULT DIET; Clear Liquid     MEDS (scheduled):    white petrolatum   Topical BID    pantoprazole  40 mg IntraVENous Q6H    baclofen  10 mg Oral TID    busPIRone  10 mg Oral BID    ferrous sulfate  325 mg Oral Daily    folic acid  1 mg Oral Daily    [Held by provider] furosemide  80 mg Oral QAM    lactulose  20 g Oral TID    midodrine  10 mg Oral TID WC    nicotine  1 patch TransDERmal Daily    pentoxifylline  400 mg Oral TID WC    [Held by provider] spironolactone  50 mg Oral Daily    traZODone  50 mg Oral Nightly    thiamine  100 mg Oral Daily    sodium chloride flush  5-40 mL IntraVENous 2 times per day    [Held by provider] enoxaparin  40 mg SubCUTAneous Daily    rifAXIMin  550 mg Oral BID    ciprofloxacin  400 mg IntraVENous Q12H       MEDS (infusions):   sodium chloride      sodium chloride 100 mL/hr at 03/01/24 1712    octreotide (SANDOSTATIN) 500 mcg in sodium chloride 0.9 % 100 mL infusion 50 mcg/hr (03/01/24 0733)    sodium chloride      sodium chloride         MEDS (prn):  morphine, sodium chloride flush, sodium chloride, potassium chloride **OR** potassium alternative oral replacement **OR** potassium chloride, magnesium sulfate, ondansetron **OR** ondansetron, polyethylene glycol, HYDROcodone-acetaminophen, sodium chloride, sodium chloride    PHYSICAL EXAM:     Patient Vitals for the past 24 hrs:   BP Temp Temp src Pulse Resp SpO2   03/01/24 1700 (!) 94/52

## 2024-03-01 NOTE — CARE COORDINATION
Social Work / Discharge Planning :Patient admitted from [facility].They will need pre-cert. Patient sister did update SW/CM that [facility] is the plan. They are researching transplant list for patient. N 17 generated and transport forms completed.Await pre-cert.  SW to follow. Electronically signed by JOHN Mireles on 3/1/24 at 11:00 AM EST

## 2024-03-01 NOTE — PROGRESS NOTES
Patient requesting something for pain. Patient is NPO and not due for IV pain meds for 2 hours. Dr. Torres notified. See new orders

## 2024-03-01 NOTE — FLOWSHEET NOTE
Inpatient Wound Care    Admit Date: 2/29/2024 12:47 AM    Reason for consult:  Buttocks, scrotum    Significant history:  Admitted with Ascities of liver. History of cirrhosis, Hep C, GIB, scrotal abscess.    Wound history:  POA    Findings:     03/01/24 1236   Wound 02/29/24 Buttocks Left   Date First Assessed/Time First Assessed: 02/29/24 0815   Location: Buttocks  Wound Location Orientation: Left   Wound Image    Wound Etiology Pressure Stage 2   Wound Cleansed   (bath wipe)   Dressing/Treatment   (aquaphor ordered)   Wound Length (cm) 3 cm   Wound Width (cm) 2 cm   Wound Depth (cm) 0.2 cm   Wound Surface Area (cm^2) 6 cm^2   Change in Wound Size % (l*w) -1614.29   Wound Volume (cm^3) 1.2 cm^3   Wound Assessment Pink/red   Drainage Amount None (dry)   Odor None   Joan-wound Assessment Intact   Wound Thickness Description not for Pressure Injury Partial thickness   Wound 02/29/24 Buttocks Left   Date First Assessed/Time First Assessed: 02/29/24 0815   Location: Buttocks  Wound Location Orientation: Left   Wound Etiology Pressure Stage 2   Wound Cleansed   (bath wipe)   Dressing/Treatment   (Aquaphor ordered)   Wound Length (cm) 2.5 cm   Wound Width (cm) 2 cm   Wound Depth (cm) 0.1 cm   Wound Surface Area (cm^2) 5 cm^2   Wound Volume (cm^3) 0.5 cm^3   Wound Assessment Pink/red   Drainage Amount None (dry)   Odor None   Wound Thickness Description not for Pressure Injury Partial thickness         Impression:  Stage 2 pressure injuries R and L buttocks. Scrotal edema.     Interventions in place:  Pt having pain with movement. States he will try heel protectors. Refusing wedges for turns.   Pt agrees to a low air loss bed.   Sister is at the bedside and updated on the POC.    Plan:Low air loss bed, SOS precautions, Aquaphor to buttocks.   **Informed Consent**    The patient has given verbal consent to have photos taken of Buttocks and inserted into their chart as part of their permanent medical record for purposes of

## 2024-03-01 NOTE — ANESTHESIA PRE PROCEDURE
Department of Anesthesiology  Preprocedure Note       Name:  Johnathan Yanez III   Age:  44 y.o.  :  1980                                          MRN:  [id number]         Date:  3/1/2024      Surgeon: Surgeon(s):  Jonnie Cai DO    Procedure: Procedure(s):  ESOPHAGOGASTRODUODENOSCOPY    Medications prior to admission:   Prior to Admission medications    Medication Sig Start Date End Date Taking? Authorizing Provider   furosemide (LASIX) 80 MG tablet Take 1 tablet by mouth every morning 24   Amaris Navarrete DO   spironolactone (ALDACTONE) 50 MG tablet Take 1 tablet by mouth daily 24   Amaris Navarrete DO   traZODone (DESYREL) 50 MG tablet Take 1 tablet by mouth nightly  Patient taking differently: Take 2 tablets by mouth nightly 2/27/24 3/28/24  Amaris Navarrete DO   nicotine (NICODERM CQ) 14 MG/24HR Place 1 patch onto the skin daily 24   Amaris Navarrete DO   baclofen (LIORESAL) 10 MG tablet Take 1 tablet by mouth 3 times daily 2/27/24 3/28/24  Amaris Navarrete DO   ferrous sulfate (IRON 325) 325 (65 Fe) MG tablet Take 1 tablet by mouth daily    Robles Tavera MD   rifAXIMin (XIFAXAN) 550 MG tablet Take 1 tablet by mouth 2 times daily 2/20/24 3/21/24  Mayte Lynne DO   busPIRone (BUSPAR) 10 MG tablet Take 1 tablet by mouth 2 times daily 2/3/24   Andrey Cesar MD   midodrine (PROAMATINE) 10 MG tablet Take 1 tablet by mouth 3 times daily (with meals) 24   Amaris Navarrete DO   pantoprazole (PROTONIX) 40 MG tablet Take 1 tablet by mouth 2 times daily (before meals) 24   Amaris Navarrete DO   folic acid (FOLVITE) 1 MG tablet Take 1 tablet by mouth daily    Robles Tavera MD   vitamin B-1 (THIAMINE) 100 MG tablet Take 1 tablet by mouth daily    Robles Tavera MD   lactulose (CHRONULAC) 10 GM/15ML solution Take 30 mLs by mouth 3 times daily 23   Yessy Rodgers,    pentoxifylline (TRENTAL) 400 MG extended release tablet Take 1 tablet by mouth 3 times

## 2024-03-01 NOTE — DISCHARGE INSTR - COC
Continuity of Care Form    Patient Name: Johnathan Yanez III   :  1980  MRN:  [id number]    Admit date:  2024  Discharge date:  2024    Code Status Order: Full Code   Advance Directives:     Admitting Physician:  Davion Kruger MD  PCP: No primary care provider on file.    Discharging Nurse: MOUNA Guerin RN  Discharging Hospital Unit/Room#: 0630/0630-A  Discharging Unit Phone Number: [phone]    Emergency Contact:   Extended Emergency Contact Information  Primary Emergency Contact: Peggy Rodriguez  Address: [address]  Home Phone: [phone]  Mobile Phone: [phone]  Relation: Girlfriend  Preferred language: English   needed? No  Secondary Emergency Contact: KaranMikaela  Address: [address] [place] [address]  Home Phone: [phone]  Mobile Phone: [phone]  Relation: Parent  Preferred language: English   needed? No    Past Surgical History:  Past Surgical History:   Procedure Laterality Date    ENDOSCOPY, COLON, DIAGNOSTIC      ESOPHAGOSCOPY N/A 2024    ESOPHAGOSCOPY CONTROL HEMORRHAGE performed by Tino Bal DO at [facility] OR    HERNIA REPAIR      HERNIA REPAIR Left 2022    LAPAROSCOPIC LEFT INGUINAL HERNIA REPAIR WITH MESH POSSIBLE OPEN POSSIBLE BILATERAL performed by Shashank Candelario MD at [facility] OR    TONSILLECTOMY      UMBILICAL HERNIA REPAIR N/A 9/15/2021    LAPAROSCOPIC POSSIBLE OPEN UMBILICAL HERNIA REPAIR WITH MSH performed by Shashank Candelario MD at [facility] OR    UPPER GASTROINTESTINAL ENDOSCOPY N/A 2020    EGD BIOPSY performed by Milo Carrasco MD at [facility] ENDOSCOPY    UPPER GASTROINTESTINAL ENDOSCOPY N/A 2021    EGD BAND LIGATION performed by JOIE Huizar MD at [facility] ENDOSCOPY    UPPER GASTROINTESTINAL ENDOSCOPY N/A 5/10/2021    EGD BAND LIGATION performed by JOIE Huizar MD at [facility] OR    UPPER GASTROINTESTINAL ENDOSCOPY  Falls    Impairments/Disabilities:      None    Nutrition Therapy:  Current Nutrition Therapy:   - Oral Diet:  Full Liquid    Routes of Feeding: Oral  Liquids: No Restrictions  Daily Fluid Restriction: no  Last Modified Barium Swallow with Video (Video Swallowing Test): not done    Treatments at the Time of Hospital Discharge:   Respiratory Treatments: None  Oxygen Therapy:  is not on home oxygen therapy.  Ventilator:    - No ventilator support    Rehab Therapies: Physical Therapy and Occupational Therapy  Weight Bearing Status/Restrictions: Patient is bedbound  Other Medical Equipment (for information only, NOT a DME order):  Hospital Equipment  Other Treatments:     Patient's personal belongings (please select all that are sent with patient):      RN SIGNATURE:  Electronically signed by Kori Guerin RN on 3/3/24 at 5:58 PM EST    CASE MANAGEMENT/SOCIAL WORK SECTION    Inpatient Status Date: ***    Readmission Risk Assessment Score:  Readmission Risk              Risk of Unplanned Readmission:  49           Discharging to Facility/ Agency   Name: [facility]  Address:[address] [place] [address]  Phone:[phone]  Fax:[phone]    Dialysis Facility (if applicable)   Name:  Address:  Dialysis Schedule:  Phone:  Fax:    / signature: {Esignature:[id number]}Electronically signed by JOHN Mireles on 3/1/24 at 11:07 AM EST     PHYSICIAN SECTION    Prognosis: {Prognosis:2955086343}    Condition at Discharge: Stable    Rehab Potential (if transferring to Rehab): {Prognosis:6230040825}    Recommended Labs or Other Treatments After Discharge: ***    Physician Certification: I certify the above information and transfer of Johnathan Yanez III  is necessary for the continuing treatment of the diagnosis listed and that he requires Skilled Nursing Facility for less 30 days.     Update Admission H&P: {CHP DME Changes in HandP:[id number]}    PHYSICIAN SIGNATURE:

## 2024-03-01 NOTE — PLAN OF CARE
Problem: ABCDS Injury Assessment  Goal: Absence of physical injury  2/29/2024 1151 by Valentina Mai RN  Outcome: Progressing

## 2024-03-01 NOTE — PROGRESS NOTES
[facility] Quality Flow/Interdisciplinary Rounds Progress Note        Quality Flow Rounds held on March 1, 2024    Disciplines Attending:  Bedside Nurse, , , and Nursing Unit Leadership    Johnathan Yanez III was admitted on 2/29/2024 12:47 AM    Anticipated Discharge Date:  Expected Discharge Date: 03/02/24    Disposition:    Dennis Score:  Dennis Scale Score: 16    Readmission Risk              Risk of Unplanned Readmission:  49           Discussed patient goal for the day, patient clinical progression, and barriers to discharge.  The following Goal(s) of the Day/Commitment(s) have been identified:   For EGD today, Monitor Hgb       Chey Skinner RN  March 1, 2024

## 2024-03-01 NOTE — PROGRESS NOTES
Occupational Therapy  Patient treatment attempted this AM.  Patient on hold per nursing, will continue OT POC as able and appropriate.    Jenny JEROME/NOAM [id number]

## 2024-03-01 NOTE — PROGRESS NOTES
Pt. Began with copious curt blood appearing emesis at approximately 1945 hours, PRBC transfusion initiated, PLT to follow as they were not yet ready, medications administered as reflected in MAR, bp closely monitored skin warm dry, no change in HR, total output 2000 ml unknown how much was stomach contents verse blood, Norah Alvarez NP notified as well as Dr. Huizar, 1 unit PRBC prepared incase additional transfusion is ordered.    Initially patient was having emesis every 5 to 10 minutes, then every hour, pt. Has had no emesis since 2300 approximately

## 2024-03-01 NOTE — PROGRESS NOTES
PROGRESS NOTE  By Hardeep Kennedy M.D.    [facility]  Dr. Mellissa Cuellar M.D.,  Dr. Atif Johnson M.D.,   Dr. Tino Bal D.O.,  Dr. Scot Huizar M.D.,  KAI AstorgaO.,          Johnathan Yanez III  44 y.o.  male    SUBJECTIVE:  Complains of abdominal and scrotal pain.  Currently no nausea vomiting but some earlier yesterday evening and night.    OBJECTIVE:    BP (!) 100/52   Pulse 77   Temp 98.6 °F (37 °C) (Oral)   Resp 18   Ht 1.727 m (5' 8\")   Wt 77.1 kg (170 lb)   SpO2 100%   BMI 25.85 kg/m²     General: NAD/ male.  AAOx3  HEENT: Persistent scleral icterus/moist oral mucosa  Neck: Supple with trachea midline  Chest: Symmetric excursion/nonlabored respirations  Cor: Regular  Abd.: Soft and moderately distended  Extr.:  No significant peripheral edema  Skin: Warm and dry.  Persistent icterus.  Multiple tattoos      DATA:    Monitor data reviewed        Lab Results   Component Value Date/Time    WBC 2.3 03/01/2024 10:48 AM    RBC 1.98 03/01/2024 10:48 AM    HGB 6.7 03/01/2024 10:48 AM    HCT 19.2 03/01/2024 10:48 AM    MCV 97.0 03/01/2024 10:48 AM    MCH 33.8 03/01/2024 10:48 AM    MCHC 34.9 03/01/2024 10:48 AM    RDW 18.5 03/01/2024 10:48 AM    PLT 50 03/01/2024 10:48 AM    MPV 11.7 03/01/2024 10:48 AM     Lab Results   Component Value Date/Time     03/01/2024 02:40 AM    K 3.9 03/01/2024 02:40 AM    K 3.5 06/04/2023 05:43 AM     03/01/2024 02:40 AM    CO2 25 03/01/2024 02:40 AM    BUN 17 03/01/2024 02:40 AM    CREATININE 1.2 03/01/2024 02:40 AM    CALCIUM 8.0 03/01/2024 02:40 AM    PROT 5.2 03/01/2024 02:40 AM    LABALBU 3.4 03/01/2024 02:40 AM    LABALBU 3.4 02/18/2012 05:00 AM    BILITOT 4.9 03/01/2024 02:40 AM    ALKPHOS 64 03/01/2024 02:40 AM    AST 44 03/01/2024 02:40 AM    ALT 18 03/01/2024 02:40 AM     Lab Results   Component Value Date/Time    LIPASE 57 02/29/2024 02:15 AM     Lab Results   Component Value Date/Time    AMYLASE 49 02/22/2015 09:30

## 2024-03-02 LAB
ABO/RH: NORMAL
ALBUMIN SERPL-MCNC: 2.9 G/DL (ref 3.5–5.2)
ALP SERPL-CCNC: 58 U/L (ref 40–129)
ALT SERPL-CCNC: 15 U/L (ref 0–40)
ANION GAP SERPL CALCULATED.3IONS-SCNC: 9 MMOL/L (ref 7–16)
ANTIBODY SCREEN: NEGATIVE
ARM BAND NUMBER: NORMAL
ARM BAND NUMBER: NORMAL
AST SERPL-CCNC: 30 U/L (ref 0–39)
BASOPHILS # BLD: 0 K/UL (ref 0–0.2)
BASOPHILS NFR BLD: 0 % (ref 0–2)
BILIRUB DIRECT SERPL-MCNC: 1.2 MG/DL (ref 0–0.3)
BILIRUB INDIRECT SERPL-MCNC: 2.7 MG/DL (ref 0–1)
BILIRUB SERPL-MCNC: 3.9 MG/DL (ref 0–1.2)
BLOOD BANK BLOOD PRODUCT EXPIRATION DATE: NORMAL
BLOOD BANK DISPENSE STATUS: NORMAL
BLOOD BANK ISBT PRODUCT BLOOD TYPE: 6200
BLOOD BANK PRODUCT CODE: NORMAL
BLOOD BANK SAMPLE EXPIRATION: NORMAL
BLOOD BANK UNIT TYPE AND RH: NORMAL
BPU ID: NORMAL
BUN SERPL-MCNC: 16 MG/DL (ref 6–20)
CALCIUM SERPL-MCNC: 8.1 MG/DL (ref 8.6–10.2)
CHLORIDE SERPL-SCNC: 100 MMOL/L (ref 98–107)
CO2 SERPL-SCNC: 25 MMOL/L (ref 22–29)
COMPONENT: NORMAL
CREAT SERPL-MCNC: 1.2 MG/DL (ref 0.7–1.2)
CROSSMATCH RESULT: NORMAL
CROSSMATCH RESULT: NORMAL
EOSINOPHIL # BLD: 0.07 K/UL (ref 0.05–0.5)
EOSINOPHILS RELATIVE PERCENT: 4 % (ref 0–6)
ERYTHROCYTE [DISTWIDTH] IN BLOOD BY AUTOMATED COUNT: 18.9 % (ref 11.5–15)
FERRITIN SERPL-MCNC: 173 NG/ML
GFR SERPL CREATININE-BSD FRML MDRD: >60 ML/MIN/1.73M2
GLUCOSE SERPL-MCNC: 148 MG/DL (ref 74–99)
HCT VFR BLD AUTO: 20.6 % (ref 37–54)
HCT VFR BLD AUTO: 22 % (ref 37–54)
HGB BLD-MCNC: 7 G/DL (ref 12.5–16.5)
HGB BLD-MCNC: 7.5 G/DL (ref 12.5–16.5)
IRON SATN MFR SERPL: NORMAL % (ref 20–55)
IRON SERPL-MCNC: 109 UG/DL (ref 59–158)
LYMPHOCYTES NFR BLD: 0.27 K/UL (ref 1.5–4)
LYMPHOCYTES RELATIVE PERCENT: 14 % (ref 20–42)
MAGNESIUM SERPL-MCNC: 1.7 MG/DL (ref 1.6–2.6)
MCH RBC QN AUTO: 33.2 PG (ref 26–35)
MCHC RBC AUTO-ENTMCNC: 34 G/DL (ref 32–34.5)
MCV RBC AUTO: 97.6 FL (ref 80–99.9)
MONOCYTES NFR BLD: 0.15 K/UL (ref 0.1–0.95)
MONOCYTES NFR BLD: 8 % (ref 2–12)
NEUTROPHILS NFR BLD: 75 % (ref 43–80)
NEUTS SEG NFR BLD: 1.42 K/UL (ref 1.8–7.3)
PHOSPHATE SERPL-MCNC: 3.3 MG/DL (ref 2.5–4.5)
PLATELET CONFIRMATION: NORMAL
PLATELET, FLUORESCENCE: 32 K/UL (ref 130–450)
PMV BLD AUTO: 10.5 FL (ref 7–12)
POTASSIUM SERPL-SCNC: 2.9 MMOL/L (ref 3.5–5)
PROT SERPL-MCNC: 4.6 G/DL (ref 6.4–8.3)
RBC # BLD AUTO: 2.11 M/UL (ref 3.8–5.8)
RBC # BLD: ABNORMAL 10*6/UL
SODIUM SERPL-SCNC: 134 MMOL/L (ref 132–146)
TIBC SERPL-MCNC: NORMAL UG/DL (ref 250–450)
TRANSFUSION STATUS: NORMAL
UNIT DIVISION: 0
UNIT ISSUE DATE/TIME: NORMAL
WBC OTHER # BLD: 1.9 K/UL (ref 4.5–11.5)

## 2024-03-02 PROCEDURE — 2060000000 HC ICU INTERMEDIATE R&B

## 2024-03-02 PROCEDURE — 6370000000 HC RX 637 (ALT 250 FOR IP): Performed by: INTERNAL MEDICINE

## 2024-03-02 PROCEDURE — 6360000002 HC RX W HCPCS: Performed by: INTERNAL MEDICINE

## 2024-03-02 PROCEDURE — 85025 COMPLETE CBC W/AUTO DIFF WBC: CPT

## 2024-03-02 PROCEDURE — C9113 INJ PANTOPRAZOLE SODIUM, VIA: HCPCS | Performed by: INTERNAL MEDICINE

## 2024-03-02 PROCEDURE — 82728 ASSAY OF FERRITIN: CPT

## 2024-03-02 PROCEDURE — 84100 ASSAY OF PHOSPHORUS: CPT

## 2024-03-02 PROCEDURE — 99232 SBSQ HOSP IP/OBS MODERATE 35: CPT | Performed by: INTERNAL MEDICINE

## 2024-03-02 PROCEDURE — 83540 ASSAY OF IRON: CPT

## 2024-03-02 PROCEDURE — 83735 ASSAY OF MAGNESIUM: CPT

## 2024-03-02 PROCEDURE — 80053 COMPREHEN METABOLIC PANEL: CPT

## 2024-03-02 PROCEDURE — 2580000003 HC RX 258: Performed by: INTERNAL MEDICINE

## 2024-03-02 PROCEDURE — 83550 IRON BINDING TEST: CPT

## 2024-03-02 PROCEDURE — 85018 HEMOGLOBIN: CPT

## 2024-03-02 PROCEDURE — 85014 HEMATOCRIT: CPT

## 2024-03-02 PROCEDURE — 82248 BILIRUBIN DIRECT: CPT

## 2024-03-02 RX ORDER — POTASSIUM CHLORIDE 20 MEQ/1
40 TABLET, EXTENDED RELEASE ORAL
Status: COMPLETED | OUTPATIENT
Start: 2024-03-02 | End: 2024-03-02

## 2024-03-02 RX ORDER — MIDODRINE HYDROCHLORIDE 5 MG/1
15 TABLET ORAL
Status: DISCONTINUED | OUTPATIENT
Start: 2024-03-03 | End: 2024-03-03 | Stop reason: HOSPADM

## 2024-03-02 RX ORDER — MAGNESIUM SULFATE 1 G/100ML
1000 INJECTION INTRAVENOUS ONCE
Status: COMPLETED | OUTPATIENT
Start: 2024-03-02 | End: 2024-03-02

## 2024-03-02 RX ADMIN — TRAZODONE HYDROCHLORIDE 50 MG: 50 TABLET ORAL at 20:50

## 2024-03-02 RX ADMIN — PANTOPRAZOLE SODIUM 40 MG: 40 INJECTION, POWDER, FOR SOLUTION INTRAVENOUS at 13:48

## 2024-03-02 RX ADMIN — PENTOXIFYLLINE 400 MG: 400 TABLET, EXTENDED RELEASE ORAL at 16:23

## 2024-03-02 RX ADMIN — OCTREOTIDE ACETATE 50 MCG/HR: 500 INJECTION, SOLUTION INTRAVENOUS; SUBCUTANEOUS at 13:36

## 2024-03-02 RX ADMIN — BUSPIRONE HYDROCHLORIDE 10 MG: 10 TABLET ORAL at 08:34

## 2024-03-02 RX ADMIN — PANTOPRAZOLE SODIUM 40 MG: 40 INJECTION, POWDER, FOR SOLUTION INTRAVENOUS at 08:33

## 2024-03-02 RX ADMIN — POTASSIUM CHLORIDE 40 MEQ: 1500 TABLET, EXTENDED RELEASE ORAL at 12:46

## 2024-03-02 RX ADMIN — BACLOFEN 10 MG: 10 TABLET ORAL at 13:48

## 2024-03-02 RX ADMIN — MIDODRINE HYDROCHLORIDE 10 MG: 5 TABLET ORAL at 08:37

## 2024-03-02 RX ADMIN — MORPHINE SULFATE 2 MG: 2 INJECTION, SOLUTION INTRAMUSCULAR; INTRAVENOUS at 06:43

## 2024-03-02 RX ADMIN — OCTREOTIDE ACETATE 50 MCG/HR: 500 INJECTION, SOLUTION INTRAVENOUS; SUBCUTANEOUS at 22:40

## 2024-03-02 RX ADMIN — MORPHINE SULFATE 2 MG: 2 INJECTION, SOLUTION INTRAMUSCULAR; INTRAVENOUS at 00:35

## 2024-03-02 RX ADMIN — RIFAXIMIN 550 MG: 550 TABLET ORAL at 08:37

## 2024-03-02 RX ADMIN — HYDROCODONE BITARTRATE AND ACETAMINOPHEN 1 TABLET: 7.5; 325 TABLET ORAL at 02:44

## 2024-03-02 RX ADMIN — SODIUM CHLORIDE, PRESERVATIVE FREE 10 ML: 5 INJECTION INTRAVENOUS at 08:38

## 2024-03-02 RX ADMIN — MAGNESIUM SULFATE HEPTAHYDRATE 1000 MG: 1 INJECTION, SOLUTION INTRAVENOUS at 21:47

## 2024-03-02 RX ADMIN — MIDODRINE HYDROCHLORIDE 10 MG: 5 TABLET ORAL at 16:23

## 2024-03-02 RX ADMIN — PETROLATUM: 420 OINTMENT TOPICAL at 08:49

## 2024-03-02 RX ADMIN — SODIUM CHLORIDE: 9 INJECTION, SOLUTION INTRAVENOUS at 13:47

## 2024-03-02 RX ADMIN — LACTULOSE 20 G: 20 SOLUTION ORAL at 13:48

## 2024-03-02 RX ADMIN — PENTOXIFYLLINE 400 MG: 400 TABLET, EXTENDED RELEASE ORAL at 12:31

## 2024-03-02 RX ADMIN — BACLOFEN 10 MG: 10 TABLET ORAL at 20:50

## 2024-03-02 RX ADMIN — RIFAXIMIN 550 MG: 550 TABLET ORAL at 20:49

## 2024-03-02 RX ADMIN — BACLOFEN 10 MG: 10 TABLET ORAL at 08:36

## 2024-03-02 RX ADMIN — LACTULOSE 20 G: 20 SOLUTION ORAL at 20:49

## 2024-03-02 RX ADMIN — PANTOPRAZOLE SODIUM 40 MG: 40 INJECTION, POWDER, FOR SOLUTION INTRAVENOUS at 20:54

## 2024-03-02 RX ADMIN — FERROUS SULFATE TAB 325 MG (65 MG ELEMENTAL FE) 325 MG: 325 (65 FE) TAB at 08:38

## 2024-03-02 RX ADMIN — CIPROFLOXACIN 400 MG: 400 INJECTION, SOLUTION INTRAVENOUS at 02:43

## 2024-03-02 RX ADMIN — BUSPIRONE HYDROCHLORIDE 10 MG: 10 TABLET ORAL at 20:50

## 2024-03-02 RX ADMIN — ONDANSETRON 4 MG: 4 TABLET, ORALLY DISINTEGRATING ORAL at 22:46

## 2024-03-02 RX ADMIN — PANTOPRAZOLE SODIUM 40 MG: 40 INJECTION, POWDER, FOR SOLUTION INTRAVENOUS at 02:45

## 2024-03-02 RX ADMIN — FOLIC ACID 1 MG: 1 TABLET ORAL at 08:37

## 2024-03-02 RX ADMIN — PENTOXIFYLLINE 400 MG: 400 TABLET, EXTENDED RELEASE ORAL at 08:35

## 2024-03-02 RX ADMIN — POTASSIUM CHLORIDE 40 MEQ: 1500 TABLET, EXTENDED RELEASE ORAL at 08:33

## 2024-03-02 RX ADMIN — CIPROFLOXACIN 400 MG: 400 INJECTION, SOLUTION INTRAVENOUS at 13:48

## 2024-03-02 RX ADMIN — MIDODRINE HYDROCHLORIDE 10 MG: 5 TABLET ORAL at 12:31

## 2024-03-02 RX ADMIN — Medication 100 MG: at 08:36

## 2024-03-02 RX ADMIN — OCTREOTIDE ACETATE 50 MCG/HR: 500 INJECTION, SOLUTION INTRAVENOUS; SUBCUTANEOUS at 02:32

## 2024-03-02 ASSESSMENT — PAIN DESCRIPTION - LOCATION
LOCATION: ABDOMEN

## 2024-03-02 ASSESSMENT — PAIN DESCRIPTION - FREQUENCY: FREQUENCY: CONTINUOUS

## 2024-03-02 ASSESSMENT — PAIN DESCRIPTION - ONSET: ONSET: ON-GOING

## 2024-03-02 ASSESSMENT — PAIN SCALES - WONG BAKER
WONGBAKER_NUMERICALRESPONSE: NO HURT
WONGBAKER_NUMERICALRESPONSE: 0

## 2024-03-02 ASSESSMENT — PAIN DESCRIPTION - ORIENTATION
ORIENTATION: RIGHT;LEFT
ORIENTATION: RIGHT

## 2024-03-02 ASSESSMENT — PAIN DESCRIPTION - DESCRIPTORS
DESCRIPTORS: ACHING

## 2024-03-02 ASSESSMENT — PAIN SCALES - GENERAL
PAINLEVEL_OUTOF10: 10
PAINLEVEL_OUTOF10: 7
PAINLEVEL_OUTOF10: 0
PAINLEVEL_OUTOF10: 10
PAINLEVEL_OUTOF10: 8
PAINLEVEL_OUTOF10: 10

## 2024-03-02 ASSESSMENT — PAIN - FUNCTIONAL ASSESSMENT
PAIN_FUNCTIONAL_ASSESSMENT: ACTIVITIES ARE NOT PREVENTED

## 2024-03-02 ASSESSMENT — PAIN DESCRIPTION - PAIN TYPE: TYPE: CHRONIC PAIN

## 2024-03-02 NOTE — PROGRESS NOTES
Comprehensive Nutrition Assessment    Type and Reason for Visit:  Initial, Positive Nutrition Screen    Nutrition Recommendations/Plan:    Currently on clear liquid diet. Will send ONS Clear BID.   ADAT as medically feasible. Will add 4 oz STD ONS once able.   Will monitor.     Malnutrition Assessment:  Malnutrition Status:  Moderate malnutrition (03/01/24 1934)    Context:  Chronic Illness     Findings of the 6 clinical characteristics of malnutrition:  Energy Intake:  75% or less estimated energy requirements for 1 month or longer  Weight Loss:  Unable to assess (liver failure/paracentesis)     Body Fat Loss:  Mild body fat loss Triceps   Muscle Mass Loss:  Mild muscle mass loss Clavicles (pectoralis & deltoids), Thigh (quadriceps)  Fluid Accumulation:  Unable to assess     Strength:  Not Performed    Nutrition Assessment:    Upper GIB, Cirrhosis/Ascites of liver- s/p paracentesis 2.2 L.  PMH: Hep C, ETOH abuse, esophageal varices, scrotal cellulitis. PMH: Moderate PCM (1/24). Pt asking for Ensure. Appetite decreased x 2 mos &w/unintentional wt loss per pt. Currently on clear liquid diet. Will send ONS Clear BID. ADAT as medically feasible. Will add 4 oz STD ONS once able. Meets criteria for moderate PCM. Will monitor.    Nutrition Related Findings:    A& O x 4, jaundice, distended/rounded abd, lactulose, Nicoderm, Diuretic given-then held, Thiamine, elevated LFT's, + edema Wound Type: None       Current Nutrition Intake & Therapies:    Average Meal Intake: NPO (when seen)     ADULT DIET; Clear Liquid  ADULT ORAL NUTRITION SUPPLEMENT; Breakfast, Dinner; Clear Liquid Oral Supplement    Anthropometric Measures:  Height: 172.7 cm (5' 8\")  Ideal Body Weight (IBW): 154 lbs (70 kg)    Admission Body Weight: 71.6 kg (157 lb 12.8 oz) (3/1. stated wt given on adm)  Current Body Weight: 71.6 kg (157 lb 12.8 oz), 102.5 % IBW. Weight Source: Bed Scale (3/1)  Current BMI (kg/m2): 24  Usual Body Weight: 76.5 kg (168 lb 10

## 2024-03-02 NOTE — PROGRESS NOTES
[facility] Hospitalist Progress Note    Admitting Date and Time: 2/29/2024 12:47 AM  Admit Dx: Splenomegaly [R16.1]  Hypomagnesemia [E83.42]  Hyperbilirubinemia [E80.6]  Hypoalbuminemia [E88.09]  Pleural effusion [J90]  Thrombocytopenia (HCC) [D69.6]  Transaminitis [R74.01]  CANDY (acute kidney injury) (HCC) [N17.9]  Cirrhosis of liver with ascites, unspecified hepatic cirrhosis type (HCC) [K74.60, R18.8]  Hydrocele, unspecified hydrocele type [N43.3]  Ascites of liver [R18.8]  Ascites due to alcoholic cirrhosis (HCC) [K70.31]  Anemia, unspecified type [D64.9]    Subjective:  Patient is being followed for Splenomegaly [R16.1]  Hypomagnesemia [E83.42]  Hyperbilirubinemia [E80.6]  Hypoalbuminemia [E88.09]  Pleural effusion [J90]  Thrombocytopenia (HCC) [D69.6]  Transaminitis [R74.01]  CANDY (acute kidney injury) (HCC) [N17.9]  Cirrhosis of liver with ascites, unspecified hepatic cirrhosis type (HCC) [K74.60, R18.8]  Hydrocele, unspecified hydrocele type [N43.3]  Ascites of liver [R18.8]  Ascites due to alcoholic cirrhosis (HCC) [K70.31]  Anemia, unspecified type [D64.9]   Pt seen and examined this morning  No acute events overnight  Sleeping this morning.  Denies any acute complaints    ROS: denies fever, chills, cp, sob, n/v, HA unless stated above.      white petrolatum   Topical BID    pantoprazole  40 mg IntraVENous Q6H    baclofen  10 mg Oral TID    busPIRone  10 mg Oral BID    ferrous sulfate  325 mg Oral Daily    folic acid  1 mg Oral Daily    [Held by provider] furosemide  80 mg Oral QAM    lactulose  20 g Oral TID    midodrine  10 mg Oral TID WC    nicotine  1 patch TransDERmal Daily    pentoxifylline  400 mg Oral TID WC    [Held by provider] spironolactone  50 mg Oral Daily    traZODone  50 mg Oral Nightly    thiamine  100 mg Oral Daily    sodium chloride flush  5-40 mL IntraVENous 2 times per day    [Held by provider] enoxaparin  40 mg SubCUTAneous Daily    rifAXIMin  550 mg Oral BID    ciprofloxacin   400 mg IntraVENous Q12H     morphine, 2 mg, Q6H PRN  sodium chloride flush, 5-40 mL, PRN  sodium chloride, , PRN  potassium chloride, 40 mEq, PRN   Or  potassium alternative oral replacement, 40 mEq, PRN   Or  potassium chloride, 10 mEq, PRN  magnesium sulfate, 2,000 mg, PRN  ondansetron, 4 mg, Q8H PRN   Or  ondansetron, 4 mg, Q6H PRN  polyethylene glycol, 17 g, Daily PRN  HYDROcodone-acetaminophen, 1 tablet, Q6H PRN  sodium chloride, , PRN  sodium chloride, , PRN         Objective:    BP (!) 94/51   Pulse 70   Temp 98.1 °F (36.7 °C) (Oral)   Resp 16   Ht 1.727 m (5' 8\")   Wt 71.6 kg (157 lb 12.8 oz)   SpO2 100%   BMI 23.99 kg/m²     General Appearance: alert and oriented to person, place and time and in no acute distress  Skin: warm and dry, jaundiced  Head: normocephalic and atraumatic  Eyes: pupils equal, round, and reactive to light, extraocular eye movements intact, conjunctivae icteric  Neck: neck supple and non tender without mass   Pulmonary/Chest: clear to auscultation bilaterally- no wheezes, rales or rhonchi, normal air movement, no respiratory distress  Cardiovascular: normal rate, normal S1 and S2 and no carotid bruits  Abdomen: soft, tender to light palpation throughout, slightly distended, normal bowel sounds, no masses or organomegaly  Extremities: no cyanosis, no clubbing, nonpitting edema  Neurologic: no cranial nerve deficit and speech normal        Recent Labs     02/29/24  1707 03/01/24  0240 03/02/24  0300   * 134 134   K 3.2* 3.9 2.9*   CL 94* 100 100   CO2 26 25 25   BUN 15 17 16   CREATININE 1.3* 1.2 1.2   GLUCOSE 114* 108* 148*   CALCIUM 8.9 8.0* 8.1*       Recent Labs     02/29/24  1707 03/01/24  0240 03/01/24  1048 03/02/24  0300   WBC 2.6* 2.9* 2.3* 1.9*   RBC 2.05* 2.11* 1.98* 2.11*   HGB 7.1* 7.2* 6.7* 7.0*   HCT 20.7* 20.7* 19.2* 20.6*   .0* 98.1 97.0 97.6   MCH 34.6 34.1 33.8 33.2   MCHC 34.3 34.8* 34.9* 34.0   RDW 16.7* 18.0* 18.5* 18.9*   PLT 23* 43* 50*  --

## 2024-03-02 NOTE — PROGRESS NOTES
PROGRESS NOTE  By Elieser Perez, AMOR    [facility]  Dr. Mellissa Cuellar M.D.,  Dr. Atif Johnson M.D.,   Dr. Tino Bal D.O.,  Dr. Scot Huizar M.D.,  KAI AstorgaO.,          Johnathan Mhaerker III  44 y.o.  male    SUBJECTIVE:  Patient resting in bed.  Tolerating liquid diet today.  Minimal indigestion.  Would like to try and advance diet.  Abdominal distention.  Pain around umbilical hernia.    OBJECTIVE:    BP (!) 91/54   Pulse 70   Temp 98.1 °F (36.7 °C) (Oral)   Resp 18   Ht 1.727 m (5' 8\")   Wt 71.6 kg (157 lb 12.8 oz)   SpO2 100%   BMI 23.99 kg/m²     General: NAD/ male.  AAOx3  HEENT: Persistent scleral icterus/moist oral mucosa  Neck: Supple with trachea midline  Chest: Symmetric excursion/nonlabored respirations  Cor: Regular  Abd.: Soft and moderately distended, umbilical hernia with bruising  Extr.:  No significant peripheral edema  Skin: Warm and dry.  Persistent icterus.  Multiple tattoos      DATA:    Monitor data reviewed        Lab Results   Component Value Date/Time    WBC 1.9 03/02/2024 03:00 AM    RBC 2.11 03/02/2024 03:00 AM    HGB 7.0 03/02/2024 03:00 AM    HCT 20.6 03/02/2024 03:00 AM    MCV 97.6 03/02/2024 03:00 AM    MCH 33.2 03/02/2024 03:00 AM    MCHC 34.0 03/02/2024 03:00 AM    RDW 18.9 03/02/2024 03:00 AM    PLT 50 03/01/2024 10:48 AM    MPV 10.5 03/02/2024 03:00 AM     Lab Results   Component Value Date/Time     03/02/2024 03:00 AM    K 2.9 03/02/2024 03:00 AM    K 3.5 06/04/2023 05:43 AM     03/02/2024 03:00 AM    CO2 25 03/02/2024 03:00 AM    BUN 16 03/02/2024 03:00 AM    CREATININE 1.2 03/02/2024 03:00 AM    CALCIUM 8.1 03/02/2024 03:00 AM    PROT 4.6 03/02/2024 03:00 AM    LABALBU 2.9 03/02/2024 03:00 AM    LABALBU 3.4 02/18/2012 05:00 AM    BILITOT 3.9 03/02/2024 03:00 AM    ALKPHOS 58 03/02/2024 03:00 AM    AST 30 03/02/2024 03:00 AM    ALT 15 03/02/2024 03:00 AM     Lab Results   Component Value Date/Time    LIPASE 57  for prior endoscopies  -IV PPI and Sandostatin  -Patient previously has refused colonoscopy      7.  Abnormal CT pancreas  -CT abdomen pelvis 1/25/2024 showing hazy areas of attenuation along the margins of the pancreas  -MRI/MRCP 2/5/2024 poorly visualized pancreas but no gross dilatation of bile ducts.  -Consider repeat MRI versus endoscopic ultrasound as outpatient      8.  Abnormal CT stomach/bowel/urinary bladder  -Reported mild wall thickening likely related to ascites/laboratory state  -Endoscopies as above     9.  Alcohol/polysubstance abuse  -Persistent alcohol use until recently, currently resides at nursing home     10.  Comorbidities  -Per admitting/consultants     11.  Renal failure  -Acute on likely related to frequent paracentesis  -Albumin with paracentesis as above  -Nephrology input appreciated       This admission, patient has been transfused 2 units PRBCs and it appears 6 units of platelets.  Continue PPI twice daily.  Continue octreotide for now, okay to wean.  Further transfusion per admitting.  GI will follow.      Jorge Perez, APRN - CNP  3/2/2024  12:20 PM    NOTE:  This report was transcribed using voice recognition software.  Every effort was made to ensure accuracy; however, inadvertent computerized transcription errors may be present.

## 2024-03-02 NOTE — PROGRESS NOTES
[facility] Quality Flow/Interdisciplinary Rounds Progress Note        Quality Flow Rounds held on March 2, 2024    Disciplines Attending:  Bedside Nurse and Nursing Unit Leadership    Johnathan Yanez III was admitted on 2/29/2024 12:47 AM    Anticipated Discharge Date:  Expected Discharge Date: 03/02/24    Disposition:    Dennis Score:  Dennis Scale Score: 12    Readmission Risk              Risk of Unplanned Readmission:  53           Discussed patient goal for the day, patient clinical progression, and barriers to discharge.  The following Goal(s) of the Day/Commitment(s) have been identified:   cipro q12h, sandostatin gtt.       Brenna Uribe RN  March 2, 2024

## 2024-03-02 NOTE — PROGRESS NOTES
Pt has mcclellan cath in place, states he doesn't remember when it was initially put in, however he was advised it is to remain in place until his f/u appointment with urology at the [facility]. Stated that he was told only the bag could be exchanged not the catheter itself. States his f.u appointment has been rescheduled because of this hospitalization for Tuesday 3/4/24 and that his wife will transport him there from SNF.

## 2024-03-03 VITALS
HEIGHT: 68 IN | BODY MASS INDEX: 23.92 KG/M2 | OXYGEN SATURATION: 97 % | SYSTOLIC BLOOD PRESSURE: 100 MMHG | DIASTOLIC BLOOD PRESSURE: 61 MMHG | RESPIRATION RATE: 18 BRPM | TEMPERATURE: 98 F | WEIGHT: 157.8 LBS | HEART RATE: 63 BPM

## 2024-03-03 LAB
ALBUMIN SERPL-MCNC: 2.8 G/DL (ref 3.5–5.2)
ALP SERPL-CCNC: 69 U/L (ref 40–129)
ALT SERPL-CCNC: 19 U/L (ref 0–40)
ANION GAP SERPL CALCULATED.3IONS-SCNC: 8 MMOL/L (ref 7–16)
AST SERPL-CCNC: 37 U/L (ref 0–39)
BASOPHILS # BLD: 0.05 K/UL (ref 0–0.2)
BASOPHILS NFR BLD: 2 % (ref 0–2)
BILIRUB DIRECT SERPL-MCNC: 1.2 MG/DL (ref 0–0.3)
BILIRUB INDIRECT SERPL-MCNC: 2.6 MG/DL (ref 0–1)
BILIRUB SERPL-MCNC: 3.8 MG/DL (ref 0–1.2)
BUN SERPL-MCNC: 8 MG/DL (ref 6–20)
CALCIUM SERPL-MCNC: 8.1 MG/DL (ref 8.6–10.2)
CHLORIDE SERPL-SCNC: 105 MMOL/L (ref 98–107)
CO2 SERPL-SCNC: 23 MMOL/L (ref 22–29)
CREAT SERPL-MCNC: 1 MG/DL (ref 0.7–1.2)
EOSINOPHIL # BLD: 0.08 K/UL (ref 0.05–0.5)
EOSINOPHILS RELATIVE PERCENT: 3 % (ref 0–6)
ERYTHROCYTE [DISTWIDTH] IN BLOOD BY AUTOMATED COUNT: 18.5 % (ref 11.5–15)
GFR SERPL CREATININE-BSD FRML MDRD: >60 ML/MIN/1.73M2
GLUCOSE SERPL-MCNC: 129 MG/DL (ref 74–99)
HCT VFR BLD AUTO: 24.3 % (ref 37–54)
HGB BLD-MCNC: 8.1 G/DL (ref 12.5–16.5)
LYMPHOCYTES NFR BLD: 0.2 K/UL (ref 1.5–4)
LYMPHOCYTES RELATIVE PERCENT: 7 % (ref 20–42)
MAGNESIUM SERPL-MCNC: 1.9 MG/DL (ref 1.6–2.6)
MCH RBC QN AUTO: 33.1 PG (ref 26–35)
MCHC RBC AUTO-ENTMCNC: 33.3 G/DL (ref 32–34.5)
MCV RBC AUTO: 99.2 FL (ref 80–99.9)
MICROORGANISM SPEC CULT: NO GROWTH
MICROORGANISM/AGENT SPEC: NORMAL
MONOCYTES NFR BLD: 0.13 K/UL (ref 0.1–0.95)
MONOCYTES NFR BLD: 4 % (ref 2–12)
NEUTROPHILS NFR BLD: 84 % (ref 43–80)
NEUTS SEG NFR BLD: 2.44 K/UL (ref 1.8–7.3)
PHOSPHATE SERPL-MCNC: 2.4 MG/DL (ref 2.5–4.5)
PLATELET CONFIRMATION: NORMAL
PLATELET, FLUORESCENCE: 35 K/UL (ref 130–450)
PMV BLD AUTO: 10.4 FL (ref 7–12)
POTASSIUM SERPL-SCNC: 4.2 MMOL/L (ref 3.5–5)
PROT SERPL-MCNC: 4.7 G/DL (ref 6.4–8.3)
RBC # BLD AUTO: 2.45 M/UL (ref 3.8–5.8)
RBC # BLD: ABNORMAL 10*6/UL
SODIUM SERPL-SCNC: 136 MMOL/L (ref 132–146)
SPECIMEN DESCRIPTION: NORMAL
WBC OTHER # BLD: 2.9 K/UL (ref 4.5–11.5)

## 2024-03-03 PROCEDURE — 83735 ASSAY OF MAGNESIUM: CPT

## 2024-03-03 PROCEDURE — 6360000002 HC RX W HCPCS: Performed by: INTERNAL MEDICINE

## 2024-03-03 PROCEDURE — 6370000000 HC RX 637 (ALT 250 FOR IP): Performed by: INTERNAL MEDICINE

## 2024-03-03 PROCEDURE — 36415 COLL VENOUS BLD VENIPUNCTURE: CPT

## 2024-03-03 PROCEDURE — 97530 THERAPEUTIC ACTIVITIES: CPT

## 2024-03-03 PROCEDURE — 99239 HOSP IP/OBS DSCHRG MGMT >30: CPT | Performed by: INTERNAL MEDICINE

## 2024-03-03 PROCEDURE — C9113 INJ PANTOPRAZOLE SODIUM, VIA: HCPCS | Performed by: INTERNAL MEDICINE

## 2024-03-03 PROCEDURE — 85025 COMPLETE CBC W/AUTO DIFF WBC: CPT

## 2024-03-03 PROCEDURE — 2580000003 HC RX 258: Performed by: INTERNAL MEDICINE

## 2024-03-03 PROCEDURE — 82248 BILIRUBIN DIRECT: CPT

## 2024-03-03 PROCEDURE — 80053 COMPREHEN METABOLIC PANEL: CPT

## 2024-03-03 PROCEDURE — 84100 ASSAY OF PHOSPHORUS: CPT

## 2024-03-03 RX ORDER — MORPHINE SULFATE 2 MG/ML
1 INJECTION, SOLUTION INTRAMUSCULAR; INTRAVENOUS ONCE
Status: COMPLETED | OUTPATIENT
Start: 2024-03-03 | End: 2024-03-03

## 2024-03-03 RX ORDER — MIDODRINE HYDROCHLORIDE 10 MG/1
15 TABLET ORAL
Qty: 90 TABLET | Refills: 3 | Status: SHIPPED | OUTPATIENT
Start: 2024-03-03

## 2024-03-03 RX ADMIN — MIDODRINE HYDROCHLORIDE 15 MG: 5 TABLET ORAL at 07:46

## 2024-03-03 RX ADMIN — BUSPIRONE HYDROCHLORIDE 10 MG: 10 TABLET ORAL at 07:59

## 2024-03-03 RX ADMIN — Medication 100 MG: at 07:45

## 2024-03-03 RX ADMIN — RIFAXIMIN 550 MG: 550 TABLET ORAL at 19:43

## 2024-03-03 RX ADMIN — TRAZODONE HYDROCHLORIDE 50 MG: 50 TABLET ORAL at 19:42

## 2024-03-03 RX ADMIN — PANTOPRAZOLE SODIUM 40 MG: 40 INJECTION, POWDER, FOR SOLUTION INTRAVENOUS at 19:43

## 2024-03-03 RX ADMIN — PANTOPRAZOLE SODIUM 40 MG: 40 INJECTION, POWDER, FOR SOLUTION INTRAVENOUS at 07:46

## 2024-03-03 RX ADMIN — FOLIC ACID 1 MG: 1 TABLET ORAL at 07:45

## 2024-03-03 RX ADMIN — MORPHINE SULFATE 1 MG: 2 INJECTION, SOLUTION INTRAMUSCULAR; INTRAVENOUS at 09:49

## 2024-03-03 RX ADMIN — BACLOFEN 10 MG: 10 TABLET ORAL at 07:45

## 2024-03-03 RX ADMIN — PANTOPRAZOLE SODIUM 40 MG: 40 INJECTION, POWDER, FOR SOLUTION INTRAVENOUS at 13:28

## 2024-03-03 RX ADMIN — OCTREOTIDE ACETATE 50 MCG/HR: 500 INJECTION, SOLUTION INTRAVENOUS; SUBCUTANEOUS at 21:36

## 2024-03-03 RX ADMIN — PENTOXIFYLLINE 400 MG: 400 TABLET, EXTENDED RELEASE ORAL at 11:40

## 2024-03-03 RX ADMIN — HYDROCODONE BITARTRATE AND ACETAMINOPHEN 1 TABLET: 7.5; 325 TABLET ORAL at 13:28

## 2024-03-03 RX ADMIN — LACTULOSE 20 G: 20 SOLUTION ORAL at 13:28

## 2024-03-03 RX ADMIN — CIPROFLOXACIN 400 MG: 400 INJECTION, SOLUTION INTRAVENOUS at 02:29

## 2024-03-03 RX ADMIN — MIDODRINE HYDROCHLORIDE 15 MG: 5 TABLET ORAL at 16:54

## 2024-03-03 RX ADMIN — MIDODRINE HYDROCHLORIDE 15 MG: 5 TABLET ORAL at 11:39

## 2024-03-03 RX ADMIN — PETROLATUM: 420 OINTMENT TOPICAL at 07:59

## 2024-03-03 RX ADMIN — RIFAXIMIN 550 MG: 550 TABLET ORAL at 07:45

## 2024-03-03 RX ADMIN — SODIUM CHLORIDE, PRESERVATIVE FREE 10 ML: 5 INJECTION INTRAVENOUS at 19:46

## 2024-03-03 RX ADMIN — PENTOXIFYLLINE 400 MG: 400 TABLET, EXTENDED RELEASE ORAL at 16:54

## 2024-03-03 RX ADMIN — BACLOFEN 10 MG: 10 TABLET ORAL at 19:42

## 2024-03-03 RX ADMIN — FERROUS SULFATE TAB 325 MG (65 MG ELEMENTAL FE) 325 MG: 325 (65 FE) TAB at 07:44

## 2024-03-03 RX ADMIN — MORPHINE SULFATE 2 MG: 2 INJECTION, SOLUTION INTRAMUSCULAR; INTRAVENOUS at 15:41

## 2024-03-03 RX ADMIN — PETROLATUM: 420 OINTMENT TOPICAL at 19:43

## 2024-03-03 RX ADMIN — PANTOPRAZOLE SODIUM 40 MG: 40 INJECTION, POWDER, FOR SOLUTION INTRAVENOUS at 02:35

## 2024-03-03 RX ADMIN — PENTOXIFYLLINE 400 MG: 400 TABLET, EXTENDED RELEASE ORAL at 07:45

## 2024-03-03 RX ADMIN — CIPROFLOXACIN 400 MG: 400 INJECTION, SOLUTION INTRAVENOUS at 13:31

## 2024-03-03 RX ADMIN — BUSPIRONE HYDROCHLORIDE 10 MG: 10 TABLET ORAL at 19:43

## 2024-03-03 RX ADMIN — SODIUM CHLORIDE, PRESERVATIVE FREE 10 ML: 5 INJECTION INTRAVENOUS at 07:59

## 2024-03-03 RX ADMIN — HYDROCODONE BITARTRATE AND ACETAMINOPHEN 1 TABLET: 7.5; 325 TABLET ORAL at 19:42

## 2024-03-03 RX ADMIN — BACLOFEN 10 MG: 10 TABLET ORAL at 13:28

## 2024-03-03 RX ADMIN — OCTREOTIDE ACETATE 50 MCG/HR: 500 INJECTION, SOLUTION INTRAVENOUS; SUBCUTANEOUS at 08:01

## 2024-03-03 ASSESSMENT — PAIN SCALES - GENERAL
PAINLEVEL_OUTOF10: 8
PAINLEVEL_OUTOF10: 9
PAINLEVEL_OUTOF10: 8

## 2024-03-03 ASSESSMENT — PAIN DESCRIPTION - LOCATION
LOCATION: ABDOMEN

## 2024-03-03 ASSESSMENT — PAIN DESCRIPTION - DESCRIPTORS
DESCRIPTORS: CRAMPING
DESCRIPTORS: CRAMPING;SHARP
DESCRIPTORS: SHARP;THROBBING
DESCRIPTORS: THROBBING;SHARP

## 2024-03-03 ASSESSMENT — PAIN DESCRIPTION - ORIENTATION: ORIENTATION: RIGHT;LEFT

## 2024-03-03 ASSESSMENT — PAIN DESCRIPTION - FREQUENCY
FREQUENCY: CONTINUOUS
FREQUENCY: CONTINUOUS

## 2024-03-03 ASSESSMENT — PAIN DESCRIPTION - PAIN TYPE
TYPE: CHRONIC PAIN
TYPE: CHRONIC PAIN

## 2024-03-03 ASSESSMENT — PAIN DESCRIPTION - ONSET: ONSET: ON-GOING

## 2024-03-03 NOTE — DISCHARGE SUMMARY
[facility] Hospitalist Physician Discharge Summary       [facility]  [address] [address]  [phone]          Activity level: As tolerated     Dispo: [facility]      Condition on discharge: Stable     Patient ID:  Johnathan Yanez III  [id number]  44 y.o.  1980    Admit date: 2/29/2024    Discharge date and time:  3/3/2024  4:33 PM    Admission Diagnoses: Principal Problem:    Ascites of liver  Active Problems:    Moderate protein-calorie malnutrition (HCC)  Resolved Problems:    * No resolved hospital problems. *      Discharge Diagnoses: Principal Problem:    Ascites of liver  Active Problems:    Moderate protein-calorie malnutrition (HCC)  Resolved Problems:    * No resolved hospital problems. *      Consults:  IP CONSULT TO IV TEAM  IP CONSULT TO GI  IP CONSULT TO IV TEAM  IP CONSULT TO NEPHROLOGY  IP CONSULT TO IV TEAM    Hospital Course:   Patient Johnathan Yanez III is a 44 y.o. presented with Splenomegaly [R16.1]  Hypomagnesemia [E83.42]  Hyperbilirubinemia [E80.6]  Hypoalbuminemia [E88.09]  Pleural effusion [J90]  Thrombocytopenia (HCC) [D69.6]  Transaminitis [R74.01]  CANDY (acute kidney injury) (HCC) [N17.9]  Cirrhosis of liver with ascites, unspecified hepatic cirrhosis type (HCC) [K74.60, R18.8]  Hydrocele, unspecified hydrocele type [N43.3]  Ascites of liver [R18.8]  Ascites due to alcoholic cirrhosis (HCC) [K70.31]  Anemia, unspecified type [D64.9]     Pt is a 43 yo male who was admitted to due to decompensated liver cirrhosis and rapidly reaccumulating ascites. S/p paracentesis 2/29 with removal of 2.2L. later that day she had an episode of hematemesis with kellee blood pool and clots.  Hemoglobin dropped from 8.3 to 7.1. updated GI< who recommended plt and blood transfusion, starting octreotide drip with plans for EGD next day. Pt was already on prophylactic abx. He received 4 units of plt and 1 prbc then underwent an EGD  on 3/1 without recurrent  as: IRON 325     folic acid 1 MG tablet  Commonly known as: FOLVITE     lactulose 10 GM/15ML solution  Commonly known as: CHRONULAC  Take 30 mLs by mouth 3 times daily     nicotine 14 MG/24HR  Commonly known as: NICODERM CQ  Place 1 patch onto the skin daily     pantoprazole 40 MG tablet  Commonly known as: PROTONIX  Take 1 tablet by mouth 2 times daily (before meals)     pentoxifylline 400 MG extended release tablet  Commonly known as: TRENTAL  Take 1 tablet by mouth 3 times daily (with meals)     rifAXIMin 550 MG tablet  Commonly known as: XIFAXAN  Take 1 tablet by mouth 2 times daily     traZODone 50 MG tablet  Commonly known as: DESYREL  Take 1 tablet by mouth nightly     vitamin B-1 100 MG tablet  Commonly known as: THIAMINE               Where to Get Your Medications        These medications were sent to [address] -  [phone] - F [phone] [address] [address]      Phone: [phone]   midodrine 10 MG tablet           Note that more than 30 minutes was spent in preparing discharge papers, discussing discharge with patient, medication review, etc.    Signed:  Electronically signed by Carlton Torres MD on 3/3/2024 at 4:33 PM

## 2024-03-03 NOTE — PROGRESS NOTES
Call received from access center , pt has bed at [facility]. [facility] [address]  Call Report to [phone]. Awaiting call back for transport ETA.     [facility] ETA 2000.

## 2024-03-03 NOTE — PROGRESS NOTES
P Quality Flow/Interdisciplinary Rounds Progress Note        Quality Flow Rounds held on March 3, 2024    Disciplines Attending:  Bedside Nurse and Nursing Unit Leadership    Johnathan Yanez III was admitted on 2/29/2024 12:47 AM    Anticipated Discharge Date:  Expected Discharge Date: 03/02/24    Disposition:    Dennis Score:  Dennis Scale Score: 15    Readmission Risk              Risk of Unplanned Readmission:  53           Discussed patient goal for the day, patient clinical progression, and barriers to discharge.  The following Goal(s) of the Day/Commitment(s) have been identified:   transfer to [facility]. Pain control, sandostatin gtt, cipro q12h.      Brenna Uribe RN  March 3, 2024

## 2024-03-03 NOTE — PROGRESS NOTES
[facility] Hospitalist Progress Note    Admitting Date and Time: 2/29/2024 12:47 AM  Admit Dx: Splenomegaly [R16.1]  Hypomagnesemia [E83.42]  Hyperbilirubinemia [E80.6]  Hypoalbuminemia [E88.09]  Pleural effusion [J90]  Thrombocytopenia (HCC) [D69.6]  Transaminitis [R74.01]  CANDY (acute kidney injury) (HCC) [N17.9]  Cirrhosis of liver with ascites, unspecified hepatic cirrhosis type (HCC) [K74.60, R18.8]  Hydrocele, unspecified hydrocele type [N43.3]  Ascites of liver [R18.8]  Ascites due to alcoholic cirrhosis (HCC) [K70.31]  Anemia, unspecified type [D64.9]    Subjective:  Patient is being followed for Splenomegaly [R16.1]  Hypomagnesemia [E83.42]  Hyperbilirubinemia [E80.6]  Hypoalbuminemia [E88.09]  Pleural effusion [J90]  Thrombocytopenia (HCC) [D69.6]  Transaminitis [R74.01]  CANDY (acute kidney injury) (HCC) [N17.9]  Cirrhosis of liver with ascites, unspecified hepatic cirrhosis type (HCC) [K74.60, R18.8]  Hydrocele, unspecified hydrocele type [N43.3]  Ascites of liver [R18.8]  Ascites due to alcoholic cirrhosis (HCC) [K70.31]  Anemia, unspecified type [D64.9]   Pt seen and examined this morning  No acute events overnight  In moderate distress this morning due to pain.  Denies any other complaints    ROS: denies fever, chills, cp, sob, n/v, HA unless stated above.      midodrine  15 mg Oral TID WC    white petrolatum   Topical BID    pantoprazole  40 mg IntraVENous Q6H    baclofen  10 mg Oral TID    busPIRone  10 mg Oral BID    ferrous sulfate  325 mg Oral Daily    folic acid  1 mg Oral Daily    [Held by provider] furosemide  80 mg Oral QAM    lactulose  20 g Oral TID    nicotine  1 patch TransDERmal Daily    pentoxifylline  400 mg Oral TID WC    [Held by provider] spironolactone  50 mg Oral Daily    traZODone  50 mg Oral Nightly    thiamine  100 mg Oral Daily    sodium chloride flush  5-40 mL IntraVENous 2 times per day    [Held by provider] enoxaparin  40 mg SubCUTAneous Daily    rifAXIMin  550 mg  Oral BID    ciprofloxacin  400 mg IntraVENous Q12H     morphine, 2 mg, Q6H PRN  sodium chloride flush, 5-40 mL, PRN  sodium chloride, , PRN  potassium chloride, 40 mEq, PRN   Or  potassium alternative oral replacement, 40 mEq, PRN   Or  potassium chloride, 10 mEq, PRN  magnesium sulfate, 2,000 mg, PRN  ondansetron, 4 mg, Q8H PRN   Or  ondansetron, 4 mg, Q6H PRN  polyethylene glycol, 17 g, Daily PRN  HYDROcodone-acetaminophen, 1 tablet, Q6H PRN  sodium chloride, , PRN  sodium chloride, , PRN         Objective:    BP (!) 103/58   Pulse 68   Temp 98 °F (36.7 °C) (Oral)   Resp 16   Ht 1.727 m (5' 8\")   Wt 71.6 kg (157 lb 12.8 oz)   SpO2 97%   BMI 23.99 kg/m²     General Appearance: alert and oriented to person, place and time and in no acute distress  Skin: warm and dry, jaundiced  Head: normocephalic and atraumatic  Eyes: pupils equal, round, and reactive to light, extraocular eye movements intact, conjunctivae icteric  Neck: neck supple and non tender without mass   Pulmonary/Chest: clear to auscultation bilaterally- no wheezes, rales or rhonchi, normal air movement, no respiratory distress  Cardiovascular: normal rate, normal S1 and S2 and no carotid bruits  Abdomen: soft, tender to light palpation throughout, slightly distended, normal bowel sounds, no masses or organomegaly  Extremities: no cyanosis, no clubbing, nonpitting edema  Neurologic: no cranial nerve deficit and speech normal        Recent Labs     03/01/24  0240 03/02/24  0300 03/03/24  0618    134 136   K 3.9 2.9* 4.2    100 105   CO2 25 25 23   BUN 17 16 8   CREATININE 1.2 1.2 1.0   GLUCOSE 108* 148* 129*   CALCIUM 8.0* 8.1* 8.1*       Recent Labs     02/29/24  1707 03/01/24  0240 03/01/24  1048 03/02/24  0300 03/02/24  1820 03/03/24  0618   WBC 2.6* 2.9* 2.3* 1.9*  --  2.9*   RBC 2.05* 2.11* 1.98* 2.11*  --  2.45*   HGB 7.1* 7.2* 6.7* 7.0* 7.5* 8.1*   HCT 20.7* 20.7* 19.2* 20.6* 22.0* 24.3*   .0* 98.1 97.0 97.6  --  99.2   MCH

## 2024-03-03 NOTE — PROGRESS NOTES
PROGRESS NOTE  By Elieser Perez, AMOR    [facility]  Dr. Mellissa Cuellar M.D.,  Dr. Atif Johnson M.D.,   Dr. Tino Bal D.O.,  Dr. Scot Huizar M.D.,  Dr. Jonnie Cai D.O.,          Johnatahn Maherker III  44 y.o.  male    SUBJECTIVE:  Patient resting in bed.  Awake and alert.  Tolerating full liquids.  Denies abdominal pain other than pain around umbilicus/umbilical hernia.  Denies nausea or vomiting.    OBJECTIVE:    BP (!) 91/46   Pulse 73   Temp 98 °F (36.7 °C) (Oral)   Resp 16   Ht 1.727 m (5' 8\")   Wt 71.6 kg (157 lb 12.8 oz)   SpO2 97%   BMI 23.99 kg/m²     General: NAD/ male.  AAOx3  HEENT: Persistent scleral icterus/moist oral mucosa  Neck: Supple with trachea midline  Chest: Symmetric excursion/nonlabored respirations  Cor: Regular  Abd.: Soft and moderately distended, umbilical hernia with bruising  Extr.:  No significant peripheral edema  Skin: Warm and dry.  Persistent icterus.  Multiple tattoos      DATA:    Monitor data reviewed        Lab Results   Component Value Date/Time    WBC 2.9 03/03/2024 06:18 AM    RBC 2.45 03/03/2024 06:18 AM    HGB 8.1 03/03/2024 06:18 AM    HCT 24.3 03/03/2024 06:18 AM    MCV 99.2 03/03/2024 06:18 AM    MCH 33.1 03/03/2024 06:18 AM    MCHC 33.3 03/03/2024 06:18 AM    RDW 18.5 03/03/2024 06:18 AM    PLT 50 03/01/2024 10:48 AM    MPV 10.4 03/03/2024 06:18 AM     Lab Results   Component Value Date/Time     03/03/2024 06:18 AM    K 4.2 03/03/2024 06:18 AM    K 3.5 06/04/2023 05:43 AM     03/03/2024 06:18 AM    CO2 23 03/03/2024 06:18 AM    BUN 8 03/03/2024 06:18 AM    CREATININE 1.0 03/03/2024 06:18 AM    CALCIUM 8.1 03/03/2024 06:18 AM    PROT 4.7 03/03/2024 06:18 AM    LABALBU 2.8 03/03/2024 06:18 AM    LABALBU 3.4 02/18/2012 05:00 AM    BILITOT 3.8 03/03/2024 06:18 AM    ALKPHOS 69 03/03/2024 06:18 AM    AST 37 03/03/2024 06:18 AM    ALT 19 03/03/2024 06:18 AM     Lab Results   Component Value Date/Time    LIPASE 57  effort was made to ensure accuracy; however, inadvertent computerized transcription errors may be present.

## 2024-03-03 NOTE — PROGRESS NOTES
[facility]  MD Mario Tom, MD Tesha Pierre, CNP   Teresa Enriquez, CHARISSE Arvizu, CNP  Progress Note    3/2/2024    SUBJECTIVE:   3/1: Ongoing nausea, no emesis since late last night.  Abdomen feels better status post 2.2 L paracentesis.  No peripheral swelling.  No dyspnea at rest.  No chest pain.  Overall feeling better still feeling quite poorly    3/2 seen in his room on RA poor muscle mass starting to put ascites back     PROBLEM LIST:    Principal Problem:    Ascites of liver  Active Problems:    Moderate protein-calorie malnutrition (HCC)  Resolved Problems:    * No resolved hospital problems. *         DIET:    ADULT DIET; Full Liquid  ADULT ORAL NUTRITION SUPPLEMENT; Breakfast, Dinner; Standard 4 oz Oral Supplement     MEDS (scheduled):    white petrolatum   Topical BID    pantoprazole  40 mg IntraVENous Q6H    baclofen  10 mg Oral TID    busPIRone  10 mg Oral BID    ferrous sulfate  325 mg Oral Daily    folic acid  1 mg Oral Daily    [Held by provider] furosemide  80 mg Oral QAM    lactulose  20 g Oral TID    midodrine  10 mg Oral TID WC    nicotine  1 patch TransDERmal Daily    pentoxifylline  400 mg Oral TID WC    [Held by provider] spironolactone  50 mg Oral Daily    traZODone  50 mg Oral Nightly    thiamine  100 mg Oral Daily    sodium chloride flush  5-40 mL IntraVENous 2 times per day    [Held by provider] enoxaparin  40 mg SubCUTAneous Daily    rifAXIMin  550 mg Oral BID    ciprofloxacin  400 mg IntraVENous Q12H       MEDS (infusions):   sodium chloride      sodium chloride 100 mL/hr at 03/02/24 1347    octreotide (SANDOSTATIN) 500 mcg in sodium chloride 0.9 % 100 mL infusion 50 mcg/hr (03/02/24 1336)    sodium chloride      sodium chloride         MEDS (prn):  morphine, sodium chloride flush, sodium chloride, potassium chloride **OR** potassium alternative oral replacement **OR** potassium chloride, magnesium sulfate, ondansetron  **OR** ondansetron, polyethylene glycol, HYDROcodone-acetaminophen, sodium chloride, sodium chloride    PHYSICAL EXAM:     Patient Vitals for the past 24 hrs:   BP Temp Temp src Pulse Resp SpO2   03/02/24 1607 (!) 94/51 -- -- 70 -- --   03/02/24 1515 (!) 104/56 98.1 °F (36.7 °C) Oral 72 16 --   03/02/24 1409 (!) 91/50 -- -- -- -- --   03/02/24 1126 (!) 91/54 98.1 °F (36.7 °C) Oral 70 18 100 %   03/02/24 0830 (!) 93/54 98 °F (36.7 °C) Oral 69 18 97 %   03/02/24 0643 -- -- -- -- 18 --   03/02/24 0315 (!) 95/50 98.2 °F (36.8 °C) -- 80 18 97 %   03/02/24 0314 -- -- -- -- 18 --   03/02/24 0244 -- -- -- -- 18 --   03/02/24 0015 98/64 98.4 °F (36.9 °C) -- 72 18 --   03/01/24 2105 -- -- -- -- 18 --   03/01/24 2051 102/74 98 °F (36.7 °C) -- 74 18 96 %     @      Intake/Output Summary (Last 24 hours) at 3/2/2024 2020  Last data filed at 3/2/2024 1832  Gross per 24 hour   Intake 1614.66 ml   Output --   Net 1614.66 ml           Wt Readings from Last 3 Encounters:   03/01/24 71.6 kg (157 lb 12.8 oz)   02/27/24 77.4 kg (170 lb 11.2 oz)   02/20/24 77.4 kg (170 lb 10.2 oz)       Constitutional:  in no acute distress  HEENT: NC/AT, EOMI, sclera and conjunctiva are clear and anicteric, mucus membranes moist  Neck: Trachea midline, no JVD  Cardiovascular: S1, S2 regular rhythm, no murmur,or rub  Respiratory:  No crackles, no wheeze  Gastrointestinal:  Soft, nontender, mildly distended, NABS  Ext: no edema peripherally, feet warm  Skin: dry, no rash  Neuro: awake, alert, interactive moves all 4 extremities.      DATA:    Recent Labs     02/29/24  1707 03/01/24  0240 03/01/24  1048 03/02/24  0300 03/02/24  1820   WBC 2.6* 2.9* 2.3* 1.9*  --    HGB 7.1* 7.2* 6.7* 7.0* 7.5*   HCT 20.7* 20.7* 19.2* 20.6* 22.0*   .0* 98.1 97.0 97.6  --    PLT 23* 43* 50*  --   --        Recent Labs     02/29/24  0215 02/29/24  1707 03/01/24  0240 03/02/24  0300   * 129* 134 134   K 3.5 3.2* 3.9 2.9*   CL 95* 94* 100 100   CO2 27 26 25 25   MG

## 2024-03-03 NOTE — PROGRESS NOTES
[facility]  MD Mario Tom, MD Tesha Pierre, CNP   Teresa Enriquez, CHARISSE Arvizu, CNP  Progress Note    3/3/2024    SUBJECTIVE:   3/1: Ongoing nausea, no emesis since late last night.  Abdomen feels better status post 2.2 L paracentesis.  No peripheral swelling.  No dyspnea at rest.  No chest pain.  Overall feeling better still feeling quite poorly    3/2 seen in his room on RA poor muscle mass starting to put ascites back     3/3 charts reviewed     PROBLEM LIST:    Principal Problem:    Ascites of liver  Active Problems:    Moderate protein-calorie malnutrition (HCC)  Resolved Problems:    * No resolved hospital problems. *         DIET:    ADULT DIET; Full Liquid  ADULT ORAL NUTRITION SUPPLEMENT; Breakfast, Dinner; Standard 4 oz Oral Supplement     MEDS (scheduled):    midodrine  15 mg Oral TID WC    white petrolatum   Topical BID    pantoprazole  40 mg IntraVENous Q6H    baclofen  10 mg Oral TID    busPIRone  10 mg Oral BID    ferrous sulfate  325 mg Oral Daily    folic acid  1 mg Oral Daily    [Held by provider] furosemide  80 mg Oral QAM    lactulose  20 g Oral TID    nicotine  1 patch TransDERmal Daily    pentoxifylline  400 mg Oral TID WC    [Held by provider] spironolactone  50 mg Oral Daily    traZODone  50 mg Oral Nightly    thiamine  100 mg Oral Daily    sodium chloride flush  5-40 mL IntraVENous 2 times per day    [Held by provider] enoxaparin  40 mg SubCUTAneous Daily    rifAXIMin  550 mg Oral BID    ciprofloxacin  400 mg IntraVENous Q12H       MEDS (infusions):   sodium chloride      octreotide (SANDOSTATIN) 500 mcg in sodium chloride 0.9 % 100 mL infusion 50 mcg/hr (03/03/24 0801)    sodium chloride      sodium chloride         MEDS (prn):  morphine, sodium chloride flush, sodium chloride, potassium chloride **OR** potassium alternative oral replacement **OR** potassium chloride, magnesium sulfate, ondansetron **OR** ondansetron,

## 2024-03-03 NOTE — PROGRESS NOTES
Physical Therapy  Facility/Department: [address] INTERMEDIATE  Physical Therapy Treatment Note    Name: Johnathan Yanez III  : 1980  MRN: [id number]  Date of Service: 3/3/2024        Patient Diagnosis(es): The primary encounter diagnosis was Ascites due to alcoholic cirrhosis (HCC). Diagnoses of Cirrhosis of liver with ascites, unspecified hepatic cirrhosis type (HCC), Splenomegaly, Hydrocele, unspecified hydrocele type, Pleural effusion, Hypomagnesemia, Transaminitis, Hyperbilirubinemia, Hypoalbuminemia, CANDY (acute kidney injury) (HCC), Thrombocytopenia (HCC), and Anemia, unspecified type were also pertinent to this visit.  Past Medical History:  has a past medical history of Cirrhosis (HCC), Esophageal varices (HCC), ETOH abuse, GAVE (gastric antral vascular ectasia), Hepatitis C, and Portal hypertension (HCC).  Past Surgical History:  has a past surgical history that includes Upper gastrointestinal endoscopy (N/A, 2020); Tonsillectomy; Upper gastrointestinal endoscopy (N/A, 2021); Upper gastrointestinal endoscopy (N/A, 5/10/2021); Endoscopy, colon, diagnostic; Umbilical hernia repair (N/A, 9/15/2021); hernia repair; hernia repair (Left, 2022); Upper gastrointestinal endoscopy (N/A, 2023); Upper gastrointestinal endoscopy (2023); Upper gastrointestinal endoscopy (N/A, 6/3/2023); Upper gastrointestinal endoscopy (N/A, 2024); and Esophagoscopy (N/A, 2024).          Referring provider:  Carlton Torres MD    PT Order:  PT eval and treat     Evaluating PT:  Fiona Childress PT, DPT PT [id number]    Room #:  0630/0630-A  Diagnosis:  Splenomegaly [R16.1]  Hypomagnesemia [E83.42]  Hyperbilirubinemia [E80.6]  Hypoalbuminemia [E88.09]  Pleural effusion [J90]  Thrombocytopenia (HCC) [D69.6]  Transaminitis [R74.01]  CANDY (acute kidney injury) (HCC) [N17.9]  Cirrhosis of liver with ascites, unspecified hepatic cirrhosis type (HCC) [K74.60, R18.8]  Hydrocele, unspecified hydrocele type  [N43.3]  Ascites of liver [R18.8]  Ascites due to alcoholic cirrhosis (HCC) [K70.31]  Anemia, unspecified type [D64.9]  Precautions:  fall risk, LE simon  Equipment Needs:  none    SUBJECTIVE:    Pt admitted from nursing facility.  Pt reported he was using a walker with assistance for short distance ambulation.     OBJECTIVE:   Initial Evaluation  Date: 2/29 Treatment  3/3/2024 Short Term/ Long Term   Goals   Was pt agreeable to Eval/treatment? yes yes    Does pt have pain? Abdominal and B thigh pain. Abdominal and B gastroc pain/stiffness    Bed Mobility  Rolling: SBA  Supine to sit: SBA  Sit to supine: SBA  Scooting: SBA to sitting in bed. Rolling: SBA  Supine <> sit: SBA  Scooting: SBA seated to EOB independent   Transfers Sit to stand: Mod A  Stand to sit: Mod A  Stand pivot: NT Sit <> stand: Min A Min A   Ambulation    Pt unable to take steps due to weakness.  NT. Pt states too weak 20 feet with w/w Min A    Stair negotiation: ascended and descended  NT  NT    ROM BLE:  WFL     Strength BLE:  grossly 3/5  Increase LE strength by 1/2 mm grade   Balance Sitting EOB:  supervision  Static standing:  Mod A.  Left knee simon.   Sitting EOB:  independent  Dynamic Standing:  Min A with w/w   AM-PAC 6 Clicks 11/24 12/24      Pt is alert, following instruction, states his feet are numb.   Balance: poor standing with WW    Pt performed therapeutic exercise of the following: NT    Patient education/treatment  Pt was educated on UE usage for transfer safety, upright standing posture, wt shifting promoting pre gait activities.     Patient response to education:   Pt verbalized understanding Pt demonstrated skill Pt requires further education in this area   yes With instruction yes     ASSESSMENT:   Comments: Nurse ok with Rx. Pt found in bed, assisted to EOB, sat EOB a prolonged time SBA for balance. Pt stood using WW approx 2 minutes 30 seconds with Min A for balance, states calves very sore and tight, states feels

## 2024-03-03 NOTE — PLAN OF CARE

## 2024-03-03 NOTE — PLAN OF CARE
Problem: ABCDS Injury Assessment  Goal: Absence of physical injury  3/3/2024 1749 by Kori Guerin RN  Outcome: Adequate for Discharge  3/3/2024 1749 by Kori Guerin RN  Outcome: Adequate for Discharge     Problem: Skin/Tissue Integrity  Goal: Absence of new skin breakdown  Description: 1.  Monitor for areas of redness and/or skin breakdown  2.  Assess vascular access sites hourly  3.  Every 4-6 hours minimum:  Change oxygen saturation probe site  4.  Every 4-6 hours:  If on nasal continuous positive airway pressure, respiratory therapy assess nares and determine need for appliance change or resting period.  3/3/2024 1749 by Kori Guerin RN  Outcome: Adequate for Discharge  3/3/2024 1749 by Kori Guerin RN  Outcome: Adequate for Discharge     Problem: Pain  Goal: Verbalizes/displays adequate comfort level or baseline comfort level  3/3/2024 1749 by Kori Guerin RN  Outcome: Adequate for Discharge  3/3/2024 1749 by Kori Guerin RN  Outcome: Adequate for Discharge     Problem: Safety - Adult  Goal: Free from fall injury  3/3/2024 1749 by Kori Guerin RN  Outcome: Adequate for Discharge  3/3/2024 1749 by Kori Guerin RN  Outcome: Adequate for Discharge     Problem: Discharge Planning  Goal: Discharge to home or other facility with appropriate resources  3/3/2024 1749 by Kori Guerin RN  Outcome: Adequate for Discharge  3/3/2024 1749 by Kori Guerin RN  Outcome: Adequate for Discharge     Problem: Nutrition Deficit:  Goal: Optimize nutritional status  3/3/2024 1749 by Kori Guerin RN  Outcome: Adequate for Discharge  3/3/2024 1749 by Kori Guerin RN  Outcome: Adequate for Discharge

## 2024-03-04 LAB — NON-GYN CYTOLOGY REPORT: NORMAL

## 2024-03-13 NOTE — ED PROVIDER NOTES
y.o. male presenting to the ED for evaluation of right hip pain after sustaining mechanical fall.  He states that the dog tripped him.  He fell onto his right hip.  He has pain in the lateral aspect of the hip.  He is ambulatory into the emergency department currently rating his pain is 6 out of 10.  Notes weightbearing and palpation of the area as aggravating factors to the pain     Patient states that he has liver cirrhosis and is advised not to take Tylenol and or ibuprofen.  He is given a single dose of oxycodone here and will obtain x-ray to evaluate for fracture    Differential diagnoses include but not limited to fracture, contusion, strain  Patient is requesting to leave prior to results of x-ray.  I did review x-ray images myself and upon my interpretation I do not see or appreciate any evidence of fracture.  I did advise patient that I do not have radiology interpretation and he would have to leave AGAINST MEDICAL ADVICE.  He is willing to do so.  I did encourage him to return to the emergency department should he change his mind or have any further issues.  Should otherwise take Tylenol, Motrin as needed for pain control and to follow-up with his PCP.    I did later review patient's radiology interpretation which is negative for fracture      Counseling:   The emergency provider has spoken with the patient and discussed today’s results, in addition to providing specific details for the plan of care and counseling regarding the diagnosis and prognosis.  Questions are answered at this time and they are agreeable with the plan.      --------------------------------- IMPRESSION AND DISPOSITION ---------------------------------    IMPRESSION  1. Contusion of left hip, initial encounter    2. Left against medical advice        DISPOSITION  Disposition: AMA  Patient condition is good                 Charlene Harris, CHRIS - CNP  03/13/24 [id number]

## 2024-03-14 PROBLEM — E72.20 HYPERAMMONEMIA (HCC): Status: ACTIVE | Noted: 2024-01-01

## 2024-03-14 PROBLEM — D64.89 OTHER SPECIFIED ANEMIAS: Status: ACTIVE | Noted: 2024-01-01

## 2024-03-14 NOTE — ED NOTES
Department of Emergency Medicine  FIRST PROVIDER TRIAGE NOTE             Independent MLP           3/14/24  1:12 PM EDT    Date of Encounter: 3/14/24   MRN: [id number]      HPI: Johnathan Yanez III is a 44 y.o. male who presents to the ED for Abdominal Pain (Hx TIPPS and stage 3 liver cirrhosis ), Jaundice, and Foot Pain (jillian)   STATES HE IS NOT FEELING WELL TODAY.  FEELS AS IF HE \"IS CRASHING\".     ROS: Negative for cp or sob.    PE: Gen Appearance/Constitutional: alert  HEENT: NC/NT. PERRLA,  Airway patent.     Initial Plan of Care: All treatment areas with department are currently occupied. Plan to order/Initiate the following while awaiting opening in ED.  Initiate Treatment-Testing, Proceed toTreatment Area When Bed Available for ED Attending/MLP to Continue Care    Electronically signed by CHRIS Guerrero CNP   DD: 3/14/24      Robbi Marcelo APRN - CNP  03/14/24 [id number]

## 2024-03-14 NOTE — PROGRESS NOTES
Database initiated pharmacy and medications verified with the patient. He is A&O comes in from home with wife. He has a walker if needed but has not needed it.

## 2024-03-14 NOTE — ED PROVIDER NOTES
Department of Emergency Medicine     Written by: Rajeev Patel MD  Patient Name: Johnathan Yanez III  Admit Date: 3/14/2024  2:07 PM  MRN: [id number]                   : 1980    HPI  Chief Complaint   Patient presents with    Abdominal Pain     Hx TIPPS and stage 3 liver cirrhosis     Jaundice    Foot Pain     jillian       Johnathan Yanez III is a 44 y.o. male that presents to the ED with concerns of worsening abdominal pain.  Patient has a history of TIPS procedure and stage III liver cirrhosis, secondary to hepatitis C infection due to IV drug use and alcohol abuse.  Patient says that he has become more jaundiced.  Patient takes lactulose daily as he tends to develop hyperammonemia.  The patient reports that his primary complaint today is worsening abdominal pain.  He says his hepatologist is Dr. Medina at [facility].  He has followed with Dr. Johnson's GI group here.  No fevers chills diaphoresis.  No chest pain or shortness of this.    Review of systems:  Pertinent positives and negatives mentioned in the HPI/MDM.    Physical Exam  Constitutional:       General: He is not in acute distress.     Appearance: Normal appearance.   Eyes:      General: Scleral icterus present.      Extraocular Movements: Extraocular movements intact.      Pupils: Pupils are equal, round, and reactive to light.   Cardiovascular:      Rate and Rhythm: Normal rate and regular rhythm.   Pulmonary:      Effort: Pulmonary effort is normal. No respiratory distress.   Abdominal:      Palpations: Abdomen is soft. There is mass (Left abdomen into left flank).      Tenderness: There is no abdominal tenderness.      Hernia: A hernia is present. Hernia is present in the umbilical area.   Skin:     Coloration: Skin is jaundiced.   Neurological:      Mental Status: He is alert and oriented to person, place, and time. Mental status is at baseline.          Chart review: The patient was evaluated by neurology for recurrent ascites  with significant stool burden and a cirrhotic liver.  The patient also had moderate to large volume of ascites in all quadrants of the abdomen.  Discussed with admitting team, will admit, likely have IR guided paracentesis inpatient    Clinical Impression  1. Hyperammonemia (HCC)    2. Anemia, unspecified type    3. History of cirrhosis    4. Other ascites    5. Lactic acidosis         Disposition  Patient's disposition: Admit to med/surg floor    Rajeev Patel MD  Resident PGY-2

## 2024-03-15 NOTE — H&P
[facility] Hospitalist Group History and Physical      CHIEF COMPLAINT:  Abdominal pain    History of Present Illness:  This is a 44 year old male with PMH significant for hepatitis C, polysubstance abuse, alcohol abuse, alcoholic cirrhosis with ascites, portal hypertension, esophageal varices, and GAVE.  Recently admitted 02/29/2024 - 03/03/2024 for decompensated liver cirrhosis and rapidly re-accumulating ascites. Then transferred to [facility] on 03/03/2024 for possible TIPS revision and transplant evaluation and discharged there on 03/08/2024. Patient evaluated by Dr. Haro for recurrent ascites. Underwent TIPS revision but TIPS was widely patent, gradient was 15 mm Hg. TIPS procedure 01/23 with reduction of gradient from 19 -> 8 mm Hg. Possible parallel TIPS procedure in future. Discharged on low dose lasix/aldactone.     To ED for abdominal Pain that increased today. Also has had nose bleeds on and off. Denies hematemesis, melena, and hematochezia. Last paracentesis done February 29 th with 2 L removed.  Labs remarkable for lactic acid 2.5 and then 2.0 after 1 L bolus.  Protein 6.0, ammonia 103, alkaline phos 139, AST 61, total bili 5.1, direct bili 2.0, indirect bili 3.1, lipase 135, WBC 2.2, H&H 6.6/19.3, and PT/INR 32.9/2.9.  Denies recent illness, fever, chills, shortness of breath, chest pain nausea/vomiting, and changes in urination.  Does admit to not taking lactulose daily.  CT scan showing colonic fecal retention and moderate-large ascites, and left hydrocele.  Patient denies smoking, alcohol, and drug use.  Given fentanyl, lactulose, 1 L bolus and 1 unit of packed red blood cells in ED.  Will admit for further evaluation and treatment.     2/12 - 2/20 patient presented to ED with complaints of swelling to abdomen and scrotum.  Patient received diuresis.  Underwent paracentesis with 3.5L removed.  EGD performed noting very small varices.  Hematology/oncology consulted for pancytopenia.   non-tender, non-distended, normal bowel sounds, no masses or organomegaly  Extremities: no cyanosis, no clubbing, BLE 2-3+ pitting edema  Neurologic: speech normal        LABS:  Recent Labs     03/14/24  1436      K 3.5   *   CO2 20*   BUN 8   CREATININE 0.9   GLUCOSE 103*   CALCIUM 9.0       Recent Labs     03/14/24  1436 03/14/24  1937   WBC 2.2*  --    RBC 1.93*  --    HGB 6.6* 7.2*   HCT 19.3* 21.2*   .0*  --    MCH 34.2  --    MCHC 34.2  --    RDW 19.8*  --    PLT 29*  --    MPV 10.8  --        No results for input(s): \"POCGLU\" in the last 72 hours.        Radiology:   CT ABDOMEN PELVIS W IV CONTRAST Additional Contrast? None   Final Result   Diffuse colonic fecal retention with significant stool burden.      Cirrhotic morphology of the liver.      Splenomegaly.      Moderate to large volume 4 quadrant ascites.      Upper abdominal portal venous collaterals.      Probable partially visualized left hydrocele.         IR US GUIDED PARACENTESIS    (Results Pending)       EKG: NA    ASSESSMENT:      Principal Problem:    Hyperammonemia (HCC)  Active Problems:    Alcoholic cirrhosis of liver with ascites (HCC)    Other specified anemias  Resolved Problems:    * No resolved hospital problems. *      PLAN:    1.  Hyperammonemia-ammonia level 103.  Give lactulose 20 g 3 times daily.  First dose given in ED.  Recheck ammonia level in AM.  At baseline mental status.    2.  Alcoholic cirrhosis of liver with ascites-increase in LFTs.  CT showing moderate-large ascites.  IR consulted for paracentesis.  Will check fluid due to abdominal pain.  Continue rifaximin.    3.  Anemia acute on chronic-hemoglobin 6.6.  Patient has been having nosebleeds on and off.  Denies hematic emesis, melena, and hematochezia.  Monitor.  INR 2.9.    4.  Lactic acidosis-lactic acid 2.5 and then 2.0 after 1 L of fluid.  Patient appears intravascularly dry.  Hold Lasix and Aldactone for now.    5.  Chronic  thrombocytopenia-platelet count 29.  Monitor.  May need platelets.    6.  History GAVE and varices -EGD 2/18.  Continue PPI.  His he is not a candidate for beta-blocker due to hypotension.     Code Status: Full code  DVT prophylaxis: Hold- bleeding risk    49 minutes or more spent reviewing patient chart, assessing patient, discussing plan of care with patient and family, discussing plan of care with collaborating physician, and documentation.       NOTE: This report was transcribed using voice recognition software. Every effort was made to ensure accuracy; however, inadvertent computerized transcription errors may be present.  Electronically signed by CHRIS Rodriguez - CNP on 3/14/2024 at 8:10 PM      [facility] Hospitalist:  addendum to NP(or PA if applicable) note  Attending Physician Statement: Luca Liu M.D., F.A.C.P.  Seen for new admission-- Acute and chronic problems addressed  Prior ER/Hospital and outpatient charts reviewed, including other providers notes, relevant labs and imaging. Meds reviewed.  discussion/coordination with ER/ (access center if applicable) +other providers and/or counseling patient/family +NP  I have seen patient+reviewed pertinent history and exam findings as documented by NP   I agree with assessment/plan as per NP note   (split billing based on medical complexity of case)  +My assessment of various problems addressed tonight as documented below:    Chronic cirrhosis    ER requesting we admit bc of inc ammonia  Doubt acute hepatic encephalopathy- oriented, but slightly slow, dec alert?  Not lethargic  Inc ammonia >100- Does admit to not taking lactulose daily.   Noted -- on CT constipated-- CT scan showing colonic fecal retention   He does take his rifaximin    Recent Nose bleed - anemia  Chronic pancytopenia   1/11 EGD performed revealing small varices not amenable to banding.  Moderate for both GAVE moderate portal hypertension gastropathy normal duodenum.   Noted nose bleed

## 2024-03-15 NOTE — DISCHARGE INSTR - COC
Continuity of Care Form    Patient Name: Johnathan Yanez III   :  1980  MRN:  [id number]    Admit date:  3/14/2024  Discharge date:  ***    Code Status Order: Full Code   Advance Directives:     Admitting Physician:  Luca Liu MD  PCP: No primary care provider on file.    Discharging Nurse: ***  Discharging Hospital Unit/Room#: 0205/0205-A  Discharging Unit Phone Number: ***    Emergency Contact:   Extended Emergency Contact Information  Primary Emergency Contact: Peggy Rodriguez  Address: [address] [place] [address]  Home Phone: [phone]  Mobile Phone: [phone]  Relation: Girlfriend  Preferred language: English   needed? No  Secondary Emergency Contact: Angeles Smallwood \"Naty\"  Address: [address]  Home Phone: [phone]  Mobile Phone: [phone]  Relation: Brother/Sister  Preferred language: English   needed? No    Past Surgical History:  Past Surgical History:   Procedure Laterality Date    ENDOSCOPY, COLON, DIAGNOSTIC      ESOPHAGOSCOPY N/A 2024    ESOPHAGOSCOPY CONTROL HEMORRHAGE performed by Tino Bal DO at [facility] OR    HERNIA REPAIR      HERNIA REPAIR Left 2022    LAPAROSCOPIC LEFT INGUINAL HERNIA REPAIR WITH MESH POSSIBLE OPEN POSSIBLE BILATERAL performed by Shashank Candelario MD at [facility] OR    TONSILLECTOMY      UMBILICAL HERNIA REPAIR N/A 9/15/2021    LAPAROSCOPIC POSSIBLE OPEN UMBILICAL HERNIA REPAIR WITH MSH performed by Shashank Candelario MD at [facility] OR    UPPER GASTROINTESTINAL ENDOSCOPY N/A 2020    EGD BIOPSY performed by Milo Carrasco MD at [facility]    UPPER GASTROINTESTINAL ENDOSCOPY N/A 2021    EGD BAND LIGATION performed by JOIE Huizar MD at [facility] ENDOSCOPY    UPPER GASTROINTESTINAL ENDOSCOPY N/A 5/10/2021    EGD BAND LIGATION performed by JOIE Huizar MD at [facility] OR    UPPER GASTROINTESTINAL ENDOSCOPY N/A    { SHABANA Impairments/Disabilities:986742004}    Nutrition Therapy:  Current Nutrition Therapy:   { SHABANA Diet List:205241982}    Routes of Feeding: {[facility] DME Other Feedings:542886602}  Liquids: {Slp liquid thickness:24923}  Daily Fluid Restriction: {[facility] DME Yes amt example:212595296}  Last Modified Barium Swallow with Video (Video Swallowing Test): {Done Not Done Date:}    Treatments at the Time of Hospital Discharge:   Respiratory Treatments: ***  Oxygen Therapy:  {Therapy; copd oxygen:38450}  Ventilator:    { CC Vent List:569231703}    Rehab Therapies: Physical Therapy and Occupational Therapy  Weight Bearing Status/Restrictions: {[facility] Weight Bearin}  Other Medical Equipment (for information only, NOT a DME order):  {EQUIPMENT:139736142}  Other Treatments: ***    Patient's personal belongings (please select all that are sent with patient):  {[facility] DME Belongings:567443255}    RN SIGNATURE:  {Esignature:863133326}    CASE MANAGEMENT/SOCIAL WORK SECTION    Inpatient Status Date: ***    Readmission Risk Assessment Score:  Readmission Risk              Risk of Unplanned Readmission:  46           Discharging to Facility/ Agency   Name: [facility]  Address: [address] [place] [address]  Phone: [phone]  Fax:    Dialysis Facility (if applicable)   Name:  Address:  Dialysis Schedule:  Phone:  Fax:    / signature: {Esignature:653717984}    PHYSICIAN SECTION    Prognosis: {Prognosis:7311010716}    Condition at Discharge: Stable    Rehab Potential (if transferring to Rehab): {Prognosis:6358815064}    Recommended Labs or Other Treatments After Discharge: ***    Physician Certification: I certify the above information and transfer of Johnathan Yanez III  is necessary for the continuing treatment of the diagnosis listed and that he requires Skilled Nursing Facility for less 30 days.     Update Admission H&P: {[facility] DME Changes in HandP:738875419}    PHYSICIAN SIGNATURE:   {[facility]:167798441}

## 2024-03-15 NOTE — PROCEDURES
Intubation Procedure Note    Indication: Respiratory failure, airway compromise, comatose state, hypoxia, and airway protection    Consent: Unable to be obtained due to the emergent nature of this procedure.    Medications Used: etomidate and vecuronium intravenously    Procedure: The patient was placed in the appropriate position.  Cricoid pressure was utilized.  Intubation was performed by direct laryngoscopy using a video laryngoscope and an 8.0 cuffed endotracheal tube.  The cuff was then inflated and the tube was secured appropriately at a distance of 23 cm to the dental ridge.  Initial confirmation of placement included bilateral breath sounds, an end tidal CO2 detector, absence of sounds over the stomach, tube fogging, adequate chest rise, adequate pulse oximetry reading, and improved skin color.  A chest x-ray to verify correct placement of the tube has been ordered but is still pending.    The patient tolerated the procedure well.     Complications: None     Sergey Peraza MD on 3/15/2024 at 10:39 AM      Nino Goldsmith DO

## 2024-03-15 NOTE — SIGNIFICANT EVENT
Called for RRT/Code Blue due to unresponsiveness    Per RN, patient had presented to IR for paracentesis and became unresponsive with eyes deviated to the left. Reportedly unable to obtain a pulse and received 1-2 minutes of CPR. At time of evaluation patient with agonal breathing however became somewhat more alert during course of RRT/Code. Noted hypoxia to 70s, unsure if accurate reading. Unable to obtain reliable BP. Note patient with incontinence, possibly baseline for him? Jaundiced on exam, hx cirrhosis. Unclear if pt truly lost pulse vs seizure activity. Patient now alert and confused/agitated, pulling at O2 mask, appears post-ictal. Intensivist at bedside and patient to be transferred to ICU for closer monitoring at this time.    Ariana Pruett MD   3/15/24  8:54 AM

## 2024-03-15 NOTE — ED NOTES
ED to Inpatient Handoff Report    Notified Tesha that electronic handoff available and patient ready for transport to room 516.    Safety Risks: None identified    Patient in Restraints: no    Constant Observer or Patient : no    Telemetry Monitoring Ordered :NO      Cardiac Rhythm: Sinus rhythm    Order to transfer to unit without monitor:N/A    Last MEWS: 1 Time completed: 2001    Deterioration Index Score:   Predictive Model Details          20 (Normal)  Factor Value    Calculated 3/14/2024 20:06 39% Age 44 years old    Deterioration Index Model 17% Hematocrit abnormal (21.2 %)     14% Respiratory rate 18     9% Pulse 97     8% Pulse oximetry 100 %     4% Potassium 3.5 mmol/L     4% WBC count abnormal (2.2 k/uL)     3% Platelet count abnormal (29 k/uL)     2% Systolic 117     0% Sodium 139 mmol/L     0% Temperature 98.4 °F (36.9 °C)        Vitals:    03/14/24 1730 03/14/24 1750 03/14/24 1820 03/14/24 2001   BP:  107/67 104/71 117/73   Pulse:  (!) 102 100 97   Resp:  14 11 18   Temp:    98.4 °F (36.9 °C)   TempSrc:    Oral   SpO2: 100%   100%   Weight:       Height:             Opportunity for questions and clarification was provided.

## 2024-03-15 NOTE — PROGRESS NOTES
SPIRITUAL HEALTH SERVICES [facility]  PROGRESS NOTE    Name: Johnathan Yanez III                Hindu: Yarsanism   Anointed (Last Rites): Yes    Referral: Code Blue    Assessment:  Upon entering the room  observes Family of Patient outside of Patient's room in hallway. Fiance, Sister, and Step-Mother of Patient were present. Later Mother and another Sister to Patient arrived. Patient's Father was reached by phone and he was able to speak by phone to Patient, as were two Sisters of Patient. Family was distraught, wailing and crying.       Intervention:   offered a prayer for a miracle to occur with Patient's Fiance and Sister present. Patient's Sister asked for Last Rites to be provided.  called Fr. Merrill who came and officiated Last Rites for the Patient.  offered to contact a local [place], however Patient nor Family belong to a local [place].  offered to contact other Family members by phone, which was declined as Family present had called everyone.  listened as Family reminisced about Patient and they shared with  about Patient's life.  provided prayer twice more for Family and Patient as Patient was actively dying.     Outcome:  Family appreciative of Chaplains presence, ritual, and prayers.     Plan:  Chaplains will remain available to offer spiritual and emotional support as needed.      Electronically signed by ALMA Verma, on 3/15/2024 at 10:43 AM.  Spiritual Care Department  [facility]  [phone]

## 2024-03-15 NOTE — PROGRESS NOTES
SPIRITUAL HEALTH SERVICES - [facility] Encounter    Name: Johnathan Yanez III                  Referral:  Called by another  at the request of patient's family.    Sacraments  Anointed (Last Rites): Yes  Apostolic [place]: Yes  Confession: No  Communion: No     Assessment:  Patient's family was receptive to 's visit.      Intervention:   provided spiritual support and sacramental ministry for patient.     Outcome:  Patient's family expressed gratitude for 's visit.    Plan:  None.      Electronically signed by Chaplain Shreyas, on 3/15/2024 at 4:15 PM.  Spiritual Care Department  [facility]  [phone]

## 2024-03-15 NOTE — CONSULTS
Critical Care Admit/Consult Note         Patient - Johnathan Yanez III   MRN -  [id number]   [id number]   - 1980      Date of Admission -  3/14/2024  2:07 PM  Date of evaluation -  3/15/2024  0205/0205-A   Hospital Day - 1            ADMIT/CONSULT DETAILS     Reason for Admit/Consult   Altered mental status, unresponsive    Consulting Service/Physician   Consulting - Andrey Cesar MD  Primary Care Physician - No primary care provider on file.     ICU attending - Dr. Agus ABEL   The patient is a 44 y.o. male with significant past medical history of hepatitis C, polysubstance abuse, alcohol abuse, alcoholic cirrhosis with ascites, portal hypertension, esophageal varices presents with Abdominal Pain (Hx TIPPS and stage 3 liver cirrhosis ), Jaundice, and Foot Pain (jillian).  Patient was recently admitted from  to 3/3 of this year for decompensated liver cirrhosis and rapidly reaccumulating ascites.  Patient was transferred to [facility] at that time for possible TIPS revision.  Patient did undergo TIPS procedure at that time.  Patient presented to the ED with clinical plaints of generalized abdominal pain and jaundice.  Patient was found to have hyperammonemia, anemia, lactic acidosis, hyperbilirubinemia.  CT abdomen done in the ED showing colonic fecal retention with stool burden and cirrhotic liver.  Also showed large to moderate ascites in all quadrants.  [facility] plan to go undergo IR guided paracentesis today.    While down at IR patient went unresponsive with transient loss of pulses with eye deviation to the left and CODE BLUE was called.  Patient received 1 2 minutes of CPR and is found to have agonal breathing with hypoxia to the 70s.  At that time, rapid response team was concerned patient might of lost pulses versus seizure activity.  Patient did receive ROSC and became alert with confusion and agitation.   initially having weak pulses  Patient subsequently lost pulses and BLS and ACLS was initiated  ROSC obtained and patient was placed on vasopressors with pressure bag fluids  Hemoglobin post ROSC found to be low and 2 units PRBCs ordered but patient did not receive due to repeat cardiac arrest  Patient subsequently arrested and time of death was subsequently called.    GI  Patient having generalized jaundice with plan for IR paracentesis today.  Patient was too unstable and brought to ICU for further evaluation    Renal  Electrolytes were noted  Patient had an hyperammonemia    Infectious disease  No acute concerns    Heme/Onc  Patient found to be anemic post ROSC and 2 units PRBCs ordered.  INR 3.1    Endocrine  BGL checked during arrest and was hyperglycemic    Social/Spiritual/DNR/Other  Code status: DNR CCA  Diet ADULT DIET; Regular; Low Fat/Low Chol/High Fiber/KAITLIN  Patient lost pulses and care was withdrawn as per CODE STATUS with time of death called on 3/15/2024 at 1020.    Lines/catheters  3/15: ET tube  3/15: Right femoral triple-lumen  3/15: Left femoral A-line      Sergey Peraza MD  1:05 PM  03/15/24       I personally saw, examined and provided care for the patient. I performed the substantive portion of the visit. Radiographs, labs and medication list were reviewed by me independently. Review of Residents documentation was conducted and revisions were made as appropriate. I agree with the above documented exam, problem list and plan of care.        CCT excluding procedures 50 minutes    Nino Goldsmith DO

## 2024-03-15 NOTE — PROGRESS NOTES
4 Eyes Skin Assessment     NAME:  Johnathan Yanez III  YOB: 1980  MEDICAL RECORD NUMBER:  [id number]    The patient is being assessed for  Admission    I agree that at least one RN has performed a thorough Head to Toe Skin Assessment on the patient. ALL assessment sites listed below have been assessed.      Areas assessed by both nurses:    Head, Face, Ears, Shoulders, Back, Chest, Arms, Elbows, Hands, Sacrum. Buttock, Coccyx, Ischium, and Legs. Feet and Heels        Does the Patient have a Wound? No noted wound(s)       Dennis Prevention initiated by RN: Yes  Wound Care Orders initiated by RN: No    Pressure Injury (Stage 3,4, Unstageable, DTI, NWPT, and Complex wounds) if present, place Wound referral order by RN under : No    New Ostomies, if present place, Ostomy referral order under : No     Nurse 1 eSignature: Electronically signed by Tesha Villaseñor RN on 3/15/24 at 5:35 AM EDT    **SHARE this note so that the co-signing nurse can place an eSignature**    Nurse 2 eSignature: Electronically signed by Marie Rogers RN on 3/15/24 at 7:59 AM EDT

## 2024-03-15 NOTE — PROGRESS NOTES
Dr. Cesar was called and notified of the following findings:  patient is unresponsive with non-reactive pupils; no pulses palpated; no spontaneous breaths; no heart sounds auscultated.  Dr. Peraza pronounced patient dead at 1020 on 3/15//24.  Dr. Cesar will sign the death certificate.  Consults notified.   notified and released body.  [facility] notified and released body.

## 2024-03-15 NOTE — CARE COORDINATION
Social Work discharge planning  Pt off floor, and then was RRT this am. Pt is from [facility], where bed is NOT held per liaison Tammy. She advised pt will need PT OT evals when medically appropriate and he will need precert. WIll need to talk to pt at later time about discharge planning. N17 started In [facility].   Electronically signed by JCARLOS Cantor on 3/15/2024 at 8:56 AM

## 2024-03-15 NOTE — PROGRESS NOTES
0855 - Arrived from IR post arrest/RRT. Alert, confused pulling at oxygen, not following commands. Placed in ICU monitor.  0900 - Noted to be less arousable, RT and Dr. Toolson called to assess.  0901 - No pulse was able to be palpated, bradycardic. CPR was initeated. See code charting.   0942 - Family changed code status. CPR stopped with ROSC.   1020 - PEA on telemety. TOD.

## 2024-03-15 NOTE — PROCEDURES
Arterial Line Placement Procedure Note                     Indication: metabolic derangement, arterial blood gases, mechanical ventilation, severe hypotension, cardiovascular instability, and shock    Consent: Unable to be obtained due to the emergent nature of this procedure.    Pio's Test: Was not performed    Procedure: The skin over the left femoral artery was prepped with Chloraprep.  Local anesthesia was not performed due to the emergent nature of this procedure.  A 20 gauge arterial line catheter was then inserted, using a modified Seldinger technique, into the vessel.  The transducer set was then attached and securely fastened to the skin with sutures.  Waveforms on the monitor were observed and found to be adequate.  The patient had good distal perfusion after the procedure. The site was then dressed in a sterile fashion.    The patient tolerated the procedure well.     Complications: None     Sergey Peraza MD on 3/15/2024 at 10:41 AM      Nino Goldsmith DO

## 2024-03-15 NOTE — DISCHARGE SUMMARY
[facility] Hospitalist Physician Discharge Summary       [facility]  [address] [address]  [phone]          Dispo:       Condition on discharge:     Patient ID:  Johnathan Yanez III  [id number]  44 y.o.  1980    Admit date: 3/14/2024    Discharge date and time:  3/15/2024  5:33 PM    Admission Diagnoses: Principal Problem:    Hyperammonemia (HCC)  Active Problems:    Alcoholic cirrhosis of liver with ascites (HCC)    Other specified anemias  Resolved Problems:    * No resolved hospital problems. *      Discharge Diagnoses: Principal Problem:    Hyperammonemia (HCC)  Active Problems:    Alcoholic cirrhosis of liver with ascites (HCC)    Other specified anemias  Resolved Problems:    * No resolved hospital problems. *      Consults:  IP CONSULT TO GI    Procedures: Intubation, Central line, Arterial Line    Hospital Course:   Patient Johnathan Yanez III is a 44 y.o. presented with Hyperammonemia (HCC) [E72.20]  Pt has PMHx hepatitis C, polysubstance abuse, alcohol abuse, alcoholic cirrhosis with ascites, portal hypertension, esophageal varices, and GAVE who presented with abdominal pain. To ED for abdominal Pain that increased today. Also has had nose bleeds on and off. Denies hematemesis, melena, and hematochezia. Last paracentesis done  with 2 L removed.  Labs remarkable for lactic acid 2.5 and then 2.0 after 1 L bolus.  Protein 6.0, ammonia 103, alkaline phos 139, AST 61, total bili 5.1, direct bili 2.0, indirect bili 3.1, lipase 135, WBC 2.2, H&H 6.6/19.3, and PT/INR 32.9/2.9.  Denies recent illness, fever, chills, shortness of breath, chest pain nausea/vomiting, and changes in urination.  Does admit to not taking lactulose daily.  CT scan showing colonic fecal retention and moderate-large ascites, and left hydrocele.  Patient denies smoking, alcohol, and drug use.  Given fentanyl, lactulose, 1 L bolus and 1 unit of packed  well. Complications: None  Sergey Peraza MD on 3/15/2024 at 10:39 AM Nino Goldsmith, DO     CT ABDOMEN PELVIS W IV CONTRAST Additional Contrast? None    Result Date: 3/14/2024  EXAMINATION: CT OF THE ABDOMEN AND PELVIS WITH CONTRAST 3/14/2024 4:15 pm TECHNIQUE: CT of the abdomen and pelvis was performed with the administration of intravenous contrast. Multiplanar reformatted images are provided for review. Automated exposure control, iterative reconstruction, and/or weight based adjustment of the mA/kV was utilized to reduce the radiation dose to as low as reasonably achievable. COMPARISON: 02/29/2024 HISTORY: ORDERING SYSTEM PROVIDED HISTORY: eval worsening abd pain cirrhosis TECHNOLOGIST PROVIDED HISTORY: Additional Contrast?->None Reason for exam:->eval worsening abd pain cirrhosis Decision Support Exception - unselect if not a suspected or confirmed emergency medical condition->Emergency Medical Condition (MA) FINDINGS: Lower Chest:   Visualized lungs are normal. Organs: Cirrhotic morphology of the liver with tips stent in place. Splenomegaly.  The gallbladder and pancreas are normal.  Normal appearing kidneys.  Upper abdominal portal venous collaterals.  Grossly patent portal vein. GI/Bowel:   Diffuse colonic fecal retention.  Normal small bowel and appendix. Pelvis:   Normal urinary bladder. Peritoneum/Retroperitoneum:   Moderate to large volume 4 quadrant ascites. Soft tissues and osseous structures: Partially visualized probable left hydrocele.     Diffuse colonic fecal retention with significant stool burden. Cirrhotic morphology of the liver. Splenomegaly. Moderate to large volume 4 quadrant ascites. Upper abdominal portal venous collaterals. Probable partially visualized left hydrocele.       Note that 15 minutes were spent in preparing discharge papers, discussing discharge with patient, medication review, etc.    Signed:  Electronically signed by Andrey Cesar MD on 3/15/2024 at 5:33 PM

## 2024-03-15 NOTE — PLAN OF CARE
Problem: ABCDS Injury Assessment  Goal: Absence of physical injury  Outcome: Progressing     Problem: Skin/Tissue Integrity  Goal: Absence of new skin breakdown  Description: 1.  Monitor for areas of redness and/or skin breakdown  2.  Assess vascular access sites hourly  3.  Every 4-6 hours minimum:  Change oxygen saturation probe site  4.  Every 4-6 hours:  If on nasal continuous positive airway pressure, respiratory therapy assess nares and determine need for appliance change or resting period.  Outcome: Progressing     Problem: Pain  Goal: Verbalizes/displays adequate comfort level or baseline comfort level  Outcome: Progressing

## 2024-03-15 NOTE — PROGRESS NOTES
RN spoke with sister Naty, gave her the updates.  She waiting in ICU waiting room.    Electronically signed by Norma Rodriguez RN on 3/15/2024 at 9:04 AM

## 2024-03-15 NOTE — PROCEDURES
Central Line Placement Procedure Note    Indication: vascular access, poor peripheral access, hypovolemia, centrally administered medications, and need for frequent blood draws    Consent: Unable to be obtained due to the emergent nature of this procedure.    Procedure: The patient was positioned appropriately and the skin over the right femoral vein was prepped with Chloraprep and unable to be prepped due to the emergent nature of the procedure. Local anesthesia was not performed due to the emergent nature of this procedure.  A large bore needle was used to identify the vein.  A guide wire was then inserted into the vein through the needle. A triple lumen catheter was then inserted into the vessel over the guide wire using the Seldinger technique.  All ports showed good, free flowing blood return and were flushed with saline solution.   The catheter was then securely fastened to the skin with suture at 20 cm.  Two sutures were placed into the kit included tube clamp, proximal eyelets, and a suture end from each of the securing sutures was extended around the catheter and tied to the proximal eyelets as an added measure to prevent dislodgement. An antibiotic disk was placed and the site was then covered with a sterile dressing.  A post procedure X-ray was not indicated.    The patient tolerated the procedure well.    Complications: None      Sergey Preaza MD on 3/15/2024 at 10:42 AM    Nino Goldsmith DO

## 2024-03-15 NOTE — DEATH NOTES
Death Pronouncement Note  Patient's Name: Johnathan Yanez III   Patient's YOB: 1980  MRN Number: [id number]    Admitting Provider: Luca Liu MD  Attending Provider: Andrey Cesar MD    Patient was examined and the following were absent: Pulses, Blood Pressure, and Respiratory effort    I declared the patient dead on 3/15/2024 at 1020    Preliminary Cause of Death: Multisystem organ failure; respiratory arrest; anemia    Electronically signed by Sergey Peraza MD on 3/15/24 at 10:23 AM EDT

## 2024-03-17 LAB
ARM BAND NUMBER: NORMAL
BLOOD BANK DISPENSE STATUS: NORMAL
BPU ID: NORMAL
COMPONENT: NORMAL
TRANSFUSION STATUS: NORMAL
UNIT DIVISION: 0

## 2024-03-18 LAB
ABO/RH: NORMAL
ANTIBODY SCREEN: NEGATIVE
ARM BAND NUMBER: NORMAL
BLOOD BANK BLOOD PRODUCT EXPIRATION DATE: NORMAL
BLOOD BANK DISPENSE STATUS: NORMAL
BLOOD BANK ISBT PRODUCT BLOOD TYPE: 6200
BLOOD BANK PRODUCT CODE: NORMAL
BLOOD BANK SAMPLE EXPIRATION: NORMAL
BLOOD BANK UNIT TYPE AND RH: NORMAL
BPU ID: NORMAL
COMPONENT: NORMAL
CROSSMATCH RESULT: NORMAL
TRANSFUSION STATUS: NORMAL
UNIT DIVISION: 0
UNIT ISSUE DATE/TIME: NORMAL

## 2024-04-02 NOTE — PROGRESS NOTES
Physician Progress Note      PATIENT:               TONIO KWOK  [facility] #:                  [id number]  :                       1980  ADMIT DATE:       3/14/2024 2:07 PM  DISCH DATE:        3/15/2024 10:20 AM  RESPONDING  PROVIDER #:        Andrey Cesar MD          QUERY TEXT:    Pt admitted with Hyperammonemia.  Pt noted to have SpO2 - 77%, Respiratory   arrest, apneic. If possible, please document in the progress notes and   discharge summary if you are evaluating and/or treating any of the following:    The medical record reflects the following:  Risk Factors: Multisystem organ failure; respiratory arrest    Clinical Indicators: Critical care consult note, 03/15 \"Patient initially   agitated/confused; Patient initially had agonal breathing and became apneic.    Patient was intubated and lungs were found to be diminished in all fields but   otherwise clear to auscultation; While down at IR patient went unresponsive   with transient loss of pulses with eye deviation to the left and CODE BLUE was   called.  Patient received 1 2 minutes of CPR and is found to have agonal   breathing with hypoxia to the 70s; Patient intubated and placed on   ventilator\".    Death note, 03/15 \"Preliminary Cause of Death: Multisystem organ failure;   respiratory arrest; anemia\".    pH - 7.472, 7.214    SpO2 - 77%    Treatment: Mechanical ventilation, IVF, CRP, ABS studies    Thank you  SAUD Solis CDS  Options provided:  -- Acute respiratory failure with hypoxia  -- Other - I will add my own diagnosis  -- Disagree - Not applicable / Not valid  -- Disagree - Clinically unable to determine / Unknown  -- Refer to Clinical Documentation Reviewer    PROVIDER RESPONSE TEXT:    This patient is in acute respiratory failure with hypoxia.    Query created by: Ruddy Montoya on 2024 2:57 AM      Electronically signed by:  Andrey Cesar MD 2024 4:28 PM

## 2025-03-13 NOTE — PROGRESS NOTES
2/24/2024 9:33 AM  Service: Urology  Group: [facility] ([facility]/Buffy/Rob)    Johnathan Yanez III  [id number]    Subjective:  Awake and alert  He feels ok  Minimal scrotal pain  Mimimal drainage from the left side      Review of Systems  Constitutional: No fever or chlls  Respiratory: negative for cough and shortness of breath  Cardiovascular: negative for chest pain  Gastrointestinal: positive for abdominal pain  Dermatologic: negative   Hematologic/lymphatic: negative  Musculoskeletal:negative  Neurological: negative for seizures and tremors  Endocrine: negative  Psychiatric: negative       Scheduled Meds:   droNABinol  5 mg Oral BID    sodium chloride flush  5-40 mL IntraVENous 2 times per day    thiamine  100 mg Oral Daily    busPIRone  10 mg Oral BID    ferrous sulfate  325 mg Oral Daily with breakfast    folic acid  1 mg Oral QAM    lactulose  20 g Oral TID    midodrine  10 mg Oral TID WC    pantoprazole  40 mg Oral BID AC    pentoxifylline  400 mg Oral TID WC    sodium chloride flush  5-40 mL IntraVENous 2 times per day    nicotine  1 patch TransDERmal Daily    rifAXIMin  550 mg Oral BID    spironolactone  50 mg Oral Daily    furosemide  80 mg Oral QAM    traZODone  50 mg Oral Nightly    melatonin  6 mg Oral QPM       Objective:  Vitals:    02/24/24 0721   BP: (!) 97/51   Pulse: 97   Resp: 18   Temp: 98 °F (36.7 °C)   SpO2: 94%         Allergies: Pcn [penicillins]    General Appearance: alert and oriented to person, place and time and in no acute distress  Skin: no rash or erythema  Head: normocephalic and atraumatic  Pulmonary/Chest: normal air movement, no respiratory distress and no chest wall tenderness  Abdomen: soft, non-tender, non-distended, normal bowel sounds, no masses or organomegaly and no inguinal adenopathy  Genitourinary: genitals normal with mcclellan, scrotum 
       [facility] Hospitalist Progress Note    Admitting Date and Time: 2/22/2024  1:06 AM  Admit Dx: Anasarca [R60.1]  Other ascites [R18.8]  Abdominal pain, unspecified abdominal location [R10.9]  Hydrocele, unspecified hydrocele type [N43.3]    Subjective:  Patient is being followed for Anasarca [R60.1]  Other ascites [R18.8]  Abdominal pain, unspecified abdominal location [R10.9]  Hydrocele, unspecified hydrocele type [N43.3]     Patient was seen and examined.     ROS: denies fever, chills, cp, sob, n/v, HA unless stated above.      droNABinol  5 mg Oral BID    sodium chloride flush  5-40 mL IntraVENous 2 times per day    thiamine  100 mg Oral Daily    busPIRone  10 mg Oral BID    ferrous sulfate  325 mg Oral Daily with breakfast    folic acid  1 mg Oral QAM    lactulose  20 g Oral TID    midodrine  10 mg Oral TID WC    pantoprazole  40 mg Oral BID AC    pentoxifylline  400 mg Oral TID WC    sodium chloride flush  5-40 mL IntraVENous 2 times per day    nicotine  1 patch TransDERmal Daily    rifAXIMin  550 mg Oral BID    spironolactone  50 mg Oral Daily    furosemide  80 mg Oral QAM    traZODone  50 mg Oral Nightly    melatonin  6 mg Oral QPM     sodium chloride flush, 5-40 mL, PRN  sodium chloride, , PRN  LORazepam, 1 mg, Q1H PRN   Or  LORazepam, 1 mg, Q1H PRN   Or  LORazepam, 2 mg, Q1H PRN   Or  LORazepam, 2 mg, Q1H PRN   Or  LORazepam, 3 mg, Q1H PRN   Or  LORazepam, 3 mg, Q1H PRN   Or  LORazepam, 4 mg, Q1H PRN   Or  LORazepam, 4 mg, Q1H PRN  sodium chloride flush, 5-40 mL, PRN  sodium chloride, , PRN  potassium chloride, 40 mEq, PRN   Or  potassium alternative oral replacement, 40 mEq, PRN   Or  potassium chloride, 10 mEq, PRN  magnesium sulfate, 2,000 mg, PRN  ondansetron, 4 mg, Q8H PRN   Or  ondansetron, 4 mg, Q6H PRN  polyethylene glycol, 17 g, Daily PRN  acetaminophen, 650 mg, Q6H PRN   Or  acetaminophen, 650 mg, Q6H PRN  oxyCODONE, 5 mg, Q4H PRN  melatonin, 6 mg, Nightly PRN  fentanNYL, 25 mcg, Q3H 
       [facility] Hospitalist Progress Note    Admitting Date and Time: 2/22/2024  1:06 AM  Admit Dx: Anasarca [R60.1]  Other ascites [R18.8]  Abdominal pain, unspecified abdominal location [R10.9]  Hydrocele, unspecified hydrocele type [N43.3]    Subjective:  Patient is being followed for Anasarca [R60.1]  Other ascites [R18.8]  Abdominal pain, unspecified abdominal location [R10.9]  Hydrocele, unspecified hydrocele type [N43.3]     Patient has no complaints at this time. He is agreeable to go to [facility]    ROS: denies fever, chills, cp, sob, n/v, HA unless stated above.      baclofen  10 mg Oral TID    droNABinol  5 mg Oral BID    sodium chloride flush  5-40 mL IntraVENous 2 times per day    thiamine  100 mg Oral Daily    busPIRone  10 mg Oral BID    ferrous sulfate  325 mg Oral Daily with breakfast    folic acid  1 mg Oral QAM    lactulose  20 g Oral TID    midodrine  10 mg Oral TID WC    pantoprazole  40 mg Oral BID AC    pentoxifylline  400 mg Oral TID WC    sodium chloride flush  5-40 mL IntraVENous 2 times per day    nicotine  1 patch TransDERmal Daily    rifAXIMin  550 mg Oral BID    spironolactone  50 mg Oral Daily    furosemide  80 mg Oral QAM    traZODone  50 mg Oral Nightly    melatonin  6 mg Oral QPM     prochlorperazine, 10 mg, Q6H PRN  sodium chloride flush, 5-40 mL, PRN  sodium chloride, , PRN  LORazepam, 1 mg, Q1H PRN   Or  LORazepam, 1 mg, Q1H PRN   Or  LORazepam, 2 mg, Q1H PRN   Or  LORazepam, 2 mg, Q1H PRN   Or  LORazepam, 3 mg, Q1H PRN   Or  LORazepam, 3 mg, Q1H PRN   Or  LORazepam, 4 mg, Q1H PRN   Or  LORazepam, 4 mg, Q1H PRN  sodium chloride flush, 5-40 mL, PRN  sodium chloride, , PRN  potassium chloride, 40 mEq, PRN   Or  potassium alternative oral replacement, 40 mEq, PRN   Or  potassium chloride, 10 mEq, PRN  magnesium sulfate, 2,000 mg, PRN  ondansetron, 4 mg, Q8H PRN   Or  ondansetron, 4 mg, Q6H PRN  polyethylene glycol, 17 g, Daily PRN  acetaminophen, 650 mg, Q6H PRN   
  Physician Progress Note      PATIENT:               TONIO KWOK  [facility] #:                  [id number]  :                       1980  ADMIT DATE:       2024 1:06 AM  DISCH DATE:  RESPONDING  PROVIDER #:        Aamris Navarrete DO          QUERY TEXT:    Patient admitted with Alcoholic cirrhosis and hepatitis with Ascites.  Noted   documentation of Decompensated cirrhosis of the liver.  If possible, please   document in progress notes and discharge summary if you are evaluating and/or   treating any of the following:    The medical record reflects the following:  Risk Factors: Hepatitis C and Cirrhosis  Clinical Indicators: Per H&P: \"...This is a 44 year old male with PMH   significant for hepatitis C, polysubstance abuse, alcohol abuse, alcoholic   cirrhosis with ascites, portal hypertension, esophageal varices, and GAVE...\",   Per urology pt has admitted to current alcohol use, AST 40  53  54  54  Total   Bili 6.0  5.1  4.6  4.5, Admitted with ascites and reported decompensated   cirrhosis of the liver  Treatment: Lactulose, Rifaxin    Thank you,  Jessica PRICEN, RN, CRCR  Clinical Documentation Improvement  Options provided:  -- Liver failure due to alcoholism or history of alcoholism  -- Chronic liver failure not due to alcoholism  -- Other - I will add my own diagnosis  -- Disagree - Not applicable / Not valid  -- Disagree - Clinically unable to determine / Unknown  -- Refer to Clinical Documentation Reviewer    PROVIDER RESPONSE TEXT:    This patient has liver failure due to alcoholism or history of alcoholism.    Query created by: Jessica Cruz on 2024 9:04 AM      Electronically signed by:  Amaris Navarrete DO 2024 11:21 AM          
4 Eyes Skin Assessment     NAME:  Johnathan Yanez III  YOB: 1980  MEDICAL RECORD NUMBER:  [id number]    The patient is being assessed for  Admission    I agree that at least one RN has performed a thorough Head to Toe Skin Assessment on the patient. ALL assessment sites listed below have been assessed.      Areas assessed by both nurses:    Head, Face, Ears, Shoulders, Back, Chest, Arms, Elbows, Hands, Sacrum. Buttock, Coccyx, Ischium, Legs. Feet and Heels, and Under Medical Devices         Does the Patient have a Wound? Yes wound(s) were present on assessment. LDA wound assessment was Initiated and completed by RN       Dennis Prevention initiated by RN: Yes  Wound Care Orders initiated by RN: Yes    Pressure Injury (Stage 3,4, Unstageable, DTI, NWPT, and Complex wounds) if present, place Wound referral order by RN under : Yes    New Ostomies, if present place, Ostomy referral order under : No     Nurse 1 eSignature: Electronically signed by Dulce Cerda RN on 2/22/24 at 4:30 PM EST    **SHARE this note so that the co-signing nurse can place an eSignature**    Nurse 2 eSignature: {Esignature:[id number]}  
Attempted to call nurse-to-nurse report to [facility]; no reply.  
Attempting to get in reach with Dr GALEANO regarding pain medication, patient states he is still having 8/10  pain. Spoke with Dr Herrera, he will have Dr GALEANO reach out.   
Called [facility] wound nurse aware of scrotal wound   
Comprehensive Nutrition Assessment    Type and Reason for Visit:  Initial, Positive Nutrition Screen    Nutrition Recommendations/Plan:   Continue current diet and ONS, as tolerated  Continue inpatient monitoring     Malnutrition Assessment:  Malnutrition Status:  At risk for malnutrition (Comment) (02/24/24 1432)    Context:  Chronic Illness     Findings of the 6 clinical characteristics of malnutrition:  Energy Intake:  75% or less estimated energy requirements for 1 month or longer  Weight Loss:  Unable to assess (wt fluctuations with fluid status)     Body Fat Loss:  Unable to assess (anasarca may mask losses)     Muscle Mass Loss:  Unable to assess (edema and anasarca may mask losses)    Fluid Accumulation:  Unable to assess Generalized, Ascites, Genitals (multiple factors contributing)   Strength:  Not Performed    Nutrition Assessment:    Pt admit from NH 2/2 anasarca. S/p paracentesis 2/22 and 4.8L off. Pertinent hx=decompensated liver disease/cirrhosis, hep C, ETOH abuse, esophageal varices, scrotal cellulitis. PO intake at meals improving. Will add Ensure Compact ONS BID and continue inpt monitoring.    Nutrition Related Findings:    A&Ox4, distended round abd +BS, missing teeth, -I/O 2.2L, +3 gen edema/anasarca, diarrhea (lactulose), ammonia WNL, hyponatremia Wound Type: None (wound care deferred to Urology re: scrotal abscess)       Current Nutrition Intake & Therapies:    Average Meal Intake: 51-75%  Average Supplements Intake: None Ordered  ADULT DIET; Regular  ADULT ORAL NUTRITION SUPPLEMENT; Breakfast, Dinner; Standard 4 oz Oral Supplement    Anthropometric Measures:  Height: 172.7 cm (5' 8\")  Ideal Body Weight (IBW): 154 lbs (70 kg)    Admission Body Weight: 74.1 kg (163 lb 5.8 oz) (2/23 bedscale)  Current Body Weight: 75.3 kg (166 lb 0.1 oz), 107.8 % IBW. Weight Source: Bed Scale (2/24)  Current BMI (kg/m2): 25.2  Usual Body Weight: 76.5 kg (168 lb 10 oz) (8/27/2023 bedscale)  % Weight Change 
Database initiated. Patient is A&O comes in from [place]. He uses mostly a wheelchair but can stand with help and a walker He is RA at baseline. He is having diarrhea. Also states he has a wound on his testicles he will need a wound care consult.       !!! He is worried because he has an appointment [facility] Tuesday and we would prefer not to miss it.   
Occupational Therapy  OCCUPATIONAL THERAPY INITIAL EVALUATION  [facility]  [address] [place], [place]    Date: 2024     Patient Name: Johnathan Yanez III  MRN: [id number]  : 1980  Room: [address] [place] [address]    Evaluating OT: Valerie Mejia, OTR/L - [id number]    Referring Provider: Mayte Lynne DO   Specific Provider Orders/Date: \"OT eval and treat\" - 2024    Diagnosis: Anasarca [R60.1]  Other ascites [R18.8]  Abdominal pain, unspecified abdominal location [R10.9]  Hydrocele, unspecified hydrocele type [N43.3]      Pertinent Medical History: cirrhosis, esophageal varices, ETOH abuse, hepatitis C, portal hypertension  Precautions: fall risk, skin integrity    Assessment of Current Deficits:    [x] Functional mobility   [x]ADLs  [x] Strength               []Cognition   [x] Functional transfers   [x] IADLs         [x] Safety Awareness   [x]Endurance   [] Fine Coordination              [x] Balance      [] Vision/perception   []Sensation    []Gross Motor Coordination  [] ROM  [] Delirium                   [] Motor Control     OT PLAN OF CARE   OT POC is based on physician orders, patient diagnosis, and results of clinical assessment.  Frequency/Duration 1-3 days/week for 2 weeks PRN   Specific OT Treatment Interventions to Include:   * Instruction/training on adapted ADL techniques and AE recommendations to increase functional independence within precautions       * Training on energy conservation strategies, correct breathing pattern and techniques to improve independence/tolerance for self-care routine  * Functional transfer/mobility training/DME recommendations for increased independence, safety, and fall prevention  * Patient/Family education to increase follow through with safety techniques and functional independence  * Recommendation of environmental modifications for increased safety with functional transfers/mobility and ADLs  * 
Occupational Therapy  OT BEDSIDE TREATMENT NOTE      Date:2024  Patient Name: Johnathan Yanez III  MRN: [id number]  : 1980  Room: [address] [place] [address]     Evaluating OT: Valerie Mejia OTR/L - [id number]     Referring Provider: Mayte Lynne DO   Specific Provider Orders/Date: \"OT eval and treat\" - 2024     Diagnosis: Anasarca [R60.1]  Other ascites [R18.8]  Abdominal pain, unspecified abdominal location [R10.9]  Hydrocele, unspecified hydrocele type [N43.3]       Pertinent Medical History: cirrhosis, esophageal varices, ETOH abuse, hepatitis C, portal hypertension  Precautions: fall risk, skin integrity     Assessment of Current Deficits:    [x] Functional mobility             [x]ADLs           [x] Strength                  []Cognition   [x] Functional transfers           [x] IADLs         [x] Safety Awareness   [x]Endurance   [] Fine Coordination              [x] Balance      [] Vision/perception   []Sensation     []Gross Motor Coordination  [] ROM           [] Delirium                   [] Motor Control      OT PLAN OF CARE   OT POC is based on physician orders, patient diagnosis, and results of clinical assessment.  Frequency/Duration 1-3 days/week for 2 weeks PRN   Specific OT Treatment Interventions to Include:   * Instruction/training on adapted ADL techniques and AE recommendations to increase functional independence within precautions       * Training on energy conservation strategies, correct breathing pattern and techniques to improve independence/tolerance for self-care routine  * Functional transfer/mobility training/DME recommendations for increased independence, safety, and fall prevention  * Patient/Family education to increase follow through with safety techniques and functional independence  * Recommendation of environmental modifications for increased safety with functional transfers/mobility and ADLs  * Therapeutic exercise to improve motor endurance, ROM, and functional strength for 
Patient expresses preference for transport during day shift due to prior adverse experiences with late night transport.  
Physical Therapy  Facility/Department: [address] MED SURG  Physical Therapy Initial Assessment    Name: Johnathan Yanez III  : 1980  MRN: [id number]  Date of Service: 2024           Patient Diagnosis(es): The primary encounter diagnosis was Abdominal pain, unspecified abdominal location. Diagnoses of Other ascites and Hydrocele, unspecified hydrocele type were also pertinent to this visit.  Past Medical History:  has a past medical history of Cirrhosis (HCC), Esophageal varices (HCC), ETOH abuse, GAVE (gastric antral vascular ectasia), Hepatitis C, and Portal hypertension (HCC).  Past Surgical History:  has a past surgical history that includes Upper gastrointestinal endoscopy (N/A, 2020); Tonsillectomy; Upper gastrointestinal endoscopy (N/A, 2021); Upper gastrointestinal endoscopy (N/A, 5/10/2021); Endoscopy, colon, diagnostic; Umbilical hernia repair (N/A, 9/15/2021); hernia repair; hernia repair (Left, 2022); Upper gastrointestinal endoscopy (N/A, 2023); Upper gastrointestinal endoscopy (2023); Upper gastrointestinal endoscopy (N/A, 6/3/2023); Upper gastrointestinal endoscopy (N/A, 2024); and Esophagoscopy (N/A, 2024).         Requires PT Follow-Up: Yes     Evaluating Therapist: Giuliana Rodriguez PT     Referring Provider:  Mayte Lynne DO     PT order : PT eval and treat     Room #: 624  DIAGNOSIS: The primary encounter diagnosis was Abdominal pain, unspecified abdominal location. Diagnoses of Other ascites and Hydrocele, unspecified hydrocele type were also pertinent to this visit.  PRECAUTIONS: falls     Social:  Pt admitted from SNF . Prior, lived with family   Prior to admission pt walked with  ww with assist per pt      Initial Evaluation  Date:  2024 Treatment      Short Term/ Long Term   Goals   Was pt agreeable to Eval/treatment? With encouragement      Does pt have pain?  \"Balls\"      Bed Mobility  Rolling: SBA with rail  Supine to sit:  min assist   Sit 
Physical Therapy  Facility/Department: [address] MED SURG  Physical Therapy Treatment Note    Name: Johnathan Yanez III  : 1980  MRN: [id number]  Date of Service: 2024           Patient Diagnosis(es): The primary encounter diagnosis was Abdominal pain, unspecified abdominal location. Diagnoses of Other ascites and Hydrocele, unspecified hydrocele type were also pertinent to this visit.  Past Medical History:  has a past medical history of Cirrhosis (HCC), Esophageal varices (HCC), ETOH abuse, GAVE (gastric antral vascular ectasia), Hepatitis C, and Portal hypertension (HCC).  Past Surgical History:  has a past surgical history that includes Upper gastrointestinal endoscopy (N/A, 2020); Tonsillectomy; Upper gastrointestinal endoscopy (N/A, 2021); Upper gastrointestinal endoscopy (N/A, 5/10/2021); Endoscopy, colon, diagnostic; Umbilical hernia repair (N/A, 9/15/2021); hernia repair; hernia repair (Left, 2022); Upper gastrointestinal endoscopy (N/A, 2023); Upper gastrointestinal endoscopy (2023); Upper gastrointestinal endoscopy (N/A, 6/3/2023); Upper gastrointestinal endoscopy (N/A, 2024); and Esophagoscopy (N/A, 2024).               Evaluating Therapist: Giuliana Rodriguez PT     Referring Provider:  Mayte Lynne DO     PT order : PT eval and treat     Room #: 624  DIAGNOSIS: The primary encounter diagnosis was Abdominal pain, unspecified abdominal location. Diagnoses of Other ascites and Hydrocele, unspecified hydrocele type were also pertinent to this visit.  PRECAUTIONS: falls     Social:  Pt admitted from SNF . Prior, lived with family   Prior to admission pt walked with  ww with assist per pt      Initial Evaluation  Date:  2024 Treatment  2024    Short Term/ Long Term   Goals   Was pt agreeable to Eval/treatment? With encouragement  yes    Does pt have pain?  \"Balls\"  Scrotal pain    Bed Mobility  Rolling: SBA with rail  Supine to sit:  min assist   Sit to 
Urology consulted and notes reviewed re scrotal abscess.  Melody Martinez, CNS, Wound Care    
5 mg, Q4H PRN  melatonin, 6 mg, Nightly PRN         Objective:    BP (!) 97/51   Pulse 97   Temp 98 °F (36.7 °C) (Oral)   Resp 18   Ht 1.727 m (5' 8\")   Wt 75.3 kg (166 lb 1.6 oz)   SpO2 94%   BMI 25.26 kg/m²     General Appearance: alert and oriented to person, place and time and in no acute distress  Skin: warm and dry  Head: normocephalic and atraumatic  Eyes: pupils equal, round, extraocular eye movements intact, conjunctivae normal  Pulmonary/Chest: clear to auscultation bilaterally- no wheezes, rales or rhonchi, normal air movement, no respiratory distress  Cardiovascular: normal rate, normal S1 and S2   Abdomen: soft, non-tender, non-distended, normal bowel sounds,   Extremities: no cyanosis, no clubbing and no edema  Neurologic: no cranial nerve deficit and speech normal      Recent Labs     02/22/24 0230 02/23/24  0425 02/24/24  0458   * 137 133   K 3.0* 3.4* 3.9   CL 97* 101 101   CO2 26 27 24   BUN 11 10 8   CREATININE 1.1 1.2 1.0   GLUCOSE 106* 95 96   CALCIUM 8.6 8.6 8.2*       Recent Labs     02/22/24 0230 02/23/24  0425 02/24/24  0458   WBC 3.1* 2.5* 2.6*   RBC 2.63* 2.49* 2.34*   HGB 8.9* 8.6* 8.1*   HCT 26.0* 25.4* 24.5*   MCV 98.9 102.0* 104.7*   MCH 33.8 34.5 34.6   MCHC 34.2 33.9 33.1   RDW 19.4* 18.9* 18.6*   PLT 34* 26*  --    MPV 10.8 10.8 10.2       Assessment:    Principal Problem:    Anasarca  Resolved Problems:    * No resolved hospital problems. *      Plan:    Decompensated cirrhosis - Continue lasix, lactulose, aldactone, and rifaxin. Is to follow up with [facility].   Scrotal abscess - Urology has signed off.     Needs precert to return to [place]      NOTE: This report was transcribed using voice recognition software. Every effort was made to ensure accuracy; however, inadvertent computerized transcription errors may be present.    Electronically signed by Amaris Navarrete DO on 2/24/2024 at 12:13 PM     
PRN  melatonin, 6 mg, Nightly PRN         Objective:    /62   Pulse 72   Temp 97.4 °F (36.3 °C) (Axillary)   Resp 18   Ht 1.727 m (5' 8\")   Wt 74.8 kg (165 lb)   SpO2 96%   BMI 25.09 kg/m²     General Appearance: alert and oriented to person, place and time and in no acute distress, lethargic  Skin: warm and dry  Head: normocephalic and atraumatic  Eyes: pupils equal, round, extraocular eye movements intact, conjunctivae normal  Pulmonary/Chest: clear to auscultation bilaterally- no wheezes, rales or rhonchi, normal air movement, no respiratory distress  Cardiovascular: normal rate, normal S1 and S2   Abdomen: soft, non-tender, non-distended, normal bowel sounds,   Extremities: no cyanosis, no clubbing and no edema  Neurologic: no cranial nerve deficit and speech normal, did not assess ambulation      Recent Labs     02/24/24 0458 02/25/24 0317 02/26/24  0254    134 131*   K 3.9 3.6 4.1    101 98   CO2 24 28 29   BUN 8 8 9   CREATININE 1.0 1.1 1.0   GLUCOSE 96 86 96   CALCIUM 8.2* 8.2* 8.7       Recent Labs     02/24/24 0458 02/25/24 0317 02/26/24  0254   WBC 2.6* 3.2* 4.1*   RBC 2.34* 2.28* 2.24*   HGB 8.1* 7.7* 7.9*   HCT 24.5* 22.9* 22.8*   .7* 100.4* 101.8*   MCH 34.6 33.8 35.3*   MCHC 33.1 33.6 34.6*   RDW 18.6* 18.0* 17.7*   PLT  --   --  31*   MPV 10.2 10.7 11.2     Assessment:    Principal Problem:    Anasarca  Resolved Problems:    * No resolved hospital problems. *      Plan:    Decompensated cirrhosis - Continue lasix, lactulose, aldactone, and rifaxin. Is to follow up with [facility].   Scrotal abscess - Urology has signed off.      Needs precert to return to [place].   [facility] score is 16.    NOTE: This report was transcribed using voice recognition software. Every effort was made to ensure accuracy; however, inadvertent computerized transcription errors may be present.    Electronically signed by Amaris Navarrete DO on 2/26/2024 at 5:00 PM     
bearing Sit <> stand SBA SBA   Functional Mobility Unable to complete, LLE cramping with weight bearing SBA with WW in room and abernathy  SBA with functional mobility (with device, as needed/appropriate) in order to maximize independence with ADLs and other functional tasks.   Balance Sitting: SBA (EOB)  Standing: MIN A (with FWW) Sitting supervision  Standing balance SBA with WW SBA dynamic standing balance during completion of ADLs and other functional tasks.   Activity Tolerance Fair, fatigues quickly, limited by pain Fair+ Patient will demonstrate Good understanding and consistent implementation of energy conservation techniques and work simplification techniques into ADLs and functional tasks   Visual/  Perceptual Glasses, reports \"vision issues\" at baseline      N/A   B UE Strength 3+/5   Patient will demonstrate 4/5 B UE strength in order to maximize independence with ADLs and functional tasks.     Comments:  patient cleared with nursing and agreeable to bed level activity. Much improvement demonstrated today, good effort and motivation. Patient in chair with call light in reach    Education/treatment: ADL and functional transfer/activity performed to increase safety and independence during self care tasks. Education provided on safety awareness, adl reeducation, functional transfer training    Pt has made progress towards set goals.     Time In: 1:26  Time Out: 2:00     Min Units   Therapeutic Ex 20881     Therapeutic Activities 87683 34 1   ADL/Self Care 25550     Orthotic Management 43631     Neuro Re-Ed 43047     Non-Billable Time     TOTAL TIMED TREATMENT 34 1       Jenny JEROME/NOAM [id number]    
BIBA , family called 911 pt was alter fs 49 ,  EMS gave 1mg glucagon given pt took 10 regular insulin at 0330 and did not eat this morning. orange juice given at triage, pt alert x 4

## 2025-07-12 NOTE — ANESTHESIA POSTPROCEDURE EVALUATION
Department of Anesthesiology  Postprocedure Note    Patient: Lee Weaver III  MRN: [id number]  YOB: 1980  Date of evaluation: 9/15/2021  Time:  12:06 PM     Procedure Summary     Date: 09/15/21 Room / Location: [address] [place] [address] 06 [facility]    Anesthesia Start: 0801 Anesthesia Stop: 4418    Procedure: [place] 7 2020 [facility] (N/A ) Diagnosis: (UMBILICAL HERNIA)    Surgeons: Serafin Nuñez MD Responsible Provider: Carole Kim MD    Anesthesia Type: general ASA Status: 2          Anesthesia Type: general    Edmundo Phase I: Edmundo Score: 10    Edmundo Phase II: Edmundo Score: 10    Last vitals: Reviewed and per EMR flowsheets.        Anesthesia Post Evaluation    Patient location during evaluation: PACU  Patient participation: complete - patient participated  Level of consciousness: awake  Airway patency: patent  Nausea & Vomiting: no nausea and no vomiting  Complications: no  Cardiovascular status: hemodynamically stable  Respiratory status: acceptable  Hydration status: euvolemic
15

## (undated) DEVICE — LUBRICANT SURG JELLY ST BACTER TUBE 4.25OZ

## (undated) DEVICE — SYRINGE MED 10ML TRNSLUC BRL PLUNG BLK MRK POLYPR CTRL

## (undated) DEVICE — BLOCK BITE 60FR CAREGUARD

## (undated) DEVICE — NEEDLE HYPO 25GA L1.5IN BLU POLYPR HUB S STL REG BVL STR

## (undated) DEVICE — SIX SHOOTER SAEED MULTI-BAND LIGATOR: Brand: SAEED

## (undated) DEVICE — BLOCK BITE 60FR RUBBER ADLT DENTAL

## (undated) DEVICE — SET ENDO INSTR LAPAROSCOPIC INCISIONAL

## (undated) DEVICE — FORCEPS BX L240CM JAW DIA2.8MM L CAP W/ NDL MIC MESH TOOTH

## (undated) DEVICE — TOWEL,OR,DSP,ST,BLUE,STD,6/PK,12PK/CS: Brand: MEDLINE

## (undated) DEVICE — KENDALL 450 SERIES MONITORING FOAM ELECTRODE - RECTANGULAR SHAPE ( 3/PK): Brand: KENDALL

## (undated) DEVICE — SPONGE GZ 4IN 4IN 4 PLY N WVN AVANT

## (undated) DEVICE — CLOTH SURG PREP PREOPERATIVE CHLORHEXIDINE GLUC 2% READYPREP

## (undated) DEVICE — PACK SURG LAP CHOLE CUSTOM

## (undated) DEVICE — ELECTRODE PT RET AD L9FT HI MOIST COND ADH HYDRGEL CORDED

## (undated) DEVICE — COVER,LIGHT HANDLE,FLX,1/PK: Brand: MEDLINE INDUSTRIES, INC.

## (undated) DEVICE — TROCAR: Brand: KII FIOS FIRST ENTRY

## (undated) DEVICE — KIT BEDSIDE REVITAL OX 500ML

## (undated) DEVICE — MASK,FACE,MAXFLUIDPROTECT,SHIELD/ERLPS: Brand: MEDLINE

## (undated) DEVICE — GOWN,SIRUS,NONRNF,SETINSLV,XL,20/CS: Brand: MEDLINE

## (undated) DEVICE — SPONGE GZ W4XL4IN RAYON POLY CVR W/NONWOVEN FAB STRL 2/PK

## (undated) DEVICE — 6 X 9  1.75MIL 4-WALL LABGUARD: Brand: MINIGRIP COMMERCIAL LLC

## (undated) DEVICE — GRADUATE TRIANG MEASURE 1000ML BLK PRNT

## (undated) DEVICE — GOWN ISOLATN REG YEL M WT MULTIPLY SIDETIE LEV 2

## (undated) DEVICE — TUBING, SUCTION, 1/4" X 10', STRAIGHT: Brand: MEDLINE

## (undated) DEVICE — DOUBLE BASIN SET: Brand: MEDLINE INDUSTRIES, INC.

## (undated) DEVICE — MULTIPLE BAND LIGATOR: Brand: SPEEDBAND SUPERVIEW SUPER 7

## (undated) DEVICE — Z INACTIVE USE 2660664 SOLUTION IRRIG 3000ML 0.9% SOD CHL USP UROMATIC PLAS CONT

## (undated) DEVICE — ADAPTER CLEANING PORPOISE CLEANING

## (undated) DEVICE — CONTAINER SPEC COLL 960ML POLYPR TRIANG GRAD INTAKE/OUTPUT

## (undated) DEVICE — TROCAR: Brand: KII SLEEVE

## (undated) DEVICE — APPLICATOR MEDICATED 26 CC SOLUTION HI LT ORNG CHLORAPREP

## (undated) DEVICE — DEFENDO AIR WATER SUCTION AND BIOPSY VALVE KIT FOR  OLYMPUS: Brand: DEFENDO AIR/WATER/SUCTION AND BIOPSY VALVE

## (undated) DEVICE — TROCAR ENDOSCP SHFT L150MM DIA8MM TEAL BLDELSS W/ STBL

## (undated) DEVICE — STAPLER INT DIA5MM 25 ABSRB STRP FIX DISP FOR HERN MESH

## (undated) DEVICE — SKIN AFFIX SURG ADHESIVE 72/CS 0.55ML: Brand: MEDLINE

## (undated) DEVICE — CATHETER ELECTROHEMSTAS 7FR L300CM WRK CHAN 2.8MM GLD PRB

## (undated) DEVICE — CAMERA STRYKER 1488 HD GEN

## (undated) DEVICE — YANKAUER,BULB TIP,W/O VENT,RIGID,STERILE: Brand: MEDLINE

## (undated) DEVICE — [HIGH FLOW INSUFFLATOR,  DO NOT USE IF PACKAGE IS DAMAGED,  KEEP DRY,  KEEP AWAY FROM SUNLIGHT,  PROTECT FROM HEAT AND RADIOACTIVE SOURCES.]: Brand: PNEUMOSURE

## (undated) DEVICE — GARMENT,MEDLINE,DVT,INT,CALF,MED, GEN2: Brand: MEDLINE

## (undated) DEVICE — FORCEPS BX L160CM JAW DIA2.4MM YEL L CAP W/ NDL DISP RAD

## (undated) DEVICE — FIAPC® PROBE W/ FILTER 2200 A OD 2.3MM/6.9FR; L 2.2M/7.2FT: Brand: ERBE

## (undated) DEVICE — MARKER,SKIN,WI/RULER AND LABELS: Brand: MEDLINE

## (undated) DEVICE — Device: Brand: DEFENDO VALVE AND CONNECTOR KIT

## (undated) DEVICE — GAUZE,SPONGE,POST-OP,4X3,STRL,LF: Brand: MEDLINE

## (undated) DEVICE — BITEBLOCK 54FR W/ DENT RIM BLOX

## (undated) DEVICE — GLOVE ORANGE PI 7 1/2   MSG9075

## (undated) DEVICE — GLOVE ORANGE PI 8   MSG9080

## (undated) DEVICE — NEEDLE SCLERO 25GA L240CM OD0.51MM ID0.24MM EXTN L4MM SHTH

## (undated) DEVICE — CONTAINER SPEC 60ML PH 7NEUTRAL BUFF FRMLN RDY TO USE

## (undated) DEVICE — PUMP SUC IRR TBNG L10FT W/ HNDPC ASSEMB STRYKEFLOW 2

## (undated) DEVICE — SET MAJOR INSTR HOUSE

## (undated) DEVICE — GLOVE ORTHO 8   MSG9480

## (undated) DEVICE — CANNULA NSL ORAL AD FOR CAPNOFLEX CO2 O2 AIRLFE

## (undated) DEVICE — INSUFFLATION NEEDLE TO ESTABLISH PNEUMOPERITONEUM.: Brand: INSUFFLATION NEEDLE

## (undated) DEVICE — LAPAROSCOPIC SCISSORS: Brand: EPIX LAPAROSCOPIC SCISSORS